# Patient Record
Sex: FEMALE | Race: WHITE | Employment: OTHER | URBAN - NONMETROPOLITAN AREA
[De-identification: names, ages, dates, MRNs, and addresses within clinical notes are randomized per-mention and may not be internally consistent; named-entity substitution may affect disease eponyms.]

---

## 2017-01-13 ENCOUNTER — HOSPITAL ENCOUNTER (OUTPATIENT)
Dept: GENERAL RADIOLOGY | Age: 52
Discharge: HOME | End: 2017-01-13
Payer: COMMERCIAL

## 2017-01-13 ENCOUNTER — OFFICE VISIT (OUTPATIENT)
Dept: URGENT CARE | Age: 52
End: 2017-01-13
Payer: COMMERCIAL

## 2017-01-13 VITALS
WEIGHT: 193.6 LBS | SYSTOLIC BLOOD PRESSURE: 95 MMHG | RESPIRATION RATE: 18 BRPM | HEART RATE: 62 BPM | HEIGHT: 62 IN | OXYGEN SATURATION: 99 % | BODY MASS INDEX: 35.63 KG/M2 | TEMPERATURE: 97.7 F | DIASTOLIC BLOOD PRESSURE: 64 MMHG

## 2017-01-13 DIAGNOSIS — M25.531 RIGHT WRIST PAIN: ICD-10-CM

## 2017-01-13 DIAGNOSIS — S63.501A RIGHT WRIST SPRAIN, INITIAL ENCOUNTER: Primary | ICD-10-CM

## 2017-01-13 DIAGNOSIS — M79.644 THUMB PAIN, RIGHT: ICD-10-CM

## 2017-01-13 PROCEDURE — 99213 OFFICE O/P EST LOW 20 MIN: CPT | Performed by: NURSE PRACTITIONER

## 2017-01-13 PROCEDURE — 73130 X-RAY EXAM OF HAND: CPT

## 2017-01-13 PROCEDURE — 73110 X-RAY EXAM OF WRIST: CPT

## 2017-01-13 PROCEDURE — G8427 DOCREV CUR MEDS BY ELIG CLIN: HCPCS | Performed by: NURSE PRACTITIONER

## 2017-01-13 PROCEDURE — G8484 FLU IMMUNIZE NO ADMIN: HCPCS | Performed by: NURSE PRACTITIONER

## 2017-01-13 PROCEDURE — 3014F SCREEN MAMMO DOC REV: CPT | Performed by: NURSE PRACTITIONER

## 2017-01-13 PROCEDURE — 1036F TOBACCO NON-USER: CPT | Performed by: NURSE PRACTITIONER

## 2017-01-13 PROCEDURE — G8598 ASA/ANTIPLAT THER USED: HCPCS | Performed by: NURSE PRACTITIONER

## 2017-01-13 PROCEDURE — 3017F COLORECTAL CA SCREEN DOC REV: CPT | Performed by: NURSE PRACTITIONER

## 2017-01-13 PROCEDURE — G8419 CALC BMI OUT NRM PARAM NOF/U: HCPCS | Performed by: NURSE PRACTITIONER

## 2017-01-13 ASSESSMENT — ENCOUNTER SYMPTOMS
GASTROINTESTINAL NEGATIVE: 1
RESPIRATORY NEGATIVE: 1
EYES NEGATIVE: 1

## 2017-02-09 ENCOUNTER — OFFICE VISIT (OUTPATIENT)
Dept: PRIMARY CARE CLINIC | Age: 52
End: 2017-02-09
Payer: COMMERCIAL

## 2017-02-09 VITALS
SYSTOLIC BLOOD PRESSURE: 135 MMHG | HEART RATE: 72 BPM | WEIGHT: 197.6 LBS | OXYGEN SATURATION: 98 % | DIASTOLIC BLOOD PRESSURE: 71 MMHG | RESPIRATION RATE: 19 BRPM | HEIGHT: 62 IN | TEMPERATURE: 97.7 F | BODY MASS INDEX: 36.36 KG/M2

## 2017-02-09 DIAGNOSIS — E78.49 OTHER HYPERLIPIDEMIA: ICD-10-CM

## 2017-02-09 DIAGNOSIS — I10 ESSENTIAL HYPERTENSION: ICD-10-CM

## 2017-02-09 DIAGNOSIS — E55.9 VITAMIN D DEFICIENCY: ICD-10-CM

## 2017-02-09 DIAGNOSIS — E11.9 TYPE 2 DIABETES, HBA1C GOAL < 7% (HCC): Primary | ICD-10-CM

## 2017-02-09 PROCEDURE — G8484 FLU IMMUNIZE NO ADMIN: HCPCS | Performed by: NURSE PRACTITIONER

## 2017-02-09 PROCEDURE — 3017F COLORECTAL CA SCREEN DOC REV: CPT | Performed by: NURSE PRACTITIONER

## 2017-02-09 PROCEDURE — G8427 DOCREV CUR MEDS BY ELIG CLIN: HCPCS | Performed by: NURSE PRACTITIONER

## 2017-02-09 PROCEDURE — G8598 ASA/ANTIPLAT THER USED: HCPCS | Performed by: NURSE PRACTITIONER

## 2017-02-09 PROCEDURE — 3046F HEMOGLOBIN A1C LEVEL >9.0%: CPT | Performed by: NURSE PRACTITIONER

## 2017-02-09 PROCEDURE — 1036F TOBACCO NON-USER: CPT | Performed by: NURSE PRACTITIONER

## 2017-02-09 PROCEDURE — 99214 OFFICE O/P EST MOD 30 MIN: CPT | Performed by: NURSE PRACTITIONER

## 2017-02-09 PROCEDURE — G8419 CALC BMI OUT NRM PARAM NOF/U: HCPCS | Performed by: NURSE PRACTITIONER

## 2017-02-09 PROCEDURE — 3014F SCREEN MAMMO DOC REV: CPT | Performed by: NURSE PRACTITIONER

## 2017-02-09 ASSESSMENT — ENCOUNTER SYMPTOMS
APNEA: 0
COLOR CHANGE: 0
ABDOMINAL DISTENTION: 0
TROUBLE SWALLOWING: 0
WHEEZING: 0
EYE REDNESS: 0
SHORTNESS OF BREATH: 0
NAUSEA: 0
ABDOMINAL PAIN: 0
VOMITING: 0
PHOTOPHOBIA: 0
SORE THROAT: 0
VOICE CHANGE: 0

## 2017-02-24 ENCOUNTER — OFFICE VISIT (OUTPATIENT)
Dept: URGENT CARE | Age: 52
End: 2017-02-24
Payer: COMMERCIAL

## 2017-02-24 VITALS
TEMPERATURE: 98 F | BODY MASS INDEX: 35.55 KG/M2 | DIASTOLIC BLOOD PRESSURE: 59 MMHG | HEIGHT: 62 IN | RESPIRATION RATE: 20 BRPM | SYSTOLIC BLOOD PRESSURE: 123 MMHG | WEIGHT: 193.2 LBS | HEART RATE: 75 BPM | OXYGEN SATURATION: 97 %

## 2017-02-24 DIAGNOSIS — J01.01 ACUTE RECURRENT MAXILLARY SINUSITIS: ICD-10-CM

## 2017-02-24 DIAGNOSIS — J02.9 SORE THROAT: Primary | ICD-10-CM

## 2017-02-24 LAB — S PYO AG THROAT QL: NORMAL

## 2017-02-24 PROCEDURE — G8427 DOCREV CUR MEDS BY ELIG CLIN: HCPCS | Performed by: FAMILY MEDICINE

## 2017-02-24 PROCEDURE — G8417 CALC BMI ABV UP PARAM F/U: HCPCS | Performed by: FAMILY MEDICINE

## 2017-02-24 PROCEDURE — G8484 FLU IMMUNIZE NO ADMIN: HCPCS | Performed by: FAMILY MEDICINE

## 2017-02-24 PROCEDURE — 1036F TOBACCO NON-USER: CPT | Performed by: FAMILY MEDICINE

## 2017-02-24 PROCEDURE — 3014F SCREEN MAMMO DOC REV: CPT | Performed by: FAMILY MEDICINE

## 2017-02-24 PROCEDURE — G8598 ASA/ANTIPLAT THER USED: HCPCS | Performed by: FAMILY MEDICINE

## 2017-02-24 PROCEDURE — 99213 OFFICE O/P EST LOW 20 MIN: CPT | Performed by: FAMILY MEDICINE

## 2017-02-24 PROCEDURE — 87880 STREP A ASSAY W/OPTIC: CPT | Performed by: FAMILY MEDICINE

## 2017-02-24 PROCEDURE — 3017F COLORECTAL CA SCREEN DOC REV: CPT | Performed by: FAMILY MEDICINE

## 2017-02-24 RX ORDER — FLUTICASONE PROPIONATE 50 MCG
2 SPRAY, SUSPENSION (ML) NASAL DAILY
Qty: 1 BOTTLE | Refills: 3 | Status: ON HOLD | OUTPATIENT
Start: 2017-02-24 | End: 2017-06-05

## 2017-02-24 RX ORDER — AMOXICILLIN AND CLAVULANATE POTASSIUM 875; 125 MG/1; MG/1
1 TABLET, FILM COATED ORAL EVERY 12 HOURS
Qty: 20 TABLET | Refills: 0 | Status: SHIPPED | OUTPATIENT
Start: 2017-02-24 | End: 2017-03-06

## 2017-05-02 ENCOUNTER — OFFICE VISIT (OUTPATIENT)
Dept: CARDIOLOGY | Age: 52
End: 2017-05-02
Payer: COMMERCIAL

## 2017-05-02 VITALS
DIASTOLIC BLOOD PRESSURE: 70 MMHG | BODY MASS INDEX: 36.07 KG/M2 | WEIGHT: 196 LBS | HEART RATE: 70 BPM | HEIGHT: 62 IN | SYSTOLIC BLOOD PRESSURE: 138 MMHG

## 2017-05-02 DIAGNOSIS — I25.10 CORONARY ARTERY DISEASE INVOLVING NATIVE CORONARY ARTERY OF NATIVE HEART WITHOUT ANGINA PECTORIS: Primary | ICD-10-CM

## 2017-05-02 DIAGNOSIS — I10 ESSENTIAL HYPERTENSION: ICD-10-CM

## 2017-05-02 DIAGNOSIS — E78.2 MIXED HYPERLIPIDEMIA: ICD-10-CM

## 2017-05-02 PROCEDURE — 1036F TOBACCO NON-USER: CPT | Performed by: INTERNAL MEDICINE

## 2017-05-02 PROCEDURE — G8598 ASA/ANTIPLAT THER USED: HCPCS | Performed by: INTERNAL MEDICINE

## 2017-05-02 PROCEDURE — G8427 DOCREV CUR MEDS BY ELIG CLIN: HCPCS | Performed by: INTERNAL MEDICINE

## 2017-05-02 PROCEDURE — G8417 CALC BMI ABV UP PARAM F/U: HCPCS | Performed by: INTERNAL MEDICINE

## 2017-05-02 PROCEDURE — 3014F SCREEN MAMMO DOC REV: CPT | Performed by: INTERNAL MEDICINE

## 2017-05-02 PROCEDURE — 99212 OFFICE O/P EST SF 10 MIN: CPT | Performed by: INTERNAL MEDICINE

## 2017-05-02 PROCEDURE — 3017F COLORECTAL CA SCREEN DOC REV: CPT | Performed by: INTERNAL MEDICINE

## 2017-05-09 ENCOUNTER — HOSPITAL ENCOUNTER (OUTPATIENT)
Dept: GENERAL RADIOLOGY | Age: 52
Discharge: HOME | End: 2017-05-09
Payer: COMMERCIAL

## 2017-05-09 ENCOUNTER — OFFICE VISIT (OUTPATIENT)
Dept: PRIMARY CARE CLINIC | Age: 52
End: 2017-05-09
Payer: COMMERCIAL

## 2017-05-09 VITALS
RESPIRATION RATE: 18 BRPM | TEMPERATURE: 97.3 F | SYSTOLIC BLOOD PRESSURE: 108 MMHG | BODY MASS INDEX: 35.33 KG/M2 | HEIGHT: 62 IN | OXYGEN SATURATION: 96 % | HEART RATE: 76 BPM | DIASTOLIC BLOOD PRESSURE: 64 MMHG | WEIGHT: 192 LBS

## 2017-05-09 DIAGNOSIS — E55.9 VITAMIN D DEFICIENCY: ICD-10-CM

## 2017-05-09 DIAGNOSIS — M79.671 RIGHT FOOT PAIN: ICD-10-CM

## 2017-05-09 DIAGNOSIS — Z13.220 SCREENING FOR HYPERLIPIDEMIA: Primary | ICD-10-CM

## 2017-05-09 DIAGNOSIS — E11.69 TYPE 2 DIABETES MELLITUS WITH OTHER SPECIFIED COMPLICATION (HCC): ICD-10-CM

## 2017-05-09 PROCEDURE — 1036F TOBACCO NON-USER: CPT | Performed by: NURSE PRACTITIONER

## 2017-05-09 PROCEDURE — G8427 DOCREV CUR MEDS BY ELIG CLIN: HCPCS | Performed by: NURSE PRACTITIONER

## 2017-05-09 PROCEDURE — G8417 CALC BMI ABV UP PARAM F/U: HCPCS | Performed by: NURSE PRACTITIONER

## 2017-05-09 PROCEDURE — G8598 ASA/ANTIPLAT THER USED: HCPCS | Performed by: NURSE PRACTITIONER

## 2017-05-09 PROCEDURE — 3014F SCREEN MAMMO DOC REV: CPT | Performed by: NURSE PRACTITIONER

## 2017-05-09 PROCEDURE — 3046F HEMOGLOBIN A1C LEVEL >9.0%: CPT | Performed by: NURSE PRACTITIONER

## 2017-05-09 PROCEDURE — 3017F COLORECTAL CA SCREEN DOC REV: CPT | Performed by: NURSE PRACTITIONER

## 2017-05-09 PROCEDURE — 73630 X-RAY EXAM OF FOOT: CPT

## 2017-05-09 PROCEDURE — 99214 OFFICE O/P EST MOD 30 MIN: CPT | Performed by: NURSE PRACTITIONER

## 2017-05-09 ASSESSMENT — ENCOUNTER SYMPTOMS
GASTROINTESTINAL NEGATIVE: 1
ALLERGIC/IMMUNOLOGIC NEGATIVE: 1
EYES NEGATIVE: 1
RESPIRATORY NEGATIVE: 1

## 2017-05-10 DIAGNOSIS — M79.672 LEFT FOOT PAIN: Primary | ICD-10-CM

## 2017-05-11 ENCOUNTER — HOSPITAL ENCOUNTER (OUTPATIENT)
Dept: NUCLEAR MEDICINE | Age: 52
End: 2017-05-11
Payer: COMMERCIAL

## 2017-05-11 ENCOUNTER — HOSPITAL ENCOUNTER (OUTPATIENT)
Dept: NUCLEAR MEDICINE | Age: 52
Discharge: HOME | End: 2017-05-11
Payer: COMMERCIAL

## 2017-05-11 ENCOUNTER — HOSPITAL ENCOUNTER (OUTPATIENT)
Dept: NON INVASIVE DIAGNOSTICS | Age: 52
Discharge: HOME | End: 2017-05-11
Payer: COMMERCIAL

## 2017-05-11 DIAGNOSIS — Z13.220 SCREENING FOR HYPERLIPIDEMIA: ICD-10-CM

## 2017-05-11 DIAGNOSIS — E11.69 TYPE 2 DIABETES MELLITUS WITH OTHER SPECIFIED COMPLICATION (HCC): ICD-10-CM

## 2017-05-11 DIAGNOSIS — E78.2 MIXED HYPERLIPIDEMIA: ICD-10-CM

## 2017-05-11 DIAGNOSIS — I10 ESSENTIAL HYPERTENSION: ICD-10-CM

## 2017-05-11 DIAGNOSIS — E55.9 VITAMIN D DEFICIENCY: ICD-10-CM

## 2017-05-11 DIAGNOSIS — I25.10 CORONARY ARTERY DISEASE INVOLVING NATIVE CORONARY ARTERY OF NATIVE HEART WITHOUT ANGINA PECTORIS: ICD-10-CM

## 2017-05-11 LAB
ALBUMIN SERPL-MCNC: 4 G/DL (ref 3.5–5.2)
ALP BLD-CCNC: 76 U/L (ref 35–104)
ALT SERPL-CCNC: 18 U/L (ref 5–33)
ANION GAP SERPL CALCULATED.3IONS-SCNC: 14 MMOL/L (ref 7–19)
AST SERPL-CCNC: 15 U/L (ref 5–32)
BASOPHILS ABSOLUTE: 0.1 K/UL (ref 0–0.2)
BASOPHILS RELATIVE PERCENT: 1 % (ref 0–1)
BILIRUB SERPL-MCNC: 0.4 MG/DL (ref 0.2–1.2)
BUN BLDV-MCNC: 12 MG/DL (ref 6–20)
CALCIUM SERPL-MCNC: 9.1 MG/DL (ref 8.6–10)
CHLORIDE BLD-SCNC: 97 MMOL/L (ref 98–111)
CO2: 26 MMOL/L (ref 22–29)
CREAT SERPL-MCNC: 0.7 MG/DL (ref 0.5–0.9)
EOSINOPHILS ABSOLUTE: 0.2 K/UL (ref 0–0.6)
EOSINOPHILS RELATIVE PERCENT: 1.8 % (ref 0–5)
GFR NON-AFRICAN AMERICAN: >60
GLOBULIN: 3 G/DL
GLUCOSE BLD-MCNC: 316 MG/DL (ref 74–109)
HBA1C MFR BLD: 10.9 %
HCT VFR BLD CALC: 44.6 % (ref 37–47)
HEMOGLOBIN: 15.3 G/DL (ref 12–16)
LYMPHOCYTES ABSOLUTE: 2.8 K/UL (ref 1.1–4.5)
LYMPHOCYTES RELATIVE PERCENT: 34 % (ref 20–40)
MCH RBC QN AUTO: 29.5 PG (ref 27–31)
MCHC RBC AUTO-ENTMCNC: 34.3 G/DL (ref 33–37)
MCV RBC AUTO: 85.9 FL (ref 81–99)
MONOCYTES ABSOLUTE: 0.7 K/UL (ref 0–0.9)
MONOCYTES RELATIVE PERCENT: 7.8 % (ref 0–10)
NEUTROPHILS ABSOLUTE: 4.6 K/UL (ref 1.5–7.5)
NEUTROPHILS RELATIVE PERCENT: 54.9 % (ref 50–65)
PDW BLD-RTO: 12.9 % (ref 11.5–14.5)
PLATELET # BLD: 236 K/UL (ref 130–400)
PMV BLD AUTO: 10.7 FL (ref 7.4–10.4)
POTASSIUM SERPL-SCNC: 4.3 MMOL/L (ref 3.5–5)
RBC # BLD: 5.19 M/UL (ref 4.2–5.4)
SODIUM BLD-SCNC: 137 MMOL/L (ref 136–145)
TOTAL PROTEIN: 7 G/DL (ref 6.6–8.7)
VITAMIN B-12: 338 PG/ML (ref 211–946)
VITAMIN D 25-HYDROXY: 13 NG/ML
WBC # BLD: 8.3 K/UL (ref 4.8–10.8)

## 2017-05-11 PROCEDURE — 3430000000 HC RX DIAGNOSTIC RADIOPHARMACEUTICAL: Performed by: INTERNAL MEDICINE

## 2017-05-11 PROCEDURE — 78452 HT MUSCLE IMAGE SPECT MULT: CPT

## 2017-05-11 PROCEDURE — 93017 CV STRESS TEST TRACING ONLY: CPT

## 2017-05-11 PROCEDURE — A9500 TC99M SESTAMIBI: HCPCS | Performed by: INTERNAL MEDICINE

## 2017-05-11 PROCEDURE — 6360000002 HC RX W HCPCS: Performed by: INTERNAL MEDICINE

## 2017-05-11 RX ADMIN — TETRAKIS(2-METHOXYISOBUTYLISOCYANIDE)COPPER(I) TETRAFLUOROBORATE 10 MILLICURIE: 1 INJECTION, POWDER, LYOPHILIZED, FOR SOLUTION INTRAVENOUS at 13:59

## 2017-05-11 RX ADMIN — REGADENOSON 0.4 MG: 0.08 INJECTION, SOLUTION INTRAVENOUS at 13:59

## 2017-05-11 RX ADMIN — TETRAKIS(2-METHOXYISOBUTYLISOCYANIDE)COPPER(I) TETRAFLUOROBORATE 30 MILLICURIE: 1 INJECTION, POWDER, LYOPHILIZED, FOR SOLUTION INTRAVENOUS at 13:58

## 2017-05-12 ENCOUNTER — TELEPHONE (OUTPATIENT)
Dept: PRIMARY CARE CLINIC | Age: 52
End: 2017-05-12

## 2017-05-12 DIAGNOSIS — M79.672 LEFT FOOT PAIN: Primary | ICD-10-CM

## 2017-05-12 LAB
CHOLESTEROL, TOTAL: 175 MG/DL (ref 160–199)
HDLC SERPL-MCNC: 61 MG/DL (ref 65–121)
LDL CHOLESTEROL CALCULATED: 98 MG/DL
LV EF: 51 %
LVEF MODALITY: NORMAL
TRIGL SERPL-MCNC: 81 MG/DL (ref 150–199)

## 2017-05-16 ENCOUNTER — SURG/PROC ORDERS (OUTPATIENT)
Dept: CARDIOLOGY | Age: 52
End: 2017-05-16

## 2017-05-16 ENCOUNTER — OFFICE VISIT (OUTPATIENT)
Dept: CARDIOLOGY | Age: 52
End: 2017-05-16
Payer: COMMERCIAL

## 2017-05-16 ENCOUNTER — HOSPITAL ENCOUNTER (OUTPATIENT)
Dept: GENERAL RADIOLOGY | Age: 52
Discharge: HOME | End: 2017-05-16
Payer: COMMERCIAL

## 2017-05-16 ENCOUNTER — HOSPITAL ENCOUNTER (OUTPATIENT)
Dept: LAB | Age: 52
Discharge: HOME | End: 2017-05-16
Payer: COMMERCIAL

## 2017-05-16 VITALS
BODY MASS INDEX: 35.33 KG/M2 | HEART RATE: 70 BPM | DIASTOLIC BLOOD PRESSURE: 70 MMHG | HEIGHT: 62 IN | SYSTOLIC BLOOD PRESSURE: 126 MMHG | WEIGHT: 192 LBS

## 2017-05-16 DIAGNOSIS — I25.10 CORONARY ARTERY DISEASE INVOLVING NATIVE CORONARY ARTERY OF NATIVE HEART WITHOUT ANGINA PECTORIS: ICD-10-CM

## 2017-05-16 DIAGNOSIS — E78.2 MIXED HYPERLIPIDEMIA: ICD-10-CM

## 2017-05-16 DIAGNOSIS — I10 ESSENTIAL HYPERTENSION: ICD-10-CM

## 2017-05-16 DIAGNOSIS — I25.10 CORONARY ARTERY DISEASE INVOLVING NATIVE CORONARY ARTERY OF NATIVE HEART WITHOUT ANGINA PECTORIS: Primary | ICD-10-CM

## 2017-05-16 DIAGNOSIS — R93.1 ABNORMAL NUCLEAR CARDIAC IMAGING TEST: Primary | ICD-10-CM

## 2017-05-16 LAB
ANION GAP SERPL CALCULATED.3IONS-SCNC: 12 MMOL/L (ref 7–19)
BUN BLDV-MCNC: 12 MG/DL (ref 6–20)
CALCIUM SERPL-MCNC: 9.3 MG/DL (ref 8.6–10)
CHLORIDE BLD-SCNC: 95 MMOL/L (ref 98–111)
CO2: 28 MMOL/L (ref 22–29)
CREAT SERPL-MCNC: 0.7 MG/DL (ref 0.5–0.9)
GFR NON-AFRICAN AMERICAN: >60
GLUCOSE BLD-MCNC: 316 MG/DL (ref 74–109)
POTASSIUM SERPL-SCNC: 4.3 MMOL/L (ref 3.5–5)
SODIUM BLD-SCNC: 135 MMOL/L (ref 136–145)

## 2017-05-16 PROCEDURE — G8427 DOCREV CUR MEDS BY ELIG CLIN: HCPCS | Performed by: INTERNAL MEDICINE

## 2017-05-16 PROCEDURE — 3017F COLORECTAL CA SCREEN DOC REV: CPT | Performed by: INTERNAL MEDICINE

## 2017-05-16 PROCEDURE — 99213 OFFICE O/P EST LOW 20 MIN: CPT | Performed by: INTERNAL MEDICINE

## 2017-05-16 PROCEDURE — G8598 ASA/ANTIPLAT THER USED: HCPCS | Performed by: INTERNAL MEDICINE

## 2017-05-16 PROCEDURE — 1036F TOBACCO NON-USER: CPT | Performed by: INTERNAL MEDICINE

## 2017-05-16 PROCEDURE — G8417 CALC BMI ABV UP PARAM F/U: HCPCS | Performed by: INTERNAL MEDICINE

## 2017-05-16 PROCEDURE — 71020 XR CHEST STANDARD TWO VW: CPT

## 2017-05-16 PROCEDURE — 3014F SCREEN MAMMO DOC REV: CPT | Performed by: INTERNAL MEDICINE

## 2017-05-17 RX ORDER — ERGOCALCIFEROL (VITAMIN D2) 1250 MCG
50000 CAPSULE ORAL WEEKLY
Qty: 4 CAPSULE | Refills: 3 | Status: SHIPPED | OUTPATIENT
Start: 2017-05-17 | End: 2017-09-05 | Stop reason: ALTCHOICE

## 2017-05-18 ENCOUNTER — TELEPHONE (OUTPATIENT)
Dept: PRIMARY CARE CLINIC | Age: 52
End: 2017-05-18

## 2017-05-18 DIAGNOSIS — E11.69 TYPE 2 DIABETES MELLITUS WITH OTHER SPECIFIED COMPLICATION (HCC): Primary | ICD-10-CM

## 2017-05-18 RX ORDER — SODIUM CHLORIDE 9 MG/ML
INJECTION, SOLUTION INTRAVENOUS CONTINUOUS
Status: CANCELLED | OUTPATIENT
Start: 2017-05-18

## 2017-05-24 ENCOUNTER — TELEPHONE (OUTPATIENT)
Dept: CARDIOLOGY | Age: 52
End: 2017-05-24

## 2017-05-25 ENCOUNTER — HOSPITAL ENCOUNTER (OUTPATIENT)
Dept: CARDIAC CATH/INVASIVE PROCEDURES | Age: 52
Discharge: HOME | End: 2017-05-25
Payer: COMMERCIAL

## 2017-06-05 ENCOUNTER — HOSPITAL ENCOUNTER (OUTPATIENT)
Dept: CARDIAC CATH/INVASIVE PROCEDURES | Age: 52
Discharge: HOME | End: 2017-06-05
Attending: INTERNAL MEDICINE | Admitting: INTERNAL MEDICINE
Payer: COMMERCIAL

## 2017-06-05 VITALS
SYSTOLIC BLOOD PRESSURE: 111 MMHG | WEIGHT: 192 LBS | DIASTOLIC BLOOD PRESSURE: 55 MMHG | HEART RATE: 66 BPM | OXYGEN SATURATION: 96 % | TEMPERATURE: 96.9 F | RESPIRATION RATE: 16 BRPM | HEIGHT: 62 IN | BODY MASS INDEX: 35.33 KG/M2

## 2017-06-05 DIAGNOSIS — R93.1 ABNORMAL NUCLEAR CARDIAC IMAGING TEST: ICD-10-CM

## 2017-06-05 PROCEDURE — 6360000002 HC RX W HCPCS

## 2017-06-05 PROCEDURE — 2720000001 HC MISC SURG SUPPLY STERILE $51-500

## 2017-06-05 PROCEDURE — 2709999900 HC NON-CHARGEABLE SUPPLY

## 2017-06-05 PROCEDURE — 2580000003 HC RX 258: Performed by: INTERNAL MEDICINE

## 2017-06-05 PROCEDURE — C1887 CATHETER, GUIDING: HCPCS

## 2017-06-05 PROCEDURE — C1760 CLOSURE DEV, VASC: HCPCS

## 2017-06-05 PROCEDURE — 99024 POSTOP FOLLOW-UP VISIT: CPT | Performed by: INTERNAL MEDICINE

## 2017-06-05 PROCEDURE — 93459 L HRT ART/GRFT ANGIO: CPT | Performed by: INTERNAL MEDICINE

## 2017-06-05 RX ORDER — ISOSORBIDE MONONITRATE 30 MG/1
30 TABLET, EXTENDED RELEASE ORAL DAILY
Qty: 30 TABLET | Refills: 3 | Status: SHIPPED | OUTPATIENT
Start: 2017-06-05 | End: 2017-09-05 | Stop reason: SDUPTHER

## 2017-06-05 RX ORDER — SODIUM CHLORIDE 9 MG/ML
INJECTION, SOLUTION INTRAVENOUS CONTINUOUS
Status: DISCONTINUED | OUTPATIENT
Start: 2017-06-05 | End: 2017-06-05 | Stop reason: HOSPADM

## 2017-06-05 RX ADMIN — SODIUM CHLORIDE: 9 INJECTION, SOLUTION INTRAVENOUS at 08:25

## 2017-06-10 ENCOUNTER — OFFICE VISIT (OUTPATIENT)
Dept: URGENT CARE | Age: 52
End: 2017-06-10
Payer: COMMERCIAL

## 2017-06-10 VITALS
HEART RATE: 74 BPM | BODY MASS INDEX: 35.88 KG/M2 | WEIGHT: 195 LBS | DIASTOLIC BLOOD PRESSURE: 74 MMHG | RESPIRATION RATE: 18 BRPM | TEMPERATURE: 97.9 F | HEIGHT: 62 IN | OXYGEN SATURATION: 98 % | SYSTOLIC BLOOD PRESSURE: 132 MMHG

## 2017-06-10 DIAGNOSIS — N39.0 URINARY TRACT INFECTION WITHOUT HEMATURIA, SITE UNSPECIFIED: ICD-10-CM

## 2017-06-10 DIAGNOSIS — R30.0 DYSURIA: Primary | ICD-10-CM

## 2017-06-10 LAB
BILIRUBIN, POC: ABNORMAL
BLOOD URINE, POC: ABNORMAL
CLARITY, POC: CLEAR
COLOR, POC: YELLOW
GLUCOSE URINE, POC: ABNORMAL
KETONES, POC: ABNORMAL
LEUKOCYTE EST, POC: ABNORMAL
NITRITE, POC: ABNORMAL
PH, POC: 7
PROTEIN, POC: ABNORMAL
SPECIFIC GRAVITY, POC: 1.02
UROBILINOGEN, POC: 0.2

## 2017-06-10 PROCEDURE — 3017F COLORECTAL CA SCREEN DOC REV: CPT | Performed by: PHYSICIAN ASSISTANT

## 2017-06-10 PROCEDURE — 3014F SCREEN MAMMO DOC REV: CPT | Performed by: PHYSICIAN ASSISTANT

## 2017-06-10 PROCEDURE — 99213 OFFICE O/P EST LOW 20 MIN: CPT | Performed by: PHYSICIAN ASSISTANT

## 2017-06-10 PROCEDURE — 81002 URINALYSIS NONAUTO W/O SCOPE: CPT | Performed by: PHYSICIAN ASSISTANT

## 2017-06-10 PROCEDURE — G8427 DOCREV CUR MEDS BY ELIG CLIN: HCPCS | Performed by: PHYSICIAN ASSISTANT

## 2017-06-10 PROCEDURE — G8598 ASA/ANTIPLAT THER USED: HCPCS | Performed by: PHYSICIAN ASSISTANT

## 2017-06-10 PROCEDURE — 1036F TOBACCO NON-USER: CPT | Performed by: PHYSICIAN ASSISTANT

## 2017-06-10 PROCEDURE — G8417 CALC BMI ABV UP PARAM F/U: HCPCS | Performed by: PHYSICIAN ASSISTANT

## 2017-06-10 RX ORDER — PHENAZOPYRIDINE HYDROCHLORIDE 100 MG/1
100 TABLET, FILM COATED ORAL 3 TIMES DAILY PRN
Qty: 21 TABLET | Refills: 0 | Status: SHIPPED | OUTPATIENT
Start: 2017-06-10 | End: 2017-06-17

## 2017-06-10 RX ORDER — CIPROFLOXACIN 500 MG/1
500 TABLET, FILM COATED ORAL 2 TIMES DAILY
Qty: 20 TABLET | Refills: 0 | Status: SHIPPED | OUTPATIENT
Start: 2017-06-10 | End: 2017-06-20

## 2017-06-10 ASSESSMENT — ENCOUNTER SYMPTOMS
VOMITING: 0
NAUSEA: 1

## 2017-06-12 LAB
ORGANISM: ABNORMAL
URINE CULTURE, ROUTINE: ABNORMAL
URINE CULTURE, ROUTINE: ABNORMAL

## 2017-06-15 ENCOUNTER — OFFICE VISIT (OUTPATIENT)
Dept: PRIMARY CARE CLINIC | Age: 52
End: 2017-06-15
Payer: COMMERCIAL

## 2017-06-15 VITALS
HEIGHT: 62 IN | WEIGHT: 196.4 LBS | SYSTOLIC BLOOD PRESSURE: 130 MMHG | DIASTOLIC BLOOD PRESSURE: 68 MMHG | BODY MASS INDEX: 36.14 KG/M2 | TEMPERATURE: 97.7 F | OXYGEN SATURATION: 95 % | HEART RATE: 70 BPM

## 2017-06-15 DIAGNOSIS — Z12.11 SCREENING FOR COLON CANCER: ICD-10-CM

## 2017-06-15 DIAGNOSIS — Z11.59 NEED FOR HEPATITIS C SCREENING TEST: ICD-10-CM

## 2017-06-15 DIAGNOSIS — I10 ESSENTIAL HYPERTENSION: ICD-10-CM

## 2017-06-15 DIAGNOSIS — K21.9 GASTROESOPHAGEAL REFLUX DISEASE WITHOUT ESOPHAGITIS: ICD-10-CM

## 2017-06-15 DIAGNOSIS — Z11.4 SCREENING FOR HIV WITHOUT PRESENCE OF RISK FACTORS: ICD-10-CM

## 2017-06-15 DIAGNOSIS — E11.8 TYPE 2 DIABETES MELLITUS WITH COMPLICATION, UNSPECIFIED LONG TERM INSULIN USE STATUS: ICD-10-CM

## 2017-06-15 DIAGNOSIS — L68.0 HIRSUTISM: ICD-10-CM

## 2017-06-15 DIAGNOSIS — Z23 NEEDS FLU SHOT: ICD-10-CM

## 2017-06-15 PROCEDURE — 1036F TOBACCO NON-USER: CPT | Performed by: NURSE PRACTITIONER

## 2017-06-15 PROCEDURE — 3017F COLORECTAL CA SCREEN DOC REV: CPT | Performed by: NURSE PRACTITIONER

## 2017-06-15 PROCEDURE — G8598 ASA/ANTIPLAT THER USED: HCPCS | Performed by: NURSE PRACTITIONER

## 2017-06-15 PROCEDURE — 99214 OFFICE O/P EST MOD 30 MIN: CPT | Performed by: NURSE PRACTITIONER

## 2017-06-15 PROCEDURE — 3046F HEMOGLOBIN A1C LEVEL >9.0%: CPT | Performed by: NURSE PRACTITIONER

## 2017-06-15 PROCEDURE — 3014F SCREEN MAMMO DOC REV: CPT | Performed by: NURSE PRACTITIONER

## 2017-06-15 PROCEDURE — G8417 CALC BMI ABV UP PARAM F/U: HCPCS | Performed by: NURSE PRACTITIONER

## 2017-06-15 PROCEDURE — G8427 DOCREV CUR MEDS BY ELIG CLIN: HCPCS | Performed by: NURSE PRACTITIONER

## 2017-06-15 RX ORDER — VALSARTAN 80 MG/1
80 TABLET ORAL DAILY
Qty: 30 TABLET | Refills: 3 | Status: SHIPPED | OUTPATIENT
Start: 2017-06-15 | End: 2017-10-11 | Stop reason: SDUPTHER

## 2017-06-16 ASSESSMENT — ENCOUNTER SYMPTOMS
EYES NEGATIVE: 1
GASTROINTESTINAL NEGATIVE: 1
RESPIRATORY NEGATIVE: 1
ALLERGIC/IMMUNOLOGIC NEGATIVE: 1

## 2017-06-19 ENCOUNTER — CARE COORDINATION (OUTPATIENT)
Dept: CARE COORDINATION | Age: 52
End: 2017-06-19

## 2017-06-20 ENCOUNTER — TELEPHONE (OUTPATIENT)
Dept: CARDIOLOGY | Age: 52
End: 2017-06-20

## 2017-06-29 ENCOUNTER — CARE COORDINATOR VISIT (OUTPATIENT)
Dept: PRIMARY CARE CLINIC | Age: 52
End: 2017-06-29

## 2017-06-29 ENCOUNTER — OFFICE VISIT (OUTPATIENT)
Dept: PRIMARY CARE CLINIC | Age: 52
End: 2017-06-29
Payer: COMMERCIAL

## 2017-06-29 VITALS
TEMPERATURE: 97.7 F | BODY MASS INDEX: 35.51 KG/M2 | RESPIRATION RATE: 16 BRPM | DIASTOLIC BLOOD PRESSURE: 68 MMHG | WEIGHT: 193 LBS | HEART RATE: 74 BPM | HEIGHT: 62 IN | SYSTOLIC BLOOD PRESSURE: 126 MMHG | OXYGEN SATURATION: 98 %

## 2017-06-29 DIAGNOSIS — E11.8 TYPE 2 DIABETES MELLITUS WITH COMPLICATION, WITH LONG-TERM CURRENT USE OF INSULIN (HCC): ICD-10-CM

## 2017-06-29 DIAGNOSIS — I10 ESSENTIAL HYPERTENSION: Primary | ICD-10-CM

## 2017-06-29 DIAGNOSIS — Z79.4 TYPE 2 DIABETES MELLITUS WITH COMPLICATION, WITH LONG-TERM CURRENT USE OF INSULIN (HCC): ICD-10-CM

## 2017-06-29 DIAGNOSIS — I25.10 ASCVD (ARTERIOSCLEROTIC CARDIOVASCULAR DISEASE): ICD-10-CM

## 2017-06-29 PROCEDURE — 3017F COLORECTAL CA SCREEN DOC REV: CPT | Performed by: INTERNAL MEDICINE

## 2017-06-29 PROCEDURE — 3046F HEMOGLOBIN A1C LEVEL >9.0%: CPT | Performed by: INTERNAL MEDICINE

## 2017-06-29 PROCEDURE — 99214 OFFICE O/P EST MOD 30 MIN: CPT | Performed by: INTERNAL MEDICINE

## 2017-06-29 PROCEDURE — G8598 ASA/ANTIPLAT THER USED: HCPCS | Performed by: INTERNAL MEDICINE

## 2017-06-29 PROCEDURE — 3014F SCREEN MAMMO DOC REV: CPT | Performed by: INTERNAL MEDICINE

## 2017-06-29 PROCEDURE — 1036F TOBACCO NON-USER: CPT | Performed by: INTERNAL MEDICINE

## 2017-06-29 PROCEDURE — G8417 CALC BMI ABV UP PARAM F/U: HCPCS | Performed by: INTERNAL MEDICINE

## 2017-06-29 PROCEDURE — G8427 DOCREV CUR MEDS BY ELIG CLIN: HCPCS | Performed by: INTERNAL MEDICINE

## 2017-06-29 RX ORDER — NYSTATIN 100000 [USP'U]/G
POWDER TOPICAL
Qty: 1 BOTTLE | Refills: 1 | Status: SHIPPED | OUTPATIENT
Start: 2017-06-29 | End: 2017-06-29 | Stop reason: SDUPTHER

## 2017-06-29 RX ORDER — NYSTATIN 100000 [USP'U]/G
POWDER TOPICAL
Qty: 1 BOTTLE | Refills: 1 | Status: SHIPPED | OUTPATIENT
Start: 2017-06-29 | End: 2018-07-13

## 2017-06-29 ASSESSMENT — ENCOUNTER SYMPTOMS
CONSTIPATION: 0
SHORTNESS OF BREATH: 1
VOMITING: 0
BACK PAIN: 0
NAUSEA: 0
DIARRHEA: 0
COUGH: 1

## 2017-06-29 ASSESSMENT — PATIENT HEALTH QUESTIONNAIRE - PHQ9
2. FEELING DOWN, DEPRESSED OR HOPELESS: 1
SUM OF ALL RESPONSES TO PHQ9 QUESTIONS 1 & 2: 2
SUM OF ALL RESPONSES TO PHQ QUESTIONS 1-9: 2
1. LITTLE INTEREST OR PLEASURE IN DOING THINGS: 1

## 2017-07-19 ENCOUNTER — CARE COORDINATION (OUTPATIENT)
Dept: PRIMARY CARE CLINIC | Age: 52
End: 2017-07-19

## 2017-07-25 ENCOUNTER — CARE COORDINATOR VISIT (OUTPATIENT)
Dept: PRIMARY CARE CLINIC | Age: 52
End: 2017-07-25

## 2017-07-25 DIAGNOSIS — E11.8 TYPE 2 DIABETES MELLITUS WITH COMPLICATION, UNSPECIFIED LONG TERM INSULIN USE STATUS: Primary | ICD-10-CM

## 2017-07-27 ENCOUNTER — TELEPHONE (OUTPATIENT)
Dept: PRIMARY CARE CLINIC | Age: 52
End: 2017-07-27

## 2017-07-27 DIAGNOSIS — M79.671 RIGHT FOOT PAIN: Primary | ICD-10-CM

## 2017-08-15 ENCOUNTER — OFFICE VISIT (OUTPATIENT)
Dept: PRIMARY CARE CLINIC | Age: 52
End: 2017-08-15
Payer: COMMERCIAL

## 2017-08-15 VITALS
HEART RATE: 66 BPM | HEIGHT: 62 IN | BODY MASS INDEX: 36.73 KG/M2 | OXYGEN SATURATION: 96 % | TEMPERATURE: 97.6 F | RESPIRATION RATE: 18 BRPM | SYSTOLIC BLOOD PRESSURE: 124 MMHG | DIASTOLIC BLOOD PRESSURE: 72 MMHG | WEIGHT: 199.6 LBS

## 2017-08-15 DIAGNOSIS — E11.8 TYPE 2 DIABETES MELLITUS WITH COMPLICATION, WITH LONG-TERM CURRENT USE OF INSULIN (HCC): Primary | ICD-10-CM

## 2017-08-15 DIAGNOSIS — Z79.4 TYPE 2 DIABETES MELLITUS WITH COMPLICATION, WITH LONG-TERM CURRENT USE OF INSULIN (HCC): Primary | ICD-10-CM

## 2017-08-15 DIAGNOSIS — I10 ESSENTIAL HYPERTENSION: ICD-10-CM

## 2017-08-15 DIAGNOSIS — I25.10 ASCVD (ARTERIOSCLEROTIC CARDIOVASCULAR DISEASE): ICD-10-CM

## 2017-08-15 LAB — HBA1C MFR BLD: 8.3 %

## 2017-08-15 PROCEDURE — G8417 CALC BMI ABV UP PARAM F/U: HCPCS | Performed by: INTERNAL MEDICINE

## 2017-08-15 PROCEDURE — 99214 OFFICE O/P EST MOD 30 MIN: CPT | Performed by: INTERNAL MEDICINE

## 2017-08-15 PROCEDURE — G8427 DOCREV CUR MEDS BY ELIG CLIN: HCPCS | Performed by: INTERNAL MEDICINE

## 2017-08-15 PROCEDURE — 83036 HEMOGLOBIN GLYCOSYLATED A1C: CPT | Performed by: INTERNAL MEDICINE

## 2017-08-15 PROCEDURE — 3046F HEMOGLOBIN A1C LEVEL >9.0%: CPT | Performed by: INTERNAL MEDICINE

## 2017-08-15 PROCEDURE — 3014F SCREEN MAMMO DOC REV: CPT | Performed by: INTERNAL MEDICINE

## 2017-08-15 PROCEDURE — 3017F COLORECTAL CA SCREEN DOC REV: CPT | Performed by: INTERNAL MEDICINE

## 2017-08-15 PROCEDURE — 1036F TOBACCO NON-USER: CPT | Performed by: INTERNAL MEDICINE

## 2017-08-15 PROCEDURE — G8598 ASA/ANTIPLAT THER USED: HCPCS | Performed by: INTERNAL MEDICINE

## 2017-08-15 ASSESSMENT — ENCOUNTER SYMPTOMS
BACK PAIN: 1
SHORTNESS OF BREATH: 1
DIARRHEA: 0
NAUSEA: 0
COUGH: 1
VOMITING: 0
CONSTIPATION: 0

## 2017-08-15 NOTE — MR AVS SNAPSHOT
Type 2 diabetes mellitus with complication, with long-term current use of insulin ([facility])   [[id number]]         Vital Signs     Blood Pressure Pulse Temperature Respirations Height Weight    124/72 66 97.6 °F (36.4 °C) (Temporal) 18 5' 2\" (1.575 m) 199 lb 9.6 oz (90.5 kg)    Last Menstrual Period Oxygen Saturation Body Mass Index Smoking Status          01/01/1998 (Approximate) 96% 36.51 kg/m2 Former Smoker        Additional Information about your Body Mass Index (BMI)           Your BMI as listed above is considered obese (30 or more). BMI is an estimate of body fat, calculated from your height and weight. The higher your BMI, the greater your risk of heart disease, high blood pressure, type 2 diabetes, stroke, gallstones, arthritis, sleep apnea, and certain cancers. BMI is not perfect. It may overestimate body fat in athletes and people who are more muscular. Even a small weight loss (between 5 and 10 percent of your current weight) by decreasing your calorie intake and becoming more physically active will help lower your risk of developing or worsening diseases associated with obesity. Learn more at: [employer]          Instructions    Stop Januvia. Start Tradjenta 5 mg once a day. Start Jardiance 10 mg once a day. Continue the remainder of your medications. Follow up again in three months. Today's Medication Changes          These changes are accurate as of: 8/15/17  3:25 PM.  If you have any questions, ask your nurse or doctor. START taking these medications           empagliflozin 10 MG tablet   Commonly known as:  JARDIANCE   Instructions: Take 1 tablet by mouth daily   Quantity:  30 tablet   Refills:  3   Started by:  Yuliet Alicea DO       linagliptin 5 MG tablet   Commonly known as:  TRADJENTA   Instructions:   Take 1 tablet by mouth daily   Quantity:  30 tablet   Refills:  5   Started by:  Yuliet Alicea DO STOP taking these medications           SITagliptin 100 MG tablet   Commonly known as:  Ladarius Mosqueda by:  aZhraa Shea, DO            Where to Get Your Medications      These medications were sent to [facility] [address], [address] [place]     Phone:  [phone]     empagliflozin 10 MG tablet    linagliptin 5 MG tablet               Your Current Medications Are              empagliflozin (JARDIANCE) 10 MG tablet Take 1 tablet by mouth daily    linagliptin (TRADJENTA) 5 MG tablet Take 1 tablet by mouth daily    insulin glargine (LANTUS) 100 UNIT/ML injection pen Inject 15 Units into the skin nightly    glucose blood VI test strips (ASCENSIA AUTODISC VI;ONE TOUCH ULTRA TEST VI) strip 1 each by In Vitro route daily As needed. nystatin (MYCOSTATIN) 290014 UNIT/GM powder Apply 2-3 times daily.     valsartan (DIOVAN) 80 MG tablet Take 1 tablet by mouth daily    Insulin Pen Needle ([employer] PEN NEEDLES) 31G X 6 MM MISC 1 each by Does not apply route daily    metFORMIN (GLUCOPHAGE) 1000 MG tablet Take 0.5 tablets by mouth 2 times daily (with meals) Resume on 6/7/17    isosorbide mononitrate (IMDUR) 30 MG extended release tablet Take 1 tablet by mouth daily    ergocalciferol (ERGOCALCIFEROL) 69777 UNITS capsule Take 1 capsule by mouth once a week    Lactobacillus (PROBIATA PO) Take 1 tablet by mouth daily     Estradiol (VAGIFEM) 10 MCG TABS vaginal tablet Place 1 tablet vaginally Twice a Week    mometasone (ELOCON) 0.1 % ointment Apply topically to vagina 3 times per week    metoprolol tartrate (LOPRESSOR) 25 MG tablet Take 1 tablet by mouth 2 times daily    spironolactone (ALDACTONE) 50 MG tablet Take 1 tablet by mouth daily    famotidine (PEPCID) 20 MG tablet Take 1 tablet by mouth 2 times daily    LORazepam (ATIVAN) 0.5 MG tablet Take 1 tablet by mouth every 8 hours as needed for Anxiety lovastatin (MEVACOR) 40 MG tablet Take 1 tablet by mouth nightly    furosemide (LASIX) 20 MG tablet Take 1 tablet by mouth daily as needed    ondansetron (ZOFRAN) 8 MG tablet Take 1 tablet by mouth every 8 hours as needed for Nausea or Vomiting    Eflornithine HCl (VANIQA) 13.9 % CREA Apply 1 Dose topically 2 times daily    nitroGLYCERIN (NITROSTAT) 0.4 MG SL tablet Place 1 tablet under the tongue every 5 minutes as needed for Chest pain    nystatin-triamcinolone (MYCOLOG II) 824358-0.1 UNIT/GM-% cream Apply topically 2 times daily. conjugated estrogens (PREMARIN) 0.625 MG/GM vaginal cream Insert 0.5 gm intravaginally every nite for 2 weeks then 2 times weekly    Cholecalciferol (VITAMIN D) 2000 UNITS CAPS capsule Take 1 capsule on days she is not taking the 50,000 Units. Docusate Calcium (STOOL SOFTENER PO) Take 100 mg by mouth 2 times daily as needed. Prochlorperazine Maleate (COMPAZINE PO) Take 10 mg by mouth every 6 hours as needed     aspirin 81 MG chewable tablet Take 81 mg by mouth daily.       Allergies              Codeine     Humalog [Insulin Lispro]     Motrin [Ibuprofen] Swelling      We Ordered/Performed the following           POCT glycosylated hemoglobin (Hb A1C)          Result Summary for POCT glycosylated hemoglobin (Hb A1C)      Result Information       Status          Abnormal Final result (Collected: 8/15/2017  3:15 PM)           8/15/2017  3:16 PM      Component Results     Component Value Ref Range & Units Status    Hemoglobin A1C 8.3 % Final               Additional Information        Basic Information     Date Of Birth Sex Race Ethnicity Preferred Language Preferred Written Language    1965 Female White Non-/Non  English English      Problem List as of 8/15/2017  Date Reviewed: 2/9/2017                Abnormal nuclear cardiac imaging test    Lichen simplex chronicus    ERRONEOUS ENCOUNTER--DISREGARD    Personal history of malignant neoplasm of cervix uteri

## 2017-08-15 NOTE — PROGRESS NOTES
[facility]  [facility] [address] [facility] [address]  Dept: [phone]  Dept Fax: [phone]  Loc: [phone]    Belle Juarez is a 46 y.o. female who presents today for her medical conditions/complaints as noted below. Belle Juarez is c/o of   Chief Complaint   Patient presents with    3 Month Follow-Up    Diabetes         HPI:     HPI  Ms. Aydee Marshall returns for follow-up of type 2 diabetes mellitus, coronary artery disease, hypertension. She still has suboptimal glycemic control. Because of her cardiac history I do recommend adding Jardiance to her regimen.     %HBA1C (%)   Date Value   03/22/2015 7.9 (H)   03/02/2015 8.2 (H)   12/23/2014 9.4 (H)     Hemoglobin A1C (%)   Date Value   08/15/2017 8.3   05/11/2017 10.9 (H)   06/07/2016 9.9             ( goal A1C is < 7)   MICROALBUMIN, RANDOM URINE (mg/G)   Date Value   03/09/2016 <1.20     LDL Calculated (mg/dL)   Date Value   05/11/2017 98   03/09/2016 104       (goal LDL is <100)   AST (U/L)   Date Value   05/11/2017 15     ALT (U/L)   Date Value   05/11/2017 18     BUN (mg/dL)   Date Value   05/16/2017 12     BP Readings from Last 3 Encounters:   09/13/17 120/67   09/05/17 130/66   08/15/17 124/72          (goal 120/80)    Past Medical History:   Diagnosis Date    CAD (coronary artery disease)     stent 2011     [facility]     Diabetes mellitus [facility]     ERRONEOUS ENCOUNTER--DISREGARD 9/15/2015    GERD (gastroesophageal reflux disease)     Headache     Heart attack ([facility] 12/2011    Hyperlipidemia     Hypertension     Lichen simplex chronicus 10/2015    Osteopenia     Pneumonia     Type II or unspecified type diabetes mellitus without mention of complication, not stated as uncontrolled       Past Surgical History:   Procedure Laterality Date    BREAST BIOPSY  6/8/2011    US guided core needle, right, benign    CARDIAC CATHETERIZATION  12/22/14  JDT    EF 50%    CARDIAC CATHETERIZATION  06/05/2017        CHOLECYSTECTOMY  2006    CORONARY ANGIOPLASTY WITH STENT PLACEMENT  12/2011    SABA to First diag    CORONARY ARTERY BYPASS GRAFT      FOOT SURGERY  2000    HYSTERECTOMY  1998    total    OTHER SURGICAL HISTORY      fibrinolysis    AL CABG, ARTERY-VEIN, THREE  12\23\2014    Coronary Artery Bypass, 3    THORACOTOMY      VULVAR/PERINEAL BIOPSY  10/07/2015    Dr. Umm Diallo       Family History   Problem Relation Age of Onset    Diabetes Father     Cancer Father     High Blood Pressure Father     Diabetes Mother     Breast Cancer Mother     Heart Failure Mother      CHF, MVP    Heart Failure Maternal Grandfather        Social History   Substance Use Topics    Smoking status: Former Smoker     Packs/day: 2.50     Years: 35.00     Types: Cigarettes     Quit date: 12/15/2014    Smokeless tobacco: Never Used      Comment: pack and half per week plus electronic cig    Alcohol use No      Current Outpatient Prescriptions   Medication Sig Dispense Refill    insulin glargine (LANTUS) 100 UNIT/ML injection pen Inject 15 Units into the skin nightly (Patient taking differently: Inject 20 Units into the skin nightly ) 5 Pen 3    glucose blood VI test strips (ASCENSIA AUTODISC VI;ONE TOUCH ULTRA TEST VI) strip 1 each by In Vitro route daily As needed. 100 each 3    nystatin (MYCOSTATIN) 732982 UNIT/GM powder Apply 2-3 times daily.  1 Bottle 1    valsartan (DIOVAN) 80 MG tablet Take 1 tablet by mouth daily 30 tablet 3    Insulin Pen Needle ([employer] PEN NEEDLES) 31G X 6 MM MISC 1 each by Does not apply route daily 100 each 3    Lactobacillus (PROBIATA PO) Take 1 tablet by mouth daily       Estradiol (VAGIFEM) 10 MCG TABS vaginal tablet Place 1 tablet vaginally Twice a Week 8 tablet 3    mometasone (ELOCON) 0.1 % ointment Apply topically to vagina 3 times per week 1 Tube 3    metoprolol tartrate (LOPRESSOR) 25 MG tablet Take 1 tablet by mouth 2 times daily 60 tablet 5  spironolactone (ALDACTONE) 50 MG tablet Take 1 tablet by mouth daily 30 tablet 5    famotidine (PEPCID) 20 MG tablet Take 1 tablet by mouth 2 times daily 60 tablet 3    LORazepam (ATIVAN) 0.5 MG tablet Take 1 tablet by mouth every 8 hours as needed for Anxiety 90 tablet 0    lovastatin (MEVACOR) 40 MG tablet Take 1 tablet by mouth nightly 30 tablet 3    furosemide (LASIX) 20 MG tablet Take 1 tablet by mouth daily as needed 90 tablet 3    ondansetron (ZOFRAN) 8 MG tablet Take 1 tablet by mouth every 8 hours as needed for Nausea or Vomiting 9 tablet 1    Eflornithine HCl (VANIQA) 13.9 % CREA Apply 1 Dose topically 2 times daily 45 g 2    nystatin-triamcinolone (MYCOLOG II) 649951-4.1 UNIT/GM-% cream Apply topically 2 times daily. 1 Tube 0    conjugated estrogens (PREMARIN) 0.625 MG/GM vaginal cream Insert 0.5 gm intravaginally every nite for 2 weeks then 2 times weekly 1 Tube 3    Docusate Calcium (STOOL SOFTENER PO) Take 100 mg by mouth 2 times daily as needed.  JANUVIA 100 MG tablet       vitamin D (ERGOCALCIFEROL) 03517 units CAPS capsule Take 50,000 Units by mouth once a week       metFORMIN (GLUCOPHAGE-XR) 500 MG extended release tablet Take 500 mg by mouth 2 times daily       aspirin 81 MG tablet Take 81 mg by mouth daily      vitamin D (CHOLECALCIFEROL) 1000 UNIT TABS tablet Take 1,000 Units by mouth daily      isosorbide mononitrate (IMDUR) 30 MG extended release tablet Take 1 tablet by mouth daily 30 tablet 5    nitroGLYCERIN (NITROSTAT) 0.4 MG SL tablet Place 1 tablet under the tongue every 5 minutes as needed for Chest pain 25 tablet 3     No current facility-administered medications for this visit.       Allergies   Allergen Reactions    Codeine     Humalog [Insulin Lispro]     Motrin [Ibuprofen] Swelling       Health Maintenance   Topic Date Due    Pneumococcal med risk (1 of 1 - PPSV23) 10/30/1982    Colon Cancer Screen FIT/FOBT  05/08/2016    Diabetic retinal exam Metabolic Panel    Creatinine, Random Urine    Hemoglobin A1C    Protein, urine, random    Urinalysis   2. ASCVD (arteriosclerotic cardiovascular disease)  CBC    Comprehensive Metabolic Panel    Creatinine, Random Urine    Hemoglobin A1C    Protein, urine, random    Urinalysis   3. Essential hypertension  CBC    Comprehensive Metabolic Panel    Creatinine, Random Urine    Hemoglobin A1C    Protein, urine, random    Urinalysis             Plan:      Return in about 3 months (around 11/15/2017). Patient Instructions   Stop Januvia. Start Tradjenta 5 mg once a day. Start Jardiance 10 mg once a day. Continue the remainder of your medications. Follow up again in three months. Orders Placed This Encounter   Procedures    CBC     Standing Status:   Future     Standing Expiration Date:   8/15/2018    Comprehensive Metabolic Panel     Standing Status:   Future     Standing Expiration Date:   8/15/2018    Creatinine, Random Urine     Standing Status:   Future     Standing Expiration Date:   8/15/2018    Hemoglobin A1C     Standing Status:   Future     Standing Expiration Date:   8/15/2018    Protein, urine, random     Standing Status:   Future     Standing Expiration Date:   8/15/2018    Urinalysis     Standing Status:   Future     Standing Expiration Date:   8/15/2018    POCT glycosylated hemoglobin (Hb A1C)     Orders Placed This Encounter   Medications    DISCONTD: empagliflozin (JARDIANCE) 10 MG tablet     Sig: Take 1 tablet by mouth daily     Dispense:  30 tablet     Refill:  3    DISCONTD: linagliptin (TRADJENTA) 5 MG tablet     Sig: Take 1 tablet by mouth daily     Dispense:  30 tablet     Refill:  5       Patient given educational materials - see patient instructions. Discussed use, benefit, and side effects of prescribed medications. All patient questions answered. Pt voiced understanding. Reviewed health maintenance. Instructed to continue current medications, diet and exercise.   Patient

## 2017-08-15 NOTE — PATIENT INSTRUCTIONS
Stop Januvia. Start Tradjenta 5 mg once a day. Start Jardiance 10 mg once a day. Continue the remainder of your medications. Follow up again in three months.

## 2017-08-18 ENCOUNTER — HOSPITAL ENCOUNTER (OUTPATIENT)
Dept: MRI IMAGING | Age: 52
Discharge: HOME | End: 2017-08-18
Payer: COMMERCIAL

## 2017-08-18 ENCOUNTER — CARE COORDINATION (OUTPATIENT)
Dept: PRIMARY CARE CLINIC | Age: 52
End: 2017-08-18

## 2017-08-18 DIAGNOSIS — M79.671 RIGHT FOOT PAIN: ICD-10-CM

## 2017-08-18 PROCEDURE — 73718 MRI LOWER EXTREMITY W/O DYE: CPT

## 2017-08-22 ENCOUNTER — TELEPHONE (OUTPATIENT)
Dept: PRIMARY CARE CLINIC | Age: 52
End: 2017-08-22

## 2017-08-22 DIAGNOSIS — D17.79 LIPOMA OF OTHER SPECIFIED SITES: Primary | ICD-10-CM

## 2017-08-23 ENCOUNTER — TELEPHONE (OUTPATIENT)
Dept: PRIMARY CARE CLINIC | Age: 52
End: 2017-08-23

## 2017-09-05 ENCOUNTER — OFFICE VISIT (OUTPATIENT)
Dept: CARDIOLOGY | Age: 52
End: 2017-09-05
Payer: COMMERCIAL

## 2017-09-05 VITALS
WEIGHT: 203 LBS | HEIGHT: 62 IN | SYSTOLIC BLOOD PRESSURE: 130 MMHG | BODY MASS INDEX: 37.36 KG/M2 | HEART RATE: 78 BPM | DIASTOLIC BLOOD PRESSURE: 66 MMHG

## 2017-09-05 DIAGNOSIS — E78.2 MIXED HYPERLIPIDEMIA: ICD-10-CM

## 2017-09-05 DIAGNOSIS — I25.810 CORONARY ARTERY DISEASE INVOLVING CORONARY BYPASS GRAFT OF NATIVE HEART WITHOUT ANGINA PECTORIS: ICD-10-CM

## 2017-09-05 DIAGNOSIS — I10 ESSENTIAL HYPERTENSION: ICD-10-CM

## 2017-09-05 DIAGNOSIS — Z95.1 S/P CABG X 3: ICD-10-CM

## 2017-09-05 DIAGNOSIS — I51.9 LEFT VENTRICULAR DYSFUNCTION: ICD-10-CM

## 2017-09-05 DIAGNOSIS — I25.10 CORONARY ARTERY DISEASE INVOLVING NATIVE CORONARY ARTERY OF NATIVE HEART WITHOUT ANGINA PECTORIS: Primary | ICD-10-CM

## 2017-09-05 PROCEDURE — 3017F COLORECTAL CA SCREEN DOC REV: CPT | Performed by: NURSE PRACTITIONER

## 2017-09-05 PROCEDURE — 1036F TOBACCO NON-USER: CPT | Performed by: NURSE PRACTITIONER

## 2017-09-05 PROCEDURE — G8427 DOCREV CUR MEDS BY ELIG CLIN: HCPCS | Performed by: NURSE PRACTITIONER

## 2017-09-05 PROCEDURE — G8417 CALC BMI ABV UP PARAM F/U: HCPCS | Performed by: NURSE PRACTITIONER

## 2017-09-05 PROCEDURE — 99213 OFFICE O/P EST LOW 20 MIN: CPT | Performed by: NURSE PRACTITIONER

## 2017-09-05 PROCEDURE — 3014F SCREEN MAMMO DOC REV: CPT | Performed by: NURSE PRACTITIONER

## 2017-09-05 PROCEDURE — G8598 ASA/ANTIPLAT THER USED: HCPCS | Performed by: NURSE PRACTITIONER

## 2017-09-05 RX ORDER — ERGOCALCIFEROL 1.25 MG/1
50000 CAPSULE ORAL WEEKLY
COMMUNITY
Start: 2017-08-11 | End: 2017-12-20 | Stop reason: SDUPTHER

## 2017-09-05 RX ORDER — METFORMIN HYDROCHLORIDE 500 MG/1
500 TABLET, EXTENDED RELEASE ORAL 2 TIMES DAILY
COMMUNITY
Start: 2017-09-02 | End: 2017-11-15 | Stop reason: SDUPTHER

## 2017-09-05 RX ORDER — NITROGLYCERIN 0.4 MG/1
0.4 TABLET SUBLINGUAL EVERY 5 MIN PRN
Qty: 25 TABLET | Refills: 3 | Status: SHIPPED | OUTPATIENT
Start: 2017-09-05 | End: 2018-03-13 | Stop reason: SDUPTHER

## 2017-09-05 RX ORDER — ISOSORBIDE MONONITRATE 30 MG/1
30 TABLET, EXTENDED RELEASE ORAL DAILY
Qty: 30 TABLET | Refills: 5 | Status: SHIPPED | OUTPATIENT
Start: 2017-09-05 | End: 2018-04-27 | Stop reason: SDUPTHER

## 2017-09-07 RX ORDER — PROCHLORPERAZINE MALEATE 10 MG
10 TABLET ORAL EVERY 6 HOURS PRN
COMMUNITY

## 2017-09-07 RX ORDER — MOMETASONE FUROATE 1 MG/G
OINTMENT TOPICAL 3 TIMES WEEKLY
COMMUNITY

## 2017-09-07 RX ORDER — NYSTATIN 100000 [USP'U]/G
POWDER TOPICAL 3 TIMES DAILY
COMMUNITY

## 2017-09-07 RX ORDER — SPIRONOLACTONE 50 MG/1
50 TABLET, FILM COATED ORAL DAILY
COMMUNITY

## 2017-09-07 RX ORDER — INSULIN GLARGINE 100 [IU]/ML
15 INJECTION, SOLUTION SUBCUTANEOUS DAILY
COMMUNITY
End: 2021-06-15

## 2017-09-07 RX ORDER — ASPIRIN 81 MG/1
81 TABLET ORAL DAILY
COMMUNITY

## 2017-09-07 RX ORDER — CHOLECALCIFEROL (VITAMIN D3) 50 MCG
2000 TABLET ORAL DAILY
COMMUNITY
End: 2021-12-14 | Stop reason: DRUGHIGH

## 2017-09-07 RX ORDER — ONDANSETRON HYDROCHLORIDE 8 MG/1
8 TABLET, FILM COATED ORAL EVERY 8 HOURS PRN
COMMUNITY

## 2017-09-07 RX ORDER — ISOSORBIDE MONONITRATE 30 MG/1
30 TABLET, EXTENDED RELEASE ORAL DAILY
COMMUNITY
End: 2022-06-22 | Stop reason: ALTCHOICE

## 2017-09-07 RX ORDER — NITROGLYCERIN 0.4 MG/1
0.4 TABLET SUBLINGUAL
COMMUNITY

## 2017-09-07 RX ORDER — ESTRADIOL 10 UG/1
1 INSERT VAGINAL 2 TIMES WEEKLY
COMMUNITY

## 2017-09-07 RX ORDER — FAMOTIDINE 20 MG/1
20 TABLET, FILM COATED ORAL 2 TIMES DAILY
COMMUNITY
End: 2021-06-15

## 2017-09-07 RX ORDER — LOVASTATIN 40 MG/1
40 TABLET ORAL NIGHTLY
COMMUNITY

## 2017-09-07 RX ORDER — DOCUSATE SODIUM 100 MG/1
100 CAPSULE, LIQUID FILLED ORAL 2 TIMES DAILY PRN
COMMUNITY

## 2017-09-07 RX ORDER — LORAZEPAM 0.5 MG/1
0.5 TABLET ORAL EVERY 8 HOURS PRN
COMMUNITY

## 2017-09-07 RX ORDER — FUROSEMIDE 20 MG/1
40 TABLET ORAL DAILY PRN
COMMUNITY

## 2017-09-07 RX ORDER — VALSARTAN 80 MG/1
80 TABLET ORAL DAILY
COMMUNITY

## 2017-09-08 ENCOUNTER — CARE COORDINATION (OUTPATIENT)
Dept: PRIMARY CARE CLINIC | Age: 52
End: 2017-09-08

## 2017-09-08 RX ORDER — AMOXICILLIN AND CLAVULANATE POTASSIUM 875; 125 MG/1; MG/1
1 TABLET, FILM COATED ORAL 2 TIMES DAILY
Qty: 20 TABLET | Refills: 0 | Status: SHIPPED | OUTPATIENT
Start: 2017-09-08 | End: 2017-09-13 | Stop reason: ALTCHOICE

## 2017-09-13 ENCOUNTER — OFFICE VISIT (OUTPATIENT)
Dept: URGENT CARE | Age: 52
End: 2017-09-13
Payer: COMMERCIAL

## 2017-09-13 VITALS
RESPIRATION RATE: 18 BRPM | BODY MASS INDEX: 35.97 KG/M2 | DIASTOLIC BLOOD PRESSURE: 67 MMHG | SYSTOLIC BLOOD PRESSURE: 120 MMHG | TEMPERATURE: 98.8 F | HEIGHT: 63 IN | WEIGHT: 203 LBS | OXYGEN SATURATION: 97 % | HEART RATE: 75 BPM

## 2017-09-13 DIAGNOSIS — J06.9 URI WITH COUGH AND CONGESTION: Primary | ICD-10-CM

## 2017-09-13 DIAGNOSIS — J02.9 SORE THROAT: ICD-10-CM

## 2017-09-13 LAB — S PYO AG THROAT QL: NORMAL

## 2017-09-13 PROCEDURE — G8417 CALC BMI ABV UP PARAM F/U: HCPCS | Performed by: NURSE PRACTITIONER

## 2017-09-13 PROCEDURE — G8427 DOCREV CUR MEDS BY ELIG CLIN: HCPCS | Performed by: NURSE PRACTITIONER

## 2017-09-13 PROCEDURE — 3014F SCREEN MAMMO DOC REV: CPT | Performed by: NURSE PRACTITIONER

## 2017-09-13 PROCEDURE — 87880 STREP A ASSAY W/OPTIC: CPT | Performed by: NURSE PRACTITIONER

## 2017-09-13 PROCEDURE — G8598 ASA/ANTIPLAT THER USED: HCPCS | Performed by: NURSE PRACTITIONER

## 2017-09-13 PROCEDURE — 1036F TOBACCO NON-USER: CPT | Performed by: NURSE PRACTITIONER

## 2017-09-13 PROCEDURE — 3017F COLORECTAL CA SCREEN DOC REV: CPT | Performed by: NURSE PRACTITIONER

## 2017-09-13 PROCEDURE — 99213 OFFICE O/P EST LOW 20 MIN: CPT | Performed by: NURSE PRACTITIONER

## 2017-09-13 RX ORDER — PREDNISONE 10 MG/1
30 TABLET ORAL DAILY
Qty: 9 TABLET | Refills: 0 | Status: SHIPPED | OUTPATIENT
Start: 2017-09-13 | End: 2017-09-16

## 2017-09-13 RX ORDER — SITAGLIPTIN 100 MG/1
100 TABLET, FILM COATED ORAL DAILY
COMMUNITY
Start: 2017-06-15 | End: 2018-06-26 | Stop reason: SDUPTHER

## 2017-09-13 RX ORDER — DEXTROMETHORPHAN HYDROBROMIDE AND PROMETHAZINE HYDROCHLORIDE 15; 6.25 MG/5ML; MG/5ML
5 SYRUP ORAL NIGHTLY PRN
Qty: 118 ML | Refills: 0 | Status: SHIPPED | OUTPATIENT
Start: 2017-09-13 | End: 2017-09-20

## 2017-09-13 RX ORDER — BENZONATATE 100 MG/1
100 CAPSULE ORAL 3 TIMES DAILY PRN
Qty: 21 CAPSULE | Refills: 0 | Status: SHIPPED | OUTPATIENT
Start: 2017-09-13 | End: 2017-09-20

## 2017-09-13 RX ORDER — AMOXICILLIN AND CLAVULANATE POTASSIUM 600; 42.9 MG/5ML; MG/5ML
600 POWDER, FOR SUSPENSION ORAL 2 TIMES DAILY
Qty: 100 ML | Refills: 0 | Status: SHIPPED | OUTPATIENT
Start: 2017-09-13 | End: 2017-09-23

## 2017-09-13 ASSESSMENT — ENCOUNTER SYMPTOMS
SHORTNESS OF BREATH: 1
SINUS PRESSURE: 1
COUGH: 1
SORE THROAT: 1

## 2017-09-19 ENCOUNTER — TELEPHONE (OUTPATIENT)
Dept: SURGERY | Age: 52
End: 2017-09-19

## 2017-10-11 DIAGNOSIS — I10 ESSENTIAL HYPERTENSION: ICD-10-CM

## 2017-10-11 DIAGNOSIS — E11.8 TYPE 2 DIABETES MELLITUS WITH COMPLICATION, UNSPECIFIED LONG TERM INSULIN USE STATUS: ICD-10-CM

## 2017-10-11 DIAGNOSIS — Z11.4 SCREENING FOR HIV WITHOUT PRESENCE OF RISK FACTORS: ICD-10-CM

## 2017-10-11 DIAGNOSIS — K21.9 GASTROESOPHAGEAL REFLUX DISEASE WITHOUT ESOPHAGITIS: ICD-10-CM

## 2017-10-11 DIAGNOSIS — Z12.11 SCREENING FOR COLON CANCER: ICD-10-CM

## 2017-10-11 DIAGNOSIS — Z23 NEEDS FLU SHOT: ICD-10-CM

## 2017-10-11 DIAGNOSIS — Z11.59 NEED FOR HEPATITIS C SCREENING TEST: ICD-10-CM

## 2017-10-11 DIAGNOSIS — L68.0 HIRSUTISM: ICD-10-CM

## 2017-10-11 RX ORDER — VALSARTAN 80 MG/1
80 TABLET ORAL DAILY
Qty: 30 TABLET | Refills: 3 | Status: SHIPPED | OUTPATIENT
Start: 2017-10-11 | End: 2018-04-27 | Stop reason: SDUPTHER

## 2017-10-12 ENCOUNTER — TELEPHONE (OUTPATIENT)
Dept: PODIATRY | Facility: CLINIC | Age: 52
End: 2017-10-12

## 2017-10-12 RX ORDER — LOVASTATIN 40 MG/1
TABLET ORAL
Qty: 30 TABLET | Refills: 3 | Status: SHIPPED | OUTPATIENT
Start: 2017-10-12 | End: 2018-04-04 | Stop reason: SDUPTHER

## 2017-10-12 RX ORDER — SPIRONOLACTONE 50 MG/1
50 TABLET, FILM COATED ORAL DAILY
Qty: 30 TABLET | Refills: 5 | Status: SHIPPED | OUTPATIENT
Start: 2017-10-12 | End: 2017-12-15 | Stop reason: SDUPTHER

## 2017-10-12 RX ORDER — FAMOTIDINE 20 MG/1
TABLET, FILM COATED ORAL
Qty: 60 TABLET | Refills: 3 | Status: SHIPPED | OUTPATIENT
Start: 2017-10-12 | End: 2017-12-20 | Stop reason: SDUPTHER

## 2017-10-12 NOTE — TELEPHONE ENCOUNTER
Rang several times and then said the person you have called does not have mailbox that has been set up yet.

## 2017-10-13 ENCOUNTER — OFFICE VISIT (OUTPATIENT)
Dept: PODIATRY | Facility: CLINIC | Age: 52
End: 2017-10-13

## 2017-10-13 VITALS
BODY MASS INDEX: 37.91 KG/M2 | WEIGHT: 206 LBS | OXYGEN SATURATION: 97 % | DIASTOLIC BLOOD PRESSURE: 76 MMHG | HEART RATE: 65 BPM | HEIGHT: 62 IN | SYSTOLIC BLOOD PRESSURE: 124 MMHG

## 2017-10-13 DIAGNOSIS — E11.49 DIABETES MELLITUS TYPE 2 WITH NEUROLOGICAL MANIFESTATIONS (HCC): ICD-10-CM

## 2017-10-13 DIAGNOSIS — L84 FOOT CALLUS: Primary | ICD-10-CM

## 2017-10-13 DIAGNOSIS — M79.671 FOOT PAIN, RIGHT: ICD-10-CM

## 2017-10-13 PROCEDURE — 11055 PARING/CUTG B9 HYPRKER LES 1: CPT | Performed by: PODIATRIST

## 2017-10-13 PROCEDURE — 99203 OFFICE O/P NEW LOW 30 MIN: CPT | Performed by: PODIATRIST

## 2017-10-13 NOTE — PROGRESS NOTES
[facility] - PODIATRY    Today's Date: 10/13/17    Patient Name: Gwendolyn Delgadillo  MRN: [id number]  CSN: [id number]  PCP: Wilton Bravo DO  Referring Provider: No ref. provider found    SUBJECTIVE     Chief Complaint   Patient presents with   • Right Foot - Pain   • Establish Care     Patient who is diabetic, states she has a knot on the outside of right foot.  Pain Score: 7/10  Last PCP visit: 09/2017   • Diabetes Mellitus     Blood sugar: 161 this morning.     HPI: Gwendolyn Delgadillo, a 51 y.o.female, comes to clinic as a(n) new patient presenting for diabetic foot exam and complaining of foot pain. Patient has h/o CAD, cervical cancer, chest pain, DM, heart attack with triple bypass, HLD, HTN, lichen simplex chronicus, lipoma. Patient is IDDM with last stated BG level of 161mg/dl. Admits pain at 7/10 level and described as aching, nagging and sharp. States that she has a spot on the bottom of her right foot that is painful. Denies trauma or injury. Says she was told it was a mass of some sort and also had an MRI that notes a small Lipoma in the area of pain. Pain is increased with weight bearing. Feels as though there is a rock in her shoe. Denies previous treatment. Denies any constitutional symptoms. No other pedal complaints at this time.    Past Medical History:   Diagnosis Date   • Abnormal nuclear cardiac imaging test    • Atrophic vaginitis    • CAD (coronary artery disease)    • Calcaneal spur, right foot    • Candidal intertrigo    • Cervical cancer    • Chest pain    • Diabetes mellitus    • HERNANDEZ (dyspnea on exertion)    • Heart attack    • History of pleural effusion    • Hyperlipidemia    • Hypertension    • Lichen simplex chronicus    • Lipoma     Right foot   • Personal history of malignant neoplasm of cervix uteri    • Right foot pain      Past Surgical History:   Procedure Laterality Date   • CORONARY ARTERY BYPASS GRAFT      x 3   • HYSTERECTOMY     • LUNG SURGERY       History reviewed.  No pertinent family history.  Social History     Social History   • Marital status: Unknown     Spouse name: N/A   • Number of children: N/A   • Years of education: N/A     Occupational History   • Not on file.     Social History Main Topics   • Smoking status: Former Smoker     Packs/day: 2.50     Quit date: 12/15/2014   • Smokeless tobacco: Never Used      Comment: 87.5 pack-years    • Alcohol use No   • Drug use: No   • Sexual activity: Defer     Other Topics Concern   • Not on file     Social History Narrative     Allergies   Allergen Reactions   • Codeine    • Humalog [Insulin Lispro]    • Motrin [Ibuprofen] Swelling     Current Outpatient Prescriptions   Medication Sig Dispense Refill   • aspirin 81 MG EC tablet Take 81 mg by mouth Daily.     • Cholecalciferol (VITAMIN D) 2000 units tablet Take 2,000 Units by mouth Daily.     • conjugated estrogens (PREMARIN) 0.625 MG/GM vaginal cream Insert 0.5 g into the vagina 2 (Two) Times a Week.     • docusate sodium (COLACE) 100 MG capsule Take 100 mg by mouth 2 (Two) Times a Day As Needed for Constipation.     • Eflornithine HCl 13.9 % cream Apply  topically 2 (Two) Times a Day.     • Empagliflozin (JARDIANCE) 10 MG tablet Take 10 mg by mouth Daily.     • estradiol (VAGIFEM) 10 MCG tablet vaginal tablet Insert 1 tablet into the vagina 2 (Two) Times a Week.     • famotidine (PEPCID) 20 MG tablet Take 20 mg by mouth 2 (Two) Times a Day.     • furosemide (LASIX) 20 MG tablet Take 20 mg by mouth Daily As Needed.     • insulin glargine (LANTUS) 100 UNIT/ML injection Inject 15 Units under the skin Daily.     • isosorbide mononitrate (IMDUR) 30 MG 24 hr tablet Take 30 mg by mouth Daily.     • Lactobacillus (PROBIATA PO) Take  by mouth Daily.     • LORazepam (ATIVAN) 0.5 MG tablet Take 0.5 mg by mouth Every 8 (Eight) Hours As Needed for Anxiety.     • lovastatin (MEVACOR) 40 MG tablet Take 40 mg by mouth Every Night.     • metFORMIN (GLUCOPHAGE) 1000 MG tablet Take 500 mg by  mouth 2 (Two) Times a Day With Meals.     • metoprolol tartrate (LOPRESSOR) 25 MG tablet Take 25 mg by mouth 2 (Two) Times a Day.     • mometasone (ELOCON) 0.1 % ointment Apply  topically 3 (Three) Times a Week.     • nitroglycerin (NITROSTAT) 0.4 MG SL tablet Place 0.4 mg under the tongue Every 5 (Five) Minutes As Needed for Chest Pain. Take no more than 3 doses in 15 minutes.     • nystatin (MYCOSTATIN) 442434 UNIT/GM powder Apply  topically 3 (Three) Times a Day.     • nystatin-triamcinolone (MYCOLOG II) 959747-1.1 UNIT/GM-% cream Apply  topically 2 (Two) Times a Day.     • ondansetron (ZOFRAN) 8 MG tablet Take 8 mg by mouth Every 8 (Eight) Hours As Needed for Nausea or Vomiting.     • prochlorperazine (COMPAZINE) 10 MG tablet Take 10 mg by mouth Every 6 (Six) Hours As Needed for Nausea or Vomiting.     • spironolactone (ALDACTONE) 50 MG tablet Take 50 mg by mouth Daily.     • valsartan (DIOVAN) 80 MG tablet Take 80 mg by mouth Daily.       No current facility-administered medications for this visit.      Review of Systems   Constitutional: Negative for chills and fever.   HENT: Negative for congestion.    Respiratory: Negative for shortness of breath.    Cardiovascular: Positive for leg swelling. Negative for chest pain.   Gastrointestinal: Negative for constipation, diarrhea, nausea and vomiting.   Musculoskeletal:        Foot pain   Skin: Negative for wound.   Neurological: Positive for numbness.       OBJECTIVE     Vitals:    10/13/17 1057   BP: 124/76   Pulse: 65   SpO2: 97%       PHYSICAL EXAM  GEN:   A&Ox3, NAD. Pt presents to clinic ambulating without assistance and wearing Casual Shoes.      NEURO:   Protective sensation intact to 9/10 sites Right foot, 10/10 site Left foot using Doyline-Beatriz monofilament  Light touch sensation diminished  No Tinel's or Villeux sign.    VASC:  Skin temperature Warm to Warm proximal to distal lissa  DP pulses 2/4 Right, 2/4 Left  PT pulses 2/4 Right, 2/4 Left  CFT 3 sec  lissa  Pedal hair growth present  1+ pitting edema noted lissa  Varicosities absent lissa    MUSC/SKEL:  Muscle Strength Right foot Dorsiflexors 5/5, Plantarflexors 5/5, Evertors 5/5, Invertors 5/5  Muscle Strength Left foot Dorsiflexors 5/5, Plantarflexors 5/5, Evertors 5/5, Invertors 5/5  ROM of the 1st MTP is full without pain or crepitus  ROM of the MTJ is full without pain or crepitus    ROM of the STJ is full without pain or crepitus    ROM of the ankle joint is full without pain or crepitus    POP of plantar 5th met head of right foot  Bony prominence to dorsal right foot, h/o foot surgery/trauma   Planus foot type    Gait pattern: Antalgic     DERM:  Pedal nails x10 are within normal limits of length and thickness  Webspaces are Clean, Dry, and Intact  Skin is normal in turgor and texture with no open wounds or sores appreciated.  Nucleated HPK noted sub 5th met head on right foot, no break in integument      RADIOLOGY/NUCLEAR:  No results found.    LABORATORY/CULTURE RESULTS:      PATHOLOGY RESULTS:       ASSESSMENT/PLAN     Gwendolyn was seen today for establish care, diabetes mellitus and pain.    Diagnoses and all orders for this visit:    Foot callus    Foot pain, right    Diabetes mellitus type 2 with neurological manifestations      Comprehensive lower extremity examination and evaluation was performed.  Discussed findings and treatment plan including risks, benefits, and treatment options with patient in detail. Patient agreed with treatment plan.  After verbal consent obtained, calluses x1 pared utilizing dermal curette and/or scalpel without incidence  Patient may maintain nails and calluses at home utilizing emery board or pumice stone between visits as needed  Reviewed at home diabetic foot care including daily foot checks   Rx: compound pain cream  An After Visit Summary was printed and given to the patient at discharge, including (if requested) any available informative/educational handouts regarding  diagnosis, treatment, or medications. All questions were answered to patient/family satisfaction. Should symptoms fail to improve or worsen they agree to call or return to clinic or to go to the Emergency Department. Discussed the importance of following up with any needed screening tests/labs/specialist appointments and any requested follow-up recommended by me today. Importance of maintaining follow-up discussed and patient accepts that missed appointments can delay diagnosis and potentially lead to worsening of conditions.  Return in about 6 months (around 4/13/2018)., or sooner if acute issues arise.        This document has been electronically signed by Lino Worthy DPM on October 13, 2017 11:33 AM

## 2017-10-13 NOTE — PATIENT INSTRUCTIONS
Corns and Calluses  Corns are small areas of thickened skin that occur on the top, sides, or tip of a toe. They contain a cone-shaped core with a point that can press on a nerve below. This causes pain. Calluses are areas of thickened skin that can occur anywhere on the body including hands, fingers, palms, soles of the feet, and heels. Calluses are usually larger than corns.   CAUSES   Corns and calluses are caused by rubbing (friction) or pressure, such as from shoes that are too tight or do not fit properly.   RISK FACTORS  Corns are more likely to develop in people who have toe deformities, such as hammer toes.  Since calluses can occur with friction to any area of the skin, calluses are more likely to develop in people who:   · Work with their hands.  · Wear shoes that fit poorly, shoes that are too tight, or shoes that are high-heeled.  · Have toes deformities.  SYMPTOMS  Symptoms of a corn or callus include:  · A hard growth on the skin.    · Pain or tenderness under the skin.    · Redness and swelling.    · Increased discomfort while wearing tight-fitting shoes.  DIAGNOSIS   Corns and calluses may be diagnosed with a medical history and physical exam.   TREATMENT   Corns and calluses may be treated with:  · Removing the cause of the friction or pressure. This may include:    Changing your shoes.    Wearing shoe inserts (orthotics) or other protective layers in your shoes, such as a corn pad.    Wearing gloves.  · Medicines to help soften skin in the hardened, thickened areas.  · Reducing the size of the corn or callus by removing the dead layers of skin.  · Antibiotic medicines to treat infection.  · Surgery, if a toe deformity is the cause.  HOME CARE INSTRUCTIONS   · Take medicines only as directed by your health care provider.  · If you were prescribed an antibiotic, finish all of it even if you start to feel better.  · Wear shoes that fit well. Avoid wearing high-heeled shoes and shoes that are too tight  or too loose.  · Wear any padding, protective layers, gloves, or orthotics as directed by your health care provider.  · Soak your hands or feet and then use a file or pumice stone to soften your corn or callus. Do this as directed by your health care provider.  · Check your corn or callus every day for signs of infection. Watch for:    Redness, swelling, or pain.    Fluid, blood, or pus.  SEEK MEDICAL CARE IF:   · Your symptoms do not improve with treatment.  · You have increased redness, swelling, or pain at the site of your corn or callus.  · You have fluid, blood, or pus coming from your corn or callus.  · You have new symptoms.     This information is not intended to replace advice given to you by your health care provider. Make sure you discuss any questions you have with your health care provider.     Document Released: 09/23/2005 Document Revised: 05/03/2016 Document Reviewed: 12/14/2015  [employer] Interactive Patient Education ©2017 [employer]    Diabetes and Foot Care  Diabetes may cause you to have problems because of poor blood supply (circulation) to your feet and legs. This may cause the skin on your feet to become thinner, break easier, and heal more slowly. Your skin may become dry, and the skin may peel and crack. You may also have nerve damage in your legs and feet causing decreased feeling in them. You may not notice minor injuries to your feet that could lead to infections or more serious problems. Taking care of your feet is one of the most important things you can do for yourself.  HOME CARE INSTRUCTIONS  · Wear shoes at all times, even in the house. Do not go barefoot. Bare feet are easily injured.  · Check your feet daily for blisters, cuts, and redness. If you cannot see the bottom of your feet, use a mirror or ask someone for help.  · Wash your feet with warm water (do not use hot water) and mild soap. Then pat your feet and the areas between your toes until they are completely dry. Do not  soak your feet as this can dry your skin.  · Apply a moisturizing lotion or petroleum jelly (that does not contain alcohol and is unscented) to the skin on your feet and to dry, brittle toenails. Do not apply lotion between your toes.  · Trim your toenails straight across. Do not dig under them or around the cuticle. File the edges of your nails with an emery board or nail file.  · Do not cut corns or calluses or try to remove them with medicine.  · Wear clean socks or stockings every day. Make sure they are not too tight. Do not wear knee-high stockings since they may decrease blood flow to your legs.  · Wear shoes that fit properly and have enough cushioning. To break in new shoes, wear them for just a few hours a day. This prevents you from injuring your feet. Always look in your shoes before you put them on to be sure there are no objects inside.  · Do not cross your legs. This may decrease the blood flow to your feet.  · If you find a minor scrape, cut, or break in the skin on your feet, keep it and the skin around it clean and dry. These areas may be cleansed with mild soap and water. Do not cleanse the area with peroxide, alcohol, or iodine.  · When you remove an adhesive bandage, be sure not to damage the skin around it.  · If you have a wound, look at it several times a day to make sure it is healing.  · Do not use heating pads or hot water bottles. They may burn your skin. If you have lost feeling in your feet or legs, you may not know it is happening until it is too late.  · Make sure your health care provider performs a complete foot exam at least annually or more often if you have foot problems. Report any cuts, sores, or bruises to your health care provider immediately.  SEEK MEDICAL CARE IF:   · You have an injury that is not healing.  · You have cuts or breaks in the skin.  · You have an ingrown nail.  · You notice redness on your legs or feet.  · You feel burning or tingling in your legs or  feet.  · You have pain or cramps in your legs and feet.  · Your legs or feet are numb.  · Your feet always feel cold.  SEEK IMMEDIATE MEDICAL CARE IF:   · There is increasing redness, swelling, or pain in or around a wound.  · There is a red line that goes up your leg.  · Pus is coming from a wound.  · You develop a fever or as directed by your health care provider.  · You notice a bad smell coming from an ulcer or wound.     This information is not intended to replace advice given to you by your health care provider. Make sure you discuss any questions you have with your health care provider.     Document Released: 12/15/2001 Document Revised: 04/10/2017 Document Reviewed: 05/27/2014  [employer] Interactive Patient Education ©2017 [employer] Inc[employer]

## 2017-11-09 ENCOUNTER — OFFICE VISIT (OUTPATIENT)
Dept: URGENT CARE | Age: 52
End: 2017-11-09
Payer: COMMERCIAL

## 2017-11-09 VITALS
TEMPERATURE: 97.9 F | HEIGHT: 62 IN | WEIGHT: 206 LBS | OXYGEN SATURATION: 97 % | HEART RATE: 68 BPM | BODY MASS INDEX: 37.91 KG/M2 | RESPIRATION RATE: 20 BRPM | SYSTOLIC BLOOD PRESSURE: 126 MMHG | DIASTOLIC BLOOD PRESSURE: 72 MMHG

## 2017-11-09 DIAGNOSIS — M54.50 ACUTE LOW BACK PAIN WITHOUT SCIATICA, UNSPECIFIED BACK PAIN LATERALITY: ICD-10-CM

## 2017-11-09 DIAGNOSIS — R81 GLUCOSURIA: ICD-10-CM

## 2017-11-09 DIAGNOSIS — N30.00 ACUTE CYSTITIS WITHOUT HEMATURIA: Primary | ICD-10-CM

## 2017-11-09 LAB
APPEARANCE FLUID: ABNORMAL
BILIRUBIN, POC: ABNORMAL
BLOOD URINE, POC: ABNORMAL
CLARITY, POC: ABNORMAL
COLOR, POC: ABNORMAL
GLUCOSE BLD-MCNC: 358 MG/DL
GLUCOSE URINE, POC: >=1000
KETONES, POC: ABNORMAL
LEUKOCYTE EST, POC: ABNORMAL
NITRITE, POC: ABNORMAL
PH, POC: 5.5
PROTEIN, POC: ABNORMAL
SPECIFIC GRAVITY, POC: 1.02
UROBILINOGEN, POC: 0.2

## 2017-11-09 PROCEDURE — 99213 OFFICE O/P EST LOW 20 MIN: CPT | Performed by: NURSE PRACTITIONER

## 2017-11-09 PROCEDURE — G8417 CALC BMI ABV UP PARAM F/U: HCPCS | Performed by: NURSE PRACTITIONER

## 2017-11-09 PROCEDURE — G8598 ASA/ANTIPLAT THER USED: HCPCS | Performed by: NURSE PRACTITIONER

## 2017-11-09 PROCEDURE — G8427 DOCREV CUR MEDS BY ELIG CLIN: HCPCS | Performed by: NURSE PRACTITIONER

## 2017-11-09 PROCEDURE — 3017F COLORECTAL CA SCREEN DOC REV: CPT | Performed by: NURSE PRACTITIONER

## 2017-11-09 PROCEDURE — 82962 GLUCOSE BLOOD TEST: CPT | Performed by: NURSE PRACTITIONER

## 2017-11-09 PROCEDURE — 1036F TOBACCO NON-USER: CPT | Performed by: NURSE PRACTITIONER

## 2017-11-09 PROCEDURE — G8484 FLU IMMUNIZE NO ADMIN: HCPCS | Performed by: NURSE PRACTITIONER

## 2017-11-09 PROCEDURE — 3014F SCREEN MAMMO DOC REV: CPT | Performed by: NURSE PRACTITIONER

## 2017-11-09 RX ORDER — NITROFURANTOIN 25; 75 MG/1; MG/1
100 CAPSULE ORAL 2 TIMES DAILY
Qty: 20 CAPSULE | Refills: 0 | Status: SHIPPED | OUTPATIENT
Start: 2017-11-09 | End: 2017-11-14 | Stop reason: ALTCHOICE

## 2017-11-09 ASSESSMENT — ENCOUNTER SYMPTOMS: BACK PAIN: 1

## 2017-11-09 NOTE — PATIENT INSTRUCTIONS
Patient Education        Urinary Tract Infection in Women: Care Instructions  Your Care Instructions    A urinary tract infection, or UTI, is a general term for an infection anywhere between the kidneys and the urethra (where urine comes out). Most UTIs are bladder infections. They often cause pain or burning when you urinate. UTIs are caused by bacteria and can be cured with antibiotics. Be sure to complete your treatment so that the infection goes away. Follow-up care is a key part of your treatment and safety. Be sure to make and go to all appointments, and call your doctor if you are having problems. It's also a good idea to know your test results and keep a list of the medicines you take. How can you care for yourself at home? · Take your antibiotics as directed. Do not stop taking them just because you feel better. You need to take the full course of antibiotics. · Drink extra water and other fluids for the next day or two. This may help wash out the bacteria that are causing the infection. (If you have kidney, heart, or liver disease and have to limit fluids, talk with your doctor before you increase your fluid intake.)  · Avoid drinks that are carbonated or have caffeine. They can irritate the bladder. · Urinate often. Try to empty your bladder each time. · To relieve pain, take a hot bath or lay a heating pad set on low over your lower belly or genital area. Never go to sleep with a heating pad in place. To prevent UTIs  · Drink plenty of water each day. This helps you urinate often, which clears bacteria from your system. (If you have kidney, heart, or liver disease and have to limit fluids, talk with your doctor before you increase your fluid intake.)  · Urinate when you need to. · Urinate right after you have sex. · Change sanitary pads often. · Avoid douches, bubble baths, feminine hygiene sprays, and other feminine hygiene products that have deodorants.   · After going to the bathroom, wipe

## 2017-11-09 NOTE — PROGRESS NOTES
[address] [place] [address]  Dept: [phone]  Loc: [phone]      Krys Greene is c/o of Lower Back Pain and Urinary Frequency        HPI:     HPI  Patient presents with Lower Back Pain and Urinary Frequency  Back pain started three days ago, she then noticed she is having frequency, urgency, and burning. She feels as though she needs to urinate but hardly can. She has not had any fever or nausea. She states this feels exactly like every other UTI she has ever had.      Past Medical History:   Diagnosis Date    CAD (coronary artery disease)     stent 2011     [facility]     Diabetes mellitus ([facility]     ERRONEOUS ENCOUNTER--DISREGARD 9/15/2015    GERD (gastroesophageal reflux disease)     Headache(784.0)     Heart attack 12/2011    Hyperlipidemia     Hypertension     Lichen simplex chronicus 10/2015    Osteopenia     Pneumonia     Type II or unspecified type diabetes mellitus without mention of complication, not stated as uncontrolled       Past Surgical History:   Procedure Laterality Date    BREAST BIOPSY  6/8/2011    US guided core needle, right, benign    CARDIAC CATHETERIZATION  12/22/14  JDT    EF 50%    CARDIAC CATHETERIZATION  06/05/2017        CHOLECYSTECTOMY  2006    CORONARY ANGIOPLASTY WITH STENT PLACEMENT  12/2011    [facility] to First diag    CORONARY ARTERY BYPASS GRAFT      FOOT SURGERY  2000    HYSTERECTOMY  1998    total    OTHER SURGICAL HISTORY      fibrinolysis    MA CABG, ARTERY-VEIN, THREE  12\23\2014    Coronary Artery Bypass, 3    THORACOTOMY      VULVAR/PERINEAL BIOPSY  10/07/2015    Dr. Aleta Cohen       Family History   Problem Relation Age of Onset    Diabetes Father     Cancer Father     High Blood Pressure Father     Diabetes Mother     Breast Cancer Mother     Heart Failure Mother      CHF, MVP    Heart Failure Maternal Grandfather        Social History   Substance Use Topics    Smoking status: Former Smoker     Packs/day: 2.50     Years: 35.00     Types: Cigarettes     Quit date: 12/15/2014    Smokeless tobacco: Never Used      Comment: pack and half per week plus electronic cig    Alcohol use No      Current Outpatient Prescriptions   Medication Sig Dispense Refill    nitrofurantoin, macrocrystal-monohydrate, (MACROBID) 100 MG capsule Take 1 capsule by mouth 2 times daily for 10 days 20 capsule 0    famotidine (PEPCID) 20 MG tablet TAKE 1 TABLET TWICE DAILY 60 tablet 3    lovastatin (MEVACOR) 40 MG tablet TAKE 1 TABLET AT BEDTIME 30 tablet 3    metoprolol tartrate (LOPRESSOR) 25 MG tablet TAKE 1 TABLET BY MOUTH TWICE DAILY 60 tablet 5    spironolactone (ALDACTONE) 50 MG tablet TAKE 1 TABLET BY MOUTH DAILY 30 tablet 5    valsartan (DIOVAN) 80 MG tablet TAKE 1 TABLET BY MOUTH DAILY 30 tablet 3    JANUVIA 100 MG tablet       vitamin D (ERGOCALCIFEROL) 66049 units CAPS capsule Take 50,000 Units by mouth once a week       metFORMIN (GLUCOPHAGE-XR) 500 MG extended release tablet Take 500 mg by mouth 2 times daily       aspirin 81 MG tablet Take 81 mg by mouth daily      vitamin D (CHOLECALCIFEROL) 1000 UNIT TABS tablet Take 1,000 Units by mouth daily      isosorbide mononitrate (IMDUR) 30 MG extended release tablet Take 1 tablet by mouth daily 30 tablet 5    nitroGLYCERIN (NITROSTAT) 0.4 MG SL tablet Place 1 tablet under the tongue every 5 minutes as needed for Chest pain 25 tablet 3    insulin glargine (LANTUS) 100 UNIT/ML injection pen Inject 15 Units into the skin nightly (Patient taking differently: Inject 20 Units into the skin nightly ) 5 Pen 3    glucose blood VI test strips (ASCENSIA AUTODISC VI;ONE TOUCH ULTRA TEST VI) strip 1 each by In Vitro route daily As needed. 100 each 3    nystatin (MYCOSTATIN) 174973 UNIT/GM powder Apply 2-3 times daily.  1 Bottle 1    Insulin Pen Needle ([employer] PEN NEEDLES) 31G X 6 MM MISC 1 each by Does not apply route daily 100 each 3    Lactobacillus (PROBIATA PO) Take 1 tablet by mouth daily       Estradiol (VAGIFEM) 10 MCG TABS vaginal tablet Place 1 tablet vaginally Twice a Week 8 tablet 3    mometasone (ELOCON) 0.1 % ointment Apply topically to vagina 3 times per week 1 Tube 3    spironolactone (ALDACTONE) 50 MG tablet Take 1 tablet by mouth daily 30 tablet 5    LORazepam (ATIVAN) 0.5 MG tablet Take 1 tablet by mouth every 8 hours as needed for Anxiety 90 tablet 0    furosemide (LASIX) 20 MG tablet Take 1 tablet by mouth daily as needed 90 tablet 3    ondansetron (ZOFRAN) 8 MG tablet Take 1 tablet by mouth every 8 hours as needed for Nausea or Vomiting 9 tablet 1    Eflornithine HCl (VANIQA) 13.9 % CREA Apply 1 Dose topically 2 times daily 45 g 2    nystatin-triamcinolone (MYCOLOG II) 867413-3.1 UNIT/GM-% cream Apply topically 2 times daily. 1 Tube 0    conjugated estrogens (PREMARIN) 0.625 MG/GM vaginal cream Insert 0.5 gm intravaginally every nite for 2 weeks then 2 times weekly 1 Tube 3    Docusate Calcium (STOOL SOFTENER PO) Take 100 mg by mouth 2 times daily as needed. No current facility-administered medications for this visit.       Allergies   Allergen Reactions    Codeine     Humalog [Insulin Lispro]     Motrin [Ibuprofen] Swelling       Health Maintenance   Topic Date Due    DTaP/Tdap/Td vaccine (1 - Tdap) 10/30/1982    Pneumococcal med risk (1 of 1 - PPSV23) 10/30/1982    Colon Cancer Screen FIT/FOBT  05/08/2016    Diabetic retinal exam  01/11/2017    Diabetic microalbuminuria test  03/09/2017    Flu vaccine (1) 09/01/2017    Hepatitis C screen  12/15/2017 (Originally 1965)    HIV screen  12/15/2017 (Originally 10/30/1978)    Breast cancer screen  12/19/2017    Diabetic foot exam  05/09/2018    Lipid screen  05/11/2018    Diabetic hemoglobin A1C test  08/15/2018    Cervical cancer screen  08/25/2018       Subjective:      Review of Systems   Constitutional: Negative for fever. Genitourinary: Positive for difficulty urinating, dysuria, frequency, pelvic pain and urgency. Musculoskeletal: Positive for back pain. Objective:     Physical Exam   Constitutional: She is oriented to person, place, and time. She appears well-developed and well-nourished. No distress. HENT:   Head: Normocephalic and atraumatic. Right Ear: External ear normal.   Left Ear: External ear normal.   Eyes: Conjunctivae and EOM are normal. Pupils are equal, round, and reactive to light. Right eye exhibits no discharge. Left eye exhibits no discharge. No scleral icterus. Neck: Normal range of motion. Neck supple. No JVD present. No thyromegaly present. Cardiovascular: Normal rate, regular rhythm and normal heart sounds. No murmur heard. Pulmonary/Chest: Effort normal and breath sounds normal. No respiratory distress. Abdominal: Soft. Bowel sounds are normal. She exhibits no distension. There is tenderness in the suprapubic area. There is no CVA tenderness. Musculoskeletal: Normal range of motion. Neurological: She is alert and oriented to person, place, and time. No cranial nerve deficit. Skin: Skin is warm and dry. No rash noted. She is not diaphoretic. Psychiatric: She has a normal mood and affect. Her behavior is normal. Judgment normal.   Nursing note and vitals reviewed.     /72 (Site: Right Arm, Position: Sitting, Cuff Size: Small Adult)   Pulse 68   Temp 97.9 °F (36.6 °C) (Oral)   Resp 20   Ht 5' 2\" (1.575 m)   Wt 206 lb (93.4 kg)   LMP 01/01/1998 (Approximate)   SpO2 97%   BMI 37.68 kg/m²     Results for orders placed or performed in visit on 11/09/17   POCT Urinalysis no Micro   Result Value Ref Range    Color, UA YEL     Clarity, UA CLR     Glucose, UA POC >=1,000 (A)     Bilirubin, UA NEG     Ketones, UA NEG     Spec Grav, UA 1.025     Blood, UA POC NEG     pH, UA 5.5     Protein, UA POC NEG     Urobilinogen, UA 0.2     Leukocytes, UA NEG the next day or two. This may help wash out the bacteria that are causing the infection. (If you have kidney, heart, or liver disease and have to limit fluids, talk with your doctor before you increase your fluid intake.)  · Avoid drinks that are carbonated or have caffeine. They can irritate the bladder. · Urinate often. Try to empty your bladder each time. · To relieve pain, take a hot bath or lay a heating pad set on low over your lower belly or genital area. Never go to sleep with a heating pad in place. To prevent UTIs  · Drink plenty of water each day. This helps you urinate often, which clears bacteria from your system. (If you have kidney, heart, or liver disease and have to limit fluids, talk with your doctor before you increase your fluid intake.)  · Urinate when you need to. · Urinate right after you have sex. · Change sanitary pads often. · Avoid douches, bubble baths, feminine hygiene sprays, and other feminine hygiene products that have deodorants. · After going to the bathroom, wipe from front to back. When should you call for help? Call your doctor now or seek immediate medical care if:  · Symptoms such as fever, chills, nausea, or vomiting get worse or appear for the first time. · You have new pain in your back just below your rib cage. This is called flank pain. · There is new blood or pus in your urine. · You have any problems with your antibiotic medicine. Watch closely for changes in your health, and be sure to contact your doctor if:  · You are not getting better after taking an antibiotic for 2 days. · Your symptoms go away but then come back. Where can you learn more? Go to https://[employer]. org and sign in to your [employer] account. Enter R242 in the [employer] box to learn more about \"Urinary Tract Infection in Women: Care Instructions. \"     If you do not have an account, please click on the \"Sign Up Now\" link.   Current as of: November 28,

## 2017-11-11 LAB
ORGANISM: ABNORMAL
URINE CULTURE, ROUTINE: ABNORMAL
URINE CULTURE, ROUTINE: ABNORMAL

## 2017-11-14 ENCOUNTER — TELEPHONE (OUTPATIENT)
Dept: URGENT CARE | Age: 52
End: 2017-11-14

## 2017-11-14 RX ORDER — AMOXICILLIN AND CLAVULANATE POTASSIUM 875; 125 MG/1; MG/1
1 TABLET, FILM COATED ORAL EVERY 12 HOURS
Qty: 20 TABLET | Refills: 0 | Status: SHIPPED | OUTPATIENT
Start: 2017-11-14 | End: 2017-11-24

## 2017-11-14 NOTE — TELEPHONE ENCOUNTER
Please let patient know that her urine culture did grow group B strep. I am sending her a different antibiotic to begin, she needs to stop the macrobid.

## 2017-11-15 ENCOUNTER — CARE COORDINATION (OUTPATIENT)
Dept: CARE COORDINATION | Age: 52
End: 2017-11-15

## 2017-11-15 RX ORDER — METFORMIN HYDROCHLORIDE 500 MG/1
TABLET, EXTENDED RELEASE ORAL
Qty: 30 TABLET | Refills: 5 | Status: SHIPPED | OUTPATIENT
Start: 2017-11-15 | End: 2018-01-30 | Stop reason: ALTCHOICE

## 2017-11-15 NOTE — CARE COORDINATION
Ambulatory Care Coordination Note  11/15/2017  CM Risk Score: 5  Yusef Mortality Risk Score:      ACC: Stewart White, RN    Summary Note: ACC called pt to introduce self as new care coordinator, pt stated she is having trouble with the antibiotic Urgent Care called in for her (will get the liquid option called into the pharmacy), pt has been without diabetic meds for several weeks, she can not afford the copay, she has been trying to get a denial letter about how she is not eligible for part D Medicare for [employer] but has been unable to obtain that letter as of yet. Care Coordination Interventions    Program Enrollment:  Rising Risk  Referral from Primary Care Provider:  Yes  Suggested Interventions and Community Resources  Diabetes Education:  Declined (Comment: 11/15/17-pt has been without medication for several weeks, can not afford, )  Fall Risk Prevention:  Not Started (Comment: Discuss life line, PT for gait eval)  Medication Assistance Program:  In Process (Comment: Pt 11/15/17-pt is trying to get a letter stating that she was not eligible for Part D, she has called both [facility] and Medicare seaking help with this letter and has not be able to obtain one. )  Pharmacist:  Completed (Comment: 11/15/17-Pt says she is in the doughnut hole most of her medication Pt has been without Escobar Hartman in several weeks. Pt is working with [facility] to see if she qualifies for assitance with medicaitons)  Zone Management Tools:   In Process (Comment: DM)  Other Services or Interventions:  Pt having trouble with antibiotic that she was given from Urgent Care, states the pills are too big, the liquid form of this medication will be called into the pharmacy         Goals Addressed             Most Recent     Self Monitoring   No change (11/15/2017)             Self-Monitored Blood Glucose - I will check my blood sugar Fasting blood sugar will keep food diary for blood sugars above 160    Blood sugar ranging from

## 2017-11-21 ENCOUNTER — CARE COORDINATION (OUTPATIENT)
Dept: CARE COORDINATION | Age: 52
End: 2017-11-21

## 2017-11-24 NOTE — CARE COORDINATION
Unable to reach pt this week, left a voicemail asking for call back and office number left in message.   Will try again to reach pt in one week

## 2017-12-05 ENCOUNTER — CARE COORDINATION (OUTPATIENT)
Dept: CARE COORDINATION | Age: 52
End: 2017-12-05

## 2017-12-06 DIAGNOSIS — I10 ESSENTIAL HYPERTENSION: ICD-10-CM

## 2017-12-06 DIAGNOSIS — E11.8 TYPE 2 DIABETES MELLITUS WITH COMPLICATION, WITH LONG-TERM CURRENT USE OF INSULIN (HCC): ICD-10-CM

## 2017-12-06 DIAGNOSIS — I25.10 ASCVD (ARTERIOSCLEROTIC CARDIOVASCULAR DISEASE): ICD-10-CM

## 2017-12-06 DIAGNOSIS — Z79.4 TYPE 2 DIABETES MELLITUS WITH COMPLICATION, WITH LONG-TERM CURRENT USE OF INSULIN (HCC): ICD-10-CM

## 2017-12-06 LAB
ALBUMIN SERPL-MCNC: 4.2 G/DL (ref 3.5–5.2)
ALP BLD-CCNC: 66 U/L (ref 35–104)
ALT SERPL-CCNC: 21 U/L (ref 5–33)
ANION GAP SERPL CALCULATED.3IONS-SCNC: 15 MMOL/L (ref 7–19)
AST SERPL-CCNC: 31 U/L (ref 5–32)
BACTERIA: ABNORMAL /HPF
BILIRUB SERPL-MCNC: 0.6 MG/DL (ref 0.2–1.2)
BILIRUBIN URINE: NEGATIVE
BLOOD, URINE: NEGATIVE
BUN BLDV-MCNC: 15 MG/DL (ref 6–20)
CALCIUM SERPL-MCNC: 9.6 MG/DL (ref 8.6–10)
CHLORIDE BLD-SCNC: 98 MMOL/L (ref 98–111)
CLARITY: ABNORMAL
CO2: 25 MMOL/L (ref 22–29)
COLOR: YELLOW
CREAT SERPL-MCNC: 0.6 MG/DL (ref 0.5–0.9)
CREATININE URINE: 160.4 MG/DL (ref 4.2–622)
EPITHELIAL CELLS, UA: 4 /HPF (ref 0–5)
GFR NON-AFRICAN AMERICAN: >60
GLUCOSE BLD-MCNC: 235 MG/DL (ref 74–109)
GLUCOSE URINE: 100 MG/DL
HBA1C MFR BLD: 9.6 %
HCT VFR BLD CALC: 45.3 % (ref 37–47)
HEMOGLOBIN: 15.2 G/DL (ref 12–16)
HYALINE CASTS: 4 /HPF (ref 0–8)
KETONES, URINE: NEGATIVE MG/DL
LEUKOCYTE ESTERASE, URINE: ABNORMAL
MCH RBC QN AUTO: 28.8 PG (ref 27–31)
MCHC RBC AUTO-ENTMCNC: 33.6 G/DL (ref 33–37)
MCV RBC AUTO: 85.8 FL (ref 81–99)
NITRITE, URINE: NEGATIVE
PDW BLD-RTO: 13 % (ref 11.5–14.5)
PH UA: 7
PLATELET # BLD: 237 K/UL (ref 130–400)
PMV BLD AUTO: 11.2 FL (ref 9.4–12.3)
POTASSIUM SERPL-SCNC: 5 MMOL/L (ref 3.5–5)
PROTEIN PROTEIN: 15 MG/DL (ref 15–45)
PROTEIN UA: NEGATIVE MG/DL
RBC # BLD: 5.28 M/UL (ref 4.2–5.4)
RBC UA: 1 /HPF (ref 0–4)
SODIUM BLD-SCNC: 138 MMOL/L (ref 136–145)
SPECIFIC GRAVITY UA: 1.02
TOTAL PROTEIN: 7.5 G/DL (ref 6.6–8.7)
UROBILINOGEN, URINE: 0.2 E.U./DL
WBC # BLD: 8.1 K/UL (ref 4.8–10.8)
WBC UA: 30 /HPF (ref 0–5)

## 2017-12-06 NOTE — CARE COORDINATION
Ambulatory Care Coordination Note  12/5/2017  CM Risk Score: 5  Yusef Mortality Risk Score:      ACC: Stephany Cartwright RN    Summary Note: ACC spoke with pt, she is doing well overall, blood sugars have been increasing, because she has not been able to afford the Januvia. Will work on trying to find the medication at a lower cost        Care Coordination Interventions    Program Enrollment:  Rising Risk  Referral from Primary Care Provider:  Yes  Suggested Interventions and Community Resources  Diabetes Education:  Declined (Comment: 12/5/17-pt has been without medication for several weeks, can not afford, )  Fall Risk Prevention:  Not Started (Comment: Discuss life line, PT for gait eval)  Medication Assistance Program:  In Process (Comment: 12/5/17-pt is still struggeling with medication cost, will work with [facility] to see what programs this patient maybe eligible for)  Pharmacist:  Completed (Comment: 12/5/17-Januvia coupon card sent to pharmacy to see about lowering the cost of the medication, pt qualified, now script is $5/month)  Zone Management Tools: In Process (Comment: 12/5/17DM-since pt has been out of Demond Post she has noticed her blood sugars have been running higher, fasting tends to be 170+)  Other Services or Interventions:  12/6/17 received confirmation on the cost of Januvia, pt to  and resume taking as prescribed         Goals Addressed             Most Recent     Self Monitoring   On track (12/5/2017)             Self-Monitored Blood Glucose - I will check my blood sugar Fasting blood sugar will keep food diary for blood sugars above 160    Blood sugar ranging from 100-156    Barriers: none  Plan for overcoming my barriers: N/A  Confidence: 7/10  Anticipated Goal Completion Date: 9/23/17              Prior to Admission medications    Medication Sig Start Date End Date Taking?  Authorizing Provider   metFORMIN (GLUCOPHAGE-XR) 500 MG extended release tablet TAKE 1 TABLET EVERY DAY (WITH

## 2017-12-08 LAB
ORGANISM: ABNORMAL
URINE CULTURE, ROUTINE: ABNORMAL
URINE CULTURE, ROUTINE: ABNORMAL

## 2017-12-15 ENCOUNTER — TELEPHONE (OUTPATIENT)
Dept: SURGERY | Age: 52
End: 2017-12-15

## 2017-12-15 ENCOUNTER — CARE COORDINATOR VISIT (OUTPATIENT)
Dept: CARE COORDINATION | Age: 52
End: 2017-12-15

## 2017-12-15 ENCOUNTER — OFFICE VISIT (OUTPATIENT)
Dept: PRIMARY CARE CLINIC | Age: 52
End: 2017-12-15
Payer: COMMERCIAL

## 2017-12-15 VITALS
WEIGHT: 209 LBS | DIASTOLIC BLOOD PRESSURE: 68 MMHG | HEART RATE: 67 BPM | HEIGHT: 62 IN | BODY MASS INDEX: 38.46 KG/M2 | TEMPERATURE: 96.1 F | OXYGEN SATURATION: 96 % | SYSTOLIC BLOOD PRESSURE: 128 MMHG

## 2017-12-15 DIAGNOSIS — I10 ESSENTIAL HYPERTENSION: Primary | ICD-10-CM

## 2017-12-15 DIAGNOSIS — E11.8 TYPE 2 DIABETES MELLITUS WITH COMPLICATION, UNSPECIFIED LONG TERM INSULIN USE STATUS: ICD-10-CM

## 2017-12-15 DIAGNOSIS — G62.9 NEUROPATHY: ICD-10-CM

## 2017-12-15 PROCEDURE — G8598 ASA/ANTIPLAT THER USED: HCPCS | Performed by: INTERNAL MEDICINE

## 2017-12-15 PROCEDURE — G8427 DOCREV CUR MEDS BY ELIG CLIN: HCPCS | Performed by: INTERNAL MEDICINE

## 2017-12-15 PROCEDURE — 1036F TOBACCO NON-USER: CPT | Performed by: INTERNAL MEDICINE

## 2017-12-15 PROCEDURE — 3017F COLORECTAL CA SCREEN DOC REV: CPT | Performed by: INTERNAL MEDICINE

## 2017-12-15 PROCEDURE — G8484 FLU IMMUNIZE NO ADMIN: HCPCS | Performed by: INTERNAL MEDICINE

## 2017-12-15 PROCEDURE — 3046F HEMOGLOBIN A1C LEVEL >9.0%: CPT | Performed by: INTERNAL MEDICINE

## 2017-12-15 PROCEDURE — 3014F SCREEN MAMMO DOC REV: CPT | Performed by: INTERNAL MEDICINE

## 2017-12-15 PROCEDURE — G8417 CALC BMI ABV UP PARAM F/U: HCPCS | Performed by: INTERNAL MEDICINE

## 2017-12-15 PROCEDURE — 99213 OFFICE O/P EST LOW 20 MIN: CPT | Performed by: INTERNAL MEDICINE

## 2017-12-15 RX ORDER — GABAPENTIN 300 MG/1
300 CAPSULE ORAL 3 TIMES DAILY
Qty: 90 CAPSULE | Refills: 3 | Status: SHIPPED | OUTPATIENT
Start: 2017-12-15 | End: 2018-01-11 | Stop reason: SDUPTHER

## 2017-12-15 ASSESSMENT — ENCOUNTER SYMPTOMS
DIARRHEA: 0
BACK PAIN: 0
NAUSEA: 0
SHORTNESS OF BREATH: 1
COUGH: 1
VOMITING: 0
CONSTIPATION: 0

## 2017-12-15 NOTE — PROGRESS NOTES
 CORONARY ANGIOPLASTY WITH STENT PLACEMENT  12/2011    SABA to Glenn Fernando 25 GRAFT      FOOT SURGERY  2000    HYSTERECTOMY  1998    total    OTHER SURGICAL HISTORY      fibrinolysis    [place] CABG, ARTERY-VEIN, THREE  [id number]    Coronary Artery Bypass, 3    THORACOTOMY      VULVAR/PERINEAL BIOPSY  10/07/2015    Dr. Thierry Boston       Family History   Problem Relation Age of Onset    Diabetes Father     Cancer Father     High Blood Pressure Father     Diabetes Mother     Breast Cancer Mother     Heart Failure Mother      CHF, MVP    Heart Failure Maternal Grandfather        Social History   Substance Use Topics    Smoking status: Former Smoker     Packs/day: 2.50     Years: 35.00     Types: Cigarettes     Quit date: 12/15/2014    Smokeless tobacco: Never Used      Comment: pack and half per week plus electronic cig    Alcohol use No      Current Outpatient Prescriptions   Medication Sig Dispense Refill    empagliflozin (JARDIANCE) 25 MG tablet Take 25 mg by mouth daily 30 tablet 3    metFORMIN (GLUCOPHAGE-XR) 500 MG extended release tablet TAKE 1 TABLET EVERY DAY (WITH BREAKFAST) 30 tablet 5    lovastatin (MEVACOR) 40 MG tablet TAKE 1 TABLET AT BEDTIME 30 tablet 3    metoprolol tartrate (LOPRESSOR) 25 MG tablet TAKE 1 TABLET BY MOUTH TWICE DAILY 60 tablet 5    valsartan (DIOVAN) 80 MG tablet TAKE 1 TABLET BY MOUTH DAILY 30 tablet 3    JANUVIA 100 MG tablet       aspirin 81 MG tablet Take 81 mg by mouth daily      vitamin D (CHOLECALCIFEROL) 1000 UNIT TABS tablet Take 1,000 Units by mouth daily      isosorbide mononitrate (IMDUR) 30 MG extended release tablet Take 1 tablet by mouth daily 30 tablet 5    nitroGLYCERIN (NITROSTAT) 0.4 MG SL tablet Place 1 tablet under the tongue every 5 minutes as needed for Chest pain 25 tablet 3    insulin glargine (LANTUS) 100 UNIT/ML injection pen Inject 15 Units into the skin nightly (Patient taking differently: Inject 20 Units into the skin nightly ) 5 Pen 3    glucose blood VI test strips (ASCENSIA AUTODISC VI;ONE TOUCH ULTRA TEST VI) strip 1 each by In Vitro route daily As needed. 100 each 3    nystatin (MYCOSTATIN) 381811 UNIT/GM powder Apply 2-3 times daily. 1 Bottle 1    Insulin Pen Needle ([employer] PEN NEEDLES) 31G X 6 MM MISC 1 each by Does not apply route daily 100 each 3    Lactobacillus (PROBIATA PO) Take 1 tablet by mouth daily       Estradiol (VAGIFEM) 10 MCG TABS vaginal tablet Place 1 tablet vaginally Twice a Week 8 tablet 3    mometasone (ELOCON) 0.1 % ointment Apply topically to vagina 3 times per week 1 Tube 3    LORazepam (ATIVAN) 0.5 MG tablet Take 1 tablet by mouth every 8 hours as needed for Anxiety 90 tablet 0    ondansetron (ZOFRAN) 8 MG tablet Take 1 tablet by mouth every 8 hours as needed for Nausea or Vomiting 9 tablet 1    Eflornithine HCl (VANIQA) 13.9 % CREA Apply 1 Dose topically 2 times daily 45 g 2    nystatin-triamcinolone (MYCOLOG II) 092113-2.1 UNIT/GM-% cream Apply topically 2 times daily. 1 Tube 0    Docusate Calcium (STOOL SOFTENER PO) Take 100 mg by mouth 2 times daily as needed.  gabapentin (NEURONTIN) 300 MG capsule Take 1 capsule by mouth 3 times daily for 30 days. 90 capsule 3    vitamin D (ERGOCALCIFEROL) 53691 units CAPS capsule Take 1 capsule by mouth once a week 12 capsule 1    famotidine (PEPCID) 20 MG tablet TAKE 1 TABLET TWICE DAILY 60 tablet 3    furosemide (LASIX) 20 MG tablet Take 1 tablet by mouth daily as needed (edema) 90 tablet 3    conjugated estrogens (PREMARIN) 0.625 MG/GM vaginal cream Insert 0.5 gm intravaginally every nite for 2 weeks then 2 times weekly 1 Tube 3     No current facility-administered medications for this visit.       Allergies   Allergen Reactions    Codeine     Humalog [Insulin Lispro]     Motrin [Ibuprofen] Swelling       Health Maintenance   Topic Date Due    Hepatitis C screen  1965    HIV screen  10/30/1978    DTaP/Tdap/Td vaccine (1 - Tdap) 10/30/1982    Pneumococcal med risk (1 of 1 - PPSV23) 10/30/1982    Colon Cancer Screen FIT/FOBT  05/08/2016    Diabetic retinal exam  01/11/2017    Diabetic microalbuminuria test  03/09/2017    Flu vaccine (1) 09/01/2017    Breast cancer screen  12/19/2017    A1C test (Diabetic or Prediabetic)  03/06/2018    Diabetic foot exam  05/09/2018    Lipid screen  05/11/2018    Cervical cancer screen  08/25/2018    Potassium monitoring  12/06/2018    Creatinine monitoring  12/06/2018       Subjective:      Review of Systems   Constitutional: Negative for activity change and fatigue. Respiratory: Positive for cough and shortness of breath. Cardiovascular: Negative for chest pain and leg swelling. Gastrointestinal: Negative for constipation, diarrhea, nausea and vomiting. Genitourinary: Negative for difficulty urinating and dysuria. Musculoskeletal: Negative for arthralgias and back pain. Neurological: Positive for numbness. Negative for dizziness and headaches. Objective:     Physical Exam   Constitutional: She is oriented to person, place, and time. She appears well-developed and well-nourished. HENT:   Head: Normocephalic and atraumatic. Mouth/Throat: Oropharynx is clear and moist.   Eyes: Conjunctivae are normal. Pupils are equal, round, and reactive to light. Cardiovascular: Normal rate, regular rhythm and normal heart sounds. Pulmonary/Chest: Effort normal and breath sounds normal.   Abdominal: Soft. Musculoskeletal: Normal range of motion. Neurological: She is alert and oriented to person, place, and time. Skin: Skin is warm and dry. Vitals reviewed. /68   Pulse 67   Temp 96.1 °F (35.6 °C) (Temporal)   Ht 5' 2\" (1.575 m)   Wt 209 lb (94.8 kg)   LMP 01/01/1998 (Approximate)   SpO2 96%   BMI 38.23 kg/m²     Assessment:      1. Essential hypertension     2. Type 2 diabetes mellitus with complication, unspecified long term insulin use status ([facility] [address]     3.

## 2017-12-19 NOTE — CARE COORDINATION
Ambulatory Care Coordination Note  12/15/2017  CM Risk Score: 5  Yusef Mortality Risk Score:      ACC: Noemí Lancaster RN    Summary Note: ACC met with the patient during PCP visit, reviewed medications and what concerns the patient had. Pt continued to have concerns about medication costs, worked with Pharmacy and [employer] and was able to lower the cost of some medications        Care Coordination Interventions    Program Enrollment:  Rising Risk  Referral from Primary Care Provider:  Yes  Suggested Interventions and Community Resources  Diabetes Education:  Declined (Comment: 12/5/17-pt has been without medication for several weeks, can not afford, )  Fall Risk Prevention:  Not Started (Comment: Discuss life line, PT for gait eval)  Medication Assistance Program:  In Process (Comment: 12/5/17-pt is still struggeling with medication cost, will work with [facility] to see what programs this patient maybe eligible for)  Pharmacist:  Completed (Comment: 12/5/17-Januvia coupon card sent to pharmacy to see about lowering the cost of the medication, pt qualified, now script is $5/month)  [employer] Management Tools: In Process (Comment: 12/5/17DM-since pt has been out of [facility] she has noticed her blood sugars have been running higher, fasting tends to be 170+)  Other Services or Interventions:  12/6/17 received confirmation on the cost of Januvia, pt to  and resume taking as prescribed         Goals Addressed     None          Prior to Admission medications    Medication Sig Start Date End Date Taking?  Authorizing Provider   gabapentin (NEURONTIN) 300 MG capsule Take 1 capsule by mouth 3 times daily 12/15/17   Brock Fatima, DO   empagliflozin (JARDIANCE) 25 MG tablet Take 25 mg by mouth daily 12/15/17   Brock Fatima DO   metFORMIN (GLUCOPHAGE-XR) 500 MG extended release tablet TAKE 1 TABLET EVERY DAY (WITH BREAKFAST) 11/15/17   Brock Fatima DO   famotidine (PEPCID) 20 MG tablet TAKE 1 TABLET TWICE DAILY 10/12/17   Lalit Schumacher MD   lovastatin (MEVACOR) 40 MG tablet TAKE 1 TABLET AT BEDTIME 10/12/17   Lalit Schumacher MD   metoprolol tartrate (LOPRESSOR) 25 MG tablet TAKE 1 TABLET BY MOUTH TWICE DAILY 10/12/17   Lalit Schumacher MD   valsartan (DIOVAN) 80 MG tablet TAKE 1 TABLET BY MOUTH DAILY 10/11/17   MIKE Artis   JANUVIA 100 MG tablet  6/15/17   Historical Provider, MD   vitamin D (ERGOCALCIFEROL) 09811 units CAPS capsule Take 50,000 Units by mouth once a week  8/11/17   Historical Provider, MD   aspirin 81 MG tablet Take 81 mg by mouth daily    Historical Provider, MD   vitamin D (CHOLECALCIFEROL) 1000 UNIT TABS tablet Take 1,000 Units by mouth daily    Historical Provider, MD   isosorbide mononitrate (IMDUR) 30 MG extended release tablet Take 1 tablet by mouth daily 9/5/17   MIKE Emery   nitroGLYCERIN (NITROSTAT) 0.4 MG SL tablet Place 1 tablet under the tongue every 5 minutes as needed for Chest pain 9/5/17   MIKE Emery   insulin glargine (LANTUS) 100 UNIT/ML injection pen Inject 15 Units into the skin nightly  Patient taking differently: Inject 20 Units into the skin nightly  7/25/17   Hieu Bates, DO   glucose blood VI test strips (ASCENSIA AUTODISC VI;ONE TOUCH ULTRA TEST VI) strip 1 each by In Vitro route daily As needed. 6/29/17   Hagen Copper, DO   nystatin (MYCOSTATIN) 880795 UNIT/GM powder Apply 2-3 times daily.  6/29/17   Hagen Copper, DO   Insulin Pen Needle ([employer] PEN NEEDLES) 31G X 6 MM MISC 1 each by Does not apply route daily 6/15/17   MIKE Artis   Lactobacillus (PROBIATA PO) Take 1 tablet by mouth daily     Historical Provider, MD   Estradiol (VAGIFEM) 10 MCG TABS vaginal tablet Place 1 tablet vaginally Twice a Week 12/15/16   Sariah Regalado MD   mometasone (ELOCON) 0.1 % ointment Apply topically to vagina 3 times per week 11/7/16   Sariah Regalado MD   LORazepam (ATIVAN) 0.5 MG tablet Take 1 tablet by mouth every

## 2017-12-20 ENCOUNTER — CARE COORDINATION (OUTPATIENT)
Dept: CARE COORDINATION | Age: 52
End: 2017-12-20

## 2017-12-20 DIAGNOSIS — I10 ESSENTIAL HYPERTENSION: ICD-10-CM

## 2017-12-20 DIAGNOSIS — Z12.11 SCREENING FOR COLON CANCER: ICD-10-CM

## 2017-12-20 DIAGNOSIS — L68.0 HIRSUTISM: ICD-10-CM

## 2017-12-20 DIAGNOSIS — K21.9 GASTROESOPHAGEAL REFLUX DISEASE WITHOUT ESOPHAGITIS: ICD-10-CM

## 2017-12-20 DIAGNOSIS — R60.0 LOCALIZED EDEMA: ICD-10-CM

## 2017-12-20 DIAGNOSIS — E11.8 TYPE 2 DIABETES MELLITUS WITH COMPLICATION, UNSPECIFIED LONG TERM INSULIN USE STATUS: ICD-10-CM

## 2017-12-20 DIAGNOSIS — Z11.4 SCREENING FOR HIV WITHOUT PRESENCE OF RISK FACTORS: ICD-10-CM

## 2017-12-20 DIAGNOSIS — Z23 NEEDS FLU SHOT: ICD-10-CM

## 2017-12-20 DIAGNOSIS — Z11.59 NEED FOR HEPATITIS C SCREENING TEST: ICD-10-CM

## 2017-12-20 RX ORDER — FAMOTIDINE 20 MG/1
TABLET, FILM COATED ORAL
Qty: 60 TABLET | Refills: 3 | Status: SHIPPED | OUTPATIENT
Start: 2017-12-20 | End: 2018-05-12 | Stop reason: SDUPTHER

## 2017-12-20 RX ORDER — FUROSEMIDE 20 MG/1
20 TABLET ORAL DAILY PRN
Qty: 90 TABLET | Refills: 3 | Status: SHIPPED | OUTPATIENT
Start: 2017-12-20 | End: 2018-04-30 | Stop reason: SDUPTHER

## 2017-12-20 RX ORDER — ERGOCALCIFEROL 1.25 MG/1
50000 CAPSULE ORAL WEEKLY
Qty: 12 CAPSULE | Refills: 1 | Status: SHIPPED | OUTPATIENT
Start: 2017-12-20 | End: 2018-10-04 | Stop reason: SDUPTHER

## 2017-12-27 NOTE — CARE COORDINATION
Maribel Chavez MD   mometasone (ELOCON) 0.1 % ointment Apply topically to vagina 3 times per week 11/7/16   Maribel Chavez MD   LORazepam (ATIVAN) 0.5 MG tablet Take 1 tablet by mouth every 8 hours as needed for Anxiety 10/18/16   MIKE Arriaga   ondansetron [facility]) 8 MG tablet Take 1 tablet by mouth every 8 hours as needed for Nausea or Vomiting 4/18/16   MIKE Vargas   Eflornithine HCl (VANIQA) 13.9 % CREA Apply 1 Dose topically 2 times daily 11/20/15   Mireya Chasten FontaineMIKE   nystatin-triamcinolone [facility]) 637937-5.5 UNIT/GM-% cream Apply topically 2 times daily. 9/30/15   MIKE Clayton   conjugated estrogens (PREMARIN) 0.625 MG/GM vaginal cream Insert 0.5 gm intravaginally every nite for 2 weeks then 2 times weekly 8/25/15   MIKE Clayton   Docusate Calcium (STOOL SOFTENER PO) Take 100 mg by mouth 2 times daily as needed.     Historical Provider, MD       Future Appointments  Date Time Provider Trini Quinn   3/6/2018 3:15 PM Pam Pena MD [facility]   3/15/2018 1:15 PM DO MITCHEL Carrillo [address]

## 2018-01-11 ENCOUNTER — CARE COORDINATION (OUTPATIENT)
Dept: CARE COORDINATION | Age: 53
End: 2018-01-11

## 2018-01-11 RX ORDER — GABAPENTIN 300 MG/1
300 CAPSULE ORAL 3 TIMES DAILY
Qty: 90 CAPSULE | Refills: 3 | Status: SHIPPED | OUTPATIENT
Start: 2018-01-11 | End: 2018-01-30 | Stop reason: SDUPTHER

## 2018-01-18 ENCOUNTER — CARE COORDINATION (OUTPATIENT)
Dept: CARE COORDINATION | Age: 53
End: 2018-01-18

## 2018-01-22 ENCOUNTER — APPOINTMENT (OUTPATIENT)
Dept: GENERAL RADIOLOGY | Age: 53
DRG: 313 | End: 2018-01-22
Payer: COMMERCIAL

## 2018-01-22 ENCOUNTER — HOSPITAL ENCOUNTER (INPATIENT)
Age: 53
LOS: 1 days | Discharge: HOME | DRG: 313 | End: 2018-01-23
Attending: FAMILY MEDICINE | Admitting: INTERNAL MEDICINE
Payer: COMMERCIAL

## 2018-01-22 DIAGNOSIS — I20.0 ACCELERATING ANGINA (HCC): Primary | ICD-10-CM

## 2018-01-22 LAB
ALBUMIN SERPL-MCNC: 4.1 G/DL (ref 3.5–5.2)
ALP BLD-CCNC: 69 U/L (ref 35–104)
ALT SERPL-CCNC: 16 U/L (ref 5–33)
ANION GAP SERPL CALCULATED.3IONS-SCNC: 15 MMOL/L (ref 7–19)
AST SERPL-CCNC: 16 U/L (ref 5–32)
BASOPHILS ABSOLUTE: 0.1 K/UL (ref 0–0.2)
BASOPHILS RELATIVE PERCENT: 0.9 % (ref 0–1)
BILIRUB SERPL-MCNC: <0.2 MG/DL (ref 0.2–1.2)
BUN BLDV-MCNC: 19 MG/DL (ref 6–20)
CALCIUM SERPL-MCNC: 9.3 MG/DL (ref 8.6–10)
CHLORIDE BLD-SCNC: 100 MMOL/L (ref 98–111)
CO2: 24 MMOL/L (ref 22–29)
CREAT SERPL-MCNC: 0.7 MG/DL (ref 0.5–0.9)
EOSINOPHILS ABSOLUTE: 0.1 K/UL (ref 0–0.6)
EOSINOPHILS RELATIVE PERCENT: 1.2 % (ref 0–5)
GFR NON-AFRICAN AMERICAN: >60
GLUCOSE BLD-MCNC: 287 MG/DL (ref 74–109)
HCT VFR BLD CALC: 40.8 % (ref 37–47)
HEMOGLOBIN: 13.7 G/DL (ref 12–16)
LYMPHOCYTES ABSOLUTE: 3 K/UL (ref 1.1–4.5)
LYMPHOCYTES RELATIVE PERCENT: 31.7 % (ref 20–40)
MCH RBC QN AUTO: 28.2 PG (ref 27–31)
MCHC RBC AUTO-ENTMCNC: 33.6 G/DL (ref 33–37)
MCV RBC AUTO: 84 FL (ref 81–99)
MONOCYTES ABSOLUTE: 0.6 K/UL (ref 0–0.9)
MONOCYTES RELATIVE PERCENT: 6.6 % (ref 0–10)
NEUTROPHILS ABSOLUTE: 5.6 K/UL (ref 1.5–7.5)
NEUTROPHILS RELATIVE PERCENT: 59.3 % (ref 50–65)
PDW BLD-RTO: 12.9 % (ref 11.5–14.5)
PLATELET # BLD: 250 K/UL (ref 130–400)
PMV BLD AUTO: 10.8 FL (ref 9.4–12.3)
POTASSIUM SERPL-SCNC: 4 MMOL/L (ref 3.5–5)
PRO-BNP: 635 PG/ML (ref 0–900)
RBC # BLD: 4.86 M/UL (ref 4.2–5.4)
SODIUM BLD-SCNC: 139 MMOL/L (ref 136–145)
TOTAL CK: 59 U/L (ref 26–192)
TOTAL PROTEIN: 7.3 G/DL (ref 6.6–8.7)
WBC # BLD: 9.5 K/UL (ref 4.8–10.8)

## 2018-01-22 PROCEDURE — 99285 EMERGENCY DEPT VISIT HI MDM: CPT

## 2018-01-22 PROCEDURE — 71045 X-RAY EXAM CHEST 1 VIEW: CPT

## 2018-01-22 PROCEDURE — 6360000002 HC RX W HCPCS: Performed by: FAMILY MEDICINE

## 2018-01-22 PROCEDURE — 6370000000 HC RX 637 (ALT 250 FOR IP): Performed by: FAMILY MEDICINE

## 2018-01-22 PROCEDURE — 96374 THER/PROPH/DIAG INJ IV PUSH: CPT

## 2018-01-22 PROCEDURE — 2580000003 HC RX 258: Performed by: FAMILY MEDICINE

## 2018-01-22 PROCEDURE — 93005 ELECTROCARDIOGRAM TRACING: CPT

## 2018-01-22 RX ORDER — NITROGLYCERIN 0.4 MG/1
0.4 TABLET SUBLINGUAL EVERY 5 MIN PRN
Status: DISCONTINUED | OUTPATIENT
Start: 2018-01-22 | End: 2018-01-23 | Stop reason: SDUPTHER

## 2018-01-22 RX ORDER — SODIUM CHLORIDE 9 MG/ML
INJECTION, SOLUTION INTRAVENOUS CONTINUOUS
Status: DISCONTINUED | OUTPATIENT
Start: 2018-01-22 | End: 2018-01-23 | Stop reason: HOSPADM

## 2018-01-22 RX ORDER — LORAZEPAM 2 MG/ML
1 INJECTION INTRAMUSCULAR ONCE
Status: COMPLETED | OUTPATIENT
Start: 2018-01-22 | End: 2018-01-22

## 2018-01-22 RX ADMIN — LORAZEPAM 1 MG: 2 INJECTION INTRAMUSCULAR; INTRAVENOUS at 23:22

## 2018-01-22 RX ADMIN — NITROGLYCERIN 0.4 MG: 0.4 TABLET SUBLINGUAL at 23:21

## 2018-01-22 RX ADMIN — SODIUM CHLORIDE: 9 INJECTION, SOLUTION INTRAVENOUS at 23:19

## 2018-01-22 ASSESSMENT — ENCOUNTER SYMPTOMS
VOMITING: 0
COUGH: 0
BACK PAIN: 0
SHORTNESS OF BREATH: 1
COLOR CHANGE: 0
DIARRHEA: 0
PHOTOPHOBIA: 0
NAUSEA: 1
CHEST TIGHTNESS: 1
RHINORRHEA: 0
CONSTIPATION: 0
SINUS PAIN: 0
ABDOMINAL PAIN: 0
ABDOMINAL DISTENTION: 0
WHEEZING: 0
BLOOD IN STOOL: 0

## 2018-01-22 ASSESSMENT — PAIN SCALES - GENERAL: PAINLEVEL_OUTOF10: 6

## 2018-01-22 ASSESSMENT — PAIN DESCRIPTION - DESCRIPTORS: DESCRIPTORS: STABBING

## 2018-01-22 ASSESSMENT — PAIN DESCRIPTION - LOCATION: LOCATION: CHEST

## 2018-01-22 ASSESSMENT — PAIN DESCRIPTION - ORIENTATION: ORIENTATION: MID

## 2018-01-23 ENCOUNTER — APPOINTMENT (OUTPATIENT)
Dept: NUCLEAR MEDICINE | Age: 53
DRG: 313 | End: 2018-01-23
Payer: COMMERCIAL

## 2018-01-23 VITALS
OXYGEN SATURATION: 95 % | WEIGHT: 209.13 LBS | DIASTOLIC BLOOD PRESSURE: 70 MMHG | BODY MASS INDEX: 38.48 KG/M2 | RESPIRATION RATE: 16 BRPM | HEART RATE: 63 BPM | SYSTOLIC BLOOD PRESSURE: 140 MMHG | TEMPERATURE: 97.4 F | HEIGHT: 62 IN

## 2018-01-23 PROBLEM — I20.8 ANGINA EFFORT: Status: ACTIVE | Noted: 2018-01-23

## 2018-01-23 PROBLEM — I20.89 ANGINA EFFORT: Status: ACTIVE | Noted: 2018-01-23

## 2018-01-23 LAB
EKG P AXIS: 68 DEGREES
EKG P-R INTERVAL: 134 MS
EKG Q-T INTERVAL: 388 MS
EKG QRS DURATION: 90 MS
EKG QTC CALCULATION (BAZETT): 407 MS
EKG T AXIS: 38 DEGREES
GLUCOSE BLD-MCNC: 117 MG/DL (ref 70–99)
GLUCOSE BLD-MCNC: 235 MG/DL (ref 70–99)
PERFORMED ON: ABNORMAL
PERFORMED ON: ABNORMAL
PERFORMED ON: NORMAL
POC TROPONIN I: 0.01 NG/ML (ref 0–0.08)
TROPONIN: <0.01 NG/ML (ref 0–0.03)
TROPONIN: <0.01 NG/ML (ref 0–0.03)

## 2018-01-23 PROCEDURE — 82948 REAGENT STRIP/BLOOD GLUCOSE: CPT

## 2018-01-23 PROCEDURE — 36415 COLL VENOUS BLD VENIPUNCTURE: CPT

## 2018-01-23 PROCEDURE — 6360000002 HC RX W HCPCS: Performed by: INTERNAL MEDICINE

## 2018-01-23 PROCEDURE — 83880 ASSAY OF NATRIURETIC PEPTIDE: CPT

## 2018-01-23 PROCEDURE — 78452 HT MUSCLE IMAGE SPECT MULT: CPT

## 2018-01-23 PROCEDURE — 85025 COMPLETE CBC W/AUTO DIFF WBC: CPT

## 2018-01-23 PROCEDURE — 99223 1ST HOSP IP/OBS HIGH 75: CPT | Performed by: INTERNAL MEDICINE

## 2018-01-23 PROCEDURE — 84484 ASSAY OF TROPONIN QUANT: CPT

## 2018-01-23 PROCEDURE — 99284 EMERGENCY DEPT VISIT MOD MDM: CPT | Performed by: FAMILY MEDICINE

## 2018-01-23 PROCEDURE — A9500 TC99M SESTAMIBI: HCPCS | Performed by: INTERNAL MEDICINE

## 2018-01-23 PROCEDURE — 82550 ASSAY OF CK (CPK): CPT

## 2018-01-23 PROCEDURE — 3430000000 HC RX DIAGNOSTIC RADIOPHARMACEUTICAL: Performed by: INTERNAL MEDICINE

## 2018-01-23 PROCEDURE — 80053 COMPREHEN METABOLIC PANEL: CPT

## 2018-01-23 PROCEDURE — 6370000000 HC RX 637 (ALT 250 FOR IP): Performed by: INTERNAL MEDICINE

## 2018-01-23 PROCEDURE — 93017 CV STRESS TEST TRACING ONLY: CPT

## 2018-01-23 PROCEDURE — 2140000000 HC CCU INTERMEDIATE R&B

## 2018-01-23 RX ORDER — SODIUM CHLORIDE 0.9 % (FLUSH) 0.9 %
10 SYRINGE (ML) INJECTION EVERY 12 HOURS SCHEDULED
Status: DISCONTINUED | OUTPATIENT
Start: 2018-01-23 | End: 2018-01-23 | Stop reason: HOSPADM

## 2018-01-23 RX ORDER — NITROGLYCERIN 0.4 MG/1
0.4 TABLET SUBLINGUAL EVERY 5 MIN PRN
Status: DISCONTINUED | OUTPATIENT
Start: 2018-01-23 | End: 2018-01-23 | Stop reason: HOSPADM

## 2018-01-23 RX ORDER — SODIUM CHLORIDE 0.9 % (FLUSH) 0.9 %
10 SYRINGE (ML) INJECTION PRN
Status: DISCONTINUED | OUTPATIENT
Start: 2018-01-23 | End: 2018-01-23 | Stop reason: HOSPADM

## 2018-01-23 RX ORDER — ONDANSETRON 2 MG/ML
4 INJECTION INTRAMUSCULAR; INTRAVENOUS EVERY 8 HOURS PRN
Status: DISCONTINUED | OUTPATIENT
Start: 2018-01-23 | End: 2018-01-23 | Stop reason: HOSPADM

## 2018-01-23 RX ORDER — MORPHINE SULFATE 4 MG/ML
4 INJECTION, SOLUTION INTRAMUSCULAR; INTRAVENOUS
Status: DISCONTINUED | OUTPATIENT
Start: 2018-01-23 | End: 2018-01-23 | Stop reason: HOSPADM

## 2018-01-23 RX ORDER — MORPHINE SULFATE 4 MG/ML
2 INJECTION, SOLUTION INTRAMUSCULAR; INTRAVENOUS
Status: DISCONTINUED | OUTPATIENT
Start: 2018-01-23 | End: 2018-01-23 | Stop reason: HOSPADM

## 2018-01-23 RX ORDER — ACETAMINOPHEN 325 MG/1
650 TABLET ORAL EVERY 4 HOURS PRN
Status: DISCONTINUED | OUTPATIENT
Start: 2018-01-23 | End: 2018-01-23 | Stop reason: HOSPADM

## 2018-01-23 RX ADMIN — ACETAMINOPHEN 650 MG: 325 TABLET, FILM COATED ORAL at 03:53

## 2018-01-23 RX ADMIN — TETRAKIS(2-METHOXYISOBUTYLISOCYANIDE)COPPER(I) TETRAFLUOROBORATE 10 MILLICURIE: 1 INJECTION, POWDER, LYOPHILIZED, FOR SOLUTION INTRAVENOUS at 13:22

## 2018-01-23 RX ADMIN — TETRAKIS(2-METHOXYISOBUTYLISOCYANIDE)COPPER(I) TETRAFLUOROBORATE 30 MILLICURIE: 1 INJECTION, POWDER, LYOPHILIZED, FOR SOLUTION INTRAVENOUS at 13:22

## 2018-01-23 RX ADMIN — REGADENOSON 0.4 MG: 0.08 INJECTION, SOLUTION INTRAVENOUS at 13:22

## 2018-01-23 RX ADMIN — ENOXAPARIN SODIUM 40 MG: 40 INJECTION SUBCUTANEOUS at 09:04

## 2018-01-23 ASSESSMENT — PAIN SCALES - GENERAL
PAINLEVEL_OUTOF10: 0
PAINLEVEL_OUTOF10: 6
PAINLEVEL_OUTOF10: 5
PAINLEVEL_OUTOF10: 0

## 2018-01-23 ASSESSMENT — HEART SCORE: ECG: 0

## 2018-01-23 ASSESSMENT — PAIN DESCRIPTION - LOCATION: LOCATION: HEAD

## 2018-01-23 NOTE — H&P
of children: N/A    Years of education: N/A     Occupational History    Not on file. Social History Main Topics    Smoking status: Former Smoker     Packs/day: 2.50     Years: 35.00     Types: Cigarettes     Quit date: 12/15/2014    Smokeless tobacco: Never Used      Comment: pack and half per week plus electronic cig    Alcohol use No    Drug use: No    Sexual activity: Yes     Other Topics Concern    Not on file     Social History Narrative    No narrative on file       Family History:     Family History   Problem Relation Age of Onset    Diabetes Father     Cancer Father     High Blood Pressure Father     Diabetes Mother     Breast Cancer Mother     Heart Failure Mother      CHF, MVP    Heart Failure Maternal Grandfather          REVIEW OF SYSTEMS:     Except as noted in the HPI, all other systems are negative        PHYSICAL EXAMINATION:     BP (!) 140/70   Pulse 63   Temp 97.4 °F (36.3 °C) (Temporal)   Resp 16   Ht 5' 2\" (1.575 m)   Wt 209 lb 2 oz (94.9 kg)   LMP 01/01/1998 (Approximate)   SpO2 95%   BMI 38.25 kg/m²     GENERAL - well developed and well nourished, in no amount of generalized distress  HEENT -  PERRLA, Hearing appears normal, conjunctiva and lids are normal, ears and nose appear normal  NECK - no thyromegaly, no JVD, trachea is in the midline  CARDIOVASCULAR - PMI is in the left mid line clavicular position, Normal S1 and S2 without murmur. No S3 or S4    PULMONARY - No respiratory distress. No wheezes and rales.   Breath sounds in both  lung fields are Normal  ABDOMEN  - soft, non tender, no rebound, no hepatomegaly or splenomegaly  MUSCULOSKELETAL  - Sitting, digitals and nails are without clubbing or cyanosis  EXTREMITIES - No edema  NEUROLOGIC - cranial nerves, II-XII, are normal  SKIN - turgor is normal, no rash  PSYCHIATRIC - normal mood and affect, alert and orientated x 3, judgement and insight appear appropriate      LABORATORY EVALUATION & TESTING:    I above  2. Continue to monitor rhythm  3. Further orders per clinical course. 4. Lexiscan today. Discussed with patient and nursing. I greatly appreciate the opportunity and your confidence in allowing me to participate in the care of Reggie Rea    Electronically signed by MIKE Melendez on 1/23/18     [facility]    ______________________________________________________________________  I have independently interviewed and examined Reggie Rea. I have discussed key elements of the care plan with the APRN and I agree with the findings and care plan as stated above unless otherwise noted. Cardiac Specific Problems:    Specialty Problems        Cardiology Problems    Heart attack        CAD (coronary artery disease)        Hypertension        Coronary artery disease involving coronary bypass graft of native heart without angina pectoris        Left ventricular dysfunction        Angina effort (HCC)                Subjective:      Chest discomfort    Objective:     GENERAL - well developed and well nourished    HEENT -  PERRLA, Hearing appears normal, conjunctiva and lids are normal, ears and nose appear normal  NECK - no thyromegaly, no JVD, trachea is in the midline  CARDIOVASCULAR - PMI is in the mid line clavicular position, Normal S1 and S2 with a grade 1/6 systolic murmur. No S3 or S4    PULMONARY - No respiratory distress. no wheezes and rales.   Breath sounds in both  lung fields are Normal  ABDOMEN  - soft, non tender, no rebound, no hepatomegaly or splenomegaly  MUSCULOSKELETAL  - gait and station are normal, digitals and nails are without clubbing or cyanosis  EXTREMITIES - trace edema  NEUROLOGIC - cranial nerves, 2-12, are normal  SKIN - turgor is normal, no rash  PSYCHIATRIC - normal mood and affect, alert and orientated x 3, judgement and insight appear appropriate    I examined the patient for these specific problems:    ASSESSMENT:    ALL THE CARDIOLOGY PROBLEMS ARE LISTED ABOVE; HOWEVER, THE FOLLOWING SPECIFIC CARDIAC PROBLEMS WERE ADDRESSED AND TREATED DURING THE HOSPITAL VISIT TODAY:                                                                                                                                                                                                                                              MEDICAL DECISION MAKING             Cardiac Specific Problem / Diagnosis  Discussion and Data Reviewed Diagnostic Procedures Ordered Management Options Selected           1. Presenting problem / symptom    Chest pain  Initial encounter   Serial enzymes are negative. EKG without acute changes. H&H: 13.7 & 40.8  K+ 4.0  BUN: 19 Cr: 0.7   CXR:   No evidence of acute cardiopulmonary process. An opacity   overlying the left lung apex may be related to the patient's overlying   chin. This is similar in appearance to the previous exam.     No chest pain since admission. She was given a nitro tab and Ativan in ER. Yes: Lexiscan Continue current medications:     Yes: lasix, Isosorbide, Lopressor, Diovan  Lovenox    Continue to monitor for chest pain. Telemetry. 2. CAD Initial presentation during this evaluation   Review and summation of old records:    12/7/2011 acute anterior lateral MI txed with thrombolic tx  02/1/9009 SABA to [facility] diag, normal wall motion  12/12/2014  lexiscan  Grossly negative, EF 72%, ischemia 1/0%  12/22/2014  Cath  Severe osteal LAD and proximal RCA, normal LVFX  12/23/2014  CABG x 3 LIMA-LAD, VG-diag, VG-PDA Katey Petit)  5/12/17 [place] Positive for inferior apical MI + myocardial ischemia, EF 51%, 12% ischemic myocardium on stress, intermediatre risk findings, AUC indication 16, AUC score 7  6/5/17  Cath  Occluded LIMA-LAD, patent VG-diag, patent VG-PDA, apical akinesis, EF 40%   Yes: Lexiscan Continue current medications:    Yes: Lopressor, Diovan, Mevacor, Isosorbide           3.  Hypertension Initial presentation during this evaluation The blood pressure for the lastr 36 hours has been:  Systolic (62IKQ), SMA:205 , Min:140 , JEK:127    Diastolic (03IYV), MGT:80, Min:70, Max:85     Yes: Lexiscan Continue current medications:       Yes    Continue to monitor. Adjust medications as needed. PLAN:    1. Continue present medications except for changes as noted above  2. Continue to monitor rhythm  3. Further orders per clinical course. Discussed with patient and nursing.     Electronically signed by Bere Johnson MD on 1/23/18    [facility] [place]

## 2018-01-24 ENCOUNTER — CARE COORDINATION (OUTPATIENT)
Dept: CARE COORDINATION | Age: 53
End: 2018-01-24

## 2018-01-24 LAB
LV EF: 67 %
LVEF MODALITY: NORMAL

## 2018-01-25 ENCOUNTER — CARE COORDINATION (OUTPATIENT)
Dept: CASE MANAGEMENT | Age: 53
End: 2018-01-25

## 2018-01-25 NOTE — CARE COORDINATION
[facility] Transitions Initial Follow Up Call    Call within 2 business days of discharge: Yes    Patient: Cleve Quinn Patient : 1965   MRN: [id number]  Reason for Admission: There are no discharge diagnoses documented for the most recent discharge. Discharge Date: 18 RARS: [facility] Risk Score: 10.25     Spoke with: N/A    Facility: [facility]    Non-face-to-face services provided:      Care Transitions 24 Hour Call    Do you have all of your prescriptions and are they filled?:  Yes  Do you have support at home?:  Partner/Spouse/SO  Are you an active caregiver in your home?:  No  Care Transitions Interventions         Follow Up: Made an attempt to make contact with patient to discuss CTC process and appropriate follow up. No answer, left message regarding the intent of the call. Will try again on next business day.   Future Appointments  Date Time Provider Trini Quinn   2018 9:30 AM Trudy Kitchen DO [facility]   2018 11:15 AM [facility] MAMMO RM 1 [facility] WOMENS L [facility]   3/6/2018 3:15 PM Kylah Luu MD [facility]   3/15/2018 1:15 PM DO MITCHEL Warner [facility]       Warren Amaro RN

## 2018-01-26 ENCOUNTER — CARE COORDINATION (OUTPATIENT)
Dept: CASE MANAGEMENT | Age: 53
End: 2018-01-26

## 2018-01-29 ENCOUNTER — TELEPHONE (OUTPATIENT)
Dept: PRIMARY CARE CLINIC | Age: 53
End: 2018-01-29

## 2018-01-30 ENCOUNTER — OFFICE VISIT (OUTPATIENT)
Dept: PRIMARY CARE CLINIC | Age: 53
End: 2018-01-30
Payer: COMMERCIAL

## 2018-01-30 ENCOUNTER — CARE COORDINATOR VISIT (OUTPATIENT)
Dept: PRIMARY CARE CLINIC | Age: 53
End: 2018-01-30

## 2018-01-30 VITALS
OXYGEN SATURATION: 98 % | BODY MASS INDEX: 39.71 KG/M2 | SYSTOLIC BLOOD PRESSURE: 110 MMHG | DIASTOLIC BLOOD PRESSURE: 62 MMHG | TEMPERATURE: 97.3 F | WEIGHT: 215.8 LBS | HEART RATE: 62 BPM | HEIGHT: 62 IN

## 2018-01-30 DIAGNOSIS — Z12.11 SCREENING FOR COLON CANCER: ICD-10-CM

## 2018-01-30 DIAGNOSIS — Z23 NEEDS FLU SHOT: ICD-10-CM

## 2018-01-30 DIAGNOSIS — Z11.4 SCREENING FOR HIV WITHOUT PRESENCE OF RISK FACTORS: ICD-10-CM

## 2018-01-30 DIAGNOSIS — L68.0 HIRSUTISM: ICD-10-CM

## 2018-01-30 DIAGNOSIS — K21.9 GASTROESOPHAGEAL REFLUX DISEASE WITHOUT ESOPHAGITIS: ICD-10-CM

## 2018-01-30 DIAGNOSIS — E11.8 TYPE 2 DIABETES MELLITUS WITH COMPLICATION, UNSPECIFIED LONG TERM INSULIN USE STATUS: ICD-10-CM

## 2018-01-30 DIAGNOSIS — Z11.59 NEED FOR HEPATITIS C SCREENING TEST: ICD-10-CM

## 2018-01-30 DIAGNOSIS — I10 ESSENTIAL HYPERTENSION: ICD-10-CM

## 2018-01-30 PROCEDURE — 99214 OFFICE O/P EST MOD 30 MIN: CPT | Performed by: INTERNAL MEDICINE

## 2018-01-30 RX ORDER — GABAPENTIN 300 MG/1
300 CAPSULE ORAL DAILY
Qty: 90 CAPSULE | Refills: 3 | Status: SHIPPED | OUTPATIENT
Start: 2018-01-30 | End: 2018-09-12 | Stop reason: ALTCHOICE

## 2018-01-30 RX ORDER — METFORMIN HYDROCHLORIDE EXTENDED-RELEASE TABLETS 1000 MG/1
1000 TABLET, FILM COATED, EXTENDED RELEASE ORAL
Qty: 30 TABLET | Refills: 3 | Status: SHIPPED | OUTPATIENT
Start: 2018-01-30 | End: 2018-06-12 | Stop reason: SDUPTHER

## 2018-01-30 RX ORDER — ESCITALOPRAM OXALATE 10 MG/1
10 TABLET ORAL DAILY
Qty: 30 TABLET | Refills: 3 | Status: SHIPPED | OUTPATIENT
Start: 2018-01-30 | End: 2018-07-13 | Stop reason: ALTCHOICE

## 2018-01-30 RX ORDER — LORAZEPAM 0.5 MG/1
0.5 TABLET ORAL EVERY 8 HOURS PRN
Qty: 90 TABLET | Refills: 0 | Status: SHIPPED | OUTPATIENT
Start: 2018-01-30 | End: 2018-08-03 | Stop reason: SDUPTHER

## 2018-01-30 ASSESSMENT — ENCOUNTER SYMPTOMS
SINUS PAIN: 0
SINUS PRESSURE: 0
SHORTNESS OF BREATH: 0
DIARRHEA: 0
COUGH: 0
VOMITING: 0
NAUSEA: 0
BACK PAIN: 0
CONSTIPATION: 0

## 2018-01-30 NOTE — PROGRESS NOTES
external ear and ear canal normal bilaterally, nose without deformity, nasal mucosa and turbinates normal without polyps  Neck: supple and non-tender without mass, no thyromegaly or thyroid nodules, no cervical lymphadenopathy  Pulmonary/Chest: clear to auscultation bilaterally- no wheezes, rales or rhonchi, normal air movement, no respiratory distress  Cardiovascular: normal rate, regular rhythm, normal S1 and S2, no murmurs, rubs, clicks, or gallops, distal pulses intact, no carotid bruits  Abdomen: soft, non-tender, non-distended, normal bowel sounds, no masses or organomegaly  Extremities: no cyanosis, clubbing or edema  Musculoskeletal: normal range of motion, no joint swelling, deformity or tenderness  Neurologic: reflexes normal and symmetric, no cranial nerve deficit, gait, coordination and speech normal    Initial post-discharge communication occurred between nurse care coordinator and patient on 1/24/18- see documentation in chart: telephone encounter. Assessment/Plan:  Shahbaz Lo was seen today for follow-up from hospital and [facility]. Diagnoses and all orders for this visit:    Screening for colon cancer  -     LORazepam (ATIVAN) 0.5 MG tablet; Take 1 tablet by mouth every 8 hours as needed for Anxiety. Screening for HIV without presence of risk factors  -     LORazepam (ATIVAN) 0.5 MG tablet; Take 1 tablet by mouth every 8 hours as needed for Anxiety. Need for hepatitis C screening test  -     LORazepam (ATIVAN) 0.5 MG tablet; Take 1 tablet by mouth every 8 hours as needed for Anxiety. Needs flu shot  -     LORazepam (ATIVAN) 0.5 MG tablet; Take 1 tablet by mouth every 8 hours as needed for Anxiety. Essential hypertension  -     LORazepam (ATIVAN) 0.5 MG tablet; Take 1 tablet by mouth every 8 hours as needed for Anxiety. Hirsutism  -     LORazepam (ATIVAN) 0.5 MG tablet; Take 1 tablet by mouth every 8 hours as needed for Anxiety.     Type 2 diabetes mellitus with complication,

## 2018-01-30 NOTE — PROGRESS NOTES
mouth daily      isosorbide mononitrate (IMDUR) 30 MG extended release tablet Take 1 tablet by mouth daily 30 tablet 5    nitroGLYCERIN (NITROSTAT) 0.4 MG SL tablet Place 1 tablet under the tongue every 5 minutes as needed for Chest pain 25 tablet 3    insulin glargine (LANTUS) 100 UNIT/ML injection pen Inject 15 Units into the skin nightly (Patient taking differently: Inject 20 Units into the skin nightly ) 5 Pen 3    glucose blood VI test strips (ASCENSIA AUTODISC VI;ONE TOUCH ULTRA TEST VI) strip 1 each by In Vitro route daily As needed. 100 each 3    nystatin (MYCOSTATIN) 060356 UNIT/GM powder Apply 2-3 times daily. 1 Bottle 1    Insulin Pen Needle ([employer] PEN NEEDLES) 31G X 6 MM MISC 1 each by Does not apply route daily 100 each 3    Lactobacillus (PROBIATA PO) Take 1 tablet by mouth daily       Estradiol (VAGIFEM) 10 MCG TABS vaginal tablet Place 1 tablet vaginally Twice a Week 8 tablet 3    mometasone (ELOCON) 0.1 % ointment Apply topically to vagina 3 times per week 1 Tube 3    LORazepam (ATIVAN) 0.5 MG tablet Take 1 tablet by mouth every 8 hours as needed for Anxiety 90 tablet 0    ondansetron (ZOFRAN) 8 MG tablet Take 1 tablet by mouth every 8 hours as needed for Nausea or Vomiting 9 tablet 1    Eflornithine HCl (VANIQA) 13.9 % CREA Apply 1 Dose topically 2 times daily 45 g 2    nystatin-triamcinolone (MYCOLOG II) 867003-1.1 UNIT/GM-% cream Apply topically 2 times daily. 1 Tube 0    conjugated estrogens (PREMARIN) 0.625 MG/GM vaginal cream Insert 0.5 gm intravaginally every nite for 2 weeks then 2 times weekly 1 Tube 3    Docusate Calcium (STOOL SOFTENER PO) Take 100 mg by mouth 2 times daily as needed. No current facility-administered medications for this visit.       Allergies   Allergen Reactions    Codeine     Humalog [Insulin Lispro]     Motrin [Ibuprofen] Swelling       Health Maintenance   Topic Date Due    Hepatitis C screen  1965    HIV screen  10/30/1978    DTaP/Tdap/Td vaccine (1 - Tdap) 10/30/1982    Pneumococcal med risk (1 of 1 - PPSV23) 10/30/1982    Colon Cancer Screen FIT/FOBT  05/08/2016    Diabetic retinal exam  01/11/2017    Diabetic microalbuminuria test  03/09/2017    Flu vaccine (1) 09/01/2017    Breast cancer screen  12/19/2017    A1C test (Diabetic or Prediabetic)  03/06/2018    Diabetic foot exam  05/09/2018    Lipid screen  05/11/2018    Cervical cancer screen  08/25/2018    Potassium monitoring  01/22/2019    Creatinine monitoring  01/22/2019       Subjective:      Review of Systems   Constitutional: Negative for activity change and fatigue. HENT: Negative for sinus pain and sinus pressure. Respiratory: Negative for cough and shortness of breath. Cardiovascular: Negative for chest pain and leg swelling. Gastrointestinal: Negative for constipation, diarrhea, nausea and vomiting. Genitourinary: Negative for difficulty urinating and dysuria. Musculoskeletal: Negative for arthralgias and back pain. Neurological: Negative for dizziness and headaches. Psychiatric/Behavioral: Positive for sleep disturbance. Objective:     Physical Exam   Constitutional: She is oriented to person, place, and time. She appears well-developed and well-nourished. HENT:   Head: Normocephalic and atraumatic. Mouth/Throat: Oropharynx is clear and moist.   Eyes: Conjunctivae are normal. Pupils are equal, round, and reactive to light. Cardiovascular: Normal rate, regular rhythm and normal heart sounds. Pulmonary/Chest: Effort normal and breath sounds normal.   Abdominal: Soft. Musculoskeletal: Normal range of motion. Neurological: She is alert and oriented to person, place, and time. Skin: Skin is warm and dry. Vitals reviewed.     /62 (Site: Left Arm, Position: Sitting)   Pulse 62   Temp 97.3 °F (36.3 °C)   Ht 5' 2\" (1.575 m)   Wt 215 lb 12.8 oz (97.9 kg)   LMP 01/01/1998 (Approximate)   SpO2 98%   BMI 39.47 kg/m² Assessment:      No diagnosis found. Plan:      No Follow-up on file. There are no Patient Instructions on file for this visit. No orders of the defined types were placed in this encounter. No orders of the defined types were placed in this encounter. Patient given educational materials - see patient instructions. Discussed use, benefit, and side effects of prescribed medications. All patient questions answered. Pt voiced understanding. Reviewed health maintenance. Instructed to continue current medications, diet and exercise. Patient agreed with treatment plan. Follow up as directed.      Electronically signed by Carey Monique DO on 1/30/2018 at 9:57 AM

## 2018-02-07 ENCOUNTER — CARE COORDINATION (OUTPATIENT)
Dept: CARE COORDINATION | Age: 53
End: 2018-02-07

## 2018-02-11 ENCOUNTER — OFFICE VISIT (OUTPATIENT)
Dept: URGENT CARE | Age: 53
End: 2018-02-11
Payer: COMMERCIAL

## 2018-02-11 VITALS
RESPIRATION RATE: 20 BRPM | WEIGHT: 215 LBS | HEART RATE: 62 BPM | TEMPERATURE: 98.2 F | OXYGEN SATURATION: 95 % | DIASTOLIC BLOOD PRESSURE: 60 MMHG | SYSTOLIC BLOOD PRESSURE: 113 MMHG | BODY MASS INDEX: 39.56 KG/M2 | HEIGHT: 62 IN

## 2018-02-11 DIAGNOSIS — R30.0 DYSURIA: ICD-10-CM

## 2018-02-11 DIAGNOSIS — N30.00 ACUTE CYSTITIS WITHOUT HEMATURIA: Primary | ICD-10-CM

## 2018-02-11 LAB
APPEARANCE FLUID: ABNORMAL
BILIRUBIN, POC: ABNORMAL
BLOOD URINE, POC: ABNORMAL
CLARITY, POC: ABNORMAL
COLOR, POC: ABNORMAL
GLUCOSE URINE, POC: 500
KETONES, POC: ABNORMAL
LEUKOCYTE EST, POC: ABNORMAL
NITRITE, POC: ABNORMAL
PH, POC: 6.5
PROTEIN, POC: ABNORMAL
SPECIFIC GRAVITY, POC: 1.02
UROBILINOGEN, POC: 0.2

## 2018-02-11 PROCEDURE — G8598 ASA/ANTIPLAT THER USED: HCPCS | Performed by: FAMILY MEDICINE

## 2018-02-11 PROCEDURE — G8427 DOCREV CUR MEDS BY ELIG CLIN: HCPCS | Performed by: FAMILY MEDICINE

## 2018-02-11 PROCEDURE — 99213 OFFICE O/P EST LOW 20 MIN: CPT | Performed by: FAMILY MEDICINE

## 2018-02-11 PROCEDURE — G8484 FLU IMMUNIZE NO ADMIN: HCPCS | Performed by: FAMILY MEDICINE

## 2018-02-11 PROCEDURE — 1111F DSCHRG MED/CURRENT MED MERGE: CPT | Performed by: FAMILY MEDICINE

## 2018-02-11 PROCEDURE — G8417 CALC BMI ABV UP PARAM F/U: HCPCS | Performed by: FAMILY MEDICINE

## 2018-02-11 PROCEDURE — 3017F COLORECTAL CA SCREEN DOC REV: CPT | Performed by: FAMILY MEDICINE

## 2018-02-11 PROCEDURE — 3014F SCREEN MAMMO DOC REV: CPT | Performed by: FAMILY MEDICINE

## 2018-02-11 PROCEDURE — 1036F TOBACCO NON-USER: CPT | Performed by: FAMILY MEDICINE

## 2018-02-11 RX ORDER — SULFAMETHOXAZOLE AND TRIMETHOPRIM 800; 160 MG/1; MG/1
1 TABLET ORAL 2 TIMES DAILY
Qty: 14 TABLET | Refills: 0 | Status: SHIPPED | OUTPATIENT
Start: 2018-02-11 | End: 2018-02-18

## 2018-02-11 ASSESSMENT — ENCOUNTER SYMPTOMS
RESPIRATORY NEGATIVE: 1
ABDOMINAL PAIN: 0
NAUSEA: 1

## 2018-02-11 NOTE — PROGRESS NOTES
tablet under the tongue every 5 minutes as needed for Chest pain 25 tablet 3    insulin glargine (LANTUS) 100 UNIT/ML injection pen Inject 15 Units into the skin nightly (Patient taking differently: Inject 20 Units into the skin nightly ) 5 Pen 3    glucose blood VI test strips (ASCENSIA AUTODISC VI;ONE TOUCH ULTRA TEST VI) strip 1 each by In Vitro route daily As needed. 100 each 3    nystatin (MYCOSTATIN) 174653 UNIT/GM powder Apply 2-3 times daily. 1 Bottle 1    Insulin Pen Needle ([employer] PEN NEEDLES) 31G X 6 MM MISC 1 each by Does not apply route daily 100 each 3    Lactobacillus (PROBIATA PO) Take 1 tablet by mouth daily       Estradiol (VAGIFEM) 10 MCG TABS vaginal tablet Place 1 tablet vaginally Twice a Week 8 tablet 3    mometasone (ELOCON) 0.1 % ointment Apply topically to vagina 3 times per week 1 Tube 3    ondansetron (ZOFRAN) 8 MG tablet Take 1 tablet by mouth every 8 hours as needed for Nausea or Vomiting 9 tablet 1    Eflornithine HCl (VANIQA) 13.9 % CREA Apply 1 Dose topically 2 times daily 45 g 2    nystatin-triamcinolone (MYCOLOG II) 123152-5.1 UNIT/GM-% cream Apply topically 2 times daily. 1 Tube 0    conjugated estrogens (PREMARIN) 0.625 MG/GM vaginal cream Insert 0.5 gm intravaginally every nite for 2 weeks then 2 times weekly 1 Tube 3    Docusate Calcium (STOOL SOFTENER PO) Take 100 mg by mouth 2 times daily as needed.  empagliflozin (JARDIANCE) 25 MG tablet Take 25 mg by mouth daily 30 tablet 3     No current facility-administered medications for this visit.       Allergies   Allergen Reactions    Codeine     Humalog [Insulin Lispro]     Motrin [Ibuprofen] Swelling       Health Maintenance   Topic Date Due    Hepatitis C screen  1965    HIV screen  10/30/1978    DTaP/Tdap/Td vaccine (1 - Tdap) 10/30/1982    Pneumococcal med risk (1 of 1 - PPSV23) 10/30/1982    Colon Cancer Screen FIT/FOBT  05/08/2016    Diabetic retinal exam  01/11/2017    Diabetic microalbuminuria test  03/09/2017    Flu vaccine (1) 09/01/2017    Breast cancer screen  12/19/2017    A1C test (Diabetic or Prediabetic)  03/06/2018    Diabetic foot exam  05/09/2018    Lipid screen  05/11/2018    Cervical cancer screen  08/25/2018    Potassium monitoring  01/22/2019    Creatinine monitoring  01/22/2019       Subjective:      Review of Systems   Constitutional: Positive for chills. HENT: Negative. Respiratory: Negative. Gastrointestinal: Positive for nausea. Negative for abdominal pain. Genitourinary: Positive for dysuria, frequency and urgency. Negative for flank pain and hematuria. Objective:     Physical Exam   Constitutional: She is oriented to person, place, and time. She appears well-developed and well-nourished. No distress. HENT:   Head: Normocephalic and atraumatic. Neck: Normal range of motion. Neck supple. Cardiovascular: Normal rate and regular rhythm. Pulmonary/Chest: Effort normal and breath sounds normal.   Abdominal: She exhibits no distension. There is no tenderness (No CVA tenderness). There is no rebound and no guarding. Lymphadenopathy:     She has no cervical adenopathy. Neurological: She is alert and oriented to person, place, and time. Skin: Skin is dry. No rash noted. She is not diaphoretic. /60   Pulse 62   Temp 98.2 °F (36.8 °C) (Oral)   Resp 20   Ht 5' 2\" (1.575 m)   Wt 215 lb (97.5 kg)   LMP 01/01/1998 (Approximate)   SpO2 95%   BMI 39.32 kg/m²     Assessment:      1. Acute cystitis without hematuria  sulfamethoxazole-trimethoprim (BACTRIM DS;SEPTRA DS) 800-160 MG per tablet   2. Dysuria  POCT Urinalysis no Micro       Plan:      Orders Placed This Encounter   Procedures    POCT Urinalysis no Micro       No Follow-up on file.     Orders Placed This Encounter   Procedures    POCT Urinalysis no Micro     Orders Placed This Encounter   Medications    sulfamethoxazole-trimethoprim (BACTRIM DS;SEPTRA DS) 800-160 MG

## 2018-02-12 NOTE — CARE COORDINATION
(ASCENSIA AUTODISC VI;ONE TOUCH ULTRA TEST VI) strip 1 each by In Vitro route daily As needed. 6/29/17   Francisco Webster DO   nystatin (MYCOSTATIN) 842669 UNIT/GM powder Apply 2-3 times daily. 6/29/17   Francisco Webster DO   Insulin Pen Needle ([employer] PEN NEEDLES) 31G X 6 MM MISC 1 each by Does not apply route daily 6/15/17   MIKE Covarrubias   Lactobacillus (PROBIATA PO) Take 1 tablet by mouth daily     Historical Provider, MD   Estradiol (VAGIFEM) 10 MCG TABS vaginal tablet Place 1 tablet vaginally Twice a Week 12/15/16   Sarai So MD   mometasone (ELOCON) 0.1 % ointment Apply topically to vagina 3 times per week 11/7/16   Sarai So MD   ondansetron [facility]) 8 MG tablet Take 1 tablet by mouth every 8 hours as needed for Nausea or Vomiting 4/18/16   MIKE Ralph   Eflornithine HCl (VANIQA) 13.9 % CREA Apply 1 Dose topically 2 times daily 11/20/15   Maudine Grooms FontaineMIKE   nystatin-triamcinolone [facility]) 356830-7.5 UNIT/GM-% cream Apply topically 2 times daily. 9/30/15   MIKE Painting   conjugated estrogens (PREMARIN) 0.625 MG/GM vaginal cream Insert 0.5 gm intravaginally every nite for 2 weeks then 2 times weekly 8/25/15   MIKE Painting   Docusate Calcium (STOOL SOFTENER PO) Take 100 mg by mouth 2 times daily as needed.     Historical Provider, MD       Future Appointments  Date Time Provider Trini Quinn   3/6/2018 3:15 PM Deirdre Fox MD [facility]   4/30/2018 1:00 PM Francisco Webster DO [facility]

## 2018-02-16 NOTE — CARE COORDINATION
Ambulatory Care Coordination Note  2/7/2018  CM Risk Score: 2  Yusef Mortality Risk Score:      ACC: Gloria Chauhan RN    Summary Note: ACC spoke with the pt, she is feeling better although she is experiencing a headache today. She has been taking her lasix and has noticed it seems to be helping with her fluid retention, Blood sugar was 102 this morning, but sometimes 140-150 when she checks fasting, says she is always hungry, she is wanting to lose weight, she has started exercising dayly by riding an exercise bike 5min/day   Pt is stressed over financial situation, continued to encourage the pt to reach out to the debt management company for assistance. Care Coordination Interventions    Program Enrollment:  Rising Risk  Referral from Primary Care Provider:  Yes  Suggested Interventions and Community Resources  Fall Risk Prevention:  Not Started (Comment: Discuss life line, PT for gait eval)  Pharmacist:  Completed (Comment: 12/5/17-Januvia coupon card sent to pharmacy to see about lowering the cost of the medication, pt qualified, now script is $5/month)  Zone Management Tools: In Process (Comment: 2/7/18-review of DM medications, blood sugar 102 this am, still having trouble with the cost of some medications )  Other Services or Interventions:  2/7/18-ongoing concerns with cost of medications          Goals Addressed             Most Recent     Self Monitoring   On track (2/7/2018)             Self-Monitored Blood Glucose - I will check my blood sugar Fasting blood sugar will keep food diary for blood sugars above 160    Blood sugar ranging from 100-156    Barriers: none  Plan for overcoming my barriers: N/A  Confidence: 7/10  Anticipated Goal Completion Date: 9/23/17              Prior to Admission medications    Medication Sig Start Date End Date Taking?  Authorizing Provider   sulfamethoxazole-trimethoprim (BACTRIM DS;SEPTRA DS) 800-160 MG per tablet Take 1 tablet by mouth 2 times daily for 7 days 2/11/18 2/18/18  Ashwini West MD   LORazepam (ATIVAN) 0.5 MG tablet Take 1 tablet by mouth every 8 hours as needed for Anxiety. 1/30/18 1/30/19  Barbie Be, DO   escitalopram (LEXAPRO) 10 MG tablet Take 1 tablet by mouth daily 1/30/18   Barbie Be, DO   metFORMIN, OSM, (FORTAMET) 1000 MG extended release tablet Take 1 tablet by mouth daily (with breakfast) 1/30/18   Barbie Be, DO   gabapentin (NEURONTIN) 300 MG capsule Take 1 capsule by mouth daily for 30 days.  1/30/18 3/1/18  Barbie Be, DO   vitamin D (ERGOCALCIFEROL) 56362 units CAPS capsule Take 1 capsule by mouth once a week 12/20/17   Barbie Be, DO   famotidine (PEPCID) 20 MG tablet TAKE 1 TABLET TWICE DAILY 12/20/17   Barbie Be, DO   furosemide (LASIX) 20 MG tablet Take 1 tablet by mouth daily as needed (edema) 12/20/17   Barbie Be, DO   empagliflozin (JARDIANCE) 25 MG tablet Take 25 mg by mouth daily 12/15/17   Barbie Be, DO   lovastatin (MEVACOR) 40 MG tablet TAKE 1 TABLET AT BEDTIME 10/12/17   Krish Rogers MD   metoprolol tartrate (LOPRESSOR) 25 MG tablet TAKE 1 TABLET BY MOUTH TWICE DAILY 10/12/17   Krish Rogers MD   valsartan (DIOVAN) 80 MG tablet TAKE 1 TABLET BY MOUTH DAILY 10/11/17   MIKE Goldberg   JANUVIA 100 MG tablet Take 100 mg by mouth daily  6/15/17   Historical Provider, MD   aspirin 81 MG tablet Take 81 mg by mouth daily    Historical Provider, MD   vitamin D (CHOLECALCIFEROL) 1000 UNIT TABS tablet Take 1,000 Units by mouth daily    Historical Provider, MD   isosorbide mononitrate (IMDUR) 30 MG extended release tablet Take 1 tablet by mouth daily 9/5/17   MIKE Sevilla   nitroGLYCERIN (NITROSTAT) 0.4 MG SL tablet Place 1 tablet under the tongue every 5 minutes as needed for Chest pain 9/5/17   MIKE Sevilla   insulin glargine (LANTUS) 100 UNIT/ML injection pen Inject 15 Units into the skin nightly  Patient taking differently: Inject 20 Units into the skin nightly  7/25/17   Vesna Martinez DO   glucose blood VI test strips (ASCENSIA AUTODISC VI;ONE TOUCH ULTRA TEST VI) strip 1 each by In Vitro route daily As needed. 6/29/17   Vesna Martinez DO   nystatin (MYCOSTATIN) 312118 UNIT/GM powder Apply 2-3 times daily. 6/29/17   Vesna Martinez DO   Insulin Pen Needle ([employer] PEN NEEDLES) 31G X 6 MM MISC 1 each by Does not apply route daily 6/15/17   MIKE Carlos   Lactobacillus (PROBIATA PO) Take 1 tablet by mouth daily     Historical Provider, MD   Estradiol (VAGIFEM) 10 MCG TABS vaginal tablet Place 1 tablet vaginally Twice a Week 12/15/16   Michael Valera MD   mometasone (ELOCON) 0.1 % ointment Apply topically to vagina 3 times per week 11/7/16   Michael Valera MD   ondansetron [facility]) 8 MG tablet Take 1 tablet by mouth every 8 hours as needed for Nausea or Vomiting 4/18/16   MIKE Barahona   Eflornithine HCl (VANIQA) 13.9 % CREA Apply 1 Dose topically 2 times daily 11/20/15   Burt Jennifer FontaineMIKE   nystatin-triamcinolone [facility] II) 012066-3.4 UNIT/GM-% cream Apply topically 2 times daily. 9/30/15   Minus Langley, APRN   conjugated estrogens (PREMARIN) 0.625 MG/GM vaginal cream Insert 0.5 gm intravaginally every nite for 2 weeks then 2 times weekly 8/25/15   Minus MIKE Langley   Docusate Calcium (STOOL SOFTENER PO) Take 100 mg by mouth 2 times daily as needed.     Historical Provider, MD       Future Appointments  Date Time Provider Trini Quinn   3/6/2018 3:15 PM Valerio Pearson MD [facility]   3/12/2018 2:30 PM [facility] MAMMO RM 1 [facility] WOMENS [facility] Women's   4/30/2018 1:00 PM Vesna Martinez DO [facility] RBC-[facility]

## 2018-02-25 ENCOUNTER — HOSPITAL ENCOUNTER (EMERGENCY)
Age: 53
Discharge: HOME | End: 2018-02-25
Attending: EMERGENCY MEDICINE
Payer: COMMERCIAL

## 2018-02-25 ENCOUNTER — APPOINTMENT (OUTPATIENT)
Dept: GENERAL RADIOLOGY | Age: 53
End: 2018-02-25
Payer: COMMERCIAL

## 2018-02-25 VITALS
OXYGEN SATURATION: 94 % | HEART RATE: 80 BPM | RESPIRATION RATE: 18 BRPM | WEIGHT: 210 LBS | HEIGHT: 62 IN | BODY MASS INDEX: 38.64 KG/M2 | DIASTOLIC BLOOD PRESSURE: 63 MMHG | SYSTOLIC BLOOD PRESSURE: 141 MMHG | TEMPERATURE: 99 F

## 2018-02-25 DIAGNOSIS — R68.89 FLU-LIKE SYMPTOMS: ICD-10-CM

## 2018-02-25 DIAGNOSIS — J18.9 PNEUMONIA DUE TO ORGANISM: Primary | ICD-10-CM

## 2018-02-25 LAB
ALBUMIN SERPL-MCNC: 4 G/DL (ref 3.5–5.2)
ALP BLD-CCNC: 62 U/L (ref 35–104)
ALT SERPL-CCNC: 27 U/L (ref 5–33)
ANION GAP SERPL CALCULATED.3IONS-SCNC: 15 MMOL/L (ref 7–19)
AST SERPL-CCNC: 30 U/L (ref 5–32)
BASOPHILS ABSOLUTE: 0 K/UL (ref 0–0.2)
BASOPHILS RELATIVE PERCENT: 0.2 % (ref 0–1)
BILIRUB SERPL-MCNC: 0.4 MG/DL (ref 0.2–1.2)
BUN BLDV-MCNC: 10 MG/DL (ref 6–20)
CALCIUM SERPL-MCNC: 8.8 MG/DL (ref 8.6–10)
CHLORIDE BLD-SCNC: 94 MMOL/L (ref 98–111)
CO2: 25 MMOL/L (ref 22–29)
CREAT SERPL-MCNC: 0.6 MG/DL (ref 0.5–0.9)
EOSINOPHILS ABSOLUTE: 0 K/UL (ref 0–0.6)
EOSINOPHILS RELATIVE PERCENT: 0.2 % (ref 0–5)
GFR NON-AFRICAN AMERICAN: >60
GLUCOSE BLD-MCNC: 385 MG/DL (ref 74–109)
HCT VFR BLD CALC: 40.2 % (ref 37–47)
HEMOGLOBIN: 13.6 G/DL (ref 12–16)
INR BLD: 0.97 (ref 0.88–1.18)
LACTIC ACID: 2 MMOL/L (ref 0.5–1.9)
LACTIC ACID: 3.1 MMOL/L (ref 0.5–1.9)
LACTIC ACID: 3.6 MMOL/L (ref 0.5–1.9)
LYMPHOCYTES ABSOLUTE: 0.7 K/UL (ref 1.1–4.5)
LYMPHOCYTES RELATIVE PERCENT: 11.3 % (ref 20–40)
MCH RBC QN AUTO: 27.6 PG (ref 27–31)
MCHC RBC AUTO-ENTMCNC: 33.8 G/DL (ref 33–37)
MCV RBC AUTO: 81.7 FL (ref 81–99)
MONOCYTES ABSOLUTE: 0.4 K/UL (ref 0–0.9)
MONOCYTES RELATIVE PERCENT: 6.9 % (ref 0–10)
NEUTROPHILS ABSOLUTE: 4.7 K/UL (ref 1.5–7.5)
NEUTROPHILS RELATIVE PERCENT: 81.1 % (ref 50–65)
PDW BLD-RTO: 13.2 % (ref 11.5–14.5)
PLATELET # BLD: 134 K/UL (ref 130–400)
PMV BLD AUTO: 10.1 FL (ref 9.4–12.3)
POTASSIUM SERPL-SCNC: 4.2 MMOL/L (ref 3.5–5)
PROTHROMBIN TIME: 12.8 SEC (ref 12–14.6)
RAPID INFLUENZA  B AGN: NEGATIVE
RAPID INFLUENZA A AGN: NEGATIVE
RBC # BLD: 4.92 M/UL (ref 4.2–5.4)
SODIUM BLD-SCNC: 134 MMOL/L (ref 136–145)
TOTAL PROTEIN: 7.4 G/DL (ref 6.6–8.7)
TROPONIN: <0.01 NG/ML (ref 0–0.03)
WBC # BLD: 5.8 K/UL (ref 4.8–10.8)

## 2018-02-25 PROCEDURE — 99285 EMERGENCY DEPT VISIT HI MDM: CPT | Performed by: EMERGENCY MEDICINE

## 2018-02-25 PROCEDURE — 85610 PROTHROMBIN TIME: CPT

## 2018-02-25 PROCEDURE — 96375 TX/PRO/DX INJ NEW DRUG ADDON: CPT

## 2018-02-25 PROCEDURE — 84484 ASSAY OF TROPONIN QUANT: CPT

## 2018-02-25 PROCEDURE — 6360000002 HC RX W HCPCS: Performed by: EMERGENCY MEDICINE

## 2018-02-25 PROCEDURE — 85025 COMPLETE CBC W/AUTO DIFF WBC: CPT

## 2018-02-25 PROCEDURE — 6370000000 HC RX 637 (ALT 250 FOR IP): Performed by: EMERGENCY MEDICINE

## 2018-02-25 PROCEDURE — 83605 ASSAY OF LACTIC ACID: CPT

## 2018-02-25 PROCEDURE — 36415 COLL VENOUS BLD VENIPUNCTURE: CPT

## 2018-02-25 PROCEDURE — 93005 ELECTROCARDIOGRAM TRACING: CPT

## 2018-02-25 PROCEDURE — 71046 X-RAY EXAM CHEST 2 VIEWS: CPT

## 2018-02-25 PROCEDURE — 99285 EMERGENCY DEPT VISIT HI MDM: CPT

## 2018-02-25 PROCEDURE — 87040 BLOOD CULTURE FOR BACTERIA: CPT

## 2018-02-25 PROCEDURE — 80053 COMPREHEN METABOLIC PANEL: CPT

## 2018-02-25 PROCEDURE — 96365 THER/PROPH/DIAG IV INF INIT: CPT

## 2018-02-25 PROCEDURE — 2580000003 HC RX 258: Performed by: EMERGENCY MEDICINE

## 2018-02-25 PROCEDURE — 94640 AIRWAY INHALATION TREATMENT: CPT

## 2018-02-25 PROCEDURE — 87804 INFLUENZA ASSAY W/OPTIC: CPT

## 2018-02-25 RX ORDER — 0.9 % SODIUM CHLORIDE 0.9 %
1000 INTRAVENOUS SOLUTION INTRAVENOUS ONCE
Status: COMPLETED | OUTPATIENT
Start: 2018-02-25 | End: 2018-02-25

## 2018-02-25 RX ORDER — ONDANSETRON 4 MG/1
4 TABLET, ORALLY DISINTEGRATING ORAL EVERY 8 HOURS PRN
Qty: 30 TABLET | Refills: 0 | Status: SHIPPED | OUTPATIENT
Start: 2018-02-25 | End: 2018-07-13 | Stop reason: SDUPTHER

## 2018-02-25 RX ORDER — OSELTAMIVIR PHOSPHATE 75 MG/1
75 CAPSULE ORAL ONCE
Status: COMPLETED | OUTPATIENT
Start: 2018-02-25 | End: 2018-02-25

## 2018-02-25 RX ORDER — DOXYCYCLINE 100 MG/1
100 CAPSULE ORAL 2 TIMES DAILY
Qty: 14 CAPSULE | Refills: 0 | Status: SHIPPED | OUTPATIENT
Start: 2018-02-25 | End: 2018-03-04

## 2018-02-25 RX ORDER — LEVOFLOXACIN 750 MG/1
750 TABLET ORAL DAILY
Qty: 7 TABLET | Refills: 0 | Status: SHIPPED | OUTPATIENT
Start: 2018-02-25 | End: 2018-03-04

## 2018-02-25 RX ORDER — ONDANSETRON 2 MG/ML
4 INJECTION INTRAMUSCULAR; INTRAVENOUS ONCE
Status: COMPLETED | OUTPATIENT
Start: 2018-02-25 | End: 2018-02-25

## 2018-02-25 RX ORDER — OSELTAMIVIR PHOSPHATE 75 MG/1
75 CAPSULE ORAL 2 TIMES DAILY
Qty: 10 CAPSULE | Refills: 0 | Status: SHIPPED | OUTPATIENT
Start: 2018-02-25 | End: 2018-03-02

## 2018-02-25 RX ORDER — IPRATROPIUM BROMIDE AND ALBUTEROL SULFATE 2.5; .5 MG/3ML; MG/3ML
1 SOLUTION RESPIRATORY (INHALATION) ONCE
Status: COMPLETED | OUTPATIENT
Start: 2018-02-25 | End: 2018-02-25

## 2018-02-25 RX ORDER — LEVOFLOXACIN 5 MG/ML
750 INJECTION, SOLUTION INTRAVENOUS ONCE
Status: COMPLETED | OUTPATIENT
Start: 2018-02-25 | End: 2018-02-25

## 2018-02-25 RX ADMIN — LEVOFLOXACIN 750 MG: 5 INJECTION, SOLUTION INTRAVENOUS at 14:54

## 2018-02-25 RX ADMIN — SODIUM CHLORIDE 1000 ML: 9 INJECTION, SOLUTION INTRAVENOUS at 16:50

## 2018-02-25 RX ADMIN — IPRATROPIUM BROMIDE AND ALBUTEROL SULFATE 1 AMPULE: .5; 3 SOLUTION RESPIRATORY (INHALATION) at 14:00

## 2018-02-25 RX ADMIN — SODIUM CHLORIDE 1000 ML: 9 INJECTION, SOLUTION INTRAVENOUS at 14:54

## 2018-02-25 RX ADMIN — OSELTAMIVIR PHOSPHATE 75 MG: 75 CAPSULE ORAL at 15:18

## 2018-02-25 RX ADMIN — ONDANSETRON 4 MG: 2 INJECTION, SOLUTION INTRAMUSCULAR; INTRAVENOUS at 16:27

## 2018-02-25 ASSESSMENT — ENCOUNTER SYMPTOMS
SHORTNESS OF BREATH: 1
VOMITING: 0
ABDOMINAL PAIN: 0
COUGH: 1

## 2018-02-25 ASSESSMENT — PAIN SCALES - GENERAL: PAINLEVEL_OUTOF10: 5

## 2018-02-25 NOTE — ED PROVIDER NOTES
Hypertension     Lichen simplex chronicus 10/2015    Osteopenia     Pneumonia     Type II or unspecified type diabetes mellitus without mention of complication, not stated as uncontrolled          SURGICAL HISTORY       Past Surgical History:   Procedure Laterality Date    BREAST BIOPSY  6/8/2011    US guided core needle, right, benign    CARDIAC CATHETERIZATION  12/22/14  JDT    EF 50%    CARDIAC CATHETERIZATION  06/05/2017        CHOLECYSTECTOMY  2006    CORONARY ANGIOPLASTY WITH STENT PLACEMENT  12/2011    [facility] to [facility]OT SURGERY  2000    HYSTERECTOMY  1998    total    OTHER SURGICAL HISTORY      fibrinolysis    MD CABG, ARTERY-VEIN, THREE  12\23\2014    Coronary Artery Bypass, 3    THORACOTOMY      VULVAR/PERINEAL [facility]OPSY  10/07/2015    [facility]       Discharge Medication List as of 2/25/2018  7:01 PM      CONTINUE these medications which have NOT CHANGED    Details   LORazepam (ATIVAN) 0.5 MG tablet Take 1 tablet by mouth every 8 hours as needed for Anxiety. , Disp-90 tablet, R-0Normal      escitalopram (LEXAPRO) 10 MG tablet Take 1 tablet by mouth daily, Disp-30 tablet, R-3Normal      metFORMIN, OSM, (FORTAMET) 1000 MG extended release tablet Take 1 tablet by mouth daily (with breakfast), Disp-30 tablet, R-3Normal      gabapentin (NEURONTIN) 300 MG capsule Take 1 capsule by mouth daily for 30 days. , Disp-90 capsule, R-3Normal      vitamin D (ERGOCALCIFEROL) 69159 units CAPS capsule Take 1 capsule by mouth once a week, Disp-12 capsule, R-1Normal      famotidine (PEPCID) 20 MG tablet TAKE 1 TABLET TWICE DAILY, Disp-60 tablet, R-3Normal      furosemide (LASIX) 20 MG tablet Take 1 tablet by mouth daily as needed (edema), Disp-90 tablet, R-3Normal      empagliflozin (JARDIANCE) 25 MG tablet Take 25 mg by mouth daily, Disp-30 tablet, R-3Normal      lovastatin (MEVACOR) 40 MG tablet TAKE 1 TABLET AT BEDTIME, Disp-30 tel. ,  Chemistry results called to and read back by Minnie Acevedo in ED, 02/25/2018  16:49, by [facility]   LACTIC ACID, PLASMA - Abnormal; Notable for the following:     Lactic Acid 3.6 (*)     All other components within normal limits    Narrative:     [address] tel. ,  Chemistry results called to and read back by Natalia Monet RN in ED, 02/25/2018  18:47, by [facility]   RAPID INFLUENZA A/B ANTIGENS   CULTURE BLOOD #1   CULTURE BLOOD #2   PROTIME-INR   TROPONIN       All other labs were within normal range or not returned as of this dictation. EMERGENCY DEPARTMENT COURSE and DIFFERENTIAL DIAGNOSIS/MDM:   Vitals:    Vitals:    02/25/18 1532 02/25/18 1631 02/25/18 1702 02/25/18 1732   BP: (!) 145/67 139/85 (!) 142/58 (!) 141/63   Pulse: 94 81 80 80   Resp: 19 18 18 18   Temp:       SpO2: 93% 95% 95% 94%   Weight:       Height:           MDM  Number of Diagnoses or Management Options  Flu-like symptoms:   Pneumonia due to organism:   Diagnosis management comments: Patient with flulike symptoms last week which then have not gotten completely better. Her  had the same flulike illness last week and got better. She is concerned she has a cough. The patient has a history of smoking in the past. She otherwise has not been eating well. She is a diabetic. Her flu test is negative however given this history of likely flulike illness and likely flu I will treat her with Tamiflu. The patient was given a breathing treatment as well. And clinically she has left lower lobe pneumonia which I can hear on exam and repeat exam. Chest x-ray seems to suggest this locale for pneumonia as well. Given that she is a diabetic and has underlying comorbidities. I will treat her with Tamiflu outside the window 48 hours as she has pneumonia and flu. I will also start her on Levaquin which she was given a dose in the ER to cover for community-acquired pneumonia.  Given that she also has risk for increased for MRSA with possible post-flu pneumonia TO:  Ursula Landeros [address] [address] [phone]    Schedule an appointment as soon as possible for a visit in 3 days        DISCHARGE MEDICATIONS:  Discharge Medication List as of 2/25/2018  7:01 PM      START taking these medications    Details   ondansetron (ZOFRAN ODT) 4 MG disintegrating tablet Take 1 tablet by mouth every 8 hours as needed for Nausea or Vomiting, Disp-30 tablet, R-0Print      oseltamivir (TAMIFLU) 75 MG capsule Take 1 capsule by mouth 2 times daily for 5 days, Disp-10 capsule, R-0Print      levofloxacin (LEVAQUIN) 750 MG tablet Take 1 tablet by mouth daily for 7 days, Disp-7 tablet, R-0Print      doxycycline monohydrate (MONODOX) 100 MG capsule Take 1 capsule by mouth 2 times daily for 7 days, Disp-14 capsule, R-0Print      albuterol (PROVENTIL) (5 MG/ML) 0.5% nebulizer solution Take 0.5 mLs by nebulization every 6 hours as needed for Wheezing, Disp-120 each, R-0Print                (Please note that portions of this note were completed with a voice recognition program.  Efforts were made to edit the dictations but occasionally words are mis-transcribed.)    Salma Reyes MD (electronically signed)  Attending Emergency Physician         Salma Reyes MD  02/25/18 0912

## 2018-02-26 ENCOUNTER — CARE COORDINATION (OUTPATIENT)
Dept: CARE COORDINATION | Age: 53
End: 2018-02-26

## 2018-02-26 ENCOUNTER — TELEPHONE (OUTPATIENT)
Dept: PRIMARY CARE CLINIC | Age: 53
End: 2018-02-26

## 2018-02-26 DIAGNOSIS — J18.9 PNEUMONIA OF BOTH LUNGS DUE TO INFECTIOUS ORGANISM, UNSPECIFIED PART OF LUNG: Primary | ICD-10-CM

## 2018-02-27 LAB
EKG P AXIS: 20 DEGREES
EKG P-R INTERVAL: 116 MS
EKG Q-T INTERVAL: 360 MS
EKG QRS DURATION: 76 MS
EKG QTC CALCULATION (BAZETT): 389 MS
EKG T AXIS: 119 DEGREES

## 2018-03-01 ENCOUNTER — OFFICE VISIT (OUTPATIENT)
Dept: PRIMARY CARE CLINIC | Age: 53
End: 2018-03-01
Payer: COMMERCIAL

## 2018-03-01 ENCOUNTER — CARE COORDINATION (OUTPATIENT)
Dept: CARE COORDINATION | Age: 53
End: 2018-03-01

## 2018-03-01 VITALS
SYSTOLIC BLOOD PRESSURE: 118 MMHG | WEIGHT: 213 LBS | OXYGEN SATURATION: 96 % | HEART RATE: 77 BPM | DIASTOLIC BLOOD PRESSURE: 74 MMHG | TEMPERATURE: 96.8 F | BODY MASS INDEX: 39.2 KG/M2 | HEIGHT: 62 IN

## 2018-03-01 DIAGNOSIS — Z09 HOSPITAL DISCHARGE FOLLOW-UP: ICD-10-CM

## 2018-03-01 DIAGNOSIS — B37.31 VAGINAL YEAST INFECTION: Primary | ICD-10-CM

## 2018-03-01 DIAGNOSIS — J18.9 COMMUNITY ACQUIRED PNEUMONIA, UNSPECIFIED LATERALITY: ICD-10-CM

## 2018-03-01 PROCEDURE — 99214 OFFICE O/P EST MOD 30 MIN: CPT | Performed by: NURSE PRACTITIONER

## 2018-03-01 PROCEDURE — G8427 DOCREV CUR MEDS BY ELIG CLIN: HCPCS | Performed by: NURSE PRACTITIONER

## 2018-03-01 PROCEDURE — 3014F SCREEN MAMMO DOC REV: CPT | Performed by: NURSE PRACTITIONER

## 2018-03-01 PROCEDURE — 1036F TOBACCO NON-USER: CPT | Performed by: NURSE PRACTITIONER

## 2018-03-01 PROCEDURE — G8484 FLU IMMUNIZE NO ADMIN: HCPCS | Performed by: NURSE PRACTITIONER

## 2018-03-01 PROCEDURE — G8598 ASA/ANTIPLAT THER USED: HCPCS | Performed by: NURSE PRACTITIONER

## 2018-03-01 PROCEDURE — G8417 CALC BMI ABV UP PARAM F/U: HCPCS | Performed by: NURSE PRACTITIONER

## 2018-03-01 PROCEDURE — 3017F COLORECTAL CA SCREEN DOC REV: CPT | Performed by: NURSE PRACTITIONER

## 2018-03-01 RX ORDER — NYSTATIN 100000 U/G
OINTMENT TOPICAL
Qty: 30 G | Refills: 0 | Status: SHIPPED | OUTPATIENT
Start: 2018-03-01 | End: 2019-02-08

## 2018-03-01 RX ORDER — FLUCONAZOLE 150 MG/1
TABLET ORAL
Qty: 1 TABLET | Refills: 0 | Status: SHIPPED | OUTPATIENT
Start: 2018-03-01 | End: 2018-07-13 | Stop reason: ALTCHOICE

## 2018-03-01 ASSESSMENT — ENCOUNTER SYMPTOMS
EYES NEGATIVE: 1
COUGH: 1
GASTROINTESTINAL NEGATIVE: 1

## 2018-03-01 NOTE — PROGRESS NOTES
[facility] PRIMARY CARE  [address]  [address] [facility] [address]  Dept: [phone]  Dept Fax: [phone]  Loc: [phone]    Jeri Marks is a 45 y.o. female who presents today for her medical conditions/complaints as noted below. Jeri Marks is c/o of Follow-Up from [facility]   Patient presents with    Follow-Up from Hospital       HPI:     HPI   Patient here for follow up on pneumonia. She was diagnosed on 2/25/2018 and was treated with Tamiflu. Patient reports taking the antibiotics and has been using the breathing treatment as well. She has noticed an improvement. She is hsving issues with a vaginal yeast infection though. She reports it starting after starting the oral abx.      Past Medical History:   Diagnosis Date    CAD (coronary artery disease)     stent 2011     [facility] [address]     Diabetes mellitus ([facility] [address]     ERRONEOUS ENCOUNTER--DISREGARD 9/15/2015    GERD (gastroesophageal reflux disease)     Headache(784.0)     Heart attack 12/2011    Hyperlipidemia     Hypertension     Lichen simplex chronicus 10/2015    Osteopenia     Pneumonia     Type II or unspecified type diabetes mellitus without mention of complication, not stated as uncontrolled         Past Surgical History:   Procedure Laterality Date    BREAST BIOPSY  6/8/2011    US guided core needle, right, benign    CARDIAC CATHETERIZATION  12/22/14  JDT    EF 50%    CARDIAC CATHETERIZATION  06/05/2017        CHOLECYSTECTOMY  2006    CORONARY ANGIOPLASTY WITH STENT PLACEMENT  12/2011    SABA to First diag    CORONARY ARTERY BYPASS GRAFT      FOOT SURGERY  2000    HYSTERECTOMY  1998    total    OTHER SURGICAL HISTORY      fibrinolysis    RI CABG, ARTERY-VEIN, THREE  12\23\2014    Coronary Artery Bypass, 3    THORACOTOMY      VULVAR/PERINEAL BIOPSY  10/07/2015    Dr. Luz Ornelas       Social History   Substance Use Topics    Smoking status: facility-administered medications for this visit. Allergies   Allergen Reactions    Codeine     Humalog [Insulin Lispro]     Motrin [Ibuprofen] Swelling       Family History   Problem Relation Age of Onset    Diabetes Father     Cancer Father     High Blood Pressure Father     Diabetes Mother    [de-identified] Breast Cancer Mother     Heart Failure Mother      CHF, MVP    Heart Failure Maternal Grandfather          Subjective:      Review of Systems   Constitutional: Negative. HENT: Negative. Eyes: Negative. Respiratory: Positive for cough. Cardiovascular: Negative. Gastrointestinal: Negative. Endocrine: Negative. Genitourinary: Positive for vaginal pain (vaginal itching). Musculoskeletal: Negative. Skin: Negative. Neurological: Negative. Hematological: Negative. Psychiatric/Behavioral: Negative. Objective:     Physical Exam   Constitutional: She is oriented to person, place, and time. Vital signs are normal. She appears well-developed and well-nourished. HENT:   Head: Normocephalic and atraumatic. Right Ear: Hearing, tympanic membrane, external ear and ear canal normal.   Left Ear: Hearing, tympanic membrane, external ear and ear canal normal.   Nose: Nose normal.   Mouth/Throat: Uvula is midline, oropharynx is clear and moist and mucous membranes are normal.   Eyes: Conjunctivae, EOM and lids are normal. Pupils are equal, round, and reactive to light. Neck: Trachea normal and normal range of motion. Neck supple. No thyroid mass and no thyromegaly present. Cardiovascular: Normal rate, regular rhythm, normal heart sounds and normal pulses. Pulmonary/Chest: Effort normal and breath sounds normal.   Abdominal: Soft. Normal appearance and bowel sounds are normal.   Genitourinary:   Genitourinary Comments: Patient declined exam   Musculoskeletal: Normal range of motion. Cervical back: Normal. She exhibits normal range of motion and no tenderness. Thoracic back: Normal. She exhibits normal range of motion and no tenderness. Lumbar back: Normal. She exhibits normal range of motion and no tenderness. Neurological: She is alert and oriented to person, place, and time. She has normal strength. Skin: Skin is warm, dry and intact. Psychiatric: She has a normal mood and affect. Her speech is normal and behavior is normal. Judgment and thought content normal. Cognition and memory are normal.   Nursing note and vitals reviewed. /74   Pulse 77   Temp 96.8 °F (36 °C) (Temporal)   Ht 5' 2\" (1.575 m)   Wt 213 lb (96.6 kg)   LMP 01/01/1998 (Approximate)   SpO2 96%   BMI 38.96 kg/m²     Assessment:     1. Vaginal yeast infection  fluconazole (DIFLUCAN) 150 MG tablet    nystatin (MYCOSTATIN) 377721 UNIT/GM ointment   2. Hospital discharge follow-up     3. Community acquired pneumonia, unspecified laterality         No results found for this visit on 03/01/18. Plan:     I am treating vaginal yeast orally with diflucan and topical nystatin as requested by patient. She has improved since discharge, so no additional xray or medication is needed. She does need to finish abx as prescribed. Return for Keep follow up as scheduled. No orders of the defined types were placed in this encounter. Orders Placed This Encounter   Medications    fluconazole (DIFLUCAN) 150 MG tablet     Sig: Take 1 tablet today, then 1 tablet in 3-5 days     Dispense:  1 tablet     Refill:  0    nystatin (MYCOSTATIN) 979745 UNIT/GM ointment     Sig: Apply topically 2 times daily. Dispense:  30 g     Refill:  0        Patient given educational materials - see patient instructions. Discussed use, benefit, and side effects of prescribed medications. All patient questions answered. Pt voiced understanding. Reviewed health maintenance. Instructed to continue current medications, diet and exercise. Patient agreed with treatment plan. Follow up as directed. Electronically signed by MIKE Hall on 3/1/2018 at 1:25 PM

## 2018-03-02 LAB
BLOOD CULTURE, ROUTINE: NORMAL
CULTURE, BLOOD 2: NORMAL

## 2018-03-05 ENCOUNTER — CARE COORDINATION (OUTPATIENT)
Dept: CARE COORDINATION | Age: 53
End: 2018-03-05

## 2018-03-07 ENCOUNTER — CARE COORDINATION (OUTPATIENT)
Dept: CARE COORDINATION | Age: 53
End: 2018-03-07

## 2018-03-13 ENCOUNTER — OFFICE VISIT (OUTPATIENT)
Dept: CARDIOLOGY | Age: 53
End: 2018-03-13
Payer: COMMERCIAL

## 2018-03-13 VITALS
WEIGHT: 211 LBS | DIASTOLIC BLOOD PRESSURE: 60 MMHG | SYSTOLIC BLOOD PRESSURE: 102 MMHG | RESPIRATION RATE: 16 BRPM | HEART RATE: 67 BPM | HEIGHT: 62 IN | BODY MASS INDEX: 38.83 KG/M2

## 2018-03-13 DIAGNOSIS — I25.10 CORONARY ARTERY DISEASE INVOLVING NATIVE CORONARY ARTERY OF NATIVE HEART WITHOUT ANGINA PECTORIS: ICD-10-CM

## 2018-03-13 DIAGNOSIS — E78.2 MIXED HYPERLIPIDEMIA: ICD-10-CM

## 2018-03-13 DIAGNOSIS — I10 ESSENTIAL HYPERTENSION: Primary | ICD-10-CM

## 2018-03-13 PROCEDURE — 99212 OFFICE O/P EST SF 10 MIN: CPT | Performed by: INTERNAL MEDICINE

## 2018-03-13 PROCEDURE — G8598 ASA/ANTIPLAT THER USED: HCPCS | Performed by: INTERNAL MEDICINE

## 2018-03-13 PROCEDURE — G8484 FLU IMMUNIZE NO ADMIN: HCPCS | Performed by: INTERNAL MEDICINE

## 2018-03-13 PROCEDURE — 3014F SCREEN MAMMO DOC REV: CPT | Performed by: INTERNAL MEDICINE

## 2018-03-13 PROCEDURE — G8427 DOCREV CUR MEDS BY ELIG CLIN: HCPCS | Performed by: INTERNAL MEDICINE

## 2018-03-13 PROCEDURE — 1036F TOBACCO NON-USER: CPT | Performed by: INTERNAL MEDICINE

## 2018-03-13 PROCEDURE — G8417 CALC BMI ABV UP PARAM F/U: HCPCS | Performed by: INTERNAL MEDICINE

## 2018-03-13 PROCEDURE — 3017F COLORECTAL CA SCREEN DOC REV: CPT | Performed by: INTERNAL MEDICINE

## 2018-03-13 NOTE — PROGRESS NOTES
[address] [facility] Patient Office Visit    [place]  [address]  Phone: [phone]  Fax: [phone]        3/13/2018    Chief Complaint / Reason for the Visit   Follow up of:  CAD and HTN and Hyperlipidemia      Specialty Problems        Cardiology Problems    Heart attack        CAD (coronary artery disease)        Hypertension        Coronary artery disease involving coronary bypass graft of native heart without angina pectoris        Left ventricular dysfunction        Angina effort [facility])              Current Status Today According to the patient:  \"Just got over pneumonia. \"    Subjective:  Ms. Elmer Foreman is generally feeling stable. Ms. Elmer Foreman has the following cardiac complaints / symptoms today:    1. CAD, states pain every once in while when she first wakes up that is very brief. 2. HTN, well controlled    3.  Hyperlipidemia, managed by PCP        Elmer Foreman is a 45 y.o. female [facility] the [facility]:    Patient Active Problem List    Diagnosis Date Noted    Angina effort ([facility] [address] 01/23/2018    Coronary artery disease involving coronary bypass graft of native heart without angina pectoris 09/05/2017    Left ventricular dysfunction 09/05/2017    Abnormal nuclear cardiac imaging test     Lichen simplex chronicus 10/21/2015    ERRONEOUS ENCOUNTER--DISREGARD 09/15/2015    Personal history of malignant neoplasm of cervix uteri 05/11/2015    Atrophic vaginitis 05/11/2015    History of pleural effusion 04/29/2015    Candidal intertrigo 02/03/2015    Weakness 02/03/2015    S/P CABG x 3 02/03/2015    Chest pain 12/01/2014    TAVERAS (dyspnea on exertion) 12/01/2014    Diabetes mellitus ([address]     CAD (coronary artery disease) 07/30/2013    Hypertension 07/30/2013    Hyperlipidemia 07/30/2013    Heart attack 12/01/2011     Current Outpatient Prescriptions   Medication Sig Dispense Refill    Monae Yan MA am scribing for and in the presence of YAMILE Pacheco MD,[facility]. Ravinder Singh MA     3:33 PM  YAMILE ROTH MD, [facility], personally performed the services described in this documentation as scribed by Ravinder Singh in my presence, and it is both accurate and complete. Electronically signed by Tyler Bear.  Shadi Pacheco MD, [facility]    3/14/18 10:07 AM

## 2018-03-14 RX ORDER — NITROGLYCERIN 0.4 MG/1
0.4 TABLET SUBLINGUAL EVERY 5 MIN PRN
Qty: 25 TABLET | Refills: 3 | Status: SHIPPED | OUTPATIENT
Start: 2018-03-14 | End: 2020-05-20 | Stop reason: SDUPTHER

## 2018-03-22 ENCOUNTER — CARE COORDINATION (OUTPATIENT)
Dept: CARE COORDINATION | Age: 53
End: 2018-03-22

## 2018-03-23 NOTE — CARE COORDINATION
empagliflozin (JARDIANCE) 25 MG tablet Take 25 mg by mouth daily 3/5/18   Chapin Flores DO   fluconazole (DIFLUCAN) 150 MG tablet Take 1 tablet today, then 1 tablet in 3-5 days 3/1/18   Felicia Councilman, APRN   nystatin (MYCOSTATIN) 397424 UNIT/GM ointment Apply topically 2 times daily. 3/1/18   Felicia Councilman, APRN   Nebulizers (COMPRESSOR/NEBULIZER) MISC Use as directed 2/26/18   Chapin Flores DO   ondansetron (ZOFRAN ODT) 4 MG disintegrating tablet Take 1 tablet by mouth every 8 hours as needed for Nausea or Vomiting 2/25/18   Minerva Oliva MD   albuterol (PROVENTIL) (5 MG/ML) 0.5% nebulizer solution Take 0.5 mLs by nebulization every 6 hours as needed for Wheezing 2/25/18   Minerva Oliva MD   LORazepam (ATIVAN) 0.5 MG tablet Take 1 tablet by mouth every 8 hours as needed for Anxiety. 1/30/18 1/30/19  Chapin Flores DO   escitalopram (LEXAPRO) 10 MG tablet Take 1 tablet by mouth daily 1/30/18   Chapin Flores DO   metFORMIN, OSM, (FORTAMET) 1000 MG extended release tablet Take 1 tablet by mouth daily (with breakfast)  Patient taking differently: Take 500 mg by mouth 2 times daily (with meals)  1/30/18   Chapin Flores DO   gabapentin (NEURONTIN) 300 MG capsule Take 1 capsule by mouth daily for 30 days.  1/30/18 3/13/18  Chapin Flores DO   vitamin D (ERGOCALCIFEROL) 33764 units CAPS capsule Take 1 capsule by mouth once a week 12/20/17   Chapin Flores DO   famotidine (PEPCID) 20 MG tablet TAKE 1 TABLET TWICE DAILY 12/20/17   Chapin Flores DO   furosemide (LASIX) 20 MG tablet Take 1 tablet by mouth daily as needed (edema) 12/20/17   Chapin Flores DO   lovastatin (MEVACOR) 40 MG tablet TAKE 1 TABLET AT BEDTIME 10/12/17   Juanita Pierce MD   metoprolol tartrate (LOPRESSOR) 25 MG tablet TAKE 1 TABLET BY MOUTH TWICE DAILY 10/12/17   Juanita Pierce MD   valsartan (DIOVAN) 80 MG tablet TAKE 1 TABLET BY MOUTH DAILY 10/11/17   Felicia Councilman, APRN JANUVIA 100 MG Appointments  Date Time Provider Trini Cheyenne   4/30/2018 1:00 PM [facility]   9/11/2018 2:30 PM MIKE Caballero [facility] [place]   3/5/2019 1:30 PM Jarad Adler MD [facility]

## 2018-04-04 DIAGNOSIS — L68.0 HIRSUTISM: ICD-10-CM

## 2018-04-04 DIAGNOSIS — E11.8 TYPE 2 DIABETES MELLITUS WITH COMPLICATION, UNSPECIFIED LONG TERM INSULIN USE STATUS: ICD-10-CM

## 2018-04-04 DIAGNOSIS — Z11.4 SCREENING FOR HIV WITHOUT PRESENCE OF RISK FACTORS: ICD-10-CM

## 2018-04-04 DIAGNOSIS — Z12.11 SCREENING FOR COLON CANCER: ICD-10-CM

## 2018-04-04 DIAGNOSIS — I10 ESSENTIAL HYPERTENSION: ICD-10-CM

## 2018-04-04 DIAGNOSIS — Z23 NEEDS FLU SHOT: ICD-10-CM

## 2018-04-04 DIAGNOSIS — Z11.59 NEED FOR HEPATITIS C SCREENING TEST: ICD-10-CM

## 2018-04-04 DIAGNOSIS — K21.9 GASTROESOPHAGEAL REFLUX DISEASE WITHOUT ESOPHAGITIS: ICD-10-CM

## 2018-04-04 RX ORDER — LOVASTATIN 40 MG/1
TABLET ORAL
Qty: 30 TABLET | Refills: 5 | Status: SHIPPED | OUTPATIENT
Start: 2018-04-04 | End: 2018-11-06 | Stop reason: SDUPTHER

## 2018-04-06 ENCOUNTER — CARE COORDINATION (OUTPATIENT)
Dept: CARE COORDINATION | Age: 53
End: 2018-04-06

## 2018-04-13 ENCOUNTER — TELEPHONE (OUTPATIENT)
Dept: VASCULAR SURGERY | Facility: CLINIC | Age: 53
End: 2018-04-13

## 2018-04-27 DIAGNOSIS — I25.10 CORONARY ARTERY DISEASE INVOLVING NATIVE CORONARY ARTERY OF NATIVE HEART WITHOUT ANGINA PECTORIS: ICD-10-CM

## 2018-04-27 RX ORDER — ISOSORBIDE MONONITRATE 30 MG/1
TABLET, EXTENDED RELEASE ORAL
Qty: 30 TABLET | Refills: 5 | Status: SHIPPED | OUTPATIENT
Start: 2018-04-27 | End: 2018-09-12 | Stop reason: SDUPTHER

## 2018-04-30 ENCOUNTER — OFFICE VISIT (OUTPATIENT)
Dept: PRIMARY CARE CLINIC | Age: 53
End: 2018-04-30
Payer: COMMERCIAL

## 2018-04-30 ENCOUNTER — CARE COORDINATION (OUTPATIENT)
Dept: CARE COORDINATION | Age: 53
End: 2018-04-30

## 2018-04-30 VITALS — DIASTOLIC BLOOD PRESSURE: 62 MMHG | WEIGHT: 204 LBS | SYSTOLIC BLOOD PRESSURE: 104 MMHG | BODY MASS INDEX: 37.31 KG/M2

## 2018-04-30 DIAGNOSIS — Z12.11 SCREENING FOR COLON CANCER: ICD-10-CM

## 2018-04-30 DIAGNOSIS — E11.8 TYPE 2 DIABETES MELLITUS WITH COMPLICATION, UNSPECIFIED LONG TERM INSULIN USE STATUS: ICD-10-CM

## 2018-04-30 DIAGNOSIS — M25.512 CHRONIC LEFT SHOULDER PAIN: Primary | ICD-10-CM

## 2018-04-30 DIAGNOSIS — Z11.59 NEED FOR HEPATITIS C SCREENING TEST: ICD-10-CM

## 2018-04-30 DIAGNOSIS — G89.29 CHRONIC LEFT SHOULDER PAIN: Primary | ICD-10-CM

## 2018-04-30 DIAGNOSIS — Z11.4 SCREENING FOR HIV WITHOUT PRESENCE OF RISK FACTORS: ICD-10-CM

## 2018-04-30 DIAGNOSIS — Z23 NEEDS FLU SHOT: ICD-10-CM

## 2018-04-30 DIAGNOSIS — R60.0 LOCALIZED EDEMA: ICD-10-CM

## 2018-04-30 DIAGNOSIS — K21.9 GASTROESOPHAGEAL REFLUX DISEASE WITHOUT ESOPHAGITIS: ICD-10-CM

## 2018-04-30 DIAGNOSIS — L68.0 HIRSUTISM: ICD-10-CM

## 2018-04-30 DIAGNOSIS — I10 ESSENTIAL HYPERTENSION: ICD-10-CM

## 2018-04-30 LAB — HBA1C MFR BLD: 9.3 %

## 2018-04-30 PROCEDURE — 1036F TOBACCO NON-USER: CPT | Performed by: INTERNAL MEDICINE

## 2018-04-30 PROCEDURE — 3017F COLORECTAL CA SCREEN DOC REV: CPT | Performed by: INTERNAL MEDICINE

## 2018-04-30 PROCEDURE — G8598 ASA/ANTIPLAT THER USED: HCPCS | Performed by: INTERNAL MEDICINE

## 2018-04-30 PROCEDURE — 83036 HEMOGLOBIN GLYCOSYLATED A1C: CPT | Performed by: INTERNAL MEDICINE

## 2018-04-30 PROCEDURE — 2022F DILAT RTA XM EVC RTNOPTHY: CPT | Performed by: INTERNAL MEDICINE

## 2018-04-30 PROCEDURE — G8417 CALC BMI ABV UP PARAM F/U: HCPCS | Performed by: INTERNAL MEDICINE

## 2018-04-30 PROCEDURE — 3046F HEMOGLOBIN A1C LEVEL >9.0%: CPT | Performed by: INTERNAL MEDICINE

## 2018-04-30 PROCEDURE — 99214 OFFICE O/P EST MOD 30 MIN: CPT | Performed by: INTERNAL MEDICINE

## 2018-04-30 PROCEDURE — G8427 DOCREV CUR MEDS BY ELIG CLIN: HCPCS | Performed by: INTERNAL MEDICINE

## 2018-04-30 RX ORDER — MELOXICAM 15 MG/1
15 TABLET ORAL DAILY
Qty: 30 TABLET | Refills: 3 | Status: SHIPPED | OUTPATIENT
Start: 2018-04-30 | End: 2018-07-13 | Stop reason: ALTCHOICE

## 2018-04-30 RX ORDER — FUROSEMIDE 20 MG/1
20 TABLET ORAL DAILY PRN
Qty: 90 TABLET | Refills: 3 | Status: SHIPPED | OUTPATIENT
Start: 2018-04-30 | End: 2019-06-24 | Stop reason: DRUGHIGH

## 2018-04-30 ASSESSMENT — ENCOUNTER SYMPTOMS
COUGH: 0
SINUS PAIN: 0
SHORTNESS OF BREATH: 0
DIARRHEA: 0
NAUSEA: 0
SINUS PRESSURE: 0
CONSTIPATION: 0
VOMITING: 0
BACK PAIN: 0

## 2018-05-12 DIAGNOSIS — L68.0 HIRSUTISM: ICD-10-CM

## 2018-05-12 DIAGNOSIS — Z23 NEEDS FLU SHOT: ICD-10-CM

## 2018-05-12 DIAGNOSIS — Z12.11 SCREENING FOR COLON CANCER: ICD-10-CM

## 2018-05-12 DIAGNOSIS — Z11.4 SCREENING FOR HIV WITHOUT PRESENCE OF RISK FACTORS: ICD-10-CM

## 2018-05-12 DIAGNOSIS — Z11.59 NEED FOR HEPATITIS C SCREENING TEST: ICD-10-CM

## 2018-05-12 DIAGNOSIS — K21.9 GASTROESOPHAGEAL REFLUX DISEASE WITHOUT ESOPHAGITIS: ICD-10-CM

## 2018-05-12 DIAGNOSIS — I10 ESSENTIAL HYPERTENSION: ICD-10-CM

## 2018-05-12 DIAGNOSIS — E11.8 TYPE 2 DIABETES MELLITUS WITH COMPLICATION, UNSPECIFIED LONG TERM INSULIN USE STATUS: ICD-10-CM

## 2018-05-14 RX ORDER — FAMOTIDINE 20 MG/1
TABLET, FILM COATED ORAL
Qty: 60 TABLET | Refills: 3 | Status: SHIPPED | OUTPATIENT
Start: 2018-05-14 | End: 2019-03-05 | Stop reason: CLARIF

## 2018-06-07 ENCOUNTER — CARE COORDINATION (OUTPATIENT)
Dept: CARE COORDINATION | Age: 53
End: 2018-06-07

## 2018-06-12 RX ORDER — METFORMIN HYDROCHLORIDE EXTENDED-RELEASE TABLETS 1000 MG/1
1000 TABLET, FILM COATED, EXTENDED RELEASE ORAL
Qty: 30 TABLET | Refills: 1 | Status: SHIPPED | OUTPATIENT
Start: 2018-06-12 | End: 2018-07-13

## 2018-06-26 RX ORDER — SITAGLIPTIN 100 MG/1
TABLET, FILM COATED ORAL
Qty: 30 TABLET | Refills: 3 | Status: SHIPPED | OUTPATIENT
Start: 2018-06-26 | End: 2018-11-06 | Stop reason: SDUPTHER

## 2018-07-13 ENCOUNTER — HOSPITAL ENCOUNTER (OUTPATIENT)
Dept: PREADMISSION TESTING | Age: 53
Discharge: HOME | End: 2018-07-17
Payer: COMMERCIAL

## 2018-07-13 VITALS — WEIGHT: 212 LBS | BODY MASS INDEX: 37.56 KG/M2 | HEIGHT: 63 IN

## 2018-07-13 LAB
ANION GAP SERPL CALCULATED.3IONS-SCNC: 11 MMOL/L (ref 7–19)
BASOPHILS ABSOLUTE: 0.1 K/UL (ref 0–0.2)
BASOPHILS RELATIVE PERCENT: 1 % (ref 0–1)
BUN BLDV-MCNC: 21 MG/DL (ref 6–20)
CALCIUM SERPL-MCNC: 9.5 MG/DL (ref 8.6–10)
CHLORIDE BLD-SCNC: 99 MMOL/L (ref 98–111)
CO2: 29 MMOL/L (ref 22–29)
CREAT SERPL-MCNC: 0.9 MG/DL (ref 0.5–0.9)
EOSINOPHILS ABSOLUTE: 0.1 K/UL (ref 0–0.6)
EOSINOPHILS RELATIVE PERCENT: 1.5 % (ref 0–5)
GFR NON-AFRICAN AMERICAN: >60
GLUCOSE BLD-MCNC: 259 MG/DL (ref 74–109)
HCT VFR BLD CALC: 45.2 % (ref 37–47)
HEMOGLOBIN: 14.8 G/DL (ref 12–16)
LYMPHOCYTES ABSOLUTE: 2 K/UL (ref 1.1–4.5)
LYMPHOCYTES RELATIVE PERCENT: 21.1 % (ref 20–40)
MCH RBC QN AUTO: 28.2 PG (ref 27–31)
MCHC RBC AUTO-ENTMCNC: 32.7 G/DL (ref 33–37)
MCV RBC AUTO: 86.3 FL (ref 81–99)
MONOCYTES ABSOLUTE: 0.7 K/UL (ref 0–0.9)
MONOCYTES RELATIVE PERCENT: 7.2 % (ref 0–10)
NEUTROPHILS ABSOLUTE: 6.4 K/UL (ref 1.5–7.5)
NEUTROPHILS RELATIVE PERCENT: 68.7 % (ref 50–65)
PDW BLD-RTO: 13.3 % (ref 11.5–14.5)
PLATELET # BLD: 239 K/UL (ref 130–400)
PMV BLD AUTO: 10.7 FL (ref 9.4–12.3)
POTASSIUM SERPL-SCNC: 4.3 MMOL/L (ref 3.5–5)
RBC # BLD: 5.24 M/UL (ref 4.2–5.4)
SODIUM BLD-SCNC: 139 MMOL/L (ref 136–145)
WBC # BLD: 9.3 K/UL (ref 4.8–10.8)

## 2018-07-13 PROCEDURE — 80048 BASIC METABOLIC PNL TOTAL CA: CPT

## 2018-07-13 PROCEDURE — 93005 ELECTROCARDIOGRAM TRACING: CPT

## 2018-07-13 PROCEDURE — 85025 COMPLETE CBC W/AUTO DIFF WBC: CPT

## 2018-07-13 RX ORDER — METFORMIN HYDROCHLORIDE 500 MG/1
500 TABLET, EXTENDED RELEASE ORAL
COMMUNITY
Start: 2018-04-27 | End: 2018-08-01 | Stop reason: SDUPTHER

## 2018-07-18 PROBLEM — M75.02 ADHESIVE CAPSULITIS OF LEFT SHOULDER: Status: ACTIVE | Noted: 2018-07-18

## 2018-07-18 LAB
EKG P AXIS: 38 DEGREES
EKG P-R INTERVAL: 138 MS
EKG Q-T INTERVAL: 410 MS
EKG QRS DURATION: 82 MS
EKG QTC CALCULATION (BAZETT): 409 MS
EKG T AXIS: 120 DEGREES

## 2018-07-19 ENCOUNTER — ANESTHESIA EVENT (OUTPATIENT)
Dept: OPERATING ROOM | Age: 53
End: 2018-07-19
Payer: COMMERCIAL

## 2018-07-19 ENCOUNTER — HOSPITAL ENCOUNTER (OUTPATIENT)
Age: 53
Setting detail: OUTPATIENT SURGERY
Discharge: HOME | End: 2018-07-19
Attending: ORTHOPAEDIC SURGERY | Admitting: ORTHOPAEDIC SURGERY
Payer: COMMERCIAL

## 2018-07-19 ENCOUNTER — ANESTHESIA (OUTPATIENT)
Dept: OPERATING ROOM | Age: 53
End: 2018-07-19
Payer: COMMERCIAL

## 2018-07-19 VITALS
TEMPERATURE: 98.2 F | SYSTOLIC BLOOD PRESSURE: 156 MMHG | BODY MASS INDEX: 37.56 KG/M2 | RESPIRATION RATE: 16 BRPM | DIASTOLIC BLOOD PRESSURE: 86 MMHG | HEIGHT: 63 IN | WEIGHT: 212 LBS | OXYGEN SATURATION: 99 % | HEART RATE: 51 BPM

## 2018-07-19 VITALS
SYSTOLIC BLOOD PRESSURE: 107 MMHG | RESPIRATION RATE: 13 BRPM | OXYGEN SATURATION: 98 % | DIASTOLIC BLOOD PRESSURE: 49 MMHG

## 2018-07-19 DIAGNOSIS — M75.02 ADHESIVE CAPSULITIS OF LEFT SHOULDER: Primary | ICD-10-CM

## 2018-07-19 LAB
GLUCOSE BLD-MCNC: 126 MG/DL (ref 70–99)
PERFORMED ON: ABNORMAL

## 2018-07-19 PROCEDURE — 2500000003 HC RX 250 WO HCPCS

## 2018-07-19 PROCEDURE — 7100000010 HC PHASE II RECOVERY - FIRST 15 MIN: Performed by: ORTHOPAEDIC SURGERY

## 2018-07-19 PROCEDURE — 6360000002 HC RX W HCPCS: Performed by: ANESTHESIOLOGY

## 2018-07-19 PROCEDURE — 7100000001 HC PACU RECOVERY - ADDTL 15 MIN: Performed by: ORTHOPAEDIC SURGERY

## 2018-07-19 PROCEDURE — 6360000002 HC RX W HCPCS

## 2018-07-19 PROCEDURE — 82948 REAGENT STRIP/BLOOD GLUCOSE: CPT

## 2018-07-19 PROCEDURE — 3600000101 HC MANIP SHOULDER UNDER ANESTHESI: Performed by: ORTHOPAEDIC SURGERY

## 2018-07-19 PROCEDURE — 7100000000 HC PACU RECOVERY - FIRST 15 MIN: Performed by: ORTHOPAEDIC SURGERY

## 2018-07-19 PROCEDURE — 7100000011 HC PHASE II RECOVERY - ADDTL 15 MIN: Performed by: ORTHOPAEDIC SURGERY

## 2018-07-19 PROCEDURE — 6360000002 HC RX W HCPCS: Performed by: ORTHOPAEDIC SURGERY

## 2018-07-19 PROCEDURE — 64415 NJX AA&/STRD BRCH PLXS IMG: CPT

## 2018-07-19 PROCEDURE — S0028 INJECTION, FAMOTIDINE, 20 MG: HCPCS | Performed by: ANESTHESIOLOGY

## 2018-07-19 PROCEDURE — 2580000003 HC RX 258: Performed by: ORTHOPAEDIC SURGERY

## 2018-07-19 PROCEDURE — 3700000000 HC ANESTHESIA ATTENDED CARE: Performed by: ORTHOPAEDIC SURGERY

## 2018-07-19 PROCEDURE — 2500000003 HC RX 250 WO HCPCS: Performed by: ORTHOPAEDIC SURGERY

## 2018-07-19 PROCEDURE — 2500000003 HC RX 250 WO HCPCS: Performed by: ANESTHESIOLOGY

## 2018-07-19 PROCEDURE — L3650 SO 8 ABD RESTRAINT PRE OTS: HCPCS | Performed by: ORTHOPAEDIC SURGERY

## 2018-07-19 PROCEDURE — 6370000000 HC RX 637 (ALT 250 FOR IP): Performed by: ANESTHESIOLOGY

## 2018-07-19 RX ORDER — SCOLOPAMINE TRANSDERMAL SYSTEM 1 MG/1
1 PATCH, EXTENDED RELEASE TRANSDERMAL ONCE
Status: DISCONTINUED | OUTPATIENT
Start: 2018-07-19 | End: 2018-07-19 | Stop reason: HOSPADM

## 2018-07-19 RX ORDER — ENALAPRILAT 2.5 MG/2ML
1.25 INJECTION INTRAVENOUS
Status: DISCONTINUED | OUTPATIENT
Start: 2018-07-19 | End: 2018-07-19 | Stop reason: HOSPADM

## 2018-07-19 RX ORDER — SODIUM CHLORIDE 0.9 % (FLUSH) 0.9 %
10 SYRINGE (ML) INJECTION EVERY 12 HOURS SCHEDULED
Status: DISCONTINUED | OUTPATIENT
Start: 2018-07-19 | End: 2018-07-19 | Stop reason: HOSPADM

## 2018-07-19 RX ORDER — MEPERIDINE HYDROCHLORIDE 50 MG/ML
12.5 INJECTION INTRAMUSCULAR; INTRAVENOUS; SUBCUTANEOUS EVERY 5 MIN PRN
Status: DISCONTINUED | OUTPATIENT
Start: 2018-07-19 | End: 2018-07-19 | Stop reason: HOSPADM

## 2018-07-19 RX ORDER — TRAMADOL HYDROCHLORIDE 50 MG/1
50 TABLET ORAL EVERY 4 HOURS PRN
Qty: 40 TABLET | Refills: 0 | Status: SHIPPED | OUTPATIENT
Start: 2018-07-19 | End: 2018-07-23

## 2018-07-19 RX ORDER — MIDAZOLAM HYDROCHLORIDE 1 MG/ML
2 INJECTION INTRAMUSCULAR; INTRAVENOUS
Status: COMPLETED | OUTPATIENT
Start: 2018-07-19 | End: 2018-07-19

## 2018-07-19 RX ORDER — HYDROCODONE BITARTRATE AND ACETAMINOPHEN 5; 325 MG/1; MG/1
1 TABLET ORAL EVERY 4 HOURS PRN
Qty: 20 TABLET | Refills: 0 | Status: SHIPPED | OUTPATIENT
Start: 2018-07-19 | End: 2018-07-26

## 2018-07-19 RX ORDER — SODIUM CHLORIDE, SODIUM LACTATE, POTASSIUM CHLORIDE, CALCIUM CHLORIDE 600; 310; 30; 20 MG/100ML; MG/100ML; MG/100ML; MG/100ML
INJECTION, SOLUTION INTRAVENOUS CONTINUOUS
Status: DISCONTINUED | OUTPATIENT
Start: 2018-07-19 | End: 2018-07-19 | Stop reason: HOSPADM

## 2018-07-19 RX ORDER — PROMETHAZINE HYDROCHLORIDE 25 MG/ML
6.25 INJECTION, SOLUTION INTRAMUSCULAR; INTRAVENOUS
Status: DISCONTINUED | OUTPATIENT
Start: 2018-07-19 | End: 2018-07-19 | Stop reason: HOSPADM

## 2018-07-19 RX ORDER — LIDOCAINE HYDROCHLORIDE 10 MG/ML
INJECTION, SOLUTION EPIDURAL; INFILTRATION; INTRACAUDAL; PERINEURAL PRN
Status: DISCONTINUED | OUTPATIENT
Start: 2018-07-19 | End: 2018-07-19 | Stop reason: SDUPTHER

## 2018-07-19 RX ORDER — ROPIVACAINE HYDROCHLORIDE 5 MG/ML
INJECTION, SOLUTION EPIDURAL; INFILTRATION; PERINEURAL PRN
Status: DISCONTINUED | OUTPATIENT
Start: 2018-07-19 | End: 2018-07-19 | Stop reason: SDUPTHER

## 2018-07-19 RX ORDER — LABETALOL HYDROCHLORIDE 5 MG/ML
5 INJECTION, SOLUTION INTRAVENOUS EVERY 10 MIN PRN
Status: DISCONTINUED | OUTPATIENT
Start: 2018-07-19 | End: 2018-07-19 | Stop reason: HOSPADM

## 2018-07-19 RX ORDER — FENTANYL CITRATE 50 UG/ML
50 INJECTION, SOLUTION INTRAMUSCULAR; INTRAVENOUS
Status: COMPLETED | OUTPATIENT
Start: 2018-07-19 | End: 2018-07-19

## 2018-07-19 RX ORDER — LIDOCAINE HYDROCHLORIDE 10 MG/ML
1 INJECTION, SOLUTION EPIDURAL; INFILTRATION; INTRACAUDAL; PERINEURAL
Status: DISCONTINUED | OUTPATIENT
Start: 2018-07-19 | End: 2018-07-19 | Stop reason: HOSPADM

## 2018-07-19 RX ORDER — METOCLOPRAMIDE HYDROCHLORIDE 5 MG/ML
10 INJECTION INTRAMUSCULAR; INTRAVENOUS
Status: DISCONTINUED | OUTPATIENT
Start: 2018-07-19 | End: 2018-07-19 | Stop reason: HOSPADM

## 2018-07-19 RX ORDER — HYDRALAZINE HYDROCHLORIDE 20 MG/ML
5 INJECTION INTRAMUSCULAR; INTRAVENOUS EVERY 10 MIN PRN
Status: DISCONTINUED | OUTPATIENT
Start: 2018-07-19 | End: 2018-07-19 | Stop reason: HOSPADM

## 2018-07-19 RX ORDER — DIPHENHYDRAMINE HYDROCHLORIDE 50 MG/ML
12.5 INJECTION INTRAMUSCULAR; INTRAVENOUS
Status: DISCONTINUED | OUTPATIENT
Start: 2018-07-19 | End: 2018-07-19 | Stop reason: HOSPADM

## 2018-07-19 RX ORDER — ONDANSETRON 2 MG/ML
INJECTION INTRAMUSCULAR; INTRAVENOUS PRN
Status: DISCONTINUED | OUTPATIENT
Start: 2018-07-19 | End: 2018-07-19 | Stop reason: SDUPTHER

## 2018-07-19 RX ORDER — PROPOFOL 10 MG/ML
INJECTION, EMULSION INTRAVENOUS PRN
Status: DISCONTINUED | OUTPATIENT
Start: 2018-07-19 | End: 2018-07-19 | Stop reason: SDUPTHER

## 2018-07-19 RX ORDER — SODIUM CHLORIDE 0.9 % (FLUSH) 0.9 %
10 SYRINGE (ML) INJECTION PRN
Status: DISCONTINUED | OUTPATIENT
Start: 2018-07-19 | End: 2018-07-19 | Stop reason: HOSPADM

## 2018-07-19 RX ADMIN — SODIUM CHLORIDE, POTASSIUM CHLORIDE, SODIUM LACTATE AND CALCIUM CHLORIDE: 600; 310; 30; 20 INJECTION, SOLUTION INTRAVENOUS at 10:21

## 2018-07-19 RX ADMIN — FAMOTIDINE 20 MG: 10 INJECTION, SOLUTION INTRAVENOUS at 10:21

## 2018-07-19 RX ADMIN — ONDANSETRON HYDROCHLORIDE 4 MG: 2 INJECTION, SOLUTION INTRAMUSCULAR; INTRAVENOUS at 11:32

## 2018-07-19 RX ADMIN — FENTANYL CITRATE 50 MCG: 50 INJECTION, SOLUTION INTRAMUSCULAR; INTRAVENOUS at 11:05

## 2018-07-19 RX ADMIN — LIDOCAINE HYDROCHLORIDE 40 MG: 10 INJECTION, SOLUTION EPIDURAL; INFILTRATION; INTRACAUDAL; PERINEURAL at 11:33

## 2018-07-19 RX ADMIN — MIDAZOLAM 2 MG: 1 INJECTION INTRAMUSCULAR; INTRAVENOUS at 11:05

## 2018-07-19 RX ADMIN — ROPIVACAINE HYDROCHLORIDE 20 ML: 5 INJECTION, SOLUTION EPIDURAL; INFILTRATION; PERINEURAL at 11:04

## 2018-07-19 RX ADMIN — PROPOFOL 100 MG: 10 INJECTION, EMULSION INTRAVENOUS at 11:33

## 2018-07-19 ASSESSMENT — PAIN SCALES - GENERAL
PAINLEVEL_OUTOF10: 0
PAINLEVEL_OUTOF10: 8

## 2018-07-19 ASSESSMENT — PAIN - FUNCTIONAL ASSESSMENT: PAIN_FUNCTIONAL_ASSESSMENT: 0-10

## 2018-07-19 ASSESSMENT — ENCOUNTER SYMPTOMS: SHORTNESS OF BREATH: 0

## 2018-07-19 ASSESSMENT — LIFESTYLE VARIABLES: SMOKING_STATUS: 0

## 2018-07-19 NOTE — BRIEF OP NOTE
Brief Postoperative Note  ______________________________________________________________    Patient: Anne Alarcon  YOB: 1965  MRN: [id number]  Date of Procedure: 7/19/2018    Pre-Op Diagnosis: M75.02    Post-Op Diagnosis: Same       Procedure(s):  SHOULDER MANIPULATION UNDER ANESTHESIA  CORTICOSTEROID INJECTION    Anesthesia: Regional, General    Surgeon(s):  Jeff Doss MD    Staff:  * No surgical staff found *     Estimated Blood Loss: * No values recorded between 7/19/2018 11:30 AM and 7/19/2018 40:57 AM * mL    Complications: None    Specimens:   * No specimens in log *    Implants:  * No implants in log *      Drains:      Findings: see op note    Jeff Doss MD  Date: 7/19/2018  Time: 11:31 AM

## 2018-07-19 NOTE — ANESTHESIA POSTPROCEDURE EVALUATION
Department of Anesthesiology  Postprocedure Note    Patient: Ibrahima Ireland  MRN: [id number]  YOB: 1965  Date of evaluation: 7/19/2018  Time:  11:50 AM     Procedure Summary     Date:  07/19/18 Room / Location:  PACU PROCS 01 / [facility] OR    Anesthesia Start:  5540 Anesthesia Stop:  0359    Procedures:       SHOULDER MANIPULATION UNDER ANESTHESIA (Left )      CORTICOSTEROID INJECTION (Left ) Diagnosis:  (M75.02)    Surgeon:  Tana Ram MD Responsible Provider:  MIKE Archer CRNA    Anesthesia Type:  general, regional ASA Status:  3          Anesthesia Type: general, regional    Marian Phase I: Marian Score: 8    Marian Phase II:      Last vitals: Reviewed and per EMR flowsheets.        Anesthesia Post Evaluation    Patient location during evaluation: PACU  Patient participation: complete - patient participated  Level of consciousness: awake and alert  Pain score: 0  Airway patency: patent  Nausea & Vomiting: no nausea and no vomiting  Complications: no  Cardiovascular status: hemodynamically stable and blood pressure returned to baseline  Respiratory status: acceptable and nasal cannula  Hydration status: stable

## 2018-07-19 NOTE — ANESTHESIA PRE PROCEDURE
Department of Anesthesiology  Preprocedure Note       Name:  June Mohamud   Age:  45 y.o.  :  1965                                          MRN:  [id number]         Date:  2018      Surgeon: Madhuri Ramírez):  Herminia Crawford MD    Procedure: Procedure(s):  SHOULDER MANIPULATION UNDER ANESTHESIA  CORTICOSTEROID INJECTION    Medications prior to admission:   Prior to Admission medications    Medication Sig Start Date End Date Taking? Authorizing Provider   metFORMIN (GLUCOPHAGE-XR) 500 MG extended release tablet Take 500 mg by mouth daily (with breakfast)  18   Historical Provider, MD HERRERA 100 MG tablet TAKE 1 TABLET EVERY DAY 18   MIKE Patino   famotidine (PEPCID) 20 MG tablet TAKE 1 TABLET TWICE DAILY 18   Hines Gosselin, DO   furosemide (LASIX) 20 MG tablet Take 1 tablet by mouth daily as needed (edema) 18   Hines Gosselin, DO   metoprolol tartrate (LOPRESSOR) 25 MG tablet TAKE 1 TABLET BY MOUTH TWICE DAILY 18   Hines Gosselin, DO   insulin glargine (LANTUS) 100 UNIT/ML injection pen Inject 28 Units into the skin nightly 18   Hines Gosselin, DO   isosorbide mononitrate (IMDUR) 30 MG extended release tablet TAKE 1 TABLET EVERY DAY  Patient taking differently: TAKE 1 TABLET EVERY nightly 18   MIKE Varela   valsartan (DIOVAN) 80 MG tablet TAKE 1 TABLET BY MOUTH DAILY 18   MIKE Patino   lovastatin (MEVACOR) 40 MG tablet TAKE 1 TABLET AT BEDTIME 18   Hines Gosselin, DO   nitroGLYCERIN (NITROSTAT) 0.4 MG SL tablet Place 1 tablet under the tongue every 5 minutes as needed for Chest pain 3/14/18   Karla Gorman MD   empagliflozin (JARDIANCE) 25 MG tablet Take 25 mg by mouth daily 3/5/18   Hines Gosselin, DO   nystatin (MYCOSTATIN) 876317 UNIT/GM ointment Apply topically 2 times daily.  3/1/18   MIKE Patino   Nebulizers (COMPRESSOR/NEBULIZER) MISC Use as directed 18   Hines Gosselin, DO   albuterol (PROVENTIL) (5 MG/ML) 0.5% nebulizer solution Take 0.5 mLs by nebulization every 6 hours as needed for Wheezing 2/25/18   Kilo Chandler MD   LORazepam (ATIVAN) 0.5 MG tablet Take 1 tablet by mouth every 8 hours as needed for Anxiety. 1/30/18 1/30/19  Colin Fermin DO   gabapentin (NEURONTIN) 300 MG capsule Take 1 capsule by mouth daily for 30 days. 1/30/18 3/13/18  Colin Fermin DO   vitamin D (ERGOCALCIFEROL) 04823 units CAPS capsule Take 1 capsule by mouth once a week  Patient taking differently: Take 50,000 Units by mouth once a week  12/20/17   Colin Fermin DO   aspirin 81 MG tablet Take 81 mg by mouth daily    Historical Provider, MD   vitamin D (CHOLECALCIFEROL) 1000 UNIT TABS tablet Take 5,000 Units by mouth daily     Historical Provider, MD   glucose blood VI test strips (ASCENSIA AUTODISC VI;ONE TOUCH ULTRA TEST VI) strip 1 each by In Vitro route daily As needed. 6/29/17   Colin Fermin DO   Insulin Pen Needle ([employer] PEN NEEDLES) 31G X 6 MM MISC 1 each by Does not apply route daily 6/15/17   MIKE Langley   Lactobacillus (PROBIATA PO) Take 1 tablet by mouth daily     Historical Provider, MD   mometasone (ELOCON) 0.1 % ointment Apply topically to vagina 3 times per week 11/7/16   Tania Hernandez MD   ondansetron [facility]) 8 MG tablet Take 1 tablet by mouth every 8 hours as needed for Nausea or Vomiting 4/18/16   MIKE Andino CNP   Eflornithine HCl (VANIQA) 13.9 % CREA Apply 1 Dose topically 2 times daily 11/20/15   MIKE Galvan   nystatin-triamcinolone [facility]) 431677-8.1 UNIT/GM-% cream Apply topically 2 times daily. 9/30/15   MIKE Allen CNP   Docusate Calcium (STOOL SOFTENER PO) Take 100 mg by mouth 2 times daily as needed.     Historical Provider, MD       Current medications:    Current Facility-Administered Medications   Medication Dose Route Frequency Provider Last Rate Last Dose    lactated ringers infusion   Intravenous Continuous Lemuel Barba MD           Allergies: Allergies   Allergen Reactions    Codeine Other (See Comments)     Seizures as a child    Motrin [Ibuprofen] Swelling     Tongue to swell    Humalog [Insulin Lispro] Other (See Comments)     Shakiness, break out in a sweat at higher doses.        Problem List:    Patient Active Problem List   Diagnosis Code    CAD (coronary artery disease) I25.10    Hypertension I10    Hyperlipidemia E78.5    Chest pain R07.9    Diabetes mellitus ([facility] [address] E11.9    Heart attack I21.9    TAVERAS (dyspnea on exertion) R06.09    Candidal intertrigo B37.2    Weakness R53.1    S/P CABG x 3 Z95.1    History of pleural effusion Z87.09    Personal history of malignant neoplasm of cervix uteri Z85.41    Atrophic vaginitis N95.2    ERRONEOUS ENCOUNTER--DISREGARD 62453    Lichen simplex chronicus L28.0    Abnormal nuclear cardiac imaging test R93.1    Coronary artery disease involving coronary bypass graft of native heart without angina pectoris I25.810    Left ventricular dysfunction I51.9    Angina effort (HCC) I20.8    Adhesive capsulitis of left shoulder M75.02       Past Medical History:        Diagnosis Date    CAD (coronary artery disease)     stent 2011 Tegan Rashid; sees dr. Mirella Barnes Cervical cancer ([facility] [address]     Diabetes mellitus ([facility] [address]     ERRONEOUS ENCOUNTER--DISREGARD 9/15/2015    GERD (gastroesophageal reflux disease)     Headache(784.0)     Heart attack 12/2011    Hyperlipidemia     Hypertension     Lichen simplex chronicus 10/2015    Osteopenia     Pneumonia     Type II or unspecified type diabetes mellitus without mention of complication, not stated as uncontrolled        Past Surgical History:        Procedure Laterality Date    BREAST BIOPSY  6/8/2011    US guided core needle, right, benign    CARDIAC CATHETERIZATION  12/22/14  JDT    EF 50%    CARDIAC CATHETERIZATION  06/05/2017        CHOLECYSTECTOMY  2006    CORONARY ANGIOPLASTY WITH 02/25/2018       HCG (If Applicable): No results found for: PREGTESTUR, PREGSERUM, HCG, HCGQUANT     ABGs:   Lab Results   Component Value Date    PO2ART 383 12/23/2014        Type & Screen (If Applicable):  No results found for: LABABO, [address]    Anesthesia Evaluation  Patient summary reviewed and Nursing notes reviewed no history of anesthetic complications:   Airway: Mallampati: II  TM distance: >3 FB   Neck ROM: full  Mouth opening: > = 3 FB Dental:      Comment: 13 teeth, broken and chipped    Pulmonary:normal exam        (-) pneumonia, shortness of breath and not a current smoker                          ROS comment: S/p talc pleurodesis   Cardiovascular:    (+) hypertension:, CAD:, CABG/stent:, hyperlipidemia      ECG reviewed      Echocardiogram reviewed  Stress test reviewed       Beta Blocker:  Dose within 24 Hrs      ROS comment: EF 40%  1 occluded bypass on last cath     Neuro/Psych:   Negative Neuro/Psych ROS              GI/Hepatic/Renal:   (+) GERD: well controlled,           Endo/Other:    (+) DiabetesType II DM, using insulin, : arthritis:., malignancy/cancer. (-) hypothyroidism               Abdominal:           Vascular: negative vascular ROS. Anesthesia Plan      general and regional     ASA 3     (Brachial plexus block)  Induction: intravenous. MIPS: Postoperative opioids intended and Prophylactic antiemetics administered. Anesthetic plan and risks discussed with patient. Plan discussed with CRNA.                   Sabina Mae MD   7/19/2018

## 2018-07-19 NOTE — OP NOTE
adduction: 60  3) External rotation in abduction:  90  4) Internal rotation in abduction:  80     After successfully manipulating the shoulder, the coracoid process was palpated and a sterile prep was performed just lateral to it.  Then, 4 mL 1% lidocaine, 4 mL 0.25% Marcaine, and 80 mg of Depo-Medrol were injected successfully in to the glenohumeral joint with a 22 gauge needle.  A band-aid was applied.  The patient was awakened by anesthesia, placed in a sling, and transported to the preop area in stable condition.     POSTOP PLAN  Begin range of motion immediately.  A physical therapy prescription has been given for passive and active-assist range of motion to begin on postop day No. 1.  The patient will follow up in clinic in 1 week for a clinical check.       Electronically signed by Alec Servin MD on 7/19/2018 at 11:31 AM

## 2018-07-23 ENCOUNTER — CARE COORDINATION (OUTPATIENT)
Dept: CARE COORDINATION | Age: 53
End: 2018-07-23

## 2018-08-01 ENCOUNTER — CARE COORDINATION (OUTPATIENT)
Dept: CARE COORDINATION | Age: 53
End: 2018-08-01

## 2018-08-01 ENCOUNTER — OFFICE VISIT (OUTPATIENT)
Dept: PRIMARY CARE CLINIC | Age: 53
End: 2018-08-01
Payer: COMMERCIAL

## 2018-08-01 VITALS
SYSTOLIC BLOOD PRESSURE: 128 MMHG | BODY MASS INDEX: 38.9 KG/M2 | HEART RATE: 64 BPM | DIASTOLIC BLOOD PRESSURE: 74 MMHG | HEIGHT: 62 IN | WEIGHT: 211.4 LBS | OXYGEN SATURATION: 98 % | TEMPERATURE: 97.7 F

## 2018-08-01 DIAGNOSIS — I51.9 LEFT VENTRICULAR DYSFUNCTION: ICD-10-CM

## 2018-08-01 DIAGNOSIS — R30.0 DYSURIA: ICD-10-CM

## 2018-08-01 DIAGNOSIS — R30.0 DYSURIA: Primary | ICD-10-CM

## 2018-08-01 DIAGNOSIS — Z95.1 S/P CABG X 3: ICD-10-CM

## 2018-08-01 DIAGNOSIS — N95.2 ATROPHIC VAGINITIS: ICD-10-CM

## 2018-08-01 DIAGNOSIS — N39.0 URINARY TRACT INFECTION WITHOUT HEMATURIA, SITE UNSPECIFIED: ICD-10-CM

## 2018-08-01 DIAGNOSIS — E55.9 VITAMIN D DEFICIENCY: ICD-10-CM

## 2018-08-01 DIAGNOSIS — R93.1 ABNORMAL NUCLEAR CARDIAC IMAGING TEST: ICD-10-CM

## 2018-08-01 DIAGNOSIS — I25.10 CORONARY ARTERY DISEASE INVOLVING NATIVE CORONARY ARTERY OF NATIVE HEART WITHOUT ANGINA PECTORIS: ICD-10-CM

## 2018-08-01 DIAGNOSIS — Z23 NEEDS FLU SHOT: ICD-10-CM

## 2018-08-01 DIAGNOSIS — I25.810 CORONARY ARTERY DISEASE INVOLVING CORONARY BYPASS GRAFT OF NATIVE HEART WITHOUT ANGINA PECTORIS: ICD-10-CM

## 2018-08-01 DIAGNOSIS — L28.0 LICHEN SIMPLEX CHRONICUS: ICD-10-CM

## 2018-08-01 DIAGNOSIS — I10 ESSENTIAL HYPERTENSION: ICD-10-CM

## 2018-08-01 DIAGNOSIS — Z85.41 PERSONAL HISTORY OF MALIGNANT NEOPLASM OF CERVIX UTERI: ICD-10-CM

## 2018-08-01 DIAGNOSIS — Z12.11 SCREENING FOR COLON CANCER: ICD-10-CM

## 2018-08-01 DIAGNOSIS — Z11.4 SCREENING FOR HIV WITHOUT PRESENCE OF RISK FACTORS: ICD-10-CM

## 2018-08-01 DIAGNOSIS — Z11.59 NEED FOR HEPATITIS C SCREENING TEST: ICD-10-CM

## 2018-08-01 DIAGNOSIS — E11.8 TYPE 2 DIABETES MELLITUS WITH COMPLICATION, UNSPECIFIED LONG TERM INSULIN USE STATUS: ICD-10-CM

## 2018-08-01 DIAGNOSIS — K21.9 GASTROESOPHAGEAL REFLUX DISEASE WITHOUT ESOPHAGITIS: ICD-10-CM

## 2018-08-01 DIAGNOSIS — E78.2 MIXED HYPERLIPIDEMIA: ICD-10-CM

## 2018-08-01 DIAGNOSIS — Z87.09 HISTORY OF PLEURAL EFFUSION: ICD-10-CM

## 2018-08-01 DIAGNOSIS — L68.0 HIRSUTISM: ICD-10-CM

## 2018-08-01 LAB
APPEARANCE FLUID: NORMAL
BILIRUBIN, POC: NORMAL
BLOOD URINE, POC: NORMAL
CLARITY, POC: CLEAR
COLOR, POC: YELLOW
GLUCOSE URINE, POC: 500
HBA1C MFR BLD: 9.7 %
KETONES, POC: NORMAL
LEUKOCYTE EST, POC: NORMAL
NITRITE, POC: NORMAL
PH, POC: 5.5
PROTEIN, POC: NORMAL
SPECIFIC GRAVITY, POC: 1.01
UROBILINOGEN, POC: 0.2

## 2018-08-01 PROCEDURE — 83036 HEMOGLOBIN GLYCOSYLATED A1C: CPT | Performed by: NURSE PRACTITIONER

## 2018-08-01 PROCEDURE — G8598 ASA/ANTIPLAT THER USED: HCPCS | Performed by: NURSE PRACTITIONER

## 2018-08-01 PROCEDURE — G8427 DOCREV CUR MEDS BY ELIG CLIN: HCPCS | Performed by: NURSE PRACTITIONER

## 2018-08-01 PROCEDURE — 3046F HEMOGLOBIN A1C LEVEL >9.0%: CPT | Performed by: NURSE PRACTITIONER

## 2018-08-01 PROCEDURE — 1036F TOBACCO NON-USER: CPT | Performed by: NURSE PRACTITIONER

## 2018-08-01 PROCEDURE — 81002 URINALYSIS NONAUTO W/O SCOPE: CPT | Performed by: NURSE PRACTITIONER

## 2018-08-01 PROCEDURE — 99214 OFFICE O/P EST MOD 30 MIN: CPT | Performed by: NURSE PRACTITIONER

## 2018-08-01 PROCEDURE — 3017F COLORECTAL CA SCREEN DOC REV: CPT | Performed by: NURSE PRACTITIONER

## 2018-08-01 PROCEDURE — 2022F DILAT RTA XM EVC RTNOPTHY: CPT | Performed by: NURSE PRACTITIONER

## 2018-08-01 PROCEDURE — G8417 CALC BMI ABV UP PARAM F/U: HCPCS | Performed by: NURSE PRACTITIONER

## 2018-08-01 RX ORDER — TAMSULOSIN HYDROCHLORIDE 0.4 MG/1
0.4 CAPSULE ORAL DAILY
Qty: 30 CAPSULE | Refills: 1 | Status: SHIPPED | OUTPATIENT
Start: 2018-08-01 | End: 2018-12-10 | Stop reason: SDUPTHER

## 2018-08-01 RX ORDER — METFORMIN HYDROCHLORIDE 500 MG/1
500 TABLET, EXTENDED RELEASE ORAL 2 TIMES DAILY
Qty: 60 TABLET | Refills: 2 | Status: SHIPPED | OUTPATIENT
Start: 2018-08-01 | End: 2018-12-20 | Stop reason: SDUPTHER

## 2018-08-01 RX ORDER — NITROFURANTOIN 25; 75 MG/1; MG/1
100 CAPSULE ORAL 2 TIMES DAILY
Qty: 20 CAPSULE | Refills: 0 | Status: SHIPPED | OUTPATIENT
Start: 2018-08-01 | End: 2018-08-11

## 2018-08-01 RX ORDER — PHENAZOPYRIDINE HYDROCHLORIDE 200 MG/1
200 TABLET, FILM COATED ORAL 3 TIMES DAILY PRN
Qty: 9 TABLET | Refills: 0 | Status: SHIPPED | OUTPATIENT
Start: 2018-08-01 | End: 2018-08-04

## 2018-08-01 RX ORDER — LORAZEPAM 0.5 MG/1
0.5 TABLET ORAL EVERY 8 HOURS PRN
Qty: 90 TABLET | Refills: 0 | Status: CANCELLED | OUTPATIENT
Start: 2018-08-01 | End: 2019-08-01

## 2018-08-01 ASSESSMENT — PATIENT HEALTH QUESTIONNAIRE - PHQ9
1. LITTLE INTEREST OR PLEASURE IN DOING THINGS: 0
SUM OF ALL RESPONSES TO PHQ9 QUESTIONS 1 & 2: 0
SUM OF ALL RESPONSES TO PHQ QUESTIONS 1-9: 0
2. FEELING DOWN, DEPRESSED OR HOPELESS: 0

## 2018-08-01 NOTE — PROGRESS NOTES
[facility] PRIMARY CARE  [address] [facility]  [address] [place] [address]  Dept: [phone]  Dept Fax: [phone]  Loc: [phone]        Yana Lopez is a 45 y.o. female who presents today for her medical conditions/ complaints as noted below. Yana Lopez is c/o 3 Month Follow-Up        Chief Complaint   Patient presents with    3 Month Follow-Up       HPI:     HPI    DM 2: 9.3 HA1C. Metformin XR 500mg, Lantus 28 units daily, Jardiance 25mg. HA1C is 9.7 today. Vit d def: Pt has completed 50,000 weekly for 6 weeks and is now on 5,000 daily. Pt has noted improvement in fatigue since beginning this regimen. Pt c/o of UTI symptoms. Frequency, burning with urination. Pt states that she doesn't think she is able to get all of the urine out of her bladder. Patient reports that they have been compliant with taking medications as directed.      Past Medical History:   Diagnosis Date    CAD (coronary artery disease)     stent 2011 Sesar Farmer; sees dr. Johnathan Avery [facility]ical [facility]     Diabetes mellitus ([facility]     ERRONEOUS ENCOUNTER--DISREGARD 9/15/2015    GERD (gastroesophageal reflux disease)     Headache(784.0)     Heart attack ([facility] 12/2011    Hyperlipidemia     Hypertension     Lichen simplex chronicus 10/2015    Osteopenia     Pneumonia     Type II or unspecified type diabetes mellitus without mention of complication, not stated as uncontrolled        Past Surgical History:   Procedure Laterality Date    BREAST BIOPSY  6/8/2011    US guided core needle, right, benign    CARDIAC CATHETERIZATION  12/22/14  JDT    EF 50%    CARDIAC CATHETERIZATION  06/05/2017        [facility] FLUOROSCOPIC GUIDANCE NEEDLE PLACEMENT ADD ON Left 7/19/2018    CORTICOSTEROID INJECTION performed by Norman Crowder MD at [address] [place] [address]  2006    CORONARY ANGIOPLASTY WITH STENT PLACEMENT  12/2011    SABA to [address] [place] [address] SURGERY  2000    HYSTERECTOMY  1998    total    OTHER SURGICAL HISTORY      fibrinolysis    [place] CABG, ARTERY-VEIN, THREE  12\23\2014    Coronary Artery Bypass, 3    [place] MANIPULATN SHLDR JT W ANESTHESIA Left 7/19/2018    SHOULDER MANIPULATION UNDER ANESTHESIA performed by Nasir Cates MD at [address]      VULVAR/PERINEAL BIOPSY  10/07/2015    Dr. Kuldip Salcedo Marital status:      Spouse name: N/A    Number of children: N/A    Years of education: N/A     Social History Main Topics    Smoking status: Former Smoker     Packs/day: 2.50     Years: 35.00     Types: Cigarettes     Quit date: 12/15/2014    Smokeless tobacco: Never Used      Comment: pack and half per week plus electronic cig    Alcohol use No    Drug use: No    Sexual activity: Yes     Other Topics Concern    None     Social History Narrative    None       Family History   Problem Relation Age of Onset    Diabetes Father     Cancer Father     High Blood Pressure Father     Diabetes Mother     Breast Cancer Mother     Heart Failure Mother         CHF, MVP    Heart Failure Maternal Grandfather        Current Outpatient Prescriptions   Medication Sig Dispense Refill    metFORMIN (GLUCOPHAGE-XR) 500 MG extended release tablet Take 1 tablet by mouth 2 times daily 60 tablet 2    tamsulosin (FLOMAX) 0.4 MG capsule Take 1 capsule by mouth daily 30 capsule 1    nitrofurantoin, macrocrystal-monohydrate, (MACROBID) 100 MG capsule Take 1 capsule by mouth 2 times daily for 10 days 20 capsule 0    insulin glargine (LANTUS SOLOSTAR) 100 UNIT/ML injection pen Inject 35 Units into the skin nightly 5 pen 1    JANUVIA 100 MG tablet TAKE 1 TABLET EVERY DAY 30 tablet 3    famotidine (PEPCID) 20 MG tablet TAKE 1 TABLET TWICE DAILY 60 tablet 3    furosemide (LASIX) 20 MG tablet Take 1 tablet by mouth daily as needed (edema) 90 tablet 3    metoprolol tartrate (LOPRESSOR) 25 MG tablet TAKE 1 TABLET BY MOUTH TWICE DAILY 60 tablet 5    isosorbide mononitrate (IMDUR) 30 MG extended release tablet TAKE 1 TABLET EVERY DAY (Patient taking differently: TAKE 1 TABLET EVERY nightly) 30 tablet 5    valsartan (DIOVAN) 80 MG tablet TAKE 1 TABLET BY MOUTH DAILY 30 tablet 5    lovastatin (MEVACOR) 40 MG tablet TAKE 1 TABLET AT BEDTIME 30 tablet 5    nitroGLYCERIN (NITROSTAT) 0.4 MG SL tablet Place 1 tablet under the tongue every 5 minutes as needed for Chest pain 25 tablet 3    nystatin (MYCOSTATIN) 735059 UNIT/GM ointment Apply topically 2 times daily. 30 g 0    Nebulizers (COMPRESSOR/NEBULIZER) MISC Use as directed 1 each 3    albuterol (PROVENTIL) (5 MG/ML) 0.5% nebulizer solution Take 0.5 mLs by nebulization every 6 hours as needed for Wheezing 120 each 0    vitamin D (ERGOCALCIFEROL) 10956 units CAPS capsule Take 1 capsule by mouth once a week (Patient taking differently: Take 50,000 Units by mouth once a week ) 12 capsule 1    aspirin 81 MG tablet Take 81 mg by mouth daily      vitamin D (CHOLECALCIFEROL) 1000 UNIT TABS tablet Take 5,000 Units by mouth daily       glucose blood VI test strips (ASCENSIA AUTODISC VI;ONE TOUCH ULTRA TEST VI) strip 1 each by In Vitro route daily As needed. 100 each 3    Insulin Pen Needle ([employer] PEN NEEDLES) 31G X 6 MM MISC 1 each by Does not apply route daily 100 each 3    Lactobacillus (PROBIATA PO) Take 1 tablet by mouth daily       mometasone (ELOCON) 0.1 % ointment Apply topically to vagina 3 times per week 1 Tube 3    ondansetron (ZOFRAN) 8 MG tablet Take 1 tablet by mouth every 8 hours as needed for Nausea or Vomiting 9 tablet 1    Eflornithine HCl (VANIQA) 13.9 % CREA Apply 1 Dose topically 2 times daily 45 g 2    nystatin-triamcinolone (MYCOLOG II) 256103-7.1 UNIT/GM-% cream Apply topically 2 times daily. 1 Tube 0    Docusate Calcium (STOOL SOFTENER PO) Take 100 mg by mouth 2 times daily as needed.       empagliflozin (JARDIANCE) 25 MG tablet Take 25 mg by mouth daily 30 tablet 1    LORazepam (ATIVAN) 0.5 MG tablet Take 1 tablet by mouth every 8 hours as needed for Anxiety. . 90 tablet 0    gabapentin (NEURONTIN) 300 MG capsule Take 1 capsule by mouth daily for 30 days. 90 capsule 3     No current facility-administered medications for this visit. Allergies   Allergen Reactions    Codeine Other (See Comments)     Seizures as a child    Motrin [Ibuprofen] Swelling     Tongue to swell    Humalog [Insulin Lispro] Other (See Comments)     Shakiness, break out in a sweat at higher doses. Lab Review not applicable    Subjective:   Review of Systems   Constitutional: Negative for activity change, appetite change, fatigue, fever and unexpected weight change. HENT: Negative for congestion, ear pain, nosebleeds, rhinorrhea, sore throat, trouble swallowing and voice change. Eyes: Negative for redness and visual disturbance. Respiratory: Negative for cough, chest tightness, shortness of breath and wheezing. Cardiovascular: Negative for chest pain, palpitations and leg swelling. Gastrointestinal: Negative for abdominal pain, blood in stool, constipation, diarrhea, nausea and vomiting. Endocrine: Positive for polydipsia, polyphagia and polyuria. Genitourinary: Positive for urgency. Negative for dysuria and frequency. Musculoskeletal: Negative for myalgias. Skin: Negative for rash and wound. Neurological: Negative for dizziness, speech difficulty, light-headedness and headaches. Psychiatric/Behavioral: Negative for agitation, confusion, self-injury and suicidal ideas. The patient is not nervous/anxious. Objective:     Physical Exam   Constitutional: She is oriented to person, place, and time. She appears well-developed and well-nourished. HENT:   Head: Normocephalic and atraumatic. Mouth/Throat: Oropharynx is clear and moist.   Eyes: Conjunctivae are normal. Pupils are equal, round, and reactive to light.    Cardiovascular: Screening for colon cancer    3. Screening for HIV without presence of risk factors    4. Need for hepatitis C screening test    5. Needs flu shot    6. Essential hypertension    7. Hirsutism    8. Type 2 diabetes mellitus with complication, unspecified long term insulin use status    9. Gastroesophageal reflux disease without esophagitis    10. Vitamin D deficiency    11. Urinary tract infection without hematuria, site unspecified      Return in about 1 month (around 9/1/2018) for 30 min appointment, medication recheck. Orders Placed This Encounter   Procedures    Urine Culture     Standing Status:   Future     Number of Occurrences:   1     Standing Expiration Date:   8/1/2019     Order Specific Question:   Specify (ex-cath, midstream, cysto, etc)? Answer:   hat    Urine Culture     Standing Status:   Future     Number of Occurrences:   1     Standing Expiration Date:   9/5/2019     Order Specific Question:   Specify (ex-cath, midstream, cysto, etc)?      Answer:   midstream    POCT glycosylated hemoglobin (Hb A1C)    POCT Urinalysis no Micro     Orders Placed This Encounter   Medications    DISCONTD: empagliflozin (JARDIANCE) 25 MG tablet     Sig: Take 25 mg by mouth daily     Dispense:  30 tablet     Refill:  1    metFORMIN (GLUCOPHAGE-XR) 500 MG extended release tablet     Sig: Take 1 tablet by mouth 2 times daily     Dispense:  60 tablet     Refill:  2    tamsulosin (FLOMAX) 0.4 MG capsule     Sig: Take 1 capsule by mouth daily     Dispense:  30 capsule     Refill:  1    nitrofurantoin, macrocrystal-monohydrate, (MACROBID) 100 MG capsule     Sig: Take 1 capsule by mouth 2 times daily for 10 days     Dispense:  20 capsule     Refill:  0    phenazopyridine (PYRIDIUM) 200 MG tablet     Sig: Take 1 tablet by mouth 3 times daily as needed for Pain     Dispense:  9 tablet     Refill:  0    insulin glargine (LANTUS SOLOSTAR) 100 UNIT/ML injection pen     Sig: Inject 35 Units into the skin nightly

## 2018-08-01 NOTE — CARE COORDINATION
tablet Take 81 mg by mouth daily    Historical Provider, MD   vitamin D (CHOLECALCIFEROL) 1000 UNIT TABS tablet Take 5,000 Units by mouth daily     Historical Provider, MD   glucose blood VI test strips (ASCENSIA AUTODISC VI;ONE TOUCH ULTRA TEST VI) strip 1 each by In Vitro route daily As needed. 6/29/17   Kari Augustin,    Insulin Pen Needle ([employer] PEN NEEDLES) 31G X 6 MM MISC 1 each by Does not apply route daily 6/15/17   MIKE Gracia   Lactobacillus (PROBIATA PO) Take 1 tablet by mouth daily     Historical Provider, MD   mometasone (ELOCON) 0.1 % ointment Apply topically to vagina 3 times per week 11/7/16   Susy Figueroa MD   ondansetron [facility]) 8 MG tablet Take 1 tablet by mouth every 8 hours as needed for Nausea or Vomiting 4/18/16   MIKE Duvall CNP   Eflornithine HCl (VANIQA) 13.9 % CREA Apply 1 Dose topically 2 times daily 11/20/15   MIKE Shea   nystatin-triamcinolone [facility]) 575648-1.3 UNIT/GM-% cream Apply topically 2 times daily. 9/30/15   MIKE Urena CNP   Docusate Calcium (STOOL SOFTENER PO) Take 100 mg by mouth 2 times daily as needed.     Historical Provider, MD       Future Appointments  Date Time Provider Trini Quinn   8/1/2018 1:30 PM MIKE Ruiz [facility]   9/11/2018 2:30 PM MIKE Whitlock [facility] [place]   3/5/2019 1:30 PM Dedra Singleton MD [facility] [place]

## 2018-08-01 NOTE — PATIENT INSTRUCTIONS
Increase metformin to twice a day dosing. Increase lantus to 35 units at bedtime. Begin macrobid 100mg twice a day for 10 days. Take pyridium 200mg three times a day for 3 days. Begin taking flomax 0.4mg daily to help get the urine out. Follow up in one month.

## 2018-08-02 ENCOUNTER — CARE COORDINATION (OUTPATIENT)
Dept: CARE COORDINATION | Age: 53
End: 2018-08-02

## 2018-08-03 DIAGNOSIS — Z11.59 NEED FOR HEPATITIS C SCREENING TEST: ICD-10-CM

## 2018-08-03 DIAGNOSIS — Z23 NEEDS FLU SHOT: ICD-10-CM

## 2018-08-03 DIAGNOSIS — Z11.4 SCREENING FOR HIV WITHOUT PRESENCE OF RISK FACTORS: ICD-10-CM

## 2018-08-03 DIAGNOSIS — I10 ESSENTIAL HYPERTENSION: ICD-10-CM

## 2018-08-03 DIAGNOSIS — Z12.11 SCREENING FOR COLON CANCER: ICD-10-CM

## 2018-08-03 DIAGNOSIS — E11.8 TYPE 2 DIABETES MELLITUS WITH COMPLICATION, UNSPECIFIED LONG TERM INSULIN USE STATUS: ICD-10-CM

## 2018-08-03 DIAGNOSIS — L68.0 HIRSUTISM: ICD-10-CM

## 2018-08-03 DIAGNOSIS — K21.9 GASTROESOPHAGEAL REFLUX DISEASE WITHOUT ESOPHAGITIS: ICD-10-CM

## 2018-08-03 LAB — URINE CULTURE, ROUTINE: NORMAL

## 2018-08-03 RX ORDER — LORAZEPAM 0.5 MG/1
0.5 TABLET ORAL EVERY 8 HOURS PRN
Qty: 90 TABLET | Refills: 0 | Status: SHIPPED | OUTPATIENT
Start: 2018-08-03 | End: 2019-08-03

## 2018-08-06 ENCOUNTER — CARE COORDINATION (OUTPATIENT)
Dept: CARE COORDINATION | Age: 53
End: 2018-08-06

## 2018-08-06 PROBLEM — K21.9 GASTROESOPHAGEAL REFLUX DISEASE WITHOUT ESOPHAGITIS: Status: ACTIVE | Noted: 2018-08-06

## 2018-08-06 PROBLEM — R30.0 DYSURIA: Status: ACTIVE | Noted: 2018-08-06

## 2018-08-06 PROBLEM — E55.9 VITAMIN D DEFICIENCY: Status: ACTIVE | Noted: 2018-08-06

## 2018-08-06 PROBLEM — N39.0 URINARY TRACT INFECTION WITHOUT HEMATURIA: Status: ACTIVE | Noted: 2018-08-06

## 2018-08-06 ASSESSMENT — ENCOUNTER SYMPTOMS
RHINORRHEA: 0
NAUSEA: 0
ABDOMINAL PAIN: 0
TROUBLE SWALLOWING: 0
DIARRHEA: 0
CONSTIPATION: 0
COUGH: 0
SHORTNESS OF BREATH: 0
CHEST TIGHTNESS: 0
VOMITING: 0
BLOOD IN STOOL: 0
SORE THROAT: 0
VOICE CHANGE: 0
WHEEZING: 0
EYE REDNESS: 0

## 2018-08-22 NOTE — CARE COORDINATION
PATIENT  This is Barry Hernandez. I think the Arlyss Coca was sent to the wrong place. [facility] still hasnt received it. Could you plz check on it. Thank you. ADRI ANTOINE RN  Yes let me check    PATIENT  She didnt renew my lorazepam either. ADRI ANTOINE   I have sent the request to [facility].

## 2018-08-28 NOTE — CARE COORDINATION
SOLOSTAR) 100 UNIT/ML injection pen Inject 35 Units into the skin nightly 8/1/18 8/31/18  MIKE Quigley   JANUVIA 100 MG tablet TAKE 1 TABLET EVERY DAY 6/26/18   MIKE Gregg   famotidine (PEPCID) 20 MG tablet TAKE 1 TABLET TWICE DAILY 5/14/18   George Sender, DO   furosemide (LASIX) 20 MG tablet Take 1 tablet by mouth daily as needed (edema) 4/30/18   George Sender, DO   metoprolol tartrate (LOPRESSOR) 25 MG tablet TAKE 1 TABLET BY MOUTH TWICE DAILY 4/30/18   George Sender, DO   isosorbide mononitrate (IMDUR) 30 MG extended release tablet TAKE 1 TABLET EVERY DAY  Patient taking differently: TAKE 1 TABLET EVERY nightly 4/27/18   MIKE Alexander   valsartan (DIOVAN) 80 MG tablet TAKE 1 TABLET BY MOUTH DAILY 4/27/18   MIKE Gregg   lovastatin (MEVACOR) 40 MG tablet TAKE 1 TABLET AT BEDTIME 4/4/18   George Sender, DO   nitroGLYCERIN (NITROSTAT) 0.4 MG SL tablet Place 1 tablet under the tongue every 5 minutes as needed for Chest pain 3/14/18   Singh Castro MD   nystatin (MYCOSTATIN) 707052 UNIT/GM ointment Apply topically 2 times daily. 3/1/18   MIKE Gregg   Nebulizers (COMPRESSOR/NEBULIZER) MISC Use as directed 2/26/18   George Sendileana, DO   albuterol (PROVENTIL) (5 MG/ML) 0.5% nebulizer solution Take 0.5 mLs by nebulization every 6 hours as needed for Wheezing 2/25/18   Gurinder Thompson MD   gabapentin (NEURONTIN) 300 MG capsule Take 1 capsule by mouth daily for 30 days.  1/30/18 8/1/18  George Sender, DO   vitamin D (ERGOCALCIFEROL) 43406 units CAPS capsule Take 1 capsule by mouth once a week  Patient taking differently: Take 50,000 Units by mouth once a week  12/20/17   George Sender, DO   aspirin 81 MG tablet Take 81 mg by mouth daily    Historical Provider, MD   vitamin D (CHOLECALCIFEROL) 1000 UNIT TABS tablet Take 5,000 Units by mouth daily     Historical Provider, MD   glucose blood VI test strips (ASCENSIA AUTODISC Jemima Toni TOUCH ULTRA TEST VI) strip 1 each by In Vitro route daily As needed. 6/29/17   Lavern Males, DO   Insulin Pen Needle ([employer] PEN NEEDLES) 31G X 6 MM MISC 1 each by Does not apply route daily 6/15/17   MIKE Cardoza   Lactobacillus (PROBIATA PO) Take 1 tablet by mouth daily     Historical Provider, MD   mometasone (ELOCON) 0.1 % ointment Apply topically to vagina 3 times per week 11/7/16   Mikey Macias MD   ondansetron [facility]) 8 MG tablet Take 1 tablet by mouth every 8 hours as needed for Nausea or Vomiting 4/18/16   MIKE Castillo CNP   Eflornithine HCl (VANIQA) 13.9 % CREA Apply 1 Dose topically 2 times daily 11/20/15   MIKE Andrews   nystatin-triamcinolone [facility]) 847666-5.8 UNIT/GM-% cream Apply topically 2 times daily. 9/30/15   MIKE Olson CNP   Docusate Calcium (STOOL SOFTENER PO) Take 100 mg by mouth 2 times daily as needed.     Historical Provider, MD       Future Appointments  Date Time Provider Trini Quinn   9/6/2018 2:15 PM MIKE Atkinson [facility]   9/11/2018 2:30 PM MIKE Daniels [facility]   3/5/2019 1:30 PM Viri Mckee MD [facility]

## 2018-09-12 ENCOUNTER — OFFICE VISIT (OUTPATIENT)
Dept: CARDIOLOGY | Age: 53
End: 2018-09-12
Payer: COMMERCIAL

## 2018-09-12 VITALS
WEIGHT: 215 LBS | SYSTOLIC BLOOD PRESSURE: 118 MMHG | DIASTOLIC BLOOD PRESSURE: 70 MMHG | RESPIRATION RATE: 16 BRPM | HEART RATE: 66 BPM | BODY MASS INDEX: 38.09 KG/M2 | HEIGHT: 63 IN

## 2018-09-12 DIAGNOSIS — I25.10 CORONARY ARTERY DISEASE INVOLVING NATIVE CORONARY ARTERY OF NATIVE HEART WITHOUT ANGINA PECTORIS: ICD-10-CM

## 2018-09-12 DIAGNOSIS — I25.810 CORONARY ARTERY DISEASE INVOLVING CORONARY BYPASS GRAFT OF NATIVE HEART WITHOUT ANGINA PECTORIS: ICD-10-CM

## 2018-09-12 DIAGNOSIS — I51.9 LEFT VENTRICULAR DYSFUNCTION: ICD-10-CM

## 2018-09-12 DIAGNOSIS — I10 ESSENTIAL HYPERTENSION: Primary | ICD-10-CM

## 2018-09-12 PROCEDURE — G8598 ASA/ANTIPLAT THER USED: HCPCS | Performed by: CLINICAL NURSE SPECIALIST

## 2018-09-12 PROCEDURE — 3017F COLORECTAL CA SCREEN DOC REV: CPT | Performed by: CLINICAL NURSE SPECIALIST

## 2018-09-12 PROCEDURE — G8417 CALC BMI ABV UP PARAM F/U: HCPCS | Performed by: CLINICAL NURSE SPECIALIST

## 2018-09-12 PROCEDURE — 1036F TOBACCO NON-USER: CPT | Performed by: CLINICAL NURSE SPECIALIST

## 2018-09-12 PROCEDURE — 99213 OFFICE O/P EST LOW 20 MIN: CPT | Performed by: CLINICAL NURSE SPECIALIST

## 2018-09-12 PROCEDURE — G8427 DOCREV CUR MEDS BY ELIG CLIN: HCPCS | Performed by: CLINICAL NURSE SPECIALIST

## 2018-09-12 RX ORDER — ISOSORBIDE MONONITRATE 30 MG/1
TABLET, EXTENDED RELEASE ORAL
Qty: 30 TABLET | Refills: 5 | Status: SHIPPED | OUTPATIENT
Start: 2018-09-12 | End: 2018-11-06 | Stop reason: SDUPTHER

## 2018-09-12 NOTE — PATIENT INSTRUCTIONS
Follow up in 6 mos With Dr. Yamila Espinoza   Call with any questions or concerns  Follow up with MIKE Fischer for non cardiac problems  Report any new problems  Cardiovascular Fitness-Exercise as tolerated. Strive for 30 minutes of exercise most days of the week. Cardiac / Healthy Diet  Continue current medications as directed  Continue plan of treatment  It is always recommended that you bring your medications bottles with you to each visit - this is for your safety! Patient Education        Learning About Diabetes and Coronary Artery Disease  How are diabetes and heart disease connected? Many people think diabetes and heart disease go hand in hand. But having diabetes doesn't have to mean that you are going to have a heart attack someday. Healthy living can help prevent many of the problems that come with both diabetes and heart disease. For some people, diabetes can cause problems in your body that may lead to heart disease. Diabetes can make the problems of heart disease worse. Experts do not fully understand how diabetes affects the heart. Many things can lead to heart disease, including high blood sugar, insulin resistance, high cholesterol, and high blood pressure. But lifestyle and genetics may also affect a person's risk. But here's the good news: The good things you're doing to stay healthy with diabetes-eating healthy foods, quitting smoking, getting exercise and more-are also helping your heart. What increases your risk for heart disease? When you have diabetes, your risk for heart disease is even higher if you have:  · High blood pressure, which pushes blood through the arteries with too much force. Over time, this damages the walls of the arteries. · High cholesterol, which causes the buildup of a kind of fat inside the blood vessel walls. This buildup can lower blood flow to the heart muscle and raise your risk for having a heart attack.   · Kidney damage, which shares many of this instruction, always ask your healthcare professional. [place] [id number] any warranty or liability for your use of this information.

## 2018-09-12 NOTE — PROGRESS NOTES
[facility] [address]  [address]  Phone: [phone]  Fax: [phone]    OFFICE VISIT:  2018    Shawnagladys Dunbarley - : 1965    Reason For Visit:  Margy Solares is a 45 y.o. female who is here for 6 Month Follow-Up and Coronary Artery Disease  Heart catheterization 2017  1. Successful femoral artery ultrasound  2. Successful femoral artery arteriogram  3. Severe double vessel coronary artery disease involving the LAD and RCA  4. Left ventricular function is 40% with apical akinesis  5. Occluded left CONSUELO to the LAD  6. Patent vein graft to the diagonal  7. Patent vein graft to the PDA  8. Patent ungrafted right CONSUELO  Continue medical management    Was in hospital January of this year who underwent nuclear stress testing. Previous inferior MI with some ischemia. Clinical management continued    Subjective  Margy Solares has occasional exertional chest pain that is relived with rest or a nitro. Overall has been doing fairly well except for her knee pain with activity. Has some TAVERAS. No resting shortness of breath, orthopnea, paroxysmal nocturnal dyspnea, syncope, presyncope, arrhythmia. Has some occasional BLE edema by end of the day. No  fatigue. The patient denies numbness or weakness to suggest cerebrovascular accident or transient ischemic attack. She did have a recent fall due to dogs pulling her onto steps. Had bruising on her chest.  Sierra Greene is PCP and follows labs including lipids.     Shawna Thien has the following history as recorded in [facility]:    Patient Active Problem List    Diagnosis Date Noted    Gastroesophageal reflux disease without esophagitis 2018    Dysuria 2018    Vitamin D deficiency 2018    Urinary tract infection without hematuria 2018    Adhesive capsulitis of left shoulder 2018    Angina effort ([address] 2018    Coronary artery disease involving coronary bypass graft of native heart without angina pectoris 09/05/2017    Left ventricular dysfunction 09/05/2017    Abnormal nuclear cardiac imaging test     Lichen simplex chronicus 10/21/2015    Personal history of malignant neoplasm of cervix uteri 05/11/2015    Atrophic vaginitis 05/11/2015    History of pleural effusion 04/29/2015    Candidal intertrigo 02/03/2015    Weakness 02/03/2015    S/P CABG x 3 02/03/2015    TAVERAS (dyspnea on exertion) 12/01/2014    Type 2 diabetes mellitus with complication, unspecified long term insulin use status     CAD (coronary artery disease) 07/30/2013    Hypertension 07/30/2013    Hyperlipidemia 07/30/2013    Heart attack ([address] 12/01/2011     Past Medical History:   Diagnosis Date    CAD (coronary artery disease)     stent 2011 Maritza Centeno; sees dr. Alden Allen [facility]ical [facility]     Diabetes mellitus ([facility]     ERRONEOUS ENCOUNTER--DISREGARD 9/15/2015    GERD (gastroesophageal reflux disease)     Headache(784.0)     Heart attack ([address] 12/2011    Hyperlipidemia     Hypertension     Lichen simplex chronicus 10/2015    Osteopenia     Pneumonia     Type II or unspecified type diabetes mellitus without mention of complication, not stated as uncontrolled      Past Surgical History:   Procedure Laterality Date    BREAST BIOPSY  6/8/2011    US guided core needle, right, benign    CARDIAC CATHETERIZATION  12/22/14  JDT    EF 50%    CARDIAC CATHETERIZATION  06/05/2017        G FLUOROSCOPIC GUIDANCE NEEDLE PLACEMENT ADD ON Left 7/19/2018    CORTICOSTEROID INJECTION performed by Satya Gillespie MD at [address] [place] [address]  2006    CORONARY ANGIOPLASTY WITH STENT PLACEMENT  12/2011    [facility] to [facility] [place] [facility]GERY  2000    HYSTERECTOMY  1998    total    OTHER SURGICAL HISTORY      fibrinolysis    NH CABG, ARTERY-VEIN, THREE  12\23\2014    Coronary Artery Bypass, 3    NH MANIPULATN SHLDR JT W ANESTHESIA Left 7/19/2018 SHOULDER MANIPULATION UNDER ANESTHESIA performed by Hilton Barnett MD at [facility] BIOPSY  10/07/2015    Dr. Tim Salamanca     Family History   Problem Relation Age of Onset    Diabetes Father     Cancer Father     High Blood Pressure Father     Diabetes Mother     Breast Cancer Mother     Heart Failure Mother         CHF, MVP    Heart Failure Maternal Grandfather      Social History   Substance Use Topics    Smoking status: Former Smoker     Packs/day: 2.50     Years: 35.00     Types: Cigarettes     Quit date: 12/15/2014    Smokeless tobacco: Never Used      Comment: pack and half per week plus electronic cig    Alcohol use No      Current Outpatient Prescriptions   Medication Sig Dispense Refill    empagliflozin (JARDIANCE) 25 MG tablet Take 25 mg by mouth daily      LORazepam (ATIVAN) 0.5 MG tablet Take 1 tablet by mouth every 8 hours as needed for Anxiety. . 90 tablet 0    metFORMIN (GLUCOPHAGE-XR) 500 MG extended release tablet Take 1 tablet by mouth 2 times daily 60 tablet 2    tamsulosin (FLOMAX) 0.4 MG capsule Take 1 capsule by mouth daily 30 capsule 1    insulin glargine (LANTUS SOLOSTAR) 100 UNIT/ML injection pen Inject 35 Units into the skin nightly 5 pen 1    JANUVIA 100 MG tablet TAKE 1 TABLET EVERY DAY 30 tablet 3    famotidine (PEPCID) 20 MG tablet TAKE 1 TABLET TWICE DAILY 60 tablet 3    furosemide (LASIX) 20 MG tablet Take 1 tablet by mouth daily as needed (edema) 90 tablet 3    metoprolol tartrate (LOPRESSOR) 25 MG tablet TAKE 1 TABLET BY MOUTH TWICE DAILY 60 tablet 5    isosorbide mononitrate (IMDUR) 30 MG extended release tablet TAKE 1 TABLET EVERY DAY (Patient taking differently: TAKE 1 TABLET EVERY nightly) 30 tablet 5    valsartan (DIOVAN) 80 MG tablet TAKE 1 TABLET BY MOUTH DAILY 30 tablet 5    lovastatin (MEVACOR) 40 MG tablet TAKE 1 TABLET AT BEDTIME 30 tablet 5    nitroGLYCERIN (NITROSTAT) 0.4 MG SL tablet Place 1 tablet under the tongue every 5 minutes as needed for Chest pain 25 tablet 3    nystatin (MYCOSTATIN) 774593 UNIT/GM ointment Apply topically 2 times daily. 30 g 0    Nebulizers (COMPRESSOR/NEBULIZER) MISC Use as directed 1 each 3    albuterol (PROVENTIL) (5 MG/ML) 0.5% nebulizer solution Take 0.5 mLs by nebulization every 6 hours as needed for Wheezing 120 each 0    vitamin D (ERGOCALCIFEROL) 49671 units CAPS capsule Take 1 capsule by mouth once a week (Patient taking differently: Take 50,000 Units by mouth once a week ) 12 capsule 1    aspirin 81 MG tablet Take 81 mg by mouth daily      vitamin D (CHOLECALCIFEROL) 1000 UNIT TABS tablet Take 5,000 Units by mouth daily       glucose blood VI test strips (ASCENSIA AUTODISC VI;ONE TOUCH ULTRA TEST VI) strip 1 each by In Vitro route daily As needed. 100 each 3    Insulin Pen Needle ([employer] PEN NEEDLES) 31G X 6 MM MISC 1 each by Does not apply route daily 100 each 3    Lactobacillus (PROBIATA PO) Take 1 tablet by mouth daily       mometasone (ELOCON) 0.1 % ointment Apply topically to vagina 3 times per week 1 Tube 3    ondansetron (ZOFRAN) 8 MG tablet Take 1 tablet by mouth every 8 hours as needed for Nausea or Vomiting 9 tablet 1    Eflornithine HCl (VANIQA) 13.9 % CREA Apply 1 Dose topically 2 times daily 45 g 2    nystatin-triamcinolone (MYCOLOG II) 263415-8.1 UNIT/GM-% cream Apply topically 2 times daily. 1 Tube 0    Docusate Calcium (STOOL SOFTENER PO) Take 100 mg by mouth 2 times daily as needed. No current facility-administered medications for this visit. Allergies: Codeine; Motrin [ibuprofen]; and Humalog [insulin lispro]    Review of Systems  Constitutional - no significant activity change, appetite change, or unexpected weight change. No fever, chills or diaphoresis. No fatigue. HEENT - no significant rhinorrhea or epistaxis. No tinnitus or significant hearing loss. Eyes - no sudden vision change or amaurosis.    Respiratory - no significant wheezing, stridor, apnea or cough. No dyspnea on exertion or shortness of breath. Cardiovascular -+ chest pain,. No orthopnea or PND. No sensation of arrhythmia or slow heart rate. No claudication or leg edema. Gastrointestinal - no abdominal swelling or pain. No blood in stool. No severe constipation, diarrhea, nausea, or vomiting. Genitourinary - no difficulty urinating, dysuria, frequency, or urgency. No flank pain or hematuria. Musculoskeletal -+ knee pain no back pain, gait disturbance, or myalgia. Skin - no color change or rash. No pallor. No new surgical incision. Neurologic - no speech difficulty, facial asymmetry or lateralizing weakness. No seizures, presyncope, syncope, or significant dizziness. Hematologic - no easy bruising or excessive bleeding. Psychiatric - no severe anxiety or insomnia. No confusion. All other review of systems are negative. Objective  Vital Signs - /70   Pulse 66   Resp 16   Ht 5' 3\" (1.6 m)   Wt 215 lb (97.5 kg)   LMP 01/01/1998 (Approximate)   BMI 38.09 kg/m²   General - Georges Greene is alert, cooperative, and pleasant. Well groomed. No acute distress. Body habitus is obese. HEENT - The head is normocephalic. No circumoral cyanosis. Dentition is normal.   EYES -  No Xanthelasma, no arcus senilis, no conjunctival hemorrhages or discharge. Neck - Supple, without increased jugular venous pressures. No carotid bruits. No mass. Respiratory - Lungs are clear bilaterally. No wheezes or rales. Normal effort without use of accessory muscles. Cardiovascular - Heart has regular rhythm and rate. No murmurs, rubs or gallops. + pedal pulses and no varicosities. Abdominal -  Soft, nontender, nondistended. Bowel sounds are intact. Extremities - No clubbing, cyanosis, or  edema. Musculoskeletal -  No clubbing . No Osler's nodes. Gait normal .  No kyphosis or scoliosis. Skin -  no statis ulcers or dermatitis.   Neurological - No

## 2018-09-20 ENCOUNTER — CARE COORDINATION (OUTPATIENT)
Dept: CARE COORDINATION | Age: 53
End: 2018-09-20

## 2018-10-03 ENCOUNTER — CARE COORDINATION (OUTPATIENT)
Dept: CARE COORDINATION | Age: 53
End: 2018-10-03

## 2018-10-03 ENCOUNTER — OFFICE VISIT (OUTPATIENT)
Dept: PRIMARY CARE CLINIC | Age: 53
End: 2018-10-03
Payer: COMMERCIAL

## 2018-10-03 VITALS
WEIGHT: 216.8 LBS | HEIGHT: 62 IN | TEMPERATURE: 97.5 F | HEART RATE: 70 BPM | BODY MASS INDEX: 39.9 KG/M2 | SYSTOLIC BLOOD PRESSURE: 120 MMHG | OXYGEN SATURATION: 97 % | DIASTOLIC BLOOD PRESSURE: 68 MMHG

## 2018-10-03 DIAGNOSIS — E78.2 MIXED HYPERLIPIDEMIA: ICD-10-CM

## 2018-10-03 DIAGNOSIS — E11.8 TYPE 2 DIABETES MELLITUS WITH COMPLICATION, WITHOUT LONG-TERM CURRENT USE OF INSULIN (HCC): Primary | ICD-10-CM

## 2018-10-03 DIAGNOSIS — Z12.31 ENCOUNTER FOR SCREENING MAMMOGRAM FOR BREAST CANCER: ICD-10-CM

## 2018-10-03 DIAGNOSIS — Z72.89 OTHER PROBLEMS RELATED TO LIFESTYLE: ICD-10-CM

## 2018-10-03 DIAGNOSIS — Z13.29 SCREENING FOR THYROID DISORDER: ICD-10-CM

## 2018-10-03 DIAGNOSIS — K12.2 ORAL ABSCESS: ICD-10-CM

## 2018-10-03 DIAGNOSIS — L84 CALLUS OF FOOT: ICD-10-CM

## 2018-10-03 DIAGNOSIS — Z12.4 PAP SMEAR FOR CERVICAL CANCER SCREENING: ICD-10-CM

## 2018-10-03 DIAGNOSIS — I10 ESSENTIAL HYPERTENSION: ICD-10-CM

## 2018-10-03 PROCEDURE — 3046F HEMOGLOBIN A1C LEVEL >9.0%: CPT | Performed by: NURSE PRACTITIONER

## 2018-10-03 PROCEDURE — G8484 FLU IMMUNIZE NO ADMIN: HCPCS | Performed by: NURSE PRACTITIONER

## 2018-10-03 PROCEDURE — G8427 DOCREV CUR MEDS BY ELIG CLIN: HCPCS | Performed by: NURSE PRACTITIONER

## 2018-10-03 PROCEDURE — 1036F TOBACCO NON-USER: CPT | Performed by: NURSE PRACTITIONER

## 2018-10-03 PROCEDURE — 3017F COLORECTAL CA SCREEN DOC REV: CPT | Performed by: NURSE PRACTITIONER

## 2018-10-03 PROCEDURE — G8598 ASA/ANTIPLAT THER USED: HCPCS | Performed by: NURSE PRACTITIONER

## 2018-10-03 PROCEDURE — 2022F DILAT RTA XM EVC RTNOPTHY: CPT | Performed by: NURSE PRACTITIONER

## 2018-10-03 PROCEDURE — 99214 OFFICE O/P EST MOD 30 MIN: CPT | Performed by: NURSE PRACTITIONER

## 2018-10-03 PROCEDURE — G8417 CALC BMI ABV UP PARAM F/U: HCPCS | Performed by: NURSE PRACTITIONER

## 2018-10-03 RX ORDER — CLINDAMYCIN HYDROCHLORIDE 300 MG/1
300 CAPSULE ORAL 3 TIMES DAILY
Qty: 30 CAPSULE | Refills: 0 | Status: SHIPPED | OUTPATIENT
Start: 2018-10-03 | End: 2018-10-13

## 2018-10-03 ASSESSMENT — ENCOUNTER SYMPTOMS
RESPIRATORY NEGATIVE: 1
GASTROINTESTINAL NEGATIVE: 1
EYES NEGATIVE: 1

## 2018-10-03 NOTE — PROGRESS NOTES
[facility]  [address]  [address] [place] [address]  Dept: [phone]  Dept Fax: [phone]  Loc: [phone]    Namrata Espinoza is a 45 y.o. female who presents today for her medical conditions/complaints as noted below. Namrata Espinoza is c/o of Diabetes (FU); Discuss Medications (Nystatin Powder); Jaw Pain (knot in jaw); and Immunizations (questions about vaccines)      Chief Complaint   Patient presents with    Diabetes     FU    Discuss Medications     Nystatin Powder    Jaw Pain     knot in jaw    Immunizations     questions about vaccines       HPI:     HPI   Patient is here for follow-up on multiple chronic conditions including diabetes mellitus, hypertension, and hyperlipidemia. Patient is also discussed different medications including nystatin powder. Patient is also needing multiple things addressed on her health maintenance including immunizations. She reports that glucose readings have been improving. Ranging 90-150s depending on her diet the day before. She has been using 35 units of Lantus at night.        Past Medical History:   Diagnosis Date    CAD (coronary artery disease)     stent 2011 Pierce Ignacio; sees dr. Ion Virgen [facility]ical [facility] ([facility] [address]     Diabetes mellitus ([facility] [address]     ERRONEOUS ENCOUNTER--DISREGARD 9/15/2015    GERD (gastroesophageal reflux disease)     Headache(784.0)     Heart attack ([facility] [address] 12/2011    Hyperlipidemia     Hypertension     Lichen simplex chronicus 10/2015    Osteopenia     Pneumonia     Type II or unspecified type diabetes mellitus without mention of complication, not stated as uncontrolled         Past Surgical History:   Procedure Laterality Date    BREAST BIOPSY  6/8/2011    US guided core needle, right, benign    CARDIAC CATHETERIZATION  12/22/14  JDT    EF 50%    CARDIAC CATHETERIZATION  06/05/2017        CHG FLUOROSCOPIC GUIDANCE NEEDLE PLACEMENT ADD ON Left 7/19/2018    CORTICOSTEROID normal range of motion and no tenderness. Neurological: She is alert and oriented to person, place, and time. She has normal strength. Skin: Skin is warm, dry and intact. Psychiatric: She has a normal mood and affect. Her speech is normal and behavior is normal. Judgment and thought content normal. Cognition and memory are normal.   Nursing note and vitals reviewed. Monofilament Exam Reveals:  Pulses: normal  Edema:normal  Skin Lesions:multiple clauses noted  Right Foot:    Left Foot:  Normal sensation at 1,2,3,4,5,6  Normal sensation at 1,2,3,6,10   Diminished sensation at 4,9   Diminished sensation at 7,8,9   No sensation at 10    No sensation at 4,5           /68   Pulse 70   Temp 97.5 °F (36.4 °C)   Ht 5' 2\" (1.575 m)   Wt 216 lb 12.8 oz (98.3 kg)   LMP 01/01/1998 (Approximate)   SpO2 97%   BMI 39.65 kg/m²     Assessment:      Diagnosis Orders   1. Type 2 diabetes mellitus with complication, with long-term current use of insulin ([facility])   DIABETES FOOT EXAM    Microalbumin / creatinine urine ratio    Hemoglobin A1C    CBC    Comprehensive Metabolic Panel   2. Encounter for screening mammogram for breast cancer  AMINAH Screening Bilateral   3. Essential hypertension     4. Mixed hyperlipidemia  Lipid Panel   5. Pap smear for cervical cancer screening  [id number] [place], [place]   6. BMI 39.0-39.9,adult     7. Other problems related to lifestyle  HIV Rapid 1&2    Hepatitis C Antibody   8. Oral abscess  clindamycin (CLEOCIN) 300 MG capsule   9. Screening for thyroid disorder  TSH without Reflex   10. Callus of foot         No results found for this visit on 10/03/18. Plan:     Oral abx for oral abscess. Refer to ob/gyn for PAP. Order for mammogram.  Labs - we will call with results. Order placed for diabetic shoes. Return in about 3 months (around 1/3/2019) for Diabetic Follow up.     Orders Placed This Encounter   Procedures    AMINAH Screening Bilateral     Order Specific

## 2018-10-04 ENCOUNTER — CARE COORDINATION (OUTPATIENT)
Dept: CARE COORDINATION | Age: 53
End: 2018-10-04

## 2018-10-04 RX ORDER — ERGOCALCIFEROL 1.25 MG/1
CAPSULE ORAL
Qty: 12 CAPSULE | Refills: 1 | Status: SHIPPED | OUTPATIENT
Start: 2018-10-04 | End: 2019-05-28 | Stop reason: SDUPTHER

## 2018-10-17 ENCOUNTER — HOSPITAL ENCOUNTER (OUTPATIENT)
Dept: WOMENS IMAGING | Age: 53
Discharge: HOME | End: 2018-10-17
Payer: COMMERCIAL

## 2018-10-17 DIAGNOSIS — Z72.89 OTHER PROBLEMS RELATED TO LIFESTYLE: ICD-10-CM

## 2018-10-17 DIAGNOSIS — Z12.31 ENCOUNTER FOR SCREENING MAMMOGRAM FOR BREAST CANCER: ICD-10-CM

## 2018-10-17 DIAGNOSIS — Z13.29 SCREENING FOR THYROID DISORDER: ICD-10-CM

## 2018-10-17 DIAGNOSIS — E11.8 TYPE 2 DIABETES MELLITUS WITH COMPLICATION, WITHOUT LONG-TERM CURRENT USE OF INSULIN (HCC): ICD-10-CM

## 2018-10-17 DIAGNOSIS — E78.2 MIXED HYPERLIPIDEMIA: ICD-10-CM

## 2018-10-17 LAB
ALBUMIN SERPL-MCNC: 4.4 G/DL (ref 3.5–5.2)
ALP BLD-CCNC: 72 U/L (ref 35–104)
ALT SERPL-CCNC: 26 U/L (ref 5–33)
ANION GAP SERPL CALCULATED.3IONS-SCNC: 15 MMOL/L (ref 7–19)
AST SERPL-CCNC: 22 U/L (ref 5–32)
BILIRUB SERPL-MCNC: 0.4 MG/DL (ref 0.2–1.2)
BUN BLDV-MCNC: 15 MG/DL (ref 6–20)
CALCIUM SERPL-MCNC: 9.9 MG/DL (ref 8.6–10)
CHLORIDE BLD-SCNC: 102 MMOL/L (ref 98–111)
CHOLESTEROL, TOTAL: 127 MG/DL (ref 160–199)
CO2: 27 MMOL/L (ref 22–29)
CREAT SERPL-MCNC: 0.7 MG/DL (ref 0.5–0.9)
CREATININE URINE: 423 MG/DL (ref 4.2–622)
GFR NON-AFRICAN AMERICAN: >60
GLUCOSE BLD-MCNC: 90 MG/DL (ref 74–109)
HBA1C MFR BLD: 7.1 % (ref 4–6)
HCT VFR BLD CALC: 43.9 % (ref 37–47)
HDLC SERPL-MCNC: 61 MG/DL (ref 65–121)
HEMOGLOBIN: 14.3 G/DL (ref 12–16)
HEPATITIS C ANTIBODY INTERPRETATION: NORMAL
HEREDITARY CANCER TEST-MYRIAD: NORMAL
LDL CHOLESTEROL CALCULATED: 52 MG/DL
MCH RBC QN AUTO: 28.4 PG (ref 27–31)
MCHC RBC AUTO-ENTMCNC: 32.6 G/DL (ref 33–37)
MCV RBC AUTO: 87.1 FL (ref 81–99)
MICROALBUMIN UR-MCNC: 1.6 MG/DL (ref 0–19)
MICROALBUMIN/CREAT UR-RTO: 3.8 MG/G
PDW BLD-RTO: 14 % (ref 11.5–14.5)
PLATELET # BLD: 297 K/UL (ref 130–400)
PMV BLD AUTO: 10.5 FL (ref 9.4–12.3)
POTASSIUM SERPL-SCNC: 4 MMOL/L (ref 3.5–5)
RAPID HIV 1&2: NORMAL
RBC # BLD: 5.04 M/UL (ref 4.2–5.4)
SODIUM BLD-SCNC: 144 MMOL/L (ref 136–145)
TOTAL PROTEIN: 7.8 G/DL (ref 6.6–8.7)
TRIGL SERPL-MCNC: 71 MG/DL (ref 0–149)
TSH SERPL DL<=0.05 MIU/L-ACNC: 2.29 UIU/ML (ref 0.27–4.2)
WBC # BLD: 10.1 K/UL (ref 4.8–10.8)

## 2018-10-17 PROCEDURE — 36415 COLL VENOUS BLD VENIPUNCTURE: CPT

## 2018-10-17 PROCEDURE — 77067 SCR MAMMO BI INCL CAD: CPT

## 2018-10-23 ENCOUNTER — TELEPHONE (OUTPATIENT)
Dept: PRIMARY CARE CLINIC | Age: 53
End: 2018-10-23

## 2018-10-23 ENCOUNTER — OFFICE VISIT (OUTPATIENT)
Dept: SURGERY | Age: 53
End: 2018-10-23
Payer: COMMERCIAL

## 2018-10-23 VITALS
BODY MASS INDEX: 39.93 KG/M2 | HEART RATE: 67 BPM | HEIGHT: 62 IN | SYSTOLIC BLOOD PRESSURE: 139 MMHG | DIASTOLIC BLOOD PRESSURE: 73 MMHG | WEIGHT: 217 LBS

## 2018-10-23 DIAGNOSIS — Z12.39 SCREENING BREAST EXAMINATION: Primary | ICD-10-CM

## 2018-10-23 PROCEDURE — G8598 ASA/ANTIPLAT THER USED: HCPCS | Performed by: PHYSICIAN ASSISTANT

## 2018-10-23 PROCEDURE — 99212 OFFICE O/P EST SF 10 MIN: CPT | Performed by: PHYSICIAN ASSISTANT

## 2018-10-23 PROCEDURE — 3017F COLORECTAL CA SCREEN DOC REV: CPT | Performed by: PHYSICIAN ASSISTANT

## 2018-10-23 PROCEDURE — G8484 FLU IMMUNIZE NO ADMIN: HCPCS | Performed by: PHYSICIAN ASSISTANT

## 2018-10-23 PROCEDURE — G8427 DOCREV CUR MEDS BY ELIG CLIN: HCPCS | Performed by: PHYSICIAN ASSISTANT

## 2018-10-23 PROCEDURE — 1036F TOBACCO NON-USER: CPT | Performed by: PHYSICIAN ASSISTANT

## 2018-10-23 PROCEDURE — G8417 CALC BMI ABV UP PARAM F/U: HCPCS | Performed by: PHYSICIAN ASSISTANT

## 2018-10-24 NOTE — CARE COORDINATION
Alia Khan MD   ondansetron (ZOFRAN) 8 MG tablet Take 1 tablet by mouth every 8 hours as needed for Nausea or Vomiting 4/18/16   MIKE Chatterjee CNP   Eflornithine HCl (VANIQA) 13.9 % CREA Apply 1 Dose topically 2 times daily 11/20/15   MIKE Avilez   nystatin-triamcinolone [facility]) 533743-6.8 UNIT/GM-% cream Apply topically 2 times daily. 9/30/15   MIKE Whitmore CNP   Docusate Calcium (STOOL SOFTENER PO) Take 100 mg by mouth 2 times daily as needed.     Historical Provider, MD       Future Appointments  Date Time Provider Trini Quinn   12/12/2018 10:30 AM [facility]OM [facility]   12/12/2018 2:30 PM Milla Osborn MD OB/GYN [facility]   1/3/2019 1:45 PM MIKE Bhakta [facility]   3/5/2019 1:30 PM Deepa Walton MD [facility] Card[facility]

## 2018-11-06 ENCOUNTER — CARE COORDINATION (OUTPATIENT)
Dept: CARE COORDINATION | Age: 53
End: 2018-11-06

## 2018-11-06 DIAGNOSIS — I25.10 CORONARY ARTERY DISEASE INVOLVING NATIVE CORONARY ARTERY OF NATIVE HEART WITHOUT ANGINA PECTORIS: ICD-10-CM

## 2018-11-06 RX ORDER — ISOSORBIDE MONONITRATE 30 MG/1
TABLET, EXTENDED RELEASE ORAL
Qty: 30 TABLET | Refills: 5 | Status: SHIPPED | OUTPATIENT
Start: 2018-11-06 | End: 2019-05-14 | Stop reason: SDUPTHER

## 2018-11-06 RX ORDER — SITAGLIPTIN 100 MG/1
TABLET, FILM COATED ORAL
Qty: 30 TABLET | Refills: 3 | Status: SHIPPED | OUTPATIENT
Start: 2018-11-06 | End: 2019-02-22 | Stop reason: SDUPTHER

## 2018-11-06 RX ORDER — VALSARTAN 80 MG/1
80 TABLET ORAL DAILY
Qty: 30 TABLET | Refills: 5 | Status: SHIPPED | OUTPATIENT
Start: 2018-11-06 | End: 2019-05-14 | Stop reason: SDUPTHER

## 2018-11-15 ENCOUNTER — CARE COORDINATION (OUTPATIENT)
Dept: CARE COORDINATION | Age: 53
End: 2018-11-15

## 2018-11-23 ENCOUNTER — CARE COORDINATION (OUTPATIENT)
Dept: CARE COORDINATION | Age: 53
End: 2018-11-23

## 2018-12-05 DIAGNOSIS — Z23 NEEDS FLU SHOT: ICD-10-CM

## 2018-12-05 DIAGNOSIS — K21.9 GASTROESOPHAGEAL REFLUX DISEASE WITHOUT ESOPHAGITIS: ICD-10-CM

## 2018-12-05 DIAGNOSIS — I10 ESSENTIAL HYPERTENSION: ICD-10-CM

## 2018-12-05 DIAGNOSIS — Z11.59 NEED FOR HEPATITIS C SCREENING TEST: ICD-10-CM

## 2018-12-05 DIAGNOSIS — E11.8 TYPE 2 DIABETES MELLITUS WITH COMPLICATION (HCC): ICD-10-CM

## 2018-12-05 DIAGNOSIS — L68.0 HIRSUTISM: ICD-10-CM

## 2018-12-05 DIAGNOSIS — Z11.4 SCREENING FOR HIV WITHOUT PRESENCE OF RISK FACTORS: ICD-10-CM

## 2018-12-05 DIAGNOSIS — Z12.11 SCREENING FOR COLON CANCER: ICD-10-CM

## 2018-12-07 ENCOUNTER — CARE COORDINATION (OUTPATIENT)
Dept: CARE COORDINATION | Age: 53
End: 2018-12-07

## 2018-12-10 DIAGNOSIS — N39.0 URINARY TRACT INFECTION WITHOUT HEMATURIA, SITE UNSPECIFIED: ICD-10-CM

## 2018-12-10 RX ORDER — TAMSULOSIN HYDROCHLORIDE 0.4 MG/1
0.4 CAPSULE ORAL DAILY
Qty: 30 CAPSULE | Refills: 1 | Status: SHIPPED | OUTPATIENT
Start: 2018-12-10 | End: 2019-06-24

## 2018-12-10 NOTE — CARE COORDINATION
hours as needed for Nausea or Vomiting 4/18/16   MIKE Barragan - CNP   Eflornithine HCl (VANIQA) 13.9 % CREA Apply 1 Dose topically 2 times daily 11/20/15   Pearla Bud Fontaine, MIKE   nystatin-triamcinolone [facility]) 263305-0.8 UNIT/GM-% cream Apply topically 2 times daily. 9/30/15   GarwinMIKE Ramsay - CNP   Docusate Calcium (STOOL SOFTENER PO) Take 100 mg by mouth 2 times daily as needed.     Historical Provider, MD       Future Appointments  Date Time Provider Trini Quinn   12/12/2018 10:30 AM [facility] WOMENS CTR NAVIGATION ROOM [facility] WOMENS [facility] Women[facility]   12/12/2018 2:30 PM Tnoy Coronado MD OB/GYN [facility]   1/3/2019 1:45 PM [facility]   3/5/2019 1:30 PM Dean Weinstein MD [facility] Cardio [facility]

## 2018-12-12 ENCOUNTER — HOSPITAL ENCOUNTER (OUTPATIENT)
Dept: WOMENS IMAGING | Age: 53
Discharge: HOME | End: 2018-12-12
Payer: MEDICARE

## 2018-12-20 ENCOUNTER — CARE COORDINATION (OUTPATIENT)
Dept: CARE COORDINATION | Age: 53
End: 2018-12-20

## 2018-12-20 RX ORDER — METFORMIN HYDROCHLORIDE 500 MG/1
500 TABLET, EXTENDED RELEASE ORAL 2 TIMES DAILY
Qty: 60 TABLET | Refills: 0 | Status: SHIPPED | OUTPATIENT
Start: 2018-12-20 | End: 2019-02-05 | Stop reason: SDUPTHER

## 2018-12-20 NOTE — CARE COORDINATION
Ambulatory Care Coordination Note  12/20/2018  CM Risk Score: 2  Yusef Mortality Risk Score:      ACC: Hanny Latif RN    Summary Note: ACC spoke with the patient, she states the pharmacy has attempted to get refill on metformin 2 times and has not gotten it yet. Will forward to phone nurse, pt reports ongoing foot pain from bone spur, and issues with hands being cold. Care Coordination Interventions    Program Enrollment:  Rising Risk  Referral from Primary Care Provider:  Yes  Suggested Interventions and Community Resources  Pharmacist:  Completed (Comment: 12/5/17-Januvia coupon card sent to pharmacy to see about lowering the cost of the medication, pt qualified, now script is $5/month)  Zone Management Tools: In Process (Comment: 10/3/18-Review of diabetes management )  Other Services or Interventions:  10/3/18 Discussed providers and wanting to change PCP's         Goals Addressed     None          Prior to Admission medications    Medication Sig Start Date End Date Taking?  Authorizing Provider   tamsulosin (FLOMAX) 0.4 MG capsule Take 1 capsule by mouth daily 12/10/18 1/9/19  MIKE Packer   metoprolol tartrate (LOPRESSOR) 25 MG tablet TAKE 1 TABLET BY MOUTH TWICE DAILY 12/5/18   Delfina Ganser, DO   insulin glargine (LANTUS SOLOSTAR) 100 UNIT/ML injection pen Inject 35 Units into the skin nightly 11/24/18 12/24/18  MIKE Jones   isosorbide mononitrate (IMDUR) 30 MG extended release tablet TAKE 1 TABLET EVERY DAY 11/6/18   MIKE Bedolla   JANUVIA 100 MG tablet TAKE 1 TABLET EVERY DAY 11/6/18   MIKE Jones   lovastatin (MEVACOR) 40 MG tablet TAKE 1 TABLET AT BEDTIME 11/6/18 12/6/18  Delfina Ganser, DO   valsartan (DIOVAN) 80 MG tablet TAKE 1 TABLET BY MOUTH DAILY 11/6/18   MIKE Jones   vitamin D (ERGOCALCIFEROL) 80129 units CAPS capsule TAKE 1 CAPSULE BY MOUTH ONCE WEEKLY 10/4/18   Delfina Ganser, DO   empagliflozin (Ofilia ) 25 MG Eflornithine HCl (VANIQA) 13.9 % CREA Apply 1 Dose topically 2 times daily 11/20/15   MIKE Esquivel   nystatin-triamcinolone [facility]) 695672-3.0 UNIT/GM-% cream Apply topically 2 times daily. 9/30/15   Mitch Cos, APRN - CNP   Docusate Calcium (STOOL SOFTENER PO) Take 100 mg by mouth 2 times daily as needed.     Historical Provider, MD       Future Appointments  Date Time Provider Trini Quinn   1/3/2019 1:45 PM MIKE Haskins [facility]   1/17/2019 1:30 PM Lul Maldonado MD OB/GYN [facility]   3/5/2019 1:30 PM India Cantrell MD [facility] Cardio [facility]

## 2018-12-28 ENCOUNTER — TELEPHONE (OUTPATIENT)
Dept: CARDIOLOGY | Age: 53
End: 2018-12-28

## 2018-12-31 DIAGNOSIS — E11.8 TYPE 2 DIABETES MELLITUS WITH COMPLICATION (HCC): ICD-10-CM

## 2018-12-31 DIAGNOSIS — K21.9 GASTROESOPHAGEAL REFLUX DISEASE WITHOUT ESOPHAGITIS: ICD-10-CM

## 2018-12-31 DIAGNOSIS — Z11.4 SCREENING FOR HIV WITHOUT PRESENCE OF RISK FACTORS: ICD-10-CM

## 2018-12-31 DIAGNOSIS — Z11.59 NEED FOR HEPATITIS C SCREENING TEST: ICD-10-CM

## 2018-12-31 DIAGNOSIS — L68.0 HIRSUTISM: ICD-10-CM

## 2018-12-31 DIAGNOSIS — Z23 NEEDS FLU SHOT: ICD-10-CM

## 2018-12-31 DIAGNOSIS — Z12.11 SCREENING FOR COLON CANCER: ICD-10-CM

## 2018-12-31 DIAGNOSIS — I10 ESSENTIAL HYPERTENSION: ICD-10-CM

## 2018-12-31 RX ORDER — FAMOTIDINE 20 MG/1
TABLET, FILM COATED ORAL
Qty: 60 TABLET | Refills: 3 | Status: SHIPPED | OUTPATIENT
Start: 2018-12-31 | End: 2019-05-15 | Stop reason: SDUPTHER

## 2019-01-03 ENCOUNTER — OFFICE VISIT (OUTPATIENT)
Dept: PRIMARY CARE CLINIC | Age: 54
End: 2019-01-03
Payer: COMMERCIAL

## 2019-01-03 ENCOUNTER — CARE COORDINATION (OUTPATIENT)
Dept: CARE COORDINATION | Age: 54
End: 2019-01-03

## 2019-01-03 VITALS
WEIGHT: 220.8 LBS | SYSTOLIC BLOOD PRESSURE: 122 MMHG | HEIGHT: 62 IN | HEART RATE: 77 BPM | BODY MASS INDEX: 40.63 KG/M2 | OXYGEN SATURATION: 98 % | DIASTOLIC BLOOD PRESSURE: 64 MMHG | TEMPERATURE: 97.1 F

## 2019-01-03 DIAGNOSIS — M72.2 PLANTAR FASCIITIS: ICD-10-CM

## 2019-01-03 DIAGNOSIS — E11.8 TYPE 2 DIABETES MELLITUS WITH COMPLICATION, WITHOUT LONG-TERM CURRENT USE OF INSULIN (HCC): Primary | ICD-10-CM

## 2019-01-03 LAB — HBA1C MFR BLD: 7.1 %

## 2019-01-03 PROCEDURE — 2022F DILAT RTA XM EVC RTNOPTHY: CPT | Performed by: NURSE PRACTITIONER

## 2019-01-03 PROCEDURE — 1036F TOBACCO NON-USER: CPT | Performed by: NURSE PRACTITIONER

## 2019-01-03 PROCEDURE — G8427 DOCREV CUR MEDS BY ELIG CLIN: HCPCS | Performed by: NURSE PRACTITIONER

## 2019-01-03 PROCEDURE — G8484 FLU IMMUNIZE NO ADMIN: HCPCS | Performed by: NURSE PRACTITIONER

## 2019-01-03 PROCEDURE — 3045F PR MOST RECENT HEMOGLOBIN A1C LEVEL 7.0-9.0%: CPT | Performed by: NURSE PRACTITIONER

## 2019-01-03 PROCEDURE — 99214 OFFICE O/P EST MOD 30 MIN: CPT | Performed by: NURSE PRACTITIONER

## 2019-01-03 PROCEDURE — G8598 ASA/ANTIPLAT THER USED: HCPCS | Performed by: NURSE PRACTITIONER

## 2019-01-03 PROCEDURE — 3017F COLORECTAL CA SCREEN DOC REV: CPT | Performed by: NURSE PRACTITIONER

## 2019-01-03 PROCEDURE — G8417 CALC BMI ABV UP PARAM F/U: HCPCS | Performed by: NURSE PRACTITIONER

## 2019-01-03 PROCEDURE — 83036 HEMOGLOBIN GLYCOSYLATED A1C: CPT | Performed by: NURSE PRACTITIONER

## 2019-01-04 ENCOUNTER — CARE COORDINATION (OUTPATIENT)
Dept: CARE COORDINATION | Age: 54
End: 2019-01-04

## 2019-01-07 ASSESSMENT — ENCOUNTER SYMPTOMS
RESPIRATORY NEGATIVE: 1
GASTROINTESTINAL NEGATIVE: 1
EYES NEGATIVE: 1

## 2019-01-08 ENCOUNTER — OFFICE VISIT (OUTPATIENT)
Dept: PRIMARY CARE CLINIC | Age: 54
End: 2019-01-08
Payer: COMMERCIAL

## 2019-01-08 ENCOUNTER — CARE COORDINATION (OUTPATIENT)
Dept: CARE COORDINATION | Age: 54
End: 2019-01-08

## 2019-01-08 VITALS
WEIGHT: 219.2 LBS | OXYGEN SATURATION: 97 % | DIASTOLIC BLOOD PRESSURE: 74 MMHG | SYSTOLIC BLOOD PRESSURE: 126 MMHG | TEMPERATURE: 97.9 F | BODY MASS INDEX: 40.34 KG/M2 | HEART RATE: 80 BPM | HEIGHT: 62 IN

## 2019-01-08 DIAGNOSIS — N30.01 ACUTE CYSTITIS WITH HEMATURIA: Primary | ICD-10-CM

## 2019-01-08 DIAGNOSIS — E66.01 MORBID OBESITY WITH BMI OF 40.0-44.9, ADULT (HCC): ICD-10-CM

## 2019-01-08 DIAGNOSIS — R35.0 URINARY FREQUENCY: ICD-10-CM

## 2019-01-08 LAB
APPEARANCE FLUID: ABNORMAL
BILIRUBIN, POC: ABNORMAL
BLOOD URINE, POC: ABNORMAL
CLARITY, POC: ABNORMAL
COLOR, POC: ABNORMAL
GLUCOSE URINE, POC: 500
KETONES, POC: ABNORMAL
LEUKOCYTE EST, POC: ABNORMAL
NITRITE, POC: ABNORMAL
PH, POC: 6
PROTEIN, POC: ABNORMAL
SPECIFIC GRAVITY, POC: 1
UROBILINOGEN, POC: 0.2

## 2019-01-08 PROCEDURE — G8427 DOCREV CUR MEDS BY ELIG CLIN: HCPCS | Performed by: NURSE PRACTITIONER

## 2019-01-08 PROCEDURE — G8484 FLU IMMUNIZE NO ADMIN: HCPCS | Performed by: NURSE PRACTITIONER

## 2019-01-08 PROCEDURE — G8598 ASA/ANTIPLAT THER USED: HCPCS | Performed by: NURSE PRACTITIONER

## 2019-01-08 PROCEDURE — 3017F COLORECTAL CA SCREEN DOC REV: CPT | Performed by: NURSE PRACTITIONER

## 2019-01-08 PROCEDURE — 99213 OFFICE O/P EST LOW 20 MIN: CPT | Performed by: NURSE PRACTITIONER

## 2019-01-08 PROCEDURE — 1036F TOBACCO NON-USER: CPT | Performed by: NURSE PRACTITIONER

## 2019-01-08 PROCEDURE — G8417 CALC BMI ABV UP PARAM F/U: HCPCS | Performed by: NURSE PRACTITIONER

## 2019-01-08 PROCEDURE — 81002 URINALYSIS NONAUTO W/O SCOPE: CPT | Performed by: NURSE PRACTITIONER

## 2019-01-08 RX ORDER — PHENAZOPYRIDINE HYDROCHLORIDE 200 MG/1
200 TABLET, FILM COATED ORAL 3 TIMES DAILY PRN
Qty: 9 TABLET | Refills: 0 | Status: SHIPPED | OUTPATIENT
Start: 2019-01-08 | End: 2019-01-11

## 2019-01-08 RX ORDER — NITROFURANTOIN 25; 75 MG/1; MG/1
100 CAPSULE ORAL 2 TIMES DAILY
Qty: 20 CAPSULE | Refills: 0 | Status: SHIPPED | OUTPATIENT
Start: 2019-01-08 | End: 2019-01-18

## 2019-01-08 ASSESSMENT — ENCOUNTER SYMPTOMS
EYES NEGATIVE: 1
RESPIRATORY NEGATIVE: 1
GASTROINTESTINAL NEGATIVE: 1

## 2019-01-16 ENCOUNTER — CARE COORDINATION (OUTPATIENT)
Dept: CARE COORDINATION | Age: 54
End: 2019-01-16

## 2019-01-17 ENCOUNTER — CARE COORDINATION (OUTPATIENT)
Dept: CARE COORDINATION | Age: 54
End: 2019-01-17

## 2019-01-17 ENCOUNTER — OFFICE VISIT (OUTPATIENT)
Dept: OBGYN | Age: 54
End: 2019-01-17
Payer: COMMERCIAL

## 2019-01-17 VITALS
DIASTOLIC BLOOD PRESSURE: 60 MMHG | SYSTOLIC BLOOD PRESSURE: 100 MMHG | BODY MASS INDEX: 38.8 KG/M2 | HEIGHT: 63 IN | WEIGHT: 219 LBS

## 2019-01-17 DIAGNOSIS — Z12.4 SCREENING FOR CERVICAL CANCER: ICD-10-CM

## 2019-01-17 DIAGNOSIS — M79.671 RIGHT FOOT PAIN: Primary | ICD-10-CM

## 2019-01-17 DIAGNOSIS — Z01.419 WOMEN'S ANNUAL ROUTINE GYNECOLOGICAL EXAMINATION: Primary | ICD-10-CM

## 2019-01-17 PROCEDURE — G8484 FLU IMMUNIZE NO ADMIN: HCPCS | Performed by: OBSTETRICS & GYNECOLOGY

## 2019-01-17 PROCEDURE — G0101 CA SCREEN;PELVIC/BREAST EXAM: HCPCS | Performed by: OBSTETRICS & GYNECOLOGY

## 2019-01-27 ASSESSMENT — ENCOUNTER SYMPTOMS
GASTROINTESTINAL NEGATIVE: 1
RESPIRATORY NEGATIVE: 1
ALLERGIC/IMMUNOLOGIC NEGATIVE: 1
EYES NEGATIVE: 1

## 2019-01-29 ENCOUNTER — CARE COORDINATION (OUTPATIENT)
Dept: CARE COORDINATION | Age: 54
End: 2019-01-29

## 2019-02-05 RX ORDER — METFORMIN HYDROCHLORIDE 500 MG/1
500 TABLET, EXTENDED RELEASE ORAL 2 TIMES DAILY
Qty: 60 TABLET | Refills: 0 | Status: SHIPPED | OUTPATIENT
Start: 2019-02-05 | End: 2019-03-21 | Stop reason: SDUPTHER

## 2019-02-05 RX ORDER — INSULIN GLARGINE 100 [IU]/ML
INJECTION, SOLUTION SUBCUTANEOUS
Qty: 15 ML | Refills: 1 | Status: SHIPPED | OUTPATIENT
Start: 2019-02-05 | End: 2019-04-18 | Stop reason: SDUPTHER

## 2019-02-07 ENCOUNTER — CARE COORDINATION (OUTPATIENT)
Dept: CARE COORDINATION | Age: 54
End: 2019-02-07

## 2019-02-08 ENCOUNTER — OFFICE VISIT (OUTPATIENT)
Dept: PRIMARY CARE CLINIC | Age: 54
End: 2019-02-08
Payer: COMMERCIAL

## 2019-02-08 ENCOUNTER — HOSPITAL ENCOUNTER (OUTPATIENT)
Dept: GENERAL RADIOLOGY | Age: 54
Discharge: HOME | End: 2019-02-08
Payer: COMMERCIAL

## 2019-02-08 ENCOUNTER — CARE COORDINATION (OUTPATIENT)
Dept: CARE COORDINATION | Age: 54
End: 2019-02-08

## 2019-02-08 ENCOUNTER — TELEPHONE (OUTPATIENT)
Dept: PRIMARY CARE CLINIC | Age: 54
End: 2019-02-08

## 2019-02-08 VITALS
WEIGHT: 222.4 LBS | DIASTOLIC BLOOD PRESSURE: 64 MMHG | TEMPERATURE: 97.6 F | BODY MASS INDEX: 39.41 KG/M2 | HEART RATE: 75 BPM | RESPIRATION RATE: 14 BRPM | OXYGEN SATURATION: 98 % | SYSTOLIC BLOOD PRESSURE: 102 MMHG | HEIGHT: 63 IN

## 2019-02-08 DIAGNOSIS — M79.671 RIGHT FOOT PAIN: ICD-10-CM

## 2019-02-08 DIAGNOSIS — B37.31 VAGINAL YEAST INFECTION: Primary | ICD-10-CM

## 2019-02-08 LAB
APPEARANCE FLUID: NORMAL
BILIRUBIN, POC: NORMAL
BLOOD URINE, POC: NORMAL
CLARITY, POC: CLEAR
COLOR, POC: YELLOW
GLUCOSE URINE, POC: 500
KETONES, POC: NORMAL
LEUKOCYTE EST, POC: NORMAL
NITRITE, POC: NORMAL
PH, POC: 5.5
PROTEIN, POC: NORMAL
SPECIFIC GRAVITY, POC: 1.01
UROBILINOGEN, POC: 0.2

## 2019-02-08 PROCEDURE — 81002 URINALYSIS NONAUTO W/O SCOPE: CPT | Performed by: NURSE PRACTITIONER

## 2019-02-08 PROCEDURE — G8417 CALC BMI ABV UP PARAM F/U: HCPCS | Performed by: NURSE PRACTITIONER

## 2019-02-08 PROCEDURE — 1036F TOBACCO NON-USER: CPT | Performed by: NURSE PRACTITIONER

## 2019-02-08 PROCEDURE — 3017F COLORECTAL CA SCREEN DOC REV: CPT | Performed by: NURSE PRACTITIONER

## 2019-02-08 PROCEDURE — 99213 OFFICE O/P EST LOW 20 MIN: CPT | Performed by: NURSE PRACTITIONER

## 2019-02-08 PROCEDURE — 73630 X-RAY EXAM OF FOOT: CPT

## 2019-02-08 PROCEDURE — G8427 DOCREV CUR MEDS BY ELIG CLIN: HCPCS | Performed by: NURSE PRACTITIONER

## 2019-02-08 PROCEDURE — G8484 FLU IMMUNIZE NO ADMIN: HCPCS | Performed by: NURSE PRACTITIONER

## 2019-02-08 PROCEDURE — G8598 ASA/ANTIPLAT THER USED: HCPCS | Performed by: NURSE PRACTITIONER

## 2019-02-08 RX ORDER — FLUCONAZOLE 150 MG/1
150 TABLET ORAL DAILY
Qty: 3 TABLET | Refills: 0 | Status: SHIPPED | OUTPATIENT
Start: 2019-02-08 | End: 2019-02-11

## 2019-02-08 RX ORDER — NYSTATIN 100000 U/G
CREAM TOPICAL
Qty: 30 G | Refills: 1 | Status: SHIPPED | OUTPATIENT
Start: 2019-02-08 | End: 2019-04-18 | Stop reason: SDUPTHER

## 2019-02-08 ASSESSMENT — PATIENT HEALTH QUESTIONNAIRE - PHQ9
SUM OF ALL RESPONSES TO PHQ9 QUESTIONS 1 & 2: 0
SUM OF ALL RESPONSES TO PHQ QUESTIONS 1-9: 0
SUM OF ALL RESPONSES TO PHQ QUESTIONS 1-9: 0
1. LITTLE INTEREST OR PLEASURE IN DOING THINGS: 0
2. FEELING DOWN, DEPRESSED OR HOPELESS: 0

## 2019-02-08 ASSESSMENT — ENCOUNTER SYMPTOMS
RESPIRATORY NEGATIVE: 1
EYES NEGATIVE: 1
GASTROINTESTINAL NEGATIVE: 1

## 2019-02-11 ENCOUNTER — CARE COORDINATION (OUTPATIENT)
Dept: CARE COORDINATION | Age: 54
End: 2019-02-11

## 2019-02-12 ENCOUNTER — TELEPHONE (OUTPATIENT)
Dept: PRIMARY CARE CLINIC | Age: 54
End: 2019-02-12

## 2019-02-12 ENCOUNTER — CARE COORDINATION (OUTPATIENT)
Dept: CARE COORDINATION | Age: 54
End: 2019-02-12

## 2019-02-14 ENCOUNTER — CARE COORDINATION (OUTPATIENT)
Dept: CARE COORDINATION | Age: 54
End: 2019-02-14

## 2019-02-26 ENCOUNTER — CARE COORDINATION (OUTPATIENT)
Dept: CARE COORDINATION | Age: 54
End: 2019-02-26

## 2019-03-05 ENCOUNTER — OFFICE VISIT (OUTPATIENT)
Dept: CARDIOLOGY | Age: 54
End: 2019-03-05
Payer: COMMERCIAL

## 2019-03-05 VITALS
WEIGHT: 224 LBS | BODY MASS INDEX: 41.22 KG/M2 | HEIGHT: 62 IN | SYSTOLIC BLOOD PRESSURE: 124 MMHG | HEART RATE: 60 BPM | DIASTOLIC BLOOD PRESSURE: 64 MMHG

## 2019-03-05 DIAGNOSIS — I51.9 LEFT VENTRICULAR DYSFUNCTION: ICD-10-CM

## 2019-03-05 DIAGNOSIS — I10 ESSENTIAL HYPERTENSION: ICD-10-CM

## 2019-03-05 DIAGNOSIS — E66.01 CLASS 3 SEVERE OBESITY DUE TO EXCESS CALORIES WITHOUT SERIOUS COMORBIDITY WITH BODY MASS INDEX (BMI) OF 40.0 TO 44.9 IN ADULT (HCC): ICD-10-CM

## 2019-03-05 DIAGNOSIS — I25.810 CORONARY ARTERY DISEASE INVOLVING CORONARY BYPASS GRAFT OF NATIVE HEART WITHOUT ANGINA PECTORIS: Primary | ICD-10-CM

## 2019-03-05 PROCEDURE — 3017F COLORECTAL CA SCREEN DOC REV: CPT | Performed by: CLINICAL NURSE SPECIALIST

## 2019-03-05 PROCEDURE — G8484 FLU IMMUNIZE NO ADMIN: HCPCS | Performed by: CLINICAL NURSE SPECIALIST

## 2019-03-05 PROCEDURE — G8417 CALC BMI ABV UP PARAM F/U: HCPCS | Performed by: CLINICAL NURSE SPECIALIST

## 2019-03-05 PROCEDURE — 1036F TOBACCO NON-USER: CPT | Performed by: CLINICAL NURSE SPECIALIST

## 2019-03-05 PROCEDURE — G8598 ASA/ANTIPLAT THER USED: HCPCS | Performed by: CLINICAL NURSE SPECIALIST

## 2019-03-05 PROCEDURE — 99213 OFFICE O/P EST LOW 20 MIN: CPT | Performed by: CLINICAL NURSE SPECIALIST

## 2019-03-05 PROCEDURE — G8427 DOCREV CUR MEDS BY ELIG CLIN: HCPCS | Performed by: CLINICAL NURSE SPECIALIST

## 2019-03-12 ENCOUNTER — CARE COORDINATION (OUTPATIENT)
Dept: CARE COORDINATION | Age: 54
End: 2019-03-12

## 2019-03-13 ENCOUNTER — CARE COORDINATION (OUTPATIENT)
Dept: CARE COORDINATION | Age: 54
End: 2019-03-13

## 2019-03-15 ENCOUNTER — OFFICE VISIT (OUTPATIENT)
Dept: PRIMARY CARE CLINIC | Age: 54
End: 2019-03-15
Payer: COMMERCIAL

## 2019-03-15 ENCOUNTER — HOSPITAL ENCOUNTER (OUTPATIENT)
Dept: GENERAL RADIOLOGY | Age: 54
Discharge: HOME | End: 2019-03-15
Payer: COMMERCIAL

## 2019-03-15 VITALS
HEART RATE: 67 BPM | HEIGHT: 62 IN | BODY MASS INDEX: 41.18 KG/M2 | TEMPERATURE: 97.4 F | SYSTOLIC BLOOD PRESSURE: 137 MMHG | DIASTOLIC BLOOD PRESSURE: 69 MMHG | WEIGHT: 223.8 LBS | RESPIRATION RATE: 19 BRPM | OXYGEN SATURATION: 99 %

## 2019-03-15 DIAGNOSIS — M54.12 CERVICAL RADICULOPATHY: Primary | ICD-10-CM

## 2019-03-15 DIAGNOSIS — M54.12 CERVICAL RADICULOPATHY: ICD-10-CM

## 2019-03-15 PROCEDURE — G8598 ASA/ANTIPLAT THER USED: HCPCS | Performed by: NURSE PRACTITIONER

## 2019-03-15 PROCEDURE — 1036F TOBACCO NON-USER: CPT | Performed by: NURSE PRACTITIONER

## 2019-03-15 PROCEDURE — 72040 X-RAY EXAM NECK SPINE 2-3 VW: CPT

## 2019-03-15 PROCEDURE — G8427 DOCREV CUR MEDS BY ELIG CLIN: HCPCS | Performed by: NURSE PRACTITIONER

## 2019-03-15 PROCEDURE — G8417 CALC BMI ABV UP PARAM F/U: HCPCS | Performed by: NURSE PRACTITIONER

## 2019-03-15 PROCEDURE — 99213 OFFICE O/P EST LOW 20 MIN: CPT | Performed by: NURSE PRACTITIONER

## 2019-03-15 PROCEDURE — 3017F COLORECTAL CA SCREEN DOC REV: CPT | Performed by: NURSE PRACTITIONER

## 2019-03-15 PROCEDURE — G8484 FLU IMMUNIZE NO ADMIN: HCPCS | Performed by: NURSE PRACTITIONER

## 2019-03-15 PROCEDURE — 70360 X-RAY EXAM OF NECK: CPT

## 2019-03-15 RX ORDER — TIZANIDINE 2 MG/1
2 TABLET ORAL 2 TIMES DAILY PRN
Qty: 60 TABLET | Refills: 0 | Status: SHIPPED | OUTPATIENT
Start: 2019-03-15 | End: 2019-04-18 | Stop reason: SDUPTHER

## 2019-03-15 ASSESSMENT — ENCOUNTER SYMPTOMS
GASTROINTESTINAL NEGATIVE: 1
RESPIRATORY NEGATIVE: 1
EYES NEGATIVE: 1

## 2019-03-18 ENCOUNTER — CARE COORDINATION (OUTPATIENT)
Dept: CARE COORDINATION | Age: 54
End: 2019-03-18

## 2019-03-19 ENCOUNTER — CARE COORDINATION (OUTPATIENT)
Dept: CARE COORDINATION | Age: 54
End: 2019-03-19

## 2019-03-20 ENCOUNTER — TELEPHONE (OUTPATIENT)
Dept: PRIMARY CARE CLINIC | Age: 54
End: 2019-03-20

## 2019-03-20 ENCOUNTER — CARE COORDINATION (OUTPATIENT)
Dept: CARE COORDINATION | Age: 54
End: 2019-03-20

## 2019-03-21 DIAGNOSIS — E11.8 TYPE 2 DIABETES MELLITUS WITH COMPLICATION, WITH LONG-TERM CURRENT USE OF INSULIN (HCC): Primary | ICD-10-CM

## 2019-03-21 DIAGNOSIS — Z79.4 TYPE 2 DIABETES MELLITUS WITH COMPLICATION, WITH LONG-TERM CURRENT USE OF INSULIN (HCC): Primary | ICD-10-CM

## 2019-03-21 RX ORDER — METFORMIN HYDROCHLORIDE 500 MG/1
500 TABLET, EXTENDED RELEASE ORAL 2 TIMES DAILY
Qty: 60 TABLET | Refills: 2 | Status: SHIPPED | OUTPATIENT
Start: 2019-03-21 | End: 2019-06-24 | Stop reason: DRUGHIGH

## 2019-03-26 ENCOUNTER — CARE COORDINATION (OUTPATIENT)
Dept: CARE COORDINATION | Age: 54
End: 2019-03-26

## 2019-03-29 ENCOUNTER — TELEPHONE (OUTPATIENT)
Dept: PRIMARY CARE CLINIC | Age: 54
End: 2019-03-29

## 2019-03-29 DIAGNOSIS — M50.30 DDD (DEGENERATIVE DISC DISEASE), CERVICAL: ICD-10-CM

## 2019-03-29 DIAGNOSIS — M54.12 CERVICAL RADICULOPATHY: Primary | ICD-10-CM

## 2019-04-03 ENCOUNTER — OFFICE VISIT (OUTPATIENT)
Dept: PRIMARY CARE CLINIC | Age: 54
End: 2019-04-03
Payer: COMMERCIAL

## 2019-04-03 VITALS
HEART RATE: 77 BPM | DIASTOLIC BLOOD PRESSURE: 80 MMHG | WEIGHT: 220.2 LBS | OXYGEN SATURATION: 98 % | SYSTOLIC BLOOD PRESSURE: 126 MMHG | HEIGHT: 63 IN | BODY MASS INDEX: 39.02 KG/M2 | TEMPERATURE: 98.7 F

## 2019-04-03 DIAGNOSIS — E78.2 MIXED HYPERLIPIDEMIA: ICD-10-CM

## 2019-04-03 DIAGNOSIS — M54.12 CERVICAL RADICULOPATHY: ICD-10-CM

## 2019-04-03 DIAGNOSIS — Z79.4 TYPE 2 DIABETES MELLITUS WITH COMPLICATION, WITH LONG-TERM CURRENT USE OF INSULIN (HCC): Primary | ICD-10-CM

## 2019-04-03 DIAGNOSIS — Z87.891 PERSONAL HISTORY OF TOBACCO USE: ICD-10-CM

## 2019-04-03 DIAGNOSIS — I10 ESSENTIAL HYPERTENSION: ICD-10-CM

## 2019-04-03 DIAGNOSIS — E11.8 TYPE 2 DIABETES MELLITUS WITH COMPLICATION, WITH LONG-TERM CURRENT USE OF INSULIN (HCC): Primary | ICD-10-CM

## 2019-04-03 DIAGNOSIS — I20.8 ANGINA EFFORT: ICD-10-CM

## 2019-04-03 LAB — HBA1C MFR BLD: 7.5 %

## 2019-04-03 PROCEDURE — 3045F PR MOST RECENT HEMOGLOBIN A1C LEVEL 7.0-9.0%: CPT | Performed by: NURSE PRACTITIONER

## 2019-04-03 PROCEDURE — G8427 DOCREV CUR MEDS BY ELIG CLIN: HCPCS | Performed by: NURSE PRACTITIONER

## 2019-04-03 PROCEDURE — 2022F DILAT RTA XM EVC RTNOPTHY: CPT | Performed by: NURSE PRACTITIONER

## 2019-04-03 PROCEDURE — 3017F COLORECTAL CA SCREEN DOC REV: CPT | Performed by: NURSE PRACTITIONER

## 2019-04-03 PROCEDURE — 83036 HEMOGLOBIN GLYCOSYLATED A1C: CPT | Performed by: NURSE PRACTITIONER

## 2019-04-03 PROCEDURE — G8598 ASA/ANTIPLAT THER USED: HCPCS | Performed by: NURSE PRACTITIONER

## 2019-04-03 PROCEDURE — 1036F TOBACCO NON-USER: CPT | Performed by: NURSE PRACTITIONER

## 2019-04-03 PROCEDURE — G0296 VISIT TO DETERM LDCT ELIG: HCPCS | Performed by: NURSE PRACTITIONER

## 2019-04-03 PROCEDURE — G8417 CALC BMI ABV UP PARAM F/U: HCPCS | Performed by: NURSE PRACTITIONER

## 2019-04-03 PROCEDURE — 99214 OFFICE O/P EST MOD 30 MIN: CPT | Performed by: NURSE PRACTITIONER

## 2019-04-03 ASSESSMENT — ENCOUNTER SYMPTOMS
EYES NEGATIVE: 1
RESPIRATORY NEGATIVE: 1
GASTROINTESTINAL NEGATIVE: 1

## 2019-04-03 NOTE — PROGRESS NOTES
[facility] PRIMARY CARE  [address]  [address] [facility] [address]  Dept: [phone]  Dept Fax: [phone]  Loc: [phone]    Tteo Betancur is a 52 y.o. female who presents today for her medical conditions/complaints as noted below. Teto Betancur is c/o of Follow-up (3 months) and Diabetes      Chief Complaint   Patient presents with    Follow-up     3 months    Diabetes       HPI:     HPI   Patient here for follow up on chronic conditions including DM, HTN, and increased neck pain. She has been treating as discussed at last appointment without any relief. Patient also reports being out of her Jardiance over the last 2-3 weeks. She has been monitoring blood sugars at home but does not remember the numbers per her report. She denies any issues with her blood pressure at this time.     Past Medical History:   Diagnosis Date    Abnormal Pap smear of cervix     CAD (coronary artery disease)     stent 2011 Jeremías Ontiveros; sees dr. Tavo Pichardo Cervical [facility]     Diabetes mellitus [facility]     ERRONEOUS ENCOUNTER--DISREGARD 9/15/2015    GERD (gastroesophageal reflux disease)     Headache(784.0)     Heart attack ([facility] [address] [facility] 12/2011    Hyperlipidemia     Hypertension     Lichen simplex chronicus 10/2015    Osteopenia     Pneumonia     Type II or unspecified type diabetes mellitus without mention of complication, not stated as uncontrolled         Past Surgical History:   Procedure Laterality Date    BREAST BIOPSY  6/8/2011    US guided core needle, right, benign    CARDIAC CATHETERIZATION  12/22/14  JDT    EF 50%    CARDIAC CATHETERIZATION  06/05/2017        [facility] FLUOROSCOPIC GUIDANCE NEEDLE PLACEMENT ADD ON Left 7/19/2018    CORTICOSTEROID INJECTION performed by Conor Costa MD at [address] [place] [address]  2006    CORONARY ANGIOPLASTY WITH STENT PLACEMENT  12/2011    [facility]    HYSTERECTOMY      total    OTHER SURGICAL HISTORY      fibrinolysis    HI CABG, ARTERY-VEIN, THREE  \\    Coronary Artery Bypass, 3    HI MANIPULATN SHLDR JT W ANESTHESIA Left 2018    SHOULDER MANIPULATION UNDER ANESTHESIA performed by Sanjuana Montano MD at [address] [place] [address] VULVAR/PERINEAL BIOPSY  10/07/2015    Dr. Keri Dixon       Social History     Tobacco Use    Smoking status: Former Smoker     Packs/day: 2.50     Years: 35.00     Pack years: 87.50     Types: Cigarettes     Last attempt to quit: 12/15/2014     Years since quittin.3    Smokeless tobacco: Never Used    Tobacco comment: pack and half per week plus electronic cig   Substance Use Topics    Alcohol use: No        Current Outpatient Medications   Medication Sig Dispense Refill    metFORMIN (GLUCOPHAGE-XR) 500 MG extended release tablet Take 1 tablet by mouth 2 times daily Indications: Diabetes 60 tablet 2    tiZANidine (ZANAFLEX) 2 MG tablet Take 1 tablet by mouth 2 times daily as needed (pain) 60 tablet 0    empagliflozin (JARDIANCE) 25 MG tablet Take 25 mg by mouth daily 30 tablet 5    JANUVIA 100 MG tablet TAKE 1 TABLET EVERY DAY 30 tablet 5    nystatin (MYCOSTATIN) 938787 UNIT/GM cream Apply topically 2 times daily.  30 g 1    Exenatide ER (BYDUREON BCISE) 2 MG/0.85ML AUIJ Inject 2 mg into the skin every 7 days 4 pen 2    famotidine (PEPCID) 20 MG tablet TAKE 1 TABLET TWICE DAILY 60 tablet 3    tamsulosin (FLOMAX) 0.4 MG capsule Take 1 capsule by mouth daily 30 capsule 1    metoprolol tartrate (LOPRESSOR) 25 MG tablet TAKE 1 TABLET BY MOUTH TWICE DAILY 60 tablet 5    isosorbide mononitrate (IMDUR) 30 MG extended release tablet TAKE 1 TABLET EVERY DAY 30 tablet 5    lovastatin (MEVACOR) 40 MG tablet TAKE 1 TABLET AT BEDTIME 30 tablet 2    valsartan (DIOVAN) 80 MG tablet TAKE 1 TABLET BY MOUTH DAILY 30 tablet 5    vitamin D (ERGOCALCIFEROL) 33144 units CAPS capsule TAKE 1 CAPSULE BY MOUTH ONCE WEEKLY 12 capsule 1    Diabetic Shoe MISC by Does not apply route 1 each 0    LORazepam (ATIVAN) 0.5 MG tablet Take 1 tablet by mouth every 8 hours as needed for Anxiety. . 90 tablet 0    furosemide (LASIX) 20 MG tablet Take 1 tablet by mouth daily as needed (edema) 90 tablet 3    nitroGLYCERIN (NITROSTAT) 0.4 MG SL tablet Place 1 tablet under the tongue every 5 minutes as needed for Chest pain 25 tablet 3    Nebulizers (COMPRESSOR/NEBULIZER) MISC Use as directed 1 each 3    albuterol (PROVENTIL) (5 MG/ML) 0.5% nebulizer solution Take 0.5 mLs by nebulization every 6 hours as needed for Wheezing 120 each 0    aspirin 81 MG tablet Take 81 mg by mouth daily      glucose blood VI test strips (ASCENSIA AUTODISC VI;ONE TOUCH ULTRA TEST VI) strip 1 each by In Vitro route daily As needed. 100 each 3    Insulin Pen Needle ([employer] PEN NEEDLES) 31G X 6 MM MISC 1 each by Does not apply route daily 100 each 3    Lactobacillus (PROBIATA PO) Take 1 tablet by mouth daily       mometasone (ELOCON) 0.1 % ointment Apply topically to vagina 3 times per week 1 Tube 3    ondansetron (ZOFRAN) 8 MG tablet Take 1 tablet by mouth every 8 hours as needed for Nausea or Vomiting 9 tablet 1    Eflornithine HCl (VANIQA) 13.9 % CREA Apply 1 Dose topically 2 times daily 45 g 2    Docusate Calcium (STOOL SOFTENER PO) Take 100 mg by mouth 2 times daily as needed.  LANTUS SOLOSTAR 100 UNIT/ML injection pen INJECT 35 UNITS SQ INTO THE SKIN NIGHTLY 15 mL 1     No current facility-administered medications for this visit. Allergies   Allergen Reactions    Codeine Other (See Comments)     Seizures as a child    Motrin [Ibuprofen] Swelling     Tongue to swell    Humalog [Insulin Lispro] Other (See Comments)     Shakiness, break out in a sweat at higher doses.        Family History   Problem Relation Age of Onset    Diabetes Father     Cancer Father     High Blood Pressure Father     Heart Attack Father     Diabetes Mother    Arvilla Orchard Breast Cancer Mother     Heart Failure Mother         CHF, MVP    Heart Failure Maternal Grandfather     Stroke Maternal Grandfather     Diabetes Brother          Subjective:      Review of Systems   Constitutional: Negative. HENT: Negative. Eyes: Negative. Respiratory: Negative. Cardiovascular: Negative. Gastrointestinal: Negative. Endocrine: Negative. Genitourinary: Negative. Musculoskeletal: Positive for arthralgias (left shoulder) and neck pain. Skin: Negative. Neurological: Negative. Hematological: Negative. Psychiatric/Behavioral: Negative. Objective:     Physical Exam   Constitutional: She is oriented to person, place, and time. Vital signs are normal. She appears well-developed and well-nourished. HENT:   Head: Normocephalic and atraumatic. Right Ear: Hearing, tympanic membrane, external ear and ear canal normal.   Left Ear: Hearing, tympanic membrane, external ear and ear canal normal.   Nose: Nose normal.   Mouth/Throat: Uvula is midline, oropharynx is clear and moist and mucous membranes are normal.   Eyes: Pupils are equal, round, and reactive to light. Conjunctivae, EOM and lids are normal.   Neck: Trachea normal and normal range of motion. Neck supple. No thyroid mass and no thyromegaly present. Cardiovascular: Normal rate, regular rhythm, normal heart sounds and intact distal pulses. Pulmonary/Chest: Effort normal and breath sounds normal.   Abdominal: Soft. Normal appearance and bowel sounds are normal.   Musculoskeletal:        Cervical back: She exhibits decreased range of motion and tenderness. Thoracic back: Normal. She exhibits normal range of motion and no tenderness. Lumbar back: Normal. She exhibits normal range of motion and no tenderness. Neurological: She is alert and oriented to person, place, and time. She has normal strength. Skin: Skin is warm, dry and intact. Psychiatric: She has a normal mood and affect. Her speech is normal and behavior is normal. Judgment and thought content normal. Cognition and memory are normal.   Nursing note and vitals reviewed. /80   Pulse 77   Temp 98.7 °F (37.1 °C)   Ht 5' 2.5\" (1.588 m)   Wt 220 lb 3.2 oz (99.9 kg)   LMP 1998 (Approximate)   SpO2 98%   BMI 39.63 kg/m²     Assessment:      Diagnosis Orders   1. Type 2 diabetes mellitus with complication, with long-term current use of insulin ([facility])  POCT glycosylated hemoglobin (Hb A1C)   2. Personal history of tobacco use  MS VISIT TO DISCUSS LUNG CA SCREEN W LDCT    CT Lung Screening (Annual)   3. Essential hypertension     4. Mixed hyperlipidemia     5. Cervical radiculopathy  External Referral To Physical Therapy       Results for orders placed or performed in visit on 19   POCT glycosylated hemoglobin (Hb A1C)   Result Value Ref Range    Hemoglobin A1C 7.5 %       Plan:     Patient is to restart Jardiance as discussed. Samples have been provided to the patient. Low-dose CT of chest has been ordered due to history of smoking. We will continue current medication regimen in regards to her hypertension and hyperlipidemia. Return in about 3 months (around 7/3/2019) for Follow up chronic conditions. Orders Placed This Encounter   Procedures    CT Lung Screening (Annual)     Age: Patient is 52 y.o. Smoking History: Social History    Tobacco Use      Smoking status: Former Smoker        Packs/day: 2.50        Years: 35.00        Pack years: 87.5        Types: Cigarettes        Quit date: 12/15/2014        Years since quittin.3      Smokeless tobacco: Never Used      Tobacco comment: pack and half per week plus electronic cig    Alcohol use: No    Drug use: No   Pack years: 87.5     Standing Status:   Future     Standing Expiration Date:   4/3/2020     Order Specific Question:   Is there documentation of shared decision making?      Answer:   Yes     Order Specific Question:   Is this a low dose CT or a routine CT? Answer:   Low Dose CT [1]     Order Specific Question:   Is this the first (baseline) CT or an annual exam?     Answer: Annual [2]     Order Specific Question:   Does the patient show any signs or symptoms of lung cancer? Answer:   No     Order Specific Question:   Smoking Status? Answer: Former Smoker [4]     Order Specific Question:   Date quit smoking? (must be within 15 years)     Answer:   12/15/2014     Order Specific Question:   Smoking packs per day? Answer:   2.5     Order Specific Question:   Years smoking? Answer:   28    External Referral To Physical Therapy     Referral Priority:   Routine     Referral Type:   Eval and Treat     Referral Reason:   Specialty Services Required     Requested Specialty:   Physical Therapy     Number of Visits Requested:   1    POCT glycosylated hemoglobin (Hb A1C)    [place] VISIT TO DISCUSS LUNG CA SCREEN W LDCT       No orders of the defined types were placed in this encounter. Patient offered educational handouts and has had all questions answered. Patient voices understanding and agrees to plans along with risks and benefits of plan. Patient is instructed to continue prior meds, diet, and exercise plans as instructed. Patient agrees to follow up as instructed and sooner if needed. Patient agrees to go to ER if condition becomes emergent. EMR Dragon/transcription disclaimer: Some of this encounter note is an electronic transcription/translation of spoken language to printed text. The electronic translation of spoken language may permit erroneous, or at times, nonsensical words or phrases to be inadvertently transcribed.  Although I have reviewed the note for such errors, some may still exist.    Electronically signed by MIKE Neal on 4/3/2019 at 3:35 PM                   Low Dose CT (LDCT) Lung Screening criteria met   Age 55-77   Pack year smoking >30   Still smoking or less than 15 year since quit   No sign or symptoms of lung cancer   > 11 months since last LDCT     Risks and benefits of lung cancer screening with LDCT scans discussed:    Significance of positive screen - False-positive LDCT results often occur. 95% of all positive results do not lead to a diagnosis of cancer. Usually further imaging can resolve most false-positive results; however, some patients may require invasive procedures. Over diagnosis risk - 10% to 12% of screen-detected lung cancer cases are over diagnosed--that is, the cancer would not have been detected in the patient's lifetime without the screening. Need for follow up screens annually to continue lung cancer screening effectiveness     Risks associated with radiation from annual LDCT- Radiation exposure is about the same as for a mammogram, which is about 1/3 of the annual background radiation exposure from everyday life. Starting screening at age 54 is not likely to increase cancer risk from radiation exposure. Patients with comorbidities resulting in life expectancy of < 10 years, or that would preclude treatment of an abnormality identified on CT, should not be screened due to lack of benefit.     To obtain maximal benefit from this screening, smoking cessation and long-term abstinence from smoking is critical

## 2019-04-04 ENCOUNTER — TELEPHONE (OUTPATIENT)
Dept: PRIMARY CARE CLINIC | Age: 54
End: 2019-04-04

## 2019-04-11 ENCOUNTER — HOSPITAL ENCOUNTER (OUTPATIENT)
Dept: CT IMAGING | Age: 54
Discharge: HOME | End: 2019-04-11
Payer: COMMERCIAL

## 2019-04-11 ENCOUNTER — HOSPITAL ENCOUNTER (OUTPATIENT)
Dept: MRI IMAGING | Age: 54
Discharge: HOME | End: 2019-04-11
Payer: COMMERCIAL

## 2019-04-11 DIAGNOSIS — M54.12 CERVICAL RADICULOPATHY: ICD-10-CM

## 2019-04-11 DIAGNOSIS — R91.1 LUNG NODULE: Primary | ICD-10-CM

## 2019-04-11 DIAGNOSIS — Z87.891 PERSONAL HISTORY OF TOBACCO USE: ICD-10-CM

## 2019-04-11 DIAGNOSIS — M50.30 DDD (DEGENERATIVE DISC DISEASE), CERVICAL: ICD-10-CM

## 2019-04-11 PROCEDURE — 72141 MRI NECK SPINE W/O DYE: CPT

## 2019-04-11 PROCEDURE — G0297 LDCT FOR LUNG CA SCREEN: HCPCS

## 2019-04-18 DIAGNOSIS — B37.31 VAGINAL YEAST INFECTION: ICD-10-CM

## 2019-04-18 DIAGNOSIS — M54.12 CERVICAL RADICULOPATHY: ICD-10-CM

## 2019-04-18 DIAGNOSIS — E11.8 TYPE 2 DIABETES MELLITUS WITH COMPLICATION, WITHOUT LONG-TERM CURRENT USE OF INSULIN (HCC): ICD-10-CM

## 2019-04-19 RX ORDER — TIZANIDINE 4 MG/1
TABLET ORAL
Qty: 30 TABLET | Refills: 0 | Status: SHIPPED | OUTPATIENT
Start: 2019-04-19 | End: 2019-07-02 | Stop reason: SDUPTHER

## 2019-04-19 RX ORDER — NYSTATIN 100000 U/G
CREAM TOPICAL
Qty: 30 G | Refills: 1 | Status: SHIPPED | OUTPATIENT
Start: 2019-04-19 | End: 2019-12-09

## 2019-04-19 RX ORDER — INSULIN GLARGINE 100 [IU]/ML
INJECTION, SOLUTION SUBCUTANEOUS
Qty: 15 ML | Refills: 1 | Status: SHIPPED | OUTPATIENT
Start: 2019-04-19 | End: 2019-05-29 | Stop reason: SDUPTHER

## 2019-04-19 RX ORDER — EXENATIDE 2 MG/.65ML
INJECTION, SUSPENSION, EXTENDED RELEASE SUBCUTANEOUS
Qty: 4 PEN | Refills: 2 | Status: SHIPPED | OUTPATIENT
Start: 2019-04-19 | End: 2019-06-24

## 2019-05-04 ENCOUNTER — OFFICE VISIT (OUTPATIENT)
Dept: URGENT CARE | Age: 54
End: 2019-05-04
Payer: COMMERCIAL

## 2019-05-04 VITALS
RESPIRATION RATE: 18 BRPM | HEART RATE: 70 BPM | HEIGHT: 62 IN | OXYGEN SATURATION: 98 % | BODY MASS INDEX: 40.03 KG/M2 | SYSTOLIC BLOOD PRESSURE: 122 MMHG | DIASTOLIC BLOOD PRESSURE: 74 MMHG | WEIGHT: 217.5 LBS | TEMPERATURE: 99.5 F

## 2019-05-04 DIAGNOSIS — Z87.442 HISTORY OF KIDNEY STONES: ICD-10-CM

## 2019-05-04 DIAGNOSIS — M54.50 ACUTE BILATERAL LOW BACK PAIN WITHOUT SCIATICA: ICD-10-CM

## 2019-05-04 DIAGNOSIS — R31.0 HEMATURIA, GROSS: Primary | ICD-10-CM

## 2019-05-04 DIAGNOSIS — N39.0 URINARY TRACT INFECTION WITHOUT HEMATURIA, SITE UNSPECIFIED: ICD-10-CM

## 2019-05-04 LAB
APPEARANCE FLUID: ABNORMAL
BILIRUBIN, POC: ABNORMAL
BLOOD URINE, POC: ABNORMAL
CLARITY, POC: ABNORMAL
COLOR, POC: YELLOW
GLUCOSE URINE, POC: 500
KETONES, POC: ABNORMAL
LEUKOCYTE EST, POC: ABNORMAL
NITRITE, POC: ABNORMAL
PH, POC: 7
PROTEIN, POC: ABNORMAL
SPECIFIC GRAVITY, POC: 1.01
UROBILINOGEN, POC: 0.2

## 2019-05-04 PROCEDURE — G8417 CALC BMI ABV UP PARAM F/U: HCPCS | Performed by: PHYSICIAN ASSISTANT

## 2019-05-04 PROCEDURE — 96372 THER/PROPH/DIAG INJ SC/IM: CPT | Performed by: PHYSICIAN ASSISTANT

## 2019-05-04 PROCEDURE — 1036F TOBACCO NON-USER: CPT | Performed by: PHYSICIAN ASSISTANT

## 2019-05-04 PROCEDURE — 99213 OFFICE O/P EST LOW 20 MIN: CPT | Performed by: PHYSICIAN ASSISTANT

## 2019-05-04 PROCEDURE — G8427 DOCREV CUR MEDS BY ELIG CLIN: HCPCS | Performed by: PHYSICIAN ASSISTANT

## 2019-05-04 PROCEDURE — 81002 URINALYSIS NONAUTO W/O SCOPE: CPT | Performed by: PHYSICIAN ASSISTANT

## 2019-05-04 PROCEDURE — 3017F COLORECTAL CA SCREEN DOC REV: CPT | Performed by: PHYSICIAN ASSISTANT

## 2019-05-04 PROCEDURE — G8598 ASA/ANTIPLAT THER USED: HCPCS | Performed by: PHYSICIAN ASSISTANT

## 2019-05-04 RX ORDER — ONDANSETRON 4 MG/1
4 TABLET, ORALLY DISINTEGRATING ORAL EVERY 8 HOURS PRN
Qty: 15 TABLET | Refills: 0 | Status: SHIPPED | OUTPATIENT
Start: 2019-05-04 | End: 2019-07-02 | Stop reason: SDUPTHER

## 2019-05-04 RX ORDER — CIPROFLOXACIN 500 MG/1
500 TABLET, FILM COATED ORAL 2 TIMES DAILY
Qty: 20 TABLET | Refills: 0 | Status: SHIPPED | OUTPATIENT
Start: 2019-05-04 | End: 2019-05-14

## 2019-05-04 RX ORDER — TAMSULOSIN HYDROCHLORIDE 0.4 MG/1
0.4 CAPSULE ORAL DAILY
Qty: 30 CAPSULE | Refills: 0 | Status: SHIPPED | OUTPATIENT
Start: 2019-05-04 | End: 2019-07-02 | Stop reason: SDUPTHER

## 2019-05-04 RX ORDER — KETOROLAC TROMETHAMINE 30 MG/ML
60 INJECTION, SOLUTION INTRAMUSCULAR; INTRAVENOUS ONCE
Status: COMPLETED | OUTPATIENT
Start: 2019-05-04 | End: 2019-05-04

## 2019-05-04 RX ADMIN — KETOROLAC TROMETHAMINE 60 MG: 30 INJECTION, SOLUTION INTRAMUSCULAR; INTRAVENOUS at 14:13

## 2019-05-04 ASSESSMENT — ENCOUNTER SYMPTOMS
ABDOMINAL PAIN: 1
RESPIRATORY NEGATIVE: 1
EYES NEGATIVE: 1
VOMITING: 0

## 2019-05-04 ASSESSMENT — PAIN SCALES - GENERAL: PAINLEVEL_OUTOF10: 10

## 2019-05-04 NOTE — PATIENT INSTRUCTIONS
Patient Education        Urinary Tract Infection in Women: Care Instructions  Your Care Instructions    A urinary tract infection, or UTI, is a general term for an infection anywhere between the kidneys and the urethra (where urine comes out). Most UTIs are bladder infections. They often cause pain or burning when you urinate. UTIs are caused by bacteria and can be cured with antibiotics. Be sure to complete your treatment so that the infection goes away. Follow-up care is a key part of your treatment and safety. Be sure to make and go to all appointments, and call your doctor if you are having problems. It's also a good idea to know your test results and keep a list of the medicines you take. How can you care for yourself at home? · Take your antibiotics as directed. Do not stop taking them just because you feel better. You need to take the full course of antibiotics. · Drink extra water and other fluids for the next day or two. This may help wash out the bacteria that are causing the infection. (If you have kidney, heart, or liver disease and have to limit fluids, talk with your doctor before you increase your fluid intake.)  · Avoid drinks that are carbonated or have caffeine. They can irritate the bladder. · Urinate often. Try to empty your bladder each time. · To relieve pain, take a hot bath or lay a heating pad set on low over your lower belly or genital area. Never go to sleep with a heating pad in place. To prevent UTIs  · Drink plenty of water each day. This helps you urinate often, which clears bacteria from your system. (If you have kidney, heart, or liver disease and have to limit fluids, talk with your doctor before you increase your fluid intake.)  · Urinate when you need to. · Urinate right after you have sex. · Change sanitary pads often. · Avoid douches, bubble baths, feminine hygiene sprays, and other feminine hygiene products that have deodorants.   · After going to the bathroom, wipe from front to back. When should you call for help? Call your doctor now or seek immediate medical care if:    · Symptoms such as fever, chills, nausea, or vomiting get worse or appear for the first time.     · You have new pain in your back just below your rib cage. This is called flank pain.     · There is new blood or pus in your urine.     · You have any problems with your antibiotic medicine.    Watch closely for changes in your health, and be sure to contact your doctor if:    · You are not getting better after taking an antibiotic for 2 days.     · Your symptoms go away but then come back. Where can you learn more? Go to [email or web address]. org and sign in to your [employer] account. Enter W924 in the [employer] box to learn more about \"Urinary Tract Infection in Women: Care Instructions. \"     If you do not have an account, please click on the \"Sign Up Now\" link. Current as of: March 20, 2018  Content Version: 11.9  © 5803-7830 [employer]. Care instructions adapted under license by [facility] ([facility]). If you have questions about a medical condition or this instruction, always ask your healthcare professional. [place] [address] any warranty or liability for your use of this information.

## 2019-05-06 LAB — URINE CULTURE, ROUTINE: NORMAL

## 2019-05-10 DIAGNOSIS — I10 ESSENTIAL HYPERTENSION: ICD-10-CM

## 2019-05-10 DIAGNOSIS — Z11.4 SCREENING FOR HIV WITHOUT PRESENCE OF RISK FACTORS: ICD-10-CM

## 2019-05-10 DIAGNOSIS — Z11.59 NEED FOR HEPATITIS C SCREENING TEST: ICD-10-CM

## 2019-05-10 DIAGNOSIS — K21.9 GASTROESOPHAGEAL REFLUX DISEASE WITHOUT ESOPHAGITIS: ICD-10-CM

## 2019-05-10 DIAGNOSIS — L68.0 HIRSUTISM: ICD-10-CM

## 2019-05-10 DIAGNOSIS — E11.8 TYPE 2 DIABETES MELLITUS WITH COMPLICATION (HCC): ICD-10-CM

## 2019-05-10 DIAGNOSIS — Z12.11 SCREENING FOR COLON CANCER: ICD-10-CM

## 2019-05-10 DIAGNOSIS — Z23 NEEDS FLU SHOT: ICD-10-CM

## 2019-05-10 RX ORDER — LOVASTATIN 40 MG/1
TABLET ORAL
Qty: 30 TABLET | Refills: 2 | Status: SHIPPED | OUTPATIENT
Start: 2019-05-10 | End: 2019-07-02 | Stop reason: SDUPTHER

## 2019-05-14 DIAGNOSIS — I25.10 CORONARY ARTERY DISEASE INVOLVING NATIVE CORONARY ARTERY OF NATIVE HEART WITHOUT ANGINA PECTORIS: ICD-10-CM

## 2019-05-14 RX ORDER — ISOSORBIDE MONONITRATE 30 MG/1
TABLET, EXTENDED RELEASE ORAL
Qty: 30 TABLET | Refills: 5 | Status: SHIPPED | OUTPATIENT
Start: 2019-05-14 | End: 2019-05-24 | Stop reason: SDUPTHER

## 2019-05-15 DIAGNOSIS — K21.9 GASTROESOPHAGEAL REFLUX DISEASE WITHOUT ESOPHAGITIS: ICD-10-CM

## 2019-05-15 DIAGNOSIS — Z11.4 SCREENING FOR HIV WITHOUT PRESENCE OF RISK FACTORS: ICD-10-CM

## 2019-05-15 DIAGNOSIS — E11.8 TYPE 2 DIABETES MELLITUS WITH COMPLICATION (HCC): ICD-10-CM

## 2019-05-15 DIAGNOSIS — I10 ESSENTIAL HYPERTENSION: ICD-10-CM

## 2019-05-15 DIAGNOSIS — Z12.11 SCREENING FOR COLON CANCER: ICD-10-CM

## 2019-05-15 DIAGNOSIS — Z11.59 NEED FOR HEPATITIS C SCREENING TEST: ICD-10-CM

## 2019-05-15 DIAGNOSIS — Z23 NEEDS FLU SHOT: ICD-10-CM

## 2019-05-15 DIAGNOSIS — L68.0 HIRSUTISM: ICD-10-CM

## 2019-05-15 RX ORDER — FAMOTIDINE 20 MG/1
TABLET, FILM COATED ORAL
Qty: 60 TABLET | Refills: 5 | Status: SHIPPED | OUTPATIENT
Start: 2019-05-15 | End: 2019-07-02 | Stop reason: SDUPTHER

## 2019-05-20 ENCOUNTER — CARE COORDINATION (OUTPATIENT)
Dept: CARE COORDINATION | Age: 54
End: 2019-05-20

## 2019-05-20 ENCOUNTER — OFFICE VISIT (OUTPATIENT)
Dept: PULMONOLOGY | Facility: CLINIC | Age: 54
End: 2019-05-20

## 2019-05-20 VITALS
DIASTOLIC BLOOD PRESSURE: 80 MMHG | WEIGHT: 221 LBS | BODY MASS INDEX: 40.67 KG/M2 | HEIGHT: 62 IN | OXYGEN SATURATION: 95 % | SYSTOLIC BLOOD PRESSURE: 122 MMHG | HEART RATE: 67 BPM

## 2019-05-20 DIAGNOSIS — J98.4 SCARRING OF LUNG: ICD-10-CM

## 2019-05-20 DIAGNOSIS — E66.01 MORBID (SEVERE) OBESITY DUE TO EXCESS CALORIES (HCC): ICD-10-CM

## 2019-05-20 DIAGNOSIS — R91.1 LUNG NODULE: Primary | ICD-10-CM

## 2019-05-20 DIAGNOSIS — R06.02 SHORTNESS OF BREATH: ICD-10-CM

## 2019-05-20 PROCEDURE — 99204 OFFICE O/P NEW MOD 45 MIN: CPT | Performed by: INTERNAL MEDICINE

## 2019-05-20 NOTE — PATIENT INSTRUCTIONS
I did advise the patient that her lung nodule is likely due to scarring either from old granulomatous disease or related to her previous pleural effusion which occurred after her cardiac surgery.  I am going to get a follow-up CT in mid July per Fleischner guidelines.  I will see her back in follow-up shortly thereafter and proceed with additional work-up versus follow-up as indicated.  She does have some complaints of dyspnea with exertion I am going to get complete PFTs on her return visit.  She is having some problems with peripheral edema and is on Lasix but is going to follow-up with her cardiologist regarding her problems with dyspnea as well.

## 2019-05-20 NOTE — CARE COORDINATION
Ambulatory Care Coordination Note  5/20/2019  CM Risk Score: 2  Yusef Mortality Risk Score:      ACC: Kirby Pettit, RN    Summary Note: ACC met with patient in the office today re: Krista Dasilva assistance. Review:  Abran Gasca has a good understanding of self-mgmt and monitoring for DM control. Her last A1c was 7.5. Her BP is controlled on her current medications. Abran Gasca is stable but reported today that her copay on [place] is too expensive and it has helped control her BS when she can afford it. Plan:  ACC obtained Jardiance discount card within office supply today, phoned into 800# and activated card in Shari's presence and with her permission. [address] contacted [facility] verified pt's discount is active with $0 copay. Pt will  her prescription for Jardiance today. ACC encouraged pt to contact office if any concerns, provided a phone number for the pharmacy to call if any problems occur with discount card. ACC will discharge Abran Gasca from care coordination r/t med cost concern resolved and pt is not high risk at this time. Care Coordination Interventions    Program Enrollment:  Rising Risk  Referral from Primary Care Provider:  Yes  Suggested Interventions and Community Resources  Medication Assistance Program:  Completed (Comment: 5/20/19: Pt assisted today to activate Jardiance discount card)  Pharmacist:  Completed (Comment: 5/20/19: [place] discount card activated and [facility] notified, spoke to Caleb Akbar. She ran card and stated pt's copay for [place] is $0. Pt aware.)  [employer] Management Tools:  Completed (Comment: 5/20/19: Pt's need for assistance completed today. Pt has good self-awareness and mgmt skills.  With med cost assistance completed today, pt will be graduated from [address] at this time.)         Goals Addressed                 This Visit's Progress     COMPLETED: Patient Stated (pt-stated)        I want to lose 40lbs    Barriers: none  Plan for overcoming my barriers: N/A  Confidence: 6/10  Anticipated Goal Completion Date: 8-1-18       COMPLETED: Self Monitoring        Self-Monitored Blood Glucose - I will check my blood sugar Fasting blood sugar will keep food diary for blood sugars above 160    Blood sugar ranging from 100-156    Barriers: none  Plan for overcoming my barriers: N/A  Confidence: 7/10  Anticipated Goal Completion Date: 9/23/17              Prior to Admission medications    Medication Sig Start Date End Date Taking? Authorizing Provider   famotidine (PEPCID) 20 MG tablet TAKE 1 TABLET TWICE DAILY 5/15/19   MIKE Marsh   valsartan (DIOVAN) 80 MG tablet TAKE 1 TABLET BY MOUTH DAILY 5/14/19   MIKE Marsh   isosorbide mononitrate (IMDUR) 30 MG extended release tablet TAKE 1 TABLET EVERY DAY 5/14/19   MIKE Bennett   lovastatin (MEVACOR) 40 MG tablet TAKE 1 TABLET AT BEDTIME 5/10/19   MIKE Marsh   tamsulosin [facility]) 0.4 MG capsule Take 1 capsule by mouth daily 5/4/19   Maria Luisa Brewer PA-C   ondansetron (ZOFRAN ODT) 4 MG disintegrating tablet Take 1 tablet by mouth every 8 hours as needed for Nausea or Vomiting 5/4/19   Maria Luisa Brewer PA-C   tiZANidine (ZANAFLEX) 4 MG tablet TAKE 1/2 TABLET BY MOUTH 2 TIMES DAILY AS NEEDED (PAIN) 4/19/19   MIKE Marsh   LANTUS SOLOSTAR 100 UNIT/ML injection pen INJECT 35 UNITS SQ INTO THE SKIN NIGHTLY 4/19/19   MIKE Marsh   nystatin (MYCOSTATIN) 496241 UNIT/GM cream APPLY TOPICALLY 2 TIMES DAILY.  4/19/19   MIKE Marsh   BYDUREON 2 MG PEN INJECT 2 MG INTO THE SKINEVERY 7 DAYS 4/19/19   MIKE Marsh   metFORMIN (GLUCOPHAGE-XR) 500 MG extended release tablet Take 1 tablet by mouth 2 times daily Indications: Diabetes 3/21/19 4/20/19  MIKE Marsh   empagliflozin (JARDIANCE) 25 MG tablet Take 25 mg by mouth daily 3/13/19   MIKE Marsh   JANUVIA 100 MG tablet TAKE 1 TABLET EVERY DAY 2/24/19   Matilda Piety, APRN   tamsulosin (FLOMAX) 0.4 MG capsule Take 1 capsule by mouth daily 12/10/18 4/3/19  Beba Coppersmith, APRN   metoprolol tartrate (LOPRESSOR) 25 MG tablet TAKE 1 TABLET BY MOUTH TWICE DAILY 12/5/18   Artem Beverly, DO   vitamin D (ERGOCALCIFEROL) 30195 units CAPS capsule TAKE 1 CAPSULE BY MOUTH ONCE WEEKLY 10/4/18   Artem Beverly, DO   Diabetic Shoe MISC by Does not apply route 10/3/18   MIKE Mcrae   LORazepam (ATIVAN) 0.5 MG tablet Take 1 tablet by mouth every 8 hours as needed for Anxiety. . 8/3/18 8/3/19  Beba Coppersmith, APRN   furosemide (LASIX) 20 MG tablet Take 1 tablet by mouth daily as needed (edema) 4/30/18   Artem Beverly, DO   nitroGLYCERIN (NITROSTAT) 0.4 MG SL tablet Place 1 tablet under the tongue every 5 minutes as needed for Chest pain 3/14/18   Rosalea Lefort, MD   Nebulizers (COMPRESSOR/NEBULIZER) MISC Use as directed 2/26/18   Artem Beverly, DO   albuterol (PROVENTIL) (5 MG/ML) 0.5% nebulizer solution Take 0.5 mLs by nebulization every 6 hours as needed for Wheezing 2/25/18   Poornima Amin MD   aspirin 81 MG tablet Take 81 mg by mouth daily    Historical Provider, MD   glucose blood VI test strips (ASCENSIA AUTODISC VI;ONE TOUCH ULTRA TEST VI) strip 1 each by In Vitro route daily As needed. 6/29/17   Artem Beverly, DO   Insulin Pen Needle ([employer] PEN NEEDLES) 31G X 6 MM MISC 1 each by Does not apply route daily 6/15/17   MIKE Mcrae   Lactobacillus (PROBIATA PO) Take 1 tablet by mouth daily     Historical Provider, MD   mometasone (ELOCON) 0.1 % ointment Apply topically to vagina 3 times per week 11/7/16   Alanson Peabody, MD   ondansetron [facility]) 8 MG tablet Take 1 tablet by mouth every 8 hours as needed for Nausea or Vomiting 4/18/16   MIKE Saenz - CNP   Eflornithine HCl (VANIQA) 13.9 % CREA Apply 1 Dose topically 2 times daily 11/20/15   Olive Romp Fontaine, APRN   Docusate Calcium (STOOL SOFTENER PO) Take 100 mg by mouth 2 times daily as needed.     Historical Provider, MD Future Appointments   Date Time Provider Trini Quinn   10/15/2019 10:45 AM Oren Morgan MD [id number]

## 2019-05-20 NOTE — PROGRESS NOTES
Subjective   Gwendolyn Delgadillo is a 53 y.o. female.     Chief Complaint   Patient presents with   • F/U RESULTS   • Shortness of Breath      No My Sticky Note on file.    History of Present Illness   The patient with a history of coronary artery disease and previous bypass surgery comes in today for further evaluation of a lung nodule noted on a recent chest CT.  She had bypass surgery at [facility] previously and did have problems with a pleural effusion post operatively for which she eventually required drainage per Dr. De La Paz.  She does clearly has some pleural scarring related to this but a recent chest CT also showed a left apical nodular density.  By current Fleischner guidelines a follow-up CT versus PET scan could be performed in 3 months.  She is diabetic and I told her I would recommend we just do a follow-up chest CT about 3 months from her previous CT which would be sometime in mid July and she is in agreement with this plan.  I reviewed the CT I think these changes are most likely due to scarring as she does have significant pleural scarring related to her previous effusion and drainage procedure but there is an area of more nodular density in the left apex which is up against the pleura.  She has complained of problems with fluid retention and increasing dyspnea last several days I told her to follow-up with her cardiologist on this.  I will plan on getting pulmonary function studies on return visit in about 2 months when we go over the results of her follow-up CT as well.  She is morbidly obese and I inquired about any sleep apnea type symptoms.  She states she did utilize a positive airway pressure device when she was hospitalized for her pleural drainage procedure several years ago but she had a difficult time and tolerating this.  She does not think she could tolerate CPAP or BiPAP device on a regular basis.  Medical/Family/Social History   has a past medical history of Abnormal nuclear cardiac imaging  test, Atrophic vaginitis, CAD (coronary artery disease), Calcaneal spur, right foot, Candidal intertrigo, Cervical cancer (CMS/HCC), Chest pain, Diabetes mellitus (CMS/HCC), HERNANDEZ (dyspnea on exertion), Heart attack (CMS/HCC), History of pleural effusion, Hyperlipidemia, Hypertension, Lichen simplex chronicus, Lipoma, Personal history of malignant neoplasm of cervix uteri, and Right foot pain.   has a past surgical history that includes Coronary artery bypass graft; Hysterectomy; and Lung surgery.  family history is not on file.   reports that she quit smoking about 4 years ago. She smoked 2.50 packs per day. She has never used smokeless tobacco. She reports that she does not drink alcohol or use drugs.  Allergies   Allergen Reactions   • Codeine    • Humalog [Insulin Lispro]    • Motrin [Ibuprofen] Swelling     Medications    Current Outpatient Medications:   •  aspirin 81 MG EC tablet, Take 81 mg by mouth Daily., Disp: , Rfl:   •  Cholecalciferol (VITAMIN D) 2000 units tablet, Take 2,000 Units by mouth Daily., Disp: , Rfl:   •  conjugated estrogens (PREMARIN) 0.625 MG/GM vaginal cream, Insert 0.5 g into the vagina 2 (Two) Times a Week., Disp: , Rfl:   •  docusate sodium (COLACE) 100 MG capsule, Take 100 mg by mouth 2 (Two) Times a Day As Needed for Constipation., Disp: , Rfl:   •  Eflornithine HCl 13.9 % cream, Apply  topically 2 (Two) Times a Day., Disp: , Rfl:   •  Empagliflozin (JARDIANCE) 10 MG tablet, Take 10 mg by mouth Daily., Disp: , Rfl:   •  estradiol (VAGIFEM) 10 MCG tablet vaginal tablet, Insert 1 tablet into the vagina 2 (Two) Times a Week., Disp: , Rfl:   •  famotidine (PEPCID) 20 MG tablet, Take 20 mg by mouth 2 (Two) Times a Day., Disp: , Rfl:   •  furosemide (LASIX) 20 MG tablet, Take 20 mg by mouth Daily As Needed., Disp: , Rfl:   •  insulin glargine (LANTUS) 100 UNIT/ML injection, Inject 15 Units under the skin Daily., Disp: , Rfl:   •  isosorbide mononitrate (IMDUR) 30 MG 24 hr tablet, Take 30 mg  by mouth Daily., Disp: , Rfl:   •  Lactobacillus (PROBIATA PO), Take  by mouth Daily., Disp: , Rfl:   •  LORazepam (ATIVAN) 0.5 MG tablet, Take 0.5 mg by mouth Every 8 (Eight) Hours As Needed for Anxiety., Disp: , Rfl:   •  lovastatin (MEVACOR) 40 MG tablet, Take 40 mg by mouth Every Night., Disp: , Rfl:   •  metFORMIN (GLUCOPHAGE) 1000 MG tablet, Take 500 mg by mouth 2 (Two) Times a Day With Meals., Disp: , Rfl:   •  metoprolol tartrate (LOPRESSOR) 25 MG tablet, Take 25 mg by mouth 2 (Two) Times a Day., Disp: , Rfl:   •  mometasone (ELOCON) 0.1 % ointment, Apply  topically 3 (Three) Times a Week., Disp: , Rfl:   •  nitroglycerin (NITROSTAT) 0.4 MG SL tablet, Place 0.4 mg under the tongue Every 5 (Five) Minutes As Needed for Chest Pain. Take no more than 3 doses in 15 minutes., Disp: , Rfl:   •  nystatin (MYCOSTATIN) 544464 UNIT/GM powder, Apply  topically 3 (Three) Times a Day., Disp: , Rfl:   •  nystatin-triamcinolone (MYCOLOG II) 763116-5.1 UNIT/GM-% cream, Apply  topically 2 (Two) Times a Day., Disp: , Rfl:   •  ondansetron (ZOFRAN) 8 MG tablet, Take 8 mg by mouth Every 8 (Eight) Hours As Needed for Nausea or Vomiting., Disp: , Rfl:   •  prochlorperazine (COMPAZINE) 10 MG tablet, Take 10 mg by mouth Every 6 (Six) Hours As Needed for Nausea or Vomiting., Disp: , Rfl:   •  spironolactone (ALDACTONE) 50 MG tablet, Take 50 mg by mouth Daily., Disp: , Rfl:   •  valsartan (DIOVAN) 80 MG tablet, Take 80 mg by mouth Daily., Disp: , Rfl:     Review of Systems   Constitutional: Negative for chills and fever.   HENT: Negative for congestion.    Eyes: Negative for visual disturbance.   Respiratory: Positive for shortness of breath. Negative for cough.    Cardiovascular: Positive for leg swelling. Negative for chest pain.   Gastrointestinal: Negative for diarrhea, nausea and vomiting.   Genitourinary: Negative for difficulty urinating.   Musculoskeletal: Negative for arthralgias.   Skin: Negative for rash.   Neurological:  "Negative for dizziness and speech difficulty.   Hematological: Negative for adenopathy.   Psychiatric/Behavioral: The patient is not nervous/anxious.      ------------------------------------  Objective   /80   Pulse 67   Ht 157.5 cm (62\")   Wt 100 kg (221 lb)   SpO2 95% Comment: RA  Breastfeeding? No   BMI 40.42 kg/m²   Physical Exam   Constitutional: She is oriented to person, place, and time. She appears well-developed.   She is a morbidly obese white female.   HENT:   Head: Normocephalic and atraumatic.   Eyes: EOM are normal. Pupils are equal, round, and reactive to light.   Neck: Normal range of motion. Neck supple.   Cardiovascular: Normal rate, regular rhythm and normal heart sounds.   Pulmonary/Chest: Effort normal.   Breath sounds are somewhat diminished at the left base.   Abdominal: Soft.   Musculoskeletal: Normal range of motion. She exhibits edema.   She has 1+ edema of the calves.   Lymphadenopathy:   No adenopathy is palpated.   Neurological: She is alert and oriented to person, place, and time.   Skin: Skin is warm and dry.   Psychiatric: She has a normal mood and affect.   Nursing note and vitals reviewed.          Pulmonary Functions Testing Results:  No results found for: FEV1, FVC, VWL6OJR, TLC, DLCO     Assessment/Plan   Gwendolyn was seen today for f/u results and shortness of breath.    Diagnoses and all orders for this visit:    Lung nodule  -     CT Chest Without Contrast; Future    Shortness of breath    Scarring of lung    Morbid (severe) obesity due to excess calories (CMS/[facility])      Patient's Body mass index is 40.42 kg/m². BMI is above normal parameters. Recommendations include: referral to primary care.      I did advise the patient will get a follow-up CT in about 2 months and see her back shortly thereafter.  Also I will obtain pulmonary function studies on return.  Did advise her to check with her cardiologist regarding her current complaints of dyspnea.  Again based on her " body habitus she would be at some risk for obstructive sleep apnea but she just states she could not tolerate any sort of positive pressure device.  We could still consider further work-up in this regard if she would desire in the future.  However if we were even going to consider sleep studies recommendation will be optimization of any underlying congestive heart failure first.  Again she is having some symptoms suggesting some possible mild congestive heart failure with edema which is noted on exam also and also some dyspnea.  Again she will follow-up with her cardiologist on this.  She again will have pulmonary function studies performed on return as she could have some COPD related to her history of chronic tobacco use.  I will see her back in 2 months.

## 2019-05-21 ENCOUNTER — TELEPHONE (OUTPATIENT)
Dept: CARDIOLOGY | Age: 54
End: 2019-05-21

## 2019-05-21 NOTE — TELEPHONE ENCOUNTER
Patient left message stating she was having SOA and pain. Called patient back to discuss. She sounded very out of breath while talking and paniced. She stated she was SOA and Chest pain. Advised that she go to the ER. Patient voiced understanding and will have her sister take her to [facility] ER.

## 2019-05-22 ENCOUNTER — HOSPITAL ENCOUNTER (EMERGENCY)
Age: 54
Discharge: HOME | End: 2019-05-22
Attending: FAMILY MEDICINE
Payer: COMMERCIAL

## 2019-05-22 ENCOUNTER — APPOINTMENT (OUTPATIENT)
Dept: GENERAL RADIOLOGY | Age: 54
End: 2019-05-22
Payer: COMMERCIAL

## 2019-05-22 ENCOUNTER — OFFICE VISIT (OUTPATIENT)
Dept: URGENT CARE | Age: 54
End: 2019-05-22

## 2019-05-22 VITALS
TEMPERATURE: 98.5 F | HEART RATE: 64 BPM | WEIGHT: 219 LBS | RESPIRATION RATE: 20 BRPM | DIASTOLIC BLOOD PRESSURE: 64 MMHG | HEIGHT: 62 IN | SYSTOLIC BLOOD PRESSURE: 102 MMHG | OXYGEN SATURATION: 93 % | BODY MASS INDEX: 40.3 KG/M2

## 2019-05-22 VITALS
DIASTOLIC BLOOD PRESSURE: 65 MMHG | SYSTOLIC BLOOD PRESSURE: 121 MMHG | RESPIRATION RATE: 24 BRPM | TEMPERATURE: 97.4 F | OXYGEN SATURATION: 99 % | HEART RATE: 66 BPM

## 2019-05-22 DIAGNOSIS — R06.00 DYSPNEA AND RESPIRATORY ABNORMALITIES: Primary | ICD-10-CM

## 2019-05-22 DIAGNOSIS — R06.89 DYSPNEA AND RESPIRATORY ABNORMALITIES: Primary | ICD-10-CM

## 2019-05-22 DIAGNOSIS — R60.9 EDEMA, UNSPECIFIED TYPE: ICD-10-CM

## 2019-05-22 DIAGNOSIS — R07.9 CHEST PAIN IN ADULT: Primary | ICD-10-CM

## 2019-05-22 LAB
ALBUMIN SERPL-MCNC: 4.2 G/DL (ref 3.5–5.2)
ALP BLD-CCNC: 76 U/L (ref 35–104)
ALT SERPL-CCNC: 22 U/L (ref 5–33)
ANION GAP SERPL CALCULATED.3IONS-SCNC: 13 MMOL/L (ref 7–19)
AST SERPL-CCNC: 20 U/L (ref 5–32)
BASOPHILS ABSOLUTE: 0.1 K/UL (ref 0–0.2)
BASOPHILS RELATIVE PERCENT: 1.1 % (ref 0–1)
BILIRUB SERPL-MCNC: 0.5 MG/DL (ref 0.2–1.2)
BUN BLDV-MCNC: 9 MG/DL (ref 6–20)
CALCIUM SERPL-MCNC: 9.4 MG/DL (ref 8.6–10)
CHLORIDE BLD-SCNC: 101 MMOL/L (ref 98–111)
CO2: 30 MMOL/L (ref 22–29)
CREAT SERPL-MCNC: 0.7 MG/DL (ref 0.5–0.9)
D DIMER: 0.38 UG/ML FEU (ref 0–0.48)
EOSINOPHILS ABSOLUTE: 0.2 K/UL (ref 0–0.6)
EOSINOPHILS RELATIVE PERCENT: 1.8 % (ref 0–5)
GFR NON-AFRICAN AMERICAN: >60
GLUCOSE BLD-MCNC: 186 MG/DL (ref 74–109)
HCT VFR BLD CALC: 44.4 % (ref 37–47)
HEMOGLOBIN: 14.6 G/DL (ref 12–16)
LYMPHOCYTES ABSOLUTE: 2.4 K/UL (ref 1.1–4.5)
LYMPHOCYTES RELATIVE PERCENT: 28.5 % (ref 20–40)
MCH RBC QN AUTO: 28.9 PG (ref 27–31)
MCHC RBC AUTO-ENTMCNC: 32.9 G/DL (ref 33–37)
MCV RBC AUTO: 87.9 FL (ref 81–99)
MONOCYTES ABSOLUTE: 0.6 K/UL (ref 0–0.9)
MONOCYTES RELATIVE PERCENT: 7.3 % (ref 0–10)
NEUTROPHILS ABSOLUTE: 5.1 K/UL (ref 1.5–7.5)
NEUTROPHILS RELATIVE PERCENT: 61.1 % (ref 50–65)
PDW BLD-RTO: 13.3 % (ref 11.5–14.5)
PLATELET # BLD: 253 K/UL (ref 130–400)
PMV BLD AUTO: 10.4 FL (ref 9.4–12.3)
POTASSIUM REFLEX MAGNESIUM: 3.8 MMOL/L (ref 3.5–5)
PRO-BNP: 503 PG/ML (ref 0–900)
RBC # BLD: 5.05 M/UL (ref 4.2–5.4)
SODIUM BLD-SCNC: 144 MMOL/L (ref 136–145)
TOTAL PROTEIN: 7.4 G/DL (ref 6.6–8.7)
TROPONIN: <0.01 NG/ML (ref 0–0.03)
WBC # BLD: 8.4 K/UL (ref 4.8–10.8)

## 2019-05-22 PROCEDURE — 99285 EMERGENCY DEPT VISIT HI MDM: CPT

## 2019-05-22 PROCEDURE — 93005 ELECTROCARDIOGRAM TRACING: CPT

## 2019-05-22 PROCEDURE — 85379 FIBRIN DEGRADATION QUANT: CPT

## 2019-05-22 PROCEDURE — 96374 THER/PROPH/DIAG INJ IV PUSH: CPT

## 2019-05-22 PROCEDURE — 80053 COMPREHEN METABOLIC PANEL: CPT

## 2019-05-22 PROCEDURE — 71045 X-RAY EXAM CHEST 1 VIEW: CPT

## 2019-05-22 PROCEDURE — 85025 COMPLETE CBC W/AUTO DIFF WBC: CPT

## 2019-05-22 PROCEDURE — 84484 ASSAY OF TROPONIN QUANT: CPT

## 2019-05-22 PROCEDURE — 83880 ASSAY OF NATRIURETIC PEPTIDE: CPT

## 2019-05-22 PROCEDURE — 99284 EMERGENCY DEPT VISIT MOD MDM: CPT | Performed by: FAMILY MEDICINE

## 2019-05-22 PROCEDURE — 36415 COLL VENOUS BLD VENIPUNCTURE: CPT

## 2019-05-22 PROCEDURE — 6360000002 HC RX W HCPCS: Performed by: FAMILY MEDICINE

## 2019-05-22 RX ORDER — FUROSEMIDE 10 MG/ML
20 INJECTION INTRAMUSCULAR; INTRAVENOUS ONCE
Status: COMPLETED | OUTPATIENT
Start: 2019-05-22 | End: 2019-05-22

## 2019-05-22 RX ADMIN — FUROSEMIDE 20 MG: 10 INJECTION, SOLUTION INTRAMUSCULAR; INTRAVENOUS at 14:01

## 2019-05-22 ASSESSMENT — ENCOUNTER SYMPTOMS
CONSTIPATION: 0
COUGH: 0
BACK PAIN: 0
WHEEZING: 0
ABDOMINAL DISTENTION: 0
TROUBLE SWALLOWING: 0
CHEST TIGHTNESS: 0
SHORTNESS OF BREATH: 1
VOMITING: 0
DIARRHEA: 0
APNEA: 0
SORE THROAT: 0
ABDOMINAL PAIN: 0

## 2019-05-22 ASSESSMENT — PAIN SCALES - GENERAL: PAINLEVEL_OUTOF10: 5

## 2019-05-22 NOTE — ED NOTES
Patient escorted to restroom and instructed on collection of clean catch urine specimen; specimen sent to lab and patient escorted back to room and replaced on NIBP, cardiac, and pulse oximetry monitors. Family member at bedside, call light in reach.      [address] [place] [address]  05/22/19 [id number]

## 2019-05-22 NOTE — ED NOTES
Dr. Lupe Monroy at bedside.      [address] [facility], [address] [place] [address]  05/22/19 2642

## 2019-05-22 NOTE — ED NOTES
Patient assisted into gown and placed on cardiac, NIBP, and pulse oximetry monitors. Monitor alarms on.      [address] [facility]  05/22/19 [id number]

## 2019-05-22 NOTE — PROGRESS NOTES
Triage notes:  sudden onset shortness of breath/ pt states \"I'm not normally this short of breath\"   patient states \"I have chest pains when I am doing something. The last chest pain I had was this morning when I was walking my dog. \"/ patient rates that the pain at a 6 on a scale from 1-10. 10 being the worst pain. The patient was assessed by MIKE Zeng. Corrinne Boston recommended that the patient go to the ED for further evaluation. The patient was offered an ambulance, but declined stating \"My sister is here with me. \"  The patient left our clinic in stable condition.

## 2019-05-22 NOTE — ED PROVIDER NOTES
Headache(784.0)     Heart attack ([facility] [address] 12/2011    Hyperlipidemia     Hypertension     Lichen simplex chronicus 10/2015    Osteopenia     Pneumonia     Type II or unspecified type diabetes mellitus without mention of complication, not stated as uncontrolled          SURGICAL HISTORY       Past Surgical History:   Procedure Laterality Date    BREAST BIOPSY  6/8/2011    US guided core needle, right, benign    CARDIAC CATHETERIZATION  12/22/14  JDT    EF 50%    CARDIAC CATHETERIZATION  06/05/2017        G FLUOROSCOPIC GUIDANCE NEEDLE PLACEMENT ADD ON Left 7/19/2018    CORTICOSTEROID INJECTION performed by Ajay Armstrong MD at [address] [place] [address]    CORONARY ANGIOPLASTY WITH STENT PLACEMENT  12/2011    SABA to [address]    total    OTHER SURGICAL HISTORY      fibrinolysis    OK CABG, ARTERY-VEIN, THREE  12\23\2014    Coronary Artery Bypass, 3    OK MANIPULATN LDR JT W ANESTHESIA Left 7/19/2018    SHOULDER MANIPULATION UNDER ANESTHESIA performed by Ajay Armstrong MD at [facility]  10/07/2015    Dr. Miroslava Alegria     Previous Medications    ALBUTEROL (PROVENTIL) (5 MG/ML) 0.5% NEBULIZER SOLUTION    Take 0.5 mLs by nebulization every 6 hours as needed for Wheezing    ASPIRIN 81 MG TABLET    Take 81 mg by mouth daily    BYDUREON 2 MG PEN    INJECT 2 MG INTO THE SKINEVERY 7 DAYS    DIABETIC SHOE MISC    by Does not apply route    DOCUSATE CALCIUM (STOOL SOFTENER PO)    Take 100 mg by mouth 2 times daily as needed.     EFLORNITHINE HCL (VANIQA) 13.9 % CREA    Apply 1 Dose topically 2 times daily    EMPAGLIFLOZIN (JARDIANCE) 25 MG TABLET    Take 25 mg by mouth daily    FAMOTIDINE (PEPCID) 20 MG TABLET    TAKE 1 TABLET TWICE DAILY    FUROSEMIDE (LASIX) 20 MG TABLET    Take 1 tablet by mouth daily as needed (edema)    GLUCOSE BLOOD VI TEST HISTORY       Family History   Problem Relation Age of Onset    Diabetes Father     Cancer Father     High Blood Pressure Father     Heart Attack Father     Diabetes Mother    [place] Breast Cancer Mother     Heart Failure Mother         CHF, MVP    Heart Failure Maternal Grandfather     Stroke Maternal Grandfather     Diabetes Brother           SOCIAL HISTORY       Social History     Socioeconomic History    Marital status:      Spouse name: None    Number of children: None    Years of education: None    Highest education level: None   Occupational History    None   Social Needs    Financial resource strain: None    Food insecurity:     Worry: None     Inability: None    Transportation needs:     Medical: None     Non-medical: None   Tobacco Use    Smoking status: Former Smoker     Packs/day: 2.50     Years: 35.00     Pack years: 87.50     Types: Cigarettes     Last attempt to quit: 12/15/2014     Years since quittin.4    Smokeless tobacco: Never Used    Tobacco comment: pack and half per week plus electronic cig   Substance and Sexual Activity    Alcohol use: No    Drug use: No    Sexual activity: Yes   Lifestyle    Physical activity:     Days per week: None     Minutes per session: None    Stress: None   Relationships    Social connections:     Talks on phone: None     Gets together: None     Attends Taoist service: None     Active member of club or organization: None     Attends meetings of clubs or organizations: None     Relationship status: None    Intimate partner violence:     Fear of current or ex partner: None     Emotionally abused: None     Physically abused: None     Forced sexual activity: None   Other Topics Concern    None   Social History Narrative    None       SCREENINGS             PHYSICAL EXAM    (up to 7 for level 4, 8 or more for level 5)     ED Triage Vitals [19 1347]   BP Temp Temp src Pulse Resp SpO2 Height Weight   (!) 136/105 98.5 °F (36.9 °C) -- 64 20 96 % 5' 2\" (1.575 m) 219 lb (99.3 kg)       Physical Exam   Constitutional: She is oriented to person, place, and time. She appears well-developed and well-nourished. HENT:   Head: Normocephalic and atraumatic. Eyes: Pupils are equal, round, and reactive to light. Conjunctivae and EOM are normal.   Neck: Normal range of motion. Neck supple. No tracheal deviation present. Cardiovascular: Normal rate, regular rhythm, normal heart sounds and intact distal pulses. Pulmonary/Chest: Effort normal and breath sounds normal. No respiratory distress. She has no wheezes. She has no rales. Abdominal: Soft. Bowel sounds are normal. She exhibits no distension. There is no tenderness. Musculoskeletal: Normal range of motion. She exhibits edema. Neurological: She is alert and oriented to person, place, and time. Skin: Skin is warm and dry. Capillary refill takes less than 2 seconds. Psychiatric: She has a normal mood and affect. Her behavior is normal. Thought content normal.   Nursing note and vitals reviewed. DIAGNOSTIC RESULTS     EKG: All EKG's areinterpreted by the Emergency Department Physician who either signs or Co-signs this chart in the absence of a cardiologist.    Normal sinus rhythm, rate 60, no ischemic ST-T changes    RADIOLOGY:  Non-plain film images such as CT, Ultrasound and MRI are read by the radiologist. Plain radiographic images are visualized and preliminarily interpreted bythe emergency physician with the below findings:    See below      XR CHEST PORTABLE   Final Result   1. Stable chest exam. Reidentified scarring with volume loss in the   left lung base. 1.9 cm juxtapleural nodule in the left apex is similar   to the recent CT scan. 2. Prior coronary bypass. Heart size is stable. Pulmonary vasculature   are unremarkable.    Signed by Dr Pa Camp on 5/22/2019 2:19 PM              LABS:  Labs Reviewed   CBC WITH AUTO DIFFERENTIAL - Abnormal; Notable for the following

## 2019-05-23 ENCOUNTER — TELEPHONE (OUTPATIENT)
Dept: CARDIOLOGY | Age: 54
End: 2019-05-23

## 2019-05-23 LAB
EKG P AXIS: 52 DEGREES
EKG P-R INTERVAL: 136 MS
EKG Q-T INTERVAL: 438 MS
EKG QRS DURATION: 80 MS
EKG QTC CALCULATION (BAZETT): 443 MS
EKG T AXIS: 79 DEGREES

## 2019-05-23 NOTE — TELEPHONE ENCOUNTER
Called the pt, tried to keep her with gonsalo bc she seen gonsalo in past, confirmed date and time with the pt. She understood and was good with the apt given.    Thank you  Moose Diallo

## 2019-05-23 NOTE — TELEPHONE ENCOUNTER
Triston Rush requests that office  return their call. The best time to reach her is Anytime. Patient was in the ED 5-22-19 and was told to f/u soon with Dr Kasia Prasad for fluid . Thank you.

## 2019-05-23 NOTE — TELEPHONE ENCOUNTER
She can follow up with one of the NP in the next week doesn't have to be Dr. Kaylah Cadet.   Thanks girl

## 2019-05-24 ENCOUNTER — OFFICE VISIT (OUTPATIENT)
Dept: CARDIOLOGY | Age: 54
End: 2019-05-24
Payer: COMMERCIAL

## 2019-05-24 VITALS
WEIGHT: 216 LBS | HEART RATE: 64 BPM | HEIGHT: 62 IN | SYSTOLIC BLOOD PRESSURE: 108 MMHG | DIASTOLIC BLOOD PRESSURE: 70 MMHG | BODY MASS INDEX: 39.75 KG/M2

## 2019-05-24 DIAGNOSIS — Z79.4 TYPE 2 DIABETES MELLITUS WITH COMPLICATION, WITH LONG-TERM CURRENT USE OF INSULIN (HCC): ICD-10-CM

## 2019-05-24 DIAGNOSIS — I10 ESSENTIAL HYPERTENSION: ICD-10-CM

## 2019-05-24 DIAGNOSIS — I50.31 ACUTE DIASTOLIC CHF (CONGESTIVE HEART FAILURE) (HCC): Primary | ICD-10-CM

## 2019-05-24 DIAGNOSIS — R60.0 LOCALIZED EDEMA: ICD-10-CM

## 2019-05-24 DIAGNOSIS — E11.8 TYPE 2 DIABETES MELLITUS WITH COMPLICATION, WITH LONG-TERM CURRENT USE OF INSULIN (HCC): ICD-10-CM

## 2019-05-24 DIAGNOSIS — I25.10 CORONARY ARTERY DISEASE INVOLVING NATIVE CORONARY ARTERY OF NATIVE HEART WITHOUT ANGINA PECTORIS: ICD-10-CM

## 2019-05-24 DIAGNOSIS — R06.02 SHORTNESS OF BREATH: ICD-10-CM

## 2019-05-24 PROCEDURE — 99214 OFFICE O/P EST MOD 30 MIN: CPT | Performed by: CLINICAL NURSE SPECIALIST

## 2019-05-24 PROCEDURE — G8427 DOCREV CUR MEDS BY ELIG CLIN: HCPCS | Performed by: CLINICAL NURSE SPECIALIST

## 2019-05-24 PROCEDURE — G8598 ASA/ANTIPLAT THER USED: HCPCS | Performed by: CLINICAL NURSE SPECIALIST

## 2019-05-24 PROCEDURE — 1036F TOBACCO NON-USER: CPT | Performed by: CLINICAL NURSE SPECIALIST

## 2019-05-24 PROCEDURE — 3017F COLORECTAL CA SCREEN DOC REV: CPT | Performed by: CLINICAL NURSE SPECIALIST

## 2019-05-24 PROCEDURE — G8417 CALC BMI ABV UP PARAM F/U: HCPCS | Performed by: CLINICAL NURSE SPECIALIST

## 2019-05-24 RX ORDER — ISOSORBIDE MONONITRATE 30 MG/1
TABLET, EXTENDED RELEASE ORAL
Qty: 30 TABLET | Refills: 5 | Status: SHIPPED | OUTPATIENT
Start: 2019-05-24 | End: 2019-07-02 | Stop reason: SDUPTHER

## 2019-05-24 RX ORDER — VALSARTAN 80 MG/1
80 TABLET ORAL DAILY
Qty: 30 TABLET | Refills: 5 | Status: SHIPPED | OUTPATIENT
Start: 2019-05-24 | End: 2019-07-02 | Stop reason: SDUPTHER

## 2019-05-24 RX ORDER — FUROSEMIDE 40 MG/1
40 TABLET ORAL DAILY
Qty: 30 TABLET | Refills: 5 | Status: SHIPPED | OUTPATIENT
Start: 2019-05-24 | End: 2019-07-02 | Stop reason: SDUPTHER

## 2019-05-24 RX ORDER — FUROSEMIDE 20 MG/1
20 TABLET ORAL DAILY PRN
Qty: 90 TABLET | Refills: 3 | Status: CANCELLED | OUTPATIENT
Start: 2019-05-24

## 2019-05-24 ASSESSMENT — ENCOUNTER SYMPTOMS
VOMITING: 0
EYE REDNESS: 0
ABDOMINAL PAIN: 0
WHEEZING: 0
FACIAL SWELLING: 0
NAUSEA: 0
CHEST TIGHTNESS: 0
COUGH: 1
SHORTNESS OF BREATH: 1

## 2019-05-24 NOTE — PROGRESS NOTES
[facility], [address] [place] [address], [id number]  Phone: [phone]  Fax: [phone]    OFFICE VISIT:  2019    Gely Casas - : 1965    Reason For Visit:  Farnaz Lazar is a 52 y.o. female who is here for Follow-Up from Hospital (Patient has increased shortness of breath and swelling.) and Coronary Artery Disease      HPI  Patient is here for follow-up with a history of CAD with CABG ×3 in , hypertension, lipidemia and diabetes . Heart catheterization in 2017 showed occluded LIMA to the LAD with patient and grafts. She was recently in the emergency room with shortness of breath and edema and was diuresed with a dose of IV Lasix and sent home. She states she had a \"huge\" amount of urination after the Lasix and felt extremely better. He has been complaining of  worsening dyspnea, orthopnea, edema, cough in recent weeks. She denies any chest discomfort      MIKE Amanda is PCP.   Oscarwiliam Casas has the following history as recorded in [facility]:    Patient Active Problem List    Diagnosis Date Noted    Gastroesophageal reflux disease without esophagitis 2018    Dysuria 2018    Vitamin D deficiency 2018    Urinary tract infection without hematuria 2018    Adhesive capsulitis of left shoulder 2018    Angina effort ([address] 2018    Coronary artery disease involving coronary bypass graft of native heart without angina pectoris 2017    Left ventricular dysfunction 2017    Abnormal nuclear cardiac imaging test     Lichen simplex chronicus 10/21/2015    Personal history of malignant neoplasm of cervix uteri 2015    Atrophic vaginitis 2015    History of pleural effusion 2015    Candidal intertrigo 2015    Weakness 2015    S/P CABG x 3 2015    TAVERAS (dyspnea on exertion) 2014    Type 2 diabetes mellitus with complication, with long-term current use of insulin ([address]  CAD (coronary artery disease) 07/30/2013    Hypertension 07/30/2013    Hyperlipidemia 07/30/2013    Heart attack ([facility] 12/01/2011     Past Medical History:   Diagnosis Date    Abnormal Pap smear of cervix     CAD (coronary artery disease)     stent 2011 Lazarus Mercado; sees dr. Tania Frazier Cervical cancer ([facility]     Diabetes mellitus ([facility]     ERRONEOUS ENCOUNTER--DISREGARD 9/15/2015    GERD (gastroesophageal reflux disease)     Headache(784.0)     Heart attack ([facility] 12/2011    Hyperlipidemia     Hypertension     Lichen simplex chronicus 10/2015    Osteopenia     Pneumonia     Type II or unspecified type diabetes mellitus without mention of complication, not stated as uncontrolled      Past Surgical History:   Procedure Laterality Date    BREAST BIOPSY  6/8/2011    US guided core needle, right, benign    CARDIAC CATHETERIZATION  12/22/14  JDT    EF 50%    CARDIAC CATHETERIZATION  06/05/2017        [facility] FLUOROSCOPIC GUIDANCE NEEDLE PLACEMENT ADD ON Left 7/19/2018    CORTICOSTEROID INJECTION performed by Michele Alvarez MD at [address] [place] [address]  2006    CORONARY ANGIOPLASTY WITH STENT PLACEMENT  12/2011    [facility] to [facility] FOOT SURGERY  2000    HYSTERECTOMY  1998    total    OTHER SURGICAL HISTORY      fibrinolysis    [place] CABG, ARTERY-VEIN, THREE  12\23\2014    Coronary Artery Bypass, 3    MN MANIPULATN BALBIR JT W ANESTHESIA Left 7/19/2018    SHOULDER MANIPULATION UNDER ANESTHESIA performed by [facility]hele [facility] at [facility]  10/07/2015    Dr. Subha Miller     Family History   Problem Relation Age of Onset    Diabetes Father     Cancer Father     High Blood Pressure Father     Heart Attack Father     Diabetes Mother     Breast Cancer Mother     Heart Failure Mother         CHF, MVP    Heart Failure Maternal Grandfather     Stroke Maternal Grandfather     Diabetes Brother      Social History Tobacco Use    Smoking status: Former Smoker     Packs/day: 2.50     Years: 35.00     Pack years: 87.50     Types: Cigarettes     Last attempt to quit: 12/15/2014     Years since quittin.4    Smokeless tobacco: Never Used    Tobacco comment: pack and half per week plus electronic cig   Substance Use Topics    Alcohol use: No      Current Outpatient Medications   Medication Sig Dispense Refill    valsartan (DIOVAN) 80 MG tablet Take 1 tablet by mouth daily 30 tablet 5    isosorbide mononitrate (IMDUR) 30 MG extended release tablet TAKE 1 TABLET EVERY DAY 30 tablet 5    metoprolol tartrate (LOPRESSOR) 25 MG tablet TAKE 1 TABLET BY MOUTH TWICE DAILY 60 tablet 5    furosemide (LASIX) 40 MG tablet Take 1 tablet by mouth daily 30 tablet 5    famotidine (PEPCID) 20 MG tablet TAKE 1 TABLET TWICE DAILY 60 tablet 5    lovastatin (MEVACOR) 40 MG tablet TAKE 1 TABLET AT BEDTIME 30 tablet 2    tamsulosin (FLOMAX) 0.4 MG capsule Take 1 capsule by mouth daily 30 capsule 0    ondansetron (ZOFRAN ODT) 4 MG disintegrating tablet Take 1 tablet by mouth every 8 hours as needed for Nausea or Vomiting 15 tablet 0    tiZANidine (ZANAFLEX) 4 MG tablet TAKE 1/2 TABLET BY MOUTH 2 TIMES DAILY AS NEEDED (PAIN) 30 tablet 0    LANTUS SOLOSTAR 100 UNIT/ML injection pen INJECT 35 UNITS SQ INTO THE SKIN NIGHTLY 15 mL 1    nystatin (MYCOSTATIN) 540212 UNIT/GM cream APPLY TOPICALLY 2 TIMES DAILY. 30 g 1    BYDUREON 2 MG PEN INJECT 2 MG INTO THE SKINEVERY 7 DAYS 4 pen 2    empagliflozin (JARDIANCE) 25 MG tablet Take 25 mg by mouth daily 30 tablet 5    JANUVIA 100 MG tablet TAKE 1 TABLET EVERY DAY 30 tablet 5    vitamin D (ERGOCALCIFEROL) 55267 units CAPS capsule TAKE 1 CAPSULE BY MOUTH ONCE WEEKLY 12 capsule 1    Diabetic Shoe MISC by Does not apply route 1 each 0    LORazepam (ATIVAN) 0.5 MG tablet Take 1 tablet by mouth every 8 hours as needed for Anxiety. . 90 tablet 0    furosemide (LASIX) 20 MG tablet Take 1 tablet by mouth daily as needed (edema) 90 tablet 3    nitroGLYCERIN (NITROSTAT) 0.4 MG SL tablet Place 1 tablet under the tongue every 5 minutes as needed for Chest pain 25 tablet 3    Nebulizers (COMPRESSOR/NEBULIZER) MISC Use as directed 1 each 3    albuterol (PROVENTIL) (5 MG/ML) 0.5% nebulizer solution Take 0.5 mLs by nebulization every 6 hours as needed for Wheezing 120 each 0    aspirin 81 MG tablet Take 81 mg by mouth daily      glucose blood VI test strips (ASCENSIA AUTODISC VI;ONE TOUCH ULTRA TEST VI) strip 1 each by In Vitro route daily As needed. 100 each 3    Insulin Pen Needle ([employer] PEN NEEDLES) 31G X 6 MM MISC 1 each by Does not apply route daily 100 each 3    Lactobacillus (PROBIATA PO) Take 1 tablet by mouth daily       mometasone (ELOCON) 0.1 % ointment Apply topically to vagina 3 times per week 1 Tube 3    ondansetron (ZOFRAN) 8 MG tablet Take 1 tablet by mouth every 8 hours as needed for Nausea or Vomiting 9 tablet 1    Eflornithine HCl (VANIQA) 13.9 % CREA Apply 1 Dose topically 2 times daily 45 g 2    Docusate Calcium (STOOL SOFTENER PO) Take 100 mg by mouth 2 times daily as needed.  metFORMIN (GLUCOPHAGE-XR) 500 MG extended release tablet Take 1 tablet by mouth 2 times daily Indications: Diabetes 60 tablet 2    tamsulosin (FLOMAX) 0.4 MG capsule Take 1 capsule by mouth daily 30 capsule 1     No current facility-administered medications for this visit. Allergies: Codeine; Motrin [ibuprofen]; and Humalog [insulin lispro]    Review of Systems  Review of Systems   Constitutional: Negative for activity change, diaphoresis, fatigue, fever and unexpected weight change. HENT: Negative for facial swelling and nosebleeds. Eyes: Negative for redness and visual disturbance. Respiratory: Positive for cough and shortness of breath. Negative for chest tightness and wheezing. Complaints of recent orthopnea   Cardiovascular: Positive for leg swelling.  Negative for chest pain and symptoms    Hypertension-well controlled on current regimen    Diabetes - discussed A1c goal of less than 7%     Plan    Orders Placed This Encounter   Procedures    Basic Metabolic Panel     Standing Status:   Future     Standing Expiration Date:   5/23/2020    ECHO Complete 2D W Doppler W Color     Standing Status:   Future     Standing Expiration Date:   5/24/2020     Order Specific Question:   Reason for exam:     Answer:   dyspnea, edema     Return in about 1 month (around 6/21/2019) for MIKE. Echocardiogram  Increase Lasix 40mg daily  Labs- BMP    OK to take an extra Lasix for weight gain over 3lbs in 24 hours. If weight is not improving by the next day, call the office.    - Weigh daily and report weight gain of 3lbs or more in 24hrs or 5lbs in one week. - Call for increasing shortness of breath or increasing swelling in feet and legs. (This could mean you are retaining too much fluid)  - 2000mg low sodium diet  - Fluid restriction of 1500ml per day (about 6 cups of fluid per day)    Call with any questionsor concerns  Follow up with MIKE Neal for non cardiac problems  Report any new problems  Cardiovascular Fitness-Exercise as tolerated. Strive for 15 minutes of exercise most days of the week. Cardiac / HealthyDiet  Continue current medications as directed  Continue plan of treatment  It is always recommended that you bring your medicationsbottles with you to each visit - this is for your safety!        MIKE Toledo

## 2019-05-24 NOTE — PATIENT INSTRUCTIONS
Return in about 1 month (around 6/21/2019) for APRN. Echocardiogram  Increase Lasix 40mg daily  Lab-BMP 1 week    OK to take an extra Lasix for weight gain over 3lbs in 24 hours. If weight is not improving by the next day, call the office.    - Weigh daily and report weight gain of 3lbs or more in 24hrs or 5lbs in one week. - Call for increasing shortness of breath or increasing swelling in feet and legs.     (This could mean you are retaining too much fluid)  - 2000mg low sodium diet  - Fluid restriction of 1500ml per day (about 6 cups of fluid per day)

## 2019-05-28 RX ORDER — ERGOCALCIFEROL 1.25 MG/1
CAPSULE ORAL
Qty: 12 CAPSULE | Refills: 1 | Status: SHIPPED | OUTPATIENT
Start: 2019-05-28 | End: 2019-07-02 | Stop reason: SDUPTHER

## 2019-05-29 RX ORDER — INSULIN GLARGINE 100 [IU]/ML
INJECTION, SOLUTION SUBCUTANEOUS
Qty: 15 ML | Refills: 1 | Status: SHIPPED | OUTPATIENT
Start: 2019-05-29 | End: 2019-07-02 | Stop reason: SDUPTHER

## 2019-05-29 NOTE — TELEPHONE ENCOUNTER
Alia Zamora called to request a refill on her medication.       Last office visit : 4/3/2019   Next office visit : Visit date not found     Requested Prescriptions     Pending Prescriptions Disp Refills    LANTUS SOLOSTAR 100 UNIT/ML injection pen [Pharmacy Med Name: LANTUS SOLOSTAR 100 UNITS/MAVE] 15 mL 1     Sig: INJECT [facility] NIGHTLY            Megan Elliott, [address] [place]

## 2019-06-07 ENCOUNTER — HOSPITAL ENCOUNTER (OUTPATIENT)
Dept: NON INVASIVE DIAGNOSTICS | Age: 54
Discharge: HOME | End: 2019-06-07
Payer: COMMERCIAL

## 2019-06-07 DIAGNOSIS — R06.02 SHORTNESS OF BREATH: ICD-10-CM

## 2019-06-07 DIAGNOSIS — R60.0 LOCALIZED EDEMA: ICD-10-CM

## 2019-06-07 LAB
LV EF: 54 %
LVEF MODALITY: NORMAL

## 2019-06-07 PROCEDURE — 93306 TTE W/DOPPLER COMPLETE: CPT

## 2019-06-24 ENCOUNTER — OFFICE VISIT (OUTPATIENT)
Dept: CARDIOLOGY | Age: 54
End: 2019-06-24
Payer: COMMERCIAL

## 2019-06-24 VITALS
BODY MASS INDEX: 39.56 KG/M2 | DIASTOLIC BLOOD PRESSURE: 70 MMHG | HEIGHT: 62 IN | SYSTOLIC BLOOD PRESSURE: 130 MMHG | WEIGHT: 215 LBS | HEART RATE: 68 BPM

## 2019-06-24 DIAGNOSIS — R06.02 SHORTNESS OF BREATH: ICD-10-CM

## 2019-06-24 DIAGNOSIS — I10 ESSENTIAL HYPERTENSION: ICD-10-CM

## 2019-06-24 DIAGNOSIS — I50.32 CHRONIC DIASTOLIC CONGESTIVE HEART FAILURE (HCC): ICD-10-CM

## 2019-06-24 DIAGNOSIS — I25.10 CORONARY ARTERY DISEASE INVOLVING NATIVE CORONARY ARTERY OF NATIVE HEART WITHOUT ANGINA PECTORIS: ICD-10-CM

## 2019-06-24 DIAGNOSIS — E11.8 TYPE 2 DIABETES MELLITUS WITH COMPLICATION, WITH LONG-TERM CURRENT USE OF INSULIN (HCC): ICD-10-CM

## 2019-06-24 DIAGNOSIS — I50.32 CHRONIC DIASTOLIC CONGESTIVE HEART FAILURE (HCC): Primary | ICD-10-CM

## 2019-06-24 DIAGNOSIS — Z79.4 TYPE 2 DIABETES MELLITUS WITH COMPLICATION, WITH LONG-TERM CURRENT USE OF INSULIN (HCC): ICD-10-CM

## 2019-06-24 LAB
ANION GAP SERPL CALCULATED.3IONS-SCNC: 12 MMOL/L (ref 7–19)
BUN BLDV-MCNC: 10 MG/DL (ref 6–20)
CALCIUM SERPL-MCNC: 9.6 MG/DL (ref 8.6–10)
CHLORIDE BLD-SCNC: 102 MMOL/L (ref 98–111)
CO2: 30 MMOL/L (ref 22–29)
CREAT SERPL-MCNC: 0.6 MG/DL (ref 0.5–0.9)
GFR NON-AFRICAN AMERICAN: >60
GLUCOSE BLD-MCNC: 204 MG/DL (ref 74–109)
POTASSIUM SERPL-SCNC: 3.8 MMOL/L (ref 3.5–5)
SODIUM BLD-SCNC: 144 MMOL/L (ref 136–145)

## 2019-06-24 PROCEDURE — 3045F PR MOST RECENT HEMOGLOBIN A1C LEVEL 7.0-9.0%: CPT | Performed by: CLINICAL NURSE SPECIALIST

## 2019-06-24 PROCEDURE — G8427 DOCREV CUR MEDS BY ELIG CLIN: HCPCS | Performed by: CLINICAL NURSE SPECIALIST

## 2019-06-24 PROCEDURE — 99213 OFFICE O/P EST LOW 20 MIN: CPT | Performed by: CLINICAL NURSE SPECIALIST

## 2019-06-24 PROCEDURE — G8598 ASA/ANTIPLAT THER USED: HCPCS | Performed by: CLINICAL NURSE SPECIALIST

## 2019-06-24 PROCEDURE — 3017F COLORECTAL CA SCREEN DOC REV: CPT | Performed by: CLINICAL NURSE SPECIALIST

## 2019-06-24 PROCEDURE — 1036F TOBACCO NON-USER: CPT | Performed by: CLINICAL NURSE SPECIALIST

## 2019-06-24 PROCEDURE — 2022F DILAT RTA XM EVC RTNOPTHY: CPT | Performed by: CLINICAL NURSE SPECIALIST

## 2019-06-24 PROCEDURE — G8417 CALC BMI ABV UP PARAM F/U: HCPCS | Performed by: CLINICAL NURSE SPECIALIST

## 2019-06-24 ASSESSMENT — ENCOUNTER SYMPTOMS
EYE REDNESS: 0
SHORTNESS OF BREATH: 1
NAUSEA: 0
WHEEZING: 0
CHEST TIGHTNESS: 0
FACIAL SWELLING: 0
ABDOMINAL PAIN: 0
COUGH: 0
VOMITING: 0

## 2019-06-24 NOTE — PROGRESS NOTES
[facility], [address] [place] [address]  Phone: [phone]  Fax: [phone]    OFFICE VISIT:  2019    Goyo Warner - : 1965    Reason For Visit:  Mikael Ngo is a 52 y.o. female who is here for 1 Month Follow-Up (shortness of breath); Congestive Heart Failure; Coronary Artery Disease; and Hypertension    HPI  Patient is here for follow-up with a history of CAD with CABG ×3 in , hypertension, hyperlipidemia and diabetes . Heart catheterization in 2017 showed occluded LIMA to the LAD with patient and grafts. She was recently in the emergency room with shortness of breath and edema and was diuresed with a dose of IV Lasix and sent home. At last visit we increased her Lasix and check a 2D echocardiogram.  Her weight is down about 4 pounds. She thinks her shortness of breath has improved some of her lower extremity edema. MIKE Bustos is PCP.   Goyo Warner has the following history as recorded in [facility]:    Patient Active Problem List    Diagnosis Date Noted    Gastroesophageal reflux disease without esophagitis 2018    Dysuria 2018    Vitamin D deficiency 2018    Urinary tract infection without hematuria 2018    Adhesive capsulitis of left shoulder 2018    Angina effort 2018    Coronary artery disease involving coronary bypass graft of native heart without angina pectoris 2017    Left ventricular dysfunction 2017    Abnormal nuclear cardiac imaging test     Lichen simplex chronicus 10/21/2015    Personal history of malignant neoplasm of cervix uteri 2015    Atrophic vaginitis 2015    History of pleural effusion 2015    Candidal intertrigo 2015    Weakness 2015    S/P CABG x 3 2015    TAVERAS (dyspnea on exertion) 2014    Type 2 diabetes mellitus with complication, with long-term current use of insulin ([facility]     CAD (coronary artery Smoking status: Former Smoker     Types: Cigarettes    Smokeless tobacco: Never Used   Substance Use Topics    Alcohol use: No      Current Outpatient Medications   Medication Sig Dispense Refill    metFORMIN (GLUCOPHAGE) 500 MG tablet Take 500 mg by mouth 2 times daily (with meals)      LANTUS SOLOSTAR 100 UNIT/ML injection pen INJECT 35 UNITS SUBQ INTOTHE SKIN NIGHTLY 15 mL 1    vitamin D (ERGOCALCIFEROL) 66600 units CAPS capsule TAKE 1 CAPSULE BY MOUTH ONCE WEEKLY 12 capsule 1    valsartan (DIOVAN) 80 MG tablet Take 1 tablet by mouth daily 30 tablet 5    isosorbide mononitrate (IMDUR) 30 MG extended release tablet TAKE 1 TABLET EVERY DAY 30 tablet 5    metoprolol tartrate (LOPRESSOR) 25 MG tablet TAKE 1 TABLET BY MOUTH TWICE DAILY 60 tablet 5    furosemide (LASIX) 40 MG tablet Take 1 tablet by mouth daily 30 tablet 5    famotidine (PEPCID) 20 MG tablet TAKE 1 TABLET TWICE DAILY 60 tablet 5    lovastatin (MEVACOR) 40 MG tablet TAKE 1 TABLET AT BEDTIME 30 tablet 2    tamsulosin (FLOMAX) 0.4 MG capsule Take 1 capsule by mouth daily 30 capsule 0    ondansetron (ZOFRAN ODT) 4 MG disintegrating tablet Take 1 tablet by mouth every 8 hours as needed for Nausea or Vomiting 15 tablet 0    tiZANidine (ZANAFLEX) 4 MG tablet TAKE 1/2 TABLET BY MOUTH 2 TIMES DAILY AS NEEDED (PAIN) 30 tablet 0    nystatin (MYCOSTATIN) 614975 UNIT/GM cream APPLY TOPICALLY 2 TIMES DAILY. 30 g 1    empagliflozin (JARDIANCE) 25 MG tablet Take 25 mg by mouth daily 30 tablet 5    JANUVIA 100 MG tablet TAKE 1 TABLET EVERY DAY 30 tablet 5    LORazepam (ATIVAN) 0.5 MG tablet Take 1 tablet by mouth every 8 hours as needed for Anxiety. . 90 tablet 0    nitroGLYCERIN (NITROSTAT) 0.4 MG SL tablet Place 1 tablet under the tongue every 5 minutes as needed for Chest pain 25 tablet 3    albuterol (PROVENTIL) (5 MG/ML) 0.5% nebulizer solution Take 0.5 mLs by nebulization every 6 hours as needed for Wheezing 120 each 0    aspirin 81 MG tablet Take 81 mg by mouth daily      Lactobacillus (PROBIATA PO) Take 1 tablet by mouth daily       mometasone (ELOCON) 0.1 % ointment Apply topically to vagina 3 times per week 1 Tube 3    ondansetron (ZOFRAN) 8 MG tablet Take 1 tablet by mouth every 8 hours as needed for Nausea or Vomiting 9 tablet 1    Eflornithine HCl (VANIQA) 13.9 % CREA Apply 1 Dose topically 2 times daily 45 g 2    Docusate Calcium (STOOL SOFTENER PO) Take 100 mg by mouth 2 times daily as needed. No current facility-administered medications for this visit. Allergies: Codeine; Motrin [ibuprofen]; and Humalog [insulin lispro]    Review of Systems  Review of Systems   Constitutional: Negative for activity change, diaphoresis, fatigue, fever and unexpected weight change. HENT: Negative for facial swelling and nosebleeds. Eyes: Negative for redness and visual disturbance. Respiratory: Positive for shortness of breath. Negative for cough, chest tightness and wheezing. Complaints of recent orthopnea   Cardiovascular: Positive for leg swelling. Negative for chest pain and palpitations. Gastrointestinal: Negative for abdominal pain, nausea and vomiting. Endocrine: Negative for cold intolerance and heat intolerance. Genitourinary: Negative for dysuria and hematuria. Musculoskeletal: Negative for arthralgias and myalgias. Skin: Negative for pallor and rash. Neurological: Negative for dizziness, seizures, syncope, weakness and light-headedness. Hematological: Does not bruise/bleed easily. Psychiatric/Behavioral: Negative for agitation. The patient is not nervous/anxious.         Objective  Vital Signs - /70   Pulse 68   Ht 5' 2\" (1.575 m)   Wt 215 lb (97.5 kg)   LMP 01/01/1998 (Approximate)   BMI 39.32 kg/m²    Wt Readings from Last 3 Encounters:   06/24/19 215 lb (97.5 kg)   05/24/19 216 lb (98 kg)   05/22/19 219 lb (99.3 kg)      Physical Exam   Constitutional: She is oriented to person, place, and ([address]       Chronic diastolic heart failure-probable. Recent ER visit with dyspnea, orthopnea, edema, chronic cough. Responded well to IV Lasix. Shortness of breath and edema has improved since increasing her daily dose of Lasix. Continue Lasix at 40 mg daily. We discussed the importance of daily weights, reporting weight gain of 3 pounds or more in 24 hours and low-sodium diet. BMP today    CAD-stable without angina symptoms    Hypertension-well controlled on current regimen    Diabetes - discussed A1c goal of less than 7%     Plan    Return in about 3 months (around 9/24/2019) for APRN. Labs-BMP    OK to take an extra Lasix for weight gain over 3lbs in 24 hours. If weight is not improving by the next day, call the office.    - Weigh daily and report weight gain of 3lbs or more in 24hrs or 5lbs in one week. - Call for increasing shortness of breath or increasing swelling in feet and legs. (This could mean you are retaining too much fluid)  - 2000mg low sodium diet  - Fluid restriction of 1500ml per day (about 6 cups of fluid per day)    Call with any questionsor concerns  Follow up with MIKE Beltrán for non cardiac problems  Report any new problems  Cardiovascular Fitness-Exercise as tolerated. Strive for 15 minutes of exercise most days of the week. Cardiac / HealthyDiet  Continue current medications as directed  Continue plan of treatment  It is always recommended that you bring your medicationsbottles with you to each visit - this is for your safety!        MIKE Howe

## 2019-06-24 NOTE — PATIENT INSTRUCTIONS
margarita in about 3 months (around 9/24/2019) for APRN. Labs_ BMP      OK to take an extra Lasix for weight gain over 3lbs in 24 hours. If weight is not improving by the next day, call the office.    - Weigh daily and report weight gain of 3lbs or more in 24hrs or 5lbs in one week. - Call for increasing shortness of breath or increasing swelling in feet and legs. (This could mean you are retaining too much fluid)  - 2000mg low sodium diet  - Fluid restriction of 1500ml per day (about 6 cups of fluid per day)    Patient Education        Limiting Sodium and Fluids With Heart Failure: Care Instructions  Your Care Instructions    Sodium causes your body to hold on to extra water. This may cause your heart failure symptoms to get worse. Limiting sodium may help you feel better and lower your risk of having to go to the hospital.  People get most of their sodium from processed foods. Fast food and restaurant meals also tend to be very high in sodium. Your doctor may suggest that you limit sodium to 2,000 milligrams (mg) a day or less. That is less than 1 teaspoon of salt a day, including all the salt you eat in cooked or packaged foods. Usually, you have to limit the amount of liquids you drink only if your heart failure is severe. Limiting sodium alone often is enough to help your body get rid of extra fluids. However, your doctor may tell you to limit your fluid intake to a set amount each day. Follow-up care is a key part of your treatment and safety. Be sure to make and go to all appointments, and call your doctor if you are having problems. It's also a good idea to know your test results and keep a list of the medicines you take. How can you care for yourself at home? Read food labels  · Read food labels on cans and food packages. The labels tell you how much sodium is in each serving. Make sure that you look at the serving size.  If you eat more than the serving size, you have eaten more sodium than is listed for one serving. · Food labels also tell you the Percent Daily Value. If the Percent Daily Value says 50%, it means that you will get at least 50% of all the sodium you need for the entire day in one serving. Choose products with low Percent Daily Values for sodium. · Be aware that sodium can come in forms other than salt, including monosodium glutamate (MSG), sodium citrate, and sodium bicarbonate (baking soda). MSG is often added to Asian food. You can sometimes ask for food without MSG or salt. Buy low-sodium foods  · Buy foods that are labeled \"unsalted\" (no salt added), \"sodium-free\" (less than 5 mg of sodium per serving), or \"low-sodium\" (less than 140 mg of sodium per serving). A food labeled \"light sodium\" has less than half of the full-sodium version of that food. Foods labeled \"reduced-sodium\" may still have too much sodium. · Buy fresh vegetables or plain, frozen vegetables. Buy low-sodium versions of canned vegetables, soups, and other canned goods. Prepare low-sodium meals  · Use less salt each day when cooking. Reducing salt in this way will help you adjust to the taste. Do not add salt after cooking. Take the salt shaker off the table. · Flavor your food with garlic, lemon juice, onion, vinegar, herbs, and spices instead of salt. Do not use soy sauce, steak sauce, onion salt, garlic salt, mustard, or ketchup on your food. · Make your own salad dressings, sauces, and ketchup without adding salt. · Use less salt (or none) when recipes call for it. You can often use half the salt a recipe calls for without losing flavor. Other dishes like rice, pasta, and grains do not need added salt. · Rinse canned vegetables. This removes some--but not all--of the salt. · Avoid water that has a naturally high sodium content or that has been treated with water softeners, which add sodium. Call your local water company to find out the sodium content of your water supply.  If you buy bottled water, read the label and choose a sodium-free brand. Avoid high-sodium foods, such as:  · Smoked, cured, salted, and canned meat, fish, and poultry. · Ham, tucker, hot dogs, and luncheon meats. · Regular, hard, and processed cheese and regular peanut butter. · Crackers with salted tops. · Frozen prepared meals. · Canned and dried soups, broths, and bouillon, unless labeled sodium-free or low-sodium. · Canned vegetables, unless labeled sodium-free or low-sodium. · Salted snack foods such as chips and pretzels. · [place] fries, pizza, tacos, and other fast foods. · Pickles, olives, ketchup, and other condiments, especially soy sauce, unless labeled sodium-free or low-sodium. If you cannot cook for yourself  · Have family members or friends help you, or have someone cook low-sodium meals. · Check with your local senior nutrition program to find out where meals are served and whether they offer a low-sodium option. You can often find these programs through your local health department or hospital.  · Have meals delivered to your home. [facility] have a Meals on [employer]. These programs provide one hot meal a day for older adults, delivered to their homes. Ask whether these meals are low-sodium. Let them know that you are on a low-sodium diet. Limiting fluid intake  · Find a method that works for you. You might simply write down how much you drink every time you do. Some people keep a container filled with the amount of fluid allowed for that day. If they drink from a source other than the container, then they pour out that amount. · Measure your regular drinking glasses to find out how much fluid each one holds. Once you know this, you will not have to measure every time. · Besides water, milk, juices, and other drinks, some foods have a lot of fluid. Count any foods that will melt (such as ice cream or gelatin dessert) or liquid foods (such as soup) as part of your fluid intake for the day.   Where can you learn more?  Go to [email or web address] and sign in to your [employer] account. Enter A166 in the [facility] box to learn more about \"Limiting Sodium and Fluids With Heart Failure: Care Instructions. \"     If you do not have an account, please click on the \"Sign Up Now\" link. Current as of: July 22, 2018  Content Version: 12.0  © 7304-5114 Healthwise, Incorporated. Care instructions adapted under license by [facility] ([facility]). If you have questions about a medical condition or this instruction, always ask your healthcare professional. [address] any warranty or liability for your use of this information.

## 2019-07-01 DIAGNOSIS — E11.8 TYPE 2 DIABETES MELLITUS WITH COMPLICATION, WITH LONG-TERM CURRENT USE OF INSULIN (HCC): ICD-10-CM

## 2019-07-01 DIAGNOSIS — Z79.4 TYPE 2 DIABETES MELLITUS WITH COMPLICATION, WITH LONG-TERM CURRENT USE OF INSULIN (HCC): ICD-10-CM

## 2019-07-01 RX ORDER — METFORMIN HYDROCHLORIDE 500 MG/1
TABLET, EXTENDED RELEASE ORAL
Qty: 60 TABLET | Refills: 2 | Status: SHIPPED | OUTPATIENT
Start: 2019-07-01 | End: 2019-07-02 | Stop reason: SDUPTHER

## 2019-07-02 ENCOUNTER — OFFICE VISIT (OUTPATIENT)
Dept: PRIMARY CARE CLINIC | Age: 54
End: 2019-07-02
Payer: COMMERCIAL

## 2019-07-02 VITALS
HEART RATE: 75 BPM | OXYGEN SATURATION: 98 % | BODY MASS INDEX: 37.39 KG/M2 | HEIGHT: 63 IN | TEMPERATURE: 96.7 F | DIASTOLIC BLOOD PRESSURE: 80 MMHG | WEIGHT: 211 LBS | SYSTOLIC BLOOD PRESSURE: 120 MMHG

## 2019-07-02 DIAGNOSIS — Z12.11 SCREENING FOR COLON CANCER: ICD-10-CM

## 2019-07-02 DIAGNOSIS — I25.10 CORONARY ARTERY DISEASE INVOLVING NATIVE CORONARY ARTERY OF NATIVE HEART WITHOUT ANGINA PECTORIS: ICD-10-CM

## 2019-07-02 DIAGNOSIS — E11.8 TYPE 2 DIABETES MELLITUS WITH COMPLICATION, WITH LONG-TERM CURRENT USE OF INSULIN (HCC): ICD-10-CM

## 2019-07-02 DIAGNOSIS — Z23 NEEDS FLU SHOT: ICD-10-CM

## 2019-07-02 DIAGNOSIS — Z11.4 SCREENING FOR HIV WITHOUT PRESENCE OF RISK FACTORS: ICD-10-CM

## 2019-07-02 DIAGNOSIS — K21.9 GASTROESOPHAGEAL REFLUX DISEASE WITHOUT ESOPHAGITIS: ICD-10-CM

## 2019-07-02 DIAGNOSIS — Z11.59 NEED FOR HEPATITIS C SCREENING TEST: ICD-10-CM

## 2019-07-02 DIAGNOSIS — L68.0 HIRSUTISM: ICD-10-CM

## 2019-07-02 DIAGNOSIS — M54.12 CERVICAL RADICULOPATHY: ICD-10-CM

## 2019-07-02 DIAGNOSIS — Z79.4 TYPE 2 DIABETES MELLITUS WITH COMPLICATION, WITH LONG-TERM CURRENT USE OF INSULIN (HCC): ICD-10-CM

## 2019-07-02 DIAGNOSIS — I10 ESSENTIAL HYPERTENSION: ICD-10-CM

## 2019-07-02 LAB — HBA1C MFR BLD: 7.4 %

## 2019-07-02 PROCEDURE — G8598 ASA/ANTIPLAT THER USED: HCPCS | Performed by: NURSE PRACTITIONER

## 2019-07-02 PROCEDURE — G8417 CALC BMI ABV UP PARAM F/U: HCPCS | Performed by: NURSE PRACTITIONER

## 2019-07-02 PROCEDURE — 99214 OFFICE O/P EST MOD 30 MIN: CPT | Performed by: NURSE PRACTITIONER

## 2019-07-02 PROCEDURE — G8427 DOCREV CUR MEDS BY ELIG CLIN: HCPCS | Performed by: NURSE PRACTITIONER

## 2019-07-02 PROCEDURE — 3017F COLORECTAL CA SCREEN DOC REV: CPT | Performed by: NURSE PRACTITIONER

## 2019-07-02 PROCEDURE — 1036F TOBACCO NON-USER: CPT | Performed by: NURSE PRACTITIONER

## 2019-07-02 PROCEDURE — 3045F PR MOST RECENT HEMOGLOBIN A1C LEVEL 7.0-9.0%: CPT | Performed by: NURSE PRACTITIONER

## 2019-07-02 PROCEDURE — 83036 HEMOGLOBIN GLYCOSYLATED A1C: CPT | Performed by: NURSE PRACTITIONER

## 2019-07-02 PROCEDURE — 2022F DILAT RTA XM EVC RTNOPTHY: CPT | Performed by: NURSE PRACTITIONER

## 2019-07-02 RX ORDER — METFORMIN HYDROCHLORIDE 500 MG/1
TABLET, EXTENDED RELEASE ORAL
Qty: 60 TABLET | Refills: 2 | Status: SHIPPED | OUTPATIENT
Start: 2019-07-02 | End: 2020-02-02

## 2019-07-02 RX ORDER — ISOSORBIDE MONONITRATE 30 MG/1
TABLET, EXTENDED RELEASE ORAL
Qty: 30 TABLET | Refills: 5 | Status: SHIPPED | OUTPATIENT
Start: 2019-07-02 | End: 2019-12-20

## 2019-07-02 RX ORDER — TAMSULOSIN HYDROCHLORIDE 0.4 MG/1
0.4 CAPSULE ORAL DAILY
Qty: 30 CAPSULE | Refills: 0 | Status: SHIPPED | OUTPATIENT
Start: 2019-07-02 | End: 2019-08-13 | Stop reason: SDUPTHER

## 2019-07-02 RX ORDER — ONDANSETRON 4 MG/1
4 TABLET, ORALLY DISINTEGRATING ORAL EVERY 8 HOURS PRN
Qty: 15 TABLET | Refills: 0 | Status: SHIPPED | OUTPATIENT
Start: 2019-07-02 | End: 2020-05-11

## 2019-07-02 RX ORDER — VALSARTAN 80 MG/1
80 TABLET ORAL DAILY
Qty: 30 TABLET | Refills: 5 | Status: SHIPPED | OUTPATIENT
Start: 2019-07-02 | End: 2020-05-11

## 2019-07-02 RX ORDER — LOVASTATIN 40 MG/1
TABLET ORAL
Qty: 30 TABLET | Refills: 2 | Status: SHIPPED | OUTPATIENT
Start: 2019-07-02 | End: 2019-11-22 | Stop reason: SDUPTHER

## 2019-07-02 RX ORDER — ERGOCALCIFEROL 1.25 MG/1
CAPSULE ORAL
Qty: 12 CAPSULE | Refills: 1 | Status: SHIPPED | OUTPATIENT
Start: 2019-07-02 | End: 2019-12-20

## 2019-07-02 RX ORDER — TIZANIDINE 4 MG/1
TABLET ORAL
Qty: 30 TABLET | Refills: 0 | Status: SHIPPED | OUTPATIENT
Start: 2019-07-02 | End: 2019-12-30

## 2019-07-02 RX ORDER — FAMOTIDINE 20 MG/1
TABLET, FILM COATED ORAL
Qty: 60 TABLET | Refills: 5 | Status: SHIPPED | OUTPATIENT
Start: 2019-07-02 | End: 2019-12-20

## 2019-07-02 RX ORDER — FUROSEMIDE 40 MG/1
40 TABLET ORAL DAILY
Qty: 30 TABLET | Refills: 5 | Status: SHIPPED | OUTPATIENT
Start: 2019-07-02 | End: 2019-12-20

## 2019-07-02 NOTE — PROGRESS NOTES
Medications    tamsulosin (FLOMAX) 0.4 MG capsule     Sig: Take 1 capsule by mouth daily     Dispense:  30 capsule     Refill:  0    metFORMIN (GLUCOPHAGE-XR) 500 MG extended release tablet     Sig: TAKE 1 TABLET BY MOUTH 2 TIMES DAILY. INDDICATIONS: DIABETES     Dispense:  60 tablet     Refill:  2    insulin glargine (LANTUS SOLOSTAR) 100 UNIT/ML injection pen     Sig: INJECT 35 UNITS SUBQ INTOTHE SKIN NIGHTLY     Dispense:  15 mL     Refill:  1    vitamin D (ERGOCALCIFEROL) 67576 units CAPS capsule     Sig: TAKE 1 CAPSULE BY MOUTH ONCE WEEKLY     Dispense:  12 capsule     Refill:  1    valsartan (DIOVAN) 80 MG tablet     Sig: Take 1 tablet by mouth daily     Dispense:  30 tablet     Refill:  5    isosorbide mononitrate (IMDUR) 30 MG extended release tablet     Sig: TAKE 1 TABLET EVERY DAY     Dispense:  30 tablet     Refill:  5    metoprolol tartrate (LOPRESSOR) 25 MG tablet     Sig: TAKE 1 TABLET BY MOUTH TWICE DAILY     Dispense:  60 tablet     Refill:  5    famotidine (PEPCID) 20 MG tablet     Sig: TAKE 1 TABLET TWICE DAILY     Dispense:  60 tablet     Refill:  5    furosemide (LASIX) 40 MG tablet     Sig: Take 1 tablet by mouth daily     Dispense:  30 tablet     Refill:  5    lovastatin (MEVACOR) 40 MG tablet     Sig: TAKE 1 TABLET AT BEDTIME     Dispense:  30 tablet     Refill:  2    ondansetron (ZOFRAN ODT) 4 MG disintegrating tablet     Sig: Take 1 tablet by mouth every 8 hours as needed for Nausea or Vomiting     Dispense:  15 tablet     Refill:  0    tiZANidine (ZANAFLEX) 4 MG tablet     Sig: TAKE 1/2 TABLET BY MOUTH 2 TIMES DAILY AS NEEDED (PAIN)     Dispense:  30 tablet     Refill:  0    SITagliptin (JANUVIA) 100 MG tablet     Sig: TAKE 1 TABLET EVERY DAY     Dispense:  30 tablet     Refill:  5        Patient offered educational handouts and has had all questions answered. Patient voices understanding and agrees to plans along with risks and benefits of plan.  Patient is instructed to

## 2019-07-03 ASSESSMENT — ENCOUNTER SYMPTOMS
GASTROINTESTINAL NEGATIVE: 1
EYES NEGATIVE: 1
SHORTNESS OF BREATH: 1

## 2019-07-17 ENCOUNTER — OFFICE VISIT (OUTPATIENT)
Dept: PRIMARY CARE CLINIC | Age: 54
End: 2019-07-17
Payer: COMMERCIAL

## 2019-07-17 ENCOUNTER — HOSPITAL ENCOUNTER (OUTPATIENT)
Dept: GENERAL RADIOLOGY | Age: 54
Discharge: HOME | End: 2019-07-17
Payer: COMMERCIAL

## 2019-07-17 VITALS
OXYGEN SATURATION: 97 % | BODY MASS INDEX: 39.27 KG/M2 | TEMPERATURE: 98.3 F | HEIGHT: 63 IN | SYSTOLIC BLOOD PRESSURE: 130 MMHG | HEART RATE: 73 BPM | WEIGHT: 221.6 LBS | DIASTOLIC BLOOD PRESSURE: 86 MMHG

## 2019-07-17 DIAGNOSIS — R53.1 GENERAL WEAKNESS: Primary | ICD-10-CM

## 2019-07-17 DIAGNOSIS — S67.22XA CRUSHING INJURY OF LEFT HAND, INITIAL ENCOUNTER: ICD-10-CM

## 2019-07-17 DIAGNOSIS — Z78.9 DECREASED ACTIVITIES OF DAILY LIVING (ADL): ICD-10-CM

## 2019-07-17 DIAGNOSIS — R29.898 DECREASED GRIP STRENGTH: ICD-10-CM

## 2019-07-17 DIAGNOSIS — Z74.09 IMPAIRED MOBILITY: ICD-10-CM

## 2019-07-17 PROCEDURE — 1036F TOBACCO NON-USER: CPT | Performed by: NURSE PRACTITIONER

## 2019-07-17 PROCEDURE — G8427 DOCREV CUR MEDS BY ELIG CLIN: HCPCS | Performed by: NURSE PRACTITIONER

## 2019-07-17 PROCEDURE — 73130 X-RAY EXAM OF HAND: CPT

## 2019-07-17 PROCEDURE — G8417 CALC BMI ABV UP PARAM F/U: HCPCS | Performed by: NURSE PRACTITIONER

## 2019-07-17 PROCEDURE — 99214 OFFICE O/P EST MOD 30 MIN: CPT | Performed by: NURSE PRACTITIONER

## 2019-07-17 PROCEDURE — G8598 ASA/ANTIPLAT THER USED: HCPCS | Performed by: NURSE PRACTITIONER

## 2019-07-17 PROCEDURE — 3017F COLORECTAL CA SCREEN DOC REV: CPT | Performed by: NURSE PRACTITIONER

## 2019-07-17 ASSESSMENT — ENCOUNTER SYMPTOMS
GASTROINTESTINAL NEGATIVE: 1
EYES NEGATIVE: 1
RESPIRATORY NEGATIVE: 1

## 2019-07-17 NOTE — PROGRESS NOTES
warm, dry and intact. Psychiatric: She has a normal mood and affect. Her speech is normal and behavior is normal. Judgment and thought content normal. Cognition and memory are normal.   Nursing note and vitals reviewed. /86   Pulse 73   Temp 98.3 °F (36.8 °C) (Temporal)   Ht 5' 2.5\" (1.588 m)   Wt 221 lb 9.6 oz (100.5 kg)   LMP 01/01/1998 (Approximate)   SpO2 97%   BMI 39.89 kg/m²     Assessment:      Diagnosis Orders   1. General weakness     2. Decreased  strength     3. Decreased activities of daily living (ADL)     4. Impaired mobility     5. Crushing injury of left hand, initial encounter  XR HAND LEFT (MIN 3 VIEWS)       No results found for this visit on 07/17/19. Plan:     I am checking xray to rule out fracture. Ace wrap provided in office. Patient is to use ice as discussed. No follow-ups on file. Orders Placed This Encounter   Procedures    XR HAND LEFT (MIN 3 VIEWS)     Standing Status:   Future     Number of Occurrences:   1     Standing Expiration Date:   7/17/2020       No orders of the defined types were placed in this encounter. Patient offered educational handouts and has had all questions answered. Patient voices understanding and agrees to plans along with risks and benefits of plan. Patient is instructed to continue prior meds, diet, and exercise plans as instructed. Patient agrees to follow up as instructed and sooner if needed. Patient agrees to go to ER if condition becomes emergent. EMR Dragon/transcription disclaimer: Some of this encounter note is an electronic transcription/translation of spoken language to printed text. The electronic translation of spoken language may permit erroneous, or at times, nonsensical words or phrases to be inadvertently transcribed.  Although I have reviewed the note for such errors, some may still exist.    Electronically signed by MIKE Means on 7/18/2019 at 11:49 AM

## 2019-07-17 NOTE — PROGRESS NOTES
[id number]    Coronary Artery Bypass, 3    ND MANIPULATN SHLDR JT W ANESTHESIA Left 2018    SHOULDER MANIPULATION UNDER ANESTHESIA performed by Louisa Devries MD at [address]      VULVAR/PERINEAL BIOPSY  10/07/2015    Dr. [place] History     Tobacco Use    Smoking status: Former Smoker     Packs/day: 2.00     Years: 15.00     Pack years: 30.00     Types: Cigarettes     Last attempt to quit: 2014     Years since quittin.0    Smokeless tobacco: Never Used   Substance Use Topics    Alcohol use: No        Current Outpatient Medications   Medication Sig Dispense Refill    tamsulosin (FLOMAX) 0.4 MG capsule Take 1 capsule by mouth daily 30 capsule 0    metFORMIN (GLUCOPHAGE-XR) 500 MG extended release tablet TAKE 1 TABLET BY MOUTH 2 TIMES DAILY.  INDDICATIONS: DIABETES 60 tablet 2    insulin glargine (LANTUS SOLOSTAR) 100 UNIT/ML injection pen INJECT 35 UNITS SUBQ INTOTHE SKIN NIGHTLY 15 mL 1    vitamin D (ERGOCALCIFEROL) 06617 units CAPS capsule TAKE 1 CAPSULE BY MOUTH ONCE WEEKLY 12 capsule 1    valsartan (DIOVAN) 80 MG tablet Take 1 tablet by mouth daily 30 tablet 5    isosorbide mononitrate (IMDUR) 30 MG extended release tablet TAKE 1 TABLET EVERY DAY 30 tablet 5    metoprolol tartrate (LOPRESSOR) 25 MG tablet TAKE 1 TABLET BY MOUTH TWICE DAILY 60 tablet 5    famotidine (PEPCID) 20 MG tablet TAKE 1 TABLET TWICE DAILY 60 tablet 5    furosemide (LASIX) 40 MG tablet Take 1 tablet by mouth daily 30 tablet 5    lovastatin (MEVACOR) 40 MG tablet TAKE 1 TABLET AT BEDTIME 30 tablet 2    ondansetron (ZOFRAN ODT) 4 MG disintegrating tablet Take 1 tablet by mouth every 8 hours as needed for Nausea or Vomiting 15 tablet 0    tiZANidine (ZANAFLEX) 4 MG tablet TAKE 1/2 TABLET BY MOUTH 2 TIMES DAILY AS NEEDED (PAIN) 30 tablet 0    SITagliptin (JANUVIA) 100 MG tablet TAKE 1 TABLET EVERY DAY 30 tablet 5    metFORMIN (GLUCOPHAGE) 500 MG tablet Take 500 mg by mouth 2 times Decreased sensation noted. Decreased strength noted. Neurological: She is alert and oriented to person, place, and time. She has normal strength. Skin: Skin is warm, dry and intact. Psychiatric: She has a normal mood and affect. Her speech is normal and behavior is normal. Judgment and thought content normal. Cognition and memory are normal.   Nursing note and vitals reviewed. /86   Pulse 73   Temp 98.3 °F (36.8 °C) (Temporal)   Ht 5' 2.5\" (1.588 m)   Wt 221 lb 9.6 oz (100.5 kg)   LMP 01/01/1998 (Approximate)   SpO2 97%   BMI 39.89 kg/m²     Assessment:      Diagnosis Orders   1. General weakness     2. Decreased  strength     3. Decreased activities of daily living (ADL)     4. Impaired mobility     5. Crushing injury of left hand, initial encounter  XR HAND LEFT (MIN 3 VIEWS)       No results found for this visit on 07/17/19. Plan:     Patient requires the assistance of a power mobility device/wheelchair to successfully complete daily living tasks outside of her house. A power wheelchair is necessary due to the patient's impaired ambulation and mobility restrictions including worsening strength or increasing weakness in hands. The patient would not be able to resolve these daily living tasks using a non powered wheelchair due to worsening strength in the patients hands/upper extremities. The patient can no longer self-propel a wheelchair safely and has been requiring assistance from friends/family members when she is out for long periods of time. The patient cannot self-propel in a standard wheelchair. The patient has not expressed an unwillingness to use the wheelchair. No follow-ups on file. Orders Placed This Encounter   Procedures    XR HAND LEFT (MIN 3 VIEWS)     Standing Status:   Future     Number of Occurrences:   1     Standing Expiration Date:   7/17/2020       No orders of the defined types were placed in this encounter.        Patient offered educational

## 2019-07-19 ASSESSMENT — ENCOUNTER SYMPTOMS
EYES NEGATIVE: 1
RESPIRATORY NEGATIVE: 1
GASTROINTESTINAL NEGATIVE: 1

## 2019-07-24 ENCOUNTER — HOSPITAL ENCOUNTER (OUTPATIENT)
Dept: CT IMAGING | Age: 54
Discharge: HOME | End: 2019-07-24
Payer: COMMERCIAL

## 2019-07-24 DIAGNOSIS — R91.1 LUNG NODULE: ICD-10-CM

## 2019-07-24 PROCEDURE — 71250 CT THORAX DX C-: CPT

## 2019-07-25 ENCOUNTER — TELEPHONE (OUTPATIENT)
Dept: PRIMARY CARE CLINIC | Age: 54
End: 2019-07-25

## 2019-07-31 ENCOUNTER — OFFICE VISIT (OUTPATIENT)
Dept: PULMONOLOGY | Facility: CLINIC | Age: 54
End: 2019-07-31

## 2019-07-31 VITALS
OXYGEN SATURATION: 95 % | BODY MASS INDEX: 40.02 KG/M2 | HEART RATE: 72 BPM | HEIGHT: 61 IN | DIASTOLIC BLOOD PRESSURE: 70 MMHG | WEIGHT: 212 LBS | SYSTOLIC BLOOD PRESSURE: 116 MMHG

## 2019-07-31 DIAGNOSIS — J98.4 SCARRING OF LUNG: Primary | ICD-10-CM

## 2019-07-31 DIAGNOSIS — E66.01 MORBID OBESITY WITH BMI OF 40.0-44.9, ADULT (HCC): ICD-10-CM

## 2019-07-31 DIAGNOSIS — J98.4 RESTRICTIVE LUNG DISEASE: ICD-10-CM

## 2019-07-31 DIAGNOSIS — Z87.891 PERSONAL HISTORY OF NICOTINE DEPENDENCE: ICD-10-CM

## 2019-07-31 LAB
DIFF CAP.CO: NORMAL ML/MMHG SEC
FEV1/FVC: NORMAL %
FEV1: NORMAL LITERS
FVC VOL RESPIRATORY: NORMAL LITERS
TLC: NORMAL LITERS

## 2019-07-31 PROCEDURE — 94727 GAS DIL/WSHOT DETER LNG VOL: CPT | Performed by: INTERNAL MEDICINE

## 2019-07-31 PROCEDURE — 99214 OFFICE O/P EST MOD 30 MIN: CPT | Performed by: INTERNAL MEDICINE

## 2019-07-31 PROCEDURE — 94729 DIFFUSING CAPACITY: CPT | Performed by: INTERNAL MEDICINE

## 2019-07-31 PROCEDURE — 94010 BREATHING CAPACITY TEST: CPT | Performed by: INTERNAL MEDICINE

## 2019-07-31 NOTE — PROGRESS NOTES
Subjective   Gwendolyn Delgadillo is a 53 y.o. female.     Chief Complaint   Patient presents with   • scarring of lung      No My Sticky Note on file.    History of Present Illness   The patient returns today for follow-up to go the results of her follow-up scan for pulmonary function studies.  She did try a CPAP device in the past and could not tolerate it.  She is not interested in pursuing any further work-up regarding possible sleep apnea.  This is been prescribed after her previous chest surgery.  Her pulmonary function studies today do show a moderate restrictive defect.  This most likely relates to a combination of her weight and some lung scarring.  Her follow-up chest CT just showed some scarring with no suspicious nodules.  Based on her smoking history we will get yearly screening CTs and we will also get repeat pulmonary functions when she returns in a year as well.    Medical/Family/Social History   has a past medical history of Abnormal nuclear cardiac imaging test, Atrophic vaginitis, CAD (coronary artery disease), Calcaneal spur, right foot, Candidal intertrigo, Cervical cancer (CMS/HCC), Chest pain, Diabetes mellitus (CMS/HCC), HERNANDEZ (dyspnea on exertion), Heart attack (CMS/HCC), History of pleural effusion, Hyperlipidemia, Hypertension, Lichen simplex chronicus, Lipoma, Personal history of malignant neoplasm of cervix uteri, and Right foot pain.   has a past surgical history that includes Coronary artery bypass graft; Hysterectomy; and Lung surgery.  family history is not on file.   reports that she quit smoking about 4 years ago. She smoked 2.50 packs per day. She has never used smokeless tobacco. She reports that she does not drink alcohol or use drugs.  Allergies   Allergen Reactions   • Codeine    • Humalog [Insulin Lispro]    • Motrin [Ibuprofen] Swelling     Medications    Current Outpatient Medications:   •  aspirin 81 MG EC tablet, Take 81 mg by mouth Daily., Disp: , Rfl:   •  Cholecalciferol (VITAMIN  D) 2000 units tablet, Take 2,000 Units by mouth Daily., Disp: , Rfl:   •  conjugated estrogens (PREMARIN) 0.625 MG/GM vaginal cream, Insert 0.5 g into the vagina 2 (Two) Times a Week., Disp: , Rfl:   •  docusate sodium (COLACE) 100 MG capsule, Take 100 mg by mouth 2 (Two) Times a Day As Needed for Constipation., Disp: , Rfl:   •  Eflornithine HCl 13.9 % cream, Apply  topically 2 (Two) Times a Day., Disp: , Rfl:   •  Empagliflozin (JARDIANCE) 10 MG tablet, Take 10 mg by mouth Daily., Disp: , Rfl:   •  estradiol (VAGIFEM) 10 MCG tablet vaginal tablet, Insert 1 tablet into the vagina 2 (Two) Times a Week., Disp: , Rfl:   •  famotidine (PEPCID) 20 MG tablet, Take 20 mg by mouth 2 (Two) Times a Day., Disp: , Rfl:   •  furosemide (LASIX) 20 MG tablet, Take 20 mg by mouth Daily As Needed., Disp: , Rfl:   •  insulin glargine (LANTUS) 100 UNIT/ML injection, Inject 15 Units under the skin Daily., Disp: , Rfl:   •  isosorbide mononitrate (IMDUR) 30 MG 24 hr tablet, Take 30 mg by mouth Daily., Disp: , Rfl:   •  Lactobacillus (PROBIATA PO), Take  by mouth Daily., Disp: , Rfl:   •  LORazepam (ATIVAN) 0.5 MG tablet, Take 0.5 mg by mouth Every 8 (Eight) Hours As Needed for Anxiety., Disp: , Rfl:   •  lovastatin (MEVACOR) 40 MG tablet, Take 40 mg by mouth Every Night., Disp: , Rfl:   •  metFORMIN (GLUCOPHAGE) 1000 MG tablet, Take 500 mg by mouth 2 (Two) Times a Day With Meals., Disp: , Rfl:   •  metoprolol tartrate (LOPRESSOR) 25 MG tablet, Take 25 mg by mouth 2 (Two) Times a Day., Disp: , Rfl:   •  mometasone (ELOCON) 0.1 % ointment, Apply  topically 3 (Three) Times a Week., Disp: , Rfl:   •  nitroglycerin (NITROSTAT) 0.4 MG SL tablet, Place 0.4 mg under the tongue Every 5 (Five) Minutes As Needed for Chest Pain. Take no more than 3 doses in 15 minutes., Disp: , Rfl:   •  nystatin (MYCOSTATIN) 858427 UNIT/GM powder, Apply  topically 3 (Three) Times a Day., Disp: , Rfl:   •  nystatin-triamcinolone (MYCOLOG II) 962901-7.1 UNIT/GM-%  "cream, Apply  topically 2 (Two) Times a Day., Disp: , Rfl:   •  ondansetron (ZOFRAN) 8 MG tablet, Take 8 mg by mouth Every 8 (Eight) Hours As Needed for Nausea or Vomiting., Disp: , Rfl:   •  prochlorperazine (COMPAZINE) 10 MG tablet, Take 10 mg by mouth Every 6 (Six) Hours As Needed for Nausea or Vomiting., Disp: , Rfl:   •  spironolactone (ALDACTONE) 50 MG tablet, Take 50 mg by mouth Daily., Disp: , Rfl:   •  valsartan (DIOVAN) 80 MG tablet, Take 80 mg by mouth Daily., Disp: , Rfl:     Review of Systems  Constitutional: Negative for chills and fever.   HENT: Negative for congestion.    Eyes: Negative for visual disturbance.   Respiratory: Positive for shortness of breath. Negative for cough.    Cardiovascular: Positive for leg swelling. Negative for chest pain.   Gastrointestinal: Negative for diarrhea, nausea and vomiting.   Genitourinary: Negative for difficulty urinating.   Musculoskeletal: Negative for arthralgias.   Skin: Negative for rash.   Neurological: Negative for dizziness and speech difficulty.   Hematological: Negative for adenopathy.   Psychiatric/Behavioral: The patient is not nervous/anxious.         Objective   /70   Pulse 72   Ht 154.9 cm (61\")   Wt 96.2 kg (212 lb)   SpO2 95% Comment: RA  Breastfeeding? No   BMI 40.06 kg/m²   Physical Exam    Constitutional: She is oriented to person, place, and time. She appears well-developed.   She is a morbidly obese white female.   HENT:   Head: Normocephalic and atraumatic.   Eyes: EOM are normal. Pupils are equal, round, and reactive to light.   Neck: Normal range of motion. Neck supple.   Cardiovascular: Normal rate, regular rhythm and normal heart sounds.   Pulmonary/Chest: Effort normal.   Breath sounds are somewhat diminished at the left base.   Abdominal: Soft.   Musculoskeletal: Normal range of motion. She exhibits edema.   She has 1+ edema of the calves.   Lymphadenopathy:   Neurological: She is alert and oriented to person, place, and " time.   Skin: Skin is warm and dry.   Psychiatric: She has a normal mood and affect.   Nursing note and vitals reviewed.        Pulmonary Functions Testing Results:  FEV1   Date Value Ref Range Status   07/31/2019 59% liters Final     FVC   Date Value Ref Range Status   07/31/2019 57% liters Final     FEV1/FVC   Date Value Ref Range Status   07/31/2019 85.14% % Final     TLC   Date Value Ref Range Status   07/31/2019 61% liters Final     DLCO   Date Value Ref Range Status   07/31/2019 75% ml/mmHg sec Final      My interpretation of PFT:  1.  Spirometry is consistent with a moderate restrictive ventilatory defect with coexisting mild small airways disease.  2.  Lung volumes confirm a moderate restrictive ventilatory defect.  There is also a decreased inspiratory capacity.  3.  There is a mild diffusion impairment which when corrected for alveolar volume is actually supranormal.  Assessment/Plan   Gwendolyn was seen today for scarring of lung.    Diagnoses and all orders for this visit:    Scarring of lung  -     Pulmonary Function Test    Personal history of nicotine dependence  -     CT Chest Low Dose Wo; Future      Patient's Body mass index is 40.06 kg/m². BMI is above normal parameters. Recommendations include: referral to primary care.      I told the patient we will see her back in 1 year with complete pulmonary functions we will get a follow-up screening chest CT just prior to return as well.

## 2019-07-31 NOTE — PATIENT INSTRUCTIONS
I did advise the patient that her chest CT just appeared to show scarring and no discrete nodules or mass lesions.  We will see her back in 1 year with a screening chest CT just prior to return.  Her pulmonary function studies did show some restriction probably due to her lung scarring.  We will get a follow-up PFT on return as well.

## 2019-08-02 ENCOUNTER — TRANSCRIBE ORDERS (OUTPATIENT)
Dept: PHYSICAL THERAPY | Facility: HOSPITAL | Age: 54
End: 2019-08-02

## 2019-08-02 DIAGNOSIS — M54.12 RADICULOPATHY, CERVICAL: Primary | ICD-10-CM

## 2019-08-02 DIAGNOSIS — Z46.89 WHEELCHAIR FITTING OR ADJUSTMENT: ICD-10-CM

## 2019-08-13 RX ORDER — TAMSULOSIN HYDROCHLORIDE 0.4 MG/1
0.4 CAPSULE ORAL DAILY
Qty: 30 CAPSULE | Refills: 0 | Status: SHIPPED | OUTPATIENT
Start: 2019-08-13 | End: 2019-10-24 | Stop reason: SDUPTHER

## 2019-08-15 ENCOUNTER — HOSPITAL ENCOUNTER (OUTPATIENT)
Dept: PHYSICAL THERAPY | Facility: HOSPITAL | Age: 54
Setting detail: THERAPIES SERIES
Discharge: HOME | End: 2019-08-15

## 2019-08-15 DIAGNOSIS — Z74.09 IMPAIRED FUNCTIONAL MOBILITY, BALANCE, GAIT, AND ENDURANCE: Primary | ICD-10-CM

## 2019-08-15 PROCEDURE — 97161 PT EVAL LOW COMPLEX 20 MIN: CPT | Performed by: PHYSICAL THERAPIST

## 2019-08-15 NOTE — THERAPY DISCHARGE NOTE
Outpatient Physical Therapy Ortho Initial Evaluation/Discharge   Francine     Patient Name: Gwendolyn Delgadillo  : 1965  MRN: [id number]  Today's Date: 8/15/2019      Visit Date: 08/15/2019    Patient Active Problem List   Diagnosis   • Scarring of lung   • Shortness of breath   • Morbid (severe) obesity due to excess calories (CMS/HCC)   • Restrictive lung disease   • Personal history of nicotine dependence   • Morbid obesity with BMI of 40.0-44.9, adult (CMS/HCC)        Past Medical History:   Diagnosis Date   • Abnormal nuclear cardiac imaging test    • Atrophic vaginitis    • CAD (coronary artery disease)    • Calcaneal spur, right foot    • Candidal intertrigo    • Cervical cancer (CMS/HCC)    • Chest pain    • Diabetes mellitus (CMS/HCC)    • HERNANDEZ (dyspnea on exertion)    • Heart attack (CMS/HCC)    • History of pleural effusion    • Hyperlipidemia    • Hypertension    • Lichen simplex chronicus    • Lipoma     Right foot   • Personal history of malignant neoplasm of cervix uteri    • Right foot pain         Past Surgical History:   Procedure Laterality Date   • CORONARY ARTERY BYPASS GRAFT      x 3   • HYSTERECTOMY     • LUNG SURGERY           Visit Dx:     ICD-10-CM ICD-9-CM   1. Impaired functional mobility, balance, gait, and endurance Z74.09 V49.89       Patient History     Row Name 08/15/19 1700             History    Chief Complaint  Other 1 (comment) Mobility  -WJ      Brief Description of Current Complaint  Pt complains of decreased fucntional mobility, poor endurance and dififuclty with ambulation for long distances. She had a triple bypass in , shoulder surgery in 2017 and lung surgery in  that all have contributed to her declinei n health and mobility.  -WJ      Previous treatment for THIS PROBLEM  Massage;Rehabilitation  -WJ      Current Tobacco Use  no  -WJ      Patient's Rating of General Health  Poor  -WJ      Hand Dominance  right-handed  -WJ      What clinical tests have you had  for this problem?  X-ray;CT scan  -WJ      Related/Recent Hospitalizations  No  -WJ         Pain     Pain Location  Shoulder;Wrist;Hand;Knee;Back  -WJ      Pain at Present  6  -WJ      Pain Frequency  Constant/continuous  -WJ         Fall Risk Assessment    Any falls in the past year:  Yes  -WJ      Number of falls reported in the last 12 months  4-5  -WJ      Factors that contributed to the fall:  Lost balance  -WJ         Services    Prior Rehab/Home Health Experiences  Yes  -WJ      When was the prior experience with Rehab/Home Health  2014,2015,2017,2019  -WJ      Where was the prior experience with Rehab/Home Health  [facility]  -WJ      Are you currently receiving Home Health services  No  -WJ      Do you plan to receive Home Health services in the near future  No  -WJ         Daily Activities    Primary Language  English  -WJ      Are you able to read  Yes  -WJ      Are you able to write  Yes  -WJ      How does patient learn best?  Listening  -WJ      Barriers to learning  None  -WJ      Pt Participated in POC and Goals  Yes  -WJ         Safety    Are you being hurt, hit, or frightened by anyone at home or in your life?  No  -WJ      Are you being neglected by a caregiver  No  -WJ        User Key  (r) = Recorded By, (t) = Taken By, (c) = Cosigned By    Initials Name Provider Type    WJ Angel Talbert, PT DPT Physical Therapist                           Therapy Education  Education Details: Discussed wheelchair eval and the process along with Niranjan Harley from [facility]  How Provided: Verbal  Provided to: Patient        PT Assessment/Plan     Row Name 08/15/19 0051          PT Assessment    Functional Limitations  Impaired gait;Impaired locomotion;Limitation in home management;Limitations in community activities;Performance in leisure activities;Performance in self-care ADL  -WJ     Impairments  Locomotion;Pain;Muscle strength;Posture;Range of motion;Impaired muscle endurance;Gait;Endurance;Balance  -WJ     Assessment  Comments  This was a one time visit for a wheelchair evaluation. Results from this evaluation can be seen in the paper chart that was completed stating the findings inclduing ROM, strength and other results/details from today's evaluation.  -WJ     Please refer to paper survey for additional self-reported information  Yes  -WJ     Rehab Potential  Other (comment) 1 time visit  -W     Patient/caregiver participated in establishment of treatment plan and goals  Yes  -WJ     Patient would benefit from skilled therapy intervention  No  -WJ        PT Plan    PT Frequency  One time visit  -W     Predicted Duration of Therapy Intervention (Therapy Eval)  one timem visit  -W     Planned CPT's?  PT EVAL LOW COMPLEXITY: 09942  -W     PT Plan Comments  One time visit, DC at this time  -       User Key  (r) = Recorded By, (t) = Taken By, (c) = Cosigned By    Initials Name Provider Type    WJ Angel Talbert, PT DPT Physical Therapist                                     Time Calculation:   Start Time: 1345  Stop Time: 1445  Time Calculation (min): 60 min  Total Timed Code Minutes- PT: 0 minute(s)  Therapy Charges for Today     Code Description Service Date Service Provider Modifiers Qty    [id number] HC PT EVAL LOW COMPLEXITY 4 8/15/2019 Angel Talbert, PT DPT GP 1                OP PT Discharge Summary  Date of Discharge: 08/15/19  Reason for Discharge: other (comment)(W/C eval)  Outcomes Achieved: Other  Discharge Destination: Home without follow-up  Discharge Instructions/Additional Comments: One time visit for wheel chair evaluation, we will D/C at this time.        Angel Talbert, PT DPT  8/15/2019

## 2019-08-22 RX ORDER — NYSTATIN 100000 [USP'U]/G
POWDER TOPICAL
Qty: 1 BOTTLE | Refills: 1 | Status: SHIPPED | OUTPATIENT
Start: 2019-08-22 | End: 2019-12-09 | Stop reason: SDUPTHER

## 2019-08-27 ENCOUNTER — TELEPHONE (OUTPATIENT)
Dept: PRIMARY CARE CLINIC | Age: 54
End: 2019-08-27

## 2019-08-27 DIAGNOSIS — E11.8 TYPE 2 DIABETES MELLITUS WITH COMPLICATION, WITH LONG-TERM CURRENT USE OF INSULIN (HCC): ICD-10-CM

## 2019-08-27 DIAGNOSIS — Z79.4 TYPE 2 DIABETES MELLITUS WITH COMPLICATION, WITH LONG-TERM CURRENT USE OF INSULIN (HCC): ICD-10-CM

## 2019-08-27 DIAGNOSIS — R43.8 METALLIC TASTE: Primary | ICD-10-CM

## 2019-09-05 ENCOUNTER — OFFICE VISIT (OUTPATIENT)
Dept: PRIMARY CARE CLINIC | Age: 54
End: 2019-09-05
Payer: COMMERCIAL

## 2019-09-05 DIAGNOSIS — Z79.4 TYPE 2 DIABETES MELLITUS WITH COMPLICATION, WITH LONG-TERM CURRENT USE OF INSULIN (HCC): ICD-10-CM

## 2019-09-05 DIAGNOSIS — E11.8 TYPE 2 DIABETES MELLITUS WITH COMPLICATION, WITH LONG-TERM CURRENT USE OF INSULIN (HCC): ICD-10-CM

## 2019-09-05 DIAGNOSIS — R30.0 DYSURIA: Primary | ICD-10-CM

## 2019-09-05 DIAGNOSIS — R43.8 METALLIC TASTE: ICD-10-CM

## 2019-09-05 LAB
ALBUMIN SERPL-MCNC: 4.1 G/DL (ref 3.5–5.2)
ALP BLD-CCNC: 65 U/L (ref 35–104)
ALT SERPL-CCNC: 27 U/L (ref 5–33)
ANION GAP SERPL CALCULATED.3IONS-SCNC: 13 MMOL/L (ref 7–19)
APPEARANCE FLUID: ABNORMAL
AST SERPL-CCNC: 28 U/L (ref 5–32)
BILIRUB SERPL-MCNC: 0.5 MG/DL (ref 0.2–1.2)
BILIRUBIN, POC: ABNORMAL
BLOOD URINE, POC: ABNORMAL
BUN BLDV-MCNC: 13 MG/DL (ref 6–20)
CALCIUM SERPL-MCNC: 9.4 MG/DL (ref 8.6–10)
CHLORIDE BLD-SCNC: 103 MMOL/L (ref 98–111)
CHOLESTEROL, FASTING: 118 MG/DL (ref 160–199)
CLARITY, POC: ABNORMAL
CO2: 25 MMOL/L (ref 22–29)
COLOR, POC: ABNORMAL
CREAT SERPL-MCNC: 0.7 MG/DL (ref 0.5–0.9)
GFR NON-AFRICAN AMERICAN: >60
GLUCOSE BLD-MCNC: 117 MG/DL (ref 74–109)
GLUCOSE URINE, POC: 500
HBA1C MFR BLD: 7.1 % (ref 4–6)
HCT VFR BLD CALC: 42.7 % (ref 37–47)
HDLC SERPL-MCNC: 53 MG/DL (ref 65–121)
HEMOGLOBIN: 13.9 G/DL (ref 12–16)
KETONES, POC: ABNORMAL
LDL CHOLESTEROL CALCULATED: 49 MG/DL
LEUKOCYTE EST, POC: ABNORMAL
MCH RBC QN AUTO: 29.1 PG (ref 27–31)
MCHC RBC AUTO-ENTMCNC: 32.6 G/DL (ref 33–37)
MCV RBC AUTO: 89.3 FL (ref 81–99)
NITRITE, POC: ABNORMAL
PDW BLD-RTO: 13.4 % (ref 11.5–14.5)
PH, POC: 6
PLATELET # BLD: 222 K/UL (ref 130–400)
PMV BLD AUTO: 10.6 FL (ref 9.4–12.3)
POTASSIUM SERPL-SCNC: 4.2 MMOL/L (ref 3.5–5)
PROTEIN, POC: ABNORMAL
RBC # BLD: 4.78 M/UL (ref 4.2–5.4)
SODIUM BLD-SCNC: 141 MMOL/L (ref 136–145)
SPECIFIC GRAVITY, POC: 1.03
TOTAL PROTEIN: 7.4 G/DL (ref 6.6–8.7)
TRIGLYCERIDE, FASTING: 82 MG/DL (ref 0–149)
UROBILINOGEN, POC: 0.2
VITAMIN B-12: 291 PG/ML (ref 211–946)
VITAMIN D 25-HYDROXY: 40.8 NG/ML
WBC # BLD: 7.7 K/UL (ref 4.8–10.8)

## 2019-09-05 PROCEDURE — 81002 URINALYSIS NONAUTO W/O SCOPE: CPT | Performed by: NURSE PRACTITIONER

## 2019-09-08 LAB
ARSENIC BLOOD: <10 UG/L (ref 0–12)
LEAD LEVEL BLOOD: <2 UG/DL (ref 0–4.9)
MERCURY BLOOD: <2.5 UG/L (ref 0–10)

## 2019-09-23 RX ORDER — INSULIN GLARGINE 100 [IU]/ML
INJECTION, SOLUTION SUBCUTANEOUS
Qty: 15 ML | Refills: 1 | Status: SHIPPED | OUTPATIENT
Start: 2019-09-23 | End: 2019-12-20

## 2019-10-02 ENCOUNTER — OFFICE VISIT (OUTPATIENT)
Dept: PRIMARY CARE CLINIC | Age: 54
End: 2019-10-02
Payer: COMMERCIAL

## 2019-10-02 VITALS
DIASTOLIC BLOOD PRESSURE: 84 MMHG | RESPIRATION RATE: 17 BRPM | SYSTOLIC BLOOD PRESSURE: 126 MMHG | HEIGHT: 62 IN | HEART RATE: 67 BPM | WEIGHT: 208 LBS | BODY MASS INDEX: 38.28 KG/M2 | OXYGEN SATURATION: 98 % | TEMPERATURE: 97.8 F

## 2019-10-02 DIAGNOSIS — Z79.4 TYPE 2 DIABETES MELLITUS WITH COMPLICATION, WITH LONG-TERM CURRENT USE OF INSULIN (HCC): ICD-10-CM

## 2019-10-02 DIAGNOSIS — R43.2 ABNORMAL TASTE IN MOUTH: ICD-10-CM

## 2019-10-02 DIAGNOSIS — N90.4 LICHEN SCLEROSUS OF FEMALE GENITALIA: ICD-10-CM

## 2019-10-02 DIAGNOSIS — M65.331 TRIGGER MIDDLE FINGER OF RIGHT HAND: ICD-10-CM

## 2019-10-02 DIAGNOSIS — E11.8 TYPE 2 DIABETES MELLITUS WITH COMPLICATION, WITH LONG-TERM CURRENT USE OF INSULIN (HCC): ICD-10-CM

## 2019-10-02 DIAGNOSIS — M65.322 TRIGGER INDEX FINGER OF LEFT HAND: Primary | ICD-10-CM

## 2019-10-02 PROCEDURE — 1036F TOBACCO NON-USER: CPT | Performed by: NURSE PRACTITIONER

## 2019-10-02 PROCEDURE — G8417 CALC BMI ABV UP PARAM F/U: HCPCS | Performed by: NURSE PRACTITIONER

## 2019-10-02 PROCEDURE — G8598 ASA/ANTIPLAT THER USED: HCPCS | Performed by: NURSE PRACTITIONER

## 2019-10-02 PROCEDURE — 3017F COLORECTAL CA SCREEN DOC REV: CPT | Performed by: NURSE PRACTITIONER

## 2019-10-02 PROCEDURE — G8484 FLU IMMUNIZE NO ADMIN: HCPCS | Performed by: NURSE PRACTITIONER

## 2019-10-02 PROCEDURE — G8427 DOCREV CUR MEDS BY ELIG CLIN: HCPCS | Performed by: NURSE PRACTITIONER

## 2019-10-02 PROCEDURE — 99214 OFFICE O/P EST MOD 30 MIN: CPT | Performed by: NURSE PRACTITIONER

## 2019-10-03 ASSESSMENT — ENCOUNTER SYMPTOMS
GASTROINTESTINAL NEGATIVE: 1
RESPIRATORY NEGATIVE: 1
EYES NEGATIVE: 1

## 2019-10-07 ENCOUNTER — TELEPHONE (OUTPATIENT)
Dept: OBGYN | Age: 54
End: 2019-10-07

## 2019-11-04 ENCOUNTER — ANESTHESIA EVENT (OUTPATIENT)
Dept: OPERATING ROOM | Age: 54
End: 2019-11-04

## 2019-11-07 ENCOUNTER — OFFICE VISIT (OUTPATIENT)
Dept: CARDIOLOGY | Age: 54
End: 2019-11-07
Payer: COMMERCIAL

## 2019-11-07 VITALS
SYSTOLIC BLOOD PRESSURE: 126 MMHG | DIASTOLIC BLOOD PRESSURE: 82 MMHG | HEIGHT: 62 IN | BODY MASS INDEX: 38.46 KG/M2 | HEART RATE: 68 BPM | WEIGHT: 209 LBS

## 2019-11-07 DIAGNOSIS — I10 ESSENTIAL HYPERTENSION: ICD-10-CM

## 2019-11-07 DIAGNOSIS — I51.9 LEFT VENTRICULAR DYSFUNCTION: ICD-10-CM

## 2019-11-07 DIAGNOSIS — I25.119 CORONARY ARTERY DISEASE INVOLVING NATIVE CORONARY ARTERY OF NATIVE HEART WITH ANGINA PECTORIS (HCC): Primary | ICD-10-CM

## 2019-11-07 PROCEDURE — 1036F TOBACCO NON-USER: CPT | Performed by: CLINICAL NURSE SPECIALIST

## 2019-11-07 PROCEDURE — G8598 ASA/ANTIPLAT THER USED: HCPCS | Performed by: CLINICAL NURSE SPECIALIST

## 2019-11-07 PROCEDURE — G8484 FLU IMMUNIZE NO ADMIN: HCPCS | Performed by: CLINICAL NURSE SPECIALIST

## 2019-11-07 PROCEDURE — G8417 CALC BMI ABV UP PARAM F/U: HCPCS | Performed by: CLINICAL NURSE SPECIALIST

## 2019-11-07 PROCEDURE — G8427 DOCREV CUR MEDS BY ELIG CLIN: HCPCS | Performed by: CLINICAL NURSE SPECIALIST

## 2019-11-07 PROCEDURE — 93000 ELECTROCARDIOGRAM COMPLETE: CPT | Performed by: CLINICAL NURSE SPECIALIST

## 2019-11-07 PROCEDURE — 99214 OFFICE O/P EST MOD 30 MIN: CPT | Performed by: CLINICAL NURSE SPECIALIST

## 2019-11-07 PROCEDURE — 3017F COLORECTAL CA SCREEN DOC REV: CPT | Performed by: CLINICAL NURSE SPECIALIST

## 2019-11-08 ENCOUNTER — ANESTHESIA (OUTPATIENT)
Dept: OPERATING ROOM | Age: 54
End: 2019-11-08

## 2019-11-08 ENCOUNTER — HOSPITAL ENCOUNTER (OUTPATIENT)
Age: 54
Setting detail: OUTPATIENT SURGERY
Discharge: HOME | End: 2019-11-08
Attending: ORTHOPAEDIC SURGERY | Admitting: ORTHOPAEDIC SURGERY

## 2019-11-08 VITALS
SYSTOLIC BLOOD PRESSURE: 134 MMHG | HEART RATE: 66 BPM | RESPIRATION RATE: 18 BRPM | OXYGEN SATURATION: 99 % | WEIGHT: 210 LBS | HEIGHT: 62 IN | TEMPERATURE: 97.1 F | BODY MASS INDEX: 38.64 KG/M2 | DIASTOLIC BLOOD PRESSURE: 68 MMHG

## 2019-11-08 VITALS
OXYGEN SATURATION: 98 % | SYSTOLIC BLOOD PRESSURE: 111 MMHG | TEMPERATURE: 97 F | RESPIRATION RATE: 7 BRPM | DIASTOLIC BLOOD PRESSURE: 66 MMHG

## 2019-11-08 PROCEDURE — 26055 INCISE FINGER TENDON SHEATH: CPT

## 2019-11-08 PROCEDURE — G8907 PT DOC NO EVENTS ON DISCHARG: HCPCS

## 2019-11-08 PROCEDURE — G8918 PT W/O PREOP ORDER IV AB PRO: HCPCS

## 2019-11-08 RX ORDER — DEXAMETHASONE SODIUM PHOSPHATE 4 MG/ML
INJECTION, SOLUTION INTRA-ARTICULAR; INTRALESIONAL; INTRAMUSCULAR; INTRAVENOUS; SOFT TISSUE PRN
Status: DISCONTINUED | OUTPATIENT
Start: 2019-11-08 | End: 2019-11-08 | Stop reason: SDUPTHER

## 2019-11-08 RX ORDER — MORPHINE SULFATE 10 MG/ML
4 INJECTION, SOLUTION INTRAMUSCULAR; INTRAVENOUS EVERY 5 MIN PRN
Status: DISCONTINUED | OUTPATIENT
Start: 2019-11-08 | End: 2019-11-08 | Stop reason: HOSPADM

## 2019-11-08 RX ORDER — PROMETHAZINE HYDROCHLORIDE 25 MG/ML
6.25 INJECTION, SOLUTION INTRAMUSCULAR; INTRAVENOUS
Status: DISCONTINUED | OUTPATIENT
Start: 2019-11-08 | End: 2019-11-08 | Stop reason: HOSPADM

## 2019-11-08 RX ORDER — HYDRALAZINE HYDROCHLORIDE 20 MG/ML
5 INJECTION INTRAMUSCULAR; INTRAVENOUS EVERY 10 MIN PRN
Status: DISCONTINUED | OUTPATIENT
Start: 2019-11-08 | End: 2019-11-08 | Stop reason: HOSPADM

## 2019-11-08 RX ORDER — MEPERIDINE HYDROCHLORIDE 25 MG/ML
12.5 INJECTION INTRAMUSCULAR; INTRAVENOUS; SUBCUTANEOUS EVERY 5 MIN PRN
Status: DISCONTINUED | OUTPATIENT
Start: 2019-11-08 | End: 2019-11-08 | Stop reason: HOSPADM

## 2019-11-08 RX ORDER — PROPOFOL 10 MG/ML
INJECTION, EMULSION INTRAVENOUS PRN
Status: DISCONTINUED | OUTPATIENT
Start: 2019-11-08 | End: 2019-11-08 | Stop reason: SDUPTHER

## 2019-11-08 RX ORDER — DIPHENHYDRAMINE HYDROCHLORIDE 50 MG/ML
12.5 INJECTION INTRAMUSCULAR; INTRAVENOUS
Status: DISCONTINUED | OUTPATIENT
Start: 2019-11-08 | End: 2019-11-08 | Stop reason: HOSPADM

## 2019-11-08 RX ORDER — LABETALOL HYDROCHLORIDE 5 MG/ML
5 INJECTION, SOLUTION INTRAVENOUS EVERY 10 MIN PRN
Status: DISCONTINUED | OUTPATIENT
Start: 2019-11-08 | End: 2019-11-08 | Stop reason: HOSPADM

## 2019-11-08 RX ORDER — HYDROMORPHONE HCL 110MG/55ML
0.5 PATIENT CONTROLLED ANALGESIA SYRINGE INTRAVENOUS EVERY 5 MIN PRN
Status: DISCONTINUED | OUTPATIENT
Start: 2019-11-08 | End: 2019-11-08 | Stop reason: HOSPADM

## 2019-11-08 RX ORDER — MORPHINE SULFATE 10 MG/ML
2 INJECTION, SOLUTION INTRAMUSCULAR; INTRAVENOUS EVERY 5 MIN PRN
Status: DISCONTINUED | OUTPATIENT
Start: 2019-11-08 | End: 2019-11-08 | Stop reason: HOSPADM

## 2019-11-08 RX ORDER — METOCLOPRAMIDE HYDROCHLORIDE 5 MG/ML
10 INJECTION INTRAMUSCULAR; INTRAVENOUS
Status: DISCONTINUED | OUTPATIENT
Start: 2019-11-08 | End: 2019-11-08 | Stop reason: HOSPADM

## 2019-11-08 RX ORDER — HYDROCODONE BITARTRATE AND ACETAMINOPHEN 7.5; 325 MG/1; MG/1
1 TABLET ORAL ONCE
Status: COMPLETED | OUTPATIENT
Start: 2019-11-08 | End: 2019-11-08

## 2019-11-08 RX ORDER — SODIUM CHLORIDE, SODIUM LACTATE, POTASSIUM CHLORIDE, CALCIUM CHLORIDE 600; 310; 30; 20 MG/100ML; MG/100ML; MG/100ML; MG/100ML
INJECTION, SOLUTION INTRAVENOUS CONTINUOUS
Status: DISCONTINUED | OUTPATIENT
Start: 2019-11-08 | End: 2019-11-08 | Stop reason: HOSPADM

## 2019-11-08 RX ORDER — ENALAPRILAT 2.5 MG/2ML
1.25 INJECTION INTRAVENOUS
Status: DISCONTINUED | OUTPATIENT
Start: 2019-11-08 | End: 2019-11-08 | Stop reason: HOSPADM

## 2019-11-08 RX ORDER — MIDAZOLAM HYDROCHLORIDE 1 MG/ML
INJECTION INTRAMUSCULAR; INTRAVENOUS PRN
Status: DISCONTINUED | OUTPATIENT
Start: 2019-11-08 | End: 2019-11-08 | Stop reason: SDUPTHER

## 2019-11-08 RX ORDER — LIDOCAINE HYDROCHLORIDE 10 MG/ML
INJECTION, SOLUTION EPIDURAL; INFILTRATION; INTRACAUDAL; PERINEURAL PRN
Status: DISCONTINUED | OUTPATIENT
Start: 2019-11-08 | End: 2019-11-08 | Stop reason: SDUPTHER

## 2019-11-08 RX ORDER — ONDANSETRON 2 MG/ML
INJECTION INTRAMUSCULAR; INTRAVENOUS PRN
Status: DISCONTINUED | OUTPATIENT
Start: 2019-11-08 | End: 2019-11-08 | Stop reason: SDUPTHER

## 2019-11-08 RX ORDER — HYDROMORPHONE HCL 110MG/55ML
0.25 PATIENT CONTROLLED ANALGESIA SYRINGE INTRAVENOUS EVERY 5 MIN PRN
Status: DISCONTINUED | OUTPATIENT
Start: 2019-11-08 | End: 2019-11-08 | Stop reason: HOSPADM

## 2019-11-08 RX ORDER — FENTANYL CITRATE 50 UG/ML
INJECTION, SOLUTION INTRAMUSCULAR; INTRAVENOUS PRN
Status: DISCONTINUED | OUTPATIENT
Start: 2019-11-08 | End: 2019-11-08 | Stop reason: SDUPTHER

## 2019-11-08 RX ADMIN — MIDAZOLAM HYDROCHLORIDE 2 MG: 1 INJECTION INTRAMUSCULAR; INTRAVENOUS at 08:17

## 2019-11-08 RX ADMIN — ONDANSETRON 4 MG: 2 INJECTION INTRAMUSCULAR; INTRAVENOUS at 08:30

## 2019-11-08 RX ADMIN — DEXAMETHASONE SODIUM PHOSPHATE 8 MG: 4 INJECTION, SOLUTION INTRA-ARTICULAR; INTRALESIONAL; INTRAMUSCULAR; INTRAVENOUS; SOFT TISSUE at 08:30

## 2019-11-08 RX ADMIN — FENTANYL CITRATE 50 MCG: 50 INJECTION, SOLUTION INTRAMUSCULAR; INTRAVENOUS at 08:55

## 2019-11-08 RX ADMIN — FENTANYL CITRATE 25 MCG: 50 INJECTION, SOLUTION INTRAMUSCULAR; INTRAVENOUS at 08:46

## 2019-11-08 RX ADMIN — HYDROCODONE BITARTRATE AND ACETAMINOPHEN 1 TABLET: 7.5; 325 TABLET ORAL at 09:22

## 2019-11-08 RX ADMIN — PROPOFOL 160 MG: 10 INJECTION, EMULSION INTRAVENOUS at 08:27

## 2019-11-08 RX ADMIN — FENTANYL CITRATE 25 MCG: 50 INJECTION, SOLUTION INTRAMUSCULAR; INTRAVENOUS at 08:38

## 2019-11-08 RX ADMIN — SODIUM CHLORIDE, SODIUM LACTATE, POTASSIUM CHLORIDE, CALCIUM CHLORIDE: 600; 310; 30; 20 INJECTION, SOLUTION INTRAVENOUS at 07:19

## 2019-11-08 RX ADMIN — LIDOCAINE HYDROCHLORIDE 50 MG: 10 INJECTION, SOLUTION EPIDURAL; INFILTRATION; INTRACAUDAL; PERINEURAL at 08:27

## 2019-11-08 ASSESSMENT — ENCOUNTER SYMPTOMS
SHORTNESS OF BREATH: 1
DYSPNEA ACTIVITY LEVEL: AFTER AMBULATING 1 FLIGHT OF STAIRS

## 2019-11-08 ASSESSMENT — PAIN SCALES - GENERAL
PAINLEVEL_OUTOF10: 5
PAINLEVEL_OUTOF10: 8

## 2019-11-08 ASSESSMENT — PAIN DESCRIPTION - ORIENTATION: ORIENTATION: LEFT

## 2019-11-08 ASSESSMENT — PAIN DESCRIPTION - LOCATION: LOCATION: HAND

## 2019-11-08 ASSESSMENT — PAIN - FUNCTIONAL ASSESSMENT: PAIN_FUNCTIONAL_ASSESSMENT: ACTIVITIES ARE NOT PREVENTED

## 2019-11-08 ASSESSMENT — PAIN DESCRIPTION - PAIN TYPE: TYPE: SURGICAL PAIN

## 2019-11-08 ASSESSMENT — PAIN DESCRIPTION - PROGRESSION: CLINICAL_PROGRESSION: GRADUALLY IMPROVING

## 2019-11-11 ENCOUNTER — TELEPHONE (OUTPATIENT)
Dept: SURGERY | Age: 54
End: 2019-11-11

## 2019-11-14 ENCOUNTER — OFFICE VISIT (OUTPATIENT)
Dept: PRIMARY CARE CLINIC | Age: 54
End: 2019-11-14
Payer: COMMERCIAL

## 2019-11-14 VITALS
SYSTOLIC BLOOD PRESSURE: 128 MMHG | OXYGEN SATURATION: 98 % | DIASTOLIC BLOOD PRESSURE: 84 MMHG | HEIGHT: 62 IN | TEMPERATURE: 97.5 F | WEIGHT: 209 LBS | HEART RATE: 73 BPM | BODY MASS INDEX: 38.46 KG/M2

## 2019-11-14 DIAGNOSIS — I10 ESSENTIAL HYPERTENSION: ICD-10-CM

## 2019-11-14 DIAGNOSIS — Z79.4 TYPE 2 DIABETES MELLITUS WITH COMPLICATION, WITH LONG-TERM CURRENT USE OF INSULIN (HCC): Primary | ICD-10-CM

## 2019-11-14 DIAGNOSIS — Z12.11 COLON CANCER SCREENING: ICD-10-CM

## 2019-11-14 DIAGNOSIS — I25.10 CORONARY ARTERY DISEASE INVOLVING NATIVE CORONARY ARTERY OF NATIVE HEART WITHOUT ANGINA PECTORIS: ICD-10-CM

## 2019-11-14 DIAGNOSIS — E11.8 TYPE 2 DIABETES MELLITUS WITH COMPLICATION, WITH LONG-TERM CURRENT USE OF INSULIN (HCC): Primary | ICD-10-CM

## 2019-11-14 PROCEDURE — 3017F COLORECTAL CA SCREEN DOC REV: CPT | Performed by: NURSE PRACTITIONER

## 2019-11-14 PROCEDURE — G8417 CALC BMI ABV UP PARAM F/U: HCPCS | Performed by: NURSE PRACTITIONER

## 2019-11-14 PROCEDURE — G8598 ASA/ANTIPLAT THER USED: HCPCS | Performed by: NURSE PRACTITIONER

## 2019-11-14 PROCEDURE — G8427 DOCREV CUR MEDS BY ELIG CLIN: HCPCS | Performed by: NURSE PRACTITIONER

## 2019-11-14 PROCEDURE — 2022F DILAT RTA XM EVC RTNOPTHY: CPT | Performed by: NURSE PRACTITIONER

## 2019-11-14 PROCEDURE — G8484 FLU IMMUNIZE NO ADMIN: HCPCS | Performed by: NURSE PRACTITIONER

## 2019-11-14 PROCEDURE — 1036F TOBACCO NON-USER: CPT | Performed by: NURSE PRACTITIONER

## 2019-11-14 PROCEDURE — 99214 OFFICE O/P EST MOD 30 MIN: CPT | Performed by: NURSE PRACTITIONER

## 2019-11-17 ASSESSMENT — ENCOUNTER SYMPTOMS
GASTROINTESTINAL NEGATIVE: 1
EYES NEGATIVE: 1
RESPIRATORY NEGATIVE: 1

## 2019-12-09 ENCOUNTER — HOSPITAL ENCOUNTER (OUTPATIENT)
Dept: WOMENS IMAGING | Age: 54
Discharge: HOME | End: 2019-12-09
Payer: COMMERCIAL

## 2019-12-09 ENCOUNTER — OFFICE VISIT (OUTPATIENT)
Dept: SURGERY | Age: 54
End: 2019-12-09
Payer: COMMERCIAL

## 2019-12-09 ENCOUNTER — HOSPITAL ENCOUNTER (OUTPATIENT)
Dept: NUCLEAR MEDICINE | Age: 54
Discharge: HOME | End: 2019-12-11
Payer: COMMERCIAL

## 2019-12-09 ENCOUNTER — HOSPITAL ENCOUNTER (OUTPATIENT)
Dept: NON INVASIVE DIAGNOSTICS | Age: 54
Discharge: HOME | End: 2019-12-09
Payer: COMMERCIAL

## 2019-12-09 VITALS
DIASTOLIC BLOOD PRESSURE: 84 MMHG | HEIGHT: 62 IN | SYSTOLIC BLOOD PRESSURE: 131 MMHG | BODY MASS INDEX: 38.46 KG/M2 | WEIGHT: 209 LBS | HEART RATE: 82 BPM

## 2019-12-09 DIAGNOSIS — Z12.31 BREAST CANCER SCREENING BY MAMMOGRAM: ICD-10-CM

## 2019-12-09 DIAGNOSIS — I25.119 CORONARY ARTERY DISEASE INVOLVING NATIVE CORONARY ARTERY OF NATIVE HEART WITH ANGINA PECTORIS (HCC): ICD-10-CM

## 2019-12-09 PROCEDURE — A9500 TC99M SESTAMIBI: HCPCS | Performed by: CLINICAL NURSE SPECIALIST

## 2019-12-09 PROCEDURE — 6360000002 HC RX W HCPCS: Performed by: INTERNAL MEDICINE

## 2019-12-09 PROCEDURE — 3017F COLORECTAL CA SCREEN DOC REV: CPT | Performed by: PHYSICIAN ASSISTANT

## 2019-12-09 PROCEDURE — G8484 FLU IMMUNIZE NO ADMIN: HCPCS | Performed by: PHYSICIAN ASSISTANT

## 2019-12-09 PROCEDURE — 78452 HT MUSCLE IMAGE SPECT MULT: CPT

## 2019-12-09 PROCEDURE — 77067 SCR MAMMO BI INCL CAD: CPT

## 2019-12-09 PROCEDURE — 93017 CV STRESS TEST TRACING ONLY: CPT

## 2019-12-09 PROCEDURE — G8598 ASA/ANTIPLAT THER USED: HCPCS | Performed by: PHYSICIAN ASSISTANT

## 2019-12-09 PROCEDURE — G8417 CALC BMI ABV UP PARAM F/U: HCPCS | Performed by: PHYSICIAN ASSISTANT

## 2019-12-09 PROCEDURE — 3430000000 HC RX DIAGNOSTIC RADIOPHARMACEUTICAL: Performed by: CLINICAL NURSE SPECIALIST

## 2019-12-09 PROCEDURE — 99213 OFFICE O/P EST LOW 20 MIN: CPT | Performed by: PHYSICIAN ASSISTANT

## 2019-12-09 PROCEDURE — G8427 DOCREV CUR MEDS BY ELIG CLIN: HCPCS | Performed by: PHYSICIAN ASSISTANT

## 2019-12-09 PROCEDURE — 1036F TOBACCO NON-USER: CPT | Performed by: PHYSICIAN ASSISTANT

## 2019-12-09 RX ORDER — NYSTATIN 100000 [USP'U]/G
POWDER TOPICAL
Qty: 60 G | Refills: 5 | Status: SHIPPED | OUTPATIENT
Start: 2019-12-09 | End: 2020-12-23

## 2019-12-09 RX ADMIN — REGADENOSON 0.4 MG: 0.08 INJECTION, SOLUTION INTRAVENOUS at 13:07

## 2019-12-09 RX ADMIN — TETRAKIS(2-METHOXYISOBUTYLISOCYANIDE)COPPER(I) TETRAFLUOROBORATE 10 MILLICURIE: 1 INJECTION, POWDER, LYOPHILIZED, FOR SOLUTION INTRAVENOUS at 13:08

## 2019-12-09 RX ADMIN — TETRAKIS(2-METHOXYISOBUTYLISOCYANIDE)COPPER(I) TETRAFLUOROBORATE 30 MILLICURIE: 1 INJECTION, POWDER, LYOPHILIZED, FOR SOLUTION INTRAVENOUS at 13:08

## 2019-12-09 NOTE — PROGRESS NOTES
HPI:  Fabricio Villalobos is in for yearly follow-up breast check. She has not noticed any changes in her breasts. She has had previous Myrisk testing and was negative for mutation. BILATERAL DIGITAL SCREENING MAMMOGRAM WITH COMPUTER AIDED DETECTION,   BILATERAL DIGITAL BREAST TOMOSYNTHESIS   DATE: 12/9/2019 1:09 PM   INDICATION: Screen. COMPARISON: 10/17/2018, 12/19/2016, 12/14/2015, 8/14/2015 (left only)   FAMILY HISTORY OF BREAST CANCER: Mother at age 47, maternal   grandmother at age 66, maternal aunt at age 50, paternal aunt at age   39. Maternal aunt with history of ovarian cancer at age 64. TECHNIQUE: Standard digital mammogram images were obtained. Computer   Aided Detection was utilized. In addition, low dose images were   obtained in each projection. These low-dose images were reconstructed   into 1 mm thick slices and reviewed on the soft copy workstation. Technologist reports limited exam due to patient's ability to position   and best possible images obtained. BREAST COMPOSITION: Category A -- Almost entirely fat (less than 25%   glandular tissue). FINDINGS: Limited exam. There are no suspicious findings.       Impression   1. No mammographic evidence of malignancy.  Recommend routine annual   screening mammography. BREAST EXAM:  On examination, she has fibrocystic changes throughout both breasts, no dominant masses, no skin or nipple changes and no axillary adenopathy. I see nothing suspicious for breast cancer. ASSESSMENT:  Benign fibrocystic changes              PLAN:  I will plan to see her back in 1 year for physical exam and bilateral mammograms. She will contact me if anything significant changes. 15 minutes spent,  which includes face to face with patient, record review, evaluation, planning, and education.

## 2019-12-10 LAB
LV EF: 76 %
LVEF MODALITY: NORMAL

## 2019-12-17 RX ORDER — SODIUM CHLORIDE 9 MG/ML
INJECTION, SOLUTION INTRAVENOUS CONTINUOUS
Status: CANCELLED | OUTPATIENT
Start: 2019-12-18

## 2019-12-18 ENCOUNTER — HOSPITAL ENCOUNTER (OUTPATIENT)
Dept: CARDIAC CATH/INVASIVE PROCEDURES | Age: 54
Discharge: HOME | End: 2019-12-18
Payer: COMMERCIAL

## 2019-12-20 ENCOUNTER — HOSPITAL ENCOUNTER (EMERGENCY)
Age: 54
Discharge: HOME | End: 2019-12-20
Attending: EMERGENCY MEDICINE
Payer: COMMERCIAL

## 2019-12-20 ENCOUNTER — APPOINTMENT (OUTPATIENT)
Dept: GENERAL RADIOLOGY | Age: 54
End: 2019-12-20
Payer: COMMERCIAL

## 2019-12-20 VITALS
SYSTOLIC BLOOD PRESSURE: 113 MMHG | HEART RATE: 72 BPM | HEIGHT: 63 IN | RESPIRATION RATE: 18 BRPM | WEIGHT: 204 LBS | OXYGEN SATURATION: 100 % | DIASTOLIC BLOOD PRESSURE: 57 MMHG | TEMPERATURE: 98 F | BODY MASS INDEX: 36.14 KG/M2

## 2019-12-20 DIAGNOSIS — Z11.4 SCREENING FOR HIV WITHOUT PRESENCE OF RISK FACTORS: ICD-10-CM

## 2019-12-20 DIAGNOSIS — S59.902A ELBOW INJURY, LEFT, INITIAL ENCOUNTER: Primary | ICD-10-CM

## 2019-12-20 DIAGNOSIS — Z11.59 NEED FOR HEPATITIS C SCREENING TEST: ICD-10-CM

## 2019-12-20 DIAGNOSIS — L68.0 HIRSUTISM: ICD-10-CM

## 2019-12-20 DIAGNOSIS — I10 ESSENTIAL HYPERTENSION: ICD-10-CM

## 2019-12-20 DIAGNOSIS — K21.9 GASTROESOPHAGEAL REFLUX DISEASE WITHOUT ESOPHAGITIS: ICD-10-CM

## 2019-12-20 DIAGNOSIS — Z23 NEEDS FLU SHOT: ICD-10-CM

## 2019-12-20 DIAGNOSIS — Z12.11 SCREENING FOR COLON CANCER: ICD-10-CM

## 2019-12-20 DIAGNOSIS — I25.10 CORONARY ARTERY DISEASE INVOLVING NATIVE CORONARY ARTERY OF NATIVE HEART WITHOUT ANGINA PECTORIS: ICD-10-CM

## 2019-12-20 PROCEDURE — 73080 X-RAY EXAM OF ELBOW: CPT

## 2019-12-20 PROCEDURE — 6370000000 HC RX 637 (ALT 250 FOR IP): Performed by: EMERGENCY MEDICINE

## 2019-12-20 PROCEDURE — 99283 EMERGENCY DEPT VISIT LOW MDM: CPT

## 2019-12-20 RX ORDER — FAMOTIDINE 20 MG/1
TABLET, FILM COATED ORAL
Qty: 60 TABLET | Refills: 5 | Status: SHIPPED | OUTPATIENT
Start: 2019-12-20 | End: 2020-02-28

## 2019-12-20 RX ORDER — ISOSORBIDE MONONITRATE 30 MG/1
TABLET, EXTENDED RELEASE ORAL
Qty: 30 TABLET | Refills: 5 | Status: SHIPPED | OUTPATIENT
Start: 2019-12-20 | End: 2021-06-16

## 2019-12-20 RX ORDER — SEMAGLUTIDE 1.34 MG/ML
INJECTION, SOLUTION SUBCUTANEOUS
Qty: 4 ML | Refills: 2 | Status: SHIPPED | OUTPATIENT
Start: 2019-12-20 | End: 2020-03-04

## 2019-12-20 RX ORDER — FUROSEMIDE 40 MG/1
40 TABLET ORAL DAILY
Qty: 30 TABLET | Refills: 5 | Status: SHIPPED | OUTPATIENT
Start: 2019-12-20 | End: 2022-04-26 | Stop reason: SDUPTHER

## 2019-12-20 RX ORDER — OXYCODONE HYDROCHLORIDE 5 MG/1
5 TABLET ORAL EVERY 6 HOURS PRN
Qty: 12 TABLET | Refills: 0 | Status: SHIPPED | OUTPATIENT
Start: 2019-12-20 | End: 2019-12-23

## 2019-12-20 RX ORDER — OXYCODONE HYDROCHLORIDE AND ACETAMINOPHEN 5; 325 MG/1; MG/1
1 TABLET ORAL ONCE
Status: COMPLETED | OUTPATIENT
Start: 2019-12-20 | End: 2019-12-20

## 2019-12-20 RX ADMIN — OXYCODONE HYDROCHLORIDE AND ACETAMINOPHEN 1 TABLET: 5; 325 TABLET ORAL at 22:39

## 2019-12-20 ASSESSMENT — PAIN SCALES - GENERAL
PAINLEVEL_OUTOF10: 8
PAINLEVEL_OUTOF10: 8

## 2019-12-20 ASSESSMENT — ENCOUNTER SYMPTOMS
COUGH: 0
BACK PAIN: 0
NAUSEA: 0
EYE PAIN: 0
SORE THROAT: 0
SHORTNESS OF BREATH: 0
ABDOMINAL PAIN: 0
CHEST TIGHTNESS: 0
PHOTOPHOBIA: 0
DIARRHEA: 0
RHINORRHEA: 0
VOMITING: 0

## 2019-12-20 ASSESSMENT — PAIN DESCRIPTION - ORIENTATION: ORIENTATION: LEFT

## 2019-12-20 ASSESSMENT — PAIN DESCRIPTION - LOCATION: LOCATION: SHOULDER

## 2019-12-30 ENCOUNTER — HOSPITAL ENCOUNTER (OUTPATIENT)
Dept: CARDIAC CATH/INVASIVE PROCEDURES | Age: 54
Discharge: HOME | End: 2019-12-30
Payer: COMMERCIAL

## 2019-12-30 ENCOUNTER — HOSPITAL ENCOUNTER (OUTPATIENT)
Dept: GENERAL RADIOLOGY | Age: 54
Discharge: HOME | End: 2019-12-30
Payer: COMMERCIAL

## 2019-12-30 VITALS
OXYGEN SATURATION: 94 % | TEMPERATURE: 97.2 F | DIASTOLIC BLOOD PRESSURE: 61 MMHG | WEIGHT: 204 LBS | HEIGHT: 62 IN | BODY MASS INDEX: 37.54 KG/M2 | HEART RATE: 72 BPM | SYSTOLIC BLOOD PRESSURE: 136 MMHG | RESPIRATION RATE: 18 BRPM

## 2019-12-30 DIAGNOSIS — I25.119 CORONARY ARTERY DISEASE INVOLVING NATIVE CORONARY ARTERY OF NATIVE HEART WITH ANGINA PECTORIS (HCC): ICD-10-CM

## 2019-12-30 LAB
ANION GAP SERPL CALCULATED.3IONS-SCNC: 14 MMOL/L (ref 7–19)
BUN BLDV-MCNC: 12 MG/DL (ref 6–20)
CALCIUM SERPL-MCNC: 9.9 MG/DL (ref 8.6–10)
CHLORIDE BLD-SCNC: 99 MMOL/L (ref 98–111)
CO2: 30 MMOL/L (ref 22–29)
CREAT SERPL-MCNC: 0.7 MG/DL (ref 0.5–0.9)
GFR NON-AFRICAN AMERICAN: >60
GLUCOSE BLD-MCNC: 112 MG/DL (ref 74–109)
HCT VFR BLD CALC: 45.5 % (ref 37–47)
HEMOGLOBIN: 15.1 G/DL (ref 12–16)
MCH RBC QN AUTO: 29.1 PG (ref 27–31)
MCHC RBC AUTO-ENTMCNC: 33.2 G/DL (ref 33–37)
MCV RBC AUTO: 87.7 FL (ref 81–99)
PDW BLD-RTO: 13.2 % (ref 11.5–14.5)
PLATELET # BLD: 274 K/UL (ref 130–400)
PMV BLD AUTO: 10.2 FL (ref 9.4–12.3)
POTASSIUM SERPL-SCNC: 3.2 MMOL/L (ref 3.5–5)
RBC # BLD: 5.19 M/UL (ref 4.2–5.4)
SODIUM BLD-SCNC: 143 MMOL/L (ref 136–145)
WBC # BLD: 8.2 K/UL (ref 4.8–10.8)

## 2019-12-30 PROCEDURE — 99024 POSTOP FOLLOW-UP VISIT: CPT | Performed by: INTERNAL MEDICINE

## 2019-12-30 PROCEDURE — 85027 COMPLETE CBC AUTOMATED: CPT

## 2019-12-30 PROCEDURE — 99152 MOD SED SAME PHYS/QHP 5/>YRS: CPT | Performed by: INTERNAL MEDICINE

## 2019-12-30 PROCEDURE — 2709999900 HC NON-CHARGEABLE SUPPLY

## 2019-12-30 PROCEDURE — 93459 L HRT ART/GRFT ANGIO: CPT | Performed by: INTERNAL MEDICINE

## 2019-12-30 PROCEDURE — 36415 COLL VENOUS BLD VENIPUNCTURE: CPT

## 2019-12-30 PROCEDURE — 6360000002 HC RX W HCPCS

## 2019-12-30 PROCEDURE — 2580000003 HC RX 258: Performed by: INTERNAL MEDICINE

## 2019-12-30 PROCEDURE — C1894 INTRO/SHEATH, NON-LASER: HCPCS

## 2019-12-30 PROCEDURE — 6360000004 HC RX CONTRAST MEDICATION: Performed by: INTERNAL MEDICINE

## 2019-12-30 PROCEDURE — 71046 X-RAY EXAM CHEST 2 VIEWS: CPT

## 2019-12-30 PROCEDURE — C1760 CLOSURE DEV, VASC: HCPCS

## 2019-12-30 PROCEDURE — 80048 BASIC METABOLIC PNL TOTAL CA: CPT

## 2019-12-30 RX ORDER — SODIUM CHLORIDE 9 MG/ML
INJECTION, SOLUTION INTRAVENOUS CONTINUOUS
Status: DISCONTINUED | OUTPATIENT
Start: 2019-12-30 | End: 2020-01-01 | Stop reason: HOSPADM

## 2019-12-30 RX ORDER — SODIUM CHLORIDE 0.9 % (FLUSH) 0.9 %
10 SYRINGE (ML) INJECTION EVERY 12 HOURS SCHEDULED
Status: DISCONTINUED | OUTPATIENT
Start: 2019-12-30 | End: 2020-01-01 | Stop reason: HOSPADM

## 2019-12-30 RX ORDER — SODIUM CHLORIDE 0.9 % (FLUSH) 0.9 %
10 SYRINGE (ML) INJECTION PRN
Status: DISCONTINUED | OUTPATIENT
Start: 2019-12-30 | End: 2020-01-01 | Stop reason: HOSPADM

## 2019-12-30 RX ADMIN — IOPAMIDOL 156 ML: 612 INJECTION, SOLUTION INTRAVENOUS at 14:03

## 2019-12-30 RX ADMIN — SODIUM CHLORIDE: 9 INJECTION, SOLUTION INTRAVENOUS at 10:45

## 2019-12-31 LAB
LV EF: 40 %
LVEF MODALITY: NORMAL

## 2020-01-17 ENCOUNTER — OFFICE VISIT (OUTPATIENT)
Dept: PRIMARY CARE CLINIC | Age: 55
End: 2020-01-17
Payer: COMMERCIAL

## 2020-01-17 VITALS
DIASTOLIC BLOOD PRESSURE: 84 MMHG | HEART RATE: 85 BPM | WEIGHT: 198 LBS | RESPIRATION RATE: 18 BRPM | OXYGEN SATURATION: 98 % | SYSTOLIC BLOOD PRESSURE: 126 MMHG | BODY MASS INDEX: 35.08 KG/M2 | HEIGHT: 63 IN | TEMPERATURE: 97.8 F

## 2020-01-17 LAB — HBA1C MFR BLD: 7 %

## 2020-01-17 PROCEDURE — G8417 CALC BMI ABV UP PARAM F/U: HCPCS | Performed by: NURSE PRACTITIONER

## 2020-01-17 PROCEDURE — 2022F DILAT RTA XM EVC RTNOPTHY: CPT | Performed by: NURSE PRACTITIONER

## 2020-01-17 PROCEDURE — G8484 FLU IMMUNIZE NO ADMIN: HCPCS | Performed by: NURSE PRACTITIONER

## 2020-01-17 PROCEDURE — 1036F TOBACCO NON-USER: CPT | Performed by: NURSE PRACTITIONER

## 2020-01-17 PROCEDURE — G8427 DOCREV CUR MEDS BY ELIG CLIN: HCPCS | Performed by: NURSE PRACTITIONER

## 2020-01-17 PROCEDURE — 83036 HEMOGLOBIN GLYCOSYLATED A1C: CPT | Performed by: NURSE PRACTITIONER

## 2020-01-17 PROCEDURE — 99213 OFFICE O/P EST LOW 20 MIN: CPT | Performed by: NURSE PRACTITIONER

## 2020-01-17 PROCEDURE — 3017F COLORECTAL CA SCREEN DOC REV: CPT | Performed by: NURSE PRACTITIONER

## 2020-01-17 ASSESSMENT — ENCOUNTER SYMPTOMS
NAUSEA: 1
RESPIRATORY NEGATIVE: 1
EYES NEGATIVE: 1

## 2020-01-17 ASSESSMENT — PATIENT HEALTH QUESTIONNAIRE - PHQ9
SUM OF ALL RESPONSES TO PHQ9 QUESTIONS 1 & 2: 2
SUM OF ALL RESPONSES TO PHQ QUESTIONS 1-9: 2
1. LITTLE INTEREST OR PLEASURE IN DOING THINGS: 1
SUM OF ALL RESPONSES TO PHQ QUESTIONS 1-9: 2
2. FEELING DOWN, DEPRESSED OR HOPELESS: 1

## 2020-01-17 NOTE — PROGRESS NOTES
[facility]  [address] [address]  [address] [address]  Dept: [phone]  Dept Fax: [phone]  Loc: [phone]    Emory Sandoval is a 62 y.o. female who presents today for her medical conditions/complaints as noted below. Emory Sandoval is c/o of Diabetes (2 month follow up - Thinks Ozempic could be causing her to have a poor appetite she states everything has a bad taste. States these sx started after increasing Ozempic dose ) and Headache (Pt. states she has been keeping a horrible HA her head pounds so bad at times she states she has not been eating well )      Chief Complaint   Patient presents with    Diabetes     2 month follow up - Thinks Ozempic could be causing her to have a poor appetite she states everything has a bad taste. States these sx started after increasing Ozempic dose     Headache     Pt. states she has been keeping a horrible HA her head pounds so bad at times she states she has not been eating well        HPI:     HPI  Patient here for follow up on chronic conditions including DM, headaches, and HTN. Patient has been taking her meds has prescribed. She reports having increased her ozempic up to 0.5 and has noticed a bad taste in her mouth. Patient has also developed increased nausea since increasing the Ozempic up as well.       Past Medical History:   Diagnosis Date    Abnormal Pap smear of cervix     CAD (coronary artery disease)     stent 2011 Jessica Whittaker; sees dr. Olga Stephenson [facility]     Diabetes mellitus ([facility]     ERRONEOUS ENCOUNTER--DISREGARD 9/15/2015    GERD (gastroesophageal reflux disease)     Headache(784.0)     Heart attack ([facility] 12/2011    Hyperlipidemia     Hypertension     Lichen simplex chronicus 10/2015    Osteopenia     Pneumonia     Type II or unspecified type diabetes mellitus without mention of complication, not stated as uncontrolled         Past Surgical History:   Procedure Laterality Date    BREAST BIOPSY  2011    US guided core needle, right, benign    CARDIAC CATHETERIZATION  14  JDT    EF 50%    CARDIAC CATHETERIZATION  2017    Dr.Talley Rosemary [facility] FLUOROSCOPIC GUIDANCE NEEDLE PLACEMENT ADD ON Left 2018    CORTICOSTEROID INJECTION performed by Gera Wyman MD at [address] [place] [address]   [address] WITH STENT PLACEMENT  2011    SABA to [address] GRAFT      FINGER TRIGGER RELEASE Left 2019    LEFT INDEX AND LONG TRIGGER FINGER RELEASAE performed by Lashaun Escoto MD at [address] [address].    total    OTHER SURGICAL HISTORY      fibrinolysis    CA CABG, ARTERY-VEIN, THREE  \    Coronary Artery Bypass, 3    CA MANIPULATN SHLDR JT W ANESTHESIA Left 2018    SHOULDER MANIPULATION UNDER ANESTHESIA performed by Gera Wyman MD at [address] [place] [address] VULVAR/PERINEAL BIOPSY  10/07/2015    Dr. Maxim Hernandez       Social History     Tobacco Use    Smoking status: Former Smoker     Packs/day: 2.00     Years: 15.00     Pack years: 30.00     Types: Cigarettes     Last attempt to quit: 2014     Years since quittin.5    Smokeless tobacco: Never Used   Substance Use Topics    Alcohol use: No        Current Outpatient Medications   Medication Sig Dispense Refill    furosemide (LASIX) 40 MG tablet TAKE 1 TABLET BY MOUTH DAILY 30 tablet 5    famotidine (PEPCID) 20 MG tablet TAKE 1 TABLET TWICE DAILY 60 tablet 5    LANTUS SOLOSTAR 100 UNIT/ML injection pen INJECT 35 UNITS SUBQ INTOTHE SKIN NIGHTLY 15 mL 1    isosorbide mononitrate (IMDUR) 30 MG extended release tablet TAKE 1 TABLET EVERY DAY (Patient taking differently: nightly TAKE 1 TABLET EVERY DAY) 30 tablet 5    vitamin D (ERGOCALCIFEROL) 1.25 MG (61311 UT) CAPS capsule TAKE 1 CAPSULE BY MOUTH ONCE WEEKLY 12 capsule 1    OZEMPIC, 1 MG/DOSE, 2 MG/1.5ML SOPN INJECT 0.25 MG INTO THE SKIN EVERY 7 DAYS 4 mL 2    nystatin (MYCOSTATIN) 302694 UNIT/GM powder Apply 2-3 times daily. 60 g 5    lovastatin (MEVACOR) 40 MG tablet TAKE 1 TABLET AT BEDTIME 30 tablet 3    JARDIANCE 25 MG tablet TAKE 1 TABLET BY MOUTH ONCE DAILY 90 tablet 1    tamsulosin (FLOMAX) 0.4 MG capsule TAKE 1 CAPSULE BY MOUTH DAILY 30 capsule 5    metFORMIN (GLUCOPHAGE-XR) 500 MG extended release tablet TAKE 1 TABLET BY MOUTH 2 TIMES DAILY. INDDICATIONS: DIABETES 60 tablet 2    valsartan (DIOVAN) 80 MG tablet Take 1 tablet by mouth daily 30 tablet 5    metoprolol tartrate (LOPRESSOR) 25 MG tablet TAKE 1 TABLET BY MOUTH TWICE DAILY 60 tablet 5    ondansetron (ZOFRAN ODT) 4 MG disintegrating tablet Take 1 tablet by mouth every 8 hours as needed for Nausea or Vomiting 15 tablet 0    SITagliptin (JANUVIA) 100 MG tablet TAKE 1 TABLET EVERY DAY 30 tablet 5    nitroGLYCERIN (NITROSTAT) 0.4 MG SL tablet Place 1 tablet under the tongue every 5 minutes as needed for Chest pain 25 tablet 3    albuterol (PROVENTIL) (5 MG/ML) 0.5% nebulizer solution Take 0.5 mLs by nebulization every 6 hours as needed for Wheezing 120 each 0    aspirin 81 MG tablet Take 81 mg by mouth daily      Lactobacillus (PROBIATA PO) Take 1 tablet by mouth daily       mometasone (ELOCON) 0.1 % ointment Apply topically to vagina 3 times per week 1 Tube 3    Eflornithine HCl (VANIQA) 13.9 % CREA Apply 1 Dose topically 2 times daily 45 g 2    Docusate Calcium (STOOL SOFTENER PO) Take 100 mg by mouth 2 times daily as needed. No current facility-administered medications for this visit. Allergies   Allergen Reactions    Codeine Other (See Comments)     Seizures as a child    Motrin [Ibuprofen] Swelling     Tongue to swell    Humalog [Insulin Lispro] Other (See Comments)     Shakiness, break out in a sweat at higher doses.        Family History   Problem Relation Age of Onset    Diabetes Father     Cancer Father     High Blood Pressure Father     Heart Attack Skin is warm and dry. Neurological:      Mental Status: She is alert and oriented to person, place, and time. Psychiatric:         Speech: Speech normal.         Behavior: Behavior normal.         Thought Content: Thought content normal.         Judgment: Judgment normal.         /84   Pulse 85   Temp 97.8 °F (36.6 °C) (Temporal)   Resp 18   Ht 5' 2.5\" (1.588 m)   Wt 198 lb (89.8 kg)   LMP 01/01/1998 (Approximate)   SpO2 98%   BMI 35.64 kg/m²     Assessment:      Diagnosis Orders   1. Type 2 diabetes mellitus with complication, with long-term current use of insulin ([facility])  POCT glycosylated hemoglobin (Hb A1C)   2. Essential hypertension     3. BMI 35.0-35.9,adult         Results for orders placed or performed in visit on 01/17/20   POCT glycosylated hemoglobin (Hb A1C)   Result Value Ref Range    Hemoglobin A1C 7.0 %       Plan:     a1c has improved since last visit. Since having increase nausea with ozempic at 0.5 mg, we are decreasing doing to 0.25 mg to see if this will help with increased nausea. If decreasing does not improve the symptoms then we will switch to see if Bcise will improve nausea symptoms. Return in about 6 weeks (around 2/28/2020) for Diabetic Follow up, Follow up chronic conditions. Orders Placed This Encounter   Procedures    POCT glycosylated hemoglobin (Hb A1C)       No orders of the defined types were placed in this encounter. Patient offered educational handouts and has had all questions answered. Patient voices understanding and agrees to plans along with risks and benefits of plan. Patient is instructed to continue prior meds, diet, and exercise plans as instructed. Patient agrees to follow up as instructed and sooner if needed. Patient agrees to go to ER if condition becomes emergent. EMR Dragon/transcription disclaimer: Some of this encounter note is an electronic transcription/translation of spoken language to printed text.  The electronic translation of spoken language may permit erroneous, or at times, nonsensical words or phrases to be inadvertently transcribed.  Although I have reviewed the note for such errors, some may still exist.    Electronically signed by MIKE Mcdonough on 1/17/2020 at 9:04 PM

## 2020-01-28 ENCOUNTER — OFFICE VISIT (OUTPATIENT)
Dept: URGENT CARE | Age: 55
End: 2020-01-28
Payer: COMMERCIAL

## 2020-01-28 VITALS
WEIGHT: 196 LBS | RESPIRATION RATE: 16 BRPM | BODY MASS INDEX: 35.28 KG/M2 | TEMPERATURE: 97.7 F | OXYGEN SATURATION: 94 % | HEART RATE: 79 BPM

## 2020-01-28 LAB
APPEARANCE FLUID: CLEAR
BILIRUBIN, POC: ABNORMAL
BLOOD URINE, POC: ABNORMAL
CLARITY, POC: CLEAR
COLOR, POC: YELLOW
GLUCOSE URINE, POC: 1000
KETONES, POC: ABNORMAL
LEUKOCYTE EST, POC: ABNORMAL
NITRITE, POC: POSITIVE
PH, POC: 5.5
PROTEIN, POC: ABNORMAL
SPECIFIC GRAVITY, POC: 1.01
UROBILINOGEN, POC: 0.2

## 2020-01-28 PROCEDURE — 81002 URINALYSIS NONAUTO W/O SCOPE: CPT | Performed by: NURSE PRACTITIONER

## 2020-01-28 PROCEDURE — 99213 OFFICE O/P EST LOW 20 MIN: CPT | Performed by: NURSE PRACTITIONER

## 2020-01-28 RX ORDER — NITROFURANTOIN 25; 75 MG/1; MG/1
100 CAPSULE ORAL 2 TIMES DAILY
Qty: 14 CAPSULE | Refills: 0 | Status: SHIPPED | OUTPATIENT
Start: 2020-01-28 | End: 2020-02-04

## 2020-01-28 RX ORDER — PHENAZOPYRIDINE HYDROCHLORIDE 200 MG/1
200 TABLET, FILM COATED ORAL 3 TIMES DAILY PRN
Qty: 15 TABLET | Refills: 0 | Status: SHIPPED | OUTPATIENT
Start: 2020-01-28 | End: 2020-02-02

## 2020-01-28 ASSESSMENT — ENCOUNTER SYMPTOMS
DIARRHEA: 0
VOMITING: 0
NAUSEA: 0
ABDOMINAL PAIN: 0
BACK PAIN: 1

## 2020-01-28 NOTE — PROGRESS NOTES
[facility] URGENT CARE  [address]  [address] [place] [address]  Dept: [phone]  Loc: [phone]    Fabricio Villalobos is a 62 y.o. female who presents today for her medical conditions/complaintsas noted below. Fabricio Villalobos is c/o of Dysuria and Urinary Frequency        HPI:     Dysuria    This is a new problem. The current episode started in the past 7 days (4-5 days). The problem occurs every urination. The problem has been gradually worsening. The quality of the pain is described as burning. The pain is at a severity of 5/10. The pain is moderate. There has been no fever. She is sexually active. There is no history of pyelonephritis. Associated symptoms include frequency and urgency. Pertinent negatives include no chills, discharge, flank pain, hesitancy, nausea or vomiting. She has tried increased fluids for the symptoms. The treatment provided no relief. Urinary Frequency    This is a new problem. The current episode started in the past 7 days (4-5 days). The problem occurs every urination. The problem has been gradually worsening. The quality of the pain is described as aching and burning. The pain is at a severity of 5/10. The pain is mild. There has been no fever. She is sexually active. There is no history of pyelonephritis. Associated symptoms include frequency and urgency. Pertinent negatives include no chills, discharge, flank pain, hesitancy, nausea or vomiting. She has tried increased fluids for the symptoms. The treatment provided mild relief. She says she has had symptoms for 4-5 days and took an Azo 2 days ago. This morning around 3 am her symptoms worsened.    Past Medical History:   Diagnosis Date    Abnormal Pap smear of cervix     CAD (coronary artery disease)     stent 2011 Caprice Hua; sees dr. Stcaie Mason Cervical cancer [facility])     Diabetes mellitus ([facility] [address]     ERRONEOUS ENCOUNTER--DISREGARD 9/15/2015    GERD (gastroesophageal reflux disease)     Headache(784.0)     Heart attack ([address] 2011    Hyperlipidemia     Hypertension     Lichen simplex chronicus 10/2015    Osteopenia     Pneumonia     Type II or unspecified type diabetes mellitus without mention of complication, not stated as uncontrolled      Past Surgical History:   Procedure Laterality Date    BREAST BIOPSY  2011    US guided core needle, right, benign    CARDIAC CATHETERIZATION  14  JDT    EF 50%    CARDIAC CATHETERIZATION  2017        CH FLUOROSCOPIC GUIDANCE NEEDLE PLACEMENT ADD ON Left 2018    CORTICOSTEROID INJECTION performed by Tong Dutta MD at [address] [place] [address]    CORONARY ANGIOPLASTY WITH STENT PLACEMENT  2011    SABA to First diag    CORONARY ARTERY BYPASS GRAFT      FINGER TRIGGER RELEASE Left 2019    LEFT INDEX AND LONG TRIGGER FINGER RELEASAE performed by Marylene Boozer, MD at [address]    total    OTHER SURGICAL HISTORY      fibrinolysis    MD CABG, ARTERY-VEIN, THREE  \\    Coronary Artery Bypass, 3    MD MANIPULATN SHLDR JT W ANESTHESIA Left 2018    SHOULDER MANIPULATION UNDER ANESTHESIA performed by [facility]utta MD at [facility]  10/07/2015    Dr. Marquis Monique       Family History   Problem Relation Age of Onset    Diabetes Father     Cancer Father     High Blood Pressure Father     Heart Attack Father     Diabetes Mother     Breast Cancer Mother     Heart Failure Mother         CHF, MVP    Heart Failure Maternal Grandfather     Stroke Maternal Grandfather     Diabetes Brother        Social History     Tobacco Use    Smoking status: Former Smoker     Packs/day: 2.00     Years: 15.00     Pack years: 30.00     Types: Cigarettes     Last attempt to quit: 2014     Years since quittin.5    Smokeless tobacco: Never Used   Substance Use Topics    Alcohol use:  No Take 0.5 mLs by nebulization every 6 hours as needed for Wheezing 120 each 0    aspirin 81 MG tablet Take 81 mg by mouth daily      Lactobacillus (PROBIATA PO) Take 1 tablet by mouth daily       mometasone (ELOCON) 0.1 % ointment Apply topically to vagina 3 times per week 1 Tube 3    Eflornithine HCl (VANIQA) 13.9 % CREA Apply 1 Dose topically 2 times daily 45 g 2    Docusate Calcium (STOOL SOFTENER PO) Take 100 mg by mouth 2 times daily as needed. No current facility-administered medications for this visit. Allergies   Allergen Reactions    Codeine Other (See Comments)     Seizures as a child    Motrin [Ibuprofen] Swelling     Tongue to swell    Humalog [Insulin Lispro] Other (See Comments)     Shakiness, break out in a sweat at higher doses. Health Maintenance   Topic Date Due    Pneumococcal 0-64 years Vaccine (1 of 1 - PPSV23) 10/30/1969    DTaP/Tdap/Td vaccine (1 - Tdap) 10/30/1974    Hepatitis B vaccine (1 of 3 - Risk 3-dose series) 10/30/1982    Shingles Vaccine (1 of 2) 10/30/2013    Diabetic retinal exam  01/11/2017    Cervical cancer screen  08/25/2018    Flu vaccine (1) 09/01/2019    Diabetic foot exam  10/03/2019    Diabetic microalbuminuria test  10/17/2019    Colon Cancer Screen FIT/FOBT  10/02/2020 (Originally 5/8/2016)    Low dose CT lung screening  07/24/2020    Lipid screen  09/05/2020    Breast cancer screen  12/09/2020    Potassium monitoring  12/30/2020    Creatinine monitoring  12/30/2020    A1C test (Diabetic or Prediabetic)  01/17/2021    Hepatitis C screen  Completed    HIV screen  Completed       Subjective:     Review of Systems   Constitutional: Negative for activity change, appetite change, chills and fever. Gastrointestinal: Negative for abdominal pain, diarrhea, nausea and vomiting. Endocrine: Negative. Genitourinary: Positive for dysuria, frequency and urgency. Negative for flank pain and hesitancy.    Musculoskeletal: Positive for back culture     Order Specific Question:   Specify (ex-cath, midstream, cysto, etc)? Answer:   midstream    POCT Urinalysis no Micro     Results for orders placed or performed in visit on 01/28/20   POCT Urinalysis no Micro   Result Value Ref Range    Color, UA yellow     Clarity, UA clear     Glucose, UA POC 1,000     Bilirubin, UA neg     Ketones, UA trace     Spec Grav, UA 1.015     Blood, UA POC moderate     pH, UA 5.5     Protein, UA POC 30 mg     Urobilinogen, UA 0.2     Leukocytes, UA small     Nitrite, UA positive     Appearance, Fluid Clear Clear, Slightly Cloudy       Return if symptoms worsen or fail to improve. Orders Placed This Encounter   Medications    nitrofurantoin, macrocrystal-monohydrate, (MACROBID) 100 MG capsule     Sig: Take 1 capsule by mouth 2 times daily for 7 days     Dispense:  14 capsule     Refill:  0    phenazopyridine (PYRIDIUM) 200 MG tablet     Sig: Take 1 tablet by mouth 3 times daily as needed for Pain     Dispense:  15 tablet     Refill:  0        Patient Instructions     Plenty of fluids- drink more water  OTC Tylenol or Motrin as needed  Pyridium as directed  Macrobid as directed  Urine for culture will call with results in 2-3 days  Follow up with PCP or return to Urgent Care for worsening or unresolved symptoms. Patient Education        Urinary Tract Infection in Women: Care Instructions  Your Care Instructions    A urinary tract infection, or UTI, is a general term for an infection anywhere between the kidneys and the urethra (where urine comes out). Most UTIs are bladder infections. They often cause pain or burning when you urinate. UTIs are caused by bacteria and can be cured with antibiotics. Be sure to complete your treatment so that the infection goes away. Follow-up care is a key part of your treatment and safety. Be sure to make and go to all appointments, and call your doctor if you are having problems.  It's also a good idea to know your test results [email or web address] and sign in to your [employer] account. Enter K828 in the [facility] box to learn more about \"Urinary Tract Infection in Women: Care Instructions. \"     If you do not have an account, please click on the \"Sign Up Now\" link. Current as of: August 21, 2019  Content Version: 12.3  © 8179-1775 Healthwise, [employer]. Care instructions adapted under license by [facility] [address] If you have questions about a medical condition or this instruction, always ask your healthcare professional. [place] [address] any warranty or liability for your use of this information. Patient given educational materials- see patient instructions. Discussed use, benefit, and side effects of prescribedmedications. All patient questions answered. Pt voiced understanding.        Electronically signed by MIKE Lemon CNP on 1/28/2020 at 1:04 PM

## 2020-01-31 ENCOUNTER — TELEPHONE (OUTPATIENT)
Dept: PRIMARY CARE CLINIC | Age: 55
End: 2020-01-31

## 2020-01-31 LAB
ORGANISM: ABNORMAL
ORGANISM: ABNORMAL
URINE CULTURE, ROUTINE: ABNORMAL

## 2020-01-31 RX ORDER — SULFAMETHOXAZOLE AND TRIMETHOPRIM 800; 160 MG/1; MG/1
1 TABLET ORAL 2 TIMES DAILY
Qty: 20 TABLET | Refills: 0 | Status: SHIPPED | OUTPATIENT
Start: 2020-01-31 | End: 2020-02-10

## 2020-01-31 RX ORDER — CIMETIDINE 400 MG/1
400 TABLET, FILM COATED ORAL 2 TIMES DAILY
Qty: 60 TABLET | Refills: 0 | Status: SHIPPED | OUTPATIENT
Start: 2020-01-31 | End: 2020-02-28

## 2020-02-03 ENCOUNTER — TELEPHONE (OUTPATIENT)
Dept: PRIMARY CARE CLINIC | Age: 55
End: 2020-02-03

## 2020-02-04 RX ORDER — CEPHALEXIN 500 MG/1
500 CAPSULE ORAL 2 TIMES DAILY
Qty: 14 CAPSULE | Refills: 0 | Status: SHIPPED | OUTPATIENT
Start: 2020-02-04 | End: 2020-02-11

## 2020-02-28 ENCOUNTER — OFFICE VISIT (OUTPATIENT)
Dept: PRIMARY CARE CLINIC | Age: 55
End: 2020-02-28
Payer: COMMERCIAL

## 2020-02-28 VITALS
TEMPERATURE: 97.4 F | WEIGHT: 193 LBS | HEIGHT: 62 IN | DIASTOLIC BLOOD PRESSURE: 82 MMHG | HEART RATE: 82 BPM | SYSTOLIC BLOOD PRESSURE: 122 MMHG | BODY MASS INDEX: 35.51 KG/M2 | OXYGEN SATURATION: 97 % | RESPIRATION RATE: 18 BRPM

## 2020-02-28 PROCEDURE — 1036F TOBACCO NON-USER: CPT | Performed by: NURSE PRACTITIONER

## 2020-02-28 PROCEDURE — G8417 CALC BMI ABV UP PARAM F/U: HCPCS | Performed by: NURSE PRACTITIONER

## 2020-02-28 PROCEDURE — 99214 OFFICE O/P EST MOD 30 MIN: CPT | Performed by: NURSE PRACTITIONER

## 2020-02-28 PROCEDURE — 2022F DILAT RTA XM EVC RTNOPTHY: CPT | Performed by: NURSE PRACTITIONER

## 2020-02-28 PROCEDURE — G8427 DOCREV CUR MEDS BY ELIG CLIN: HCPCS | Performed by: NURSE PRACTITIONER

## 2020-02-28 PROCEDURE — 3051F HG A1C>EQUAL 7.0%<8.0%: CPT | Performed by: NURSE PRACTITIONER

## 2020-02-28 PROCEDURE — G8484 FLU IMMUNIZE NO ADMIN: HCPCS | Performed by: NURSE PRACTITIONER

## 2020-02-28 PROCEDURE — 3017F COLORECTAL CA SCREEN DOC REV: CPT | Performed by: NURSE PRACTITIONER

## 2020-02-28 RX ORDER — FAMOTIDINE 20 MG/1
TABLET, FILM COATED ORAL
Qty: 60 TABLET | Refills: 5 | Status: CANCELLED | OUTPATIENT
Start: 2020-02-28

## 2020-02-28 RX ORDER — OMEPRAZOLE 20 MG/1
20 CAPSULE, DELAYED RELEASE ORAL
Qty: 90 CAPSULE | Refills: 1 | Status: SHIPPED | OUTPATIENT
Start: 2020-02-28 | End: 2020-09-09

## 2020-02-28 RX ORDER — METFORMIN HYDROCHLORIDE 500 MG/1
TABLET, EXTENDED RELEASE ORAL
Qty: 60 TABLET | Refills: 2 | Status: SHIPPED | OUTPATIENT
Start: 2020-02-28 | End: 2021-03-18

## 2020-02-28 ASSESSMENT — ENCOUNTER SYMPTOMS
NAUSEA: 1
RESPIRATORY NEGATIVE: 1
EYES NEGATIVE: 1

## 2020-02-28 NOTE — PROGRESS NOTES
[facility] PRIMARY CARE  [address] [place] [address]  Dept: [phone]  Dept Fax: [phone]  Loc: [phone]    Whit Girard is a 62 y.o. female who presents today for her medical conditions/complaints as noted below. Whit Girard is c/o of Diabetes (seen 6 weeks ago decreased Ozempic due to nausea pt states her appetite has improved and she still has bouts of nausea but it is not like it was. )      Chief Complaint   Patient presents with    Diabetes     seen 6 weeks ago decreased Ozempic due to nausea pt states her appetite has improved and she still has bouts of nausea but it is not like it was. HPI:     HPI   Patient here for follow up on DM and adjusting Ozempic. She reports that the nausea has improved since decreasing the dose of Ozempic. She has had a few breakthrough episodes of reflux. She is needing refills on other meds. Patient is also complaining of vaginal tearing with any sexual intercourse.     Past Medical History:   Diagnosis Date    Abnormal Pap smear of cervix     CAD (coronary artery disease)     stent 2011 Anabel Ayala; sees dr. Mehreen De La O Cervical cancer ([facility] [address]     Diabetes mellitus ([facility] [address] [facility]     ERRONEOUS ENCOUNTER--DISREGARD 9/15/2015    GERD (gastroesophageal reflux disease)     Headache(784.0)     Heart attack ([facility] [address] 12/2011    Hyperlipidemia     Hypertension     Lichen simplex chronicus 10/2015    Osteopenia     Pneumonia     Type II or unspecified type diabetes mellitus without mention of complication, not stated as uncontrolled         Past Surgical History:   Procedure Laterality Date    BREAST BIOPSY  6/8/2011    US guided core needle, right, benign    CARDIAC CATHETERIZATION  12/22/14  JDT    EF 50%    CARDIAC CATHETERIZATION  06/05/2017    Dr.Talley Rosemary FARRELL FLUOROSCOPIC GUIDANCE NEEDLE PLACEMENT ADD ON Left 7/19/2018    CORTICOSTEROID INJECTION performed by Jim Dyson MD at [address] [place] [address] CHOLECYSTECTOMY  2006    CORONARY ANGIOPLASTY WITH STENT PLACEMENT  2011    SABA to First diag    CORONARY ARTERY BYPASS GRAFT      FINGER TRIGGER RELEASE Left 2019    LEFT INDEX AND LONG TRIGGER FINGER RELEASAE performed by Marylene Boozer, MD at [address]   [address]    total    OTHER SURGICAL HISTORY      fibrinolysis    [place] CABG, ARTERY-VEIN, THREE      Coronary Artery Bypass, 3    [place] MANIPULATN SHLDR JT W ANESTHESIA Left 2018    SHOULDER MANIPULATION UNDER ANESTHESIA performed by Tong Dutta MD at [address] [place] [address] VULVAR/PERINEAL BIOPSY  10/07/2015    Dr. Marquis Monique       Social History     Tobacco Use    Smoking status: Former Smoker     Packs/day: 2.00     Years: 15.00     Pack years: 30.00     Types: Cigarettes     Last attempt to quit: 2014     Years since quittin.6    Smokeless tobacco: Never Used   Substance Use Topics    Alcohol use: No        Current Outpatient Medications   Medication Sig Dispense Refill    metFORMIN (GLUCOPHAGE-XR) 500 MG extended release tablet Take 1 tablet by mouth 2 times daily 60 tablet 2    metoprolol tartrate (LOPRESSOR) 25 MG tablet TAKE 1 TABLET BY MOUTH TWICE DAILY 60 tablet 5    omeprazole (PRILOSEC) 20 MG delayed release capsule Take 1 capsule by mouth every morning (before breakfast) 90 capsule 1    furosemide (LASIX) 40 MG tablet TAKE 1 TABLET BY MOUTH DAILY 30 tablet 5    LANTUS SOLOSTAR 100 UNIT/ML injection pen INJECT 35 UNITS SUBQ INTOTHE SKIN NIGHTLY 15 mL 1    isosorbide mononitrate (IMDUR) 30 MG extended release tablet TAKE 1 TABLET EVERY DAY (Patient taking differently: nightly TAKE 1 TABLET EVERY DAY) 30 tablet 5    vitamin D (ERGOCALCIFEROL) 1.25 MG (12819 UT) CAPS capsule TAKE 1 CAPSULE BY MOUTH ONCE WEEKLY 12 capsule 1    OZEMPIC, 1 MG/DOSE, 2 MG/1.5ML SOPN INJECT 0.25 MG INTO THE SKIN EVERY 7 DAYS 4 mL 2    nystatin (MYCOSTATIN) 550932 UNIT/GM powder Apply 2-3 times daily. 60 g 5    lovastatin (MEVACOR) 40 MG tablet TAKE 1 TABLET AT BEDTIME 30 tablet 3    JARDIANCE 25 MG tablet TAKE 1 TABLET BY MOUTH ONCE DAILY 90 tablet 1    tamsulosin (FLOMAX) 0.4 MG capsule TAKE 1 CAPSULE BY MOUTH DAILY 30 capsule 5    valsartan (DIOVAN) 80 MG tablet Take 1 tablet by mouth daily 30 tablet 5    ondansetron (ZOFRAN ODT) 4 MG disintegrating tablet Take 1 tablet by mouth every 8 hours as needed for Nausea or Vomiting 15 tablet 0    SITagliptin (JANUVIA) 100 MG tablet TAKE 1 TABLET EVERY DAY 30 tablet 5    nitroGLYCERIN (NITROSTAT) 0.4 MG SL tablet Place 1 tablet under the tongue every 5 minutes as needed for Chest pain 25 tablet 3    albuterol (PROVENTIL) (5 MG/ML) 0.5% nebulizer solution Take 0.5 mLs by nebulization every 6 hours as needed for Wheezing 120 each 0    aspirin 81 MG tablet Take 81 mg by mouth daily      Lactobacillus (PROBIATA PO) Take 1 tablet by mouth daily       mometasone (ELOCON) 0.1 % ointment Apply topically to vagina 3 times per week 1 Tube 3    Eflornithine HCl (VANIQA) 13.9 % CREA Apply 1 Dose topically 2 times daily 45 g 2    Docusate Calcium (STOOL SOFTENER PO) Take 100 mg by mouth 2 times daily as needed. No current facility-administered medications for this visit. Allergies   Allergen Reactions    Codeine Other (See Comments)     Seizures as a child    Motrin [Ibuprofen] Swelling     Tongue to swell    Humalog [Insulin Lispro] Other (See Comments)     Shakiness, break out in a sweat at higher doses. Family History   Problem Relation Age of Onset    Diabetes Father     Cancer Father     High Blood Pressure Father     Heart Attack Father     Diabetes Mother     Breast Cancer Mother     Heart Failure Mother         CHF, MVP    Heart Failure Maternal Grandfather     Stroke Maternal Grandfather     Diabetes Brother                Subjective:      Review of Systems   Constitutional: Negative. HENT: Negative.     Eyes: Negative. Respiratory: Negative. Cardiovascular: Negative. Gastrointestinal: Positive for nausea (improving). Endocrine: Negative. Genitourinary: Positive for vaginal bleeding (with any sex). Musculoskeletal: Negative. Skin: Negative. Neurological: Negative. Hematological: Negative. Psychiatric/Behavioral: Negative. Objective:     Physical Exam  Vitals signs and nursing note reviewed. Constitutional:       Appearance: Normal appearance. She is well-developed. Comments: obese   HENT:      Head: Normocephalic and atraumatic. Right Ear: Hearing, tympanic membrane, ear canal and external ear normal.      Left Ear: Hearing, tympanic membrane, ear canal and external ear normal.      Nose: Nose normal.      Mouth/Throat:      Pharynx: Uvula midline. Eyes:      General: Lids are normal.      Conjunctiva/sclera: Conjunctivae normal.      Pupils: Pupils are equal, round, and reactive to light. Neck:      Musculoskeletal: Normal range of motion and neck supple. Thyroid: No thyroid mass or thyromegaly. Trachea: Trachea normal.   Cardiovascular:      Rate and Rhythm: Normal rate and regular rhythm. Heart sounds: Normal heart sounds. Pulmonary:      Effort: Pulmonary effort is normal.      Breath sounds: Normal breath sounds. Abdominal:      General: Bowel sounds are normal.      Palpations: Abdomen is soft. Musculoskeletal: Normal range of motion. Cervical back: Normal. She exhibits normal range of motion and no tenderness. Thoracic back: Normal. She exhibits normal range of motion and no tenderness. Lumbar back: Normal. She exhibits normal range of motion and no tenderness. Skin:     General: Skin is warm and dry. Neurological:      Mental Status: She is alert and oriented to person, place, and time. Psychiatric:         Speech: Speech normal.         Behavior: Behavior normal.         Thought Content:  Thought content normal. Judgment: Judgment normal.         /82   Pulse 82   Temp 97.4 °F (36.3 °C) (Temporal)   Resp 18   Ht 5' 2\" (1.575 m)   Wt 193 lb (87.5 kg)   LMP 01/01/1998 (Approximate)   SpO2 97%   BMI 35.30 kg/m²     Assessment:      Diagnosis Orders   1. Type 2 diabetes mellitus with complication, with long-term current use of insulin (HCC)  metFORMIN (GLUCOPHAGE-XR) 500 MG extended release tablet   2. Essential hypertension  metoprolol tartrate (LOPRESSOR) 25 MG tablet   3. Hirsutism     4. Gastroesophageal reflux disease without esophagitis         No results found for this visit on 02/28/20. Plan:     Patient is to follow-up with OB/GYN in regards to the vaginal tearing and hormonal issues. He is to continue medications including metformin and Ozempic as scheduled. We will recheck labs including A1c at next visit. Patient is to continue to lose weight as discussed to help overall numbers. No follow-ups on file. No orders of the defined types were placed in this encounter. Orders Placed This Encounter   Medications    metFORMIN (GLUCOPHAGE-XR) 500 MG extended release tablet     Sig: Take 1 tablet by mouth 2 times daily     Dispense:  60 tablet     Refill:  2    metoprolol tartrate (LOPRESSOR) 25 MG tablet     Sig: TAKE 1 TABLET BY MOUTH TWICE DAILY     Dispense:  60 tablet     Refill:  5    omeprazole (PRILOSEC) 20 MG delayed release capsule     Sig: Take 1 capsule by mouth every morning (before breakfast)     Dispense:  90 capsule     Refill:  1        Patient offered educational handouts and has had all questions answered. Patient voices understanding and agrees to plans along with risks and benefits of plan. Patient is instructed to continue prior meds, diet, and exercise plans as instructed. Patient agrees to follow up as instructed and sooner if needed. Patient agrees to go to ER if condition becomes emergent.       EMR Dragon/transcription disclaimer: Some of this encounter note is an electronic transcription/translation of spoken language to printed text. The electronic translation of spoken language may permit erroneous, or at times, nonsensical words or phrases to be inadvertently transcribed.  Although I have reviewed the note for such errors, some may still exist.    Electronically signed by MIKE Edward on 2/28/2020 at 3:50 PM

## 2020-03-12 ENCOUNTER — TELEPHONE (OUTPATIENT)
Dept: PRIMARY CARE CLINIC | Age: 55
End: 2020-03-12

## 2020-03-24 ENCOUNTER — OFFICE VISIT (OUTPATIENT)
Dept: PRIMARY CARE CLINIC | Age: 55
End: 2020-03-24
Payer: COMMERCIAL

## 2020-03-24 PROCEDURE — 99443 PR PHYS/QHP TELEPHONE EVALUATION 21-30 MIN: CPT | Performed by: NURSE PRACTITIONER

## 2020-03-24 RX ORDER — SUCRALFATE 1 G/1
1 TABLET ORAL 4 TIMES DAILY
Qty: 120 TABLET | Refills: 3 | Status: SHIPPED | OUTPATIENT
Start: 2020-03-24 | End: 2020-06-05

## 2020-03-24 ASSESSMENT — ENCOUNTER SYMPTOMS
RESPIRATORY NEGATIVE: 1
GASTROINTESTINAL NEGATIVE: 1
EYES NEGATIVE: 1

## 2020-03-24 NOTE — PROGRESS NOTES
[address] [place] [address]            Phone:  [phone]  Fax:  [phone]    Valentine Chacon is a 62 y.o. female evaluated via telephone on 3/24/2020. Consent:    She and/or health care decision maker is aware that that she may receive a bill for this telephone service, depending on her insurance coverage, and has provided verbal consent to proceed: Yes      HPI  Chief Complaint   Patient presents with    Neck Pain    Abdominal Pain       I communicated with the patient and/or health care decision maker about continued neck pain. She is also complaining of some nausea and epigastric pain. She reports symptoms are worse after eating food. She has been taking meds as prescribed. HTN has been well controlled. Review of Systems   Constitutional: Negative. HENT: Negative. Eyes: Negative. Respiratory: Negative. Cardiovascular: Negative. Gastrointestinal: Negative. Endocrine: Negative. Genitourinary: Negative. Musculoskeletal: Positive for neck pain. Skin: Negative. Neurological: Negative. Hematological: Negative. Psychiatric/Behavioral: Negative. PLAN    Details of this discussion including any medical advice provided:     1. Cervical pain    - External Referral To Physical Therapy  -will refer once PT has reopened. 2. Epigastric pain  -starting patient on carafate to see if this will help with abd pain    3. Essential hypertension  -patient to continue all meds as prescribed. I affirm this is a Patient Initiated Episode with an Established Patient who has not had a related appointment within my department in the past 7 days or scheduled within the next 24 hours.       Total Time: minutes: 21-30 minutes      Note: not billable if this call serves to triage the patient into an appointment for the relevant concern      Annia Mckeon         Pursuant to the emergency declaration under the [address]

## 2020-03-29 ENCOUNTER — OFFICE VISIT (OUTPATIENT)
Dept: URGENT CARE | Age: 55
End: 2020-03-29
Payer: COMMERCIAL

## 2020-03-29 VITALS
OXYGEN SATURATION: 97 % | TEMPERATURE: 98.5 F | SYSTOLIC BLOOD PRESSURE: 116 MMHG | BODY MASS INDEX: 34.93 KG/M2 | DIASTOLIC BLOOD PRESSURE: 72 MMHG | HEART RATE: 76 BPM | RESPIRATION RATE: 18 BRPM | WEIGHT: 191 LBS

## 2020-03-29 LAB
APPEARANCE FLUID: ABNORMAL
BILIRUBIN, POC: NEGATIVE
BLOOD URINE, POC: ABNORMAL
CLARITY, POC: CLEAR
COLOR, POC: YELLOW
GLUCOSE URINE, POC: 500
KETONES, POC: NEGATIVE
LEUKOCYTE EST, POC: ABNORMAL
NITRITE, POC: NEGATIVE
PH, POC: 5
PROTEIN, POC: NEGATIVE
SPECIFIC GRAVITY, POC: 1.01
UROBILINOGEN, POC: 0.2

## 2020-03-29 PROCEDURE — 81002 URINALYSIS NONAUTO W/O SCOPE: CPT | Performed by: INTERNAL MEDICINE

## 2020-03-29 PROCEDURE — 1036F TOBACCO NON-USER: CPT | Performed by: INTERNAL MEDICINE

## 2020-03-29 PROCEDURE — 99213 OFFICE O/P EST LOW 20 MIN: CPT | Performed by: INTERNAL MEDICINE

## 2020-03-29 PROCEDURE — G8427 DOCREV CUR MEDS BY ELIG CLIN: HCPCS | Performed by: INTERNAL MEDICINE

## 2020-03-29 PROCEDURE — G8484 FLU IMMUNIZE NO ADMIN: HCPCS | Performed by: INTERNAL MEDICINE

## 2020-03-29 PROCEDURE — G8417 CALC BMI ABV UP PARAM F/U: HCPCS | Performed by: INTERNAL MEDICINE

## 2020-03-29 PROCEDURE — 3017F COLORECTAL CA SCREEN DOC REV: CPT | Performed by: INTERNAL MEDICINE

## 2020-03-29 RX ORDER — PHENAZOPYRIDINE HYDROCHLORIDE 200 MG/1
200 TABLET, FILM COATED ORAL 3 TIMES DAILY PRN
Qty: 10 TABLET | Refills: 1 | Status: SHIPPED | OUTPATIENT
Start: 2020-03-29 | End: 2020-04-01

## 2020-03-29 RX ORDER — CEFDINIR 300 MG/1
300 CAPSULE ORAL 2 TIMES DAILY
Qty: 14 CAPSULE | Refills: 0 | Status: SHIPPED | OUTPATIENT
Start: 2020-03-29 | End: 2020-04-05

## 2020-03-29 ASSESSMENT — ENCOUNTER SYMPTOMS
EYE DISCHARGE: 0
VOMITING: 0
CHEST TIGHTNESS: 0
SINUS PRESSURE: 0
COUGH: 0
RHINORRHEA: 0
DIARRHEA: 0
VOICE CHANGE: 0
SHORTNESS OF BREATH: 0
SORE THROAT: 0
ABDOMINAL PAIN: 1
WHEEZING: 0
COLOR CHANGE: 0
BLOOD IN STOOL: 0
NAUSEA: 1
EYE PAIN: 0
EYE REDNESS: 0

## 2020-03-29 NOTE — PROGRESS NOTES
20 MG delayed release capsule Take 1 capsule by mouth every morning (before breakfast) 90 capsule 1    furosemide (LASIX) 40 MG tablet TAKE 1 TABLET BY MOUTH DAILY 30 tablet 5    LANTUS SOLOSTAR 100 UNIT/ML injection pen INJECT 35 UNITS SUBQ INTOTHE SKIN NIGHTLY 15 mL 1    isosorbide mononitrate (IMDUR) 30 MG extended release tablet TAKE 1 TABLET EVERY DAY (Patient taking differently: nightly TAKE 1 TABLET EVERY DAY) 30 tablet 5    vitamin D (ERGOCALCIFEROL) 1.25 MG (10883 UT) CAPS capsule TAKE 1 CAPSULE BY MOUTH ONCE WEEKLY 12 capsule 1    nystatin (MYCOSTATIN) 286838 UNIT/GM powder Apply 2-3 times daily. 60 g 5    JARDIANCE 25 MG tablet TAKE 1 TABLET BY MOUTH ONCE DAILY 90 tablet 1    tamsulosin (FLOMAX) 0.4 MG capsule TAKE 1 CAPSULE BY MOUTH DAILY 30 capsule 5    valsartan (DIOVAN) 80 MG tablet Take 1 tablet by mouth daily 30 tablet 5    ondansetron (ZOFRAN ODT) 4 MG disintegrating tablet Take 1 tablet by mouth every 8 hours as needed for Nausea or Vomiting 15 tablet 0    SITagliptin (JANUVIA) 100 MG tablet TAKE 1 TABLET EVERY DAY 30 tablet 5    nitroGLYCERIN (NITROSTAT) 0.4 MG SL tablet Place 1 tablet under the tongue every 5 minutes as needed for Chest pain 25 tablet 3    albuterol (PROVENTIL) (5 MG/ML) 0.5% nebulizer solution Take 0.5 mLs by nebulization every 6 hours as needed for Wheezing 120 each 0    aspirin 81 MG tablet Take 81 mg by mouth daily      Lactobacillus (PROBIATA PO) Take 1 tablet by mouth daily       mometasone (ELOCON) 0.1 % ointment Apply topically to vagina 3 times per week 1 Tube 3    Eflornithine HCl (VANIQA) 13.9 % CREA Apply 1 Dose topically 2 times daily 45 g 2    Docusate Calcium (STOOL SOFTENER PO) Take 100 mg by mouth 2 times daily as needed. No current facility-administered medications for this visit.         Allergies   Allergen Reactions    Codeine Other (See Comments)     Seizures as a child    Motrin [Ibuprofen] Swelling     Tongue to swell    Humalog [Insulin Lispro] Other (See Comments)     Shakiness, break out in a sweat at higher doses.        Past Surgical History:   Procedure Laterality Date    BREAST BIOPSY  2011    US guided core needle, right, benign    CARDIAC CATHETERIZATION  14  JDT    EF 50%    CARDIAC CATHETERIZATION  2017        [facility]G FLUOROSCOPIC GUIDANCE NEEDLE PLACEMENT ADD ON Left 2018    CORTICOSTEROID INJECTION performed by Dalia Walker MD at [facility]  2006   [address] WITH STENT PLACEMENT  2011    SABA to First diag    CORONARY ARTERY BYPASS GRAFT      FINGER TRIGGER RELEASE Left 2019    LEFT INDEX AND LONG TRIGGER FINGER RELEASAE performed by Lis Melendrez MD at [place] [address].    total    OTHER SURGICAL HISTORY      fibrinolysis    OR CABG, ARTERY-VEIN, THREE  \    Coronary Artery Bypass, 3    OR MANIPULATN JOSELDR JT W ANESTHESIA Left 2018    SHOULDER MANIPULATION UNDER ANESTHESIA performed by Dalia Walker MD at [address]      VULVAR/PERINEAL BIOPSY  10/07/2015    Dr. Wes Patel       Social History     Tobacco Use    Smoking status: Former Smoker     Packs/day: 2.00     Years: 15.00     Pack years: 30.00     Types: Cigarettes     Last attempt to quit: 2014     Years since quittin.7    Smokeless tobacco: Never Used   Substance Use Topics    Alcohol use: No    Drug use: No       Family History   Problem Relation Age of Onset    Diabetes Father     Cancer Father     High Blood Pressure Father     Heart Attack Father     Diabetes Mother     Breast Cancer Mother     Heart Failure Mother         CHF, MVP    Heart Failure Maternal Grandfather     Stroke Maternal Grandfather     Diabetes Brother        /72   Pulse 76   Temp 98.5 °F (36.9 °C) (Oral)   Resp 18   Wt 191 lb (86.6 kg)   LMP 1998 (Approximate)   SpO2 97%   BMI 34.93 kg/m²     Physical Exam  Vitals signs reviewed. Constitutional:       General: She is not in acute distress. Appearance: She is obese. She is not toxic-appearing. HENT:      Head: Normocephalic and atraumatic. Right Ear: Tympanic membrane, ear canal and external ear normal.      Left Ear: Tympanic membrane, ear canal and external ear normal.      Nose: Nose normal.      Mouth/Throat:      Mouth: No oral lesions. Tongue: No lesions. Palate: No lesions. Pharynx: Uvula midline. No pharyngeal swelling, oropharyngeal exudate or posterior oropharyngeal erythema. Tonsils: 1+ on the right. 1+ on the left. Eyes:      General: Lids are normal.         Right eye: No discharge. Left eye: No discharge. Conjunctiva/sclera: Conjunctivae normal.      Right eye: No exudate. Left eye: No exudate. Pupils: Pupils are equal, round, and reactive to light. Neck:      Musculoskeletal: Neck supple. Normal range of motion. No neck rigidity. Trachea: Trachea normal. No tracheal tenderness. Cardiovascular:      Rate and Rhythm: Normal rate. Pulses:           Posterior tibial pulses are 2+ on the right side and 2+ on the left side. Heart sounds: Normal heart sounds. No murmur. Comments: Extremities warm and well perfused, trace ankle edema bilaterally  Pulmonary:      Effort: Pulmonary effort is normal. No accessory muscle usage. Breath sounds: Normal breath sounds. No decreased breath sounds, wheezing or rales. Abdominal:      General: Abdomen is flat. Bowel sounds are normal. There is no distension. Palpations: Abdomen is soft. There is no hepatomegaly or splenomegaly. Tenderness: There is abdominal tenderness in the epigastric area and suprapubic area. Comments: Exam slightly limited by body habitus   Musculoskeletal:         General: No tenderness. Lymphadenopathy:      Head:      Right side of head: No submandibular adenopathy.       Left side of head: No cefdinir x7 days  -pyridium 3x/day prn dysuria, limit 3 days of treatment as discussed with patient  -ok to use clobetasol twice daily for lichen simplex chronicus/lichen sclerosus X6-62 days then resume prn    Over 50% of the total visit time of 20 minutes was spent on counseling and/or coordination of care of:   1. Acute cystitis without hematuria    2. Dysuria    3. Lichen simplex chronicus         Orders Placed This Encounter   Procedures    Culture, Urine     Order Specific Question:   Specify (ex-cath, midstream, cysto, etc)? Answer:   mid stream    POCT Urinalysis no Micro     Orders Placed This Encounter   Medications    cefdinir (OMNICEF) 300 MG capsule     Sig: Take 1 capsule by mouth 2 times daily for 7 days     Dispense:  14 capsule     Refill:  0    phenazopyridine (PYRIDIUM) 200 MG tablet     Sig: Take 1 tablet by mouth 3 times daily as needed for Pain     Dispense:  10 tablet     Refill:  1     There are no discontinued medications. Patient Instructions       Patient Education        Urinary Tract Infection in Women: Care Instructions  Your Care Instructions    A urinary tract infection, or UTI, is a general term for an infection anywhere between the kidneys and the urethra (where urine comes out). Most UTIs are bladder infections. They often cause pain or burning when you urinate. UTIs are caused by bacteria and can be cured with antibiotics. Be sure to complete your treatment so that the infection goes away. Follow-up care is a key part of your treatment and safety. Be sure to make and go to all appointments, and call your doctor if you are having problems. It's also a good idea to know your test results and keep a list of the medicines you take. How can you care for yourself at home? · Take your antibiotics as directed. Do not stop taking them just because you feel better. You need to take the full course of antibiotics. · Drink extra water and other fluids for the next day or two. This may help wash out the bacteria that are causing the infection. (If you have kidney, heart, or liver disease and have to limit fluids, talk with your doctor before you increase your fluid intake.)  · Avoid drinks that are carbonated or have caffeine. They can irritate the bladder. · Urinate often. Try to empty your bladder each time. · To relieve pain, take a hot bath or lay a heating pad set on low over your lower belly or genital area. Never go to sleep with a heating pad in place. To prevent UTIs  · Drink plenty of water each day. This helps you urinate often, which clears bacteria from your system. (If you have kidney, heart, or liver disease and have to limit fluids, talk with your doctor before you increase your fluid intake.)  · Urinate when you need to. · Urinate right after you have sex. · Change sanitary pads often. · Avoid douches, bubble baths, feminine hygiene sprays, and other feminine hygiene products that have deodorants. · After going to the bathroom, wipe from front to back. When should you call for help? Call your doctor now or seek immediate medical care if:    · Symptoms such as fever, chills, nausea, or vomiting get worse or appear for the first time.     · You have new pain in your back just below your rib cage. This is called flank pain.     · There is new blood or pus in your urine.     · You have any problems with your antibiotic medicine.    Watch closely for changes in your health, and be sure to contact your doctor if:    · You are not getting better after taking an antibiotic for 2 days.     · Your symptoms go away but then come back. Where can you learn more? Go to https://[email or web address] Assistant.Spruik. org and sign in to your [email or web address] account. Enter Z706 in the KnowFu box to learn more about \"Urinary Tract Infection in Women: Care Instructions. \"     If you do not have an account, please click on the \"Sign Up Now\" link.   Current as of: August 21, always ask your healthcare professional. [place] [address] any warranty or liability for your use of this information. Patient voices understanding and agrees to plans along with risks and benefits of plan. Counseling:  Dc Null case, medications and options were discussed in detail. patient was instructed tocall the office if she   questions regarding her treatment. Should her conditions worsen, she should return to office to be reassessed by Dr. Todd Palomino. she  Should to go the closest Emergency Department for any emergency. They verbalized understanding the above instructions. Return if symptoms worsen or fail to improve.

## 2020-03-29 NOTE — PATIENT INSTRUCTIONS
Patient Education        Urinary Tract Infection in Women: Care Instructions  Your Care Instructions    A urinary tract infection, or UTI, is a general term for an infection anywhere between the kidneys and the urethra (where urine comes out). Most UTIs are bladder infections. They often cause pain or burning when you urinate. UTIs are caused by bacteria and can be cured with antibiotics. Be sure to complete your treatment so that the infection goes away. Follow-up care is a key part of your treatment and safety. Be sure to make and go to all appointments, and call your doctor if you are having problems. It's also a good idea to know your test results and keep a list of the medicines you take. How can you care for yourself at home? · Take your antibiotics as directed. Do not stop taking them just because you feel better. You need to take the full course of antibiotics. · Drink extra water and other fluids for the next day or two. This may help wash out the bacteria that are causing the infection. (If you have kidney, heart, or liver disease and have to limit fluids, talk with your doctor before you increase your fluid intake.)  · Avoid drinks that are carbonated or have caffeine. They can irritate the bladder. · Urinate often. Try to empty your bladder each time. · To relieve pain, take a hot bath or lay a heating pad set on low over your lower belly or genital area. Never go to sleep with a heating pad in place. To prevent UTIs  · Drink plenty of water each day. This helps you urinate often, which clears bacteria from your system. (If you have kidney, heart, or liver disease and have to limit fluids, talk with your doctor before you increase your fluid intake.)  · Urinate when you need to. · Urinate right after you have sex. · Change sanitary pads often. · Avoid douches, bubble baths, feminine hygiene sprays, and other feminine hygiene products that have deodorants.   · After going to the bathroom, wipe from front to back. When should you call for help? Call your doctor now or seek immediate medical care if:    · Symptoms such as fever, chills, nausea, or vomiting get worse or appear for the first time.     · You have new pain in your back just below your rib cage. This is called flank pain.     · There is new blood or pus in your urine.     · You have any problems with your antibiotic medicine.    Watch closely for changes in your health, and be sure to contact your doctor if:    · You are not getting better after taking an antibiotic for 2 days.     · Your symptoms go away but then come back. Where can you learn more? Go to https://chpepiceweb.9GAG. org and sign in to your MoPix account. Enter T210 in the TopCoder box to learn more about \"Urinary Tract Infection in Women: Care Instructions. \"     If you do not have an account, please click on the \"Sign Up Now\" link. Current as of: August 21, 2019Content Version: 12.4  © 0146-5525 Healthwise, Incorporated. Care instructions adapted under license by [facility] ([facility]). If you have questions about a medical condition or this instruction, always ask your healthcare professional. [place] [address] any warranty or liability for your use of this information. Patient Education        Lichen Sclerosus: Care Instructions  Your Care Instructions  Lichen sclerosus is a long-term (chronic) skin problem that causes thin, wrinkled white patches. The patches are itchy and painful. If the skin tears, bright red or purple spots may appear. In most cases, it occurs on the skin of the anus (the opening where stool leaves the body), the vulva (the area around the vagina), and the tip of the penis in men who haven't been circumcised. Doctors aren't sure what causes lichen sclerosus. It isn't caused by an infection, and it's not contagious. You can't spread it to others.   If the skin patches are on the anus, vulva, or penis, they may need to be treated. If these areas aren't treated, the skin can thicken and scar. This can narrow the openings to the vagina and anus. The foreskin over the penis may tighten and shrink. If this happens, going to the bathroom and having sex can be painful. Lichen sclerosus is usually treated with strong prescription cream or ointment. The medicine stops the inflammation, but the scarring of the skin might not completely go away. Men with scarring from advanced cases on the tip of the penis may have surgery to remove the foreskin. Skin patches on any other part of the body usually go away on their own without treatment. You may have a small increased risk of skin cancer on the affected area. Your doctor will examine the skin at least once a year. Follow-up care is a key part of your treatment and safety. Be sure to make and go to all appointments, and call your doctor if you are having problems. It's also a good idea to know your test results and keep a list of the medicines you take. How can you care for yourself at home? · Be safe with medicines. If your doctor prescribed a cream, apply it exactly as directed. Call your doctor if you think you are having a problem with your medicine. · Put cold, wet cloths on the area to reduce itching. · Wear loose-fitting clothes. Avoid nylon and other fabric that holds moisture close to the skin. This may allow an infection to start. · If your doctor told you to use nonprescription moisturizing cream on your skin, read and follow the directions on the label. Care tips for women  · Do not douche, unless your doctor tells you to. · Avoid hot baths. Don't use soaps or bath products to wash the area around your vulva. Rinse with water only, and gently pat the area dry. Care tips for men  · Keep your penis clean. If you haven't been circumcised, gently pull the foreskin back to wash your penis with warm water. Make sure your penis is dry before you get dressed.   When should you call for help? Call your doctor now or seek immediate medical care if:    · You have symptoms of infection, such as:  ? Increased pain, swelling, warmth, or redness. ? Red streaks leading from the area. ? Pus draining from the area. ? A fever.    Watch closely for changes in your health, and be sure to contact your doctor if:    · The affected area grows or changes.     · You do not get better as expected. Where can you learn more? Go to https://Rhone ApparelpeStatSocial.MollyWatr. org and sign in to your Qualys account. Enter X497 in the TV4 Entertainment box to learn more about \"Lichen Sclerosus: Care Instructions. \"     If you do not have an account, please click on the \"Sign Up Now\" link. Current as of: October 30, 2019Content Version: 12.4  © 4399-4774 Healthwise, Incorporated. Care instructions adapted under license by [facility] ([facility]). If you have questions about a medical condition or this instruction, always ask your healthcare professional. [address] any warranty or liability for your use of this information.

## 2020-04-16 ENCOUNTER — VIRTUAL VISIT (OUTPATIENT)
Dept: PRIMARY CARE CLINIC | Age: 55
End: 2020-04-16
Payer: COMMERCIAL

## 2020-04-16 PROCEDURE — 99442 PR PHYS/QHP TELEPHONE EVALUATION 11-20 MIN: CPT | Performed by: NURSE PRACTITIONER

## 2020-04-16 RX ORDER — CEFDINIR 300 MG/1
300 CAPSULE ORAL 2 TIMES DAILY
Qty: 20 CAPSULE | Refills: 0 | Status: SHIPPED | OUTPATIENT
Start: 2020-04-16 | End: 2020-04-24

## 2020-04-16 ASSESSMENT — ENCOUNTER SYMPTOMS
EYES NEGATIVE: 1
VOMITING: 1
RESPIRATORY NEGATIVE: 1
ALLERGIC/IMMUNOLOGIC NEGATIVE: 1
NAUSEA: 1

## 2020-04-16 NOTE — PROGRESS NOTES
Brandan Cooley is a 62 y.o. female evaluated via telephone on 4/16/2020. Consent:  She and/or health care decision maker is aware that that she may receive a bill for this telephone service, depending on her insurance coverage, and has provided verbal consent to proceed: Yes      Documentation:  I communicated with the patient and/or health care decision maker about UTI symptoms. Details of this discussion including any medical advice provided: see below     Dysuria    This is a recurrent problem. The current episode started 1 to 4 weeks ago. The problem occurs every urination. The problem has been gradually worsening. Quality: pressure. The pain is at a severity of 6/10. The pain is moderate. There has been no fever. She is not sexually active. There is no history of pyelonephritis. Associated symptoms include frequency, nausea, urgency and vomiting. Pertinent negatives include no discharge. She has tried antibiotics and increased fluids (spilled 6 days worth of antibiotics in the sink) for the symptoms. The treatment provided no relief. No change in PMH, family, social, or surgical history unless mentioned above. Review of Systems   Constitutional: Negative. HENT: Negative. Eyes: Negative. Respiratory: Negative. Cardiovascular: Negative. Gastrointestinal: Positive for nausea and vomiting. Endocrine: Negative. Genitourinary: Positive for dysuria, frequency and urgency. Musculoskeletal: Negative. Skin: Negative. Allergic/Immunologic: Negative. Neurological: Negative. Hematological: Negative. Psychiatric/Behavioral: Negative.         Past Medical History:   Diagnosis Date    Abnormal Pap smear of cervix     CAD (coronary artery disease)     stent 2011 Mario Diss; sees dr. Rashi Li Cervical cancer [facility])     Diabetes mellitus ([facility] [address]     ERRONEOUS ENCOUNTER--DISREGARD 9/15/2015    GERD (gastroesophageal reflux disease)     Headache(784.0)     Heart attack ([facility] [address] 15 tablet 0    SITagliptin (JANUVIA) 100 MG tablet TAKE 1 TABLET EVERY DAY 30 tablet 5    albuterol (PROVENTIL) (5 MG/ML) 0.5% nebulizer solution Take 0.5 mLs by nebulization every 6 hours as needed for Wheezing 120 each 0    aspirin 81 MG tablet Take 81 mg by mouth daily      Lactobacillus (PROBIATA PO) Take 1 tablet by mouth daily       mometasone (ELOCON) 0.1 % ointment Apply topically to vagina 3 times per week 1 Tube 3    Eflornithine HCl (VANIQA) 13.9 % CREA Apply 1 Dose topically 2 times daily 45 g 2    Docusate Calcium (STOOL SOFTENER PO) Take 100 mg by mouth 2 times daily as needed.  nitroGLYCERIN (NITROSTAT) 0.4 MG SL tablet Place 1 tablet under the tongue every 5 minutes as needed for Chest pain (Patient not taking: Reported on 4/16/2020) 25 tablet 3     No current facility-administered medications for this visit. Allergies   Allergen Reactions    Codeine Other (See Comments)     Seizures as a child    Motrin [Ibuprofen] Swelling     Tongue to swell    Humalog [Insulin Lispro] Other (See Comments)     Shakiness, break out in a sweat at higher doses.        Past Surgical History:   Procedure Laterality Date    BREAST BIOPSY  6/8/2011    US guided core needle, right, benign    CARDIAC CATHETERIZATION  12/22/14  JDT    EF 50%    CARDIAC CATHETERIZATION  06/05/2017        [facility] FLUOROSCOPIC GUIDANCE NEEDLE PLACEMENT ADD ON Left 7/19/2018    CORTICOSTEROID INJECTION performed by Paul Lynn MD at [address] [place] [address]  2006    CORONARY ANGIOPLASTY WITH STENT PLACEMENT  12/2011    SABA to First diag    CORONARY ARTERY BYPASS GRAFT      FINGER TRIGGER RELEASE Left 11/8/2019    LEFT INDEX AND LONG TRIGGER FINGER RELEASAE performed by Luke Carrasco MD at [place] [address].    total    OTHER SURGICAL HISTORY      fibrinolysis    AR CABG, ARTERY-VEIN, THREE  12\23\2014    Coronary Artery Bypass, 3    AR NEAL DE LEON

## 2020-04-24 ENCOUNTER — VIRTUAL VISIT (OUTPATIENT)
Dept: PRIMARY CARE CLINIC | Age: 55
End: 2020-04-24
Payer: COMMERCIAL

## 2020-04-24 PROCEDURE — 99443 PR PHYS/QHP TELEPHONE EVALUATION 21-30 MIN: CPT | Performed by: NURSE PRACTITIONER

## 2020-04-24 RX ORDER — PHENAZOPYRIDINE HYDROCHLORIDE 100 MG/1
100 TABLET, FILM COATED ORAL 3 TIMES DAILY PRN
Qty: 9 TABLET | Refills: 0 | Status: SHIPPED | OUTPATIENT
Start: 2020-04-24 | End: 2020-12-10

## 2020-04-24 RX ORDER — SULFAMETHOXAZOLE AND TRIMETHOPRIM 400; 80 MG/1; MG/1
1 TABLET ORAL 2 TIMES DAILY
Qty: 20 TABLET | Refills: 0 | Status: SHIPPED | OUTPATIENT
Start: 2020-04-24 | End: 2020-05-04

## 2020-04-24 ASSESSMENT — ENCOUNTER SYMPTOMS
RESPIRATORY NEGATIVE: 1
GASTROINTESTINAL NEGATIVE: 1
EYES NEGATIVE: 1

## 2020-04-24 NOTE — PROGRESS NOTES
[address] [place] [address]            Phone:  [phone]  Fax:  [phone]    Celestine Nuñez is a 62 y.o. female evaluated via telephone on 4/24/2020. Consent:    She and/or health care decision maker is aware that that she may receive a bill for this telephone service, depending on her insurance coverage, and has provided verbal consent to proceed: Yes      HPI  Chief Complaint   Patient presents with    Abdominal Pain    Urinary Tract Infection       I communicated with the patient and/or health care decision maker about abdominal pain and neck discomfort. Patient has been doing her own physical therapy at home with an elastic band and reports that this has been helping her cervical pain. Patient has been taking Carafate for her increased abdominal pain and reports that this also has been helping. Patient however is complaining of continued UTI-like symptoms. She was diagnosed with a UTI approximately 1 month ago and was started on oral antibiotic. She reports only being able to finish 3 days of that medicine and being seen in our office by Archie Farmer and receiving another oral antibiotic. She is on day 7 of this antibiotic and it is not helping her symptoms. Review of Systems   Constitutional: Negative. HENT: Negative. Eyes: Negative. Respiratory: Negative. Cardiovascular: Negative. Gastrointestinal: Negative. Endocrine: Negative. Genitourinary: Positive for difficulty urinating, dysuria and frequency. Musculoskeletal: Negative. Skin: Negative. Neurological: Negative. Hematological: Negative. Psychiatric/Behavioral: Negative. PLAN    Details of this discussion including any medical advice provided:     1. Urinary tract infection without hematuria, site unspecified    - sulfamethoxazole-trimethoprim (BACTRIM) 400-80 MG per tablet; Take 1 tablet by mouth 2 times daily for 10 days  Dispense: 20 tablet;  Refill: 0  -

## 2020-05-11 ENCOUNTER — VIRTUAL VISIT (OUTPATIENT)
Dept: PRIMARY CARE CLINIC | Age: 55
End: 2020-05-11
Payer: COMMERCIAL

## 2020-05-11 PROCEDURE — 99442 PR PHYS/QHP TELEPHONE EVALUATION 11-20 MIN: CPT | Performed by: NURSE PRACTITIONER

## 2020-05-11 RX ORDER — ONDANSETRON 4 MG/1
4 TABLET, ORALLY DISINTEGRATING ORAL 3 TIMES DAILY PRN
Qty: 21 TABLET | Refills: 0 | Status: SHIPPED | OUTPATIENT
Start: 2020-05-11 | End: 2020-05-29

## 2020-05-11 ASSESSMENT — ENCOUNTER SYMPTOMS
VOMITING: 1
DIARRHEA: 1
ALLERGIC/IMMUNOLOGIC NEGATIVE: 1
ABDOMINAL PAIN: 0
RESPIRATORY NEGATIVE: 1
COUGH: 0
EYES NEGATIVE: 1
NAUSEA: 1

## 2020-05-11 NOTE — PROGRESS NOTES
UNIT/ML injection pen INJECT 35 UNITS SUBQ INTOTHE SKIN NIGHTLY 15 mL 1    isosorbide mononitrate (IMDUR) 30 MG extended release tablet TAKE 1 TABLET EVERY DAY (Patient taking differently: nightly TAKE 1 TABLET EVERY DAY) 30 tablet 5    vitamin D (ERGOCALCIFEROL) 1.25 MG (01393 UT) CAPS capsule TAKE 1 CAPSULE BY MOUTH ONCE WEEKLY 12 capsule 1    nystatin (MYCOSTATIN) 935082 UNIT/GM powder Apply 2-3 times daily. 60 g 5    JARDIANCE 25 MG tablet TAKE 1 TABLET BY MOUTH ONCE DAILY 90 tablet 1    SITagliptin (JANUVIA) 100 MG tablet TAKE 1 TABLET EVERY DAY 30 tablet 5    nitroGLYCERIN (NITROSTAT) 0.4 MG SL tablet Place 1 tablet under the tongue every 5 minutes as needed for Chest pain (Patient not taking: Reported on 4/16/2020) 25 tablet 3    albuterol (PROVENTIL) (5 MG/ML) 0.5% nebulizer solution Take 0.5 mLs by nebulization every 6 hours as needed for Wheezing 120 each 0    aspirin 81 MG tablet Take 81 mg by mouth daily      Lactobacillus (PROBIATA PO) Take 1 tablet by mouth daily       mometasone (ELOCON) 0.1 % ointment Apply topically to vagina 3 times per week 1 Tube 3    Eflornithine HCl (VANIQA) 13.9 % CREA Apply 1 Dose topically 2 times daily 45 g 2    Docusate Calcium (STOOL SOFTENER PO) Take 100 mg by mouth 2 times daily as needed. No current facility-administered medications for this visit. Allergies   Allergen Reactions    Codeine Other (See Comments)     Seizures as a child    Motrin [Ibuprofen] Swelling     Tongue to swell    Humalog [Insulin Lispro] Other (See Comments)     Shakiness, break out in a sweat at higher doses.        Past Surgical History:   Procedure Laterality Date    BREAST BIOPSY  6/8/2011    US guided core needle, right, benign    CARDIAC CATHETERIZATION  12/22/14  JDT    EF 50%    CARDIAC CATHETERIZATION  06/05/2017        CHG FLUOROSCOPIC GUIDANCE NEEDLE PLACEMENT ADD ON Left 7/19/2018    CORTICOSTEROID INJECTION performed by Lata Lauren MD at tablet; Refill: 0    3. Diarrhea, unspecified type  - refrain from taking imodium; if continues for 3-4 days, then will consider further testing     4. Encounter for counseling  - medications, nutrition, and follow-up    Over 50% of the total visit time of 15 minutes was spent on counseling and or coordination of care of gastroenteritis, vomiting, diarrhea, counseling, medications, and follow-up. No orders of the defined types were placed in this encounter. Orders Placed This Encounter   Medications    ondansetron (ZOFRAN-ODT) 4 MG disintegrating tablet     Sig: Take 1 tablet by mouth 3 times daily as needed for Nausea or Vomiting     Dispense:  21 tablet     Refill:  0     Medications Discontinued During This Encounter   Medication Reason    ondansetron (ZOFRAN ODT) 4 MG disintegrating tablet LIST CLEANUP     There are no Patient Instructions on file for this visit. Patient given educational handouts and has had all questions answered. Patient voices understanding and agrees to plans along with risks and benefits of plan. Patient isinstructed to continue prior meds, diet, and exercise plans unless instructed otherwise. Patient agrees to follow up as instructed and sooner if needed. Patient agrees to go to ER if condition becomes emergent. Notesmay be completed with dictation device and spelling errors may occur. Return if symptoms worsen or fail to improve. I affirm this is a Patient Initiated Episode with an Established Patient who has not had a related appointment within my department in the past 7 days or scheduled within the next 24 hours. Total Time: minutes: 11-20 minutes    Note: not billable if this call serves to triage the patient into an appointment for the relevant concern      [address] [place] [address] were provided through a telephone visit to substitute for in-person clinic visit. Patient and provider were located at their individual homes.     Pursuant to the emergency

## 2020-05-20 ENCOUNTER — OFFICE VISIT (OUTPATIENT)
Dept: CARDIOLOGY | Age: 55
End: 2020-05-20
Payer: COMMERCIAL

## 2020-05-20 VITALS — HEIGHT: 62 IN | BODY MASS INDEX: 34.04 KG/M2 | WEIGHT: 185 LBS

## 2020-05-20 PROCEDURE — G8417 CALC BMI ABV UP PARAM F/U: HCPCS | Performed by: CLINICAL NURSE SPECIALIST

## 2020-05-20 PROCEDURE — 3017F COLORECTAL CA SCREEN DOC REV: CPT | Performed by: CLINICAL NURSE SPECIALIST

## 2020-05-20 PROCEDURE — 99214 OFFICE O/P EST MOD 30 MIN: CPT | Performed by: CLINICAL NURSE SPECIALIST

## 2020-05-20 PROCEDURE — 1036F TOBACCO NON-USER: CPT | Performed by: CLINICAL NURSE SPECIALIST

## 2020-05-20 PROCEDURE — G8427 DOCREV CUR MEDS BY ELIG CLIN: HCPCS | Performed by: CLINICAL NURSE SPECIALIST

## 2020-05-20 RX ORDER — NITROGLYCERIN 0.4 MG/1
0.4 TABLET SUBLINGUAL EVERY 5 MIN PRN
Qty: 25 TABLET | Refills: 3 | Status: SHIPPED | OUTPATIENT
Start: 2020-05-20 | End: 2020-12-02 | Stop reason: SDUPTHER

## 2020-05-20 NOTE — PATIENT INSTRUCTIONS
Try taking your Imdur in the AM - if you continue to have to take the Nitro  more often then will increase your imdur   Follow up in 6 mos    Call with any questions or concerns  Follow up with MIKE Mariscal for non cardiac problems and labs   Report any new problems  Cardiovascular Fitness-Exercise as tolerated. Strive for 30 minutes of exercise most days of the week. Cardiac / Healthy Diet  Continue current medications as directed  Continue plan of treatment  It is always recommended that you bring your medications bottles with you to each visit - this is for your safety!

## 2020-05-20 NOTE — PROGRESS NOTES
[address] [address], [address] [address]  Phone: [phone]  Fax: [phone]    OFFICE VISIT:  2020    Jason Yang - : 1965    Reason For Visit:  Demario Palmer is a 62 y.o. female who is here for 6 Month Follow-Up (patient has soa with exertion); Coronary Artery Disease; Hypertension; and Congestive Heart Failure  CAD with CABG ×3 in , hypertension, hyperlipidemia and diabetes . Heart catheterization in 2017 showed occluded LIMA to the LAD with patient and grafts. 19  Cath  Occluded LIMA-LAD at the anastomosis to the LAD itself, patent VG-diag, patent VG-PDA, apical akinesis, EF 45%  Recommend continuing ongoing medical management    She returns today in follow-up    Subjective  Demario Palmer continues to have some exertional chest pain. It is with activity such as walking her dogs. Feels like a pressure and usually with rest.  Sometimes she does take a nitro -usually relieved with one has had to take to one occasion. Recently was stressed with has been doing recently diagnosed with bladder cancer. No resting shortness of breath, orthopnea, paroxysmal nocturnal dyspnea, syncope, presyncope, arrhythmia, edema and fatigue. The patient denies numbness or weakness to suggest cerebrovascular accident or transient ischemic attack. MIKE Colunag is PCP and follows labs.   Jason Yang has the following history as recorded in [facility]:    Patient Active Problem List    Diagnosis Date Noted    Gastroesophageal reflux disease without esophagitis 2018    Dysuria 2018    Vitamin D deficiency 2018    Urinary tract infection without hematuria 2018    Adhesive capsulitis of left shoulder 2018    Angina effort 2018    Coronary artery disease involving coronary bypass graft of native heart without angina pectoris 2017    Left ventricular dysfunction 2017    Abnormal nuclear cardiac imaging test     Lichen simplex ANESTHESIA Left 2018    SHOULDER MANIPULATION UNDER ANESTHESIA performed by [facility] Patel [facility] at [facility]    THORACOTOMY      VULVAR/PERINEAL BIOPSY  10/07/2015    Dr. López Roman     Family History   Problem Relation Age of Onset    Diabetes Father     Cancer Father     High Blood Pressure Father     Heart Attack Father     Diabetes Mother     Breast Cancer Mother     Heart Failure Mother         CHF, MVP    Heart Failure Maternal Grandfather     Stroke Maternal Grandfather     Diabetes Brother      Social History     Tobacco Use    Smoking status: Former Smoker     Packs/day: 2.00     Years: 15.00     Pack years: 30.00     Types: Cigarettes     Last attempt to quit: 2014     Years since quittin.8    Smokeless tobacco: Never Used   Substance Use Topics    Alcohol use: No      Current Outpatient Medications   Medication Sig Dispense Refill    nitroGLYCERIN (NITROSTAT) 0.4 MG SL tablet Place 1 tablet under the tongue every 5 minutes as needed for Chest pain 25 tablet 3    ondansetron (ZOFRAN-ODT) 4 MG disintegrating tablet Take 1 tablet by mouth 3 times daily as needed for Nausea or Vomiting 21 tablet 0    tamsulosin (FLOMAX) 0.4 MG capsule TAKE 1 CAPSULE BY MOUTH DAILY 30 capsule 5    valsartan (DIOVAN) 80 MG tablet TAKE 1 TABLET BY MOUTH DAILY 30 tablet 5    phenazopyridine (PYRIDIUM) 100 MG tablet Take 1 tablet by mouth 3 times daily as needed for Pain 9 tablet 0    sucralfate (CARAFATE) 1 GM tablet Take 1 tablet by mouth 4 times daily 120 tablet 3    diclofenac sodium (VOLTAREN) 1 % GEL Apply 2 g topically 4 times daily 2 Tube 1    lovastatin (MEVACOR) 40 MG tablet TAKE 1 TABLET AT BEDTIME 30 tablet 3    OZEMPIC, 1 MG/DOSE, 2 MG/1.5ML SOPN INJECT 0.25 MG INTO THE SKIN EVERY 7 DAYS 4 mL 3    metFORMIN (GLUCOPHAGE-XR) 500 MG extended release tablet Take 1 tablet by mouth 2 times daily 60 tablet 2    metoprolol tartrate (LOPRESSOR) 25 MG tablet TAKE 1 TABLET BY MOUTH TWICE DAILY disease of native artery of native heart with stable angina pectoris ([facility] [address]     2. Essential hypertension     3. Left ventricular dysfunction     4. Chronic diastolic congestive heart failure ([facility] [facility]       Data:  BP Readings from Last 3 Encounters:   03/29/20 116/72   02/28/20 122/82   01/17/20 126/84    Pulse Readings from Last 3 Encounters:   03/29/20 76   02/28/20 82   01/28/20 79        Wt Readings from Last 3 Encounters:   05/20/20 185 lb (83.9 kg)   03/29/20 191 lb (86.6 kg)   02/28/20 193 lb (87.5 kg)   recent cath-reviewed with patient in detail  The LAD is a moderate sized vessel with several diagonal branches. There is a 100% stenosis in the proximal portion of this vessel. The left CONSUELO is patent to the mid LAD, however at the anastomosis,it is 100% occluded. The vein graft to the diagonal is patent with faint retrograde filling of the LAD itself.     She is still having some exertional angina - taking her imdur at night-would have her take in the day and see how she does. May need to up titrate is having to take more supplemental as needed nitroglycerin. We discussed letting us know how she does this change of the next couple weeks. Blood Pressure and heart rate controlled. Medical management includes ARB, beta-blocker and long-acting nitrate   Last lipids 118, HDL 53, triglycerides 82 and LDL 49  Diabetic-hemoglobin A1c 7.0     States taking medications as prescribed  Weight is stable no evidence of heart failure       Plan  Try taking your Imdur in the AM - if you continue to have to take the Nitro  more often then will increase your imdur   Follow up in 6 mos    Call with any questions or concerns  Follow up with MIKE Diamond for non cardiac problems and labs   Report any new problems  Cardiovascular Fitness-Exercise as tolerated. Strive for 30 minutes of exercise most days of the week.     Cardiac / Healthy Diet  Continue current medications as directed  Continue plan of treatment  It is

## 2020-05-29 ENCOUNTER — VIRTUAL VISIT (OUTPATIENT)
Dept: PRIMARY CARE CLINIC | Age: 55
End: 2020-05-29
Payer: COMMERCIAL

## 2020-05-29 PROCEDURE — 99443 PR PHYS/QHP TELEPHONE EVALUATION 21-30 MIN: CPT | Performed by: NURSE PRACTITIONER

## 2020-05-29 RX ORDER — PROMETHAZINE HYDROCHLORIDE 25 MG/1
25 TABLET ORAL 4 TIMES DAILY PRN
Qty: 20 TABLET | Refills: 0 | Status: SHIPPED | OUTPATIENT
Start: 2020-05-29 | End: 2020-06-05

## 2020-05-29 RX ORDER — ONDANSETRON 4 MG/1
4 TABLET, ORALLY DISINTEGRATING ORAL EVERY 8 HOURS PRN
Qty: 30 TABLET | Refills: 0 | Status: SHIPPED | OUTPATIENT
Start: 2020-05-29 | End: 2020-07-13 | Stop reason: SDUPTHER

## 2020-05-29 ASSESSMENT — ENCOUNTER SYMPTOMS
NAUSEA: 1
RESPIRATORY NEGATIVE: 1
ABDOMINAL PAIN: 1
EYES NEGATIVE: 1

## 2020-05-29 NOTE — PROGRESS NOTES
[address] [address]            Phone:  [phone]  Fax:  [phone]    Quinten Soto is a 62 y.o. female evaluated via telephone on 5/29/2020. Consent:    She and/or health care decision maker is aware that that she may receive a bill for this telephone service, depending on her insurance coverage, and has provided verbal consent to proceed: Yes      HPI  Chief Complaint   Patient presents with    Nausea    Emesis       I communicated with the patient and/or health care decision maker about nausea/vomiting. She reports it is the same, but it seems to be worsening. It has been on and off for the last few months. She feels like it is constant now. She is never aware when the symptoms will occur. Review of Systems   Constitutional: Negative. HENT: Negative. Eyes: Negative. Respiratory: Negative. Cardiovascular: Negative. Gastrointestinal: Positive for abdominal pain and nausea. Endocrine: Negative. Genitourinary: Negative. Musculoskeletal: Negative. Skin: Negative. Neurological: Negative. Hematological: Negative. Psychiatric/Behavioral: Negative. PLAN    Details of this discussion including any medical advice provided:     1. Nausea and vomiting, intractability of vomiting not specified, unspecified vomiting type    - Lori Sapp, MIKE Infante, Gastroenterology, [place]    2. Epigastric pain      3. Counseling, unspecified      I am referring to GI due to continued pain and N/V. Refill on zofran and phenergan. I affirm this is a Patient Initiated Episode with an Established Patient who has not had a related appointment within my department in the past 7 days or scheduled within the next 24 hours.       Total Time: minutes: 21-30 minutes      Note: not billable if this call serves to triage the patient into an appointment for the relevant concern      Kapil Gardner         Pursuant to the emergency declaration under the [facility] waiver authority and the [employer] Act, this Virtual  Visit was conducted, with patient's consent, to reduce the patient's risk of exposure to COVID-19 and provide continuity of care for an established patient. Services were provided through a telephone discussion  to substitute for in-person clinic visit. Parties involved during telephone call include myself, MIKE Powell and patient listed.

## 2020-06-05 ENCOUNTER — OFFICE VISIT (OUTPATIENT)
Dept: GASTROENTEROLOGY | Age: 55
End: 2020-06-05
Payer: COMMERCIAL

## 2020-06-05 VITALS
WEIGHT: 180 LBS | BODY MASS INDEX: 31.89 KG/M2 | OXYGEN SATURATION: 98 % | DIASTOLIC BLOOD PRESSURE: 60 MMHG | HEIGHT: 63 IN | SYSTOLIC BLOOD PRESSURE: 101 MMHG | HEART RATE: 76 BPM

## 2020-06-05 PROBLEM — R11.0 CHRONIC NAUSEA: Status: ACTIVE | Noted: 2020-06-05

## 2020-06-05 PROBLEM — R11.10 CHRONIC VOMITING: Status: ACTIVE | Noted: 2020-06-05

## 2020-06-05 PROBLEM — R19.4 CHANGE IN BOWEL HABITS: Status: ACTIVE | Noted: 2020-06-05

## 2020-06-05 PROBLEM — R19.8 ALTERNATING CONSTIPATION AND DIARRHEA: Status: ACTIVE | Noted: 2020-06-05

## 2020-06-05 PROBLEM — Z80.0 FAMILY HISTORY OF COLON CANCER: Status: ACTIVE | Noted: 2020-06-05

## 2020-06-05 PROBLEM — Z90.49 S/P CHOLECYSTECTOMY: Status: ACTIVE | Noted: 2020-06-05

## 2020-06-05 PROBLEM — R12 CHRONIC HEARTBURN: Status: ACTIVE | Noted: 2020-06-05

## 2020-06-05 PROCEDURE — 1036F TOBACCO NON-USER: CPT | Performed by: NURSE PRACTITIONER

## 2020-06-05 PROCEDURE — G8427 DOCREV CUR MEDS BY ELIG CLIN: HCPCS | Performed by: NURSE PRACTITIONER

## 2020-06-05 PROCEDURE — 3017F COLORECTAL CA SCREEN DOC REV: CPT | Performed by: NURSE PRACTITIONER

## 2020-06-05 PROCEDURE — G8417 CALC BMI ABV UP PARAM F/U: HCPCS | Performed by: NURSE PRACTITIONER

## 2020-06-05 PROCEDURE — 99203 OFFICE O/P NEW LOW 30 MIN: CPT | Performed by: NURSE PRACTITIONER

## 2020-06-05 PROCEDURE — 3051F HG A1C>EQUAL 7.0%<8.0%: CPT | Performed by: NURSE PRACTITIONER

## 2020-06-05 PROCEDURE — 2022F DILAT RTA XM EVC RTNOPTHY: CPT | Performed by: NURSE PRACTITIONER

## 2020-06-05 ASSESSMENT — ENCOUNTER SYMPTOMS
ANAL BLEEDING: 0
BLOOD IN STOOL: 0
ABDOMINAL DISTENTION: 0
SORE THROAT: 0
BACK PAIN: 0
COUGH: 0
RECTAL PAIN: 0
VOICE CHANGE: 0
DIARRHEA: 1
VOMITING: 1
TROUBLE SWALLOWING: 0
CONSTIPATION: 1
SHORTNESS OF BREATH: 0
NAUSEA: 1
ABDOMINAL PAIN: 0

## 2020-06-05 NOTE — PROGRESS NOTES
[facility]     Diabetes mellitus ([facility]     ERRONEOUS ENCOUNTER--DISREGARD 9/15/2015    GERD (gastroesophageal reflux disease)     Headache(784.0)     Heart attack ([facility] [facility] 12/2011    Hyperlipidemia     Hypertension     Lichen simplex chronicus 10/2015    Osteopenia     Pneumonia     Type II or unspecified type diabetes mellitus without mention of complication, not stated as uncontrolled           Past Surgical History:   Procedure Laterality Date    BREAST BIOPSY  6/8/2011    US guided core needle, right, benign    CARDIAC CATHETERIZATION  12/22/14  JDT    EF 50%    CARDIAC CATHETERIZATION  06/05/2017        [facility] FLUOROSCOPIC GUIDANCE NEEDLE PLACEMENT ADD ON Left 7/19/2018    CORTICOSTEROID INJECTION performed by Jinny Del Castillo MD at [address] [place] [address]    COLONOSCOPY  approx 2005    per pt recall \" Normal\"   [address]  12/2011    SABA to [address] [place] [address] 231 Left 11/8/2019    LEFT INDEX AND LONG TRIGGER FINGER RELEASAE performed by Abrahan Crisostomo MD at [place] [address].    total    OTHER SURGICAL HISTORY      fibrinolysis    OR CABG, ARTERY-VEIN, THREE  12\23\2014    Coronary Artery Bypass, 3    OR MANIPULATN SHLDR JT W ANESTHESIA Left 7/19/2018    SHOULDER MANIPULATION UNDER ANESTHESIA performed by Jinny Del Castillo MD at [facility] ENDOSCOPY  approx 2005    per pt recall \" Normal\"    VULVAR/PERINEAL BIOPSY  10/07/2015    Dr. Christaina Norman History     Socioeconomic History    Marital status:      Spouse name: None    Number of children: None    Years of education: None    Highest education level: None   Occupational History    None   Social Needs    Financial resource strain: None    Food insecurity     Worry: None     Inability: None    Transportation needs     Medical: None g topically 4 times daily 2 Tube 1    lovastatin (MEVACOR) 40 MG tablet TAKE 1 TABLET AT BEDTIME 30 tablet 3    OZEMPIC, 1 MG/DOSE, 2 MG/1.5ML SOPN INJECT 0.25 MG INTO THE SKIN EVERY 7 DAYS 4 mL 3    metFORMIN (GLUCOPHAGE-XR) 500 MG extended release tablet Take 1 tablet by mouth 2 times daily 60 tablet 2    metoprolol tartrate (LOPRESSOR) 25 MG tablet TAKE 1 TABLET BY MOUTH TWICE DAILY 60 tablet 5    omeprazole (PRILOSEC) 20 MG delayed release capsule Take 1 capsule by mouth every morning (before breakfast) 90 capsule 1    furosemide (LASIX) 40 MG tablet TAKE 1 TABLET BY MOUTH DAILY 30 tablet 5    LANTUS SOLOSTAR 100 UNIT/ML injection pen INJECT 35 UNITS SUBQ INTOTHE SKIN NIGHTLY 15 mL 1    isosorbide mononitrate (IMDUR) 30 MG extended release tablet TAKE 1 TABLET EVERY DAY (Patient taking differently: nightly TAKE 1 TABLET EVERY DAY) 30 tablet 5    vitamin D (ERGOCALCIFEROL) 1.25 MG (32944 UT) CAPS capsule TAKE 1 CAPSULE BY MOUTH ONCE WEEKLY 12 capsule 1    nystatin (MYCOSTATIN) 780112 UNIT/GM powder Apply 2-3 times daily. 60 g 5    JARDIANCE 25 MG tablet TAKE 1 TABLET BY MOUTH ONCE DAILY 90 tablet 1    SITagliptin (JANUVIA) 100 MG tablet TAKE 1 TABLET EVERY DAY 30 tablet 5    albuterol (PROVENTIL) (5 MG/ML) 0.5% nebulizer solution Take 0.5 mLs by nebulization every 6 hours as needed for Wheezing 120 each 0    aspirin 81 MG tablet Take 81 mg by mouth daily      Lactobacillus (PROBIATA PO) Take 1 tablet by mouth daily       mometasone (ELOCON) 0.1 % ointment Apply topically to vagina 3 times per week 1 Tube 3    Eflornithine HCl (VANIQA) 13.9 % CREA Apply 1 Dose topically 2 times daily 45 g 2    Docusate Calcium (STOOL SOFTENER PO) Take 100 mg by mouth 2 times daily as needed. No current facility-administered medications for this visit. Review of Systems   Constitutional: Negative for appetite change, fatigue, fever and unexpected weight change.    HENT: Negative for sore throat, trouble swallowing and voice change. Respiratory: Negative for cough and shortness of breath. Cardiovascular: Negative for chest pain, palpitations and leg swelling. Gastrointestinal: Positive for constipation, diarrhea, nausea and vomiting. Negative for abdominal distention, abdominal pain, anal bleeding, blood in stool and rectal pain. Genitourinary: Negative for hematuria. Musculoskeletal: Negative for arthralgias, back pain and neck pain. Neurological: Negative for dizziness, weakness, light-headedness and headaches. Psychiatric/Behavioral: Negative for dysphoric mood and sleep disturbance. The patient is not nervous/anxious. All other systems reviewed and are negative. Objective:     Physical Exam  Vitals signs and nursing note reviewed. Constitutional:       Appearance: She is well-developed. Comments: /60   Pulse 76   Ht 5' 2.5\" (1.588 m)   Wt 180 lb (81.6 kg)   LMP 01/01/1998 (Approximate)   SpO2 98%   BMI 32.40 kg/m²    Eyes:      General: No scleral icterus. Conjunctiva/sclera: Conjunctivae normal.      Pupils: Pupils are equal, round, and reactive to light. Neck:      Musculoskeletal: Normal range of motion and neck supple. Thyroid: No thyromegaly. Cardiovascular:      Rate and Rhythm: Normal rate and regular rhythm. Heart sounds: Normal heart sounds. No murmur. No friction rub. No gallop. Pulmonary:      Effort: Pulmonary effort is normal. No respiratory distress. Breath sounds: Normal breath sounds. Abdominal:      General: Bowel sounds are normal. There is no distension. Palpations: Abdomen is soft. Tenderness: There is no abdominal tenderness. There is no rebound. Musculoskeletal: Normal range of motion. General: No deformity. Neurological:      Mental Status: She is alert and oriented to person, place, and time. Cranial Nerves: No cranial nerve deficit.    Psychiatric:         Judgment: Judgment normal. Assessment:       Diagnosis Orders   1. Chronic nausea  NM Gastric Emptying    ESOPHAGOSCOPY / EGD   2. Chronic vomiting  NM Gastric Emptying    ESOPHAGOSCOPY / EGD   3. Change in bowel habits     4. Family history of colon cancer     5. Alternating constipation and diarrhea     6. S/P cholecystectomy     7. Type 2 diabetes mellitus with complication, with long-term current use of insulin ([facility] [facility]     8. Chronic heartburn  ESOPHAGOSCOPY / EGD         Plan:      1. GES- will call her with results  2. Schedule outpatient endoscopy- r/o h pylori. Patient advised no Aspirin, Fish Oil, Vit E or NSAIDs 5 (five) days before procedure. Follow-up Visit: per Dr. Lindsey Espinla  Pt Education:   Risks, benefits, and alternatives to endoscopy were discussed. Patient voices understanding of risks of, but not limited to, perforation, bleeding, and infection. The risk of perforation is increased with esophageal dilatation. All questions answered to patient's satisfaction. Patient is agreable to proceed.

## 2020-06-05 NOTE — PATIENT INSTRUCTIONS
You are going to have an Endoscopy and here are some basic instructions:    Nothing to eat or drink after midnight EXCEPT:  PLEASE TAKE MEDICATION(S) FOR HIGH BLOOD PRESSURE, SEIZURES, HEART, AND THYROID WITH A SIP OF WATER AT LEAST 2 HOURS PRIOR TO ARRIVAL TIME.   YOU MAY ALSO TAKE ANY INHALERS YOU ARE PRESCRIBED. You will not be able to drive for 24 hours after the procedure due to sedation. Bring a  with you the day of the procedure. No aspirin, ibuprofen, naproxen, fish oil or vitamin E for 5 days before procedure. Continue current medications. If you are on blood thinners, clearance from the prescribing physician will be obtained before your procedure is scheduled. Increased [email or web address] may be associated with discontinuation of your blood thinner and include, but not limited to, stroke, TIA, or cardiac event. If biopsies are taken during the procedure they will be sent to a pathologist for analysis. You will be notified by mail of the pathology results in 2-3 weeks. Your physician may also schedule a follow up appointment with the nurse practitioner to discuss pathology, symptoms or to check if you have had any problems related to your procedure. If you prefer not to return to the office after your procedure please discuss this with your physician on the day of your procedure.

## 2020-06-16 ENCOUNTER — OFFICE VISIT (OUTPATIENT)
Age: 55
End: 2020-06-16

## 2020-06-16 VITALS — HEART RATE: 70 BPM | TEMPERATURE: 97.4 F | OXYGEN SATURATION: 97 %

## 2020-06-18 LAB
REPORT: NORMAL
SARS-COV-2: NOT DETECTED
THIS TEST SENT TO: NORMAL

## 2020-06-19 ENCOUNTER — ANESTHESIA (OUTPATIENT)
Dept: OPERATING ROOM | Age: 55
End: 2020-06-19

## 2020-06-19 ENCOUNTER — HOSPITAL ENCOUNTER (OUTPATIENT)
Age: 55
Setting detail: OUTPATIENT SURGERY
Discharge: HOME | End: 2020-06-19
Attending: INTERNAL MEDICINE | Admitting: INTERNAL MEDICINE
Payer: COMMERCIAL

## 2020-06-19 ENCOUNTER — ANESTHESIA EVENT (OUTPATIENT)
Dept: OPERATING ROOM | Age: 55
End: 2020-06-19

## 2020-06-19 ENCOUNTER — APPOINTMENT (OUTPATIENT)
Dept: OPERATING ROOM | Age: 55
End: 2020-06-19

## 2020-06-19 ENCOUNTER — HOSPITAL ENCOUNTER (OUTPATIENT)
Age: 55
Setting detail: SPECIMEN
Discharge: HOME | End: 2020-06-19
Payer: COMMERCIAL

## 2020-06-19 VITALS
WEIGHT: 180 LBS | HEART RATE: 68 BPM | TEMPERATURE: 98 F | SYSTOLIC BLOOD PRESSURE: 141 MMHG | DIASTOLIC BLOOD PRESSURE: 75 MMHG | HEIGHT: 62 IN | OXYGEN SATURATION: 97 % | BODY MASS INDEX: 33.13 KG/M2 | RESPIRATION RATE: 18 BRPM

## 2020-06-19 VITALS — DIASTOLIC BLOOD PRESSURE: 75 MMHG | OXYGEN SATURATION: 98 % | TEMPERATURE: 98 F | SYSTOLIC BLOOD PRESSURE: 131 MMHG

## 2020-06-19 PROCEDURE — G8907 PT DOC NO EVENTS ON DISCHARG: HCPCS

## 2020-06-19 PROCEDURE — 43239 EGD BIOPSY SINGLE/MULTIPLE: CPT | Performed by: INTERNAL MEDICINE

## 2020-06-19 PROCEDURE — 43239 EGD BIOPSY SINGLE/MULTIPLE: CPT

## 2020-06-19 PROCEDURE — 88305 TISSUE EXAM BY PATHOLOGIST: CPT

## 2020-06-19 PROCEDURE — G8918 PT W/O PREOP ORDER IV AB PRO: HCPCS

## 2020-06-19 RX ORDER — SODIUM CHLORIDE 9 MG/ML
INJECTION, SOLUTION INTRAVENOUS CONTINUOUS
Status: DISCONTINUED | OUTPATIENT
Start: 2020-06-19 | End: 2020-06-19 | Stop reason: HOSPADM

## 2020-06-19 RX ORDER — MEPERIDINE HYDROCHLORIDE 25 MG/ML
12.5 INJECTION INTRAMUSCULAR; INTRAVENOUS; SUBCUTANEOUS EVERY 5 MIN PRN
Status: DISCONTINUED | OUTPATIENT
Start: 2020-06-19 | End: 2020-06-19 | Stop reason: HOSPADM

## 2020-06-19 RX ORDER — DIPHENHYDRAMINE HYDROCHLORIDE 50 MG/ML
12.5 INJECTION INTRAMUSCULAR; INTRAVENOUS
Status: DISCONTINUED | OUTPATIENT
Start: 2020-06-19 | End: 2020-06-19 | Stop reason: HOSPADM

## 2020-06-19 RX ORDER — METOCLOPRAMIDE HYDROCHLORIDE 5 MG/ML
10 INJECTION INTRAMUSCULAR; INTRAVENOUS
Status: DISCONTINUED | OUTPATIENT
Start: 2020-06-19 | End: 2020-06-19 | Stop reason: HOSPADM

## 2020-06-19 RX ORDER — LABETALOL HYDROCHLORIDE 5 MG/ML
5 INJECTION, SOLUTION INTRAVENOUS EVERY 10 MIN PRN
Status: DISCONTINUED | OUTPATIENT
Start: 2020-06-19 | End: 2020-06-19 | Stop reason: HOSPADM

## 2020-06-19 RX ORDER — LIDOCAINE HYDROCHLORIDE 10 MG/ML
INJECTION, SOLUTION EPIDURAL; INFILTRATION; INTRACAUDAL; PERINEURAL PRN
Status: DISCONTINUED | OUTPATIENT
Start: 2020-06-19 | End: 2020-06-19 | Stop reason: SDUPTHER

## 2020-06-19 RX ORDER — PROMETHAZINE HYDROCHLORIDE 25 MG/ML
6.25 INJECTION, SOLUTION INTRAMUSCULAR; INTRAVENOUS
Status: DISCONTINUED | OUTPATIENT
Start: 2020-06-19 | End: 2020-06-19 | Stop reason: HOSPADM

## 2020-06-19 RX ORDER — PROPOFOL 10 MG/ML
INJECTION, EMULSION INTRAVENOUS PRN
Status: DISCONTINUED | OUTPATIENT
Start: 2020-06-19 | End: 2020-06-19 | Stop reason: SDUPTHER

## 2020-06-19 RX ORDER — HYDRALAZINE HYDROCHLORIDE 20 MG/ML
5 INJECTION INTRAMUSCULAR; INTRAVENOUS EVERY 10 MIN PRN
Status: DISCONTINUED | OUTPATIENT
Start: 2020-06-19 | End: 2020-06-19 | Stop reason: HOSPADM

## 2020-06-19 RX ADMIN — PROPOFOL 150 MG: 10 INJECTION, EMULSION INTRAVENOUS at 09:14

## 2020-06-19 RX ADMIN — SODIUM CHLORIDE: 9 INJECTION, SOLUTION INTRAVENOUS at 08:55

## 2020-06-19 RX ADMIN — LIDOCAINE HYDROCHLORIDE 50 MG: 10 INJECTION, SOLUTION EPIDURAL; INFILTRATION; INTRACAUDAL; PERINEURAL at 09:14

## 2020-06-19 RX ADMIN — PROPOFOL 50 MG: 10 INJECTION, EMULSION INTRAVENOUS at 09:17

## 2020-06-19 ASSESSMENT — ENCOUNTER SYMPTOMS
DYSPNEA ACTIVITY LEVEL: AFTER AMBULATING 1 FLIGHT OF STAIRS
SHORTNESS OF BREATH: 1

## 2020-06-19 NOTE — ANESTHESIA POSTPROCEDURE EVALUATION
Department of Anesthesiology  Postprocedure Note    Patient: Carmelita Porras  MRN: [id number]  YOB: 1965  Date of evaluation: 6/19/2020  Time:  9:28 AM     Procedure Summary     Date:  06/19/20 Room / Location:  Counts include 234 beds at the [facility] ENDO 02 / [address]    Anesthesia Start:  6839 Anesthesia Stop:  1918    Procedure:  EGD BIOPSY (N/A Esophagus) Diagnosis:  (N/V)    Surgeon:  Bora Salazar MD Responsible Provider:  MIKE Roque CRNA    Anesthesia Type:  general ASA Status:  3          Anesthesia Type: general    Marian Phase I:      Marian Phase II: Marian Score: 9    Last vitals: Reviewed and per EMR flowsheets. Anesthesia Post Evaluation    Patient location during evaluation: PACU  Patient participation: complete - patient participated  Level of consciousness: awake  Pain score: 0  Airway patency: patent  Nausea & Vomiting: no vomiting and no nausea  Complications: no  Cardiovascular status: blood pressure returned to baseline  Respiratory status: acceptable and room air  Hydration status: stable  Comments: Report given to      ,RN. VSS.  Adequate oxygen exchange on O2 NC.

## 2020-06-19 NOTE — ANESTHESIA PRE PROCEDURE
isosorbide mononitrate (IMDUR) 30 MG extended release tablet TAKE 1 TABLET EVERY DAY  Patient taking differently: nightly TAKE 1 TABLET EVERY DAY 12/20/19   MIKE Abbott - CNP   vitamin D (ERGOCALCIFEROL) 1.25 MG (09988 UT) CAPS capsule TAKE 1 CAPSULE BY MOUTH ONCE WEEKLY 12/20/19   MIKE Mariscal   nystatin (MYCOSTATIN) 751735 UNIT/GM powder Apply 2-3 times daily. 12/9/19   Vale Morel PA-C   JARDIANCE 25 MG tablet TAKE 1 TABLET BY MOUTH ONCE DAILY 11/12/19   MIKE Mariscal   SITagliptin (JANUVIA) 100 MG tablet TAKE 1 TABLET EVERY DAY 7/2/19   MIKE Mariscal   albuterol (PROVENTIL) (5 MG/ML) 0.5% nebulizer solution Take 0.5 mLs by nebulization every 6 hours as needed for Wheezing 2/25/18   Elden Favre, MD   aspirin 81 MG tablet Take 81 mg by mouth daily    Historical Provider, MD   Lactobacillus (PROBIATA PO) Take 1 tablet by mouth daily     Historical Provider, MD   mometasone (ELOCON) 0.1 % ointment Apply topically to vagina 3 times per week 11/7/16   Param Kaur MD   Eflornithine HCl [facility]) 13.9 % CREA Apply 1 Dose topically 2 times daily 11/20/15   MIKE De La Torre   Docusate Calcium (STOOL SOFTENER PO) Take 100 mg by mouth 2 times daily as needed. Historical Provider, MD       Current medications:    No current outpatient medications on file. No current facility-administered medications for this visit. Allergies: Allergies   Allergen Reactions    Codeine Other (See Comments)     Seizures as a child    Motrin [Ibuprofen] Swelling     Tongue to swell    Humalog [Insulin Lispro] Other (See Comments)     Shakiness, break out in a sweat at higher doses.        Problem List:    Patient Active Problem List   Diagnosis Code    CAD (coronary artery disease) I25.10    Hypertension I10    Hyperlipidemia E78.5    Type 2 diabetes mellitus with complication, with long-term current use of insulin ([facility] [address] E11.8, Z79.4    Heart attack ([facility] [address] I21.9    TAVERAS (dyspnea on exertion) R06.09    Candidal intertrigo B37.2    Weakness R53.1    S/P CABG x 3 Z95.1    History of pleural effusion Z87.09    Personal history of malignant neoplasm of cervix uteri Z85.41    Atrophic vaginitis U87.7    Lichen simplex chronicus L28.0    Abnormal nuclear cardiac imaging test R93.1    Coronary artery disease involving coronary bypass graft of native heart without angina pectoris I25.810    Left ventricular dysfunction I51.9    Angina effort I20.8    Adhesive capsulitis of left shoulder M75.02    Gastroesophageal reflux disease without esophagitis K21.9    Dysuria R30.0    Vitamin D deficiency E55.9    Urinary tract infection without hematuria N39.0    Chronic nausea R11.0    Chronic vomiting R11.10    Change in bowel habits R19.4    Family history of colon cancer Z80.0    Alternating constipation and diarrhea R19.8    S/P cholecystectomy Z90.49    Chronic heartburn R12       Past Medical History:        Diagnosis Date    Abnormal Pap smear of cervix     CAD (coronary artery disease)     stent 2011 [place]; sees dr. Skylar Ontiveros Cervical cancer ([facility] [address]     Diabetes mellitus ([facility] [address]     ERRONEOUS ENCOUNTER--DISREGARD 9/15/2015    GERD (gastroesophageal reflux disease)     Headache(784.0)     Heart attack ([facility] [address] 12/2011    Hyperlipidemia     Hypertension     Lichen simplex chronicus 10/2015    Osteopenia     Pneumonia     Type II or unspecified type diabetes mellitus without mention of complication, not stated as uncontrolled        Past Surgical History:        Procedure Laterality Date    BREAST BIOPSY  6/8/2011    US guided core needle, right, benign    CARDIAC CATHETERIZATION  12/22/14  JDT    EF 50%    CARDIAC CATHETERIZATION  06/05/2017        [facility] FLUOROSCOPIC GUIDANCE NEEDLE PLACEMENT ADD ON Left 7/19/2018    CORTICOSTEROID INJECTION performed by Bharath Vera MD at [address] [place] [address]  2006    COLONOSCOPY  approx 2005    per 12/30/2019    BUN 12 12/30/2019    CREATININE 0.7 12/30/2019    LABGLOM >60 12/30/2019    GLUCOSE 112 12/30/2019    PROT 7.4 09/05/2019    CALCIUM 9.9 12/30/2019    BILITOT 0.5 09/05/2019    ALKPHOS 65 09/05/2019    AST 28 09/05/2019    ALT 27 09/05/2019       POC Tests: No results for input(s): POCGLU, POCNA, POCK, POCCL, POCBUN, POCHEMO, POCHCT in the last 72 hours. Coags:   Lab Results   Component Value Date    PROTIME 12.8 02/25/2018    INR 0.97 02/25/2018       HCG (If Applicable): No results found for: PREGTESTUR, PREGSERUM, HCG, HCGQUANT     ABGs:   Lab Results   Component Value Date    PO2ART 383 12/23/2014        Type & Screen (If Applicable):  No results found for: LABABO, [address]    Anesthesia Evaluation  Patient summary reviewed and Nursing notes reviewed  Airway: Mallampati: II  TM distance: <3 FB   Neck ROM: full  Mouth opening: > = 3 FB Dental:          Pulmonary:normal exam    (+) shortness of breath: chronic,      (-) pneumonia                           Cardiovascular:  Exercise tolerance: poor (<4 METS),   (+) hypertension: moderate, angina: with exertion, past MI: > 6 months, CAD:, CABG/stent:, TAVERAS: after ambulating 1 flight of stairs, hyperlipidemia         Beta Blocker:  Dose within 24 Hrs      ROS comment: CABG - 12/2014       Neuro/Psych:   (+) headaches: tension headaches,             GI/Hepatic/Renal:   (+) GERD: poorly controlled,           Endo/Other:    (+) DiabetesType II DM, , malignancy/cancer. Pt had no PAT visit        ROS comment: Vit D Deficiency Abdominal:           Vascular: negative vascular ROS. Anesthesia Plan      general     ASA 3       Induction: intravenous. Anesthetic plan and risks discussed with patient.                       MIKE Lewis - CRNA   6/19/2020

## 2020-06-19 NOTE — H&P
TABLET BY MOUTH ONCE DAILY 11/12/19  Yes Dee Singh, APRN   SITagliptin (JANUVIA) 100 MG tablet TAKE 1 TABLET EVERY DAY 7/2/19  Yes Dee Knappes, APRN   albuterol (PROVENTIL) (5 MG/ML) 0.5% nebulizer solution Take 0.5 mLs by nebulization every 6 hours as needed for Wheezing 2/25/18  Yes Gisela Griffith MD   aspirin 81 MG tablet Take 81 mg by mouth daily   Yes Historical Provider, MD   Lactobacillus (PROBIATA PO) Take 1 tablet by mouth daily    Yes Historical Provider, MD   mometasone (ELOCON) 0.1 % ointment Apply topically to vagina 3 times per week 11/7/16  Yes Amanda Robledo MD   Eflornithine [facility]) 13.9 % CREA Apply 1 Dose topically 2 times daily 11/20/15  Yes MIKE Romero   Docusate Calcium (STOOL SOFTENER PO) Take 100 mg by mouth 2 times daily as needed.    Yes Historical Provider, MD       Past Medical History:  Past Medical History:   Diagnosis Date    Abnormal Pap smear of cervix     CAD (coronary artery disease)     stent 2011 [place]; sees dr. Augusta Whitman Cervical cancer ([facility] [address]     Diabetes mellitus ([facility] [address]     ERRONEOUS ENCOUNTER--DISREGARD 9/15/2015    GERD (gastroesophageal reflux disease)     Headache(784.0)     Heart attack ([facility] [address] 12/2011    Hyperlipidemia     Hypertension     Lichen simplex chronicus 10/2015    Osteopenia     Pneumonia     Type II or unspecified type diabetes mellitus without mention of complication, not stated as uncontrolled        Past Surgical History:  Past Surgical History:   Procedure Laterality Date    BREAST BIOPSY  6/8/2011    US guided core needle, right, benign    CARDIAC CATHETERIZATION  12/22/14  JDT    EF 50%    CARDIAC CATHETERIZATION  06/05/2017        [facility] FLUOROSCOPIC GUIDANCE NEEDLE PLACEMENT ADD ON Left 7/19/2018    CORTICOSTEROID INJECTION performed by Nguyễn Shearer MD at [address] [place] [address]  2006    COLONOSCOPY  approx 2005    per pt recall \" Normal\"   [address] 2011    [facility] to First diag    CORONARY ARTERY BYPASS GRAFT      FINGER TRIGGER RELEASE Left 2019    LEFT INDEX AND LONG TRIGGER FINGER RELEASAE performed by Nay Hopson MD at [place] [address].    total    OTHER SURGICAL HISTORY      fibrinolysis    MD CABG, ARTERY-VEIN, THREE  \\    Coronary Artery Bypass, 3    MD MANIPULATN SHLDR JT W ANESTHESIA Left 2018    SHOULDER MANIPULATION UNDER ANESTHESIA performed by Emma Benson MD at [address] [facility] ENDOSCOPY  approx 2005    per pt recall \" Normal\"    VULVAR/PERINEAL BIOPSY  10/07/2015    Dr. Fransico Shultz       Social History:  Social History     Tobacco Use    Smoking status: Former Smoker     Packs/day: 2.00     Years: 15.00     Pack years: 30.00     Types: Cigarettes     Last attempt to quit: 2014     Years since quittin.9    Smokeless tobacco: Never Used   Substance Use Topics    Alcohol use: No    Drug use: No       Vital Signs:   Vitals:    20 0839   BP: (!) 147/72   Pulse: 72   Resp: 16   SpO2: 96%        Physical Exam:  Cardiac:  [x]WNL  []Comments:  Pulmonary:  [x]WNL   []Comments:  Neuro/Mental Status:  [x]WNL  []Comments:  Abdominal:  [x]WNL    []Comments:  Other:   []WNL  []Comments:    Informed Consent:  The risks and benefits of the procedure have been discussed with either the patient or if they cannot consent, their representative. Assessment:  Patient examined and appropriate for planned sedation and procedure. Plan:  Proceed with planned sedation and procedure as above.          Shivani Joseph MD

## 2020-06-24 ENCOUNTER — VIRTUAL VISIT (OUTPATIENT)
Dept: PRIMARY CARE CLINIC | Age: 55
End: 2020-06-24
Payer: COMMERCIAL

## 2020-06-24 PROCEDURE — 99443 PR PHYS/QHP TELEPHONE EVALUATION 21-30 MIN: CPT | Performed by: NURSE PRACTITIONER

## 2020-06-24 PROCEDURE — 43239 EGD BIOPSY SINGLE/MULTIPLE: CPT

## 2020-06-24 RX ORDER — HYDROCODONE BITARTRATE AND ACETAMINOPHEN 5; 325 MG/1; MG/1
1 TABLET ORAL EVERY 6 HOURS PRN
Qty: 18 TABLET | Refills: 0 | Status: SHIPPED | OUTPATIENT
Start: 2020-06-24 | End: 2020-06-27

## 2020-06-24 RX ORDER — AMOXICILLIN AND CLAVULANATE POTASSIUM 875; 125 MG/1; MG/1
1 TABLET, FILM COATED ORAL 2 TIMES DAILY
Qty: 20 TABLET | Refills: 0 | Status: SHIPPED | OUTPATIENT
Start: 2020-06-24 | End: 2020-07-04

## 2020-06-24 ASSESSMENT — ENCOUNTER SYMPTOMS
EYE REDNESS: 0
RHINORRHEA: 0
SORE THROAT: 0
CONSTIPATION: 0
WHEEZING: 0
COUGH: 0
SHORTNESS OF BREATH: 0
NAUSEA: 0
VOICE CHANGE: 0
BLOOD IN STOOL: 0
TROUBLE SWALLOWING: 0
ABDOMINAL PAIN: 0
DIARRHEA: 0
CHEST TIGHTNESS: 0
VOMITING: 0

## 2020-06-24 NOTE — PROGRESS NOTES
I communicated with the patient and/or health care decision maker about start antibiotics and pain medication as needed for 3 days. Details of this discussion including any medical advice provided: call if symptoms do not improve. Contact Dr. Denzel Heimlich oral surgeon to discuss removal of teeth. I affirm this is a Patient Initiated Episode with an Established Patient who has not had a related appointment within my department in the past 7 days or scheduled within the next 24 hours. Total Time: minutes: 21-30 minutes    Note: not billable if this call serves to triage the patient into an appointment for the relevant concern      [address] to the emergency declaration under the [facility] waiver authority and the [employer] and [employer] Act, this Virtual  Visit was conducted, with patient's consent, to reduce the patient's risk of exposure to COVID-19 and provide continuity of care for an established patient. Services were provided through a video synchronous discussion virtually to substitute for in-person clinic visit.

## 2020-07-06 ENCOUNTER — HOSPITAL ENCOUNTER (OUTPATIENT)
Dept: NUCLEAR MEDICINE | Age: 55
Discharge: HOME | End: 2020-07-08
Payer: COMMERCIAL

## 2020-07-06 ENCOUNTER — TELEPHONE (OUTPATIENT)
Dept: GASTROENTEROLOGY | Age: 55
End: 2020-07-06

## 2020-07-06 PROCEDURE — 78264 GASTRIC EMPTYING IMG STUDY: CPT

## 2020-07-06 PROCEDURE — 3430000000 HC RX DIAGNOSTIC RADIOPHARMACEUTICAL: Performed by: NURSE PRACTITIONER

## 2020-07-06 PROCEDURE — A9541 TC99M SULFUR COLLOID: HCPCS | Performed by: NURSE PRACTITIONER

## 2020-07-06 RX ORDER — BETHANECHOL CHLORIDE 25 MG/1
25 TABLET ORAL
Qty: 90 TABLET | Refills: 5 | Status: SHIPPED | OUTPATIENT
Start: 2020-07-06 | End: 2021-02-23

## 2020-07-06 RX ADMIN — Medication 2 MILLICURIE: at 11:06

## 2020-07-06 NOTE — TELEPHONE ENCOUNTER
Please let the pt know the 90 minute gastric emptying study revealed delayed gastric emptying. Treatment for this is better control of her diabetes with HgA1C of 6.5 or less consistently. I can do a trial of medication (bethanechol) to see if it helps her nausea if she wants to. I escribed it .

## 2020-07-08 NOTE — TELEPHONE ENCOUNTER
I called this patient today, she did not answer her phone, I left her a VM @ 8:12 am with the information from [facility]angel, I told the patient to call us back if she has any questions.  Bogdan segovia

## 2020-07-13 RX ORDER — ONDANSETRON 4 MG/1
4 TABLET, ORALLY DISINTEGRATING ORAL EVERY 8 HOURS PRN
Qty: 30 TABLET | Refills: 0 | Status: SHIPPED | OUTPATIENT
Start: 2020-07-13 | End: 2020-07-24 | Stop reason: SDUPTHER

## 2020-07-13 NOTE — TELEPHONE ENCOUNTER
Alcon Sarita called to request a refill on her medication.       Last office visit : 3/24/2020   Next office visit : 7/28/2020     Requested Prescriptions     Pending Prescriptions Disp Refills    ondansetron (ZOFRAN ODT) 4 MG disintegrating tablet 30 tablet 0     Sig: Take 1 tablet by mouth every 8 hours as needed for Nausea or Vomiting            Nakul Plain

## 2020-07-24 ENCOUNTER — OFFICE VISIT (OUTPATIENT)
Dept: GASTROENTEROLOGY | Age: 55
End: 2020-07-24
Payer: COMMERCIAL

## 2020-07-24 VITALS
DIASTOLIC BLOOD PRESSURE: 82 MMHG | BODY MASS INDEX: 30.87 KG/M2 | WEIGHT: 174.2 LBS | HEART RATE: 69 BPM | HEIGHT: 63 IN | SYSTOLIC BLOOD PRESSURE: 126 MMHG | OXYGEN SATURATION: 98 %

## 2020-07-24 PROCEDURE — 3051F HG A1C>EQUAL 7.0%<8.0%: CPT | Performed by: NURSE PRACTITIONER

## 2020-07-24 PROCEDURE — 3017F COLORECTAL CA SCREEN DOC REV: CPT | Performed by: NURSE PRACTITIONER

## 2020-07-24 PROCEDURE — 1036F TOBACCO NON-USER: CPT | Performed by: NURSE PRACTITIONER

## 2020-07-24 PROCEDURE — G8417 CALC BMI ABV UP PARAM F/U: HCPCS | Performed by: NURSE PRACTITIONER

## 2020-07-24 PROCEDURE — G8427 DOCREV CUR MEDS BY ELIG CLIN: HCPCS | Performed by: NURSE PRACTITIONER

## 2020-07-24 PROCEDURE — 2022F DILAT RTA XM EVC RTNOPTHY: CPT | Performed by: NURSE PRACTITIONER

## 2020-07-24 PROCEDURE — 99213 OFFICE O/P EST LOW 20 MIN: CPT | Performed by: NURSE PRACTITIONER

## 2020-07-24 RX ORDER — ONDANSETRON 4 MG/1
4 TABLET, ORALLY DISINTEGRATING ORAL DAILY PRN
Qty: 30 TABLET | Refills: 5 | Status: SHIPPED | OUTPATIENT
Start: 2020-07-24 | End: 2021-09-08 | Stop reason: SDUPTHER

## 2020-07-24 ASSESSMENT — ENCOUNTER SYMPTOMS
ABDOMINAL PAIN: 0
ABDOMINAL DISTENTION: 0
TROUBLE SWALLOWING: 1
RECTAL PAIN: 0
BACK PAIN: 1
SHORTNESS OF BREATH: 1
NAUSEA: 1
COUGH: 0
ANAL BLEEDING: 0
BLOOD IN STOOL: 0
DIARRHEA: 1
CONSTIPATION: 1
VOICE CHANGE: 0
VOMITING: 1

## 2020-07-24 NOTE — PROGRESS NOTES
Subjective:      Lion Chun is a59 y.o. female  Chief Complaint   Patient presents with    Follow-up     4-6 wk EGD per Dr Nikky Rothman       HPI  PCP: MIKE Patel  Referring Provider: Dr Nikky Rothman MD  Pt is here s/p EGD to discuss results. Denies post-procedure complications. The EGD was normal.  She has also had a 90 minute gastric emptying study, this revealed delayed gastric emptying. She has known DM (controlled at this time, per pt). She was started on bethanechol TID. She reports this is really helping. She also takes zofran ODT once daily with the bethanechol. Used to vomit daily. But she hasn't vomited in a week since she's been on this regimen. She would like refills on the zofran. She also reports continued dysphagia. No esophageal stricture noted and bx neg for EoE. We discussed referral for esophageal manometry, she defers at this time but will let us know if she changes her mind. She continues to defer a colonoscopy and is aware of the risks of potential colonic malignancy. GI scope reports in history per MA per [facility] policy, I reviewed this. Family HX:  Father had colon cancer, paternal uncle had colon cancer  Pt denies family hx of inflammatory bowel dx, gastric CA and esophageal CA.     Past Medical History:   Diagnosis Date    Abnormal Pap smear of cervix     CAD (coronary artery disease)     stent 2011 [place]; sees dr. Jocy Esposito [facility]ervical cancer ([facility] [address]     Diabetes mellitus ([facility] [address]     ERRONEOUS ENCOUNTER--DISREGARD 9/15/2015    GERD (gastroesophageal reflux disease)     Headache(784.0)     Heart attack ([address] 12/2011    Hyperlipidemia     Hypertension     Lichen simplex chronicus 10/2015    Osteopenia     Pneumonia     Type II or unspecified type diabetes mellitus without mention of complication, not stated as uncontrolled           Past Surgical History:   Procedure Laterality Date    BREAST BIOPSY  6/8/2011    US guided core needle, right, benign    3    metFORMIN (GLUCOPHAGE-XR) 500 MG extended release tablet Take 1 tablet by mouth 2 times daily 60 tablet 2    metoprolol tartrate (LOPRESSOR) 25 MG tablet TAKE 1 TABLET BY MOUTH TWICE DAILY 60 tablet 5    omeprazole (PRILOSEC) 20 MG delayed release capsule Take 1 capsule by mouth every morning (before breakfast) 90 capsule 1    furosemide (LASIX) 40 MG tablet TAKE 1 TABLET BY MOUTH DAILY 30 tablet 5    LANTUS SOLOSTAR 100 UNIT/ML injection pen INJECT 35 UNITS SUBQ INTOTHE SKIN NIGHTLY 15 mL 1    isosorbide mononitrate (IMDUR) 30 MG extended release tablet TAKE 1 TABLET EVERY DAY (Patient taking differently: nightly TAKE 1 TABLET EVERY DAY) 30 tablet 5    vitamin D (ERGOCALCIFEROL) 1.25 MG (48275 UT) CAPS capsule TAKE 1 CAPSULE BY MOUTH ONCE WEEKLY 12 capsule 1    nystatin (MYCOSTATIN) 887527 UNIT/GM powder Apply 2-3 times daily. (Patient taking differently: Apply topically 3 times daily as needed ) 60 g 5    JARDIANCE 25 MG tablet TAKE 1 TABLET BY MOUTH ONCE DAILY 90 tablet 1    SITagliptin (JANUVIA) 100 MG tablet TAKE 1 TABLET EVERY DAY 30 tablet 5    albuterol (PROVENTIL) (5 MG/ML) 0.5% nebulizer solution Take 0.5 mLs by nebulization every 6 hours as needed for Wheezing 120 each 0    aspirin 81 MG tablet Take 81 mg by mouth daily      mometasone (ELOCON) 0.1 % ointment Apply topically to vagina 3 times per week (Patient taking differently: Apply topically three times a week Apply topically to vagina 3 times per week) 1 Tube 3    Eflornithine HCl (VANIQA) 13.9 % CREA Apply 1 Dose topically 2 times daily (Patient taking differently: Apply 1 Dose topically 2 times daily as needed ) 45 g 2    Docusate Calcium (STOOL SOFTENER PO) Take 100 mg by mouth 2 times daily as needed. No current facility-administered medications for this visit. Review of Systems   Constitutional: Positive for fatigue. Negative for unexpected weight change. HENT: Positive for trouble swallowing.  Negative for voice change. Respiratory: Positive for shortness of breath. Negative for cough. Cardiovascular: Negative for chest pain and palpitations. Gastrointestinal: Positive for constipation, diarrhea, nausea and vomiting. Negative for abdominal distention, abdominal pain, anal bleeding, blood in stool and rectal pain. Genitourinary: Negative for hematuria. Musculoskeletal: Positive for back pain and neck pain. Negative for arthralgias. Neurological: Negative for weakness and headaches. Psychiatric/Behavioral: Positive for dysphoric mood. The patient is not nervous/anxious. Objective:     Physical Exam  Vitals signs and nursing note reviewed. Constitutional:       Appearance: She is well-developed. Comments: /82   Pulse 69   Ht 5' 2.5\" (1.588 m)   Wt 174 lb 3.2 oz (79 kg)   LMP 01/01/1998 (Approximate)   SpO2 98%   BMI 31.35 kg/m²    Eyes:      General: No scleral icterus. Conjunctiva/sclera: Conjunctivae normal.      Pupils: Pupils are equal, round, and reactive to light. Cardiovascular:      Rate and Rhythm: Normal rate and regular rhythm. Heart sounds: Normal heart sounds. No murmur. No friction rub. No gallop. Pulmonary:      Effort: Pulmonary effort is normal. No respiratory distress. Breath sounds: Normal breath sounds. Abdominal:      General: Bowel sounds are normal. There is no distension. Palpations: Abdomen is soft. Tenderness: There is no abdominal tenderness. There is no rebound. Neurological:      Mental Status: She is alert and oriented to person, place, and time. Cranial Nerves: No cranial nerve deficit. Psychiatric:         Judgment: Judgment normal.           Assessment:       Diagnosis Orders   1. Gastroparesis due to DM (HCC)  ondansetron (ZOFRAN ODT) 4 MG disintegrating tablet   2. Chronic nausea  ondansetron (ZOFRAN ODT) 4 MG disintegrating tablet   3.  Chronic vomiting  ondansetron (ZOFRAN ODT) 4 MG disintegrating tablet Plan:      1. Continue bethanechol and zofran, I refilled the zofran.   2. F/u in 1 year for med refills or sooner if needed

## 2020-07-24 NOTE — PATIENT INSTRUCTIONS
Patient Education        Gastroparesis: Care Instructions  Your Care Instructions     When you have gastroparesis, your stomach takes a lot longer to empty. This delay can cause belly pain, bloating, and belching. It also can cause hiccups, heartburn, nausea or vomiting. You may not feel like eating. These symptoms may come and go. They most often occur during and after meals. You may feel full after only a few bites of food. This condition occurs when the nerves to the stomach don't work properly. Diabetes is the most common cause of this nerve damage. Gastroparesis can make it harder to control your blood sugar levels. But keeping your blood sugar levels under control may help with your symptoms. Parkinson's disease, stroke, and some medicines can also cause this condition. Home treatment can often help. Follow-up care is a key part of your treatment and safety. Be sure to make and go to all appointments, and call your doctor if you are having problems. It's also a good idea to know your test results and keep a list of the medicines you take. How can you care for yourself at home? · Eat several small meals each day rather than three large meals. · Eat foods that are low in fiber and fat. · If your doctor suggests it, take medicines that help the stomach empty more quickly. These are called motility agents. When should you call for help? Call your doctor now or seek immediate medical care if:  · You are vomiting. · You have new or worse belly pain. · You have a fever. · You cannot pass stools or gas. Watch closely for changes in your health, and be sure to contact your doctor if you have any problems. Where can you learn more? Go to [email or web address] and sign in to your [employer] account. Enter M106 in the [employer] box to learn more about \"Gastroparesis: Care Instructions. \"     If you do not have an account, please click on the \"Sign Up Now\" link.   Current as of: August 12, 2019               Content Version: 12.5  © 5537-6032 [employer]wise, Inc[employer]. Care instructions adapted under license by [facility] ([facility]). If you have questions about a medical condition or this instruction, always ask your healthcare professional. [address] any warranty or liability for your use of this information.

## 2020-07-28 ENCOUNTER — HOSPITAL ENCOUNTER (OUTPATIENT)
Dept: CT IMAGING | Age: 55
Discharge: HOME | End: 2020-07-28
Payer: COMMERCIAL

## 2020-07-28 DIAGNOSIS — Z87.891 PERSONAL HISTORY OF NICOTINE DEPENDENCE: ICD-10-CM

## 2020-07-28 PROBLEM — E66.01 CLASS 3 SEVERE OBESITY DUE TO EXCESS CALORIES WITHOUT SERIOUS COMORBIDITY WITH BODY MASS INDEX (BMI) OF 40.0 TO 44.9 IN ADULT (HCC): Status: RESOLVED | Noted: 2020-07-28 | Resolved: 2020-07-28

## 2020-07-28 PROBLEM — E66.01 CLASS 3 SEVERE OBESITY DUE TO EXCESS CALORIES WITHOUT SERIOUS COMORBIDITY WITH BODY MASS INDEX (BMI) OF 40.0 TO 44.9 IN ADULT (HCC): Status: ACTIVE | Noted: 2020-07-28

## 2020-07-28 PROBLEM — E66.813 CLASS 3 SEVERE OBESITY DUE TO EXCESS CALORIES WITHOUT SERIOUS COMORBIDITY WITH BODY MASS INDEX (BMI) OF 40.0 TO 44.9 IN ADULT: Status: RESOLVED | Noted: 2020-07-28 | Resolved: 2020-07-28

## 2020-07-28 PROBLEM — E66.813 CLASS 3 SEVERE OBESITY DUE TO EXCESS CALORIES WITHOUT SERIOUS COMORBIDITY WITH BODY MASS INDEX (BMI) OF 40.0 TO 44.9 IN ADULT: Status: ACTIVE | Noted: 2020-07-28

## 2020-07-28 PROCEDURE — G0297 LDCT FOR LUNG CA SCREEN: HCPCS

## 2020-08-07 ENCOUNTER — VIRTUAL VISIT (OUTPATIENT)
Dept: PRIMARY CARE CLINIC | Age: 55
End: 2020-08-07
Payer: COMMERCIAL

## 2020-08-07 PROCEDURE — 99443 PR PHYS/QHP TELEPHONE EVALUATION 21-30 MIN: CPT | Performed by: NURSE PRACTITIONER

## 2020-08-07 RX ORDER — PSYLLIUM SEED (WITH DEXTROSE)
1 POWDER (GRAM) ORAL 2 TIMES DAILY
Qty: 60 PACKET | Refills: 2 | Status: SHIPPED | OUTPATIENT
Start: 2020-08-07 | End: 2020-09-06

## 2020-08-07 RX ORDER — DOCUSATE SODIUM 100 MG/1
100 CAPSULE, LIQUID FILLED ORAL DAILY PRN
Qty: 30 CAPSULE | Refills: 1 | Status: SHIPPED | OUTPATIENT
Start: 2020-08-07 | End: 2022-04-26 | Stop reason: SDUPTHER

## 2020-08-07 ASSESSMENT — ENCOUNTER SYMPTOMS
RESPIRATORY NEGATIVE: 1
EYES NEGATIVE: 1
DIARRHEA: 1
CONSTIPATION: 1

## 2020-08-07 NOTE — PROGRESS NOTES
LEYDI Sepulveda  [facility]   Respiratory Disease Clinic  [address] [place] [address]  Phone: [phone]  Fax: [phone]     Gwendolyn Delgadillo is a 54 y.o. female.   CC:   Chief Complaint   Patient presents with   • Shortness of Breath        HPI:  Ms Delgadillo is a pleasant 54 year old female patient of Dr. Felix with known scarring of lung, moderate lung disease, and small airway disease. She is here for a yearly follow up with CT of the chest the is stable. She is morbidly obese with possible ROWAN however mention in last year's note that she does not wish to have any further work up. She confirms that today. She continues to have SOB related to one of her bypass vessels occluded. She gets short of breath just walking the dogs. She will sit on chair and hold the lead. The heat is killing her. She is limiting her exposure to shopping. She uses the nebulizer once every couple of weeks if she is wheezing. She is not on any inhalers. She quit smoking about 5 years ago.     Patient denies fever, chills, cough, shortness of breath or difficulty breathing, fatigue, muscle or body aches, new onset headache, new loss of taste or smell, sore throat, congestion or runny nose, nausea or vomiting, diarrhea.  Patient denies any known exposure to a person under investigation or positive for COVID-19.  Patient is wearing mask today.       The following portions of the patient's history were reviewed and updated as appropriate: allergies, current medications, past family history, past medical history, past social history, past surgical history and problem list.    Past Medical History:   Diagnosis Date   • Abnormal nuclear cardiac imaging test    • Atrophic vaginitis    • CAD (coronary artery disease)    • Calcaneal spur, right foot    • Candidal intertrigo    • Cervical cancer (CMS/HCC)    • Chest pain    • Diabetes mellitus (CMS/HCC)    • HERNANDEZ (dyspnea on exertion)    • Heart attack (CMS/HCC)    •  "History of pleural effusion    • Hyperlipidemia    • Hypertension    • Lichen simplex chronicus    • Lipoma     Right foot   • Personal history of malignant neoplasm of cervix uteri    • Right foot pain        History reviewed. No pertinent family history.    Social History     Socioeconomic History   • Marital status:      Spouse name: Not on file   • Number of children: Not on file   • Years of education: Not on file   • Highest education level: Not on file   Tobacco Use   • Smoking status: Former Smoker     Packs/day: 2.50     Last attempt to quit: 12/15/2014     Years since quittin.6   • Smokeless tobacco: Never Used   • Tobacco comment: 87.5 pack-years    Substance and Sexual Activity   • Alcohol use: No   • Drug use: No   • Sexual activity: Defer       Review of Systems   Constitutional: Negative for chills, diaphoresis, fatigue and fever.   HENT: Negative for congestion, rhinorrhea and trouble swallowing.    Eyes: Negative for blurred vision, double vision and visual disturbance.   Respiratory: Negative for cough, shortness of breath and wheezing.    Cardiovascular: Negative for chest pain, palpitations and leg swelling.   Gastrointestinal: Negative for abdominal distention, abdominal pain and nausea.   Endocrine: Negative for cold intolerance, heat intolerance and polydipsia.   Musculoskeletal: Negative for back pain, gait problem and myalgias.   Skin: Negative for color change, pallor and rash.   Neurological: Negative for dizziness, syncope and confusion.   Hematological: Negative for adenopathy. Does not bruise/bleed easily.   Psychiatric/Behavioral: Negative for agitation, behavioral problems and sleep disturbance.       /70   Pulse 76   Temp 98.1 °F (36.7 °C)   Resp 16   Ht 154.9 cm (61\")   Wt 78.5 kg (173 lb)   SpO2 97% Comment: RA  Breastfeeding No   BMI 32.69 kg/m²     Physical Exam   Constitutional: She is oriented to person, place, and time. She appears well-developed and " well-nourished. No distress. She is morbidly obese.  HENT:   Head: Normocephalic and atraumatic.   Eyes: Pupils are equal, round, and reactive to light. Conjunctivae are normal.   Neck: Normal range of motion. Neck supple.   Cardiovascular: Normal rate, regular rhythm and normal heart sounds. Exam reveals no gallop and no friction rub.   No murmur heard.  Pulmonary/Chest: Effort normal. No respiratory distress. She has decreased breath sounds. She has no wheezes.   Abdominal: Soft. Bowel sounds are normal.   Musculoskeletal: Normal range of motion. She exhibits no edema or deformity.   Lymphadenopathy:     She has no cervical adenopathy.   Neurological: She is alert and oriented to person, place, and time.   Skin: Skin is warm and dry. She is not diaphoretic. No erythema.   Psychiatric: She has a normal mood and affect. Her behavior is normal. Judgment and thought content normal.       Pulmonary Functions Testing Results:    PFT Values        Some values may be hidden. Unless noted otherwise, only the newest values recorded on each date are displayed.         Old Values PFT Results 7/31/19   FVC 57%   FEV1 59%   FEV1/FVC 85.14%   DLCO 75%   TLC 61%      Pre Drug PFT Results 7/31/19   No data to display.      Post Drug PFT Results 7/31/19   No data to display.      Other Tests PFT Results 7/31/19   No data to display.           Results for orders placed in visit on 07/31/19   Pulmonary Function Test     My interpretation: no new    CXR: none         Problem List Items Addressed This Visit        Respiratory    Scarring of lung - Primary    Restrictive lung disease    Relevant Medications    albuterol (2.5 MG/3ML) 0.083% nebulizer solution 3 mL, albuterol (5 MG/ML) 0.5% nebulizer solution 0.5 mL       Digestive    Morbid (severe) obesity due to excess calories (CMS/[facility])       Other    Personal history of nicotine dependence      Other Visit Diagnoses     Lung nodule            Patient's Body mass index is 32.69 kg/m².  BMI is above normal parameters. Recommendations include: referral to primary care.      Assessment/Plan     She relates her shortness of breath to her coronary disease and weight. She last PFTs showed restriction. She may have some sleep apnea but does not wish to be tested as she could not tolerate the CPAP related to anxiety with the mask. She was walked today in office and does not qualify for oxygen. She is offered an overnight pulse ox but wants to think about it and will call back to let us know. She will follow up with Dr. Felix in 6 months.  She will need a repeat CT scan in one year.            Aissatou Diaz, LEYDI  8/10/2020  15:40    No follow-ups on file.    This dictation was generated by voice recognition computer software. Although all attempts are made to edit dictation for accuracy, there may be errors in the transcription that are not intended.

## 2020-08-07 NOTE — PROGRESS NOTES
[address] [address]            Phone:  [phone]  Fax:  [phone]    Kwan Stern is a 62 y.o. female evaluated via telephone on 8/7/2020. Consent:    She and/or health care decision maker is aware that that she may receive a bill for this telephone service, depending on her insurance coverage, and has provided verbal consent to proceed: Yes      HPI  Chief Complaint   Patient presents with    Diabetes    Emesis       I communicated with the patient and/or health care decision maker about follow up on chronic conditions including DM, HTN, and HLD. She has seen GI recently due to chronic nausea/vomiting. She is now on a regimen that is helping. She reports taking bethanechol, but it is a large pill that she is having issues swallowing it. She is also complaining of some constipation and diarrhea as well. She reports this started after stopping her probiotic. She has been monitoring blood sugars and they have been the highest 180s. Fasting has been 80-140s. Review of Systems   Constitutional: Negative. HENT: Negative. Eyes: Negative. Respiratory: Negative. Cardiovascular: Negative. Gastrointestinal: Positive for constipation and diarrhea. Endocrine: Negative. Genitourinary: Negative. Musculoskeletal: Negative. Skin: Negative. Neurological: Negative. Hematological: Negative. Psychiatric/Behavioral: Negative. PLAN    Details of this discussion including any medical advice provided:     1. Type 2 diabetes mellitus with complication, with long-term current use of insulin ([facility] [address]      2. Coronary artery disease involving coronary bypass graft of native heart without angina pectoris      3. Mixed hyperlipidemia      4. Nausea and vomiting, intractability of vomiting not specified, unspecified vomiting type      5. Chronic idiopathic constipation    - psyllium (METAMUCIL) 28 % packet;  Take 1 packet by mouth 2 times

## 2020-08-10 ENCOUNTER — OFFICE VISIT (OUTPATIENT)
Dept: PULMONOLOGY | Facility: CLINIC | Age: 55
End: 2020-08-10

## 2020-08-10 VITALS
RESPIRATION RATE: 16 BRPM | WEIGHT: 173 LBS | SYSTOLIC BLOOD PRESSURE: 120 MMHG | OXYGEN SATURATION: 97 % | HEART RATE: 76 BPM | BODY MASS INDEX: 32.66 KG/M2 | DIASTOLIC BLOOD PRESSURE: 70 MMHG | HEIGHT: 61 IN | TEMPERATURE: 98.1 F

## 2020-08-10 DIAGNOSIS — R91.1 LUNG NODULE: ICD-10-CM

## 2020-08-10 DIAGNOSIS — Z87.891 PERSONAL HISTORY OF NICOTINE DEPENDENCE: ICD-10-CM

## 2020-08-10 DIAGNOSIS — J98.4 RESTRICTIVE LUNG DISEASE: ICD-10-CM

## 2020-08-10 DIAGNOSIS — R43.2 ABNORMAL TASTE IN MOUTH: ICD-10-CM

## 2020-08-10 DIAGNOSIS — R06.09 DYSPNEA ON EXERTION: ICD-10-CM

## 2020-08-10 DIAGNOSIS — J98.4 SCARRING OF LUNG: Primary | ICD-10-CM

## 2020-08-10 DIAGNOSIS — E66.01 MORBID (SEVERE) OBESITY DUE TO EXCESS CALORIES (HCC): ICD-10-CM

## 2020-08-10 PROCEDURE — 99213 OFFICE O/P EST LOW 20 MIN: CPT | Performed by: NURSE PRACTITIONER

## 2020-08-10 NOTE — PROCEDURES
Walking Oximetry  Performed by: Aissatou Diaz APRN  Authorized by: Aissatou Diaz APRN     Rest room air SAT %:  97%  Exercise room air SAT %:  97%

## 2020-08-12 LAB — ANA IGG, ELISA: NORMAL

## 2020-09-09 RX ORDER — OMEPRAZOLE 20 MG/1
20 CAPSULE, DELAYED RELEASE ORAL
Qty: 90 CAPSULE | Refills: 1 | Status: SHIPPED | OUTPATIENT
Start: 2020-09-09 | End: 2021-02-23 | Stop reason: SINTOL

## 2020-09-09 RX ORDER — SEMAGLUTIDE 1.34 MG/ML
INJECTION, SOLUTION SUBCUTANEOUS
Qty: 4 ML | Refills: 4 | Status: SHIPPED | OUTPATIENT
Start: 2020-09-09 | End: 2020-11-12

## 2020-09-09 RX ORDER — LOVASTATIN 40 MG/1
TABLET ORAL
Qty: 30 TABLET | Refills: 4 | Status: SHIPPED | OUTPATIENT
Start: 2020-09-09 | End: 2021-04-15

## 2020-09-30 RX ORDER — EMPAGLIFLOZIN 25 MG/1
TABLET, FILM COATED ORAL
Qty: 90 TABLET | Refills: 0 | Status: SHIPPED | OUTPATIENT
Start: 2020-09-30 | End: 2020-12-07 | Stop reason: SDUPTHER

## 2020-10-16 RX ORDER — ERGOCALCIFEROL 1.25 MG/1
CAPSULE ORAL
Qty: 4 CAPSULE | Refills: 2 | Status: SHIPPED | OUTPATIENT
Start: 2020-10-16 | End: 2021-03-18

## 2020-10-28 ENCOUNTER — OFFICE VISIT (OUTPATIENT)
Dept: PRIMARY CARE CLINIC | Age: 55
End: 2020-10-28
Payer: COMMERCIAL

## 2020-10-28 VITALS
TEMPERATURE: 98 F | OXYGEN SATURATION: 96 % | HEIGHT: 62 IN | WEIGHT: 176 LBS | BODY MASS INDEX: 32.39 KG/M2 | SYSTOLIC BLOOD PRESSURE: 118 MMHG | HEART RATE: 67 BPM | DIASTOLIC BLOOD PRESSURE: 74 MMHG | RESPIRATION RATE: 18 BRPM

## 2020-10-28 DIAGNOSIS — N30.00 ACUTE CYSTITIS WITHOUT HEMATURIA: ICD-10-CM

## 2020-10-28 LAB
APPEARANCE FLUID: CLEAR
BILIRUBIN, POC: NORMAL
BLOOD URINE, POC: NORMAL
CLARITY, POC: CLEAR
COLOR, POC: YELLOW
GLUCOSE URINE, POC: >1000
KETONES, POC: NORMAL
LEUKOCYTE EST, POC: NORMAL
NITRITE, POC: NORMAL
PH, POC: 5
PROTEIN, POC: NORMAL
SPECIFIC GRAVITY, POC: 1.01
UROBILINOGEN, POC: 0.2

## 2020-10-28 PROCEDURE — 3017F COLORECTAL CA SCREEN DOC REV: CPT | Performed by: NURSE PRACTITIONER

## 2020-10-28 PROCEDURE — 3051F HG A1C>EQUAL 7.0%<8.0%: CPT | Performed by: NURSE PRACTITIONER

## 2020-10-28 PROCEDURE — 99213 OFFICE O/P EST LOW 20 MIN: CPT | Performed by: NURSE PRACTITIONER

## 2020-10-28 PROCEDURE — 2022F DILAT RTA XM EVC RTNOPTHY: CPT | Performed by: NURSE PRACTITIONER

## 2020-10-28 PROCEDURE — G8427 DOCREV CUR MEDS BY ELIG CLIN: HCPCS | Performed by: NURSE PRACTITIONER

## 2020-10-28 PROCEDURE — 1036F TOBACCO NON-USER: CPT | Performed by: NURSE PRACTITIONER

## 2020-10-28 PROCEDURE — G8417 CALC BMI ABV UP PARAM F/U: HCPCS | Performed by: NURSE PRACTITIONER

## 2020-10-28 PROCEDURE — G8484 FLU IMMUNIZE NO ADMIN: HCPCS | Performed by: NURSE PRACTITIONER

## 2020-10-28 PROCEDURE — 81002 URINALYSIS NONAUTO W/O SCOPE: CPT | Performed by: NURSE PRACTITIONER

## 2020-10-28 ASSESSMENT — PATIENT HEALTH QUESTIONNAIRE - PHQ9
2. FEELING DOWN, DEPRESSED OR HOPELESS: 0
SUM OF ALL RESPONSES TO PHQ QUESTIONS 1-9: 0
SUM OF ALL RESPONSES TO PHQ QUESTIONS 1-9: 0
SUM OF ALL RESPONSES TO PHQ9 QUESTIONS 1 & 2: 0
SUM OF ALL RESPONSES TO PHQ QUESTIONS 1-9: 0
1. LITTLE INTEREST OR PLEASURE IN DOING THINGS: 0

## 2020-10-28 ASSESSMENT — ENCOUNTER SYMPTOMS
COUGH: 0
RHINORRHEA: 0
VOMITING: 0
VOICE CHANGE: 0
PHOTOPHOBIA: 0
COLOR CHANGE: 0
BACK PAIN: 0
SHORTNESS OF BREATH: 0
NAUSEA: 0

## 2020-10-28 NOTE — PROGRESS NOTES
[address] [facility]  [address] [address] [place] [address]  Dept: [phone]  Dept Fax: [phone]  Loc: [phone]    Ayde Chapman is a 62 y.o. female who presents today for her medical conditions/complaints as noted below. Ayde Chapman is c/o of Urinary Tract Infection (several days; worse yesterday; burning and irritated and feels like she is having to go very often, but little output)        HPI:     HPI   Chief Complaint   Patient presents with    Urinary Tract Infection     several days; worse yesterday; burning and irritated and feels like she is having to go very often, but little output     Patient presents today for evaluation of dysuria. She states that this has been present for several days; she has burning and frequency with decreased output. She denies fever. She has not taken any medication for symptoms. No hematuria.        Past Medical History:   Diagnosis Date    Abnormal Pap smear of cervix     CAD (coronary artery disease)     stent 2011 Finn Ignacio; sees dr. Lesvia Manning Cervical cancer [facility])     Class 3 severe obesity due to excess calories without serious comorbidity with body mass index (BMI) of 40.0 to 44.9 in adult [facility]) 7/28/2020    Diabetes mellitus ([facility] [address]     ERRONEOUS ENCOUNTER--DISREGARD 9/15/2015    GERD (gastroesophageal reflux disease)     Headache(784.0)     Heart attack ([facility] [address] 12/2011    Hyperlipidemia     Hypertension     Lichen simplex chronicus 10/2015    Osteopenia     Pneumonia     Type II or unspecified type diabetes mellitus without mention of complication, not stated as uncontrolled       Past Surgical History:   Procedure Laterality Date    BREAST BIOPSY  6/8/2011    US guided core needle, right, benign    CARDIAC CATHETERIZATION  12/22/14  JDT    EF 50%    CARDIAC CATHETERIZATION  06/05/2017        CHSUZI FLUOROSCOPIC GUIDANCE NEEDLE PLACEMENT ADD ON Left 7/19/2018    CORTICOSTEROID INJECTION performed by Jessie Case MD at [address] [place] [address]    COLONOSCOPY  approx 2005    Dr. Nelwyn Rinne per pt recall \" Normal\"   [address] WITH STENT PLACEMENT  12/2011    SABA to [address] RELEASE Left 11/8/2019    LEFT INDEX AND LONG TRIGGER FINGER RELEASAE performed by Claudell Hubert, MD at [facility]    OTHER SURGICAL HISTORY      fibrinolysis    MA CABG, ARTERY-VEIN, THREE  12/23/2014    Coronary Artery Bypass, 3    MA MANIPULATN JOSELDR JT W ANESTHESIA Left 7/19/2018    SHOULDER MANIPULATION UNDER ANESTHESIA performed by Jessie Case MD at [address]    THORACOTOMY      UPPER GASTROINTESTINAL ENDOSCOPY  approx 2005    per pt recall \" Normal\"    UPPER GASTROINTESTINAL ENDOSCOPY N/A 6/19/2020    Dr Ralf Maher exam, neg EoE    VULVAR/PERINEAL BIOPSY  10/07/2015    Dr. Mery Guzman 10/28/2020 7/24/2020 6/19/2020 6/19/2020 6/19/2020 7/39/7811   SYSTOLIC 145 085 600 884 154 -   DIASTOLIC 74 82 75 62 65 -   Pulse 67 69 68 79 74 -   Temp 98 - - - 98 -   Resp 18 - 18 18 16 -   SpO2 96 98 97 98 99 98   Weight 176 lb 174 lb 3.2 oz - - - -   Height 5' 2\" 5' 2.5\" - - - -   Body mass index 32.19 kg/m2 31.35 kg/m2 - - - -   Some recent data might be hidden       Family History   Problem Relation Age of Onset    Diabetes Father     Cancer Father     High Blood Pressure Father     Heart Attack Father     Colon Cancer Father     Diabetes Mother     Breast Cancer Mother     Heart Failure Mother         CHF, MVP    Liver Cancer Mother     Heart Failure Maternal Grandfather     Stroke Maternal Grandfather     Pancreatic Cancer Maternal Grandfather     Stomach Cancer Maternal Grandfather     Diabetes Brother     Colon Cancer Paternal Uncle     Rectal Cancer Other     Colon Polyps Neg Hx     Crohn's Disease Neg Hx     Irritable Bowel Syndrome Neg Hx     Liver Disease Neg Hx        Social History     Tobacco Use    Smoking status: Former Smoker     Packs/day: 2.00     Years: 15.00     Pack years: 30.00     Types: Cigarettes     Last attempt to quit: 2014     Years since quittin.3    Smokeless tobacco: Never Used   Substance Use Topics    Alcohol use: No      Current Outpatient Medications   Medication Sig Dispense Refill    nitrofurantoin, macrocrystal-monohydrate, (MACROBID) 100 MG capsule Take 1 capsule by mouth 2 times daily for 10 days 20 capsule 0    SITagliptin (JANUVIA) 100 MG tablet TAKE 1 TABLET EVERY DAY 30 tablet 5    albuterol (PROVENTIL) (5 MG/ML) 0.5% nebulizer solution Take 0.5 mLs by nebulization every 6 hours as needed for Wheezing 120 each 0    vitamin D (ERGOCALCIFEROL) 1.25 MG (74045 UT) CAPS capsule TAKE 1 CAPSULE BY MOUTH ONCE WEEKLY 4 capsule 2    metoprolol tartrate (LOPRESSOR) 25 MG tablet TAKE 1 TABLET BY MOUTH TWICE DAILY 60 tablet 5    JARDIANCE 25 MG tablet Take 1 tablet by mouth once daily 90 tablet 0    OZEMPIC, 1 MG/DOSE, 2 MG/1.5ML SOPN INJECT 0.25 MG INTO THE SKIN EVERY 7 DAYS 4 mL 4    lovastatin (MEVACOR) 40 MG tablet TAKE 1 TABLET AT BEDTIME 30 tablet 4    omeprazole (PRILOSEC) 20 MG delayed release capsule TAKE 1 CAPSULE BY MOUTH EVERY MORNING (BEFORE BREAKFAST) 90 capsule 1    docusate sodium (COLACE) 100 MG capsule Take 1 capsule by mouth daily as needed for Constipation 30 capsule 1    ondansetron (ZOFRAN ODT) 4 MG disintegrating tablet Take 1 tablet by mouth daily as needed for Nausea or Vomiting 30 tablet 5    bethanechol (URECHOLINE) 25 MG tablet Take 1 tablet by mouth 3 times daily (before meals) Take 30 minutes before each meal and at bedtime (Patient taking differently: Take 25 mg by mouth 2 times daily (with meals) Take 30 minutes before each meal and at bedtime) 90 tablet 5    nitroGLYCERIN (NITROSTAT) 0.4 MG SL tablet Place 1 tablet under the tongue every 5 minutes as needed for Chest pain 25 tablet 3    tamsulosin (FLOMAX) 0.4 MG capsule TAKE 1 CAPSULE BY MOUTH DAILY 30 capsule 5    valsartan (DIOVAN) 80 MG tablet TAKE 1 TABLET BY MOUTH DAILY 30 tablet 5    phenazopyridine (PYRIDIUM) 100 MG tablet Take 1 tablet by mouth 3 times daily as needed for Pain 9 tablet 0    diclofenac sodium (VOLTAREN) 1 % GEL Apply 2 g topically 4 times daily (Patient taking differently: Apply 2 g topically 4 times daily as needed for Pain ) 2 Tube 1    metFORMIN (GLUCOPHAGE-XR) 500 MG extended release tablet Take 1 tablet by mouth 2 times daily 60 tablet 2    furosemide (LASIX) 40 MG tablet TAKE 1 TABLET BY MOUTH DAILY 30 tablet 5    isosorbide mononitrate (IMDUR) 30 MG extended release tablet TAKE 1 TABLET EVERY DAY (Patient taking differently: nightly TAKE 1 TABLET EVERY DAY) 30 tablet 5    nystatin (MYCOSTATIN) 727322 UNIT/GM powder Apply 2-3 times daily. (Patient taking differently: Apply topically 3 times daily as needed ) 60 g 5    aspirin 81 MG tablet Take 81 mg by mouth daily      mometasone (ELOCON) 0.1 % ointment Apply topically to vagina 3 times per week (Patient taking differently: Apply topically three times a week Apply topically to vagina 3 times per week) 1 Tube 3    Eflornithine HCl (VANIQA) 13.9 % CREA Apply 1 Dose topically 2 times daily (Patient taking differently: Apply 1 Dose topically 2 times daily as needed ) 45 g 2    LANTUS SOLOSTAR 100 UNIT/ML injection pen INJECT 35 UNITS SUBQ INTOTHE SKIN NIGHTLY (Patient not taking: Reported on 10/28/2020) 15 mL 1     No current facility-administered medications for this visit. Allergies   Allergen Reactions    Codeine Other (See Comments)     Seizures as a child    Motrin [Ibuprofen] Swelling     Tongue to swell    Humalog [Insulin Lispro] Other (See Comments)     Shakiness, break out in a sweat at higher doses.        Health Maintenance   Topic Date Due    Hepatitis B vaccine (1 of 3 - Risk 3-dose series) 10/30/1982    DTaP/Tdap/Td vaccine (1 - Tdap) 10/30/1982    Shingles Vaccine (1 of 2) 10/30/2013    Colon Cancer Screen FIT/FOBT  05/08/2016    Diabetic retinal exam  01/11/2017    Diabetic foot exam  10/03/2019    Diabetic microalbuminuria test  10/17/2019    Annual Wellness Visit (AWV)  07/28/2020    Lipid screen  09/05/2020    Flu vaccine (1) 10/28/2021 (Originally 9/1/2020)    Pneumococcal 0-64 years Vaccine (1 of 1 - PPSV23) 10/28/2024 (Originally 10/30/1971)    Breast cancer screen  12/09/2020    Potassium monitoring  12/30/2020    Creatinine monitoring  12/30/2020    A1C test (Diabetic or Prediabetic)  01/17/2021    Low dose CT lung screening  07/28/2021    Hepatitis C screen  Completed    HIV screen  Completed    Hepatitis A vaccine  Aged Out    Hib vaccine  Aged Out    Meningococcal (ACWY) vaccine  Aged Out       Subjective:      Review of Systems   Constitutional: Negative for chills and fever. HENT: Negative for ear pain, hearing loss, rhinorrhea and voice change. Eyes: Negative for photophobia and visual disturbance. Respiratory: Negative for cough and shortness of breath. Cardiovascular: Negative for chest pain and palpitations. Gastrointestinal: Negative for nausea and vomiting. Endocrine: Negative. Negative for cold intolerance and heat intolerance. Genitourinary: Positive for dysuria and frequency. Negative for difficulty urinating and flank pain. Musculoskeletal: Negative for back pain and neck pain. Skin: Negative for color change and rash. Allergic/Immunologic: Negative for environmental allergies and food allergies. Neurological: Negative for dizziness, speech difficulty and headaches. Hematological: Does not bruise/bleed easily. Psychiatric/Behavioral: Negative for sleep disturbance and suicidal ideas. Objective:     Physical Exam  Vitals signs and nursing note reviewed. Constitutional:       Appearance: She is well-developed. HENT:      Head: Atraumatic.       Right Ear: External ear normal.      Left Ear: External ear normal.      Nose: Nose normal.   Eyes:      Conjunctiva/sclera: Conjunctivae normal.      Pupils: Pupils are equal, round, and reactive to light. Neck:      Musculoskeletal: Normal range of motion and neck supple. Cardiovascular:      Rate and Rhythm: Normal rate and regular rhythm. Heart sounds: Normal heart sounds, S1 normal and S2 normal.   Pulmonary:      Effort: Pulmonary effort is normal.      Breath sounds: Normal breath sounds. Abdominal:      General: Bowel sounds are normal.      Palpations: Abdomen is soft. Musculoskeletal: Normal range of motion. Skin:     General: Skin is warm and dry. Neurological:      Mental Status: She is alert and oriented to person, place, and time. Psychiatric:         Behavior: Behavior normal.       /74   Pulse 67   Temp 98 °F (36.7 °C) (Temporal)   Resp 18   Ht 5' 2\" (1.575 m)   Wt 176 lb (79.8 kg)   LMP 01/01/1998 (Approximate)   SpO2 96%   BMI 32.19 kg/m²     Assessment:       Diagnosis Orders   1. Type 2 diabetes mellitus with complication, with long-term current use of insulin ([facility] [address]     2. Urinary tract infection without hematuria, site unspecified  POCT Urinalysis no Micro   3. Acute cystitis without hematuria  Culture, Urine       Results for orders placed or performed in visit on 10/28/20   POCT Urinalysis no Micro   Result Value Ref Range    Color, UA yellow     Clarity, UA clear     Glucose, UA POC >1,000     Bilirubin, UA -     Ketones, UA -     Spec Grav, UA 1.015     Blood, UA POC trace - intact     pH, UA 5.0     Protein, UA POC -     Urobilinogen, UA 0.2     Leukocytes, UA -     Nitrite, UA +     Appearance, Fluid Clear Clear, Slightly Cloudy       Plan:     Macrobid as directed; will send urine off for culture. Patient given educational materials -see patient instructions. Discussed use, benefit, and side effects of prescribed medications. All patient questions answered.   Pt voiced understanding. Reviewed health maintenance. Instructed to continue currentmedications, diet and exercise. Patient agreed with treatment plan. Follow up as directed. MEDICATIONS:  Orders Placed This Encounter   Medications    SITagliptin (JANUVIA) 100 MG tablet     Sig: TAKE 1 TABLET EVERY DAY     Dispense:  30 tablet     Refill:  5    albuterol (PROVENTIL) (5 MG/ML) 0.5% nebulizer solution     Sig: Take 0.5 mLs by nebulization every 6 hours as needed for Wheezing     Dispense:  120 each     Refill:  0    nitrofurantoin, macrocrystal-monohydrate, (MACROBID) 100 MG capsule     Sig: Take 1 capsule by mouth 2 times daily for 10 days     Dispense:  20 capsule     Refill:  0         ORDERS:  Orders Placed This Encounter   Procedures    Culture, Urine    Culture, Urine    POCT Urinalysis no Micro       Follow-up:  Return if symptoms worsen or fail to improve. PATIENT INSTRUCTIONS:  Patient Instructions   We are committed to providing you with the best care possible. In order to help us achieve these goals please remember to bring all medications, herbal products, and over the counter supplements with you to each visit. If your provider has ordered testing for you, please be sure to follow up with our office if you have not received results within 7 days after the testing took place. *If you receive a survey after visiting one of our offices, please take time to share your experience concerning your physician office visit. These surveys are confidential and no health information about you is shared. We are eager to improve for you and we are counting on your feedback to help make that happen. Electronically signed by MIKE Singleton on 10/29/2020 at 2:15 PM    EMR Dragon/transcription disclaimer:  Much of thisencounter note is electronic transcription/translation of spoken language to printed texts.   The electronic translation of spoken language may be erroneous, or at times,

## 2020-10-29 ENCOUNTER — TELEPHONE (OUTPATIENT)
Dept: PRIMARY CARE CLINIC | Age: 55
End: 2020-10-29

## 2020-10-29 RX ORDER — NITROFURANTOIN 25; 75 MG/1; MG/1
100 CAPSULE ORAL 2 TIMES DAILY
Qty: 20 CAPSULE | Refills: 0 | Status: SHIPPED | OUTPATIENT
Start: 2020-10-29 | End: 2020-11-08

## 2020-10-29 NOTE — TELEPHONE ENCOUNTER
I sent it; please apologize to her as I seem to have left that off the orders I placed for her yesterday!

## 2020-10-30 LAB
ORGANISM: ABNORMAL
URINE CULTURE, ROUTINE: ABNORMAL
URINE CULTURE, ROUTINE: ABNORMAL

## 2020-11-06 RX ORDER — TAMSULOSIN HYDROCHLORIDE 0.4 MG/1
0.4 CAPSULE ORAL DAILY
Qty: 30 CAPSULE | Refills: 5 | Status: SHIPPED | OUTPATIENT
Start: 2020-11-06 | End: 2021-07-23

## 2020-11-12 ENCOUNTER — OFFICE VISIT (OUTPATIENT)
Dept: PRIMARY CARE CLINIC | Age: 55
End: 2020-11-12
Payer: COMMERCIAL

## 2020-11-12 VITALS
OXYGEN SATURATION: 98 % | HEART RATE: 79 BPM | RESPIRATION RATE: 18 BRPM | DIASTOLIC BLOOD PRESSURE: 80 MMHG | WEIGHT: 169 LBS | TEMPERATURE: 98 F | BODY MASS INDEX: 29.95 KG/M2 | SYSTOLIC BLOOD PRESSURE: 122 MMHG | HEIGHT: 63 IN

## 2020-11-12 PROCEDURE — 3017F COLORECTAL CA SCREEN DOC REV: CPT | Performed by: NURSE PRACTITIONER

## 2020-11-12 PROCEDURE — G8484 FLU IMMUNIZE NO ADMIN: HCPCS | Performed by: NURSE PRACTITIONER

## 2020-11-12 PROCEDURE — 99214 OFFICE O/P EST MOD 30 MIN: CPT | Performed by: NURSE PRACTITIONER

## 2020-11-12 PROCEDURE — 2022F DILAT RTA XM EVC RTNOPTHY: CPT | Performed by: NURSE PRACTITIONER

## 2020-11-12 PROCEDURE — 3051F HG A1C>EQUAL 7.0%<8.0%: CPT | Performed by: NURSE PRACTITIONER

## 2020-11-12 PROCEDURE — G8417 CALC BMI ABV UP PARAM F/U: HCPCS | Performed by: NURSE PRACTITIONER

## 2020-11-12 PROCEDURE — G8427 DOCREV CUR MEDS BY ELIG CLIN: HCPCS | Performed by: NURSE PRACTITIONER

## 2020-11-12 PROCEDURE — 1036F TOBACCO NON-USER: CPT | Performed by: NURSE PRACTITIONER

## 2020-11-12 RX ORDER — CEFDINIR 300 MG/1
300 CAPSULE ORAL 2 TIMES DAILY
Qty: 14 CAPSULE | Refills: 0 | Status: SHIPPED | OUTPATIENT
Start: 2020-11-12 | End: 2020-11-19

## 2020-11-12 ASSESSMENT — ENCOUNTER SYMPTOMS
EYES NEGATIVE: 1
RESPIRATORY NEGATIVE: 1
NAUSEA: 1

## 2020-11-12 NOTE — PROGRESS NOTES
[address] [facility]  [address] [address]  [address] [address]  Dept: [phone]  Dept Fax: [phone]  Loc: [phone]    Staci Kehr is a 60 y.o. female who presents today for her medical conditions/complaints as noted below. Staci Kehr is c/o of Diabetes (Pt states she is having a hard time about keeping her medications down most of the time. She states she is vomiting daily Pt request DM foot exam and rx for DM shoes )      Chief Complaint   Patient presents with    Diabetes     Pt states she is having a hard time about keeping her medications down most of the time. She states she is vomiting daily Pt request DM foot exam and rx for DM shoes        HPI:     HPI   Patient here for follow up on DM. She is still having increased nausea. She has lost a few more pounds over the last month as well. She is also complaining of increased urination with burning as well. She denies fever.      Past Medical History:   Diagnosis Date    Abnormal Pap smear of cervix     CAD (coronary artery disease)     stent 2011 Sravani Ashton; sees dr. Sherry Pena Cervical cancer [facility])     Class 3 severe obesity due to excess calories without serious comorbidity with body mass index (BMI) of 40.0 to 44.9 in adult [facility]) 7/28/2020    Diabetes mellitus ([address]     ERRONEOUS ENCOUNTER--DISREGARD 9/15/2015    GERD (gastroesophageal reflux disease)     Headache(784.0)     Heart attack ([address] 12/2011    Hyperlipidemia     Hypertension     Lichen simplex chronicus 10/2015    Osteopenia     Pneumonia     Type II or unspecified type diabetes mellitus without mention of complication, not stated as uncontrolled         Past Surgical History:   Procedure Laterality Date    BREAST BIOPSY  6/8/2011    US guided core needle, right, benign    CARDIAC CATHETERIZATION  12/22/14  JDT    EF 50%    CARDIAC CATHETERIZATION  06/05/2017    Dr.Talley Rosemary [facility] FLUOROSCOPIC GUIDANCE NEEDLE PLACEMENT ADD ON Left 2018    CORTICOSTEROID INJECTION performed by Kaley Ngo MD at [address] [address]    COLONOSCOPY  approx     Dr. Gonzalez Primes per pt recall \" Normal\"   [address] WITH STENT PLACEMENT  2011    SABA to [address] RELEASE Left 2019    LEFT INDEX AND LONG TRIGGER FINGER RELEASAE performed by Adalberto Pereira MD at [facility]    OTHER SURGICAL HISTORY      fibrinolysis    CO CABG, ARTERY-VEIN, THREE  2014    Coronary Artery Bypass, 3    CO MANIPULATN SHLDR JT W ANESTHESIA Left 2018    SHOULDER MANIPULATION UNDER ANESTHESIA performed by Kaley Ngo MD at [facility] ENDOSCOPY  approx     per pt recall \" Normal\"    UPPER GASTROINTESTINAL ENDOSCOPY N/A 2020    Dr Martinez Russian exam, neg EoE    VULVAR/PERINEAL BIOPSY  10/07/2015    Dr. Aniket Palacios       Social History     Tobacco Use    Smoking status: Former Smoker     Packs/day: 2.00     Years: 15.00     Pack years: 30.00     Types: Cigarettes     Last attempt to quit: 2014     Years since quittin.3    Smokeless tobacco: Never Used   Substance Use Topics    Alcohol use: No        Current Outpatient Medications   Medication Sig Dispense Refill    cefdinir (OMNICEF) 300 MG capsule Take 1 capsule by mouth 2 times daily for 7 days 14 capsule 0    Diabetic Shoe MISC by Does not apply route 1 each 0    tamsulosin (FLOMAX) 0.4 MG capsule TAKE 1 CAPSULE BY MOUTH DAILY 30 capsule 5    SITagliptin (JANUVIA) 100 MG tablet TAKE 1 TABLET EVERY DAY 30 tablet 5    albuterol (PROVENTIL) (5 MG/ML) 0.5% nebulizer solution Take 0.5 mLs by nebulization every 6 hours as needed for Wheezing 120 each 0    vitamin D (ERGOCALCIFEROL) 1.25 MG (12178 UT) CAPS capsule TAKE 1 CAPSULE BY MOUTH ONCE WEEKLY 4 capsule 2    metoprolol tartrate (LOPRESSOR) 25 MG tablet TAKE 1 TABLET BY MOUTH TWICE DAILY 60 tablet 5    JARDIANCE 25 MG tablet Take 1 tablet by mouth once daily 90 tablet 0    lovastatin (MEVACOR) 40 MG tablet TAKE 1 TABLET AT BEDTIME 30 tablet 4    omeprazole (PRILOSEC) 20 MG delayed release capsule TAKE 1 CAPSULE BY MOUTH EVERY MORNING (BEFORE BREAKFAST) 90 capsule 1    docusate sodium (COLACE) 100 MG capsule Take 1 capsule by mouth daily as needed for Constipation 30 capsule 1    ondansetron (ZOFRAN ODT) 4 MG disintegrating tablet Take 1 tablet by mouth daily as needed for Nausea or Vomiting 30 tablet 5    bethanechol (URECHOLINE) 25 MG tablet Take 1 tablet by mouth 3 times daily (before meals) Take 30 minutes before each meal and at bedtime (Patient taking differently: Take 25 mg by mouth 2 times daily (with meals) Take 30 minutes before each meal and at bedtime) 90 tablet 5    nitroGLYCERIN (NITROSTAT) 0.4 MG SL tablet Place 1 tablet under the tongue every 5 minutes as needed for Chest pain 25 tablet 3    valsartan (DIOVAN) 80 MG tablet TAKE 1 TABLET BY MOUTH DAILY 30 tablet 5    phenazopyridine (PYRIDIUM) 100 MG tablet Take 1 tablet by mouth 3 times daily as needed for Pain 9 tablet 0    diclofenac sodium (VOLTAREN) 1 % GEL Apply 2 g topically 4 times daily (Patient taking differently: Apply 2 g topically 4 times daily as needed for Pain ) 2 Tube 1    metFORMIN (GLUCOPHAGE-XR) 500 MG extended release tablet Take 1 tablet by mouth 2 times daily 60 tablet 2    furosemide (LASIX) 40 MG tablet TAKE 1 TABLET BY MOUTH DAILY 30 tablet 5    isosorbide mononitrate (IMDUR) 30 MG extended release tablet TAKE 1 TABLET EVERY DAY (Patient taking differently: nightly TAKE 1 TABLET EVERY DAY) 30 tablet 5    nystatin (MYCOSTATIN) 508205 UNIT/GM powder Apply 2-3 times daily.  (Patient taking differently: Apply topically 3 times daily as needed ) 60 g 5    aspirin 81 MG tablet Take 81 mg by mouth daily      mometasone (ELOCON) 0.1 % ointment Apply topically to vagina 3 times per week (Patient taking differently: Apply topically three times a week Apply topically to vagina 3 times per week) 1 Tube 3    Eflornithine HCl (VANIQA) 13.9 % CREA Apply 1 Dose topically 2 times daily (Patient taking differently: Apply 1 Dose topically 2 times daily as needed ) 45 g 2     No current facility-administered medications for this visit. Allergies   Allergen Reactions    Codeine Other (See Comments)     Seizures as a child    Motrin [Ibuprofen] Swelling     Tongue to swell    Humalog [Insulin Lispro] Other (See Comments)     Shakiness, break out in a sweat at higher doses.  Ozempic (0.25 Or 0.5 Mg-Dose) [Semaglutide(0.25 Or 0.5mg-Dos)]      nausea       Family History   Problem Relation Age of Onset    Diabetes Father     Cancer Father     High Blood Pressure Father     Heart Attack Father     Colon Cancer Father     Diabetes Mother     Breast Cancer Mother     Heart Failure Mother         CHF, MVP    Liver Cancer Mother     Heart Failure Maternal Grandfather     Stroke Maternal Grandfather     Pancreatic Cancer Maternal Grandfather     Stomach Cancer Maternal Grandfather     Diabetes Brother     Colon Cancer Paternal Uncle     Rectal Cancer Other     Colon Polyps Neg Hx     Crohn's Disease Neg Hx     Irritable Bowel Syndrome Neg Hx     Liver Disease Neg Hx                Subjective:      Review of Systems   Constitutional: Negative. HENT: Negative. Eyes: Negative. Respiratory: Negative. Cardiovascular: Negative. Gastrointestinal: Positive for nausea. Endocrine: Negative. Genitourinary: Positive for dysuria. Musculoskeletal: Positive for arthralgias (bilateral foot). Skin: Negative. Neurological: Negative. Hematological: Negative. Psychiatric/Behavioral: Negative. Objective:     Physical Exam  Vitals signs and nursing note reviewed. Constitutional:       Appearance: Normal appearance. She is well-developed. HENT:      Head: Normocephalic and atraumatic. Right Ear: Hearing, tympanic membrane, ear canal and external ear normal.      Left Ear: Hearing, tympanic membrane, ear canal and external ear normal.      Nose: Nose normal.      Mouth/Throat:      Pharynx: Uvula midline. Eyes:      General: Lids are normal.      Conjunctiva/sclera: Conjunctivae normal.      Pupils: Pupils are equal, round, and reactive to light. Neck:      Musculoskeletal: Normal range of motion and neck supple. Thyroid: No thyroid mass or thyromegaly. Trachea: Trachea normal.   Cardiovascular:      Rate and Rhythm: Normal rate and regular rhythm. Pulses: Normal pulses. Heart sounds: Normal heart sounds. Pulmonary:      Effort: Pulmonary effort is normal.      Breath sounds: Normal breath sounds. Abdominal:      General: Bowel sounds are normal.      Palpations: Abdomen is soft. Tenderness: There is abdominal tenderness in the suprapubic area. Musculoskeletal: Normal range of motion. Cervical back: Normal. She exhibits normal range of motion and no tenderness. Thoracic back: Normal. She exhibits normal range of motion and no tenderness. Lumbar back: She exhibits tenderness. She exhibits normal range of motion. Feet:      Right foot:      Protective Sensation: 10 sites tested. 2 sites sensed. Left foot:      Protective Sensation: 10 sites tested. 2 sites sensed. Skin:     General: Skin is warm and dry. Neurological:      Mental Status: She is alert and oriented to person, place, and time. Psychiatric:         Speech: Speech normal.         Behavior: Behavior normal.         Thought Content: Thought content normal.         Judgment: Judgment normal.         /80   Pulse 79   Temp 98 °F (36.7 °C) (Temporal)   Resp 18   Ht 5' 2.5\" (1.588 m)   Wt 169 lb (76.7 kg)   LMP 01/01/1998 (Approximate)   SpO2 98%   BMI 30.42 kg/m²     Assessment:      Diagnosis Orders   1.  Type 2 diabetes mellitus with complication, with long-term current use of insulin ([facility])   DIABETES FOOT EXAM    CBC Auto Differential    Comprehensive Metabolic Panel    Lipid, Fasting    Hemoglobin A1C    Diabetic Shoe MISC   2. Colon cancer screening  Cologuard (For External Results Only)   3. Essential hypertension     4. Acute cystitis without hematuria  cefdinir (OMNICEF) 300 MG capsule   5. Mixed hyperlipidemia         No results found for this visit on 11/12/20. Plan: Will check a1c prior to making med adjustment. Likely will start Glyperide  Stop ozempic due to side effect  abx for UTI      Return in about 6 weeks (around 12/24/2020) for Diabetic Follow up. Orders Placed This Encounter   Procedures    Cologuard (For External Results Only)     This test is performed by an external laboratory and is used for result attachment only. It is required that this order requisition be faxed to: [facility] @ [phone]. See [email or web address] for further information.      Standing Status:   Future     Standing Expiration Date:   11/12/2021    CBC Auto Differential     Standing Status:   Future     Number of Occurrences:   1     Standing Expiration Date:   11/12/2021    Comprehensive Metabolic Panel     Standing Status:   Future     Number of Occurrences:   1     Standing Expiration Date:   11/12/2021    Lipid, Fasting     Standing Status:   Future     Number of Occurrences:   1     Standing Expiration Date:   11/12/2021    Hemoglobin A1C     Standing Status:   Future     Number of Occurrences:   1     Standing Expiration Date:   11/12/2021     DIABETES FOOT EXAM       Orders Placed This Encounter   Medications    cefdinir (OMNICEF) 300 MG capsule     Sig: Take 1 capsule by mouth 2 times daily for 7 days     Dispense:  14 capsule     Refill:  0    Diabetic Shoe MISC     Sig: by Does not apply route     Dispense:  1 each     Refill:  0        Patient offered educational handouts and has had all questions answered. Patient voices understanding and agrees to plans along with risks and benefits of plan. Patient is instructed to continue prior meds, diet, and exercise plans as instructed. Patient agrees to follow up as instructed and sooner if needed. Patient agrees to go to ER if condition becomes emergent. EMR Dragon/transcription disclaimer: Some of this encounter note is an electronic transcription/translation of spoken language to printed text. The electronic translation of spoken language may permit erroneous, or at times, nonsensical words or phrases to be inadvertently transcribed.  Although I have reviewed the note for such errors, some may still exist.    Electronically signed by MIKE David on 11/13/2020 at 12:31 PM

## 2020-11-13 DIAGNOSIS — E11.8 TYPE 2 DIABETES MELLITUS WITH COMPLICATION, WITH LONG-TERM CURRENT USE OF INSULIN (HCC): ICD-10-CM

## 2020-11-13 DIAGNOSIS — Z79.4 TYPE 2 DIABETES MELLITUS WITH COMPLICATION, WITH LONG-TERM CURRENT USE OF INSULIN (HCC): ICD-10-CM

## 2020-11-13 LAB
ALBUMIN SERPL-MCNC: 4.5 G/DL (ref 3.5–5.2)
ALP BLD-CCNC: 81 U/L (ref 35–104)
ALT SERPL-CCNC: 20 U/L (ref 5–33)
ANION GAP SERPL CALCULATED.3IONS-SCNC: 9 MMOL/L (ref 7–19)
AST SERPL-CCNC: 22 U/L (ref 5–32)
BASOPHILS ABSOLUTE: 0.1 K/UL (ref 0–0.2)
BASOPHILS RELATIVE PERCENT: 1.3 % (ref 0–1)
BILIRUB SERPL-MCNC: 0.7 MG/DL (ref 0.2–1.2)
BUN BLDV-MCNC: 18 MG/DL (ref 6–20)
CALCIUM SERPL-MCNC: 9.7 MG/DL (ref 8.6–10)
CHLORIDE BLD-SCNC: 101 MMOL/L (ref 98–111)
CHOLESTEROL, FASTING: 168 MG/DL (ref 160–199)
CO2: 29 MMOL/L (ref 22–29)
CREAT SERPL-MCNC: 0.7 MG/DL (ref 0.5–0.9)
EOSINOPHILS ABSOLUTE: 0.1 K/UL (ref 0–0.6)
EOSINOPHILS RELATIVE PERCENT: 1.5 % (ref 0–5)
GFR AFRICAN AMERICAN: >59
GFR NON-AFRICAN AMERICAN: >60
GLUCOSE BLD-MCNC: 133 MG/DL (ref 74–109)
HBA1C MFR BLD: 6.3 % (ref 4–6)
HCT VFR BLD CALC: 44.1 % (ref 37–47)
HDLC SERPL-MCNC: 64 MG/DL (ref 65–121)
HEMOGLOBIN: 14.9 G/DL (ref 12–16)
IMMATURE GRANULOCYTES #: 0 K/UL
LDL CHOLESTEROL CALCULATED: 90 MG/DL
LYMPHOCYTES ABSOLUTE: 1.9 K/UL (ref 1.1–4.5)
LYMPHOCYTES RELATIVE PERCENT: 34.2 % (ref 20–40)
MCH RBC QN AUTO: 28.8 PG (ref 27–31)
MCHC RBC AUTO-ENTMCNC: 33.8 G/DL (ref 33–37)
MCV RBC AUTO: 85.3 FL (ref 81–99)
MONOCYTES ABSOLUTE: 0.4 K/UL (ref 0–0.9)
MONOCYTES RELATIVE PERCENT: 7.1 % (ref 0–10)
NEUTROPHILS ABSOLUTE: 3.1 K/UL (ref 1.5–7.5)
NEUTROPHILS RELATIVE PERCENT: 55.7 % (ref 50–65)
PDW BLD-RTO: 13.1 % (ref 11.5–14.5)
PLATELET # BLD: 269 K/UL (ref 130–400)
PMV BLD AUTO: 10.4 FL (ref 9.4–12.3)
POTASSIUM SERPL-SCNC: 3.6 MMOL/L (ref 3.5–5)
RBC # BLD: 5.17 M/UL (ref 4.2–5.4)
SODIUM BLD-SCNC: 139 MMOL/L (ref 136–145)
TOTAL PROTEIN: 7.5 G/DL (ref 6.6–8.7)
TRIGLYCERIDE, FASTING: 68 MG/DL (ref 0–149)
WBC # BLD: 5.5 K/UL (ref 4.8–10.8)

## 2020-12-02 ENCOUNTER — OFFICE VISIT (OUTPATIENT)
Dept: CARDIOLOGY | Age: 55
End: 2020-12-02
Payer: COMMERCIAL

## 2020-12-02 VITALS
SYSTOLIC BLOOD PRESSURE: 122 MMHG | DIASTOLIC BLOOD PRESSURE: 76 MMHG | HEIGHT: 62 IN | HEART RATE: 68 BPM | WEIGHT: 173 LBS | BODY MASS INDEX: 31.83 KG/M2 | OXYGEN SATURATION: 96 %

## 2020-12-02 PROCEDURE — G8417 CALC BMI ABV UP PARAM F/U: HCPCS | Performed by: CLINICAL NURSE SPECIALIST

## 2020-12-02 PROCEDURE — G8427 DOCREV CUR MEDS BY ELIG CLIN: HCPCS | Performed by: CLINICAL NURSE SPECIALIST

## 2020-12-02 PROCEDURE — 99213 OFFICE O/P EST LOW 20 MIN: CPT | Performed by: CLINICAL NURSE SPECIALIST

## 2020-12-02 PROCEDURE — 1036F TOBACCO NON-USER: CPT | Performed by: CLINICAL NURSE SPECIALIST

## 2020-12-02 PROCEDURE — G8484 FLU IMMUNIZE NO ADMIN: HCPCS | Performed by: CLINICAL NURSE SPECIALIST

## 2020-12-02 PROCEDURE — 3017F COLORECTAL CA SCREEN DOC REV: CPT | Performed by: CLINICAL NURSE SPECIALIST

## 2020-12-02 RX ORDER — NITROGLYCERIN 0.4 MG/1
0.4 TABLET SUBLINGUAL EVERY 5 MIN PRN
Qty: 25 TABLET | Refills: 3 | Status: SHIPPED | OUTPATIENT
Start: 2020-12-02 | End: 2021-06-03 | Stop reason: SDUPTHER

## 2020-12-02 NOTE — PATIENT INSTRUCTIONS
Follow up in 6 mos   Call with any questions or concerns  Follow up with MIKE Herrera for non cardiac problems  Report any new problems  Cardiovascular Fitness-Exercise as tolerated. Strive for 30 minutes of exercise most days of the week. Cardiac / Healthy Diet  Continue current medications as directed  Continue plan of treatment  It is always recommended that you bring your medications bottles with you to each visit - this is for your safety!

## 2020-12-02 NOTE — PROGRESS NOTES
[facility]  [address] [place], [place] [address]elisa William Alberts, [address] [address]  Phone: [phone]  Fax: [phone]    OFFICE VISIT:  2020    Rosana Espinosa - : 1965    Reason For Visit:  Corrinne Lab is a 60 y.o. female who is here for Follow-up (NO CARDIAC SYMPTOMS ); Coronary Artery Disease; Congestive Heart Failure; and Hypertension  CAD with CABG ×3 in , hypertension, hyperlipidemia and diabetes . Heart catheterization in 2017 showed occluded LIMA to the LAD with patient and grafts. 19  Cath  Occluded LIMA-LAD at the anastomosis to the LAD itself, patent VG-diag, patent VG-PDA, apical akinesis, EF 45%  Recommend continuing ongoing medical management     She returns today in follow-up. She was having N/V with weight loss  Was found to have allergy to ozempic. She is now eating better. She has TAVERAS with any activity but no worse than she has had. She continues to have TAVERAS with activity. I will have resting shortness of breath. She states when she sitting she does have to take a big deep breath. Subjective  Corebonyne Lab denies exertional chest pain, orthopnea, paroxysmal nocturnal dyspnea, syncope, presyncope, arrhythmia, edema and fatigue. The patient denies numbness or weakness to suggest cerebrovascular accident or transient ischemic attack. MIKE Lane is PCP and follows labs including lipids and glucose.    Rosana Espinosa has the following history as recorded in [facility]:    Patient Active Problem List    Diagnosis Date Noted    Chronic nausea 2020    Chronic vomiting 2020    Change in bowel habits 2020    Family history of colon cancer 2020    Alternating constipation and diarrhea 2020    S/P cholecystectomy 2020    Chronic heartburn 2020    Gastroesophageal reflux disease without esophagitis 2018    Dysuria 2018    Vitamin D deficiency 2018    Urinary tract infection without hematuria 2018    Adhesive capsulitis of left shoulder 07/18/2018    Angina effort 01/23/2018    Coronary artery disease involving coronary bypass graft of native heart without angina pectoris 09/05/2017    Left ventricular dysfunction 09/05/2017    Abnormal nuclear cardiac imaging test     Lichen simplex chronicus 10/21/2015    Personal history of malignant neoplasm of cervix uteri 05/11/2015    Atrophic vaginitis 05/11/2015    History of pleural effusion 04/29/2015    Candidal intertrigo 02/03/2015    Weakness 02/03/2015    S/P CABG x 3 02/03/2015    TAVERAS (dyspnea on exertion) 12/01/2014    Type 2 diabetes mellitus with complication, with long-term current use of insulin ([address]     CAD (coronary artery disease) 07/30/2013    Hypertension 07/30/2013    Hyperlipidemia 07/30/2013    Heart attack ([address] 12/01/2011     Past Medical History:   Diagnosis Date    Abnormal Pap smear of cervix     CAD (coronary artery disease)     stent 2011 Nesha Cruz; sees dr. Alexis Cam Cervical cancer [facility])     Class 3 severe obesity due to excess calories without serious comorbidity with body mass index (BMI) of 40.0 to 44.9 in adult ([address] 7/28/2020    Diabetes mellitus ([address]     ERRONEOUS ENCOUNTER--DISREGARD 9/15/2015    GERD (gastroesophageal reflux disease)     Headache(784.0)     Heart attack ([address] 12/2011    Hyperlipidemia     Hypertension     Lichen simplex chronicus 10/2015    Osteopenia     Pneumonia     Type II or unspecified type diabetes mellitus without mention of complication, not stated as uncontrolled      Past Surgical History:   Procedure Laterality Date    BREAST BIOPSY  6/8/2011    US guided core needle, right, benign    CARDIAC CATHETERIZATION  12/22/14  JDT    EF 50%    CARDIAC CATHETERIZATION  06/05/2017        [facility] FLUOROSCOPIC GUIDANCE NEEDLE PLACEMENT ADD ON Left 7/19/2018    CORTICOSTEROID INJECTION performed by Omari Schneider MD at [address] [place] [address]  2006    COLONOSCOPY  approx 2005 Dr. Kamara Memos per pt recall \" Normal\"   [address] WITH STENT PLACEMENT  2011    SABA to First diag    CORONARY ARTERY BYPASS GRAFT      FINGER TRIGGER RELEASE Left 2019    LEFT INDEX AND LONG TRIGGER FINGER RELEASAE performed by Juan Luis Elise MD at [facility]    OTHER SURGICAL HISTORY      fibrinolysis    CT CABG, ARTERY-VEIN, THREE  2014    Coronary Artery Bypass, 3    CT MANIPULATN SHLDR JT W ANESTHESIA Left 2018    SHOULDER MANIPULATION UNDER ANESTHESIA performed by Yasir Trinh MD at [facility]    THORACOTOMY      UPPER GASTROINTESTINAL ENDOSCOPY  approx     per pt recall \" Normal\"    UPPER GASTROINTESTINAL ENDOSCOPY N/A 2020    Dr Jaye Mead exam, neg EoE    VULVAR/PERINEAL BIOPSY  10/07/2015    Dr. Bing Turpin     Family History   Problem Relation Age of Onset    Diabetes Father     Cancer Father     High Blood Pressure Father     Heart Attack Father     Colon Cancer Father     Diabetes Mother     Breast Cancer Mother     Heart Failure Mother         CHF, MVP    Liver Cancer Mother     Heart Failure Maternal Grandfather     Stroke Maternal Grandfather     Pancreatic Cancer Maternal Grandfather     Stomach Cancer Maternal Grandfather     Diabetes Brother     Colon Cancer Paternal Uncle     Rectal Cancer Other     Colon Polyps Neg Hx     Crohn's Disease Neg Hx     Irritable Bowel Syndrome Neg Hx     Liver Disease Neg Hx      Social History     Tobacco Use    Smoking status: Former Smoker     Packs/day: 2.00     Years: 15.00     Pack years: 30.00     Types: Cigarettes     Last attempt to quit: 2014     Years since quittin.4    Smokeless tobacco: Never Used   Substance Use Topics    Alcohol use: No      Current Outpatient Medications   Medication Sig Dispense Refill    Diabetic Shoe MISC by Does not apply route 1 each 0    tamsulosin (FLOMAX) 0.4 MG capsule TAKE 1 CAPSULE BY MOUTH DAILY 30 capsule 5    SITagliptin (JANUVIA) 100 MG tablet TAKE 1 TABLET EVERY DAY 30 tablet 5    albuterol (PROVENTIL) (5 MG/ML) 0.5% nebulizer solution Take 0.5 mLs by nebulization every 6 hours as needed for Wheezing 120 each 0    vitamin D (ERGOCALCIFEROL) 1.25 MG (03820 UT) CAPS capsule TAKE 1 CAPSULE BY MOUTH ONCE WEEKLY 4 capsule 2    metoprolol tartrate (LOPRESSOR) 25 MG tablet TAKE 1 TABLET BY MOUTH TWICE DAILY 60 tablet 5    JARDIANCE 25 MG tablet Take 1 tablet by mouth once daily 90 tablet 0    lovastatin (MEVACOR) 40 MG tablet TAKE 1 TABLET AT BEDTIME 30 tablet 4    omeprazole (PRILOSEC) 20 MG delayed release capsule TAKE 1 CAPSULE BY MOUTH EVERY MORNING (BEFORE BREAKFAST) 90 capsule 1    docusate sodium (COLACE) 100 MG capsule Take 1 capsule by mouth daily as needed for Constipation 30 capsule 1    ondansetron (ZOFRAN ODT) 4 MG disintegrating tablet Take 1 tablet by mouth daily as needed for Nausea or Vomiting 30 tablet 5    bethanechol (URECHOLINE) 25 MG tablet Take 1 tablet by mouth 3 times daily (before meals) Take 30 minutes before each meal and at bedtime (Patient taking differently: Take 25 mg by mouth 2 times daily (with meals) Take 30 minutes before each meal and at bedtime) 90 tablet 5    nitroGLYCERIN (NITROSTAT) 0.4 MG SL tablet Place 1 tablet under the tongue every 5 minutes as needed for Chest pain 25 tablet 3    valsartan (DIOVAN) 80 MG tablet TAKE 1 TABLET BY MOUTH DAILY 30 tablet 5    phenazopyridine (PYRIDIUM) 100 MG tablet Take 1 tablet by mouth 3 times daily as needed for Pain 9 tablet 0    diclofenac sodium (VOLTAREN) 1 % GEL Apply 2 g topically 4 times daily (Patient taking differently: Apply 2 g topically 4 times daily as needed for Pain ) 2 Tube 1    metFORMIN (GLUCOPHAGE-XR) 500 MG extended release tablet Take 1 tablet by mouth 2 times daily 60 tablet 2    furosemide (LASIX) 40 MG tablet TAKE 1 TABLET BY MOUTH DAILY 30 tablet 5    isosorbide mononitrate (IMDUR) 30 MG extended release tablet TAKE 1 TABLET EVERY DAY (Patient taking differently: nightly TAKE 1 TABLET EVERY DAY) 30 tablet 5    nystatin (MYCOSTATIN) 324906 UNIT/GM powder Apply 2-3 times daily. (Patient taking differently: Apply topically 3 times daily as needed ) 60 g 5    aspirin 81 MG tablet Take 81 mg by mouth daily      mometasone (ELOCON) 0.1 % ointment Apply topically to vagina 3 times per week (Patient taking differently: Apply topically three times a week Apply topically to vagina 3 times per week) 1 Tube 3    Eflornithine HCl (VANIQA) 13.9 % CREA Apply 1 Dose topically 2 times daily (Patient taking differently: Apply 1 Dose topically 2 times daily as needed ) 45 g 2     No current facility-administered medications for this visit. Allergies: Codeine; Motrin [ibuprofen]; Humalog [insulin lispro]; and Ozempic (0.25 or 0.5 mg-dose) [semaglutide(0.25 or 0.5mg-dos)]    Review of Systems  Constitutional - no significant activity change, appetite change, or unexpected weight change. No fever, chills or diaphoresis. No fatigue. HEENT - no significant rhinorrhea or epistaxis. No tinnitus or significant hearing loss. Eyes - no sudden vision change or amaurosis. Respiratory - no significant wheezing, stridor, apnea or cough. + dyspnea on exertion and occasional resting shortness of breath. Cardiovascular - no exertional chest pain, orthopnea or PND. No sensation of arrhythmia or slow heart rate. No claudication or leg edema. Gastrointestinal - no abdominal swelling or pain. No blood in stool. No severe constipation, diarrhea, nausea, or vomiting. Genitourinary - no difficulty urinating, dysuria, frequency, or urgency. No flank pain or hematuria. Musculoskeletal - no back pain, gait disturbance, or myalgia. Skin - no color change or rash. No pallor. No new surgical incision. Neurologic - no speech difficulty, facial asymmetry or lateralizing weakness.   No seizures, presyncope, syncope, or significant dizziness. Hematologic - no easy bruising or excessive bleeding. Psychiatric - no severe anxiety or insomnia. No confusion. All other review of systems are negative. Objective  Vital Signs - /76   Pulse 68   Ht 5' 2\" (1.575 m)   Wt 173 lb (78.5 kg)   LMP 01/01/1998 (Approximate)   SpO2 96%   BMI 31.64 kg/m²   [facility] alert, cooperative, and pleasant. Well groomed. No acute distress. Body habitus is overweight. HEENT - The head is normocephalic. No circumoral cyanosis. Dentition is normal.   EYES -  No Xanthelasma, no arcus senilis, no conjunctival hemorrhages or discharge. Neck - Supple, without increased jugular venous pressures. No carotid bruits. No mass. Respiratory - Lungs are clear bilaterally. No wheezes or rales. Normal effort without use of accessory muscles. Cardiovascular - Heart has regular rhythm and rate. No murmurs, rubs or gallops. + pedal pulses and no varicosities. Abdominal -  Soft, nontender, nondistended. Bowel sounds are intact. Extremities - No clubbing, cyanosis, or  edema. Musculoskeletal -  No clubbing . No Osler's nodes. Gait normal .  No kyphosis or scoliosis. Skin -  no statis ulcers or dermatitis. Neurological - No focal signs are identified. Oriented to person, place and time. Psychiatric -  Appropriate affect and mood. Assessment:     Diagnosis Orders   1. Coronary artery disease involving native coronary artery of native heart without angina pectoris     2. Essential hypertension     3. Mixed hyperlipidemia     4.  Left ventricular dysfunction       Data:  BP Readings from Last 3 Encounters:   12/02/20 122/76   11/12/20 122/80   10/28/20 118/74    Pulse Readings from Last 3 Encounters:   12/02/20 68   11/12/20 79   10/28/20 67        Wt Readings from Last 3 Encounters:   12/02/20 173 lb (78.5 kg)   11/12/20 169 lb (76.7 kg)   10/28/20 176 lb (79.8 kg)   Blood pressure and heart rate well controlled. Medical manage includes beta-blocker, ARB, statin, aspirin as well as long-acting nitrate    Has ongoing exertional dyspnea but no true angina. She is taking her medications regularly including her long-acting nitrate. Overall no change in activity tolerance. Encouraged her to be as active as possible. Following with primary care for her glucose. Evidently has had some recent GI complications with some of those medications and is improving. States taking medications as prescribed  Stable cardiovascular status. No evidence of overt heart failure, angina or dysrhythmia. Plan    Follow up in 6 mos   Call with any questions or concerns  Follow up with MIKE Franz for non cardiac problems  Report any new problems  Cardiovascular Fitness-Exercise as tolerated. Strive for 30 minutes of exercise most days of the week. Cardiac / Healthy Diet  Continue current medications as directed  Continue plan of treatment  It is always recommended that you bring your medications bottles with you to each visit - this is for your safety! MIKE Pollack    EMR dragon/transcription disclaimer: Much of this encounter note is electronic transcription/translation of spoken language to printed tach. Electronic translation of spoken language may be erroneous, or at times, nonsensical words or phrases may be inadvertently transcribed.  Although, I have reviewed the note for such errors, some may still exist.

## 2020-12-07 RX ORDER — EMPAGLIFLOZIN 25 MG/1
25 TABLET, FILM COATED ORAL DAILY
Qty: 90 TABLET | Refills: 0 | Status: SHIPPED | OUTPATIENT
Start: 2020-12-07 | End: 2021-03-25

## 2020-12-07 NOTE — TELEPHONE ENCOUNTER
Staci Kehr called to request a refill on her medication.       Last office visit : 11/12/2020   Next office visit : 12/18/2020     Requested Prescriptions     Pending Prescriptions Disp Refills    empagliflozin (JARDIANCE) 25 MG tablet 90 tablet 0     Sig: Take 25 mg by mouth daily            [employer]

## 2020-12-10 ENCOUNTER — VIRTUAL VISIT (OUTPATIENT)
Dept: PRIMARY CARE CLINIC | Age: 55
End: 2020-12-10
Payer: COMMERCIAL

## 2020-12-10 PROCEDURE — 99442 PR PHYS/QHP TELEPHONE EVALUATION 11-20 MIN: CPT | Performed by: NURSE PRACTITIONER

## 2020-12-10 RX ORDER — PHENAZOPYRIDINE HYDROCHLORIDE 100 MG/1
100 TABLET, FILM COATED ORAL 3 TIMES DAILY PRN
Qty: 9 TABLET | Refills: 0 | Status: SHIPPED | OUTPATIENT
Start: 2020-12-10 | End: 2021-01-12

## 2020-12-10 RX ORDER — CIPROFLOXACIN 500 MG/1
500 TABLET, FILM COATED ORAL 2 TIMES DAILY
Qty: 14 TABLET | Refills: 0 | Status: SHIPPED | OUTPATIENT
Start: 2020-12-10 | End: 2020-12-17

## 2020-12-10 ASSESSMENT — ENCOUNTER SYMPTOMS
EYES NEGATIVE: 1
GASTROINTESTINAL NEGATIVE: 1
RESPIRATORY NEGATIVE: 1

## 2020-12-10 NOTE — PROGRESS NOTES
[address] [address]            Phone:  [phone]  Fax:  [phone]    Edin Jack is a 60 y.o. female evaluated via telephone on 12/10/2020. Consent:    She and/or health care decision maker is aware that that she may receive a bill for this telephone service, depending on her insurance coverage, and has provided verbal consent to proceed: Yes      HPI  Chief Complaint   Patient presents with    Urinary Tract Infection       I communicated with the patient and/or health care decision maker about frequent urination and pressure. She did take 1 tablet of pyridium yesterday. She finished an oral abx last month for UTI as well. She reports symptoms did resolve, but have returned. Denies fever or flank pain. Review of Systems   Constitutional: Negative. Negative for fever. HENT: Negative. Eyes: Negative. Respiratory: Negative. Cardiovascular: Negative. Gastrointestinal: Negative. Endocrine: Negative. Genitourinary: Positive for dysuria and frequency. Negative for flank pain. Musculoskeletal: Negative. Skin: Negative. Neurological: Negative. Hematological: Negative. Psychiatric/Behavioral: Negative. PLAN    Details of this discussion including any medical advice provided:     1. Acute cystitis without hematuria    - ciprofloxacin (CIPRO) 500 MG tablet; Take 1 tablet by mouth 2 times daily for 7 days  Dispense: 14 tablet; Refill: 0  - phenazopyridine (PYRIDIUM) 100 MG tablet; Take 1 tablet by mouth 3 times daily as needed for Pain  Dispense: 9 tablet; Refill: 0       I am sending in Cipro along with Pyridium due to UTI symptoms. If this does not resolve it UTI or recurs again will need to refer to urology for further evaluation.     I affirm this is a Patient Initiated Episode with an Established Patient who has not had a related appointment within my department in the past 7 days or scheduled within the next 24 hours. Total Time: minutes: 11-20 minutes      Note: not billable if this call serves to triage the patient into an appointment for the relevant concern      Asmita Yun to the emergency declaration under the [facility], [facility] waiver authority and the [employer] and [employer] Act, this Virtual  Visit was conducted, with patient's consent, to reduce the patient's risk of exposure to COVID-19 and provide continuity of care for an established patient. Services were provided through a telephone discussion  to substitute for in-person clinic visit. Parties involved during telephone call include myself, MIKE Vazquez and patient listed.

## 2020-12-23 RX ORDER — NYSTATIN 100000 [USP'U]/G
POWDER TOPICAL
Qty: 60 G | Refills: 5 | Status: SHIPPED | OUTPATIENT
Start: 2020-12-23

## 2021-01-12 ENCOUNTER — VIRTUAL VISIT (OUTPATIENT)
Dept: PRIMARY CARE CLINIC | Age: 56
End: 2021-01-12
Payer: COMMERCIAL

## 2021-01-12 DIAGNOSIS — Z79.4 TYPE 2 DIABETES MELLITUS WITH COMPLICATION, WITH LONG-TERM CURRENT USE OF INSULIN (HCC): Primary | ICD-10-CM

## 2021-01-12 DIAGNOSIS — Z71.9 COUNSELING, UNSPECIFIED: ICD-10-CM

## 2021-01-12 DIAGNOSIS — I10 ESSENTIAL HYPERTENSION: ICD-10-CM

## 2021-01-12 DIAGNOSIS — R20.9 SENSATION OF COLD IN FINGER: ICD-10-CM

## 2021-01-12 DIAGNOSIS — E11.8 TYPE 2 DIABETES MELLITUS WITH COMPLICATION, WITH LONG-TERM CURRENT USE OF INSULIN (HCC): Primary | ICD-10-CM

## 2021-01-12 PROCEDURE — 99443 PR PHYS/QHP TELEPHONE EVALUATION 21-30 MIN: CPT | Performed by: NURSE PRACTITIONER

## 2021-01-12 ASSESSMENT — ENCOUNTER SYMPTOMS
GASTROINTESTINAL NEGATIVE: 1
RESPIRATORY NEGATIVE: 1
EYES NEGATIVE: 1

## 2021-01-12 NOTE — PROGRESS NOTES
Patient continue current regimen. We are checking poct a1c in office during next visit. May need to start glipizide if increasing. If patient continues to have increased episodes of the cold sensation in left hand will order vascular studies as discussed. I affirm this is a Patient Initiated Episode with an Established Patient who has not had a related appointment within my department in the past 7 days or scheduled within the next 24 hours. Total Time: minutes: 21-30 minutes      Note: not billable if this call serves to triage the patient into an appointment for the relevant concern      Jayant Huitron to the emergency declaration under the [facility], [facility] waiver authority and the [employer] and [employer] Act, this Virtual  Visit was conducted, with patient's consent, to reduce the patient's risk of exposure to COVID-19 and provide continuity of care for an established patient. Services were provided through a telephone discussion  to substitute for in-person clinic visit. Parties involved during telephone call include myself, MIKE Marks and patient listed.

## 2021-02-08 ENCOUNTER — TELEPHONE (OUTPATIENT)
Dept: CARDIOLOGY CLINIC | Age: 56
End: 2021-02-08

## 2021-02-08 NOTE — TELEPHONE ENCOUNTER
Patient states that for the past week and a half when she gets SOB she has to rest and she is just dead tired after that. She said she is getting enough sleep. BP good at 126/68-78. She has an appt with pulm on 2/10/21 advised she discuss with them as well.

## 2021-02-09 DIAGNOSIS — R06.02 SOB (SHORTNESS OF BREATH): Primary | ICD-10-CM

## 2021-02-09 NOTE — TELEPHONE ENCOUNTER
Spoke with patient and she agreed to do 2D echo. Order placed and number given to patient to schedule.

## 2021-02-09 NOTE — TELEPHONE ENCOUNTER
Had similar symptoms in December.   Agree to discuss with pulmonologist  If symptoms are significantly worse than they were in December and we can do 2D echo

## 2021-02-23 ENCOUNTER — OFFICE VISIT (OUTPATIENT)
Dept: PRIMARY CARE CLINIC | Age: 56
End: 2021-02-23
Payer: COMMERCIAL

## 2021-02-23 VITALS
BODY MASS INDEX: 34.16 KG/M2 | HEART RATE: 60 BPM | HEIGHT: 63 IN | DIASTOLIC BLOOD PRESSURE: 72 MMHG | WEIGHT: 192.8 LBS | OXYGEN SATURATION: 96 % | TEMPERATURE: 97.4 F | SYSTOLIC BLOOD PRESSURE: 128 MMHG

## 2021-02-23 DIAGNOSIS — E66.01 MORBID (SEVERE) OBESITY DUE TO EXCESS CALORIES (HCC): ICD-10-CM

## 2021-02-23 DIAGNOSIS — I25.118 CORONARY ARTERY DISEASE OF NATIVE ARTERY OF NATIVE HEART WITH STABLE ANGINA PECTORIS (HCC): ICD-10-CM

## 2021-02-23 DIAGNOSIS — I50.32 CHRONIC DIASTOLIC CONGESTIVE HEART FAILURE (HCC): ICD-10-CM

## 2021-02-23 DIAGNOSIS — E11.8 TYPE 2 DIABETES MELLITUS WITH COMPLICATION, WITH LONG-TERM CURRENT USE OF INSULIN (HCC): Primary | ICD-10-CM

## 2021-02-23 DIAGNOSIS — Z79.4 TYPE 2 DIABETES MELLITUS WITH COMPLICATION, WITH LONG-TERM CURRENT USE OF INSULIN (HCC): Primary | ICD-10-CM

## 2021-02-23 LAB — HBA1C MFR BLD: 8.8 %

## 2021-02-23 PROCEDURE — 1036F TOBACCO NON-USER: CPT | Performed by: NURSE PRACTITIONER

## 2021-02-23 PROCEDURE — 83036 HEMOGLOBIN GLYCOSYLATED A1C: CPT | Performed by: NURSE PRACTITIONER

## 2021-02-23 PROCEDURE — 2022F DILAT RTA XM EVC RTNOPTHY: CPT | Performed by: NURSE PRACTITIONER

## 2021-02-23 PROCEDURE — G8417 CALC BMI ABV UP PARAM F/U: HCPCS | Performed by: NURSE PRACTITIONER

## 2021-02-23 PROCEDURE — 3052F HG A1C>EQUAL 8.0%<EQUAL 9.0%: CPT | Performed by: NURSE PRACTITIONER

## 2021-02-23 PROCEDURE — 99214 OFFICE O/P EST MOD 30 MIN: CPT | Performed by: NURSE PRACTITIONER

## 2021-02-23 PROCEDURE — G8427 DOCREV CUR MEDS BY ELIG CLIN: HCPCS | Performed by: NURSE PRACTITIONER

## 2021-02-23 PROCEDURE — G8484 FLU IMMUNIZE NO ADMIN: HCPCS | Performed by: NURSE PRACTITIONER

## 2021-02-23 PROCEDURE — 3017F COLORECTAL CA SCREEN DOC REV: CPT | Performed by: NURSE PRACTITIONER

## 2021-02-23 ASSESSMENT — PATIENT HEALTH QUESTIONNAIRE - PHQ9
SUM OF ALL RESPONSES TO PHQ QUESTIONS 1-9: 0
SUM OF ALL RESPONSES TO PHQ QUESTIONS 1-9: 0
SUM OF ALL RESPONSES TO PHQ9 QUESTIONS 1 & 2: 0
2. FEELING DOWN, DEPRESSED OR HOPELESS: 0
SUM OF ALL RESPONSES TO PHQ QUESTIONS 1-9: 0

## 2021-02-23 NOTE — PROGRESS NOTES
[address] [facility]  [id number] [place] [address]  [address] [address]  Dept: [phone]  Dept Fax: [phone]  Loc: [phone]    Severiano Maria is a 60 y.o. female who presents today for her medical conditions/complaints as noted below. Severiano Maria is c/o of Diabetes (F/U)      Chief Complaint   Patient presents with    Diabetes     F/U       HPI:     HPI  Patient is here for follow up on DM. During last visit we did adjust her meds due to increased nausea/vomiting. She has had some weight gain over the last few months. She has been monitoring blood sugars and they have been higher per her report. She has been monitoring her bp at home and it has been doing good as well.       Past Medical History:   Diagnosis Date    Abnormal Pap smear of cervix     CAD (coronary artery disease)     stent 2011 Scot Bal; sees dr. Rick Castillo Cervical cancer [facility])     Class 3 severe obesity due to excess calories without serious comorbidity with body mass index (BMI) of 40.0 to 44.9 in adult [facility]) 7/28/2020    Diabetes mellitus ([facility]     ERRONEOUS ENCOUNTER--DISREGARD 9/15/2015    GERD (gastroesophageal reflux disease)     Headache(784.0)     Heart attack ([facility] [address] 12/2011    Hyperlipidemia     Hypertension     Lichen simplex chronicus 10/2015    Osteopenia     Pneumonia     Type II or unspecified type diabetes mellitus without mention of complication, not stated as uncontrolled         Past Surgical History:   Procedure Laterality Date    BREAST BIOPSY  6/8/2011    US guided core needle, right, benign    CARDIAC CATHETERIZATION  12/22/14  JDT    EF 50%    CARDIAC CATHETERIZATION  06/05/2017        [facility] FLUOROSCOPIC GUIDANCE NEEDLE PLACEMENT ADD ON Left 7/19/2018    CORTICOSTEROID INJECTION performed by Adam Reyes MD at [address] [facility]  2006    COLONOSCOPY  approx 2005    Dr. Kendal Peterson per pt recall \" Normal\"   [address] [facility] PLACEMENT  2011    [facility] to First diag    CORONARY ARTERY BYPASS GRAFT      FINGER TRIGGER RELEASE Left 2019    LEFT INDEX AND LONG TRIGGER FINGER RELEASAE performed by Darcy Hopkins MD at [address]    OTHER SURGICAL HISTORY      fibrinolysis    MA CABG, ARTERY-VEIN, THREE  2014    Coronary Artery Bypass, 3    MA MANIPULATN SHLDR JT W ANESTHESIA Left 2018    SHOULDER MANIPULATION UNDER ANESTHESIA performed by Kota Contreras MD at [address]      UPPER GASTROINTESTINAL ENDOSCOPY  approx 2005    per pt recall \" Normal\"    UPPER GASTROINTESTINAL ENDOSCOPY N/A 2020    Dr Armin Zavaleta exam, neg EoE    VULVAR/PERINEAL BIOPSY  10/07/2015    Dr. Shayna Guerin       Social History     Tobacco Use    Smoking status: Former Smoker     Packs/day: 2.00     Years: 15.00     Pack years: 30.00     Types: Cigarettes     Quit date: 2014     Years since quittin.6    Smokeless tobacco: Never Used   Substance Use Topics    Alcohol use: No        Current Outpatient Medications   Medication Sig Dispense Refill    SITagliptin (JANUVIA) 50 MG tablet Take 1 tablet by mouth daily 90 tablet 1    nystatin (MYCOSTATIN) 722348 UNIT/GM powder APPLY 2 OR 3 TIMES DAILY.  60 g 5    empagliflozin (JARDIANCE) 25 MG tablet Take 25 mg by mouth daily 90 tablet 0    nitroGLYCERIN (NITROSTAT) 0.4 MG SL tablet Place 1 tablet under the tongue every 5 minutes as needed for Chest pain 25 tablet 3    Diabetic Shoe MISC by Does not apply route 1 each 0    tamsulosin (FLOMAX) 0.4 MG capsule TAKE 1 CAPSULE BY MOUTH DAILY 30 capsule 5    albuterol (PROVENTIL) (5 MG/ML) 0.5% nebulizer solution Take 0.5 mLs by nebulization every 6 hours as needed for Wheezing 120 each 0    vitamin D (ERGOCALCIFEROL) 1.25 MG (92790 UT) CAPS capsule TAKE 1 CAPSULE BY MOUTH ONCE WEEKLY 4 capsule 2    metoprolol tartrate (LOPRESSOR) 25 MG tablet TAKE 1 TABLET BY MOUTH TWICE DAILY 60 tablet 5    docusate sodium (COLACE) 100 MG capsule Take 1 capsule by mouth daily as needed for Constipation 30 capsule 1    ondansetron (ZOFRAN ODT) 4 MG disintegrating tablet Take 1 tablet by mouth daily as needed for Nausea or Vomiting 30 tablet 5    valsartan (DIOVAN) 80 MG tablet TAKE 1 TABLET BY MOUTH DAILY 30 tablet 5    diclofenac sodium (VOLTAREN) 1 % GEL Apply 2 g topically 4 times daily (Patient taking differently: Apply 2 g topically 4 times daily as needed for Pain ) 2 Tube 1    metFORMIN (GLUCOPHAGE-XR) 500 MG extended release tablet Take 1 tablet by mouth 2 times daily 60 tablet 2    furosemide (LASIX) 40 MG tablet TAKE 1 TABLET BY MOUTH DAILY 30 tablet 5    isosorbide mononitrate (IMDUR) 30 MG extended release tablet TAKE 1 TABLET EVERY DAY (Patient taking differently: nightly TAKE 1 TABLET EVERY DAY) 30 tablet 5    aspirin 81 MG tablet Take 81 mg by mouth daily      mometasone (ELOCON) 0.1 % ointment Apply topically to vagina 3 times per week (Patient taking differently: Apply topically three times a week Apply topically to vagina 3 times per week) 1 Tube 3    Eflornithine HCl (VANIQA) 13.9 % CREA Apply 1 Dose topically 2 times daily (Patient taking differently: Apply 1 Dose topically 2 times daily as needed ) 45 g 2    lovastatin (MEVACOR) 40 MG tablet TAKE 1 TABLET AT BEDTIME 30 tablet 4     No current facility-administered medications for this visit. Allergies   Allergen Reactions    Codeine Other (See Comments)     Seizures as a child    Motrin [Ibuprofen] Swelling     Tongue to swell    Humalog [Insulin Lispro] Other (See Comments)     Shakiness, break out in a sweat at higher doses.     Ozempic (0.25 Or 0.5 Mg-Dose) [Semaglutide(0.25 Or 0.5mg-Dos)]      nausea       Family History   Problem Relation Age of Onset    Diabetes Father     Cancer Father     High Blood Pressure Father     Heart Attack Father     Colon Cancer Father     Diabetes Mother     Breast Cancer Mother     Heart Failure Mother         CHF, MVP    Liver Cancer Mother     Heart Failure Maternal Grandfather     Stroke Maternal Grandfather     Pancreatic Cancer Maternal Grandfather     Stomach Cancer Maternal Grandfather     Diabetes Brother     Colon Cancer Paternal Uncle     Rectal Cancer Other     Colon Polyps Neg Hx     Crohn's Disease Neg Hx     Irritable Bowel Syndrome Neg Hx     Liver Disease Neg Hx                Subjective:      Review of Systems   Constitutional: Negative. HENT: Negative. Eyes: Negative. Respiratory: Negative. Cardiovascular: Negative. Gastrointestinal: Negative. Endocrine: Negative. Genitourinary: Negative. Musculoskeletal: Negative. Skin: Negative. Neurological: Negative. Hematological: Negative. Psychiatric/Behavioral: Negative. Objective:     Physical Exam  Vitals signs and nursing note reviewed. Constitutional:       Appearance: Normal appearance. HENT:      Head: Normocephalic and atraumatic. Jaw: There is normal jaw occlusion. Right Ear: Hearing, tympanic membrane, ear canal and external ear normal.      Left Ear: Hearing, tympanic membrane, ear canal and external ear normal.      Nose: Nose normal.      Mouth/Throat:      Lips: Pink. Mouth: Mucous membranes are moist.      Pharynx: Oropharynx is clear. Eyes:      General: Lids are normal.      Extraocular Movements: Extraocular movements intact. Conjunctiva/sclera: Conjunctivae normal.      Pupils: Pupils are equal, round, and reactive to light. Neck:      Musculoskeletal: Full passive range of motion without pain, normal range of motion and neck supple. Thyroid: No thyromegaly. Trachea: Trachea normal.   Cardiovascular:      Rate and Rhythm: Normal rate and regular rhythm. Pulses: Normal pulses. Dorsalis pedis pulses are 2+ on the right side and 2+ on the left side.         Posterior tibial pulses are 2+ on the right side and 2+ on the left side. Heart sounds: Normal heart sounds. No murmur. Pulmonary:      Effort: Pulmonary effort is normal.      Breath sounds: Normal breath sounds and air entry. Abdominal:      General: Bowel sounds are normal.      Palpations: Abdomen is soft. Musculoskeletal:      Cervical back: She exhibits normal range of motion. Thoracic back: She exhibits normal range of motion. Lumbar back: She exhibits normal range of motion. Right lower leg: No edema. Left lower leg: No edema. Lymphadenopathy:      Cervical: No cervical adenopathy. Skin:     General: Skin is warm and dry. Capillary Refill: Capillary refill takes less than 2 seconds. Neurological:      General: No focal deficit present. Mental Status: She is alert and oriented to person, place, and time. Mental status is at baseline. Psychiatric:         Attention and Perception: Attention normal.         Mood and Affect: Mood normal.         Speech: Speech normal.         Behavior: Behavior normal.         Thought Content: Thought content normal.         Cognition and Memory: Cognition normal.         Judgment: Judgment normal.         /72   Pulse 60   Temp 97.4 °F (36.3 °C)   Ht 5' 2.5\" (1.588 m)   Wt 192 lb 12.8 oz (87.5 kg)   LMP 01/01/1998 (Approximate)   SpO2 96%   BMI 34.70 kg/m²     Assessment:      Diagnosis Orders   1. Type 2 diabetes mellitus with complication, with long-term current use of insulin ([facility])  POCT glycosylated hemoglobin (Hb A1C)   2. Chronic diastolic congestive heart failure ([address]     3. Morbid (severe) obesity due to excess calories ([address]     4. Coronary artery disease of native artery of native heart with stable angina pectoris ([address]         Results for orders placed or performed in visit on 02/23/21   POCT glycosylated hemoglobin (Hb A1C)   Result Value Ref Range    Hemoglobin A1C 8.8 %       Plan:     a1c has increased.   I am starting patient on Januvia based on results. Samples provided in office. pateint is to monitor diet and increase exercise as well. Return in about 3 months (around 5/23/2021) for Office Visit, POCT A1c. Orders Placed This Encounter   Procedures    POCT glycosylated hemoglobin (Hb A1C)       Orders Placed This Encounter   Medications    SITagliptin (JANUVIA) 50 MG tablet     Sig: Take 1 tablet by mouth daily     Dispense:  90 tablet     Refill:  1        Patient offered educational handouts and has had all questions answered. Patient voices understanding and agrees to plans along with risks and benefits of plan. Patient is instructed to continue prior meds, diet, and exercise plans as instructed. Patient agrees to follow up as instructed and sooner if needed. Patient agrees to go to ER if condition becomes emergent. EMR Dragon/transcription disclaimer: Some of this encounter note is an electronic transcription/translation of spoken language to printed text. The electronic translation of spoken language may permit erroneous, or at times, nonsensical words or phrases to be inadvertently transcribed.  Although I have reviewed the note for such errors, some may still exist.    Electronically signed by MIKE Nova on 2/24/2021 at 12:42 PM

## 2021-02-24 PROBLEM — I50.32 CHRONIC DIASTOLIC CONGESTIVE HEART FAILURE (HCC): Status: ACTIVE | Noted: 2021-02-24

## 2021-02-24 ASSESSMENT — ENCOUNTER SYMPTOMS
GASTROINTESTINAL NEGATIVE: 1
EYES NEGATIVE: 1
RESPIRATORY NEGATIVE: 1

## 2021-03-01 ENCOUNTER — VIRTUAL VISIT (OUTPATIENT)
Dept: PRIMARY CARE CLINIC | Age: 56
End: 2021-03-01
Payer: COMMERCIAL

## 2021-03-01 DIAGNOSIS — N30.00 ACUTE CYSTITIS WITHOUT HEMATURIA: Primary | ICD-10-CM

## 2021-03-01 PROCEDURE — 99442 PR PHYS/QHP TELEPHONE EVALUATION 11-20 MIN: CPT | Performed by: NURSE PRACTITIONER

## 2021-03-01 RX ORDER — CIPROFLOXACIN 500 MG/1
500 TABLET, FILM COATED ORAL 2 TIMES DAILY
Qty: 20 TABLET | Refills: 0 | Status: SHIPPED | OUTPATIENT
Start: 2021-03-01 | End: 2021-03-11

## 2021-03-01 RX ORDER — PHENAZOPYRIDINE HYDROCHLORIDE 100 MG/1
100 TABLET, FILM COATED ORAL 3 TIMES DAILY PRN
Qty: 9 TABLET | Refills: 0 | Status: SHIPPED | OUTPATIENT
Start: 2021-03-01 | End: 2021-03-31

## 2021-03-01 ASSESSMENT — ENCOUNTER SYMPTOMS
PHOTOPHOBIA: 0
BACK PAIN: 0
SHORTNESS OF BREATH: 0
COUGH: 0
NAUSEA: 0
RHINORRHEA: 0
COLOR CHANGE: 0
VOICE CHANGE: 0
VOMITING: 0

## 2021-03-01 NOTE — PROGRESS NOTES
[facility]   [address] [place] [address]            Phone:  [phone]  Fax:  [phone]    Joycelyn Blanca is a 60 y.o. female evaluated via telephone on 3/1/2021. Consent:    She and/or health care decision maker is aware that that she may receive a bill for this telephone service, depending on her insurance coverage, and has provided verbal consent to proceed: Yes      HPI  Chief Complaint   Patient presents with    Dysuria       Patient presents today for evaluation of dysuria x 2 days. She states symptoms began yesterday. She is having pelvic pressure, urinary frequency and burning. She denies fever. Review of Systems   Constitutional: Negative for chills and fever. HENT: Negative for ear pain, hearing loss, rhinorrhea and voice change. Eyes: Negative for photophobia and visual disturbance. Respiratory: Negative for cough and shortness of breath. Cardiovascular: Negative for chest pain and palpitations. Gastrointestinal: Negative for nausea and vomiting. Endocrine: Negative. Negative for cold intolerance and heat intolerance. Genitourinary: Positive for dysuria and frequency. Negative for difficulty urinating and flank pain. Musculoskeletal: Negative for back pain and neck pain. Skin: Negative for color change and rash. Allergic/Immunologic: Negative for environmental allergies and food allergies. Neurological: Negative for dizziness, speech difficulty and headaches. Hematological: Does not bruise/bleed easily. Psychiatric/Behavioral: Negative for sleep disturbance and suicidal ideas. Patient location: home    PLAN    Details of this discussion including any medical advice provided:     1. Acute cystitis without hematuria    - ciprofloxacin (CIPRO) 500 MG tablet; Take 1 tablet by mouth 2 times daily for 10 days  Dispense: 20 tablet;  Refill: 0 - phenazopyridine (PYRIDIUM) 100 MG tablet; Take 1 tablet by mouth 3 times daily as needed for Pain  Dispense: 9 tablet; Refill: 0       I affirm this is a Patient Initiated Episode with an Established Patient who has not had a related appointment within my department in the past 7 days or scheduled within the next 24 hours. Total Time: minutes: 11-20 minutes      Note: not billable if this call serves to triage the patient into an appointment for the relevant concern      [address] to the emergency declaration under the [place], [facility] waiver authority and the [employer] Act, this Virtual  Visit was conducted, with patient's consent, to reduce the patient's risk of exposure to COVID-19 and provide continuity of care for an established patient.

## 2021-03-04 ENCOUNTER — HOSPITAL ENCOUNTER (OUTPATIENT)
Dept: NON INVASIVE DIAGNOSTICS | Age: 56
Discharge: HOME | End: 2021-03-04
Payer: COMMERCIAL

## 2021-03-04 DIAGNOSIS — R06.02 SOB (SHORTNESS OF BREATH): ICD-10-CM

## 2021-03-04 LAB
LV EF: 58 %
LVEF MODALITY: NORMAL

## 2021-03-04 PROCEDURE — 6360000004 HC RX CONTRAST MEDICATION: Performed by: CLINICAL NURSE SPECIALIST

## 2021-03-04 PROCEDURE — C8929 TTE W OR WO FOL WCON,DOPPLER: HCPCS

## 2021-03-04 RX ADMIN — PERFLUTREN 2.2 MG: 6.52 INJECTION, SUSPENSION INTRAVENOUS at 16:20

## 2021-03-18 DIAGNOSIS — Z79.4 TYPE 2 DIABETES MELLITUS WITH COMPLICATION, WITH LONG-TERM CURRENT USE OF INSULIN (HCC): ICD-10-CM

## 2021-03-18 DIAGNOSIS — E11.8 TYPE 2 DIABETES MELLITUS WITH COMPLICATION, WITH LONG-TERM CURRENT USE OF INSULIN (HCC): ICD-10-CM

## 2021-03-18 RX ORDER — ERGOCALCIFEROL 1.25 MG/1
CAPSULE ORAL
Qty: 4 CAPSULE | Refills: 2 | Status: SHIPPED | OUTPATIENT
Start: 2021-03-18 | End: 2021-06-22

## 2021-03-18 RX ORDER — VALSARTAN 80 MG/1
80 TABLET ORAL DAILY
Qty: 30 TABLET | Refills: 5 | Status: SHIPPED | OUTPATIENT
Start: 2021-03-18 | End: 2021-10-22

## 2021-03-18 RX ORDER — METFORMIN HYDROCHLORIDE 500 MG/1
TABLET, EXTENDED RELEASE ORAL
Qty: 60 TABLET | Refills: 3 | Status: SHIPPED | OUTPATIENT
Start: 2021-03-18 | End: 2021-07-22

## 2021-03-25 RX ORDER — ORAL SEMAGLUTIDE 7 MG/1
7 TABLET ORAL DAILY
Qty: 30 TABLET | Refills: 2 | Status: SHIPPED | OUTPATIENT
Start: 2021-03-25 | End: 2021-04-29 | Stop reason: SINTOL

## 2021-03-25 RX ORDER — GLIPIZIDE 5 MG/1
5 TABLET, FILM COATED, EXTENDED RELEASE ORAL DAILY
Qty: 30 TABLET | Refills: 3 | Status: SHIPPED | OUTPATIENT
Start: 2021-03-25 | End: 2021-06-01

## 2021-03-26 ENCOUNTER — TELEPHONE (OUTPATIENT)
Dept: PRIMARY CARE CLINIC | Age: 56
End: 2021-03-26

## 2021-03-26 NOTE — TELEPHONE ENCOUNTER
Pt called and LM on VM stating she was just about out of samples of Jardiance and Januvia and did not qualify for patient assistance and could not afford it. Per VO from Michael Adhikari APRN medication has been changed to Glipizide and Rybelsus. Pt is to come by the office to  sample Lot # [id number] Exp 1/2022 so that she can use the savings card to receive a discount on the Rx at the pharmacy on the [address]. Pt has been notified by telephone and EULALIA.

## 2021-03-31 ENCOUNTER — OFFICE VISIT (OUTPATIENT)
Dept: URGENT CARE | Age: 56
End: 2021-03-31
Payer: COMMERCIAL

## 2021-03-31 VITALS
BODY MASS INDEX: 34.38 KG/M2 | OXYGEN SATURATION: 98 % | SYSTOLIC BLOOD PRESSURE: 138 MMHG | DIASTOLIC BLOOD PRESSURE: 82 MMHG | HEART RATE: 90 BPM | RESPIRATION RATE: 18 BRPM | TEMPERATURE: 97.7 F | WEIGHT: 191 LBS

## 2021-03-31 DIAGNOSIS — K04.7 DENTAL INFECTION: ICD-10-CM

## 2021-03-31 DIAGNOSIS — K08.89 PAIN, DENTAL: Primary | ICD-10-CM

## 2021-03-31 PROCEDURE — G8484 FLU IMMUNIZE NO ADMIN: HCPCS | Performed by: NURSE PRACTITIONER

## 2021-03-31 PROCEDURE — G8427 DOCREV CUR MEDS BY ELIG CLIN: HCPCS | Performed by: NURSE PRACTITIONER

## 2021-03-31 PROCEDURE — 3017F COLORECTAL CA SCREEN DOC REV: CPT | Performed by: NURSE PRACTITIONER

## 2021-03-31 PROCEDURE — 1036F TOBACCO NON-USER: CPT | Performed by: NURSE PRACTITIONER

## 2021-03-31 PROCEDURE — 99213 OFFICE O/P EST LOW 20 MIN: CPT | Performed by: NURSE PRACTITIONER

## 2021-03-31 PROCEDURE — G8417 CALC BMI ABV UP PARAM F/U: HCPCS | Performed by: NURSE PRACTITIONER

## 2021-03-31 RX ORDER — AMOXICILLIN AND CLAVULANATE POTASSIUM 875; 125 MG/1; MG/1
1 TABLET, FILM COATED ORAL 2 TIMES DAILY
Qty: 20 TABLET | Refills: 0 | Status: SHIPPED | OUTPATIENT
Start: 2021-03-31 | End: 2021-04-10

## 2021-03-31 ASSESSMENT — ENCOUNTER SYMPTOMS: SINUS PRESSURE: 0

## 2021-03-31 NOTE — PROGRESS NOTES
[facility]  [address] [place] [address] [address] [address]  Dept: [phone]  Dept Fax: [phone]  Loc: [phone]    Sanjuana Bright is a 60 y.o. female who presents today for her medical conditions/complaintsas noted below. Sanjuana Bright is c/o of Dental Pain        HPI:     Dental Pain   This is a new problem. The current episode started in the past 7 days. The problem occurs constantly. The problem has been rapidly worsening. The pain is at a severity of 10/10. The pain is severe. Associated symptoms include facial pain. Pertinent negatives include no fever, sinus pressure or thermal sensitivity. She has tried acetaminophen for the symptoms. The treatment provided mild relief.        Past Medical History:   Diagnosis Date    Abnormal Pap smear of cervix     CAD (coronary artery disease)     stent 2011 Zack Ibarra; sees dr. Kerri Wilkins Cervical cancer [facility])     Class 3 severe obesity due to excess calories without serious comorbidity with body mass index (BMI) of 40.0 to 44.9 in adult [facility]) 7/28/2020    Diabetes mellitus ([facility]     ERRONEOUS ENCOUNTER--DISREGARD 9/15/2015    GERD (gastroesophageal reflux disease)     Headache(784.0)     Heart attack ([facility] 12/2011    Hyperlipidemia     Hypertension     Lichen simplex chronicus 10/2015    Osteopenia     Pneumonia     Type II or unspecified type diabetes mellitus without mention of complication, not stated as uncontrolled      Past Surgical History:   Procedure Laterality Date    BREAST BIOPSY  6/8/2011    US guided core needle, right, benign    CARDIAC CATHETERIZATION  12/22/14  JDT    EF 50%    CARDIAC CATHETERIZATION  06/05/2017        G FLUOROSCOPIC GUIDANCE NEEDLE PLACEMENT ADD ON Left 7/19/2018    CORTICOSTEROID INJECTION performed by Dave Hagan MD at [address] [place] [address]  2006    COLONOSCOPY  approx 2005    Dr. Yossi Herrera per pt recall \" Normal\"    CORONARY ANGIOPLASTY WITH STENT PLACEMENT  2011    [facility]    CORONARY ARTERY BYPASS GRAFT      FINGER TRIGGER RELEASE Left 2019    LEFT INDEX AND LONG TRIGGER FINGER RELEASAE performed by Barbara Palma MD at [address]    OTHER SURGICAL HISTORY      fibrinolysis    IA CABG, ARTERY-VEIN, THREE  2014    Coronary Artery Bypass, 3    IA MANIPULATN JOSELDR JT W ANESTHESIA Left 2018    SHOULDER MANIPULATION UNDER ANESTHESIA performed by Mich Krishnamurthy MD at [facility]    THORACOTOMY      UPPER GASTROINTESTINAL ENDOSCOPY  approx     per pt recall \" Normal\"    UPPER GASTROINTESTINAL ENDOSCOPY N/A 2020    Dr Elijah Alston exam, neg EoE    VULVAR/PERINEAL BIOPSY  10/07/2015    Dr. Steven Flanagan       Family History   Problem Relation Age of Onset    Diabetes Father     Cancer Father     High Blood Pressure Father     Heart Attack Father     Colon Cancer Father     Diabetes Mother    Naseem Re Breast Cancer Mother     Heart Failure Mother         CHF, MVP    Liver Cancer Mother     Heart Failure Maternal Grandfather     Stroke Maternal Grandfather     Pancreatic Cancer Maternal Grandfather     Stomach Cancer Maternal Grandfather     Diabetes Brother     Colon Cancer Paternal Uncle     Rectal Cancer Other     Colon Polyps Neg Hx     Crohn's Disease Neg Hx     Irritable Bowel Syndrome Neg Hx     Liver Disease Neg Hx        Social History     Tobacco Use    Smoking status: Former Smoker     Packs/day: 2.00     Years: 15.00     Pack years: 30.00     Types: Cigarettes     Quit date: 2014     Years since quittin.7    Smokeless tobacco: Never Used   Substance Use Topics    Alcohol use: No      Current Outpatient Medications   Medication Sig Dispense Refill    amoxicillin-clavulanate (AUGMENTIN) 875-125 MG per tablet Take 1 tablet by mouth 2 times daily for 10 days 20 tablet 0    glipiZIDE (GLUCOTROL XL) 5 MG extended release tablet Take 1 topically to vagina 3 times per week) 1 Tube 3    Eflornithine HCl (VANIQA) 13.9 % CREA Apply 1 Dose topically 2 times daily (Patient taking differently: Apply 1 Dose topically 2 times daily as needed ) 45 g 2     No current facility-administered medications for this visit. Allergies   Allergen Reactions    Codeine Other (See Comments)     Seizures as a child    Motrin [Ibuprofen] Swelling     Tongue to swell    Humalog [Insulin Lispro] Other (See Comments)     Shakiness, break out in a sweat at higher doses.  Ozempic (0.25 Or 0.5 Mg-Dose) [Semaglutide(0.25 Or 0.5mg-Dos)]      nausea       Health Maintenance   Topic Date Due    Hepatitis B vaccine (1 of 3 - Risk 3-dose series) Never done    DTaP/Tdap/Td vaccine (1 - Tdap) Never done    Shingles Vaccine (1 of 2) Never done    Colon Cancer Screen FIT/FOBT  05/08/2016    Diabetic retinal exam  01/11/2017    Diabetic microalbuminuria test  10/17/2019    Breast cancer screen  12/09/2020    Flu vaccine (1) 10/28/2021 (Originally 9/1/2020)    Pneumococcal 0-64 years Vaccine (1 of 1 - PPSV23) 10/28/2024 (Originally 10/30/1971)    Low dose CT lung screening  07/28/2021    Diabetic foot exam  11/12/2021    Lipid screen  11/13/2021    Potassium monitoring  11/13/2021    Creatinine monitoring  11/13/2021    A1C test (Diabetic or Prediabetic)  02/23/2022    Hepatitis C screen  Completed    HIV screen  Completed    Hepatitis A vaccine  Aged Out    Hib vaccine  Aged Out    Meningococcal (ACWY) vaccine  Aged Out       Subjective:     Review of Systems   Constitutional: Negative for fever. HENT: Positive for dental problem. Negative for sinus pressure.        :Objective      Physical Exam  Vitals signs and nursing note reviewed. Constitutional:       General: She is not in acute distress. Appearance: Normal appearance. She is well-developed. She is not ill-appearing or diaphoretic. HENT:      Head: Normocephalic and atraumatic. Mouth/Throat:      Lips: Pink. Mouth: Mucous membranes are moist.      Dentition: Abnormal dentition. Gingival swelling and dental caries present. Pharynx: Oropharynx is clear. Uvula midline. Eyes:      Conjunctiva/sclera: Conjunctivae normal.      Pupils: Pupils are equal, round, and reactive to light. Neck:      Musculoskeletal: Normal range of motion. Cardiovascular:      Rate and Rhythm: Normal rate and regular rhythm. Heart sounds: Normal heart sounds. No murmur. Pulmonary:      Effort: Pulmonary effort is normal. No respiratory distress. Breath sounds: Normal breath sounds. No wheezing. Skin:     General: Skin is warm and dry. Findings: No rash. Neurological:      Mental Status: She is alert and oriented to person, place, and time. Psychiatric:         Behavior: Behavior normal.       /82   Pulse 90   Temp 97.7 °F (36.5 °C) (Temporal)   Resp 18   Wt 191 lb (86.6 kg)   LMP 01/01/1998 (Approximate)   SpO2 98%   BMI 34.38 kg/m²     :Assessment       Diagnosis Orders   1. Pain, dental     2. Dental infection  amoxicillin-clavulanate (AUGMENTIN) 875-125 MG per tablet       :Plan   1. Antibiotic as prescribed   2. Continue tylenol per package instructions   3. Follow up with PCP as needed   4. Keep dental appointment   5. Return to clinic as needed with new symptoms      No orders of the defined types were placed in this encounter. No follow-ups on file. Orders Placed This Encounter   Medications    amoxicillin-clavulanate (AUGMENTIN) 875-125 MG per tablet     Sig: Take 1 tablet by mouth 2 times daily for 10 days     Dispense:  20 tablet     Refill:  0       Patient given educational materials- see patient instructions. Discussed use, benefit, and side effects of prescribedmedications. All patient questions answered. Pt voiced understanding.      Patient Instructions       Patient Education        Abscessed Tooth: Care Instructions  Your Care prevent tooth abscess  · Brush and floss every day. Have regular dental checkups. · Eat a healthy diet. Avoid sugary foods and drinks. · Do not smoke or vape with nicotine. And don't use spit tobacco. Tobacco and nicotine slow your ability to heal. They increase your risk for gum disease and cancer of the mouth and throat. If you need help quitting, talk to your doctor about stop-smoking programs and medicines. These can increase your chances of quitting for good. When should you call for help? Call 911 anytime you think you may need emergency care. For example, call if:    · You have trouble breathing. Call your doctor now or seek immediate medical care if:    · You have new or worse symptoms of infection, such as:  ? Increased pain, swelling, warmth, or redness. ? Red streaks leading from the area. ? Pus draining from the area. ? A fever. Watch closely for changes in your health, and be sure to contact your doctor if:    · You do not get better as expected. Where can you learn more? Go to https://ObjectFX.Nextlanding. org and sign in to your Corhythm account. Enter Q156 in the [facility] box to learn more about \"Abscessed Tooth: Care Instructions. \"     If you do not have an account, please click on the \"Sign Up Now\" link. Current as of: October 27, 2020               Content Version: 12.8  © 3245-5076 Healthwise, Incorporated. Care instructions adapted under license by [facility] ([facility]). If you have questions about a medical condition or this instruction, always ask your healthcare professional. [place] [address] any warranty or liability for your use of this information. Patient Education        Tooth and Gum Pain: Care Instructions  Your Care Instructions     The most common causes of dental pain are tooth decay and gum disease. Pain can also be caused by an infection of the tooth (abscess) or the gums. Or you may have pain from a broken or cracked tooth. Other causes of pain include infection and damage to a tooth from nervous grinding of your teeth. A wisdom tooth can be painful when it is coming in but cannot break through the gum. It can also be painful when the tooth is only partway in and extra gum tissue has formed around it. The tissue can get inflamed (pericoronitis), and sometimes it gets infected. Prompt dental care can help find the cause of your toothache and keep the tooth from dying or gum disease from getting worse. Self-care at home may reduce your pain and discomfort. Follow-up care is a key part of your treatment and safety. Be sure to make and go to all appointments, and call your dentist or doctor if you are having problems. It's also a good idea to know your test results and keep a list of the medicines you take. How can you care for yourself at home? · To reduce pain and facial swelling, put an ice or cold pack on the outside of your cheek for 10 to 20 minutes at a time. Put a thin cloth between the ice and your skin. Do not use heat. · If your doctor prescribed antibiotics, take them as directed. Do not stop taking them just because you feel better. You need to take the full course of antibiotics. · Ask your doctor if you can take an over-the-counter pain medicine, such as acetaminophen (Tylenol), ibuprofen (Advil, Motrin), or naproxen (Aleve). Be safe with medicines. Read and follow all instructions on the label. · Avoid very hot, cold, or sweet foods and drinks if they increase your pain. · Rinse your mouth with warm salt water every 2 hours to help relieve pain and swelling. Mix 1 teaspoon of salt in 8 ounces of water. · Talk to your dentist about using special toothpaste for sensitive teeth. To reduce pain on contact with heat or cold or when brushing, brush with this toothpaste regularly or rub a small amount of the paste on the sensitive area with a clean finger 2 or 3 times a day. Floss gently between your teeth.   · Do not smoke or use spit tobacco. Tobacco use can make gum problems worse, decreases your ability to fight infection in your gums, and delays healing. If you need help quitting, talk to your doctor about stop-smoking programs and medicines. These can increase your chances of quitting for good. When should you call for help? Call 911 anytime you think you may need emergency care. For example, call if:    · You have trouble breathing. Call your dentist or doctor now or seek immediate medical care if:    · You have signs of infection, such as:  ? Increased pain, swelling, warmth, or redness. ? Red streaks leading from the area. ? Pus draining from the area. ? A fever. Watch closely for changes in your health, and be sure to contact your doctor if:    · You do not get better as expected. Where can you learn more? Go to https://Dedalus Grouppepiceweb.Grapeshot. org and sign in to your Sendah Direct account. Enter [id number] in the [facility] box to learn more about \"Tooth and Gum Pain: Care Instructions. \"     If you do not have an account, please click on the \"Sign Up Now\" link. Current as of: October 27, 2020               Content Version: 12.8  © 2006-2021 Healthwise, [employer]. Care instructions adapted under license by [facility] ([facility]). If you have questions about a medical condition or this instruction, always ask your healthcare professional. [place] [address] any warranty or liability for your use of this information. 1. Antibiotic as prescribed   2. Continue tylenol per package instructions   3. Follow up with PCP as needed   4. Keep dental appointment   5.  Return to clinic as needed with new symptoms         Electronically signed by MIKE Contreras on 3/31/2021 at 12:46 PM

## 2021-03-31 NOTE — PATIENT INSTRUCTIONS
Patient Education        Abscessed Tooth: Care Instructions  Your Care Instructions     An abscessed tooth is a tooth that has a pocket of pus in the tissues around it. Pus forms when the body tries to fight an infection caused by bacteria. If the pus cannot drain, it forms an abscess. An abscessed tooth can cause red, swollen gums and throbbing pain, especially when you chew. You may have a bad taste in your mouth and a fever, and your jaw may swell. Damage to the tooth, untreated tooth decay, or gum disease can cause an abscessed tooth. An abscessed tooth needs to be treated by a dental professional right away. If it is not treated, the infection could spread to other parts of your body. Your dentist will give you antibiotics to stop the infection. He or she may make a hole in the tooth or cut open (sherri) the abscess inside your mouth so that the infection can drain, which should relieve your pain. You may need to have a root canal treatment, which tries to save your tooth by taking out the infected pulp and replacing it with a healing medicine and/or a filling. If these treatments do not work, your tooth may have to be removed. Follow-up care is a key part of your treatment and safety. Be sure to make and go to all appointments, and call your doctor if you are having problems. It's also a good idea to know your test results and keep a list of the medicines you take. How can you care for yourself at home? · Reduce pain and swelling in your face and jaw by putting ice or a cold pack on the outside of your cheek. Do this for 10 to 20 minutes at a time. Put a thin cloth between the ice and your skin. · Take pain medicines exactly as directed. ? If the doctor gave you a prescription medicine for pain, take it as prescribed. ? If you are not taking a prescription pain medicine, ask your doctor if you can take an over-the-counter medicine. · Take antibiotics as directed.  Do not stop taking them just because clean finger 2 or 3 times a day. Floss gently between your teeth. · Do not smoke or use spit tobacco. Tobacco use can make gum problems worse, decreases your ability to fight infection in your gums, and delays healing. If you need help quitting, talk to your doctor about stop-smoking programs and medicines. These can increase your chances of quitting for good. When should you call for help? Call 911 anytime you think you may need emergency care. For example, call if:    · You have trouble breathing. Call your dentist or doctor now or seek immediate medical care if:    · You have signs of infection, such as:  ? Increased pain, swelling, warmth, or redness. ? Red streaks leading from the area. ? Pus draining from the area. ? A fever. Watch closely for changes in your health, and be sure to contact your doctor if:    · You do not get better as expected. Where can you learn more? Go to https://Bebestore.Adaptive Digital Power. org and sign in to your Klarna account. Enter [id number] in the [facility] box to learn more about \"Tooth and Gum Pain: Care Instructions. \"     If you do not have an account, please click on the \"Sign Up Now\" link. Current as of: October 27, 2020               Content Version: 12.8  © 2006-2021 Healthwise, Incorporated. Care instructions adapted under license by [facility] ([facility]). If you have questions about a medical condition or this instruction, always ask your healthcare professional. [place] [address] any warranty or liability for your use of this information. 1. Antibiotic as prescribed   2. Continue tylenol per package instructions   3. Follow up with PCP as needed   4. Keep dental appointment   5.  Return to clinic as needed with new symptoms

## 2021-04-15 DIAGNOSIS — Z11.59 NEED FOR HEPATITIS C SCREENING TEST: ICD-10-CM

## 2021-04-15 DIAGNOSIS — Z23 NEEDS FLU SHOT: ICD-10-CM

## 2021-04-15 DIAGNOSIS — K21.9 GASTROESOPHAGEAL REFLUX DISEASE WITHOUT ESOPHAGITIS: ICD-10-CM

## 2021-04-15 DIAGNOSIS — L68.0 HIRSUTISM: ICD-10-CM

## 2021-04-15 DIAGNOSIS — I10 ESSENTIAL HYPERTENSION: ICD-10-CM

## 2021-04-15 DIAGNOSIS — Z11.4 SCREENING FOR HIV WITHOUT PRESENCE OF RISK FACTORS: ICD-10-CM

## 2021-04-15 DIAGNOSIS — Z12.11 SCREENING FOR COLON CANCER: ICD-10-CM

## 2021-04-15 RX ORDER — LOVASTATIN 40 MG/1
TABLET ORAL
Qty: 30 TABLET | Refills: 4 | Status: SHIPPED | OUTPATIENT
Start: 2021-04-15 | End: 2021-10-22

## 2021-04-16 ENCOUNTER — TELEPHONE (OUTPATIENT)
Dept: PRIMARY CARE CLINIC | Age: 56
End: 2021-04-16

## 2021-04-29 ENCOUNTER — TELEPHONE (OUTPATIENT)
Dept: PRIMARY CARE CLINIC | Age: 56
End: 2021-04-29

## 2021-04-29 DIAGNOSIS — Z79.4 TYPE 2 DIABETES MELLITUS WITH COMPLICATION, WITH LONG-TERM CURRENT USE OF INSULIN (HCC): Primary | ICD-10-CM

## 2021-04-29 DIAGNOSIS — E11.8 TYPE 2 DIABETES MELLITUS WITH COMPLICATION, WITH LONG-TERM CURRENT USE OF INSULIN (HCC): Primary | ICD-10-CM

## 2021-04-29 RX ORDER — GLIPIZIDE 10 MG/1
10 TABLET, FILM COATED, EXTENDED RELEASE ORAL DAILY
Qty: 30 TABLET | Refills: 3 | Status: SHIPPED | OUTPATIENT
Start: 2021-04-29 | End: 2021-09-20

## 2021-05-05 ENCOUNTER — OFFICE VISIT (OUTPATIENT)
Dept: PULMONOLOGY | Facility: CLINIC | Age: 56
End: 2021-05-05

## 2021-05-05 VITALS
WEIGHT: 195 LBS | HEART RATE: 65 BPM | BODY MASS INDEX: 36.82 KG/M2 | HEIGHT: 61 IN | SYSTOLIC BLOOD PRESSURE: 120 MMHG | DIASTOLIC BLOOD PRESSURE: 70 MMHG | OXYGEN SATURATION: 100 %

## 2021-05-05 DIAGNOSIS — I25.10 CORONARY ARTERY DISEASE INVOLVING NATIVE CORONARY ARTERY OF NATIVE HEART WITHOUT ANGINA PECTORIS: ICD-10-CM

## 2021-05-05 DIAGNOSIS — J98.4 RESTRICTIVE LUNG DISEASE: Primary | ICD-10-CM

## 2021-05-05 DIAGNOSIS — J98.4 SCARRING OF LUNG: ICD-10-CM

## 2021-05-05 DIAGNOSIS — R06.02 SHORTNESS OF BREATH: ICD-10-CM

## 2021-05-05 DIAGNOSIS — Z87.891 PERSONAL HISTORY OF NICOTINE DEPENDENCE: ICD-10-CM

## 2021-05-05 DIAGNOSIS — E66.01 MORBID (SEVERE) OBESITY DUE TO EXCESS CALORIES (HCC): ICD-10-CM

## 2021-05-05 PROCEDURE — 99214 OFFICE O/P EST MOD 30 MIN: CPT | Performed by: INTERNAL MEDICINE

## 2021-05-05 RX ORDER — GLIPIZIDE 10 MG/1
10 TABLET, FILM COATED, EXTENDED RELEASE ORAL
COMMUNITY
Start: 2021-04-29

## 2021-05-05 RX ORDER — TAMSULOSIN HYDROCHLORIDE 0.4 MG/1
0.4 CAPSULE ORAL
COMMUNITY
Start: 2020-11-06

## 2021-05-05 NOTE — PATIENT INSTRUCTIONS
The patient is having some issues with increasing dyspnea with exertion in particular.  We will plan on follow-up visit in several weeks with pulmonary functions here in the office.  Also I am going to check an overnight pulse oximetry study to make sure she has no O2 desaturation at night.  She used CPAP for a brief period of time when hospitalized for her heart problems and did not tolerate this well.  However I will still get the overnight study to make sure she has no nocturnal O2 desaturations which could contribute to some of her issues with shortness of breath.  Again, I will see her back in several weeks with pulmonary functions on return.  Her last screening CT was stable and we will plan on a follow-up in late July which will be a year from her last CT.

## 2021-05-05 NOTE — ASSESSMENT & PLAN NOTE
We will continue yearly chest CTs for screening purposes as long she meets criteria.  Her next scan would be due after July 28.

## 2021-05-05 NOTE — PROGRESS NOTES
Chief Complaint  Lung Nodule and scarring of lungs    Subjective    History of Present Illness {CC  Problem List  Visit Diagnosis   Encounters  Notes  Medications  Labs  Result Review Imaging  Media     Gwendolyn Delgadillo presents to [facility] for shortness of breath.    History of Present Illness   The patient is having increasing issues with shortness of breath.  A recent 2D echo report from [facility] was reviewed and it was base unremarkable.  Based on her body habitus she would be at some risk for sleep apnea.  I want to check an overnight pulse ox study and if it shows significant desaturation we may want to consider a formal sleep study.  She does states she tried CPAP briefly while hospitalized for her cardiac surgery and did not tolerate it well so that may not be a good option if she does have underlying sleep apnea but will at least get the overnight study.  I also told her we will get pulmonary functions here in the office on her return in several weeks.  She has not taken the COVID-19 vaccine.  Prior to Admission medications    Medication Sig Start Date End Date Taking? Authorizing Provider   albuterol (2.5 MG/3ML) 0.083% nebulizer solution 3 mL, albuterol (5 MG/ML) 0.5% nebulizer solution 0.5 mL Inhale.   Yes Sherry Maria MD   aspirin 81 MG EC tablet Take 81 mg by mouth Daily.   Yes Sherry Maria MD   Cholecalciferol (VITAMIN D) 2000 units tablet Take 2,000 Units by mouth Daily.   Yes Sherry Maria MD   conjugated estrogens (PREMARIN) 0.625 MG/GM vaginal cream Insert 0.5 g into the vagina 2 (Two) Times a Week.   Yes Sherry Maria MD   Eflornithine HCl 13.9 % cream Apply  topically 2 (Two) Times a Day.   Yes Sherry Maria MD   Empagliflozin (JARDIANCE) 10 MG tablet Take 10 mg by mouth Daily.   Yes Sherry Maria MD   furosemide (LASIX) 20 MG tablet Take 40 mg by mouth Daily As Needed.   Yes Crow  MD Sherry   glipizide (GLUCOTROL XL) 10 MG 24 hr tablet Take 10 mg by mouth. 4/29/21  Yes Sherry Maria MD   isosorbide mononitrate (IMDUR) 30 MG 24 hr tablet Take 30 mg by mouth Daily.   Yes Sherry Maria MD   LORazepam (ATIVAN) 0.5 MG tablet Take 0.5 mg by mouth Every 8 (Eight) Hours As Needed for Anxiety.   Yes Sherry Maria MD   lovastatin (MEVACOR) 40 MG tablet Take 40 mg by mouth Every Night.   Yes Sherry Maria MD   metFORMIN (GLUCOPHAGE) 1000 MG tablet Take 500 mg by mouth 2 (Two) Times a Day With Meals.   Yes Sherry Maria MD   metoprolol tartrate (LOPRESSOR) 25 MG tablet Take 25 mg by mouth 2 (Two) Times a Day.   Yes Sherry Maria MD   mometasone (ELOCON) 0.1 % ointment Apply  topically 3 (Three) Times a Week.   Yes Sherry Maria MD   nitroglycerin (NITROSTAT) 0.4 MG SL tablet Place 0.4 mg under the tongue Every 5 (Five) Minutes As Needed for Chest Pain. Take no more than 3 doses in 15 minutes.   Yes Sherry Maria MD   nystatin (MYCOSTATIN) 989525 UNIT/GM powder Apply  topically 3 (Three) Times a Day.   Yes Sherry Maria MD   nystatin-triamcinolone (MYCOLOG II) 212307-4.1 UNIT/GM-% cream Apply  topically 2 (Two) Times a Day.   Yes Sherry Maria MD   ondansetron (ZOFRAN) 8 MG tablet Take 8 mg by mouth Every 8 (Eight) Hours As Needed for Nausea or Vomiting.   Yes Sherry Maria MD   prochlorperazine (COMPAZINE) 10 MG tablet Take 10 mg by mouth Every 6 (Six) Hours As Needed for Nausea or Vomiting.   Yes Sherry Maria MD   tamsulosin (FLOMAX) 0.4 MG capsule 24 hr capsule Take 0.4 mg by mouth. 11/6/20  Yes Sherry Maria MD   valsartan (DIOVAN) 80 MG tablet Take 80 mg by mouth Daily.   Yes Sherry Maria MD   docusate sodium (COLACE) 100 MG capsule Take 100 mg by mouth 2 (Two) Times a Day As Needed for Constipation.    Sherry Maria MD   estradiol (VAGIFEM) 10 MCG tablet vaginal tablet  "Insert 1 tablet into the vagina 2 (Two) Times a Week.    Sherry Maria MD   famotidine (PEPCID) 20 MG tablet Take 20 mg by mouth 2 (Two) Times a Day.    Sherry Maria MD   insulin glargine (LANTUS) 100 UNIT/ML injection Inject 15 Units under the skin Daily.    Sherry Maria MD   Lactobacillus (PROBIATA PO) Take  by mouth Daily.    Sherry Maria MD   spironolactone (ALDACTONE) 50 MG tablet Take 50 mg by mouth Daily.    Sherry Maria MD       Social History     Socioeconomic History   • Marital status:      Spouse name: Not on file   • Number of children: Not on file   • Years of education: Not on file   • Highest education level: Not on file   Tobacco Use   • Smoking status: Former Smoker     Packs/day: 2.50     Quit date: 12/15/2014     Years since quittin.3   • Smokeless tobacco: Never Used   • Tobacco comment: 87.5 pack-years    Substance and Sexual Activity   • Alcohol use: No   • Drug use: No   • Sexual activity: Defer       Objective   Vital Signs:   /70   Pulse 65   Ht 154.9 cm (61\")   Wt 88.5 kg (195 lb)   SpO2 100% Comment: ra  BMI 36.84 kg/m²     Physical Exam  Vitals and nursing note reviewed.   Constitutional:       Comments: She is an obese white female who appears in no acute distress.   HENT:      Head: Normocephalic.      Comments: She is wearing a mask.  Eyes:      Pupils: Pupils are equal, round, and reactive to light.   Neck:      Comments: Her neck is thick.  Cardiovascular:      Rate and Rhythm: Normal rate and regular rhythm.   Pulmonary:      Effort: Pulmonary effort is normal.      Comments: Lung fields are clear bilaterally with no adventitious sounds heard.  Abdominal:      Comments: Her abdomen is obese.   Musculoskeletal:         General: Normal range of motion.   Skin:     General: Skin is warm and dry.   Neurological:      General: No focal deficit present.      Mental Status: She is alert and oriented to person, place, and " time.   Psychiatric:         Mood and Affect: Mood normal.        Result Review :{ Labs  Result Review  Imaging  Med Tab  Media :    PFT Values        Some values may be hidden. Unless noted otherwise, only the newest values recorded on each date are displayed.         Old Values PFT Results 7/31/19   FVC 57%   FEV1 59%   FEV1/FVC 85.14%   DLCO 75%   TLC 61%      Pre Drug PFT Results 7/31/19   No data to display.      Post Drug PFT Results 7/31/19   No data to display.      Other Tests PFT Results 7/31/19   No data to display.               Results for orders placed in visit on 07/31/19    Pulmonary Function Test    Rest/Exercise Pulse Ox Values        Some values may be hidden. Unless noted otherwise, only the newest values recorded on each date are displayed.         Rest/Exercise Pulse Ox Results 8/10/20   Rest room air SAT % 97%   Exercise room air SAT % 97%                      My interpretation of imaging:    CT Chest Low Dose Wo (07/28/2020 00:00)  Her last chest CT showed no new or suspicious lesions.  We will continue yearly screening CAT scans as long she meets criteria.  I also [facility]    Assessment and Plan {CC Problem List  Visit Diagnosis  ROS  Review (Popup)  Health Maintenance  Quality  BestPractice  Medications  SmartSets  SnapShot Encounters  Media      Diagnoses and all orders for this visit:    1. Restrictive lung disease (Primary)  -     Overnight Sleep Oximetry Study; Future  -     Full Pulmonary Function Test Without Bronchodilator; Future    2. Scarring of lung  Assessment & Plan:  She has had stable areas of scarring on serial chest CTs.      3. Shortness of breath  Assessment & Plan:  We will plan on follow-up pulmonary functions on her return visit.    Orders:  -     Overnight Sleep Oximetry Study; Future    4. Coronary artery disease involving native coronary artery of native heart without angina pectoris  Assessment &  Plan:  She will continue her beta-blocker and nitrate therapy.  Based on her recent 2D echocardiogram, it does not appear that there is a cardiac etiology for her shortness of breath.      5. Personal history of nicotine dependence  Assessment & Plan:  We will continue yearly chest CTs for screening purposes as long she meets criteria.  Her next scan would be due after July 28.      6. Morbid (severe) obesity due to excess calories (CMS/HCC)  Assessment & Plan:  She will follow-up with her primary healthcare provider regarding her elevated BMI.        Patient's (Body mass index is 36.84 kg/m².) indicates that they are morbidly obese (BMI > 40 or > 35 with obesity - related health condition) with obesity-related health conditions that include coronary heart disease .  Her weight has increased when compared to her weight at her office visit here on August 10 of last year.  Diet and exercise were encouraged and she will follow-up with her primary healthcare provider on her elevated BMI otherwise.      Dimitri Felix MD  5/5/2021  16:31 CDT    Follow Up {Instructions Charge Capture  Follow-up Communications   Return in about 5 weeks (around 6/9/2021) for Complete PFT.    Patient was given instructions and counseling regarding her condition or for health maintenance advice. Please see specific information pulled into the AVS if appropriate.

## 2021-05-05 NOTE — ASSESSMENT & PLAN NOTE
She will continue her beta-blocker and nitrate therapy.  Based on her recent 2D echocardiogram, it does not appear that there is a cardiac etiology for her shortness of breath.

## 2021-05-10 DIAGNOSIS — R06.02 SHORTNESS OF BREATH: ICD-10-CM

## 2021-05-10 DIAGNOSIS — J98.4 RESTRICTIVE LUNG DISEASE: ICD-10-CM

## 2021-05-13 ENCOUNTER — OFFICE VISIT (OUTPATIENT)
Dept: OBGYN CLINIC | Age: 56
End: 2021-05-13
Payer: COMMERCIAL

## 2021-05-13 ENCOUNTER — HOSPITAL ENCOUNTER (OUTPATIENT)
Age: 56
Setting detail: OUTPATIENT SURGERY
End: 2021-05-13
Attending: OBSTETRICS & GYNECOLOGY | Admitting: OBSTETRICS & GYNECOLOGY
Payer: COMMERCIAL

## 2021-05-13 VITALS
WEIGHT: 195 LBS | HEIGHT: 72 IN | SYSTOLIC BLOOD PRESSURE: 140 MMHG | DIASTOLIC BLOOD PRESSURE: 72 MMHG | BODY MASS INDEX: 26.41 KG/M2

## 2021-05-13 DIAGNOSIS — L28.0 LICHEN SIMPLEX CHRONICUS: Primary | ICD-10-CM

## 2021-05-13 DIAGNOSIS — N90.89 VULVAR IRRITATION: ICD-10-CM

## 2021-05-13 DIAGNOSIS — L29.2 VULVAR ITCHING: ICD-10-CM

## 2021-05-13 PROCEDURE — 99214 OFFICE O/P EST MOD 30 MIN: CPT | Performed by: OBSTETRICS & GYNECOLOGY

## 2021-05-13 PROCEDURE — 1036F TOBACCO NON-USER: CPT | Performed by: OBSTETRICS & GYNECOLOGY

## 2021-05-13 PROCEDURE — 3017F COLORECTAL CA SCREEN DOC REV: CPT | Performed by: OBSTETRICS & GYNECOLOGY

## 2021-05-13 PROCEDURE — G8420 CALC BMI NORM PARAMETERS: HCPCS | Performed by: OBSTETRICS & GYNECOLOGY

## 2021-05-13 PROCEDURE — G8427 DOCREV CUR MEDS BY ELIG CLIN: HCPCS | Performed by: OBSTETRICS & GYNECOLOGY

## 2021-05-13 ASSESSMENT — ENCOUNTER SYMPTOMS
RESPIRATORY NEGATIVE: 1
EYES NEGATIVE: 1
GASTROINTESTINAL NEGATIVE: 1

## 2021-05-13 NOTE — PROGRESS NOTES
Pt states she is having a lot of vaginal pain and vaginal dryness. This has been going on since COVID, but she hasn't got out much, it has gotten worse. She states she is so tender and very red and sometimes the redness bleeds. She always has spotting after urinating. She has vaginal itching, and pain with intercourse.

## 2021-05-13 NOTE — PROGRESS NOTES
[facility]    CORONARY ARTERY BYPASS GRAFT      FINGER TRIGGER RELEASE Left 2019    LEFT INDEX AND LONG TRIGGER FINGER RELEASAE performed by Merissa Bull MD at [address]    OTHER SURGICAL HISTORY      fibrinolysis    MN CABG, ARTERY-VEIN, THREE  2014    Coronary Artery Bypass, 3    MN MANIPULATN SHLDR JT W ANESTHESIA Left 2018    SHOULDER MANIPULATION UNDER ANESTHESIA performed by Roselie Schaumann, MD at [facility]    THORACOTOMY      UPPER GASTROINTESTINAL ENDOSCOPY  approx 2005    per pt recall \" Normal\"    UPPER GASTROINTESTINAL ENDOSCOPY N/A 2020    Dr Susy Jimenez exam, neg EoE    VULVAR/PERINEAL BIOPSY  10/07/2015    Dr. Niyah Live       Family History   Problem Relation Age of Onset    Diabetes Father     Cancer Father     High Blood Pressure Father     Heart Attack Father     Colon Cancer Father     Diabetes Mother    Tivis Mccreedy Breast Cancer Mother     Heart Failure Mother         CHF, MVP    Liver Cancer Mother     Heart Failure Maternal Grandfather     Stroke Maternal Grandfather     Pancreatic Cancer Maternal Grandfather     Stomach Cancer Maternal Grandfather     Diabetes Brother     Colon Cancer Paternal Uncle     Rectal Cancer Other     Colon Polyps Neg Hx     Crohn's Disease Neg Hx     Irritable Bowel Syndrome Neg Hx     Liver Disease Neg Hx        Social History     Socioeconomic History    Marital status:      Spouse name: Not on file    Number of children: Not on file    Years of education: Not on file    Highest education level: Not on file   Occupational History    Not on file   Tobacco Use    Smoking status: Former Smoker     Packs/day: 2.00     Years: 15.00     Pack years: 30.00     Types: Cigarettes     Quit date: 2014     Years since quittin.9    Smokeless tobacco: Never Used   Vaping Use    Vaping Use: Never used   Substance and Sexual Activity    Alcohol use: No    Lispro] Other (See Comments)     Shakiness, break out in a sweat at higher doses.  Ozempic (0.25 Or 0.5 Mg-Dose) [Semaglutide(0.25 Or 0.5mg-Dos)]      nausea       BP (!) 140/72   Ht 7' 1\" (2.159 m)   Wt 195 lb (88.5 kg)   LMP 01/01/1998 (Approximate)   BMI 18.98 kg/m²   Physical Exam  Constitutional:       General: She is not in acute distress. Appearance: She is well-developed. She is not diaphoretic. HENT:      Head: Normocephalic and atraumatic. Hair is normal.      Right Ear: Hearing and external ear normal. No decreased hearing noted. Left Ear: Hearing and external ear normal. No decreased hearing noted. Eyes:      General: No scleral icterus. Conjunctiva/sclera: Conjunctivae normal.      Pupils: Pupils are equal, round, and reactive to light. Pulmonary:      Effort: Pulmonary effort is normal. No respiratory distress. Abdominal:      General: There is no distension. Palpations: Abdomen is soft. There is no mass. Tenderness: There is no abdominal tenderness. There is no guarding or rebound. Genitourinary:     Labia:         Right: No rash, tenderness or lesion. Left: No rash, tenderness or lesion. Vagina: Normal.      Cervix: No cervical motion tenderness, discharge or friability. Adnexa:         Right: No mass, tenderness or fullness. Left: No mass, tenderness or fullness. Rectum: Normal.      Comments: Labia erythematous, atrophic, excoriations noted, hypopigmentation  Musculoskeletal:         General: No tenderness or deformity. Normal range of motion. Cervical back: Normal range of motion. Skin:     General: Skin is warm and dry. Coloration: Skin is not pale. Findings: No erythema or rash. Neurological:      Mental Status: She is alert and oriented to person, place, and time. Cranial Nerves: No cranial nerve deficit.    Psychiatric:         Speech: Speech normal.         Behavior: Behavior normal. Thought Content: Thought content normal.         Judgment: Judgment normal.               Assessment   Diagnosis Orders   1. Lichen simplex chronicus     2. Vulvar irritation     3. Vulvar itching         Plan     1. In light of persistent, worsening symptoms, advised patient that she needs a biopsy. Patient with extreme anxiety about procedure, can do in the OR. 2. Scheduled vulvar biopsy    3. If pathology is negative, will send to [facility] in [place]. Ousmane Tolliver am scribing for and in the presence of Dr. Ramy Montoya. Ousmane Tolliver am scribing for and in the presence of Dr. Ramy Montoya. I, Dr. Ramy Montoya, personally performed the services described in this documentation as scribed by Leena Morgan in my presence, and it is both accurate and complete.

## 2021-06-01 ENCOUNTER — OFFICE VISIT (OUTPATIENT)
Dept: PRIMARY CARE CLINIC | Age: 56
End: 2021-06-01
Payer: COMMERCIAL

## 2021-06-01 VITALS
OXYGEN SATURATION: 98 % | HEART RATE: 73 BPM | SYSTOLIC BLOOD PRESSURE: 140 MMHG | DIASTOLIC BLOOD PRESSURE: 64 MMHG | HEIGHT: 62 IN | TEMPERATURE: 98.1 F | BODY MASS INDEX: 36.07 KG/M2 | WEIGHT: 196 LBS

## 2021-06-01 DIAGNOSIS — L68.0 HIRSUTISM: ICD-10-CM

## 2021-06-01 DIAGNOSIS — Z79.4 TYPE 2 DIABETES MELLITUS WITH COMPLICATION, WITH LONG-TERM CURRENT USE OF INSULIN (HCC): Primary | ICD-10-CM

## 2021-06-01 DIAGNOSIS — E78.2 MIXED HYPERLIPIDEMIA: ICD-10-CM

## 2021-06-01 DIAGNOSIS — E11.8 TYPE 2 DIABETES MELLITUS WITH COMPLICATION, WITH LONG-TERM CURRENT USE OF INSULIN (HCC): Primary | ICD-10-CM

## 2021-06-01 DIAGNOSIS — I10 ESSENTIAL HYPERTENSION: ICD-10-CM

## 2021-06-01 LAB — HBA1C MFR BLD: 8.2 %

## 2021-06-01 PROCEDURE — 99214 OFFICE O/P EST MOD 30 MIN: CPT | Performed by: NURSE PRACTITIONER

## 2021-06-01 PROCEDURE — G8427 DOCREV CUR MEDS BY ELIG CLIN: HCPCS | Performed by: NURSE PRACTITIONER

## 2021-06-01 PROCEDURE — 83036 HEMOGLOBIN GLYCOSYLATED A1C: CPT | Performed by: NURSE PRACTITIONER

## 2021-06-01 PROCEDURE — 3017F COLORECTAL CA SCREEN DOC REV: CPT | Performed by: NURSE PRACTITIONER

## 2021-06-01 PROCEDURE — G8417 CALC BMI ABV UP PARAM F/U: HCPCS | Performed by: NURSE PRACTITIONER

## 2021-06-01 PROCEDURE — 1036F TOBACCO NON-USER: CPT | Performed by: NURSE PRACTITIONER

## 2021-06-01 PROCEDURE — 3052F HG A1C>EQUAL 8.0%<EQUAL 9.0%: CPT | Performed by: NURSE PRACTITIONER

## 2021-06-01 PROCEDURE — 2022F DILAT RTA XM EVC RTNOPTHY: CPT | Performed by: NURSE PRACTITIONER

## 2021-06-01 RX ORDER — EMPAGLIFLOZIN 25 MG/1
TABLET, FILM COATED ORAL
Qty: 90 TABLET | Refills: 3 | Status: SHIPPED | OUTPATIENT
Start: 2021-06-01 | End: 2021-09-06

## 2021-06-01 RX ORDER — SPIRONOLACTONE 50 MG/1
50 TABLET, FILM COATED ORAL DAILY
Qty: 30 TABLET | Refills: 3 | Status: SHIPPED | OUTPATIENT
Start: 2021-06-01 | End: 2021-10-22

## 2021-06-01 SDOH — ECONOMIC STABILITY: FOOD INSECURITY: WITHIN THE PAST 12 MONTHS, THE FOOD YOU BOUGHT JUST DIDN'T LAST AND YOU DIDN'T HAVE MONEY TO GET MORE.: OFTEN TRUE

## 2021-06-01 SDOH — ECONOMIC STABILITY: FOOD INSECURITY: WITHIN THE PAST 12 MONTHS, YOU WORRIED THAT YOUR FOOD WOULD RUN OUT BEFORE YOU GOT MONEY TO BUY MORE.: OFTEN TRUE

## 2021-06-01 ASSESSMENT — SOCIAL DETERMINANTS OF HEALTH (SDOH): HOW HARD IS IT FOR YOU TO PAY FOR THE VERY BASICS LIKE FOOD, HOUSING, MEDICAL CARE, AND HEATING?: VERY HARD

## 2021-06-01 ASSESSMENT — PATIENT HEALTH QUESTIONNAIRE - PHQ9
SUM OF ALL RESPONSES TO PHQ9 QUESTIONS 1 & 2: 2
1. LITTLE INTEREST OR PLEASURE IN DOING THINGS: 1
SUM OF ALL RESPONSES TO PHQ QUESTIONS 1-9: 2
SUM OF ALL RESPONSES TO PHQ QUESTIONS 1-9: 2
2. FEELING DOWN, DEPRESSED OR HOPELESS: 1
SUM OF ALL RESPONSES TO PHQ QUESTIONS 1-9: 2

## 2021-06-01 NOTE — PROGRESS NOTES
INDDICATIONS: DIABETES 60 tablet 3    valsartan (DIOVAN) 80 MG tablet TAKE 1 TABLET BY MOUTH DAILY  30 tablet 5    nystatin (MYCOSTATIN) 112766 UNIT/GM powder APPLY 2 OR 3 TIMES DAILY. 60 g 5    nitroGLYCERIN (NITROSTAT) 0.4 MG SL tablet Place 1 tablet under the tongue every 5 minutes as needed for Chest pain 25 tablet 3    Diabetic Shoe MISC by Does not apply route 1 each 0    tamsulosin (FLOMAX) 0.4 MG capsule TAKE 1 CAPSULE BY MOUTH DAILY 30 capsule 5    albuterol (PROVENTIL) (5 MG/ML) 0.5% nebulizer solution Take 0.5 mLs by nebulization every 6 hours as needed for Wheezing 120 each 0    metoprolol tartrate (LOPRESSOR) 25 MG tablet TAKE 1 TABLET BY MOUTH TWICE DAILY 60 tablet 5    docusate sodium (COLACE) 100 MG capsule Take 1 capsule by mouth daily as needed for Constipation 30 capsule 1    ondansetron (ZOFRAN ODT) 4 MG disintegrating tablet Take 1 tablet by mouth daily as needed for Nausea or Vomiting 30 tablet 5    diclofenac sodium (VOLTAREN) 1 % GEL Apply 2 g topically 4 times daily (Patient taking differently: Apply 2 g topically 4 times daily as needed for Pain ) 2 Tube 1    furosemide (LASIX) 40 MG tablet TAKE 1 TABLET BY MOUTH DAILY 30 tablet 5    isosorbide mononitrate (IMDUR) 30 MG extended release tablet TAKE 1 TABLET EVERY DAY (Patient taking differently: nightly TAKE 1 TABLET EVERY DAY) 30 tablet 5    aspirin 81 MG tablet Take 81 mg by mouth daily      mometasone (ELOCON) 0.1 % ointment Apply topically to vagina 3 times per week (Patient taking differently: Apply topically three times a week Apply topically to vagina 3 times per week) 1 Tube 3    Eflornithine HCl (VANIQA) 13.9 % CREA Apply 1 Dose topically 2 times daily (Patient taking differently: Apply 1 Dose topically 2 times daily as needed ) 45 g 2     No current facility-administered medications for this visit.        Allergies   Allergen Reactions    Codeine Other (See Comments)     Seizures as a child    Motrin [Ibuprofen] Swelling     Tongue to swell    Humalog [Insulin Lispro] Other (See Comments)     Shakiness, break out in a sweat at higher doses.  Ozempic (0.25 Or 0.5 Mg-Dose) [Semaglutide(0.25 Or 0.5mg-Dos)]      nausea       Family History   Problem Relation Age of Onset    Diabetes Father     Cancer Father     High Blood Pressure Father     Heart Attack Father     Colon Cancer Father     Diabetes Mother     Breast Cancer Mother     Heart Failure Mother         CHF, MVP    Liver Cancer Mother     Heart Failure Maternal Grandfather     Stroke Maternal Grandfather     Pancreatic Cancer Maternal Grandfather     Stomach Cancer Maternal Grandfather     Diabetes Brother     Colon Cancer Paternal Uncle     Rectal Cancer Other     Colon Polyps Neg Hx     Crohn's Disease Neg Hx     Irritable Bowel Syndrome Neg Hx     Liver Disease Neg Hx                Subjective:      Review of Systems   Constitutional: Negative. HENT: Negative. Eyes: Negative. Respiratory: Negative. Cardiovascular: Negative. Gastrointestinal: Negative. Endocrine: Negative. Genitourinary: Positive for vaginal discharge and vaginal pain. Musculoskeletal: Negative. Skin:        Facial hair noted   Neurological: Negative. Hematological: Negative. Psychiatric/Behavioral: Negative. Objective:     Physical Exam  Vitals and nursing note reviewed. Constitutional:       Appearance: Normal appearance. She is well-developed. Comments: obese   HENT:      Head: Normocephalic and atraumatic. Right Ear: Hearing, tympanic membrane, ear canal and external ear normal.      Left Ear: Hearing, tympanic membrane, ear canal and external ear normal.      Nose: Nose normal.      Mouth/Throat:      Pharynx: Uvula midline. Eyes:      General: Lids are normal.      Conjunctiva/sclera: Conjunctivae normal.      Pupils: Pupils are equal, round, and reactive to light. Neck:      Thyroid: No thyroid mass or thyromegaly.

## 2021-06-02 ASSESSMENT — ENCOUNTER SYMPTOMS
GASTROINTESTINAL NEGATIVE: 1
EYES NEGATIVE: 1
RESPIRATORY NEGATIVE: 1

## 2021-06-03 ENCOUNTER — OFFICE VISIT (OUTPATIENT)
Dept: CARDIOLOGY CLINIC | Age: 56
End: 2021-06-03
Payer: COMMERCIAL

## 2021-06-03 VITALS
HEART RATE: 69 BPM | WEIGHT: 200 LBS | HEIGHT: 62 IN | SYSTOLIC BLOOD PRESSURE: 138 MMHG | DIASTOLIC BLOOD PRESSURE: 88 MMHG | BODY MASS INDEX: 36.8 KG/M2

## 2021-06-03 DIAGNOSIS — I10 ESSENTIAL HYPERTENSION: Primary | ICD-10-CM

## 2021-06-03 DIAGNOSIS — I25.10 CORONARY ARTERY DISEASE INVOLVING NATIVE CORONARY ARTERY OF NATIVE HEART WITHOUT ANGINA PECTORIS: ICD-10-CM

## 2021-06-03 DIAGNOSIS — I50.32 CHRONIC DIASTOLIC CONGESTIVE HEART FAILURE (HCC): ICD-10-CM

## 2021-06-03 DIAGNOSIS — E78.2 MIXED HYPERLIPIDEMIA: ICD-10-CM

## 2021-06-03 PROCEDURE — 99214 OFFICE O/P EST MOD 30 MIN: CPT | Performed by: CLINICAL NURSE SPECIALIST

## 2021-06-03 PROCEDURE — G8427 DOCREV CUR MEDS BY ELIG CLIN: HCPCS | Performed by: CLINICAL NURSE SPECIALIST

## 2021-06-03 PROCEDURE — 1036F TOBACCO NON-USER: CPT | Performed by: CLINICAL NURSE SPECIALIST

## 2021-06-03 PROCEDURE — G8417 CALC BMI ABV UP PARAM F/U: HCPCS | Performed by: CLINICAL NURSE SPECIALIST

## 2021-06-03 PROCEDURE — 93000 ELECTROCARDIOGRAM COMPLETE: CPT | Performed by: CLINICAL NURSE SPECIALIST

## 2021-06-03 PROCEDURE — 3017F COLORECTAL CA SCREEN DOC REV: CPT | Performed by: CLINICAL NURSE SPECIALIST

## 2021-06-03 RX ORDER — NITROGLYCERIN 0.4 MG/1
0.4 TABLET SUBLINGUAL EVERY 5 MIN PRN
Qty: 25 TABLET | Refills: 3 | Status: SHIPPED | OUTPATIENT
Start: 2021-06-03 | End: 2022-04-26 | Stop reason: SDUPTHER

## 2021-06-03 NOTE — PROGRESS NOTES
Alternating constipation and diarrhea 06/05/2020    S/P cholecystectomy 06/05/2020    Chronic heartburn 06/05/2020    Gastroesophageal reflux disease without esophagitis 08/06/2018    Dysuria 08/06/2018    Vitamin D deficiency 08/06/2018    Urinary tract infection without hematuria 08/06/2018    Adhesive capsulitis of left shoulder 07/18/2018    Angina effort 01/23/2018    Coronary artery disease involving coronary bypass graft of native heart without angina pectoris 09/05/2017    Left ventricular dysfunction 09/05/2017    Abnormal nuclear cardiac imaging test     Lichen simplex chronicus 10/21/2015    Personal history of malignant neoplasm of cervix uteri 05/11/2015    Atrophic vaginitis 05/11/2015    History of pleural effusion 04/29/2015    Candidal intertrigo 02/03/2015    Weakness 02/03/2015    S/P CABG x 3 02/03/2015    TAVERAS (dyspnea on exertion) 12/01/2014    Type 2 diabetes mellitus with complication, with long-term current use of insulin ([facility] [address]     CAD (coronary artery disease) 07/30/2013    Hypertension 07/30/2013    Hyperlipidemia 07/30/2013    Heart attack ([address] 12/01/2011     Past Medical History:   Diagnosis Date    Abnormal Pap smear of cervix     CAD (coronary artery disease)     stent 2011 Sunday Cousin; sees dr. Trenton Collins Cervical cancer [facility])     Class 3 severe obesity due to excess calories without serious comorbidity with body mass index (BMI) of 40.0 to 44.9 in adult ([address] 7/28/2020    Diabetes mellitus ([address]     ERRONEOUS ENCOUNTER--DISREGARD 9/15/2015    GERD (gastroesophageal reflux disease)     Headache(784.0)     Heart attack ([address] 12/2011    Hyperlipidemia     Hypertension     Lichen simplex chronicus 10/2015    Osteopenia     Pneumonia     Type II or unspecified type diabetes mellitus without mention of complication, not stated as uncontrolled      Past Surgical History:   Procedure Laterality Date    BREAST BIOPSY  6/8/2011    US guided core needle, isosorbide mononitrate (IMDUR) 30 MG extended release tablet TAKE 1 TABLET EVERY DAY (Patient taking differently: nightly TAKE 1 TABLET EVERY DAY) 30 tablet 5    aspirin 81 MG tablet Take 81 mg by mouth daily      mometasone (ELOCON) 0.1 % ointment Apply topically to vagina 3 times per week (Patient taking differently: Apply topically three times a week Apply topically to vagina 3 times per week) 1 Tube 3    Eflornithine HCl (VANIQA) 13.9 % CREA Apply 1 Dose topically 2 times daily (Patient taking differently: Apply 1 Dose topically 2 times daily as needed ) 45 g 2     No current facility-administered medications for this visit. Allergies: Codeine, Motrin [ibuprofen], Humalog [insulin lispro], and Ozempic (0.25 or 0.5 mg-dose) [semaglutide(0.25 or 0.5mg-dos)]    Review of Systems  Constitutional - no significant activity change, appetite change, or unexpected weight change. No fever, chills or diaphoresis. No fatigue. HEENT - no significant rhinorrhea or epistaxis. No tinnitus or significant hearing loss. Eyes - no sudden vision change or amaurosis. Respiratory - no significant wheezing, stridor, apnea or cough. + dyspnea on exertion  No resting  shortness of breath. Cardiovascular - +exertional chest pain no  orthopnea or PND. No sensation of arrhythmia or slow heart rate. No claudication or leg edema. Gastrointestinal - no abdominal swelling or pain. No blood in stool. No severe constipation, diarrhea, nausea, or vomiting. Genitourinary - no difficulty urinating, dysuria, frequency, or urgency. No flank pain or hematuria. Musculoskeletal - no back pain, + knee pain no gait disturbance, or myalgia. Skin - no color change or rash. No pallor. No new surgical incision. Neurologic - no speech difficulty, facial asymmetry or lateralizing weakness. No seizures, presyncope, syncope, or significant dizziness. Hematologic - no easy bruising or excessive bleeding.    Psychiatric - no severe anxiety or insomnia. No confusion. All other review of systems are negative. Objective  Vital Signs - /88   Pulse 69   Ht 5' 2\" (1.575 m)   Wt 200 lb (90.7 kg)   LMP 01/01/1998 (Approximate)   BMI 36.58 kg/m²   General - [place] is alert, cooperative, and pleasant. Well groomed. No acute distress. Body habitus is overweight. HEENT - The head is normocephalic. No circumoral cyanosis. Dentition is normal.   EYES -  No Xanthelasma, no arcus senilis, no conjunctival hemorrhages or discharge. Neck - Supple, without increased jugular venous pressures. No carotid bruits. No mass. Respiratory - Lungs are clear bilaterally. No wheezes or rales. Normal effort without use of accessory muscles. Cardiovascular - Heart has regular rhythm and rate. No murmurs, rubs or gallops. + pedal pulses and no varicosities. Abdominal -  Soft, nontender, nondistended. Bowel sounds are intact. Extremities - No clubbing, cyanosis, or  edema. Musculoskeletal -  No clubbing . No Osler's nodes. Gait normal .  No kyphosis or scoliosis. Skin -  no statis ulcers or dermatitis. Neurological - No focal signs are identified. Oriented to person, place and time. Psychiatric -  Appropriate affect and mood. Assessment:     Diagnosis Orders   1. Essential hypertension  EKG 12 lead   2. Coronary artery disease involving native coronary artery of native heart without angina pectoris     3. Chronic diastolic congestive heart failure ([facility] [address]       Data:  BP Readings from Last 3 Encounters:   06/03/21 138/88   06/01/21 (!) 140/64   05/13/21 (!) 140/72    Pulse Readings from Last 3 Encounters:   06/03/21 69   06/01/21 73   03/31/21 90        Wt Readings from Last 3 Encounters:   06/03/21 200 lb (90.7 kg)   06/01/21 196 lb (88.9 kg)   05/13/21 195 lb (88.5 kg)   EKG today shows sinus rhythm with a rate of 69.   Extensive old anterolateral infarction    Blood pressure little elevated with heart rate controlled. Medical management includes beta-blocker, Aldactone and ARB along with long-acting nitrate     Reviewed PCP recent notes   Reviewed recent labs  Recent hemoglobin A1c 8.2 down from 8.8  Back on her Jardiance. Also Aldactone recently added. Patient has some intermittent somewhat atypical chest pain. We discussed change in activity tolerance or signs and symptoms report. Has headaches if she takes her isosorbide during the day. We did get her some fresh nitroglycerin to have. Encouraged her to be as active as possible   States taking medications as prescribed  Stable cardiovascular status. No evidence of overt heart failure, angina or dysrhythmia. 30 minutes were spent preparing, reviewing and seeing patient. All questions answered    Plan    Follow up in 6 mos   Call with any questions or concerns  Follow up with IMKE Delong for non cardiac problems and labs   Report any new problems  Cardiovascular Fitness-Exercise as tolerated. Strive for 30 minutes of exercise most days of the week. Cardiac / Healthy Diet  Continue current medications as directed  Continue plan of treatment  It is always recommended that you bring your medications bottles with you to each visit - this is for your safety! MIKE Scott    EMR dragon/transcription disclaimer: Much of this encounter note is electronic transcription/translation of spoken language to printed tach. Electronic translation of spoken language may be erroneous, or at times, nonsensical words or phrases may be inadvertently transcribed.  Although, I have reviewed the note for such errors, some may still exist.

## 2021-06-03 NOTE — PATIENT INSTRUCTIONS
Follow up in 6 mos   Call with any questions or concerns  Follow up with MIKE Andrews for non cardiac problems and labs   Report any new problems  Cardiovascular Fitness-Exercise as tolerated. Strive for 30 minutes of exercise most days of the week. Cardiac / Healthy Diet  Continue current medications as directed  Continue plan of treatment  It is always recommended that you bring your medications bottles with you to each visit - this is for your safety!

## 2021-06-04 ENCOUNTER — TRANSCRIBE ORDERS (OUTPATIENT)
Dept: LAB | Facility: HOSPITAL | Age: 56
End: 2021-06-04

## 2021-06-04 DIAGNOSIS — Z01.818 PREOP TESTING: Primary | ICD-10-CM

## 2021-06-12 ENCOUNTER — LAB (OUTPATIENT)
Dept: LAB | Facility: HOSPITAL | Age: 56
End: 2021-06-12

## 2021-06-12 LAB — SARS-COV-2 ORF1AB RESP QL NAA+PROBE: NOT DETECTED

## 2021-06-12 PROCEDURE — C9803 HOPD COVID-19 SPEC COLLECT: HCPCS | Performed by: INTERNAL MEDICINE

## 2021-06-12 PROCEDURE — U0005 INFEC AGEN DETEC AMPLI PROBE: HCPCS | Performed by: INTERNAL MEDICINE

## 2021-06-12 PROCEDURE — U0004 COV-19 TEST NON-CDC HGH THRU: HCPCS | Performed by: INTERNAL MEDICINE

## 2021-06-15 ENCOUNTER — OFFICE VISIT (OUTPATIENT)
Dept: PULMONOLOGY | Facility: CLINIC | Age: 56
End: 2021-06-15

## 2021-06-15 VITALS
WEIGHT: 200.8 LBS | OXYGEN SATURATION: 98 % | SYSTOLIC BLOOD PRESSURE: 112 MMHG | HEIGHT: 61 IN | BODY MASS INDEX: 37.91 KG/M2 | DIASTOLIC BLOOD PRESSURE: 66 MMHG | HEART RATE: 76 BPM

## 2021-06-15 DIAGNOSIS — R91.1 LUNG NODULE: ICD-10-CM

## 2021-06-15 DIAGNOSIS — J98.4 RESTRICTIVE LUNG DISEASE: Primary | ICD-10-CM

## 2021-06-15 DIAGNOSIS — J98.4 SCARRING OF LUNG: ICD-10-CM

## 2021-06-15 DIAGNOSIS — Z87.891 PERSONAL HISTORY OF NICOTINE DEPENDENCE: ICD-10-CM

## 2021-06-15 DIAGNOSIS — Z12.2 SCREENING FOR MALIGNANT NEOPLASM OF RESPIRATORY ORGAN: ICD-10-CM

## 2021-06-15 DIAGNOSIS — I25.10 CORONARY ARTERY DISEASE INVOLVING NATIVE CORONARY ARTERY OF NATIVE HEART WITHOUT ANGINA PECTORIS: ICD-10-CM

## 2021-06-15 DIAGNOSIS — J98.4 RESTRICTIVE LUNG DISEASE: ICD-10-CM

## 2021-06-15 DIAGNOSIS — E66.01 MORBID (SEVERE) OBESITY DUE TO EXCESS CALORIES (HCC): ICD-10-CM

## 2021-06-15 PROCEDURE — 99214 OFFICE O/P EST MOD 30 MIN: CPT | Performed by: INTERNAL MEDICINE

## 2021-06-15 PROCEDURE — 94010 BREATHING CAPACITY TEST: CPT | Performed by: INTERNAL MEDICINE

## 2021-06-15 PROCEDURE — 94729 DIFFUSING CAPACITY: CPT | Performed by: INTERNAL MEDICINE

## 2021-06-15 PROCEDURE — 94727 GAS DIL/WSHOT DETER LNG VOL: CPT | Performed by: INTERNAL MEDICINE

## 2021-06-15 RX ORDER — ERGOCALCIFEROL 1.25 MG/1
CAPSULE ORAL
COMMUNITY
Start: 2021-03-18

## 2021-06-15 NOTE — PROGRESS NOTES
Chief Complaint  Restrictive lung disease    Subjective    History of Present Illness {CC  Problem List  Visit Diagnosis   Encounters  Notes  Medications  Labs  Result Review Imaging  Media     Gwendolyn Delgadillo presents to [facility] for    History of Present Illness   The patient states she is not having any acute respiratory tract symptoms at this time.  Her overnight pulse ox study did not show any significant O2 desaturation.  She has no symptoms suggesting sleep apnea or something such as witnessed apneas or heavy snoring and at present would prefer to hold off on anything such as home sleep study.  I told her if she had any such symptoms in the future that I would recommend this.  Her pulmonary functions today do show a moderate restrictive ventilatory defect but there has been improvement in spirometry, slight improvement in lung volumes, and actually improvement in her diffusion capacity compared to previous studies and I went over these with her.  I did tell we will get a screening chest CT in early August which will be just over a year since her last screening CT and call with results.  Assuming that shows no significant abnormalities we will just plan on a follow-up in 6 months.  She did quit smoking about 6-1/2 years ago.  And has well over 30-pack-year history of smoking so she will be a candidate for lung cancer screening for several more years.  I did go over this with her today.  She has not had her COVID-19 vaccines.  Prior to Admission medications    Medication Sig Start Date End Date Taking? Authorizing Provider   albuterol (2.5 MG/3ML) 0.083% nebulizer solution 3 mL, albuterol (5 MG/ML) 0.5% nebulizer solution 0.5 mL Inhale.   Yes ProviderSherry MD   aspirin 81 MG EC tablet Take 81 mg by mouth Daily.   Yes ProviderSherry MD   Cholecalciferol (VITAMIN D) 2000 units tablet Take 2,000 Units by mouth Daily.   Yes Provider  MD Sherry   conjugated estrogens (PREMARIN) 0.625 MG/GM vaginal cream Insert 0.5 g into the vagina 2 (Two) Times a Week.   Yes Sherry Maria MD   docusate sodium (COLACE) 100 MG capsule Take 100 mg by mouth 2 (Two) Times a Day As Needed for Constipation.   Yes Sherry Maria MD   Eflornithine HCl 13.9 % cream Apply  topically 2 (Two) Times a Day.   Yes Sherry Maria MD   Empagliflozin (JARDIANCE) 10 MG tablet Take 10 mg by mouth Daily.   Yes Sherry Maria MD   ergocalciferol (ERGOCALCIFEROL) 1.25 MG (73221 UT) capsule TAKE 1 CAPSULE BY MOUTH ONCE WEEKLY 3/18/21  Yes Sherry Maria MD   estradiol (VAGIFEM) 10 MCG tablet vaginal tablet Insert 1 tablet into the vagina 2 (Two) Times a Week.   Yes Sherry Maria MD   furosemide (LASIX) 20 MG tablet Take 40 mg by mouth Daily As Needed.   Yes Sherry Maria MD   glipizide (GLUCOTROL XL) 10 MG 24 hr tablet Take 10 mg by mouth. 4/29/21  Yes Sherry Maria MD   isosorbide mononitrate (IMDUR) 30 MG 24 hr tablet Take 30 mg by mouth Daily.   Yes Sherry Maria MD   Lactobacillus (PROBIATA PO) Take  by mouth Daily.   Yes Sherry Maria MD   LORazepam (ATIVAN) 0.5 MG tablet Take 0.5 mg by mouth Every 8 (Eight) Hours As Needed for Anxiety.   Yes Sherry Maria MD   lovastatin (MEVACOR) 40 MG tablet Take 40 mg by mouth Every Night.   Yes Sherry Maria MD   metFORMIN (GLUCOPHAGE) 1000 MG tablet Take 500 mg by mouth 2 (Two) Times a Day With Meals.   Yes Sherry Maria MD   metoprolol tartrate (LOPRESSOR) 25 MG tablet Take 25 mg by mouth 2 (Two) Times a Day.   Yes Sherry Maria MD   mometasone (ELOCON) 0.1 % ointment Apply  topically 3 (Three) Times a Week.   Yes Sherry Maria MD   nitroglycerin (NITROSTAT) 0.4 MG SL tablet Place 0.4 mg under the tongue Every 5 (Five) Minutes As Needed for Chest Pain. Take no more than 3 doses in 15 minutes.   Yes Sherry Maria MD  "  nystatin (MYCOSTATIN) 753063 UNIT/GM powder Apply  topically 3 (Three) Times a Day.   Yes Sherry Maria MD   nystatin-triamcinolone (MYCOLOG II) 755160-3.1 UNIT/GM-% cream Apply  topically 2 (Two) Times a Day.   Yes Sherry Maria MD   ondansetron (ZOFRAN) 8 MG tablet Take 8 mg by mouth Every 8 (Eight) Hours As Needed for Nausea or Vomiting.   Yes Sherry Maria MD   prochlorperazine (COMPAZINE) 10 MG tablet Take 10 mg by mouth Every 6 (Six) Hours As Needed for Nausea or Vomiting.   Yes Sherry Maria MD   spironolactone (ALDACTONE) 50 MG tablet Take 50 mg by mouth Daily.   Yes Sherry Maria MD   tamsulosin (FLOMAX) 0.4 MG capsule 24 hr capsule Take 0.4 mg by mouth. 20  Yes Sherry Maria MD   valsartan (DIOVAN) 80 MG tablet Take 80 mg by mouth Daily.   Yes Sherry Maria MD   famotidine (PEPCID) 20 MG tablet Take 20 mg by mouth 2 (Two) Times a Day.  6/15/21  Sherry Maria MD   insulin glargine (LANTUS) 100 UNIT/ML injection Inject 15 Units under the skin Daily.  6/15/21  Sherry Maria MD       Social History     Socioeconomic History   • Marital status:      Spouse name: Not on file   • Number of children: Not on file   • Years of education: Not on file   • Highest education level: Not on file   Tobacco Use   • Smoking status: Former Smoker     Packs/day: 2.50     Quit date: 12/15/2014     Years since quittin.5   • Smokeless tobacco: Never Used   • Tobacco comment: 87.5 pack-years    Vaping Use   • Vaping Use: Never used   Substance and Sexual Activity   • Alcohol use: No   • Drug use: No   • Sexual activity: Defer       Objective   Vital Signs:   /66 (BP Location: Left arm, Patient Position: Sitting, Cuff Size: Adult)   Pulse 76   Ht 154.9 cm (61\")   Wt 91.1 kg (200 lb 12.8 oz)   SpO2 98%   BMI 37.94 kg/m²     Physical Exam  Vitals and nursing note reviewed.   Constitutional:       Comments: She is an obese white female " who appears in no acute distress.   HENT:      Head: Normocephalic.      Comments: She is wearing a mask.  Eyes:      Pupils: Pupils are equal, round, and reactive to light.   Neck:      Comments: Her neck is thick.  Cardiovascular:      Rate and Rhythm: Normal rate and regular rhythm.   Pulmonary:      Effort: Pulmonary effort is normal.      Comments: She has reasonable air movement bilaterally.  Abdominal:      Comments: Her abdomen is obese.   Musculoskeletal:         General: Normal range of motion.   Skin:     General: Skin is warm and dry.   Neurological:      General: No focal deficit present.      Mental Status: She is alert and oriented to person, place, and time.   Psychiatric:         Mood and Affect: Mood normal.        Result Review :{ Labs  Result Review  Imaging  Med Tab  Media :    PFT Values        Some values may be hidden. Unless noted otherwise, only the newest values recorded on each date are displayed.         Old Values PFT Results 7/31/19 6/15/21   FVC 57%    FEV1 59%    FEV1/FVC 85.14%    DLCO 75%    TLC 61%       Pre Drug PFT Results 7/31/19 6/15/21   FVC  66   FEV1  67   FEF 25-75%  67   FEV1/FVC  83.14      Post Drug PFT Results 7/31/19 6/15/21   No data to display.      Other Tests PFT Results 7/31/19 6/15/21   TLC  66   RV  69   DLCO  85   D/VAsb  147               Results for orders placed in visit on 06/15/21    Full Pulmonary Function Test Without Bronchodilator    Narrative  Full Pulmonary Function Test Without Bronchodilator  Performed by: Freda Cummings, RRT  Authorized by: Dimitri Felix MD    Pre Drug % Predicted  FVC: 66%  FEV1: 67%  FEF 25-75%: 67%  FEV1/FVC: 83.14%  T%  RV: 69%  DLCO: 85%  D/VAsb: 147%    Interpretation  Spirometry  Spirometry shows moderate restriction. There is reduced midflow suggesting small airway/airflow obstruction.  Lung Volume Measurements  Measurements show reduced lung volumes consistent with restriction.  Diffusion  Capacity  The patient's diffusion capacity is normal.  Diffusion capacity is normal when corrected for alveolar volume.  Overall comments: The patient's baseline spirometry is consistent with a moderate restrictive ventilatory defect with coexisting small airways disease.  Lung volumes confirm a moderate restrictive ventilatory defect.  There is also a decrease in inspiratory capacity.  Diffusion capacity is within normal limits and when corrected for alveolar volume is actually supranormal.  When compared to studies done here in the office on July 31 of 2019, she has had improvement in her FVC, FEV1, total lung capacity, and diffusion capacity particularly when corrected for alveolar volume.      Results for orders placed in visit on 07/31/19    Pulmonary Function Test    Rest/Exercise Pulse Ox Values        Some values may be hidden. Unless noted otherwise, only the newest values recorded on each date are displayed.         Rest/Exercise Pulse Ox Results 8/10/20   Rest room air SAT % 97%   Exercise room air SAT % 97%                       My interpretation of imaging:    CT Chest Low Dose Wo (07/28/2020 00:00)      Assessment and Plan {CC Problem List  Visit Diagnosis  ROS  Review (Popup)  Health Maintenance  Quality  BestPractice  Medications  SmartSets  SnapShot Encounters  Media      Diagnoses and all orders for this visit:    1. Restrictive lung disease (Primary)  Assessment & Plan:  This likely relates to her weight although the previous noted lung scarring could be a contributing factor.  Again we will get a follow-up screening CT in early August and call her with results.  Otherwise she will return in 6 months.  Her PFTs that were done today did show some improvement from previous studies done in 2019.      2. Scarring of lung  Assessment & Plan:  This was stable on her last chest CT.      3. Lung nodule  Assessment & Plan:  This actually had decreased in size on her last CT.      4. Coronary  artery disease involving native coronary artery of native heart without angina pectoris  Assessment & Plan:  She will continue her beta-blocker and nitrate therapy.      5. Morbid (severe) obesity due to excess calories (CMS/HCC)  Assessment & Plan:  Diet and exercise are encouraged and she will otherwise follow-up with her primary healthcare provider regarding her elevated BMI.      6. Personal history of nicotine dependence  Assessment & Plan:  I will schedule a follow-up screening CT at [facility] for early August and call her with results.    Orders:  -      CT Chest Low Dose Cancer Screening WO; Future    7. Screening for malignant neoplasm of respiratory organ  -      CT Chest Low Dose Cancer Screening WO; Future      Patient's Body mass index is 37.94 kg/m². indicating that she is morbidly obese (BMI > 40 or > 35 with obesity - related health condition). Obesity-related health conditions include the following: coronary heart disease. Obesity is worsening.  Diet and exercise are encouraged and she otherwise will follow up with her primary healthcare provider regarding her elevated BMI.      Dimitri Felix MD  6/15/2021  16:07 CDT    Follow Up {Instructions Charge Capture  Follow-up Communications   Return in about 6 months (around 12/15/2021) for To see me specifically.    Patient was given instructions and counseling regarding her condition or for health maintenance advice. Please see specific information pulled into the AVS if appropriate.

## 2021-06-15 NOTE — PATIENT INSTRUCTIONS
The patient's pulmonary functions today still show restrictive pattern but with improvement compared to studies that were done here in the office in July 2019.  She would be due for a follow-up screening chest CT after July 28 and I will schedule that for the first week of August and call her with results.  Assuming it shows no new or suspicious lesions we will just plan on a follow-up 6 months from now.

## 2021-06-15 NOTE — PROCEDURES
Full Pulmonary Function Test Without Bronchodilator  Performed by: Freda Cummings, RRT  Authorized by: Dimitri Felix MD      Pre Drug % Predicted    FVC: 66%   FEV1: 67%   FEF 25-75%: 67%   FEV1/FVC: 83.14%   T%   RV: 69%   DLCO: 85%   D/VAsb: 147%    Interpretation   Spirometry   Spirometry shows moderate restriction. There is reduced midflow suggesting small airway/airflow obstruction.   Lung Volume Measurements  Measurements show reduced lung volumes consistent with restriction.   Diffusion Capacity  The patient's diffusion capacity is normal.  Diffusion capacity is normal when corrected for alveolar volume.   Overall comments: The patient's baseline spirometry is consistent with a moderate restrictive ventilatory defect with coexisting small airways disease.  Lung volumes confirm a moderate restrictive ventilatory defect.  There is also a decrease in inspiratory capacity.  Diffusion capacity is within normal limits and when corrected for alveolar volume is actually supranormal.  When compared to studies done here in the office on , she has had improvement in her FVC, FEV1, total lung capacity, and diffusion capacity particularly when corrected for alveolar volume.

## 2021-06-15 NOTE — ASSESSMENT & PLAN NOTE
This likely relates to her weight although the previous noted lung scarring could be a contributing factor.  Again we will get a follow-up screening CT in early August and call her with results.  Otherwise she will return in 6 months.  Her PFTs that were done today did show some improvement from previous studies done in 2019.

## 2021-06-15 NOTE — ASSESSMENT & PLAN NOTE
Diet and exercise are encouraged and she will otherwise follow-up with her primary healthcare provider regarding her elevated BMI.

## 2021-06-15 NOTE — ASSESSMENT & PLAN NOTE
I will schedule a follow-up screening CT at [facility] for early August and call her with results.

## 2021-06-16 ENCOUNTER — HOSPITAL ENCOUNTER (OUTPATIENT)
Dept: PREADMISSION TESTING | Age: 56
Discharge: HOME | End: 2021-06-20
Payer: COMMERCIAL

## 2021-06-16 VITALS — BODY MASS INDEX: 36.8 KG/M2 | WEIGHT: 200 LBS | HEIGHT: 62 IN

## 2021-06-16 LAB
ANION GAP SERPL CALCULATED.3IONS-SCNC: 9 MMOL/L (ref 7–19)
BASOPHILS ABSOLUTE: 0.1 K/UL (ref 0–0.2)
BASOPHILS RELATIVE PERCENT: 1 % (ref 0–1)
BUN BLDV-MCNC: 14 MG/DL (ref 6–20)
CALCIUM SERPL-MCNC: 9.3 MG/DL (ref 8.6–10)
CHLORIDE BLD-SCNC: 103 MMOL/L (ref 98–111)
CO2: 28 MMOL/L (ref 22–29)
CREAT SERPL-MCNC: 0.8 MG/DL (ref 0.5–0.9)
EOSINOPHILS ABSOLUTE: 0.2 K/UL (ref 0–0.6)
EOSINOPHILS RELATIVE PERCENT: 2.5 % (ref 0–5)
GFR AFRICAN AMERICAN: >59
GFR NON-AFRICAN AMERICAN: >60
GLUCOSE BLD-MCNC: 187 MG/DL (ref 74–109)
HCT VFR BLD CALC: 42.7 % (ref 37–47)
HEMOGLOBIN: 14.7 G/DL (ref 12–16)
IMMATURE GRANULOCYTES #: 0 K/UL
LYMPHOCYTES ABSOLUTE: 3.1 K/UL (ref 1.1–4.5)
LYMPHOCYTES RELATIVE PERCENT: 34.5 % (ref 20–40)
MCH RBC QN AUTO: 29.9 PG (ref 27–31)
MCHC RBC AUTO-ENTMCNC: 34.4 G/DL (ref 33–37)
MCV RBC AUTO: 86.8 FL (ref 81–99)
MONOCYTES ABSOLUTE: 0.6 K/UL (ref 0–0.9)
MONOCYTES RELATIVE PERCENT: 6.5 % (ref 0–10)
NEUTROPHILS ABSOLUTE: 5 K/UL (ref 1.5–7.5)
NEUTROPHILS RELATIVE PERCENT: 55.2 % (ref 50–65)
PDW BLD-RTO: 13.3 % (ref 11.5–14.5)
PLATELET # BLD: 236 K/UL (ref 130–400)
PMV BLD AUTO: 9.9 FL (ref 9.4–12.3)
POTASSIUM SERPL-SCNC: 4 MMOL/L (ref 3.5–5)
RBC # BLD: 4.92 M/UL (ref 4.2–5.4)
SARS-COV-2, PCR: NOT DETECTED
SODIUM BLD-SCNC: 140 MMOL/L (ref 136–145)
WBC # BLD: 9 K/UL (ref 4.8–10.8)

## 2021-06-16 PROCEDURE — 80048 BASIC METABOLIC PNL TOTAL CA: CPT

## 2021-06-16 PROCEDURE — 85025 COMPLETE CBC W/AUTO DIFF WBC: CPT

## 2021-06-16 PROCEDURE — U0003 INFECTIOUS AGENT DETECTION BY NUCLEIC ACID (DNA OR RNA); SEVERE ACUTE RESPIRATORY SYNDROME CORONAVIRUS 2 (SARS-COV-2) (CORONAVIRUS DISEASE [COVID-19]), AMPLIFIED PROBE TECHNIQUE, MAKING USE OF HIGH THROUGHPUT TECHNOLOGIES AS DESCRIBED BY CMS-2020-01-R: HCPCS

## 2021-06-16 PROCEDURE — U0005 INFEC AGEN DETEC AMPLI PROBE: HCPCS

## 2021-06-16 RX ORDER — ISOSORBIDE MONONITRATE 30 MG/1
30 TABLET, EXTENDED RELEASE ORAL NIGHTLY
COMMUNITY
End: 2022-06-09 | Stop reason: ALTCHOICE

## 2021-06-17 ENCOUNTER — TELEPHONE (OUTPATIENT)
Dept: OBGYN CLINIC | Age: 56
End: 2021-06-17

## 2021-06-18 ENCOUNTER — TELEPHONE (OUTPATIENT)
Dept: OBGYN CLINIC | Age: 56
End: 2021-06-18
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
## 2021-06-21 NOTE — TELEPHONE ENCOUNTER
Returned call to pt. Vulvar biopsy rescheduled for 7-2-21 at 7:30 am, arrive at 6 am.  Pt is to fast after midnight. Pt v/u.

## 2021-06-22 DIAGNOSIS — I10 ESSENTIAL HYPERTENSION: ICD-10-CM

## 2021-06-22 RX ORDER — ERGOCALCIFEROL 1.25 MG/1
CAPSULE ORAL
Qty: 4 CAPSULE | Refills: 2 | Status: SHIPPED | OUTPATIENT
Start: 2021-06-22 | End: 2021-10-22

## 2021-06-28 RX ORDER — OMEPRAZOLE 20 MG/1
CAPSULE, DELAYED RELEASE ORAL
Qty: 90 CAPSULE | Refills: 1 | Status: ON HOLD | OUTPATIENT
Start: 2021-06-28 | End: 2021-07-02

## 2021-07-01 ENCOUNTER — HOSPITAL ENCOUNTER (OUTPATIENT)
Dept: PREADMISSION TESTING | Age: 56
Discharge: HOME | End: 2021-07-05
Payer: COMMERCIAL

## 2021-07-01 ENCOUNTER — ANESTHESIA EVENT (OUTPATIENT)
Dept: OPERATING ROOM | Age: 56
End: 2021-07-01
Payer: COMMERCIAL

## 2021-07-01 ENCOUNTER — TELEPHONE (OUTPATIENT)
Dept: OBGYN CLINIC | Age: 56
End: 2021-07-01

## 2021-07-01 VITALS — HEIGHT: 62 IN | WEIGHT: 200 LBS | BODY MASS INDEX: 36.8 KG/M2

## 2021-07-01 DIAGNOSIS — I10 ESSENTIAL HYPERTENSION: ICD-10-CM

## 2021-07-01 DIAGNOSIS — E11.8 TYPE 2 DIABETES MELLITUS WITH COMPLICATION, WITH LONG-TERM CURRENT USE OF INSULIN (HCC): ICD-10-CM

## 2021-07-01 DIAGNOSIS — E78.2 MIXED HYPERLIPIDEMIA: ICD-10-CM

## 2021-07-01 DIAGNOSIS — Z79.4 TYPE 2 DIABETES MELLITUS WITH COMPLICATION, WITH LONG-TERM CURRENT USE OF INSULIN (HCC): ICD-10-CM

## 2021-07-01 LAB — SARS-COV-2, PCR: NOT DETECTED

## 2021-07-01 PROCEDURE — U0003 INFECTIOUS AGENT DETECTION BY NUCLEIC ACID (DNA OR RNA); SEVERE ACUTE RESPIRATORY SYNDROME CORONAVIRUS 2 (SARS-COV-2) (CORONAVIRUS DISEASE [COVID-19]), AMPLIFIED PROBE TECHNIQUE, MAKING USE OF HIGH THROUGHPUT TECHNOLOGIES AS DESCRIBED BY CMS-2020-01-R: HCPCS

## 2021-07-01 PROCEDURE — U0005 INFEC AGEN DETEC AMPLI PROBE: HCPCS

## 2021-07-02 ENCOUNTER — HOSPITAL ENCOUNTER (OUTPATIENT)
Age: 56
Setting detail: OUTPATIENT SURGERY
Discharge: HOME | End: 2021-07-02
Attending: OBSTETRICS & GYNECOLOGY | Admitting: OBSTETRICS & GYNECOLOGY
Payer: COMMERCIAL

## 2021-07-02 ENCOUNTER — ANESTHESIA (OUTPATIENT)
Dept: OPERATING ROOM | Age: 56
End: 2021-07-02
Payer: COMMERCIAL

## 2021-07-02 VITALS — SYSTOLIC BLOOD PRESSURE: 121 MMHG | OXYGEN SATURATION: 96 % | DIASTOLIC BLOOD PRESSURE: 59 MMHG | TEMPERATURE: 98 F

## 2021-07-02 VITALS
OXYGEN SATURATION: 100 % | BODY MASS INDEX: 38.68 KG/M2 | RESPIRATION RATE: 16 BRPM | HEIGHT: 60 IN | WEIGHT: 197 LBS | HEART RATE: 77 BPM | TEMPERATURE: 97.5 F | DIASTOLIC BLOOD PRESSURE: 68 MMHG | SYSTOLIC BLOOD PRESSURE: 146 MMHG

## 2021-07-02 LAB
GLUCOSE BLD-MCNC: 131 MG/DL (ref 70–99)
PERFORMED ON: ABNORMAL

## 2021-07-02 PROCEDURE — 2500000003 HC RX 250 WO HCPCS: Performed by: NURSE ANESTHETIST, CERTIFIED REGISTERED

## 2021-07-02 PROCEDURE — 6370000000 HC RX 637 (ALT 250 FOR IP): Performed by: ANESTHESIOLOGY

## 2021-07-02 PROCEDURE — 7100000001 HC PACU RECOVERY - ADDTL 15 MIN: Performed by: OBSTETRICS & GYNECOLOGY

## 2021-07-02 PROCEDURE — 88305 TISSUE EXAM BY PATHOLOGIST: CPT

## 2021-07-02 PROCEDURE — 3600000014 HC SURGERY LEVEL 4 ADDTL 15MIN: Performed by: OBSTETRICS & GYNECOLOGY

## 2021-07-02 PROCEDURE — 3700000000 HC ANESTHESIA ATTENDED CARE: Performed by: OBSTETRICS & GYNECOLOGY

## 2021-07-02 PROCEDURE — 3600000004 HC SURGERY LEVEL 4 BASE: Performed by: OBSTETRICS & GYNECOLOGY

## 2021-07-02 PROCEDURE — 7100000010 HC PHASE II RECOVERY - FIRST 15 MIN: Performed by: OBSTETRICS & GYNECOLOGY

## 2021-07-02 PROCEDURE — 82947 ASSAY GLUCOSE BLOOD QUANT: CPT

## 2021-07-02 PROCEDURE — 2500000003 HC RX 250 WO HCPCS: Performed by: OBSTETRICS & GYNECOLOGY

## 2021-07-02 PROCEDURE — 6360000002 HC RX W HCPCS: Performed by: OBSTETRICS & GYNECOLOGY

## 2021-07-02 PROCEDURE — 88312 SPECIAL STAINS GROUP 1: CPT

## 2021-07-02 PROCEDURE — 2709999900 HC NON-CHARGEABLE SUPPLY: Performed by: OBSTETRICS & GYNECOLOGY

## 2021-07-02 PROCEDURE — 2580000003 HC RX 258: Performed by: ANESTHESIOLOGY

## 2021-07-02 PROCEDURE — 7100000000 HC PACU RECOVERY - FIRST 15 MIN: Performed by: OBSTETRICS & GYNECOLOGY

## 2021-07-02 PROCEDURE — 6360000002 HC RX W HCPCS: Performed by: NURSE ANESTHETIST, CERTIFIED REGISTERED

## 2021-07-02 PROCEDURE — 3700000001 HC ADD 15 MINUTES (ANESTHESIA): Performed by: OBSTETRICS & GYNECOLOGY

## 2021-07-02 RX ORDER — MEPERIDINE HYDROCHLORIDE 50 MG/ML
12.5 INJECTION INTRAMUSCULAR; INTRAVENOUS; SUBCUTANEOUS EVERY 5 MIN PRN
Status: DISCONTINUED | OUTPATIENT
Start: 2021-07-02 | End: 2021-07-02 | Stop reason: HOSPADM

## 2021-07-02 RX ORDER — SUCCINYLCHOLINE CHLORIDE 20 MG/ML
INJECTION INTRAMUSCULAR; INTRAVENOUS PRN
Status: DISCONTINUED | OUTPATIENT
Start: 2021-07-02 | End: 2021-07-02 | Stop reason: SDUPTHER

## 2021-07-02 RX ORDER — LABETALOL HYDROCHLORIDE 5 MG/ML
5 INJECTION, SOLUTION INTRAVENOUS EVERY 10 MIN PRN
Status: DISCONTINUED | OUTPATIENT
Start: 2021-07-02 | End: 2021-07-02 | Stop reason: HOSPADM

## 2021-07-02 RX ORDER — ONDANSETRON 2 MG/ML
INJECTION INTRAMUSCULAR; INTRAVENOUS PRN
Status: DISCONTINUED | OUTPATIENT
Start: 2021-07-02 | End: 2021-07-02 | Stop reason: SDUPTHER

## 2021-07-02 RX ORDER — HYDROMORPHONE HYDROCHLORIDE 1 MG/ML
0.5 INJECTION, SOLUTION INTRAMUSCULAR; INTRAVENOUS; SUBCUTANEOUS EVERY 5 MIN PRN
Status: DISCONTINUED | OUTPATIENT
Start: 2021-07-02 | End: 2021-07-02 | Stop reason: HOSPADM

## 2021-07-02 RX ORDER — METOCLOPRAMIDE HYDROCHLORIDE 5 MG/ML
10 INJECTION INTRAMUSCULAR; INTRAVENOUS
Status: DISCONTINUED | OUTPATIENT
Start: 2021-07-02 | End: 2021-07-02 | Stop reason: HOSPADM

## 2021-07-02 RX ORDER — FENTANYL CITRATE 50 UG/ML
INJECTION, SOLUTION INTRAMUSCULAR; INTRAVENOUS PRN
Status: DISCONTINUED | OUTPATIENT
Start: 2021-07-02 | End: 2021-07-02 | Stop reason: SDUPTHER

## 2021-07-02 RX ORDER — SODIUM CHLORIDE, SODIUM LACTATE, POTASSIUM CHLORIDE, CALCIUM CHLORIDE 600; 310; 30; 20 MG/100ML; MG/100ML; MG/100ML; MG/100ML
INJECTION, SOLUTION INTRAVENOUS CONTINUOUS
Status: DISCONTINUED | OUTPATIENT
Start: 2021-07-02 | End: 2021-07-02 | Stop reason: HOSPADM

## 2021-07-02 RX ORDER — DIPHENHYDRAMINE HYDROCHLORIDE 50 MG/ML
12.5 INJECTION INTRAMUSCULAR; INTRAVENOUS
Status: DISCONTINUED | OUTPATIENT
Start: 2021-07-02 | End: 2021-07-02 | Stop reason: HOSPADM

## 2021-07-02 RX ORDER — SCOLOPAMINE TRANSDERMAL SYSTEM 1 MG/1
1 PATCH, EXTENDED RELEASE TRANSDERMAL ONCE
Status: DISCONTINUED | OUTPATIENT
Start: 2021-07-02 | End: 2021-07-02 | Stop reason: HOSPADM

## 2021-07-02 RX ORDER — FENTANYL CITRATE 50 UG/ML
50 INJECTION, SOLUTION INTRAMUSCULAR; INTRAVENOUS
Status: DISCONTINUED | OUTPATIENT
Start: 2021-07-02 | End: 2021-07-02 | Stop reason: HOSPADM

## 2021-07-02 RX ORDER — ROCURONIUM BROMIDE 10 MG/ML
INJECTION, SOLUTION INTRAVENOUS PRN
Status: DISCONTINUED | OUTPATIENT
Start: 2021-07-02 | End: 2021-07-02 | Stop reason: SDUPTHER

## 2021-07-02 RX ORDER — MIDAZOLAM HYDROCHLORIDE 1 MG/ML
2 INJECTION INTRAMUSCULAR; INTRAVENOUS
Status: DISCONTINUED | OUTPATIENT
Start: 2021-07-02 | End: 2021-07-02 | Stop reason: HOSPADM

## 2021-07-02 RX ORDER — LIDOCAINE HYDROCHLORIDE 10 MG/ML
INJECTION, SOLUTION INFILTRATION; PERINEURAL PRN
Status: DISCONTINUED | OUTPATIENT
Start: 2021-07-02 | End: 2021-07-02 | Stop reason: SDUPTHER

## 2021-07-02 RX ORDER — HYDROMORPHONE HYDROCHLORIDE 1 MG/ML
0.25 INJECTION, SOLUTION INTRAMUSCULAR; INTRAVENOUS; SUBCUTANEOUS EVERY 5 MIN PRN
Status: DISCONTINUED | OUTPATIENT
Start: 2021-07-02 | End: 2021-07-02 | Stop reason: HOSPADM

## 2021-07-02 RX ORDER — MORPHINE SULFATE 4 MG/ML
4 INJECTION, SOLUTION INTRAMUSCULAR; INTRAVENOUS EVERY 5 MIN PRN
Status: DISCONTINUED | OUTPATIENT
Start: 2021-07-02 | End: 2021-07-02 | Stop reason: HOSPADM

## 2021-07-02 RX ORDER — MORPHINE SULFATE 4 MG/ML
2 INJECTION, SOLUTION INTRAMUSCULAR; INTRAVENOUS EVERY 5 MIN PRN
Status: DISCONTINUED | OUTPATIENT
Start: 2021-07-02 | End: 2021-07-02 | Stop reason: HOSPADM

## 2021-07-02 RX ORDER — SODIUM CHLORIDE 0.9 % (FLUSH) 0.9 %
5-40 SYRINGE (ML) INJECTION EVERY 12 HOURS SCHEDULED
Status: DISCONTINUED | OUTPATIENT
Start: 2021-07-02 | End: 2021-07-02 | Stop reason: HOSPADM

## 2021-07-02 RX ORDER — ONDANSETRON 2 MG/ML
4 INJECTION INTRAMUSCULAR; INTRAVENOUS ONCE
Status: COMPLETED | OUTPATIENT
Start: 2021-07-02 | End: 2021-07-02

## 2021-07-02 RX ORDER — SODIUM CHLORIDE 0.9 % (FLUSH) 0.9 %
5-40 SYRINGE (ML) INJECTION PRN
Status: DISCONTINUED | OUTPATIENT
Start: 2021-07-02 | End: 2021-07-02 | Stop reason: HOSPADM

## 2021-07-02 RX ORDER — MORPHINE SULFATE 4 MG/ML
4 INJECTION, SOLUTION INTRAMUSCULAR; INTRAVENOUS
Status: DISCONTINUED | OUTPATIENT
Start: 2021-07-02 | End: 2021-07-02 | Stop reason: HOSPADM

## 2021-07-02 RX ORDER — DEXAMETHASONE SODIUM PHOSPHATE 10 MG/ML
INJECTION, SOLUTION INTRAMUSCULAR; INTRAVENOUS PRN
Status: DISCONTINUED | OUTPATIENT
Start: 2021-07-02 | End: 2021-07-02 | Stop reason: SDUPTHER

## 2021-07-02 RX ORDER — SODIUM CHLORIDE 9 MG/ML
25 INJECTION, SOLUTION INTRAVENOUS PRN
Status: DISCONTINUED | OUTPATIENT
Start: 2021-07-02 | End: 2021-07-02 | Stop reason: HOSPADM

## 2021-07-02 RX ORDER — PROPOFOL 10 MG/ML
INJECTION, EMULSION INTRAVENOUS PRN
Status: DISCONTINUED | OUTPATIENT
Start: 2021-07-02 | End: 2021-07-02 | Stop reason: SDUPTHER

## 2021-07-02 RX ORDER — HYDRALAZINE HYDROCHLORIDE 20 MG/ML
5 INJECTION INTRAMUSCULAR; INTRAVENOUS EVERY 10 MIN PRN
Status: DISCONTINUED | OUTPATIENT
Start: 2021-07-02 | End: 2021-07-02 | Stop reason: HOSPADM

## 2021-07-02 RX ORDER — LIDOCAINE HYDROCHLORIDE 10 MG/ML
1 INJECTION, SOLUTION EPIDURAL; INFILTRATION; INTRACAUDAL; PERINEURAL
Status: DISCONTINUED | OUTPATIENT
Start: 2021-07-02 | End: 2021-07-02 | Stop reason: HOSPADM

## 2021-07-02 RX ORDER — EPHEDRINE SULFATE 50 MG/ML
INJECTION, SOLUTION INTRAVENOUS PRN
Status: DISCONTINUED | OUTPATIENT
Start: 2021-07-02 | End: 2021-07-02 | Stop reason: SDUPTHER

## 2021-07-02 RX ORDER — BUPIVACAINE HYDROCHLORIDE AND EPINEPHRINE 2.5; 5 MG/ML; UG/ML
INJECTION, SOLUTION INFILTRATION; PERINEURAL PRN
Status: DISCONTINUED | OUTPATIENT
Start: 2021-07-02 | End: 2021-07-02 | Stop reason: ALTCHOICE

## 2021-07-02 RX ORDER — PROMETHAZINE HYDROCHLORIDE 25 MG/ML
6.25 INJECTION, SOLUTION INTRAMUSCULAR; INTRAVENOUS
Status: DISCONTINUED | OUTPATIENT
Start: 2021-07-02 | End: 2021-07-02 | Stop reason: HOSPADM

## 2021-07-02 RX ADMIN — FENTANYL CITRATE 100 MCG: 50 INJECTION, SOLUTION INTRAMUSCULAR; INTRAVENOUS at 08:01

## 2021-07-02 RX ADMIN — PROPOFOL 140 MG: 10 INJECTION, EMULSION INTRAVENOUS at 08:01

## 2021-07-02 RX ADMIN — ONDANSETRON HYDROCHLORIDE 4 MG: 2 SOLUTION INTRAMUSCULAR; INTRAVENOUS at 07:01

## 2021-07-02 RX ADMIN — ONDANSETRON HYDROCHLORIDE 4 MG: 2 INJECTION, SOLUTION INTRAMUSCULAR; INTRAVENOUS at 08:06

## 2021-07-02 RX ADMIN — LIDOCAINE HYDROCHLORIDE 50 MG: 10 INJECTION, SOLUTION INFILTRATION; PERINEURAL at 08:01

## 2021-07-02 RX ADMIN — DEXAMETHASONE SODIUM PHOSPHATE 10 MG: 10 INJECTION, SOLUTION INTRAMUSCULAR; INTRAVENOUS at 08:06

## 2021-07-02 RX ADMIN — EPHEDRINE SULFATE 10 MG: 50 INJECTION INTRAMUSCULAR; INTRAVENOUS; SUBCUTANEOUS at 08:10

## 2021-07-02 RX ADMIN — SUCCINYLCHOLINE CHLORIDE 120 MG: 20 INJECTION, SOLUTION INTRAMUSCULAR; INTRAVENOUS at 08:01

## 2021-07-02 RX ADMIN — SODIUM CHLORIDE, POTASSIUM CHLORIDE, SODIUM LACTATE AND CALCIUM CHLORIDE: 600; 310; 30; 20 INJECTION, SOLUTION INTRAVENOUS at 06:42

## 2021-07-02 RX ADMIN — EPHEDRINE SULFATE 10 MG: 50 INJECTION INTRAMUSCULAR; INTRAVENOUS; SUBCUTANEOUS at 08:25

## 2021-07-02 RX ADMIN — ROCURONIUM BROMIDE 30 MG: 10 INJECTION, SOLUTION INTRAVENOUS at 08:06

## 2021-07-02 ASSESSMENT — PAIN - FUNCTIONAL ASSESSMENT: PAIN_FUNCTIONAL_ASSESSMENT: 0-10

## 2021-07-02 ASSESSMENT — PAIN DESCRIPTION - PAIN TYPE
TYPE: SURGICAL PAIN
TYPE: SURGICAL PAIN

## 2021-07-02 ASSESSMENT — LIFESTYLE VARIABLES: SMOKING_STATUS: 0

## 2021-07-02 ASSESSMENT — PAIN DESCRIPTION - DESCRIPTORS
DESCRIPTORS: BURNING
DESCRIPTORS: BURNING

## 2021-07-02 ASSESSMENT — PAIN SCALES - GENERAL
PAINLEVEL_OUTOF10: 2
PAINLEVEL_OUTOF10: 2

## 2021-07-02 ASSESSMENT — PAIN DESCRIPTION - LOCATION: LOCATION: INCISION

## 2021-07-02 ASSESSMENT — ENCOUNTER SYMPTOMS: SHORTNESS OF BREATH: 1

## 2021-07-02 NOTE — ANESTHESIA POSTPROCEDURE EVALUATION
Department of Anesthesiology  Postprocedure Note    Patient: Frandy Granados  MRN: [id number]  YOB: 1965  Date of evaluation: 7/2/2021  Time:  8:34 AM     Procedure Summary     Date: 07/02/21 Room / Location: [address]    Anesthesia Start: 2987 Anesthesia Stop: 8682    Procedure: VULVAR BIOPSY (N/A ) Diagnosis: (VULVAR LESION, LICHEN CLEROSUS)    Surgeons: Gurinder Luna MD Responsible Provider: MIKE Santana CRNA    Anesthesia Type: general ASA Status: 3          Anesthesia Type: general    Marian Phase I:      Marian Phase II:      Last vitals: Reviewed and per EMR flowsheets.        Anesthesia Post Evaluation    Patient location during evaluation: PACU  Patient participation: complete - patient participated  Level of consciousness: awake and alert  Pain score: 0  Airway patency: patent  Nausea & Vomiting: no nausea and no vomiting  Complications: no  Cardiovascular status: hemodynamically stable  Respiratory status: acceptable  Hydration status: euvolemic

## 2021-07-02 NOTE — H&P
FLOYD Isbell is a 60 y.o. female with h/o recurrent vulvar irritation, itching and multiple lesions who presents for vulvar biopsy. She is doing well without complaints.     Past Medical History:   Diagnosis Date    Abnormal Pap smear of cervix     Arthritis     CAD (coronary artery disease)     stent 2011 Michael Luu; [facility]    [facility]erv[facility]     Class 3 severe obesity due to excess calories without serious comorbidity with body mass index (BMI) of 40.0 to 44.9 in adult ([facility] 7/28/2020    Diabetes mellitus ([facility]     ERRONEOUS ENCOUNTER--DISREGARD 09/15/2015    GERD (gastroesophageal reflux disease)     Headache(784.0)     Heart attack ([facility] 12/2011    Hyperlipidemia     Hypertension     Lichen simplex chronicus 10/2015    Osteopenia     Pneumonia     Type II or unspecified type diabetes mellitus without mention of complication, not stated as uncontrolled      Past Surgical History:   Procedure Laterality Date    BREAST BIOPSY  6/8/2011    US guided core needle, right, benign    CARDIAC CATHETERIZATION  12/22/14  JDT    EF 50%    CARDIAC CATHETERIZATION  06/05/2017        CHG FLUOROSCOPIC GUIDANCE NEEDLE PLACEMENT ADD ON Left 7/19/2018    CORTICOSTEROID INJECTION performed by Kim Ivory MD at [address] [place] [address]  2006    COLONOSCOPY  approx 2005    Dr. Vikas Vines per pt recall \" Normal\"   [address] WITH STENT PLACEMENT  12/2011    SABA to [address] RELEASE Left 11/8/2019    LEFT INDEX AND LONG TRIGGER FINGER RELEASAE performed by Domi Lancaster MD at [facility]    HYSTERECTOMY      OTHER SURGICAL HISTORY      fibrinolysis    [place] CABG, ARTERY-VEIN, THREE  12/23/2014    Coronary Artery Bypass, 3    NH ALEKSANDARULADONAVAN MCGREGOR JT W ANESTHESIA Left 7/19/2018    SHOULDER MANIPULATION UNDER ANESTHESIA performed by Kim Ivory MD at [facility]  THORACOTOMY      UPPER GASTROINTESTINAL ENDOSCOPY  approx 2005    per pt recall \" Normal\"    UPPER GASTROINTESTINAL ENDOSCOPY N/A 2020    Dr Anca Archuleta exam, neg EoE    VULVAR/PERINEAL BIOPSY  10/07/2015    Dr. Adeel De Santiago     Family History   Problem Relation Age of Onset    Diabetes Father     Cancer Father     High Blood Pressure Father     Heart Attack Father     Colon Cancer Father     Diabetes Mother    Sabrina Sparks Breast Cancer Mother     Heart Failure Mother         CHF, MVP    Liver Cancer Mother     Heart Failure Maternal Grandfather     Stroke Maternal Grandfather     Pancreatic Cancer Maternal Grandfather     Stomach Cancer Maternal Grandfather     Diabetes Brother     Colon Cancer Paternal Uncle     Rectal Cancer Other     Colon Polyps Neg Hx     Crohn's Disease Neg Hx     Irritable Bowel Syndrome Neg Hx     Liver Disease Neg Hx      Social History     Tobacco Use    Smoking status: Former Smoker     Packs/day: 2.00     Years: 15.00     Pack years: 30.00     Types: Cigarettes     Quit date: 2014     Years since quittin.0    Smokeless tobacco: Never Used   Vaping Use    Vaping Use: Never used   Substance Use Topics    Alcohol use: No    Drug use: No     No current facility-administered medications on file prior to encounter.      Current Outpatient Medications on File Prior to Encounter   Medication Sig Dispense Refill    isosorbide mononitrate (IMDUR) 30 MG extended release tablet Take 30 mg by mouth nightly Indications: High Blood Pressure Disorder      nitroGLYCERIN (NITROSTAT) 0.4 MG SL tablet Place 1 tablet under the tongue every 5 minutes as needed for Chest pain 25 tablet 3    spironolactone (ALDACTONE) 50 MG tablet Take 1 tablet by mouth daily 30 tablet 3    empagliflozin (JARDIANCE) 25 MG tablet Take 1 tablet by mouth once daily 90 tablet 3    glipiZIDE (GLUCOTROL XL) 10 MG extended release tablet Take 1 tablet by mouth daily 30 tablet 3    lovastatin (MEVACOR) 40 MG tablet TAKE 1 TABLET AT BEDTIME  (Patient taking differently: Take 40 mg by mouth nightly TAKE 1 TABLET AT BEDTIME) 30 tablet 4    metFORMIN (GLUCOPHAGE-XR) 500 MG extended release tablet TAKE 1 TABLET BY MOUTH 2 TIMES DAILY. INDDICATIONS: DIABETES (Patient taking differently: Take 500 mg by mouth 2 times daily TAKE 1 TABLET BY MOUTH 2 TIMES DAILY. INDDICATIONS: DIABETES ) 60 tablet 3    valsartan (DIOVAN) 80 MG tablet TAKE 1 TABLET BY MOUTH DAILY  30 tablet 5    nystatin (MYCOSTATIN) 969277 UNIT/GM powder APPLY 2 OR 3 TIMES DAILY. (Patient taking differently: Apply topically as needed APPLY 2 OR 3 TIMES DAILY.  ) 60 g 5    Diabetic Shoe MISC by Does not apply route 1 each 0    tamsulosin (FLOMAX) 0.4 MG capsule TAKE 1 CAPSULE BY MOUTH DAILY 30 capsule 5    albuterol (PROVENTIL) (5 MG/ML) 0.5% nebulizer solution Take 0.5 mLs by nebulization every 6 hours as needed for Wheezing 120 each 0    docusate sodium (COLACE) 100 MG capsule Take 1 capsule by mouth daily as needed for Constipation 30 capsule 1    ondansetron (ZOFRAN ODT) 4 MG disintegrating tablet Take 1 tablet by mouth daily as needed for Nausea or Vomiting 30 tablet 5    diclofenac sodium (VOLTAREN) 1 % GEL Apply 2 g topically 4 times daily (Patient taking differently: Apply 2 g topically 4 times daily as needed for Pain ) 2 Tube 1    furosemide (LASIX) 40 MG tablet TAKE 1 TABLET BY MOUTH DAILY 30 tablet 5    aspirin 81 MG tablet Take 81 mg by mouth daily      mometasone (ELOCON) 0.1 % ointment Apply topically to vagina 3 times per week (Patient taking differently: Apply topically as needed Apply topically to vagina 3 times per week) 1 Tube 3    Eflornithine HCl (VANIQA) 13.9 % CREA Apply 1 Dose topically 2 times daily (Patient taking differently: Apply 1 Dose topically 2 times daily as needed ) 45 g 2     Allergies   Allergen Reactions    Codeine Other (See Comments)     Seizures as a child    Motrin [Ibuprofen] Swelling Tongue to swell    Humalog [Insulin Lispro] Other (See Comments)     Shakiness, break out in a sweat at higher doses.  Ozempic (0.25 Or 0.5 Mg-Dose) [Semaglutide(0.25 Or 0.5mg-Dos)]      nausea     BP (!) 143/79   Pulse 66   Temp 97.7 °F (36.5 °C) (Temporal)   Resp 20   Ht 5' (1.524 m)   Wt 197 lb (89.4 kg)   LMP 01/01/1998 (Approximate)   SpO2 100%   BMI 38.47 kg/m²   Physical Exam  Constitutional:       General: She is not in acute distress. Appearance: Normal appearance. She is not ill-appearing or diaphoretic. HENT:      Head: Normocephalic and atraumatic. Nose: Nose normal. No rhinorrhea. Eyes:      General: No scleral icterus. Right eye: No discharge. Left eye: No discharge. Extraocular Movements: Extraocular movements intact. Pulmonary:      Effort: Pulmonary effort is normal. No respiratory distress. Musculoskeletal:         General: Normal range of motion. Skin:     Coloration: Skin is not pale. Findings: No erythema or rash. Neurological:      Mental Status: She is alert and oriented to person, place, and time. Psychiatric:         Attention and Perception: Attention and perception normal.         Mood and Affect: Mood and affect normal.         Speech: Speech normal.         Behavior: Behavior normal. Behavior is cooperative. Thought Content: Thought content normal.         Cognition and Memory: Cognition and memory normal.         Judgment: Judgment normal.           Assessment  1. History of lichen sclerosus  2. Persistent vulvar itching not resolved with intervention    Plan  To OR for vulvar biopsy. Risks of bleeding, infection and pain reviewed.   Consents signed

## 2021-07-02 NOTE — OP NOTE
Operative Note      Patient: Ashley Pickens  YOB: 1965  MRN: [id number]    Date of Procedure: 7/2/2021    Pre-Op Diagnosis: VULVAR LESION, LICHEN CLEROSUS    Post-Op Diagnosis: Same       Procedure(s):  VULVAR BIOPSY    Surgeon(s):  Lizet Gonzales MD    Assistant:   * No surgical staff found *    Anesthesia: General    Estimated Blood Loss (mL): Minimal    Complications: None    Specimens:   ID Type Source Tests Collected by Time Destination   A : right vulvar bx Tissue Perineum SURGICAL PATHOLOGY Lizet Gonzales MD 7/2/2021 0815    B : left vulvar bx Tissue Perineum SURGICAL PATHOLOGY Lizet Gonzales MD 7/2/2021 0815        Implants:  * No implants in log *      Drains: * No LDAs found *    Findings: Erythematous plaques covering entire vulva and perineum with multiple excoriations and erosions. Peripheral to inflammation is pale, atrophic tissue. Detailed Description of Procedure: The patient was taken to the OR where she was placed under general anesthesia. She was positioned in dorsal lithotomy, prepped and draped in usual sterile fashion. An elliptical biopsy was excised from the lower right vulva at the junction of the buttock. The defect was closed with 3-0 vicryl. Hemostasis was obtained. A 5 mm punch was then taken from the left vulva. Defect was closed with 3-0 vicryl. Hemostasis obtained. The procedure was concluded. Patient tolerated the procedure well and is in stable condition.     Electronically signed by Lizet Gonzales MD on 7/2/2021 at 8:42 AM

## 2021-07-02 NOTE — ANESTHESIA PRE PROCEDURE
Department of Anesthesiology  Preprocedure Note       Name:  Rayne Lopez   Age:  60 y.o.  :  1965                                          MRN:  [id number]         Date:  2021      Surgeon: Arti Freire):  Mila Josue MD    Procedure: Procedure(s):  EXAMINATION UNDER ANESTHESIA  VULVAR BIOPSY    Medications prior to admission:   Prior to Admission medications    Medication Sig Start Date End Date Taking? Authorizing Provider   metoprolol tartrate (LOPRESSOR) 25 MG tablet TAKE 1 TABLET BY MOUTH TWICE DAILY  21  Yes MIKE Armando   vitamin D (ERGOCALCIFEROL) 1.25 MG (77335 UT) CAPS capsule TAKE 1 CAPSULE BY MOUTH ONCE WEEKLY  21   MIKE Armando   isosorbide mononitrate (IMDUR) 30 MG extended release tablet Take 30 mg by mouth nightly Indications: High Blood Pressure Disorder    Historical Provider, MD   nitroGLYCERIN (NITROSTAT) 0.4 MG SL tablet Place 1 tablet under the tongue every 5 minutes as needed for Chest pain 6/3/21   Caren Gosselin, APRN   spironolactone (ALDACTONE) 50 MG tablet Take 1 tablet by mouth daily 21   MKIE Armando   empagliflozin (JARDIANCE) 25 MG tablet Take 1 tablet by mouth once daily 21   MIKE Armando   glipiZIDE (GLUCOTROL XL) 10 MG extended release tablet Take 1 tablet by mouth daily 21   MIKE Armando   lovastatin (MEVACOR) 40 MG tablet TAKE 1 TABLET AT BEDTIME   Patient taking differently: Take 40 mg by mouth nightly TAKE 1 TABLET AT BEDTIME 4/15/21   MIKE Armando   metFORMIN (GLUCOPHAGE-XR) 500 MG extended release tablet TAKE 1 TABLET BY MOUTH 2 TIMES DAILY. INDDICATIONS: DIABETES  Patient taking differently: Take 500 mg by mouth 2 times daily TAKE 1 TABLET BY MOUTH 2 TIMES DAILY. INDDICATIONS: DIABETES  3/18/21   MIKE Armando   valsartan (DIOVAN) 80 MG tablet TAKE 1 TABLET BY MOUTH DAILY  3/18/21   MIKE Armando   nystatin (MYCOSTATIN) 920675 UNIT/GM powder APPLY 2 OR 3 TIMES DAILY.   Patient taking differently: Apply topically as needed APPLY 2 OR 3 TIMES DAILY. 12/23/20   MIKE Brothers   Diabetic Shoe MISC by Does not apply route 11/12/20   Mercedes Koch MD   tamsulosin [facility]) 0.4 MG capsule TAKE 1 CAPSULE BY MOUTH DAILY 11/6/20   MIKE Brothers   albuterol (PROVENTIL) (5 MG/ML) 0.5% nebulizer solution Take 0.5 mLs by nebulization every 6 hours as needed for Wheezing 11/59/79   Wash VickieMIKE   docusate sodium (COLACE) 100 MG capsule Take 1 capsule by mouth daily as needed for Constipation 8/7/20   MIKE Brothers   ondansetron (ZOFRAN ODT) 4 MG disintegrating tablet Take 1 tablet by mouth daily as needed for Nausea or Vomiting 7/24/20   MIKE Ba   diclofenac sodium (VOLTAREN) 1 % GEL Apply 2 g topically 4 times daily  Patient taking differently: Apply 2 g topically 4 times daily as needed for Pain  3/24/20   MIKE Brothers   furosemide (LASIX) 40 MG tablet TAKE 1 TABLET BY MOUTH DAILY 12/20/19   MIKE Stanley   aspirin 81 MG tablet Take 81 mg by mouth daily    Historical Provider, MD   mometasone (ELOCON) 0.1 % ointment Apply topically to vagina 3 times per week  Patient taking differently: Apply topically as needed Apply topically to vagina 3 times per week 11/7/16   Galilea Chirinos MD   Eflornithine HCl (VANIQA) 13.9 % CREA Apply 1 Dose topically 2 times daily  Patient taking differently: Apply 1 Dose topically 2 times daily as needed  11/20/15   MIKE Carey       Current medications:    Current Facility-Administered Medications   Medication Dose Route Frequency Provider Last Rate Last Admin    lactated ringers infusion   Intravenous Continuous Isha Deng MD           Allergies: Allergies   Allergen Reactions    Codeine Other (See Comments)     Seizures as a child    Motrin [Ibuprofen] Swelling     Tongue to swell    Humalog [Insulin Lispro] Other (See Comments)     Shakiness, break out in a sweat at higher doses.     Ozempic (0.25 Or 0.5 Mg-Dose) [Semaglutide(0.25 Or 0.5mg-Dos)]      nausea       Problem List:    Patient Active Problem List   Diagnosis Code    CAD (coronary artery disease) I25.10    Hypertension I10    Hyperlipidemia E78.5    Type 2 diabetes mellitus with complication, with long-term current use of insulin ([facility] E11.8, Z79.4    Heart attack ([facility] I21.9    TAVERAS (dyspnea on exertion) R06.00    Candidal intertrigo B37.2    Weakness R53.1    S/P CABG x 3 Z95.1    History of pleural effusion Z87.09    Personal history of malignant neoplasm of cervix uteri Z85.41    Atrophic vaginitis Y59.6    Lichen simplex chronicus L28.0    Abnormal nuclear cardiac imaging test R93.1    Coronary artery disease involving coronary bypass graft of native heart without angina pectoris I25.810    Left ventricular dysfunction I51.9    Angina effort I20.8    Adhesive capsulitis of left shoulder M75.02    Gastroesophageal reflux disease without esophagitis K21.9    Dysuria R30.0    Vitamin D deficiency E55.9    Urinary tract infection without hematuria N39.0    Chronic nausea R11.0    Chronic vomiting R11.10    Change in bowel habits R19.4    Family history of colon cancer Z80.0    Alternating constipation and diarrhea R19.8    S/P cholecystectomy Z90.49    Chronic heartburn R12    Morbid (severe) obesity due to excess calories (HCC) E66.01    Chronic diastolic congestive heart failure (HCC) I50.32       Past Medical History:        Diagnosis Date    Abnormal Pap smear of cervix     Arthritis     CAD (coronary artery disease)     stent 2011 Michael Decree; [facility] cardiology    Cervical cancer ([facility]     Class 3 severe obesity due to excess calories without serious comorbidity with body mass index (BMI) of 40.0 to 44.9 in adult ([facility] 7/28/2020    Diabetes mellitus ([facility]     ERRONEOUS ENCOUNTER--DISREGARD 09/15/2015    GERD (gastroesophageal reflux disease)     Headache(784.0)     Heart attack ([address] 12/2011    Hyperlipidemia     Hypertension     Lichen simplex chronicus 10/2015    Osteopenia     Pneumonia     Type II or unspecified type diabetes mellitus without mention of complication, not stated as uncontrolled        Past Surgical History:        Procedure Laterality Date    BREAST BIOPSY  2011    US guided core needle, right, benign    CARDIAC CATHETERIZATION  14  JDT    EF 50%    CARDIAC CATHETERIZATION  2017        CHG FLUOROSCOPIC GUIDANCE NEEDLE PLACEMENT ADD ON Left 2018    CORTICOSTEROID INJECTION performed by Michael Cristobal MD at [address] [place] [address]    COLONOSCOPY  approx     Dr. Alvin Hernandez per pt recall \" Normal\"   [address]  2011    [address] to [address] RELEASE Left 2019    LEFT INDEX AND LONG TRIGGER FINGER RELEASAE performed by Susy Molina MD at [address] [place] [address]    HYSTERECTOMY      OTHER SURGICAL HISTORY      fibrinolysis    [place] CABG, ARTERY-VEIN, THREE  2014    Coronary Artery Bypass, 3    HI MANIPULATN JOSELDR JT W ANESTHESIA Left 2018    SHOULDER MANIPULATION UNDER ANESTHESIA performed by Michael Cristobal MD at [address] [place] [address] ENDOSCOPY  approx     per pt recall \" Normal\"    UPPER GASTROINTESTINAL ENDOSCOPY N/A 2020    Dr Ava Butler exam, neg EoE    VULVAR/PERINEAL BIOPSY  10/07/2015    Dr. Denise Gupta       Social History:    Social History     Tobacco Use    Smoking status: Former Smoker     Packs/day: 2.00     Years: 15.00     Pack years: 30.00     Types: Cigarettes     Quit date: 2014     Years since quittin.0    Smokeless tobacco: Never Used   Substance Use Topics    Alcohol use:  No                                Counseling given: Not Answered      Vital Signs (Current):   Vitals:    21 0628   BP: (!) 143/79   Pulse: 66 Resp: 20   Temp: 97.7 °F (36.5 °C)   TempSrc: Temporal   SpO2: 100%   Weight: 197 lb (89.4 kg)   Height: 5' (1.524 m)                                              BP Readings from Last 3 Encounters:   07/02/21 (!) 143/79   06/03/21 138/88   06/01/21 (!) 140/64       NPO Status:                                                                                 BMI:   Wt Readings from Last 3 Encounters:   07/02/21 197 lb (89.4 kg)   07/01/21 200 lb (90.7 kg)   06/16/21 200 lb (90.7 kg)     Body mass index is 38.47 kg/m². CBC:   Lab Results   Component Value Date    WBC 9.0 06/16/2021    RBC 4.92 06/16/2021    HGB 14.7 06/16/2021    HCT 42.7 06/16/2021    MCV 86.8 06/16/2021    RDW 13.3 06/16/2021     06/16/2021       CMP:   Lab Results   Component Value Date     06/16/2021    K 4.0 06/16/2021    K 3.8 05/22/2019     06/16/2021    CO2 28 06/16/2021    BUN 14 06/16/2021    CREATININE 0.8 06/16/2021    GFRAA >59 06/16/2021    LABGLOM >60 06/16/2021    GLUCOSE 187 06/16/2021    PROT 7.5 11/13/2020    CALCIUM 9.3 06/16/2021    BILITOT 0.7 11/13/2020    ALKPHOS 81 11/13/2020    AST 22 11/13/2020    ALT 20 11/13/2020       POC Tests: No results for input(s): POCGLU, POCNA, POCK, POCCL, POCBUN, POCHEMO, POCHCT in the last 72 hours.     Coags:   Lab Results   Component Value Date    PROTIME 12.8 02/25/2018    INR 0.97 02/25/2018       HCG (If Applicable): No results found for: PREGTESTUR, PREGSERUM, HCG, HCGQUANT     ABGs:   Lab Results   Component Value Date    PO2ART 383 12/23/2014        Type & Screen (If Applicable):  No results found for: LABABO, LABRH    Drug/Infectious Status (If Applicable):  No results found for: HIV, HEPCAB    COVID-19 Screening (If Applicable):   Lab Results   Component Value Date    COVID19 Not Detected 07/01/2021           Anesthesia Evaluation  Patient summary reviewed and Nursing notes reviewed no history of anesthetic complications:   Airway: Mallampati: II  TM distance: >3 FB   Neck ROM: full  Mouth opening: > = 3 FB Dental: normal exam   (+) upper dentures  Comment: Severe dental loss, brokenteeth     Pulmonary:Negative Pulmonary ROS and normal exam  breath sounds clear to auscultation  (+) pneumonia:  shortness of breath:      (-) not a current smoker          Patient did not smoke on day of surgery. Cardiovascular:    (+) hypertension:, angina:, past MI:, CAD:, CABG/stent:, CHF: diastolic, TAVERAS:,       NYHA Classification: I  ECG reviewed  Rhythm: regular  Rate: normal           Beta Blocker:  Dose within 24 Hrs      ROS comment: Conclusions      Summary   Structurally normal mitral valve with normal leaflet mobility. No evidence   of mitral valve stenosis or mild mitral regurgitation. Aortic valve appears to be tricuspid. Structurally normal aortic valve. No significant aortic regurgitation or stenosis is noted. Tricuspid valve is structurally normal.   mild Tr   pasp 30 mm hg   Normal left ventricular size with preserved LV function and an estimated   ejection fraction of approximately 55-60%. No evidence of left ventricular mass or thrombus noted. Normal right atrial dimension with no evidence of thrombus or mass noted. No evidence of significant pericardial effusion is noted. Signature      ----------------------------------------------------------------     Neuro/Psych:   Negative Neuro/Psych ROS  (+) headaches:,    (-) seizures, CVA and depression/anxiety            GI/Hepatic/Renal: Neg GI/Hepatic/Renal ROS  (+) GERD:, morbid obesity     (-) hiatal hernia       Endo/Other: Negative Endo/Other ROS   (+) DiabetesType II DM, , .          Pt had PAT visit. Abdominal:       Abdomen: soft. Vascular: Other Findings:             Anesthesia Plan      general     ASA 3     (Iv zofran within 30 min of closing   Nauseated thisamm,,   RSI/GETA)  Induction: intravenous.   BIS  MIPS: Postoperative opioids intended and Prophylactic antiemetics administered. Anesthetic plan and risks discussed with patient. Use of blood products discussed with patient whom. Plan discussed with CRNA.     Attending anesthesiologist reviewed and agrees with Pre Eval content              Farhana Brizuela MD   7/2/2021

## 2021-07-13 ENCOUNTER — TELEPHONE (OUTPATIENT)
Dept: OBGYN CLINIC | Age: 56
End: 2021-07-13

## 2021-07-13 NOTE — TELEPHONE ENCOUNTER
----- Message from Dianna Taylor MD sent at 7/13/2021 12:01 PM CDT -----  Benign vulvar biopsies.   Consider vulvar clinic referral to [facility]

## 2021-07-14 NOTE — TELEPHONE ENCOUNTER
Left a detailed message on identified voicemail giving results and if she desires referral, she is to contact office.

## 2021-07-15 ENCOUNTER — TELEPHONE (OUTPATIENT)
Dept: OBGYN CLINIC | Age: 56
End: 2021-07-15

## 2021-07-15 DIAGNOSIS — L29.2 VULVAR ITCHING: ICD-10-CM

## 2021-07-15 DIAGNOSIS — N90.89 VULVAR IRRITATION: Primary | ICD-10-CM

## 2021-07-15 DIAGNOSIS — L28.0 LICHEN SIMPLEX CHRONICUS: ICD-10-CM

## 2021-07-15 NOTE — TELEPHONE ENCOUNTER
Returned call to pt, she states she would like a referral to [facility]. Appt made, 10-6-21 at 11:10 at [facility] with Dr. Reema Bey. Phone number and address given. Pt would like a compound from [facility] for vaginal itching and burning. She has been using Clobetasol with little relief. I told her I would call this in today. Pt v/u.

## 2021-07-15 NOTE — TELEPHONE ENCOUNTER
Pt called and is wanting to discuss her vulvar biopsy results that she had done on 7/02/2021 in the hospital. [id number]

## 2021-07-19 ENCOUNTER — OFFICE VISIT (OUTPATIENT)
Dept: OBGYN CLINIC | Age: 56
End: 2021-07-19

## 2021-07-19 VITALS
SYSTOLIC BLOOD PRESSURE: 126 MMHG | BODY MASS INDEX: 39.07 KG/M2 | WEIGHT: 199 LBS | HEIGHT: 60 IN | DIASTOLIC BLOOD PRESSURE: 64 MMHG | HEART RATE: 63 BPM

## 2021-07-19 DIAGNOSIS — N89.8 VAGINAL ITCHING: ICD-10-CM

## 2021-07-19 DIAGNOSIS — Z48.89 ENCOUNTER FOR POSTOPERATIVE WOUND CHECK: ICD-10-CM

## 2021-07-19 DIAGNOSIS — Z09 POSTOP CHECK: Primary | ICD-10-CM

## 2021-07-19 PROCEDURE — 99024 POSTOP FOLLOW-UP VISIT: CPT | Performed by: NURSE PRACTITIONER

## 2021-07-19 RX ORDER — FLUCONAZOLE 150 MG/1
TABLET ORAL
Qty: 2 TABLET | Refills: 1 | Status: SHIPPED | OUTPATIENT
Start: 2021-07-19 | End: 2021-10-20

## 2021-07-19 ASSESSMENT — ENCOUNTER SYMPTOMS
GASTROINTESTINAL NEGATIVE: 1
RESPIRATORY NEGATIVE: 1
EYES NEGATIVE: 1

## 2021-07-19 NOTE — PROGRESS NOTES
Ollie Rodríguez is a 60 y.o. female who presents today for her medical conditions/ complaints as noted below. Ollie Rodríguez is c/o of Post-Op Check        HPI  Patient presents today for post op- vulvar biopsy done on 07/02/2021   She states the first 3 days she cried from pain but she has improved. She wasn't aware she had sutures and caught ring on them in the shower. Patient's last menstrual period was 01/01/1998 (approximate).   [id number]    Past Medical History:   Diagnosis Date    Abnormal Pap smear of cervix     Arthritis     CAD (coronary artery disease)     stent 2011 Michael Nieto; [facility] cardiology    Cervical cancer ([facility]     Class 3 severe obesity due to excess calories without serious comorbidity with body mass index (BMI) of 40.0 to 44.9 in adult ([facility] 7/28/2020    Diabetes mellitus ([facility]     ERRONEOUS ENCOUNTER--DISREGARD 09/15/2015    GERD (gastroesophageal reflux disease)     Headache(784.0)     Heart attack ([facility] 12/2011    Hyperlipidemia     Hypertension     Lichen simplex chronicus 10/2015    Osteopenia     Pneumonia     Type II or unspecified type diabetes mellitus without mention of complication, not stated as uncontrolled      Past Surgical History:   Procedure Laterality Date    BREAST BIOPSY  6/8/2011    US guided core needle, right, benign    CARDIAC CATHETERIZATION  12/22/14  JDT    EF 50%    CARDIAC CATHETERIZATION  06/05/2017        CHG FLUOROSCOPIC GUIDANCE NEEDLE PLACEMENT ADD ON Left 7/19/2018    CORTICOSTEROID INJECTION performed by Michael Cristobal MD at [address] [place] [address]  2006    COLONOSCOPY  approx 2005    Dr. Avlin Hernandez per pt recall \" Normal\"   [address] WITH STENT PLACEMENT  12/2011    SABA to [address] RELEASE Left 11/8/2019    LEFT INDEX AND LONG TRIGGER FINGER RELEASAE performed by Susy Molina MD at [address] [facility]      OTHER SURGICAL HISTORY      fibrinolysis    CA CABG, ARTERY-VEIN, THREE  2014    Coronary Artery Bypass, 3    CA MANIPULATN SHLDR JT W ANESTHESIA Left 2018    SHOULDER MANIPULATION UNDER ANESTHESIA performed by Michael Cristobal MD at [facility]    THORACOTOMY      UPPER GASTROINTESTINAL ENDOSCOPY  approx 2005    per pt recall \" Normal\"    UPPER GASTROINTESTINAL ENDOSCOPY N/A 2020    Dr Ava Butler exam, neg EoE    VULVAR/PERINEAL BIOPSY  10/07/2015    Dr. Denise Gupta    VULVAR/PERINEAL BIOPSY N/A 2021    VULVAR BIOPSY [facility] by [facility]ille [facility]ring [facility] at [facility]     Family History   Problem Relation Age of Onset    Diabetes Father     Cancer Father     High Blood Pressure Father     Heart Attack Father     Colon Cancer Father     Diabetes Mother    [employer] Breast Cancer Mother     Heart Failure Mother         CHF, MVP    Liver Cancer Mother     Heart Failure Maternal Grandfather     Stroke Maternal Grandfather     Pancreatic Cancer Maternal Grandfather     Stomach Cancer Maternal Grandfather     Diabetes Brother     Colon Cancer Paternal Uncle     Rectal Cancer Other     Colon Polyps Neg Hx     Crohn's Disease Neg Hx     Irritable Bowel Syndrome Neg Hx     Liver Disease Neg Hx      Social History     Tobacco Use    Smoking status: Former Smoker     Packs/day: 2.00     Years: 15.00     Pack years: 30.00     Types: Cigarettes     Quit date: 2014     Years since quittin.0    Smokeless tobacco: Never Used   Substance Use Topics    Alcohol use: No       Current Outpatient Medications   Medication Sig Dispense Refill    fluconazole (DIFLUCAN) 150 MG tablet Take 1 now and 1 in three days.  2 tablet 1    vitamin D (ERGOCALCIFEROL) 1.25 MG (85795 UT) CAPS capsule TAKE 1 CAPSULE BY MOUTH ONCE WEEKLY  4 capsule 2    metoprolol tartrate (LOPRESSOR) 25 MG tablet TAKE 1 TABLET BY MOUTH TWICE DAILY  60 tablet 5    isosorbide mononitrate (IMDUR) 30 MG extended release tablet Take 30 mg by mouth nightly Indications: High Blood Pressure Disorder      nitroGLYCERIN (NITROSTAT) 0.4 MG SL tablet Place 1 tablet under the tongue every 5 minutes as needed for Chest pain 25 tablet 3    spironolactone (ALDACTONE) 50 MG tablet Take 1 tablet by mouth daily 30 tablet 3    empagliflozin (JARDIANCE) 25 MG tablet Take 1 tablet by mouth once daily 90 tablet 3    glipiZIDE (GLUCOTROL XL) 10 MG extended release tablet Take 1 tablet by mouth daily 30 tablet 3    lovastatin (MEVACOR) 40 MG tablet TAKE 1 TABLET AT BEDTIME  (Patient taking differently: Take 40 mg by mouth nightly TAKE 1 TABLET AT BEDTIME) 30 tablet 4    metFORMIN (GLUCOPHAGE-XR) 500 MG extended release tablet TAKE 1 TABLET BY MOUTH 2 TIMES DAILY. INDDICATIONS: DIABETES (Patient taking differently: Take 500 mg by mouth 2 times daily TAKE 1 TABLET BY MOUTH 2 TIMES DAILY. INDDICATIONS: DIABETES ) 60 tablet 3    valsartan (DIOVAN) 80 MG tablet TAKE 1 TABLET BY MOUTH DAILY  30 tablet 5    nystatin (MYCOSTATIN) 521971 UNIT/GM powder APPLY 2 OR 3 TIMES DAILY. (Patient taking differently: Apply topically as needed APPLY 2 OR 3 TIMES DAILY.  ) 60 g 5    Diabetic Shoe MISC by Does not apply route 1 each 0    tamsulosin (FLOMAX) 0.4 MG capsule TAKE 1 CAPSULE BY MOUTH DAILY 30 capsule 5    albuterol (PROVENTIL) (5 MG/ML) 0.5% nebulizer solution Take 0.5 mLs by nebulization every 6 hours as needed for Wheezing 120 each 0    docusate sodium (COLACE) 100 MG capsule Take 1 capsule by mouth daily as needed for Constipation 30 capsule 1    ondansetron (ZOFRAN ODT) 4 MG disintegrating tablet Take 1 tablet by mouth daily as needed for Nausea or Vomiting 30 tablet 5    diclofenac sodium (VOLTAREN) 1 % GEL Apply 2 g topically 4 times daily (Patient taking differently: Apply 2 g topically 4 times daily as needed for Pain ) 2 Tube 1    furosemide (LASIX) 40 MG tablet TAKE 1 TABLET BY MOUTH DAILY 30 tablet 5    aspirin 81 MG tablet Take 81 mg by mouth daily      mometasone (ELOCON) 0.1 % ointment Apply topically to vagina 3 times per week (Patient taking differently: Apply topically as needed Apply topically to vagina 3 times per week) 1 Tube 3    Eflornithine HCl (VANIQA) 13.9 % CREA Apply 1 Dose topically 2 times daily (Patient taking differently: Apply 1 Dose topically 2 times daily as needed ) 45 g 2     No current facility-administered medications for this visit. Allergies   Allergen Reactions    Codeine Other (See Comments)     Seizures as a child    Motrin [Ibuprofen] Swelling     Tongue to swell    Humalog [Insulin Lispro] Other (See Comments)     Shakiness, break out in a sweat at higher doses.  Ozempic (0.25 Or 0.5 Mg-Dose) [Semaglutide(0.25 Or 0.5mg-Dos)]      nausea     Vitals:    07/19/21 1348   BP: 126/64   Pulse: 63     Body mass index is 38.86 kg/m². Review of Systems   Constitutional: Negative. HENT: Negative. Eyes: Negative. Respiratory: Negative. Cardiovascular: Negative. Gastrointestinal: Negative. Genitourinary: Negative for difficulty urinating, dyspareunia, dysuria, enuresis, frequency, hematuria, menstrual problem, pelvic pain, urgency and vaginal discharge. Musculoskeletal: Negative. Skin: Negative. Neurological: Negative. Psychiatric/Behavioral: Negative. Physical Exam  Vitals and nursing note reviewed. Constitutional:       General: She is not in acute distress. Appearance: She is well-developed. She is not diaphoretic. HENT:      Head: Normocephalic and atraumatic. Eyes:      Conjunctiva/sclera: Conjunctivae normal.      Pupils: Pupils are equal, round, and reactive to light. Pulmonary:      Effort: Pulmonary effort is normal.   Abdominal:      Tenderness: There is no guarding. Genitourinary:         Comments: bx sites with sutures noted, not dissolved yet. Sutures removed.  Vulvar still irritated, pale and swollen in appearance  Musculoskeletal:         General: Normal range of motion. Cervical back: Normal range of motion. Comments: Normal ROM in all 4 extremities; normal gait   Skin:     General: Skin is warm and dry. Neurological:      Mental Status: She is alert and oriented to person, place, and time. Motor: No abnormal muscle tone. Coordination: Coordination normal.   Psychiatric:         Behavior: Behavior normal.          Diagnosis Orders   1. Postop check   - ID REMOVAL OF SUTURES   2. Vaginal itching     3. Encounter for postoperative wound check         MEDICATIONS:  Orders Placed This Encounter   Medications    fluconazole (DIFLUCAN) 150 MG tablet     Sig: Take 1 now and 1 in three days. Dispense:  2 tablet     Refill:  1       ORDERS:  Orders Placed This Encounter   Procedures     - ID REMOVAL OF SUTURES       PLAN:  Sutures removed  Will send diflucan in case some yeast as well  Counseling was offered and accepted by patient. The operative report and surgical findings were discussed with the patient. Aware negative path. She was instructed to gradually resume normal activity. She was also instructed to call or return to the office if complications occur. Patient voiced understanding of above. Is getting compound Dr Joe Meckel sent to [facility] on Friday. Expensive she states. Recommended organic coconut oil until then  She is considering Allyne Felling as discussed with dr Lucille Oliveira. There are no Patient Instructions on file for this visit.

## 2021-07-22 DIAGNOSIS — E11.8 TYPE 2 DIABETES MELLITUS WITH COMPLICATION, WITH LONG-TERM CURRENT USE OF INSULIN (HCC): ICD-10-CM

## 2021-07-22 DIAGNOSIS — Z79.4 TYPE 2 DIABETES MELLITUS WITH COMPLICATION, WITH LONG-TERM CURRENT USE OF INSULIN (HCC): ICD-10-CM

## 2021-07-22 RX ORDER — METFORMIN HYDROCHLORIDE 500 MG/1
TABLET, EXTENDED RELEASE ORAL
Qty: 60 TABLET | Refills: 3 | Status: SHIPPED | OUTPATIENT
Start: 2021-07-22 | End: 2021-12-23

## 2021-07-23 RX ORDER — TAMSULOSIN HYDROCHLORIDE 0.4 MG/1
0.4 CAPSULE ORAL DAILY
Qty: 30 CAPSULE | Refills: 5 | Status: SHIPPED | OUTPATIENT
Start: 2021-07-23 | End: 2022-04-26 | Stop reason: SDUPTHER

## 2021-08-27 ENCOUNTER — HOSPITAL ENCOUNTER (OUTPATIENT)
Dept: CT IMAGING | Age: 56
Discharge: HOME | End: 2021-08-27
Payer: COMMERCIAL

## 2021-08-27 DIAGNOSIS — Z87.891 PERSONAL HISTORY OF NICOTINE DEPENDENCE: ICD-10-CM

## 2021-08-27 DIAGNOSIS — Z12.2 SCREENING FOR MALIGNANT NEOPLASM OF RESPIRATORY ORGAN: ICD-10-CM

## 2021-08-27 DIAGNOSIS — Z87.891 PERSONAL HISTORY OF SMOKING: ICD-10-CM

## 2021-08-27 PROCEDURE — 71250 CT THORAX DX C-: CPT

## 2021-09-06 DIAGNOSIS — Z79.4 TYPE 2 DIABETES MELLITUS WITH COMPLICATION, WITH LONG-TERM CURRENT USE OF INSULIN (HCC): ICD-10-CM

## 2021-09-06 DIAGNOSIS — E11.8 TYPE 2 DIABETES MELLITUS WITH COMPLICATION, WITH LONG-TERM CURRENT USE OF INSULIN (HCC): ICD-10-CM

## 2021-09-06 RX ORDER — EMPAGLIFLOZIN 25 MG/1
TABLET, FILM COATED ORAL
Qty: 90 TABLET | Refills: 0 | Status: SHIPPED | OUTPATIENT
Start: 2021-09-06 | End: 2021-12-23

## 2021-09-08 ENCOUNTER — OFFICE VISIT (OUTPATIENT)
Dept: PRIMARY CARE CLINIC | Age: 56
End: 2021-09-08
Payer: COMMERCIAL

## 2021-09-08 VITALS
DIASTOLIC BLOOD PRESSURE: 70 MMHG | BODY MASS INDEX: 36.1 KG/M2 | HEIGHT: 62 IN | SYSTOLIC BLOOD PRESSURE: 126 MMHG | TEMPERATURE: 97.4 F | OXYGEN SATURATION: 99 % | HEART RATE: 62 BPM | WEIGHT: 196.2 LBS

## 2021-09-08 DIAGNOSIS — E11.8 TYPE 2 DIABETES MELLITUS WITH COMPLICATION, WITH LONG-TERM CURRENT USE OF INSULIN (HCC): Primary | ICD-10-CM

## 2021-09-08 DIAGNOSIS — R11.10 CHRONIC VOMITING: ICD-10-CM

## 2021-09-08 DIAGNOSIS — F43.9 STRESS AT HOME: ICD-10-CM

## 2021-09-08 DIAGNOSIS — R11.0 CHRONIC NAUSEA: ICD-10-CM

## 2021-09-08 DIAGNOSIS — K31.84 GASTROPARESIS DUE TO DM (HCC): ICD-10-CM

## 2021-09-08 DIAGNOSIS — Z79.4 TYPE 2 DIABETES MELLITUS WITH COMPLICATION, WITH LONG-TERM CURRENT USE OF INSULIN (HCC): Primary | ICD-10-CM

## 2021-09-08 DIAGNOSIS — F51.01 PRIMARY INSOMNIA: ICD-10-CM

## 2021-09-08 DIAGNOSIS — E11.43 GASTROPARESIS DUE TO DM (HCC): ICD-10-CM

## 2021-09-08 LAB — HBA1C MFR BLD: 9.6 %

## 2021-09-08 PROCEDURE — 3046F HEMOGLOBIN A1C LEVEL >9.0%: CPT | Performed by: NURSE PRACTITIONER

## 2021-09-08 PROCEDURE — 2022F DILAT RTA XM EVC RTNOPTHY: CPT | Performed by: NURSE PRACTITIONER

## 2021-09-08 PROCEDURE — 83036 HEMOGLOBIN GLYCOSYLATED A1C: CPT | Performed by: NURSE PRACTITIONER

## 2021-09-08 PROCEDURE — 99214 OFFICE O/P EST MOD 30 MIN: CPT | Performed by: NURSE PRACTITIONER

## 2021-09-08 PROCEDURE — G8427 DOCREV CUR MEDS BY ELIG CLIN: HCPCS | Performed by: NURSE PRACTITIONER

## 2021-09-08 PROCEDURE — G8417 CALC BMI ABV UP PARAM F/U: HCPCS | Performed by: NURSE PRACTITIONER

## 2021-09-08 PROCEDURE — 1036F TOBACCO NON-USER: CPT | Performed by: NURSE PRACTITIONER

## 2021-09-08 PROCEDURE — 3017F COLORECTAL CA SCREEN DOC REV: CPT | Performed by: NURSE PRACTITIONER

## 2021-09-08 RX ORDER — AMITRIPTYLINE HYDROCHLORIDE 10 MG/1
10 TABLET, FILM COATED ORAL NIGHTLY
Qty: 30 TABLET | Refills: 5 | Status: SHIPPED | OUTPATIENT
Start: 2021-09-08

## 2021-09-08 RX ORDER — ONDANSETRON 4 MG/1
4 TABLET, ORALLY DISINTEGRATING ORAL DAILY PRN
Qty: 30 TABLET | Refills: 5 | Status: SHIPPED | OUTPATIENT
Start: 2021-09-08 | End: 2022-04-26 | Stop reason: SDUPTHER

## 2021-09-08 ASSESSMENT — ENCOUNTER SYMPTOMS
GASTROINTESTINAL NEGATIVE: 1
EYES NEGATIVE: 1
RESPIRATORY NEGATIVE: 1

## 2021-09-08 NOTE — PROGRESS NOTES
[address] [facility]  [id number] [place] [address]  [address] [address]  Dept: [phone]  Dept Fax: [phone]  Loc: [phone]    Becki Morrell is a 60 y.o. female who presents today for her medical conditions/complaints as noted below. Becki Morrell is c/o of Diabetes (A1C), Insomnia, and Headache      Chief Complaint   Patient presents with    Diabetes     A1C    Insomnia    Headache       HPI:     HPI  Patient is in office today to follow up on chronic conditions including DM, HTN, and HLD. She states she is also having trouble sleeping. She is up all night eating, so she can only imagine what her A1C is going to be. Pt also complaining of a lot of headaches. She is having a lot of increased stress at home.     Past Medical History:   Diagnosis Date    Abnormal Pap smear of cervix     Arthritis     CAD (coronary artery disease)     stent 2011 Mata Mendoza; sees [facility] cardiology    [facility]     Class 3 severe obesity due to excess calories without serious comorbidity with body mass index (BMI) of 40.0 to 44.9 in adult ([facility] 7/28/2020    Diabetes mellitus ([facility]     ERRONEOUS ENCOUNTER--DISREGARD 09/15/2015    GERD (gastroesophageal reflux disease)     Headache(784.0)     Heart attack ([address] 12/2011    Hyperlipidemia     Hypertension     Lichen simplex chronicus 10/2015    Osteopenia     Pneumonia     Type II or unspecified type diabetes mellitus without mention of complication, not stated as uncontrolled         Past Surgical History:   Procedure Laterality Date    BREAST BIOPSY  6/8/2011    US guided core needle, right, benign    CARDIAC CATHETERIZATION  12/22/14  JDT    EF 50%    CARDIAC CATHETERIZATION  06/05/2017        G FLUOROSCOPIC GUIDANCE NEEDLE PLACEMENT ADD ON Left 7/19/2018    CORTICOSTEROID INJECTION performed by Kaley Ngo MD at [address] [place] [address]  2006    COLONOSCOPY  approx 2005    Dr. Carlos Yang per days. 2 tablet 1    vitamin D (ERGOCALCIFEROL) 1.25 MG (97165 UT) CAPS capsule TAKE 1 CAPSULE BY MOUTH ONCE WEEKLY  4 capsule 2    metoprolol tartrate (LOPRESSOR) 25 MG tablet TAKE 1 TABLET BY MOUTH TWICE DAILY  60 tablet 5    isosorbide mononitrate (IMDUR) 30 MG extended release tablet Take 30 mg by mouth nightly Indications: High Blood Pressure Disorder      nitroGLYCERIN (NITROSTAT) 0.4 MG SL tablet Place 1 tablet under the tongue every 5 minutes as needed for Chest pain 25 tablet 3    spironolactone (ALDACTONE) 50 MG tablet Take 1 tablet by mouth daily 30 tablet 3    glipiZIDE (GLUCOTROL XL) 10 MG extended release tablet Take 1 tablet by mouth daily 30 tablet 3    lovastatin (MEVACOR) 40 MG tablet TAKE 1 TABLET AT BEDTIME  (Patient taking differently: Take 40 mg by mouth nightly TAKE 1 TABLET AT BEDTIME) 30 tablet 4    valsartan (DIOVAN) 80 MG tablet TAKE 1 TABLET BY MOUTH DAILY  30 tablet 5    nystatin (MYCOSTATIN) 343603 UNIT/GM powder APPLY 2 OR 3 TIMES DAILY. (Patient taking differently: Apply topically as needed APPLY 2 OR 3 TIMES DAILY.  ) 60 g 5    Diabetic Shoe MISC by Does not apply route 1 each 0    albuterol (PROVENTIL) (5 MG/ML) 0.5% nebulizer solution Take 0.5 mLs by nebulization every 6 hours as needed for Wheezing 120 each 0    docusate sodium (COLACE) 100 MG capsule Take 1 capsule by mouth daily as needed for Constipation 30 capsule 1    diclofenac sodium (VOLTAREN) 1 % GEL Apply 2 g topically 4 times daily (Patient taking differently: Apply 2 g topically 4 times daily as needed for Pain ) 2 Tube 1    furosemide (LASIX) 40 MG tablet TAKE 1 TABLET BY MOUTH DAILY 30 tablet 5    aspirin 81 MG tablet Take 81 mg by mouth daily      mometasone (ELOCON) 0.1 % ointment Apply topically to vagina 3 times per week (Patient taking differently: Apply topically as needed Apply topically to vagina 3 times per week) 1 Tube 3    Eflornithine HCl (VANIQA) 13.9 % CREA Apply 1 Dose topically 2 times daily (Patient taking differently: Apply 1 Dose topically 2 times daily as needed ) 45 g 2     No current facility-administered medications for this visit. Allergies   Allergen Reactions    Codeine Other (See Comments)     Seizures as a child    Motrin [Ibuprofen] Swelling     Tongue to swell    Humalog [Insulin Lispro] Other (See Comments)     Shakiness, break out in a sweat at higher doses.  Ozempic (0.25 Or 0.5 Mg-Dose) [Semaglutide(0.25 Or 0.5mg-Dos)]      nausea       Family History   Problem Relation Age of Onset    Diabetes Father     Cancer Father     High Blood Pressure Father     Heart Attack Father     Colon Cancer Father     Diabetes Mother     Breast Cancer Mother     Heart Failure Mother         CHF, MVP    Liver Cancer Mother     Heart Failure Maternal Grandfather     Stroke Maternal Grandfather     Pancreatic Cancer Maternal Grandfather     Stomach Cancer Maternal Grandfather     Diabetes Brother     Colon Cancer Paternal Uncle     Rectal Cancer Other     Colon Polyps Neg Hx     Crohn's Disease Neg Hx     Irritable Bowel Syndrome Neg Hx     Liver Disease Neg Hx                Subjective:      Review of Systems   Constitutional: Negative. HENT: Negative. Eyes: Negative. Respiratory: Negative. Cardiovascular: Negative. Gastrointestinal: Negative. Endocrine: Negative. Genitourinary: Negative. Musculoskeletal: Negative. Skin: Negative. Neurological: Positive for headaches. Hematological: Negative. Psychiatric/Behavioral: Positive for sleep disturbance. The patient is nervous/anxious. Objective:     Physical Exam  Vitals and nursing note reviewed. Constitutional:       Appearance: Normal appearance. She is well-developed. Comments: obese   HENT:      Head: Normocephalic and atraumatic.       Right Ear: Hearing, tympanic membrane, ear canal and external ear normal.      Left Ear: Hearing, tympanic membrane, ear canal and external ear normal.      Nose: Nose normal.      Mouth/Throat:      Pharynx: Uvula midline. Eyes:      General: Lids are normal.      Conjunctiva/sclera: Conjunctivae normal.      Pupils: Pupils are equal, round, and reactive to light. Neck:      Thyroid: No thyroid mass or thyromegaly. Trachea: Trachea normal.   Cardiovascular:      Rate and Rhythm: Normal rate and regular rhythm. Heart sounds: Normal heart sounds. Pulmonary:      Effort: Pulmonary effort is normal.      Breath sounds: Normal breath sounds. Abdominal:      General: Bowel sounds are normal.      Palpations: Abdomen is soft. Musculoskeletal:         General: Normal range of motion. Cervical back: Normal range of motion and neck supple. No tenderness. Thoracic back: Normal. No tenderness. Normal range of motion. Lumbar back: Normal. No tenderness. Normal range of motion. Skin:     General: Skin is warm and dry. Neurological:      Mental Status: She is alert and oriented to person, place, and time. Psychiatric:         Speech: Speech normal.         Behavior: Behavior normal.         Thought Content: Thought content normal.         Judgment: Judgment normal.         /70   Pulse 62   Temp 97.4 °F (36.3 °C)   Ht 5' 2\" (1.575 m)   Wt 196 lb 3.2 oz (89 kg)   LMP 01/01/1998 (Approximate)   SpO2 99%   BMI 35.89 kg/m²     Assessment:      Diagnosis Orders   1. Type 2 diabetes mellitus with complication, with long-term current use of insulin ([facility])  POCT glycosylated hemoglobin (Hb A1C)   2. Gastroparesis due to DM ([facility])  ondansetron (ZOFRAN ODT) 4 MG disintegrating tablet   3. Chronic nausea  ondansetron (ZOFRAN ODT) 4 MG disintegrating tablet   4. Chronic vomiting  ondansetron (ZOFRAN ODT) 4 MG disintegrating tablet   5. Primary insomnia     6.  Stress at home         Results for orders placed or performed in visit on 09/08/21   POCT glycosylated hemoglobin (Hb A1C)   Result Value Ref Range    Hemoglobin A1C 9.6 %       Plan:     Refill on zofran as discussed. a1c has increased. Starting patient on Trulicity. She is aware that if side effects of this medication develop she is to stop the medication. Starting elavil for insomnia and headaches. More than 50% of the time was spent counseling and coordinating care for a total time of 34 mins face to face. Return in about 6 weeks (around 10/20/2021) for POCT A1c, Medication Follow Up. Orders Placed This Encounter   Procedures    POCT glycosylated hemoglobin (Hb A1C)       Orders Placed This Encounter   Medications    ondansetron (ZOFRAN ODT) 4 MG disintegrating tablet     Sig: Take 1 tablet by mouth daily as needed for Nausea or Vomiting     Dispense:  30 tablet     Refill:  5    DISCONTD: Dulaglutide 0.75 MG/0.5ML SOPN     Sig: Inject 0.75 mg into the skin once a week     Dispense:  4 pen     Refill:  2    Dulaglutide 0.75 MG/0.5ML SOPN     Sig: Inject 0.75 mg into the skin once a week     Dispense:  4 pen     Refill:  2    amitriptyline (ELAVIL) 10 MG tablet     Sig: Take 1 tablet by mouth nightly     Dispense:  30 tablet     Refill:  5            Patient offered educational handouts and has had all questions answered. Patient voices understanding and agrees to plans along with risks and benefits of plan. Patient is instructed to continue prior meds, diet, and exercise plans as instructed. Patient agrees to follow up as instructed and sooner if needed. Patient agrees to go to ER if condition becomes emergent. EMR Dragon/transcription disclaimer: Some of this encounter note is an electronic transcription/translation of spoken language to printed text. The electronic translation of spoken language may permit erroneous, or at times, nonsensical words or phrases to be inadvertently transcribed.  Although I have reviewed the note for such errors, some may still exist.    Electronically signed by MIKE Bell on 9/8/2021 at 10:04 PM

## 2021-09-20 DIAGNOSIS — Z79.4 TYPE 2 DIABETES MELLITUS WITH COMPLICATION, WITH LONG-TERM CURRENT USE OF INSULIN (HCC): ICD-10-CM

## 2021-09-20 DIAGNOSIS — E11.8 TYPE 2 DIABETES MELLITUS WITH COMPLICATION, WITH LONG-TERM CURRENT USE OF INSULIN (HCC): ICD-10-CM

## 2021-09-20 RX ORDER — GLIPIZIDE 10 MG/1
10 TABLET, FILM COATED, EXTENDED RELEASE ORAL DAILY
Qty: 30 TABLET | Refills: 3 | Status: SHIPPED | OUTPATIENT
Start: 2021-09-20 | End: 2021-12-23

## 2021-09-21 ENCOUNTER — TELEPHONE (OUTPATIENT)
Dept: PRIMARY CARE CLINIC | Age: 56
End: 2021-09-21

## 2021-09-21 NOTE — TELEPHONE ENCOUNTER
Pt called stating she has a UTI it burns when she pees but does not have the gas to make it to [place] for an appointment so was wondering if you would send something in?

## 2021-09-22 RX ORDER — NITROFURANTOIN 25; 75 MG/1; MG/1
100 CAPSULE ORAL 2 TIMES DAILY
Qty: 20 CAPSULE | Refills: 0 | Status: SHIPPED | OUTPATIENT
Start: 2021-09-22 | End: 2021-10-02

## 2021-09-22 NOTE — TELEPHONE ENCOUNTER
I have sent in an oral abx, but if symptoms do not resolve I will need to see her and have urine sample completed.

## 2021-10-07 ENCOUNTER — OFFICE VISIT (OUTPATIENT)
Dept: URGENT CARE | Age: 56
End: 2021-10-07
Payer: COMMERCIAL

## 2021-10-07 VITALS
HEIGHT: 62 IN | DIASTOLIC BLOOD PRESSURE: 79 MMHG | OXYGEN SATURATION: 97 % | SYSTOLIC BLOOD PRESSURE: 145 MMHG | BODY MASS INDEX: 36.07 KG/M2 | TEMPERATURE: 97.8 F | WEIGHT: 196 LBS | HEART RATE: 81 BPM

## 2021-10-07 DIAGNOSIS — R30.0 DYSURIA: Primary | ICD-10-CM

## 2021-10-07 DIAGNOSIS — N30.00 ACUTE CYSTITIS WITHOUT HEMATURIA: ICD-10-CM

## 2021-10-07 LAB
APPEARANCE FLUID: ABNORMAL
BILIRUBIN, POC: ABNORMAL
BLOOD URINE, POC: ABNORMAL
CLARITY, POC: CLEAR
COLOR, POC: YELLOW
GLUCOSE URINE, POC: ABNORMAL
KETONES, POC: ABNORMAL
LEUKOCYTE EST, POC: ABNORMAL
NITRITE, POC: ABNORMAL
PH, POC: 6
PROTEIN, POC: ABNORMAL
SPECIFIC GRAVITY, POC: 1.02
UROBILINOGEN, POC: 0.2

## 2021-10-07 PROCEDURE — G8484 FLU IMMUNIZE NO ADMIN: HCPCS | Performed by: NURSE PRACTITIONER

## 2021-10-07 PROCEDURE — 3017F COLORECTAL CA SCREEN DOC REV: CPT | Performed by: NURSE PRACTITIONER

## 2021-10-07 PROCEDURE — 1036F TOBACCO NON-USER: CPT | Performed by: NURSE PRACTITIONER

## 2021-10-07 PROCEDURE — G8427 DOCREV CUR MEDS BY ELIG CLIN: HCPCS | Performed by: NURSE PRACTITIONER

## 2021-10-07 PROCEDURE — G8417 CALC BMI ABV UP PARAM F/U: HCPCS | Performed by: NURSE PRACTITIONER

## 2021-10-07 PROCEDURE — 81002 URINALYSIS NONAUTO W/O SCOPE: CPT | Performed by: NURSE PRACTITIONER

## 2021-10-07 PROCEDURE — 99214 OFFICE O/P EST MOD 30 MIN: CPT | Performed by: NURSE PRACTITIONER

## 2021-10-07 RX ORDER — SULFAMETHOXAZOLE AND TRIMETHOPRIM 800; 160 MG/1; MG/1
1 TABLET ORAL 2 TIMES DAILY
Qty: 14 TABLET | Refills: 0 | Status: SHIPPED | OUTPATIENT
Start: 2021-10-07 | End: 2021-10-09 | Stop reason: ALTCHOICE

## 2021-10-07 RX ORDER — PHENAZOPYRIDINE HYDROCHLORIDE 200 MG/1
200 TABLET, FILM COATED ORAL 3 TIMES DAILY PRN
Qty: 9 TABLET | Refills: 0 | Status: SHIPPED | OUTPATIENT
Start: 2021-10-07 | End: 2021-10-10

## 2021-10-07 ASSESSMENT — ENCOUNTER SYMPTOMS
ABDOMINAL PAIN: 0
ALLERGIC/IMMUNOLOGIC NEGATIVE: 1
DIARRHEA: 0
BACK PAIN: 1
NAUSEA: 0
VOMITING: 0

## 2021-10-07 ASSESSMENT — VISUAL ACUITY: OU: 1

## 2021-10-07 NOTE — PATIENT INSTRUCTIONS
Plenty of fluids- push plenty of water  Rest  Pyridium as directed   Bactrim as directed   Urine culture we will call results in 3-4 days  Follow up with PCP or return to Urgent Care for worsening or unresolved symptoms. Patient Education        Painful Urination (Dysuria): Care Instructions  Your Care Instructions  Burning pain with urination (dysuria) is a common symptom of a urinary tract infection or other urinary problems. The bladder may become inflamed. This can cause pain when the bladder fills and empties. You may also feel pain if the tube that carries urine from the bladder to the outside of the body (urethra) gets irritated or infected. Sexually transmitted infections (STIs) also may cause pain when you urinate. Sometimes the pain can be caused by things other than an infection. The urethra can be irritated by soaps, perfumes, or foreign objects in the urethra. Kidney stones can cause pain when they pass through the urethra. The cause may be hard to find. You may need tests. Treatment for painful urination depends on the cause. Follow-up care is a key part of your treatment and safety. Be sure to make and go to all appointments, and call your doctor if you are having problems. It's also a good idea to know your test results and keep a list of the medicines you take. How can you care for yourself at home? · Drink extra water for the next day or two. This will help make the urine less concentrated. (If you have kidney, heart, or liver disease and have to limit fluids, talk with your doctor before you increase the amount of fluids you drink.)  · Avoid drinks that are carbonated or have caffeine. They can irritate the bladder. · Urinate often. Try to empty your bladder each time. For women:  · Urinate right after you have sex. · After going to the bathroom, wipe from front to back. · Avoid douches, bubble baths, and feminine hygiene sprays.  And avoid other feminine hygiene products that have deodorants. When should you call for help? Call your doctor now or seek immediate medical care if:    · You have new symptoms, such as fever, nausea, or vomiting.     · You have new or worse symptoms of a urinary problem. For example:  ? You have blood or pus in your urine. ? You have chills or body aches. ? It hurts worse to urinate. ? You have groin or belly pain. ? You have pain in your back just below your rib cage (the flank area). Watch closely for changes in your health, and be sure to contact your doctor if you have any problems. Where can you learn more? Go to https://[employer] Apppepiceweb.In[employer]ate[employer]. org and sign in to your Stitch account. Enter Y326 in the [employer]asha box to learn more about \"Painful Urination (Dysuria): Care Instructions. \"     If you do not have an account, please click on the \"Sign Up Now\" link. Current as of: February 10, 2021               Content Version: 13.0  © 2006-2021 [employer]. Care instructions adapted under license by [facility] ([facility]). If you have questions about a medical condition or this instruction, always ask your healthcare professional. [place] [address] any warranty or liability for your use of this information. Patient Education        Urinary Tract Infection (UTI) in Women: Care Instructions  Overview     A urinary tract infection, or UTI, is a general term for an infection anywhere between the kidneys and the urethra (where urine comes out). Most UTIs are bladder infections. They often cause pain or burning when you urinate. UTIs are caused by bacteria and can be cured with antibiotics. Be sure to complete your treatment so that the infection does not get worse. Follow-up care is a key part of your treatment and safety. Be sure to make and go to all appointments, and call your doctor if you are having problems. It's also a good idea to know your test results and keep a list of the medicines you take.   How can you care for yourself at home? · Take your antibiotics as directed. Do not stop taking them just because you feel better. You need to take the full course of antibiotics. · Drink extra water and other fluids for the next day or two. This will help make the urine less concentrated and help wash out the bacteria that are causing the infection. (If you have kidney, heart, or liver disease and have to limit fluids, talk with your doctor before you increase the amount of fluids you drink.)  · Avoid drinks that are carbonated or have caffeine. They can irritate the bladder. · Urinate often. Try to empty your bladder each time. · To relieve pain, take a hot bath or lay a heating pad set on low over your lower belly or genital area. Never go to sleep with a heating pad in place. To prevent UTIs  · Drink plenty of water each day. This helps you urinate often, which clears bacteria from your system. (If you have kidney, heart, or liver disease and have to limit fluids, talk with your doctor before you increase the amount of fluids you drink.)  · Urinate when you need to. · If you are sexually active, urinate right after you have sex. · Change sanitary pads often. · Avoid douches, bubble baths, feminine hygiene sprays, and other feminine hygiene products that have deodorants. · After going to the bathroom, wipe from front to back. When should you call for help? Call your doctor now or seek immediate medical care if:    · Symptoms such as fever, chills, nausea, or vomiting get worse or appear for the first time.     · You have new pain in your back just below your rib cage. This is called flank pain.     · There is new blood or pus in your urine.     · You have any problems with your antibiotic medicine. Watch closely for changes in your health, and be sure to contact your doctor if:    · You are not getting better after taking an antibiotic for 2 days.     · Your symptoms go away but then come back.    Where can you learn more? Go to https://chpepiceweb.[email or web address]. org and sign in to your HelloSign account. Enter D545 in the [facility] box to learn more about \"Urinary Tract Infection (UTI) in Women: Care Instructions. \"     If you do not have an account, please click on the \"Sign Up Now\" link. Current as of: February 10, 2021               Content Version: 13.0  © 2006-2021 [employer]. Care instructions adapted under license by [facility] ([facility]). If you have questions about a medical condition or this instruction, always ask your healthcare professional. [address] any warranty or liability for your use of this information.

## 2021-10-07 NOTE — PROGRESS NOTES
[facility] [address] [facility]  [address] [place] [address] [address] [id number]  Dept: [phone]  Dept Fax: [phone]  Loc: [phone]    Leana Jessica is a 60 y.o. female who presents today for her medical conditions/complaintsas noted below. Leana Jessica is c/o of Urinary Tract Infection, Lower Back Pain, Urinary Frequency, and Dysuria (just finihsed ABX from PCP, no relief)        HPI:     Urinary Tract Infection   This is a new problem. The current episode started 1 to 4 weeks ago (started 2 weeks ago). The problem occurs every urination. The problem has been gradually worsening. The quality of the pain is described as aching and burning. The pain is at a severity of 6/10. The pain is moderate. There has been no fever. She is not sexually active. There is no history of pyelonephritis. Associated symptoms include chills, frequency and sweats. Pertinent negatives include no nausea or vomiting. Associated symptoms comments: Low back pain. She has tried antibiotics (Pt took Macrobid for 10 days) for the symptoms. The treatment provided no relief. Reyes Rivera received a prescription for Macrobid from her PCP on 9-22-21 and took this for 10 days for dysuria. About 3 days ago symptoms began to worsen again and she is having terrible dysuria since last night. She is drinking plenty of water. She reports chills but no fever.   Past Medical History:   Diagnosis Date    Abnormal Pap smear of cervix     Arthritis     CAD (coronary artery disease)     stent 2011 Og Escobar; sees [facility] cardiology    Cervical cancer ([facility] [address] [address]     Class 3 severe obesity due to excess calories without serious comorbidity with body mass index (BMI) of 40.0 to 44.9 in adult [facility]) 7/28/2020    Diabetes mellitus ([facility] [address]     ERRONEOUS ENCOUNTER--DISREGARD 09/15/2015    GERD (gastroesophageal reflux disease)     Headache(784.0)     Heart attack ([facility] [address] 12/2011    Hyperlipidemia     Hypertension     Lichen simplex chronicus 10/2015    Osteopenia     Pneumonia     Type II or unspecified type diabetes mellitus without mention of complication, not stated as uncontrolled      Past Surgical History:   Procedure Laterality Date    BREAST BIOPSY  6/8/2011    US guided core needle, right, benign    CARDIAC CATHETERIZATION  12/22/14  JDT    EF 50%    CARDIAC CATHETERIZATION  06/05/2017    Dr.Talley Rosemary [facility]ERS[facility] FLUOROSCOPIC GUIDANCE NEEDLE PLACEMENT ADD ON Left 7/19/2018    CORTICOSTEROID INJECTION performed by Sukh Arriaza MD at [address] [address]    COLONOSCOPY  approx 2005    Dr. Galina Glasgow per pt recall \" Normal\"   [address] WITH STENT PLACEMENT  12/2011    [facility]    CORONARY ARTERY BYPASS GRAFT      FINGER TRIGGER RELEASE Left 11/8/2019    LEFT INDEX AND LONG TRIGGER FINGER RELEASAE performed by Vandana Medrano MD at [facility]    HYSTERECTOMY      OTHER SURGICAL HISTORY      fibrinolysis    SC CABG, ARTERY-VEIN, THREE  12/23/2014    Coronary Artery Bypass, 3    SC MANIPULATN MILLERR JT W ANESTHESIA Left 7/19/2018    SHOULDER MANIPULATION UNDER ANESTHESIA performed by Sukh Arriaza MD at [address] UPPER GASTROINTESTINAL ENDOSCOPY  approx 2005    per pt recall \" Normal\"    UPPER GASTROINTESTINAL ENDOSCOPY N/A 6/19/2020    Dr David Rea exam, neg EoE    VULVAR/PERINEAL BIOPSY  10/07/2015    Dr. Chary Nath    VULVAR/PERINEAL BIOPSY N/A 7/2/2021    VULVAR BIOPSY [facility]re[facility]       Family History   Problem Relation Age of Onset    Diabetes Father     Cancer Father     High Blood Pressure Father     Heart Attack Father     Colon Cancer Father     Diabetes Mother     Breast Cancer Mother     Heart Failure Mother         CHF, MVP    Liver Cancer Mother     Heart Failure Maternal Grandfather     Stroke Maternal Grandfather     Pancreatic Cancer Maternal Grandfather     Stomach Cancer Maternal Grandfather     Diabetes Brother     Colon Cancer Paternal Uncle     Rectal Cancer Other     Colon Polyps Neg Hx     Crohn's Disease Neg Hx     Irritable Bowel Syndrome Neg Hx     Liver Disease Neg Hx        Social History     Tobacco Use    Smoking status: Former Smoker     Packs/day: 2.00     Years: 15.00     Pack years: 30.00     Types: Cigarettes     Quit date: 2014     Years since quittin.2    Smokeless tobacco: Never Used   Substance Use Topics    Alcohol use: No      Current Outpatient Medications   Medication Sig Dispense Refill    sulfamethoxazole-trimethoprim (BACTRIM DS;SEPTRA DS) 800-160 MG per tablet Take 1 tablet by mouth 2 times daily for 7 days 14 tablet 0    phenazopyridine (PYRIDIUM) 200 MG tablet Take 1 tablet by mouth 3 times daily as needed for Pain 9 tablet 0    glipiZIDE (GLUCOTROL XL) 10 MG extended release tablet TAKE 1 TABLET BY MOUTH DAILY  30 tablet 3    ondansetron (ZOFRAN ODT) 4 MG disintegrating tablet Take 1 tablet by mouth daily as needed for Nausea or Vomiting 30 tablet 5    Dulaglutide 0.75 MG/0.5ML SOPN Inject 0.75 mg into the skin once a week 4 pen 2    amitriptyline (ELAVIL) 10 MG tablet Take 1 tablet by mouth nightly 30 tablet 5    JARDIANCE 25 MG tablet Take 1 tablet by mouth once daily 90 tablet 0    tamsulosin (FLOMAX) 0.4 MG capsule TAKE 1 CAPSULE BY MOUTH DAILY  30 capsule 5    metFORMIN (GLUCOPHAGE-XR) 500 MG extended release tablet TAKE 1 TABLET BY MOUTH 2 TIMES DAILY. INDDICATIONS: DIABETES  60 tablet 3    fluconazole (DIFLUCAN) 150 MG tablet Take 1 now and 1 in three days.  2 tablet 1    vitamin D (ERGOCALCIFEROL) 1.25 MG (88749 UT) CAPS capsule TAKE 1 CAPSULE BY MOUTH ONCE WEEKLY  4 capsule 2    metoprolol tartrate (LOPRESSOR) 25 MG tablet TAKE 1 TABLET BY MOUTH TWICE DAILY  60 tablet 5    isosorbide mononitrate (IMDUR) 30 MG extended release tablet Take 30 mg by mouth nightly Indications: High Blood Pressure Disorder      nitroGLYCERIN (NITROSTAT) 0.4 MG SL tablet Place 1 tablet under the tongue every 5 minutes as needed for Chest pain 25 tablet 3    spironolactone (ALDACTONE) 50 MG tablet Take 1 tablet by mouth daily 30 tablet 3    lovastatin (MEVACOR) 40 MG tablet TAKE 1 TABLET AT BEDTIME  (Patient taking differently: Take 40 mg by mouth nightly TAKE 1 TABLET AT BEDTIME) 30 tablet 4    valsartan (DIOVAN) 80 MG tablet TAKE 1 TABLET BY MOUTH DAILY  30 tablet 5    nystatin (MYCOSTATIN) 792123 UNIT/GM powder APPLY 2 OR 3 TIMES DAILY. (Patient taking differently: Apply topically as needed APPLY 2 OR 3 TIMES DAILY. ) 60 g 5    Diabetic Shoe MISC by Does not apply route 1 each 0    albuterol (PROVENTIL) (5 MG/ML) 0.5% nebulizer solution Take 0.5 mLs by nebulization every 6 hours as needed for Wheezing 120 each 0    docusate sodium (COLACE) 100 MG capsule Take 1 capsule by mouth daily as needed for Constipation 30 capsule 1    diclofenac sodium (VOLTAREN) 1 % GEL Apply 2 g topically 4 times daily (Patient taking differently: Apply 2 g topically 4 times daily as needed for Pain ) 2 Tube 1    furosemide (LASIX) 40 MG tablet TAKE 1 TABLET BY MOUTH DAILY 30 tablet 5    aspirin 81 MG tablet Take 81 mg by mouth daily      mometasone (ELOCON) 0.1 % ointment Apply topically to vagina 3 times per week (Patient taking differently: Apply topically as needed Apply topically to vagina 3 times per week) 1 Tube 3    Eflornithine HCl (VANIQA) 13.9 % CREA Apply 1 Dose topically 2 times daily (Patient taking differently: Apply 1 Dose topically 2 times daily as needed ) 45 g 2     No current facility-administered medications for this visit. Allergies   Allergen Reactions    Codeine Other (See Comments)     Seizures as a child    Motrin [Ibuprofen] Swelling     Tongue to swell    Humalog [Insulin Lispro] Other (See Comments)     Shakiness, break out in a sweat at higher doses.     Ozempic (0.25 Or 0.5 Mg-Dose) [Semaglutide(0.25 Or 0.5mg-Dos)]      nausea       Health Maintenance   Topic Date Due    COVID-19 Vaccine (1) Never done    Hepatitis B vaccine (1 of 3 - Risk 3-dose series) Never done    DTaP/Tdap/Td vaccine (1 - Tdap) Never done    Shingles Vaccine (1 of 2) Never done    Colon Cancer Screen FIT/FOBT  05/08/2016    Diabetic retinal exam  01/11/2017    Diabetic microalbuminuria test  10/17/2019    Breast cancer screen  12/09/2020    Flu vaccine (1) Never done    Pneumococcal 0-64 years Vaccine (1 of 2 - PPSV23) 10/28/2024 (Originally 10/30/1971)    Diabetic foot exam  11/12/2021    Lipid screen  11/13/2021    A1C test (Diabetic or Prediabetic)  12/08/2021    Potassium monitoring  06/16/2022    Creatinine monitoring  06/16/2022    Low dose CT lung screening  08/27/2022    Hepatitis C screen  Completed    HIV screen  Completed    Hepatitis A vaccine  Aged Out    Hib vaccine  Aged Out    Meningococcal (ACWY) vaccine  Aged Out       Subjective:     Review of Systems   Constitutional: Positive for chills. Negative for activity change, appetite change and fever. Cardiovascular: Negative. Gastrointestinal: Negative for abdominal pain, diarrhea, nausea and vomiting. Genitourinary: Positive for dysuria and frequency. Musculoskeletal: Positive for back pain. Negative for arthralgias and myalgias. Low back pain   Skin: Negative for rash. Allergic/Immunologic: Negative. Neurological: Negative.        :Objective      Physical Exam  Vitals and nursing note reviewed. Constitutional:       General: She is awake. She is not in acute distress. Appearance: Normal appearance. She is well-developed, well-groomed and overweight. She is not ill-appearing. HENT:      Head: Normocephalic. Right Ear: Hearing normal.      Left Ear: Hearing normal.      Nose: Nose normal.      Mouth/Throat:      Lips: Pink. Eyes:      General: Vision grossly intact.    Neck:      Trachea: Phonation normal.   Cardiovascular:      Rate and Rhythm: Normal rate and regular rhythm. Heart sounds: Normal heart sounds, S1 normal and S2 normal. No murmur heard. No friction rub. No gallop. Pulmonary:      Effort: Pulmonary effort is normal. No respiratory distress. Breath sounds: Normal breath sounds and air entry. No wheezing, rhonchi or rales. Abdominal:      General: Abdomen is flat. Bowel sounds are normal.      Palpations: Abdomen is soft. Tenderness: There is abdominal tenderness in the suprapubic area. There is left CVA tenderness. There is no right CVA tenderness, guarding or rebound. Musculoskeletal:         General: No tenderness or deformity. Normal range of motion. Cervical back: Neck supple. Skin:     General: Skin is warm and dry. Capillary Refill: Capillary refill takes less than 2 seconds. Neurological:      General: No focal deficit present. Mental Status: She is alert, oriented to person, place, and time and easily aroused. Psychiatric:         Attention and Perception: Attention normal.         Mood and Affect: Mood normal.         Speech: Speech normal.         Behavior: Behavior normal. Behavior is cooperative. BP (!) 145/79   Pulse 81   Temp 97.8 °F (36.6 °C) (Temporal)   Ht 5' 2\" (1.575 m)   Wt 196 lb (88.9 kg)   LMP 01/01/1998 (Approximate)   SpO2 97%   BMI 35.85 kg/m²     :Assessment       Diagnosis Orders   1. Dysuria  POCT Urinalysis no Micro    Culture, Urine    sulfamethoxazole-trimethoprim (BACTRIM DS;SEPTRA DS) 800-160 MG per tablet   2. Acute cystitis without hematuria  Culture, Urine    sulfamethoxazole-trimethoprim (BACTRIM DS;SEPTRA DS) 800-160 MG per tablet       :Plan      Orders Placed This Encounter   Procedures    Culture, Urine     Order Specific Question:   Specify (ex-cath, midstream, cysto, etc)?      Answer:   clean catch    POCT Urinalysis no Micro     Results for orders placed or performed in visit on 10/07/21 POCT Urinalysis no Micro   Result Value Ref Range    Color, UA yellow     Clarity, UA clear     Glucose, UA POC neg     Bilirubin, UA neg     Ketones, UA neg     Spec Grav, UA 1.020     Blood, UA POC neg     pH, UA 6.0     Protein, UA POC neg     Urobilinogen, UA 0.2     Leukocytes, UA small (A)     Nitrite, UA neg     Appearance, Fluid         No follow-ups on file. Orders Placed This Encounter   Medications    sulfamethoxazole-trimethoprim (BACTRIM DS;SEPTRA DS) 800-160 MG per tablet     Sig: Take 1 tablet by mouth 2 times daily for 7 days     Dispense:  14 tablet     Refill:  0    phenazopyridine (PYRIDIUM) 200 MG tablet     Sig: Take 1 tablet by mouth 3 times daily as needed for Pain     Dispense:  9 tablet     Refill:  0        Patient Instructions     Plenty of fluids- push plenty of water  Rest  Pyridium as directed   Bactrim as directed   Urine culture we will call results in 3-4 days  Follow up with PCP or return to Urgent Care for worsening or unresolved symptoms. Patient Education        Painful Urination (Dysuria): Care Instructions  Your Care Instructions  Burning pain with urination (dysuria) is a common symptom of a urinary tract infection or other urinary problems. The bladder may become inflamed. This can cause pain when the bladder fills and empties. You may also feel pain if the tube that carries urine from the bladder to the outside of the body (urethra) gets irritated or infected. Sexually transmitted infections (STIs) also may cause pain when you urinate. Sometimes the pain can be caused by things other than an infection. The urethra can be irritated by soaps, perfumes, or foreign objects in the urethra. Kidney stones can cause pain when they pass through the urethra. The cause may be hard to find. You may need tests. Treatment for painful urination depends on the cause. Follow-up care is a key part of your treatment and safety.  Be sure to make and go to all appointments, and call your doctor if you are having problems. It's also a good idea to know your test results and keep a list of the medicines you take. How can you care for yourself at home? · Drink extra water for the next day or two. This will help make the urine less concentrated. (If you have kidney, heart, or liver disease and have to limit fluids, talk with your doctor before you increase the amount of fluids you drink.)  · Avoid drinks that are carbonated or have caffeine. They can irritate the bladder. · Urinate often. Try to empty your bladder each time. For women:  · Urinate right after you have sex. · After going to the bathroom, wipe from front to back. · Avoid douches, bubble baths, and feminine hygiene sprays. And avoid other feminine hygiene products that have deodorants. When should you call for help? Call your doctor now or seek immediate medical care if:    · You have new symptoms, such as fever, nausea, or vomiting.     · You have new or worse symptoms of a urinary problem. For example:  ? You have blood or pus in your urine. ? You have chills or body aches. ? It hurts worse to urinate. ? You have groin or belly pain. ? You have pain in your back just below your rib cage (the flank area). Watch closely for changes in your health, and be sure to contact your doctor if you have any problems. Where can you learn more? Go to https://[employer]digestlucinda[employer]s.[employer]. org and sign in to your [employer] account. Enter Z987 in the [facility] box to learn more about \"Painful Urination (Dysuria): Care Instructions. \"     If you do not have an account, please click on the \"Sign Up Now\" link. Current as of: February 10, 2021               Content Version: 13.0  © 2006-2021 Healthwise, Incorporated. Care instructions adapted under license by [facility] ([facility]).  If you have questions about a medical condition or this instruction, always ask your healthcare professional. Ferdinand Miller any warranty or liability for your use of this information. Patient Education        Urinary Tract Infection (UTI) in Women: Care Instructions  Overview     A urinary tract infection, or UTI, is a general term for an infection anywhere between the kidneys and the urethra (where urine comes out). Most UTIs are bladder infections. They often cause pain or burning when you urinate. UTIs are caused by bacteria and can be cured with antibiotics. Be sure to complete your treatment so that the infection does not get worse. Follow-up care is a key part of your treatment and safety. Be sure to make and go to all appointments, and call your doctor if you are having problems. It's also a good idea to know your test results and keep a list of the medicines you take. How can you care for yourself at home? · Take your antibiotics as directed. Do not stop taking them just because you feel better. You need to take the full course of antibiotics. · Drink extra water and other fluids for the next day or two. This will help make the urine less concentrated and help wash out the bacteria that are causing the infection. (If you have kidney, heart, or liver disease and have to limit fluids, talk with your doctor before you increase the amount of fluids you drink.)  · Avoid drinks that are carbonated or have caffeine. They can irritate the bladder. · Urinate often. Try to empty your bladder each time. · To relieve pain, take a hot bath or lay a heating pad set on low over your lower belly or genital area. Never go to sleep with a heating pad in place. To prevent UTIs  · Drink plenty of water each day. This helps you urinate often, which clears bacteria from your system. (If you have kidney, heart, or liver disease and have to limit fluids, talk with your doctor before you increase the amount of fluids you drink.)  · Urinate when you need to. · If you are sexually active, urinate right after you have sex.   · Change sanitary pads often. · Avoid douches, bubble baths, feminine hygiene sprays, and other feminine hygiene products that have deodorants. · After going to the bathroom, wipe from front to back. When should you call for help? Call your doctor now or seek immediate medical care if:    · Symptoms such as fever, chills, nausea, or vomiting get worse or appear for the first time.     · You have new pain in your back just below your rib cage. This is called flank pain.     · There is new blood or pus in your urine.     · You have any problems with your antibiotic medicine. Watch closely for changes in your health, and be sure to contact your doctor if:    · You are not getting better after taking an antibiotic for 2 days.     · Your symptoms go away but then come back. Where can you learn more? Go to https://chpejennifereweb.[employer]. org and sign in to your [employer] account. Enter F068 in the [employer] box to learn more about \"Urinary Tract Infection (UTI) in Women: Care Instructions. \"     If you do not have an account, please click on the \"Sign Up Now\" link. Current as of: February 10, 2021               Content Version: 13.0  © 5505-6173 Healthwise, [facility]. Care instructions adapted under license by [facility] ([facility]). If you have questions about a medical condition or this instruction, always ask your healthcare professional. [place] [address] any warranty or liability for your use of this information. Patient given educational materials- see patient instructions. Discussed use, benefit, and side effects of prescribedmedications. All patient questions answered. Pt voiced understanding.        Electronically signed by MIKE Arroyo CNP on 10/7/2021 at 2:25 PM

## 2021-10-09 LAB
ORGANISM: ABNORMAL
URINE CULTURE, ROUTINE: ABNORMAL
URINE CULTURE, ROUTINE: ABNORMAL

## 2021-10-09 RX ORDER — AMOXICILLIN 500 MG/1
500 CAPSULE ORAL 2 TIMES DAILY
Qty: 14 CAPSULE | Refills: 0 | Status: SHIPPED | OUTPATIENT
Start: 2021-10-09 | End: 2021-10-16

## 2021-10-19 DIAGNOSIS — K21.9 GASTROESOPHAGEAL REFLUX DISEASE WITHOUT ESOPHAGITIS: ICD-10-CM

## 2021-10-19 DIAGNOSIS — Z12.11 SCREENING FOR COLON CANCER: ICD-10-CM

## 2021-10-19 DIAGNOSIS — I10 ESSENTIAL HYPERTENSION: ICD-10-CM

## 2021-10-19 DIAGNOSIS — L68.0 HIRSUTISM: ICD-10-CM

## 2021-10-19 DIAGNOSIS — Z23 NEEDS FLU SHOT: ICD-10-CM

## 2021-10-19 DIAGNOSIS — Z11.59 NEED FOR HEPATITIS C SCREENING TEST: ICD-10-CM

## 2021-10-19 DIAGNOSIS — Z11.4 SCREENING FOR HIV WITHOUT PRESENCE OF RISK FACTORS: ICD-10-CM

## 2021-10-20 ENCOUNTER — OFFICE VISIT (OUTPATIENT)
Dept: PRIMARY CARE CLINIC | Age: 56
End: 2021-10-20
Payer: MEDICARE

## 2021-10-20 VITALS
WEIGHT: 199.6 LBS | BODY MASS INDEX: 37.69 KG/M2 | TEMPERATURE: 97.8 F | DIASTOLIC BLOOD PRESSURE: 70 MMHG | SYSTOLIC BLOOD PRESSURE: 126 MMHG | HEIGHT: 61 IN | OXYGEN SATURATION: 97 % | HEART RATE: 80 BPM

## 2021-10-20 DIAGNOSIS — Z00.00 ROUTINE GENERAL MEDICAL EXAMINATION AT A HEALTH CARE FACILITY: Primary | ICD-10-CM

## 2021-10-20 DIAGNOSIS — N30.00 ACUTE CYSTITIS WITHOUT HEMATURIA: ICD-10-CM

## 2021-10-20 DIAGNOSIS — Z12.31 ENCOUNTER FOR SCREENING MAMMOGRAM FOR MALIGNANT NEOPLASM OF BREAST: ICD-10-CM

## 2021-10-20 DIAGNOSIS — Z13.6 SCREENING FOR CARDIOVASCULAR CONDITION: ICD-10-CM

## 2021-10-20 DIAGNOSIS — Z23 COVID-19 VACCINE ADMINISTERED: ICD-10-CM

## 2021-10-20 DIAGNOSIS — E11.8 TYPE 2 DIABETES MELLITUS WITH COMPLICATION, WITH LONG-TERM CURRENT USE OF INSULIN (HCC): ICD-10-CM

## 2021-10-20 DIAGNOSIS — Z79.4 TYPE 2 DIABETES MELLITUS WITH COMPLICATION, WITH LONG-TERM CURRENT USE OF INSULIN (HCC): ICD-10-CM

## 2021-10-20 DIAGNOSIS — Z71.89 ADVANCE CARE PLANNING: ICD-10-CM

## 2021-10-20 LAB — HBA1C MFR BLD: 8.3 %

## 2021-10-20 PROCEDURE — 3052F HG A1C>EQUAL 8.0%<EQUAL 9.0%: CPT | Performed by: NURSE PRACTITIONER

## 2021-10-20 PROCEDURE — G8417 CALC BMI ABV UP PARAM F/U: HCPCS | Performed by: NURSE PRACTITIONER

## 2021-10-20 PROCEDURE — G0446 INTENS BEHAVE THER CARDIO DX: HCPCS | Performed by: NURSE PRACTITIONER

## 2021-10-20 PROCEDURE — 99213 OFFICE O/P EST LOW 20 MIN: CPT | Performed by: NURSE PRACTITIONER

## 2021-10-20 PROCEDURE — G0447 BEHAVIOR COUNSEL OBESITY 15M: HCPCS | Performed by: NURSE PRACTITIONER

## 2021-10-20 PROCEDURE — 99497 ADVNCD CARE PLAN 30 MIN: CPT | Performed by: NURSE PRACTITIONER

## 2021-10-20 PROCEDURE — G8427 DOCREV CUR MEDS BY ELIG CLIN: HCPCS | Performed by: NURSE PRACTITIONER

## 2021-10-20 PROCEDURE — 91300 COVID-19, PFIZER VACCINE 30MCG/0.3ML DOSE: CPT | Performed by: NURSE PRACTITIONER

## 2021-10-20 PROCEDURE — G8484 FLU IMMUNIZE NO ADMIN: HCPCS | Performed by: NURSE PRACTITIONER

## 2021-10-20 PROCEDURE — 0001A COVID-19, PFIZER VACCINE 30MCG/0.3ML DOSE: CPT | Performed by: NURSE PRACTITIONER

## 2021-10-20 PROCEDURE — 1036F TOBACCO NON-USER: CPT | Performed by: NURSE PRACTITIONER

## 2021-10-20 PROCEDURE — 2022F DILAT RTA XM EVC RTNOPTHY: CPT | Performed by: NURSE PRACTITIONER

## 2021-10-20 PROCEDURE — 3017F COLORECTAL CA SCREEN DOC REV: CPT | Performed by: NURSE PRACTITIONER

## 2021-10-20 PROCEDURE — G0438 PPPS, INITIAL VISIT: HCPCS | Performed by: NURSE PRACTITIONER

## 2021-10-20 RX ORDER — FLUCONAZOLE 150 MG/1
TABLET ORAL
Qty: 2 TABLET | Refills: 0 | Status: SHIPPED | OUTPATIENT
Start: 2021-10-20 | End: 2022-01-25 | Stop reason: ALTCHOICE

## 2021-10-20 RX ORDER — AMOXICILLIN AND CLAVULANATE POTASSIUM 875; 125 MG/1; MG/1
1 TABLET, FILM COATED ORAL 2 TIMES DAILY
Qty: 20 TABLET | Refills: 0 | Status: SHIPPED | OUTPATIENT
Start: 2021-10-20 | End: 2021-10-30

## 2021-10-20 ASSESSMENT — PATIENT HEALTH QUESTIONNAIRE - PHQ9
2. FEELING DOWN, DEPRESSED OR HOPELESS: 1
SUM OF ALL RESPONSES TO PHQ QUESTIONS 1-9: 1
SUM OF ALL RESPONSES TO PHQ QUESTIONS 1-9: 1
SUM OF ALL RESPONSES TO PHQ9 QUESTIONS 1 & 2: 1
SUM OF ALL RESPONSES TO PHQ QUESTIONS 1-9: 1
1. LITTLE INTEREST OR PLEASURE IN DOING THINGS: 0

## 2021-10-20 ASSESSMENT — LIFESTYLE VARIABLES: HOW OFTEN DO YOU HAVE A DRINK CONTAINING ALCOHOL: 0

## 2021-10-20 ASSESSMENT — ENCOUNTER SYMPTOMS
NAUSEA: 1
EYES NEGATIVE: 1
RESPIRATORY NEGATIVE: 1

## 2021-10-20 NOTE — PROGRESS NOTES
[address] [facility]  [address] [place] [address]  [address] [address]  Dept: [phone]  Dept Fax: [phone]  Loc: [phone]    Mallory Dc is a 60 y.o. female who presents today for her medical conditions/complaints as noted below. Mallory Dc is c/o of Medication Check and Medicare AWV      Chief Complaint   Patient presents with    Medication Check    Medicare AWV       HPI:     HPI  Patient is here for 2 separate visits including Medicare Annual Wellness Visit and acute/chronic issues including continued UTI symptoms. She has taken Macrobid and amoxicillin without much improvement. She is also following up for DM. She has been monitoring blood sugars and they have improved. She has been taking Trulicity as prescribed. She has been under more stress with spouse being diagnosed with COVID and was out of state. She would like to discuss the COIVD vaccine as well and receive this in the office if she qualifies. See attached note for Medicare annual wellness visit. The below review of systems and physical exam applies to both encounters.       Past Medical History:   Diagnosis Date    Abnormal Pap smear of cervix     Arthritis     CAD (coronary artery disease)     stent 2011 Chip Marus; sees [facility] cardiology    [facility]     Class 3 severe obesity due to excess calories without serious comorbidity with body mass index (BMI) of 40.0 to 44.9 in adult ([address] 7/28/2020    Diabetes mellitus ([address]     ERRONEOUS ENCOUNTER--DISREGARD 09/15/2015    GERD (gastroesophageal reflux disease)     Headache(784.0)     Heart attack ([address] 12/2011    Hyperlipidemia     Hypertension     Lichen simplex chronicus 10/2015    Osteopenia     Pneumonia     Type II or unspecified type diabetes mellitus without mention of complication, not stated as uncontrolled         Past Surgical History:   Procedure Laterality Date    BREAST BIOPSY  6/8/2011     guided core needle, right, benign    CARDIAC CATHETERIZATION  14  JDT    EF 50%    CARDIAC CATHETERIZATION  2017        G FLUOROSCOPIC GUIDANCE NEEDLE PLACEMENT ADD ON Left 2018    CORTICOSTEROID INJECTION performed by Swathi Mike MD at [address] [facility] [address]  2006    COLONOSCOPY  approx     Dr. Vivien Marmolejo per pt recall \" Normal\"   [address]  2011    [address] to [address] RELEASE Left 2019    LEFT INDEX AND LONG TRIGGER FINGER RELEASAE performed by Benjamin Brenner MD at [facility]    HYSTERECTOMY      OTHER SURGICAL HISTORY      fibrinolysis    SD CABG, ARTERY-VEIN, THREE  2014    Coronary Artery Bypass, 3    SD MANIPULATN SHLDR JT W ANESTHESIA Left 2018    SHOULDER MANIPULATION UNDER ANESTHESIA performed by Swathi Mike MD at [address] [place] [address] ENDOSCOPY  approx     per pt recall \" Normal\"    UPPER GASTROINTESTINAL ENDOSCOPY N/A 2020    Dr Vera exam, neg EoE    VULVAR/PERINEAL BIOPSY  10/07/2015    Dr. Liset Ardon N/A 2021    VULVAR BIOPSY performed by Mandie Craig MD at [place] [address] History     Tobacco Use    Smoking status: Former Smoker     Packs/day: 2.00     Years: 15.00     Pack years: 30.00     Types: Cigarettes     Quit date: 2014     Years since quittin.3    Smokeless tobacco: Never Used   Substance Use Topics    Alcohol use: No        Current Outpatient Medications   Medication Sig Dispense Refill    amoxicillin-clavulanate (AUGMENTIN) 875-125 MG per tablet Take 1 tablet by mouth 2 times daily for 10 days 20 tablet 0    fluconazole (DIFLUCAN) 150 MG tablet Take 1 tablet today then 1 tablet at the end of the antibiotics 2 tablet 0    Dulaglutide 0.75 MG/0.5ML SOPN Inject 0.75 mg into the skin once a week 12 pen 2    glipiZIDE (GLUCOTROL XL) 10 MG extended release tablet TAKE 1 TABLET BY MOUTH DAILY  30 tablet 3    ondansetron (ZOFRAN ODT) 4 MG disintegrating tablet Take 1 tablet by mouth daily as needed for Nausea or Vomiting 30 tablet 5    amitriptyline (ELAVIL) 10 MG tablet Take 1 tablet by mouth nightly 30 tablet 5    JARDIANCE 25 MG tablet Take 1 tablet by mouth once daily 90 tablet 0    tamsulosin (FLOMAX) 0.4 MG capsule TAKE 1 CAPSULE BY MOUTH DAILY  30 capsule 5    metFORMIN (GLUCOPHAGE-XR) 500 MG extended release tablet TAKE 1 TABLET BY MOUTH 2 TIMES DAILY. INDDICATIONS: DIABETES  60 tablet 3    vitamin D (ERGOCALCIFEROL) 1.25 MG (05142 UT) CAPS capsule TAKE 1 CAPSULE BY MOUTH ONCE WEEKLY  4 capsule 2    metoprolol tartrate (LOPRESSOR) 25 MG tablet TAKE 1 TABLET BY MOUTH TWICE DAILY  60 tablet 5    isosorbide mononitrate (IMDUR) 30 MG extended release tablet Take 30 mg by mouth nightly Indications: High Blood Pressure Disorder      nitroGLYCERIN (NITROSTAT) 0.4 MG SL tablet Place 1 tablet under the tongue every 5 minutes as needed for Chest pain 25 tablet 3    spironolactone (ALDACTONE) 50 MG tablet Take 1 tablet by mouth daily 30 tablet 3    lovastatin (MEVACOR) 40 MG tablet TAKE 1 TABLET AT BEDTIME  (Patient taking differently: Take 40 mg by mouth nightly TAKE 1 TABLET AT BEDTIME) 30 tablet 4    valsartan (DIOVAN) 80 MG tablet TAKE 1 TABLET BY MOUTH DAILY  30 tablet 5    nystatin (MYCOSTATIN) 651039 UNIT/GM powder APPLY 2 OR 3 TIMES DAILY. (Patient taking differently: Apply topically as needed APPLY 2 OR 3 TIMES DAILY.  ) 60 g 5    Diabetic Shoe MISC by Does not apply route 1 each 0    albuterol (PROVENTIL) (5 MG/ML) 0.5% nebulizer solution Take 0.5 mLs by nebulization every 6 hours as needed for Wheezing 120 each 0    docusate sodium (COLACE) 100 MG capsule Take 1 capsule by mouth daily as needed for Constipation 30 capsule 1    diclofenac sodium (VOLTAREN) 1 % GEL Apply 2 g topically 4 times daily (Patient taking differently: Apply 2 g topically 4 times daily as needed for Pain ) 2 Tube 1    furosemide (LASIX) 40 MG tablet TAKE 1 TABLET BY MOUTH DAILY 30 tablet 5    aspirin 81 MG tablet Take 81 mg by mouth daily      mometasone (ELOCON) 0.1 % ointment Apply topically to vagina 3 times per week (Patient taking differently: Apply topically as needed Apply topically to vagina 3 times per week) 1 Tube 3    Eflornithine HCl (VANIQA) 13.9 % CREA Apply 1 Dose topically 2 times daily (Patient taking differently: Apply 1 Dose topically 2 times daily as needed ) 45 g 2     No current facility-administered medications for this visit. Allergies   Allergen Reactions    Codeine Other (See Comments)     Seizures as a child    Motrin [Ibuprofen] Swelling     Tongue to swell    Humalog [Insulin Lispro] Other (See Comments)     Shakiness, break out in a sweat at higher doses.  Ozempic (0.25 Or 0.5 Mg-Dose) [Semaglutide(0.25 Or 0.5mg-Dos)]      nausea       Family History   Problem Relation Age of Onset    Diabetes Father     Cancer Father     High Blood Pressure Father     Heart Attack Father     Colon Cancer Father     Diabetes Mother     Breast Cancer Mother     Heart Failure Mother         CHF, MVP    Liver Cancer Mother     Heart Failure Maternal Grandfather     Stroke Maternal Grandfather     Pancreatic Cancer Maternal Grandfather     Stomach Cancer Maternal Grandfather     Diabetes Brother     Colon Cancer Paternal Uncle     Rectal Cancer Other     Colon Polyps Neg Hx     Crohn's Disease Neg Hx     Irritable Bowel Syndrome Neg Hx     Liver Disease Neg Hx                Subjective:      Review of Systems   Constitutional: Negative. HENT: Negative. Eyes: Negative. Respiratory: Negative. Cardiovascular: Negative. Gastrointestinal: Positive for nausea (intermittent due to recent gastritis). Endocrine: Negative. Genitourinary: Negative. Musculoskeletal: Negative. Skin: Negative. Neurological: Negative. Hematological: Negative. Psychiatric/Behavioral: Negative. Objective:     Physical Exam  Vitals and nursing note reviewed. Constitutional:       Appearance: Normal appearance. She is well-developed. Comments: obese   HENT:      Head: Normocephalic and atraumatic. Right Ear: Hearing, tympanic membrane, ear canal and external ear normal.      Left Ear: Hearing, tympanic membrane, ear canal and external ear normal.      Nose: Nose normal.      Mouth/Throat:      Pharynx: Uvula midline. Eyes:      General: Lids are normal.      Conjunctiva/sclera: Conjunctivae normal.      Pupils: Pupils are equal, round, and reactive to light. Neck:      Thyroid: No thyroid mass or thyromegaly. Trachea: Trachea normal.   Cardiovascular:      Rate and Rhythm: Normal rate and regular rhythm. Pulses: Normal pulses. Heart sounds: Normal heart sounds. Pulmonary:      Effort: Pulmonary effort is normal.      Breath sounds: Normal breath sounds. Abdominal:      General: Bowel sounds are normal.      Palpations: Abdomen is soft. Tenderness: There is abdominal tenderness in the suprapubic area. Musculoskeletal:         General: Normal range of motion. Cervical back: Normal range of motion and neck supple. No tenderness. Thoracic back: Normal. No tenderness. Normal range of motion. Lumbar back: Normal. No tenderness. Normal range of motion. Skin:     General: Skin is warm and dry. Neurological:      Mental Status: She is alert and oriented to person, place, and time. Psychiatric:         Speech: Speech normal.         Behavior: Behavior normal.         Thought Content:  Thought content normal.         Judgment: Judgment normal.         /70   Pulse 80   Temp 97.8 °F (36.6 °C)   Ht 5' 1\" (1.549 m)   Wt 199 lb 9.6 oz (90.5 kg)   LMP 01/01/1998 (Approximate)   SpO2 97%   BMI 37.71 kg/m² Assessment:      Diagnosis Orders   1. Routine general medical examination at a health care facility     2. Advance care planning  [place] [facility]AN FACE TO [address] [facility], [address] [facility] [[id number]]   3. BMI 37.0-37.9, adult  [place] Behavior  obesity 15m []   4. Screening for cardiovascular condition  [place] Intens behave ther cardio dx, 15 minutes []   5. Encounter for screening mammogram for malignant neoplasm of breast  [facility] DIGITAL SCREEN W OR WO CAD BILATERAL   6. Type 2 diabetes mellitus with complication, with long-term current use of insulin (HCC)  POCT glycosylated hemoglobin (Hb A1C)    Dulaglutide 0.75 MG/0.5ML SOPN   7. Acute cystitis without hematuria  amoxicillin-clavulanate (AUGMENTIN) 875-125 MG per tablet   8. COVID-19 vaccine administered  COVID-19, Pfizer Vaccine 30MCG/0.3mL Dose       Results for orders placed or performed in visit on 10/20/21   POCT glycosylated hemoglobin (Hb A1C)   Result Value Ref Range    Hemoglobin A1C 8.3 %       Plan:     Plan #1: Medicare annual wellness visit  See below note. The below encounter contains the health risk assessment questions and verbal and documented form. This is performed with provider in office and plan as noted below as well and instructions to patient verbally and written. Plan #2:  abx for UTI. Will treat for vaginal yeast as well. Will continue trulicity due to I1M improving so much from last visit. Mammogram ordered. COVID vaccine in office. Return for Medicare Annual Wellness Visit in 1 year.     Orders Placed This Encounter   Procedures    [facility] DIGITAL SCREEN W OR WO CAD BILATERAL     Standing Status:   Future     Standing Expiration Date:   12/20/2022    COVID-19, Pfizer Vaccine 30MCG/0.3mL Dose    POCT glycosylated hemoglobin (Hb A1C)    [place] [facility] PLAN FACE TO FACE, 1ST 30MIN [[id number]]    [place] Behavior  obesity 15m []    [place] Intens behave ther cardio dx, 15 minutes []       Orders Placed This Encounter Medications    amoxicillin-clavulanate (AUGMENTIN) 875-125 MG per tablet     Sig: Take 1 tablet by mouth 2 times daily for 10 days     Dispense:  20 tablet     Refill:  0    fluconazole (DIFLUCAN) 150 MG tablet     Sig: Take 1 tablet today then 1 tablet at the end of the antibiotics     Dispense:  2 tablet     Refill:  0    Dulaglutide 0.75 MG/0.5ML SOPN     Sig: Inject 0.75 mg into the skin once a week     Dispense:  12 pen     Refill:  2            Patient offered educational handouts and has had all questions answered. Patient voices understanding and agrees to plans along with risks and benefits of plan. Patient is instructed to continue prior meds, diet, and exercise plans as instructed. Patient agrees to follow up as instructed and sooner if needed. Patient agrees to go to ER if condition becomes emergent. EMR Dragon/transcription disclaimer: Some of this encounter note is an electronic transcription/translation of spoken language to printed text. The electronic translation of spoken language may permit erroneous, or at times, nonsensical words or phrases to be inadvertently transcribed.  Although I have reviewed the note for such errors, some may still exist.    Electronically signed by MIKE Almazan on 10/20/2021 at 8:01 PM

## 2021-10-20 NOTE — PROGRESS NOTES
Medicare Annual Wellness Visit  Name: José David Date: 10/20/2021   MRN: [id number] Sex: Female   Age: 60 y.o. Ethnicity: Non- / Non    : 1965 Race: White (non-)      Mallory Dc is here for Medication Check and Medicare AWV    Screenings for behavioral, psychosocial and functional/safety risks, and cognitive dysfunction are all negative except as indicated below. These results, as well as other patient data from the [address] form, are documented in Flowsheets linked to this Encounter. Allergies   Allergen Reactions    Codeine Other (See Comments)     Seizures as a child    Motrin [Ibuprofen] Swelling     Tongue to swell    Humalog [Insulin Lispro] Other (See Comments)     Shakiness, break out in a sweat at higher doses.  Ozempic (0.25 Or 0.5 Mg-Dose) [Semaglutide(0.25 Or 0.5mg-Dos)]      nausea       Prior to Visit Medications    Medication Sig Taking? Authorizing Provider   glipiZIDE (GLUCOTROL XL) 10 MG extended release tablet TAKE 1 TABLET BY MOUTH DAILY  Yes Algis Like, APRN   ondansetron (ZOFRAN ODT) 4 MG disintegrating tablet Take 1 tablet by mouth daily as needed for Nausea or Vomiting Yes Algis Like, APRN   Dulaglutide 0.75 MG/0.5ML SOPN Inject 0.75 mg into the skin once a week Yes Algis Like, APRN   amitriptyline (ELAVIL) 10 MG tablet Take 1 tablet by mouth nightly Yes Algis Like, APRN   JARDIANCE 25 MG tablet Take 1 tablet by mouth once daily Yes Algis Like, APRN   tamsulosin (FLOMAX) 0.4 MG capsule TAKE 1 CAPSULE BY MOUTH DAILY  Yes Algis Like, APRN   metFORMIN (GLUCOPHAGE-XR) 500 MG extended release tablet TAKE 1 TABLET BY MOUTH 2 TIMES DAILY. INDDICATIONS: DIABETES  Yes Algis Like, APRN   fluconazole (DIFLUCAN) 150 MG tablet Take 1 now and 1 in three days.  Yes MIKE Guajardo   vitamin D (ERGOCALCIFEROL) 1.25 MG (30007 UT) CAPS capsule TAKE 1 CAPSULE BY MOUTH ONCE WEEKLY  Yes Algis Like, APRN   metoprolol tartrate (LOPRESSOR) 25 MG tablet TAKE 1 TABLET BY MOUTH TWICE DAILY  Yes MIKE Grewal   isosorbide mononitrate (IMDUR) 30 MG extended release tablet Take 30 mg by mouth nightly Indications: High Blood Pressure Disorder Yes Historical Provider, MD   nitroGLYCERIN (NITROSTAT) 0.4 MG SL tablet Place 1 tablet under the tongue every 5 minutes as needed for Chest pain Yes MIKE Conner   spironolactone (ALDACTONE) 50 MG tablet Take 1 tablet by mouth daily Yes MIKE Grewal   lovastatin (MEVACOR) 40 MG tablet TAKE 1 TABLET AT BEDTIME   Patient taking differently: Take 40 mg by mouth nightly TAKE 1 TABLET AT BEDTIME Yes MIKE Grewal   valsartan (DIOVAN) 80 MG tablet TAKE 1 TABLET BY MOUTH DAILY  Yes MIKE Grewal   nystatin (MYCOSTATIN) 015683 UNIT/GM powder APPLY 2 OR 3 TIMES DAILY. Patient taking differently: Apply topically as needed APPLY 2 OR 3 TIMES DAILY.   Yes MIKE Grewal   Diabetic Shoe MISC by Does not apply route Yes Brandon Phelan MD   albuterol (PROVENTIL) (5 MG/ML) 0.5% nebulizer solution Take 0.5 mLs by nebulization every 6 hours as needed for Wheezing Yes Lory PotashMIKE   docusate sodium (COLACE) 100 MG capsule Take 1 capsule by mouth daily as needed for Constipation Yes MIKE Grewal   diclofenac sodium (VOLTAREN) 1 % GEL Apply 2 g topically 4 times daily  Patient taking differently: Apply 2 g topically 4 times daily as needed for Pain  Yes MIKE Grewal   furosemide (LASIX) 40 MG tablet TAKE 1 TABLET BY MOUTH DAILY Yes MIKE Conner   aspirin 81 MG tablet Take 81 mg by mouth daily Yes Historical Provider, MD   mometasone (ELOCON) 0.1 % ointment Apply topically to vagina 3 times per week  Patient taking differently: Apply topically as needed Apply topically to vagina 3 times per week Yes Ag Sarmiento MD   Eflornithine HCl (VANIQA) 13.9 % CREA Apply 1 Dose topically 2 times daily  Patient taking differently: Apply 1 Dose topically 2 times daily as 6/19/2020    Dr Ruff Oms exam, neg EoE    VULVAR/PERINEAL BIOPSY  10/07/2015    Dr. Adarsh Arnold    VULVAR/PERINEAL BIOPSY N/A 7/2/2021    VULVAR BIOPSY performed by Mike Frias [facility] at [facility]       Family History   Problem Relation Age of Onset    Diabetes Father     Cancer Father     High Blood Pressure Father     Heart Attack Father     Colon Cancer Father     Diabetes Mother     Breast Cancer Mother     Heart Failure Mother         CHF, MVP    Liver Cancer Mother     Heart Failure Maternal Grandfather     Stroke Maternal Grandfather     Pancreatic Cancer Maternal Grandfather     Stomach Cancer Maternal Grandfather     Diabetes Brother     Colon Cancer Paternal Uncle     Rectal Cancer Other     Colon Polyps Neg Hx     Crohn's Disease Neg Hx     Irritable Bowel Syndrome Neg Hx     Liver Disease Neg Hx        CareTeam (Including outside providers/suppliers regularly involved in providing care):   Patient Care Team:  MIKE Sullivan as PCP - General (Nurse Practitioner Family)  MI[facility]  Gabriel Navarrete MD (Cardiology)  Missy Wyman MD (Cardiothoracic Surgery)  MIKE Nelson as Nurse Practitioner (Certified Nurse Specialist)    Wt Readings from Last 3 Encounters:   10/20/21 199 lb 9.6 oz (90.5 kg)   10/07/21 196 lb (88.9 kg)   09/08/21 196 lb 3.2 oz (89 kg)     Vitals:    10/20/21 1424   BP: 126/70   Pulse: 80   Temp: 97.8 °F (36.6 °C)   SpO2: 97%   Weight: 199 lb 9.6 oz (90.5 kg)   Height: 5' 1\" (1.549 m)     Body mass index is 37.71 kg/m². Based upon direct observation of the patient, evaluation of cognition reveals recent and remote memory intact. Patient's complete Health Risk Assessment and screening values have been reviewed and are found in Flowsheets. The following problems were reviewed today and where indicated follow up appointments were made and/or referrals ordered.     Positive Risk Factor Screenings with Interventions:          General Health and ACP:  General  In general, how would you say your health is?: (!) Poor  In the past 7 days, have you experienced any of the following? New or Increased Pain, New or Increased Fatigue, Loneliness, Social Isolation, Stress or Anger?: (!) New or Increased Pain, New or Increased Fatigue, Loneliness, Social Isolation, Stress, Anger  Do you get the social and emotional support that you need?: (!) No (only cats and dogs)  Do you have a Living Will?: (!) No  Advance Directives     Power of  Living Will ACP-Advance Directive ACP-Power of     Not on File Not on File Filed Not on File      General Health Risk Interventions:  · No Living Will: Advance Care Planning addressed with patient today    Health Habits/Nutrition:  Health Habits/Nutrition  Do you exercise for at least 20 minutes 2-3 times per week?: Yes  Have you lost any weight without trying in the past 3 months?: No  Do you eat only one meal per day?: No  Have you seen the dentist within the past year?: (!) No  Body mass index: (!) 37.71  Health Habits/Nutrition Interventions:  · Dental exam overdue:  patient encouraged to make appointment with his/her dentist      ADL:  ADLs  In the past 7 days, did you need help from others to perform any of the following everyday activities? Eating, dressing, grooming, bathing, toileting, or walking/balance?: None  In the past 7 days, did you need help from others to take care of any of the following? Laundry, housekeeping, banking/finances, shopping, telephone use, food preparation, transportation, or taking medications?: (!) Housekeeping  ADL Interventions:  · Patient declines any further evaluation/treatment for this issue    Personalized Preventive Plan   Current Health Maintenance Status    There is no immunization history on file for this patient.      Health Maintenance   Topic Date Due    COVID-19 Vaccine (1) Never done    Hepatitis B vaccine (1 of 3 - Risk 3-dose receive should they become terminally ill or permanently unconscious. Explained the state requirement to complete the forms in the presence of two eligible witnesses OR in the presence of a . Patient was asked to provide a copy of the signed forms to the practice office. Time spent (minutes): 30     Obesity Counseling: Assessed behavioral health risks and factors affecting choice of behavior. Suggested weight control approaches, including dietary changes behavioral modification and follow up plan. Provided educational and support documentation. Time spent (minutes): 15    Cardiovascular Disease Risk Counseling: Assessed the patient's risk to develop cardiovascular disease and reviewed main risk factors. Reviewed steps to reduce disease risk including:   · Quitting tobacco use, reducing amount smoked, or not starting the habit  · Making healthy food choices  · Being physically active and gradualy increasing activity levels   · Reduce weight and determine a healthy BMI goal  · Monitor blood pressure and treat if higher than 140/90 mmHg  · Maintain blood total cholesterol levels under 5 mmol/l or 190 mg/dl  · Maintain LDL cholesterol levels under 3.0 mmol/l or 115 mg/dl   · Control blood glucose levels  · Consider taking aspirin (75 mg daily), once blood pressure is controlled   Provided a follow up plan.   Time spent (minutes): 30

## 2021-10-20 NOTE — PATIENT INSTRUCTIONS
Advance Directives: Care Instructions  Overview  An advance directive is a legal way to state your wishes at the end of your life. It tells your family and your doctor what to do if you can't say what you want. There are two main types of advance directives. You can change them any time your wishes change. Living will. This form tells your family and your doctor your wishes about life support and other treatment. The form is also called a declaration. Medical power of . This form lets you name a person to make treatment decisions for you when you can't speak for yourself. This person is called a health care agent (health care proxy, health care surrogate). The form is also called a durable power of  for health care. If you do not have an advance directive, decisions about your medical care may be made by a family member, or by a doctor or a  who doesn't know you. It may help to think of an advance directive as a gift to the people who care for you. If you have one, they won't have to make tough decisions by themselves. Follow-up care is a key part of your treatment and safety. Be sure to make and go to all appointments, and call your doctor if you are having problems. It's also a good idea to know your test results and keep a list of the medicines you take. What should you include in an advance directive? Many states have a unique advance directive form. (It may ask you to address specific issues.) Or you might use a universal form that's approved by many states. If your form doesn't tell you what to address, it may be hard to know what to include in your advance directive. Use the questions below to help you get started. · Who do you want to make decisions about your medical care if you are not able to? · What life-support measures do you want if you have a serious illness that gets worse over time or can't be cured? · What are you most afraid of that might happen? (Maybe you're afraid of having pain, losing your independence, or being kept alive by machines.)  · Where would you prefer to die? (Your home? A hospital? A nursing home?)  · Do you want to donate your organs when you die? · Do you want certain Samaritan practices performed before you die? When should you call for help? Be sure to contact your doctor if you have any questions. Where can you learn more? Go to https://chpepiceweb.[email or web address] org and sign in to your [employer] account. Enter R264 in the [facility] box to learn more about \"Advance Directives: Care Instructions. \"     If you do not have an account, please click on the \"Sign Up Now\" link. Current as of: March 17, 2021               Content Version: 13.0  © 8824-6548 Healthwise, Incorporated. Care instructions adapted under license by [facility] ([facility]). If you have questions about a medical condition or this instruction, always ask your healthcare professional. [place] [address] any warranty or liability for your use of this information. Learning About Living Rubi Lucianosal  What is a living will? A living will, also called a declaration, is a legal form. It tells your family and your doctor your wishes when you can't speak for yourself. It's used by the health professionals who will treat you as you near the end of your life or if you get seriously hurt or ill. If you put your wishes in writing, your loved ones and others will know what kind of care you want. They won't need to guess. This can ease your mind and be helpful to others. And you can change or cancel your living will at any time. A living will is not the same as an estate or property will. An estate will explains what you want to happen with your money and property after you die. How do you use it? A living will is used to describe the kinds of treatment or life support you want as you near the end of your life or if you get seriously hurt or ill. Keep these facts in mind about living jamil. · Your living will is used only if you can't speak or make decisions for yourself. Most often, one or more doctors must certify that you can't speak or decide for yourself before your living will takes effect. · If you get better and can speak for yourself again, you can accept or refuse any treatment. It doesn't matter what you said in your living will. · Some states may limit your right to refuse treatment in certain cases. For example, you may need to clearly state in your living will that you don't want artificial hydration and nutrition, such as being fed through a tube. Is a living will a legal document? A living will is a legal document. Each state has its own laws about living jamil. And a living will may be called something else in your state. Here are some things to know about living jamil. · You don't need an  to complete a living will. But legal advice can be helpful if your state's laws are unclear. It can also help if your health history is complicated or your family can't agree on what should be in your living will. · You can change your living will at any time. Some people find that their wishes about end-of-life care change as their health changes. If you make big changes to your living will, complete a new form. · If you move to another state, make sure that your living will is legal in the state where you now live. In most cases, doctors will respect your wishes even if you have a form from a different state. · You might use a universal form that has been approved by many states. This kind of form can sometimes be filled out and stored online. Your digital copy will then be available wherever you have a connection to the internet. The doctors and nurses who need to treat you can find it right away. · Your state may offer an online registry. This is another place where you can store your living will online.   · It's a good idea to get your living will notarized. This means using a person called a  to watch two people sign, or witness, your living will. What should you know when you create a living will? Here are some questions to ask yourself as you make your living will:  · Do you know enough about life support methods that might be used? If not, talk to your doctor so you know what might be done if you can't breathe on your own, your heart stops, or you can't swallow. · What things would you still want to be able to do after you receive life-support methods? Would you want to be able to walk? To speak? To eat on your own? To live without the help of machines? · Do you want certain Spiritism practices performed if you become very ill? · If you have a choice, where do you want to be cared for? In your home? At a hospital or nursing home? · If you have a choice at the end of your life, where would you prefer to die? At home? In a hospital or nursing home? Somewhere else? · Would you prefer to be buried or cremated? · Do you want your organs to be donated after you die? What should you do with your living will? · Make sure that your family members and your health care agent have copies of your living will (also called a declaration). · Give your doctor a copy of your living will. Ask him or her to keep it as part of your medical record. If you have more than one doctor, make sure that each one has a copy. · Put a copy of your living will where it can be easily found. For example, some people may put a copy on their refrigerator door. If you are using a digital copy, be sure your doctor, family members, and health care agent know how to find and access it. Where can you learn more? Go to https://chpejenniferewtarsha.Digital Global [email or web address] org and sign in to your [employer] account. Enter B201 in the e27 box to learn more about \"Learning About Living [place]. \"     If you do not have an account, please click on the \"Sign Up Now\" link. Current as of: March 17, 2021               Content Version: 13.0  © 2006-2021 Healthwise, [employer]. Care instructions adapted under license by [facility] ([facility]). If you have questions about a medical condition or this instruction, always ask your healthcare professional. [address] any warranty or liability for your use of this information. Body Mass Index: Care Instructions  Your Care Instructions     Body mass index (BMI) can help you see if your weight is raising your risk for health problems. It uses a formula to compare how much you weigh with how tall you are. · A BMI lower than 18.5 is considered underweight. · A BMI between 18.5 and 24.9 is considered healthy. · A BMI between 25 and 29.9 is considered overweight. A BMI of 30 or higher is considered obese. If your BMI is in the normal range, it means that you have a lower risk for weight-related health problems. If your BMI is in the overweight or obese range, you may be at increased risk for weight-related health problems, such as high blood pressure, heart disease, stroke, arthritis or joint pain, and diabetes. If your BMI is in the underweight range, you may be at increased risk for health problems such as fatigue, lower protection (immunity) against illness, muscle loss, bone loss, hair loss, and hormone problems. BMI is just one measure of your risk for weight-related health problems. You may be at higher risk for health problems if you are not active, you eat an unhealthy diet, or you drink too much alcohol or use tobacco products. Follow-up care is a key part of your treatment and safety. Be sure to make and go to all appointments, and call your doctor if you are having problems. It's also a good idea to know your test results and keep a list of the medicines you take. How can you care for yourself at home? · Practice healthy eating habits.  This includes eating plenty of fruits, vegetables, whole grains, lean protein, and low-fat dairy. · If your doctor recommends it, get more exercise. Walking is a good choice. Bit by bit, increase the amount you walk every day. Try for at least 30 minutes on most days of the week. · Do not smoke. Smoking can increase your risk for health problems. If you need help quitting, talk to your doctor about stop-smoking programs and medicines. These can increase your chances of quitting for good. · Limit alcohol to 2 drinks a day for men and 1 drink a day for women. Too much alcohol can cause health problems. If you have a BMI higher than 25  · Your doctor may do other tests to check your risk for weight-related health problems. This may include measuring the distance around your waist. A waist measurement of more than 40 inches in men or 35 inches in women can increase the risk of weight-related health problems. · Talk with your doctor about steps you can take to stay healthy or improve your health. You may need to make lifestyle changes to lose weight and stay healthy, such as changing your diet and getting regular exercise. If you have a BMI lower than 18.5  · Your doctor may do other tests to check your risk for health problems. · Talk with your doctor about steps you can take to stay healthy or improve your health. You may need to make lifestyle changes to gain or maintain weight and stay healthy, such as getting more healthy foods in your diet and doing exercises to build muscle. Where can you learn more? Go to https://tadeo.healthAvtal24. org and sign in to your [employer] account. Enter S176 in the [facility] box to learn more about \"Body Mass Index: Care Instructions. \"     If you do not have an account, please click on the \"Sign Up Now\" link. Current as of: March 17, 2021               Content Version: 13.0  © 0509-6690 Healthwise, Incorporated. Care instructions adapted under license by [facility] ([facility]).  If you have questions about a medical condition or this instruction, always ask your healthcare professional. [address] any warranty or liability for your use of this information. Eating Healthy Foods: Care Instructions  Your Care Instructions     Eating healthy foods can help lower your risk for disease. Healthy food gives you energy and keeps your heart strong, your brain active, your muscles working, and your bones strong. A healthy diet includes a variety of foods from the basic food groups: grains, vegetables, fruits, milk and milk products, and meat and beans. Some people may eat more of their favorite foods from only one food group and, as a result, miss getting the nutrients they need. So, it is important to pay attention not only to what you eat but also to what you are missing from your diet. You can eat a healthy, balanced diet by making a few small changes. Follow-up care is a key part of your treatment and safety. Be sure to make and go to all appointments, and call your doctor if you are having problems. It's also a good idea to know your test results and keep a list of the medicines you take. How can you care for yourself at home? Look at what you eat  · Keep a food diary for a week or two and record everything you eat or drink. Track the number of servings you eat from each food group. · For a balanced diet every day, eat a variety of:  ? 6 or more ounce-equivalents of grains, such as cereals, breads, crackers, rice, or pasta, every day. An ounce-equivalent is 1 slice of bread, 1 cup of ready-to-eat cereal, or ½ cup of cooked rice, cooked pasta, or cooked cereal.  ? 2½ cups of vegetables, especially:  § Dark-green vegetables such as broccoli and spinach. § Orange vegetables such as carrots and sweet potatoes. § Dry beans (such as alcaraz and kidney beans) and peas (such as lentils). ? 2 cups of fresh, frozen, or canned fruit. A small apple or 1 banana or orange equals 1 cup.   ? 3 cups of nonfat or low-fat milk, yogurt, or other milk products. ? 5½ ounces of meat and beans, such as chicken, fish, lean meat, beans, nuts, and seeds. One egg, 1 tablespoon of peanut butter, ½ ounce nuts or seeds, or ¼ cup of cooked beans equals 1 ounce of meat. · Learn how to read food labels for serving sizes and ingredients. Fast-food and convenience-food meals often contain few or no fruits or vegetables. Make sure you eat some fruits and vegetables to make the meal more nutritious. · Look at your food diary. For each food group, add up what you have eaten and then divide the total by the number of days. This will give you an idea of how much you are eating from each food group. See if you can find some ways to change your diet to make it more healthy. Start small  · Do not try to make dramatic changes to your diet all at once. You might feel that you are missing out on your favorite foods and then be more likely to fail. · Start slowly, and gradually change your habits. Try some of the following:  ? Use whole wheat bread instead of white bread. ? Use nonfat or low-fat milk instead of whole milk. ? Eat brown rice instead of white rice, and eat whole wheat pasta instead of white-flour pasta. ? Try low-fat cheeses and low-fat yogurt. ? Add more fruits and vegetables to meals and have them for snacks. ? Add lettuce, tomato, cucumber, and onion to sandwiches. ? Add fruit to yogurt and cereal.  Enjoy food  · You can still eat your favorite foods. You just may need to eat less of them. If your favorite foods are high in fat, salt, and sugar, limit how often you eat them, but do not cut them out entirely. · Eat a wide variety of foods. Make healthy choices when eating out  · The type of restaurant you choose can help you make healthy choices. Even fast-food chains are now offering more low-fat or healthier choices on the menu. · Choose smaller portions, or take half of your meal home. · When eating out, try:  ?  A veggie pizza with a whole wheat crust or grilled chicken (instead of sausage or pepperoni). ? Pasta with roasted vegetables, grilled chicken, or marinara sauce instead of cream sauce. ? A vegetable wrap or grilled chicken wrap. ? Broiled or poached food instead of fried or breaded items. Make healthy choices easy  · Buy packaged, prewashed, ready-to-eat fresh vegetables and fruits, such as baby carrots, salad mixes, and chopped or shredded broccoli and cauliflower. · Buy packaged, presliced fruits, such as melon or pineapple. · Choose 100% fruit or vegetable juice instead of soda. Limit juice intake to 4 to 6 oz (½ to ¾ cup) a day. · Blend low-fat yogurt, fruit juice, and canned or frozen fruit to make a smoothie for breakfast or a snack. Where can you learn more? Go to https://Organic AvenuepeChasqui Bus.Machine Perception Technologies. org and sign in to your [facility] account. Enter R922 in the [employer] box to learn more about \"Eating Healthy Foods: Care Instructions. \"     If you do not have an account, please click on the \"Sign Up Now\" link. Current as of: December 17, 2020               Content Version: 13.0  © 6331-0654 Healthwise, Incorporated. Care instructions adapted under license by [facility] ([facility]). If you have questions about a medical condition or this instruction, always ask your healthcare professional. [place] [address] any warranty or liability for your use of this information. Personalized Preventive Plan for Leana He - 10/20/2021  Medicare offers a range of preventive health benefits. Some of the tests and screenings are paid in full while other may be subject to a deductible, co-insurance, and/or copay. Some of these benefits include a comprehensive review of your medical history including lifestyle, illnesses that may run in your family, and various assessments and screenings as appropriate.     After reviewing your medical record and screening and assessments performed today your provider may have ordered immunizations, labs, imaging, and/or referrals for you. A list of these orders (if applicable) as well as your Preventive Care list are included within your After Visit Summary for your review. Other Preventive Recommendations:    · A preventive eye exam performed by an eye specialist is recommended every 1-2 years to screen for glaucoma; cataracts, macular degeneration, and other eye disorders. · A preventive dental visit is recommended every 6 months. · Try to get at least 150 minutes of exercise per week or 10,000 steps per day on a pedometer . · Order or download the FREE \"Exercise & Physical Activity: Your Everyday Guide\" from The [employer] A[employer]. Call 6-647.999.1240 or search [employer] A[employer] online. · You need [phone] mg of calcium and 1059-7333 IU of vitamin D per day. It is possible to meet your calcium requirement with diet alone, but a vitamin D supplement is usually necessary to meet this goal.  · When exposed to the sun, use a sunscreen that protects against both UVA and UVB radiation with an SPF of 30 or greater. Reapply every 2 to 3 hours or after sweating, drying off with a towel, or swimming. · Always wear a seat belt when traveling in a car. Always wear a helmet when riding a bicycle or motorcycle.

## 2021-10-21 NOTE — PROGRESS NOTES
After obtaining consent, and per orders of Lynne MCKEON, injection of Pfizer given in Right deltoid by Tung Willams MA. Patient instructed to remain in clinic for 20 minutes afterwards, and to report any adverse reaction to me immediately.

## 2021-10-22 RX ORDER — SPIRONOLACTONE 50 MG/1
50 TABLET, FILM COATED ORAL DAILY
Qty: 30 TABLET | Refills: 3 | Status: SHIPPED | OUTPATIENT
Start: 2021-10-22 | End: 2022-04-26

## 2021-10-22 RX ORDER — ERGOCALCIFEROL 1.25 MG/1
CAPSULE ORAL
Qty: 4 CAPSULE | Refills: 2 | Status: SHIPPED | OUTPATIENT
Start: 2021-10-22 | End: 2021-12-23

## 2021-10-22 RX ORDER — LOVASTATIN 40 MG/1
TABLET ORAL
Qty: 30 TABLET | Refills: 4 | Status: SHIPPED | OUTPATIENT
Start: 2021-10-22 | End: 2022-04-26 | Stop reason: SDUPTHER

## 2021-10-22 RX ORDER — VALSARTAN 80 MG/1
80 TABLET ORAL DAILY
Qty: 30 TABLET | Refills: 5 | Status: SHIPPED | OUTPATIENT
Start: 2021-10-22 | End: 2022-04-26 | Stop reason: SDUPTHER

## 2021-11-01 ENCOUNTER — OFFICE VISIT (OUTPATIENT)
Dept: PRIMARY CARE CLINIC | Age: 56
End: 2021-11-01
Payer: COMMERCIAL

## 2021-11-01 VITALS
HEART RATE: 80 BPM | WEIGHT: 205 LBS | OXYGEN SATURATION: 98 % | SYSTOLIC BLOOD PRESSURE: 126 MMHG | DIASTOLIC BLOOD PRESSURE: 70 MMHG | TEMPERATURE: 97.6 F | BODY MASS INDEX: 38.73 KG/M2

## 2021-11-01 DIAGNOSIS — Z20.822 CLOSE EXPOSURE TO COVID-19 VIRUS: ICD-10-CM

## 2021-11-01 DIAGNOSIS — Z20.822 CLOSE EXPOSURE TO COVID-19 VIRUS: Primary | ICD-10-CM

## 2021-11-01 LAB — SARS-COV-2, PCR: NOT DETECTED

## 2021-11-01 PROCEDURE — G8417 CALC BMI ABV UP PARAM F/U: HCPCS | Performed by: NURSE PRACTITIONER

## 2021-11-01 PROCEDURE — 99213 OFFICE O/P EST LOW 20 MIN: CPT | Performed by: NURSE PRACTITIONER

## 2021-11-01 PROCEDURE — G8484 FLU IMMUNIZE NO ADMIN: HCPCS | Performed by: NURSE PRACTITIONER

## 2021-11-01 PROCEDURE — G8427 DOCREV CUR MEDS BY ELIG CLIN: HCPCS | Performed by: NURSE PRACTITIONER

## 2021-11-01 PROCEDURE — 1036F TOBACCO NON-USER: CPT | Performed by: NURSE PRACTITIONER

## 2021-11-01 PROCEDURE — 3017F COLORECTAL CA SCREEN DOC REV: CPT | Performed by: NURSE PRACTITIONER

## 2021-11-01 ASSESSMENT — ENCOUNTER SYMPTOMS
BACK PAIN: 0
PHOTOPHOBIA: 0
VOMITING: 0
RHINORRHEA: 0
COLOR CHANGE: 0
VOICE CHANGE: 0
SHORTNESS OF BREATH: 0
COUGH: 1
NAUSEA: 0

## 2021-11-01 NOTE — PROGRESS NOTES
[facility] PRIMARY CARE  [address] [address]  [address] [address]  Dept: [phone]  Dept Fax: [phone]  Loc: [phone]    Rosy Ricks is a 60 y.o. female who presents today for her medical conditions/complaints as noted below. Rosy Ricks is c/o of Fatigue      Chief Complaint   Patient presents with    Fatigue       HPI:     HPI    Patient presents today with complaints of fatigue. Her  was recently admitted to the hospital with covid while in [place] for work. He was discharged home after 5 days. She reports that she got her first covid vaccine on 10/20/2021 which was the day her  returned home. She had pain at the injection site and mild symptoms that resolved within 72 hours. A couple of days later she began experiencing fatigue, cough, and body aches. She feels as if she is \"coming down with something\" and would like to be tested for covid at this time.      Past Medical History:   Diagnosis Date    Abnormal Pap smear of cervix     Arthritis     CAD (coronary artery disease)     stent 2011 Keagan Duckworth; [facility]    C[facility]     Class 3 severe obesity due to excess calories without serious comorbidity with body mass index (BMI) of 40.0 to 44.9 in adult ([address] 7/28/2020    Diabetes mellitus ([address]     ERRONEOUS ENCOUNTER--DISREGARD 09/15/2015    GERD (gastroesophageal reflux disease)     Headache(784.0)     Heart attack ([address] 12/2011    Hyperlipidemia     Hypertension     Lichen simplex chronicus 10/2015    Osteopenia     Pneumonia     Type II or unspecified type diabetes mellitus without mention of complication, not stated as uncontrolled         Past Surgical History:   Procedure Laterality Date    BREAST BIOPSY  6/8/2011    US guided core needle, right, benign    CARDIAC CATHETERIZATION  12/22/14  JDT    EF 50%    CARDIAC CATHETERIZATION  06/05/2017        CHG FLUOROSCOPIC GUIDANCE NEEDLE PLACEMENT ADD ON Left 2018    CORTICOSTEROID INJECTION performed by Yani Jacobs MD at [address] [address]    COLONOSCOPY  approx     Dr. Bebo Espinosa per pt recall \" Normal\"   [address] WITH STENT PLACEMENT  2011    SABA to [address] RELEASE Left 2019    LEFT INDEX AND LONG TRIGGER FINGER RELEASAE performed by Maisha Mullen MD at [address]    HYSTERECTOMY      OTHER SURGICAL HISTORY      fibrinolysis    FL CABG, ARTERY-VEIN, THREE  2014    Coronary Artery Bypass, 3    FL MANIPULATN SHLDR JT W ANESTHESIA Left 2018    SHOULDER MANIPULATION UNDER ANESTHESIA performed by Yani Jacobs MD at [address] [place] [address] ENDOSCOPY  approx     per pt recall \" Normal\"    UPPER GASTROINTESTINAL ENDOSCOPY N/A 2020    Dr Brown Power exam, neg EoE    VULVAR/PERINEAL BIOPSY  10/07/2015    Dr. Anne Right    VULVAR/PERINEAL BIOPSY N/A 2021    VULVAR [facility]OPSY [facility]g Leblanc [facility] at [facility] OR       Social History     Tobacco Use    Smoking status: Former Smoker     Packs/day: 2.00     Years: 15.00     Pack years: 30.00     Types: Cigarettes     Quit date: 2014     Years since quittin.3    Smokeless tobacco: Never Used   Substance Use Topics    Alcohol use: No        Current Outpatient Medications   Medication Sig Dispense Refill    vitamin D (ERGOCALCIFEROL) 1.25 MG (74942 UT) CAPS capsule TAKE 1 CAPSULE BY MOUTH ONCE WEEKLY  4 capsule 2    lovastatin (MEVACOR) 40 MG tablet TAKE 1 TABLET AT BEDTIME  30 tablet 4    spironolactone (ALDACTONE) 50 MG tablet TAKE 1 TABLET BY MOUTH DAILY  30 tablet 3    valsartan (DIOVAN) 80 MG tablet TAKE 1 TABLET BY MOUTH DAILY  30 tablet 5    fluconazole (DIFLUCAN) 150 MG tablet Take 1 tablet today then 1 tablet at the end of the antibiotics 2 tablet 0    Dulaglutide 0.75 MG/0.5ML SOPN Inject 0.75 mg into the skin once a week 12 pen 2    glipiZIDE (GLUCOTROL XL) 10 MG extended release tablet TAKE 1 TABLET BY MOUTH DAILY  30 tablet 3    ondansetron (ZOFRAN ODT) 4 MG disintegrating tablet Take 1 tablet by mouth daily as needed for Nausea or Vomiting 30 tablet 5    amitriptyline (ELAVIL) 10 MG tablet Take 1 tablet by mouth nightly 30 tablet 5    JARDIANCE 25 MG tablet Take 1 tablet by mouth once daily 90 tablet 0    tamsulosin (FLOMAX) 0.4 MG capsule TAKE 1 CAPSULE BY MOUTH DAILY  30 capsule 5    metFORMIN (GLUCOPHAGE-XR) 500 MG extended release tablet TAKE 1 TABLET BY MOUTH 2 TIMES DAILY. INDDICATIONS: DIABETES  60 tablet 3    metoprolol tartrate (LOPRESSOR) 25 MG tablet TAKE 1 TABLET BY MOUTH TWICE DAILY  60 tablet 5    isosorbide mononitrate (IMDUR) 30 MG extended release tablet Take 30 mg by mouth nightly Indications: High Blood Pressure Disorder      nitroGLYCERIN (NITROSTAT) 0.4 MG SL tablet Place 1 tablet under the tongue every 5 minutes as needed for Chest pain 25 tablet 3    nystatin (MYCOSTATIN) 816095 UNIT/GM powder APPLY 2 OR 3 TIMES DAILY. (Patient taking differently: Apply topically as needed APPLY 2 OR 3 TIMES DAILY.  ) 60 g 5    Diabetic Shoe MISC by Does not apply route 1 each 0    albuterol (PROVENTIL) (5 MG/ML) 0.5% nebulizer solution Take 0.5 mLs by nebulization every 6 hours as needed for Wheezing 120 each 0    docusate sodium (COLACE) 100 MG capsule Take 1 capsule by mouth daily as needed for Constipation 30 capsule 1    diclofenac sodium (VOLTAREN) 1 % GEL Apply 2 g topically 4 times daily (Patient taking differently: Apply 2 g topically 4 times daily as needed for Pain ) 2 Tube 1    furosemide (LASIX) 40 MG tablet TAKE 1 TABLET BY MOUTH DAILY 30 tablet 5    aspirin 81 MG tablet Take 81 mg by mouth daily      mometasone (ELOCON) 0.1 % ointment Apply topically to vagina 3 times per week (Patient taking differently: Apply topically as needed Apply topically to vagina 3 times per week) 1 Tube 3    Eflornithine HCl (VANIQA) 13.9 % CREA Apply 1 Dose topically 2 times daily (Patient taking differently: Apply 1 Dose topically 2 times daily as needed ) 45 g 2     No current facility-administered medications for this visit. Allergies   Allergen Reactions    Codeine Other (See Comments)     Seizures as a child    Motrin [Ibuprofen] Swelling     Tongue to swell    Humalog [Insulin Lispro] Other (See Comments)     Shakiness, break out in a sweat at higher doses.  Ozempic (0.25 Or 0.5 Mg-Dose) [Semaglutide(0.25 Or 0.5mg-Dos)]      nausea       Family History   Problem Relation Age of Onset    Diabetes Father     Cancer Father     High Blood Pressure Father     Heart Attack Father     Colon Cancer Father     Diabetes Mother     Breast Cancer Mother     Heart Failure Mother         CHF, MVP    Liver Cancer Mother     Heart Failure Maternal Grandfather     Stroke Maternal Grandfather     Pancreatic Cancer Maternal Grandfather     Stomach Cancer Maternal Grandfather     Diabetes Brother     Colon Cancer Paternal Uncle     Rectal Cancer Other     Colon Polyps Neg Hx     Crohn's Disease Neg Hx     Irritable Bowel Syndrome Neg Hx     Liver Disease Neg Hx                Subjective:      Review of Systems   Constitutional: Positive for chills and fatigue. Negative for fever. HENT: Negative for ear pain, hearing loss, rhinorrhea and voice change. Eyes: Negative for photophobia and visual disturbance. Respiratory: Positive for cough. Negative for shortness of breath. Cardiovascular: Negative for chest pain and palpitations. Gastrointestinal: Negative for nausea and vomiting. Endocrine: Negative. Negative for cold intolerance and heat intolerance. Genitourinary: Negative for difficulty urinating and flank pain. Musculoskeletal: Negative for back pain and neck pain. Skin: Negative for color change and rash. Allergic/Immunologic: Negative for environmental allergies and food allergies. Neurological: Negative for dizziness, speech difficulty and headaches. Hematological: Does not bruise/bleed easily. Psychiatric/Behavioral: Negative for sleep disturbance and suicidal ideas. Objective:     Physical Exam  Vitals and nursing note reviewed. Constitutional:       Appearance: She is well-developed. HENT:      Head: Atraumatic. Right Ear: External ear normal.      Left Ear: External ear normal.      Nose: Nose normal.   Eyes:      Conjunctiva/sclera: Conjunctivae normal.      Pupils: Pupils are equal, round, and reactive to light. Cardiovascular:      Rate and Rhythm: Normal rate and regular rhythm. Heart sounds: Normal heart sounds, S1 normal and S2 normal.   Pulmonary:      Effort: Pulmonary effort is normal.      Breath sounds: Normal breath sounds. Abdominal:      General: Bowel sounds are normal.      Palpations: Abdomen is soft. Musculoskeletal:         General: Normal range of motion. Cervical back: Normal range of motion and neck supple. Skin:     General: Skin is warm and dry. Neurological:      Mental Status: She is alert and oriented to person, place, and time. Psychiatric:         Behavior: Behavior normal.       /70   Pulse 80   Temp 97.6 °F (36.4 °C)   Wt 205 lb (93 kg)   LMP 01/01/1998 (Approximate)   SpO2 98%   BMI 38.73 kg/m²     Assessment:      Diagnosis Orders   1. Close exposure to COVID-19 virus  COVID-19       No results found for this visit on 11/01/21. Plan:     Covid test today. May use delsym over-the-counter for cough and motrin/tylenol as needed. If symptoms worsen she is to follow-up. Return if symptoms worsen or fail to improve.     Orders Placed This Encounter   Procedures    COVID-19     Standing Status:   Future     Standing Expiration Date:   11/1/2022     Scheduling Instructions:      1) Due to current limited availability of the COVID-19 test, tests will be prioritized based on responses to questions above. Testing may be delayed due to volume. 2) Print and instruct patient to adhere to CDC home isolation program. (Link Above)              3) Set up or refer patient for a monitoring program.              4) Have patient sign up for and leverage MyChart (if not previously done). Order Specific Question:   Is this test for diagnosis or screening? Answer:   Diagnosis of ill patient     Order Specific Question:   Symptomatic for COVID-19 as defined by CDC? Answer:   Yes     Order Specific Question:   Date of Symptom Onset     Answer:   10/27/2021     Order Specific Question:   Hospitalized for COVID-19? Answer:   No     Order Specific Question:   Admitted to ICU for COVID-19? Answer:   No     Order Specific Question:   Employed in healthcare setting? Answer:   No     Order Specific Question:   Resident in a congregate (group) care setting? Answer:   No     Order Specific Question:   Pregnant? Answer:   No     Order Specific Question:   Previously tested for COVID-19? Answer:   Yes       No orders of the defined types were placed in this encounter. Patient offered educational handouts and has had all questions answered. Patient voices understanding and agrees to plans along with risks and benefits of plan. Patient is instructed to continue prior meds, diet, and exercise plans as instructed. Patient agrees to follow up as instructed and sooner if needed. Patient agrees to go to ER if condition becomes emergent. EMR Dragon/transcription disclaimer: Some of this encounter note is an electronic transcription/translation of spoken language to printed text. The electronic translation of spoken language may permit erroneous, or at times, nonsensical words or phrases to be inadvertently transcribed.  Although I have reviewed the note for such errors, some may still exist.    Electronically signed by MIKE Brar on 11/1/2021 at 2:31 PM

## 2021-11-10 ENCOUNTER — NURSE ONLY (OUTPATIENT)
Dept: PRIMARY CARE CLINIC | Age: 56
End: 2021-11-10
Payer: MEDICARE

## 2021-11-10 PROCEDURE — 91300 COVID-19, PFIZER VACCINE 30MCG/0.3ML DOSE: CPT | Performed by: NURSE PRACTITIONER

## 2021-11-10 PROCEDURE — 0002A COVID-19, PFIZER VACCINE 30MCG/0.3ML DOSE: CPT | Performed by: NURSE PRACTITIONER

## 2021-11-29 ENCOUNTER — OFFICE VISIT (OUTPATIENT)
Dept: PRIMARY CARE CLINIC | Age: 56
End: 2021-11-29
Payer: COMMERCIAL

## 2021-11-29 VITALS
SYSTOLIC BLOOD PRESSURE: 112 MMHG | BODY MASS INDEX: 36.07 KG/M2 | HEART RATE: 87 BPM | OXYGEN SATURATION: 98 % | WEIGHT: 196 LBS | TEMPERATURE: 97.2 F | DIASTOLIC BLOOD PRESSURE: 62 MMHG | HEIGHT: 62 IN

## 2021-11-29 DIAGNOSIS — R09.82 POST-NASAL DRAINAGE: ICD-10-CM

## 2021-11-29 DIAGNOSIS — J06.9 VIRAL UPPER RESPIRATORY TRACT INFECTION: Primary | ICD-10-CM

## 2021-11-29 PROCEDURE — G8484 FLU IMMUNIZE NO ADMIN: HCPCS | Performed by: FAMILY MEDICINE

## 2021-11-29 PROCEDURE — 99213 OFFICE O/P EST LOW 20 MIN: CPT | Performed by: FAMILY MEDICINE

## 2021-11-29 PROCEDURE — 3017F COLORECTAL CA SCREEN DOC REV: CPT | Performed by: FAMILY MEDICINE

## 2021-11-29 PROCEDURE — G8417 CALC BMI ABV UP PARAM F/U: HCPCS | Performed by: FAMILY MEDICINE

## 2021-11-29 PROCEDURE — G8427 DOCREV CUR MEDS BY ELIG CLIN: HCPCS | Performed by: FAMILY MEDICINE

## 2021-11-29 PROCEDURE — 1036F TOBACCO NON-USER: CPT | Performed by: FAMILY MEDICINE

## 2021-11-29 ASSESSMENT — ENCOUNTER SYMPTOMS
SORE THROAT: 1
VOMITING: 1
RHINORRHEA: 1
NAUSEA: 1
EYES NEGATIVE: 1
FACIAL SWELLING: 0
CHEST TIGHTNESS: 1
COUGH: 1

## 2021-11-29 NOTE — PROGRESS NOTES
[address] PRIMARY [facility]ARE  [address] [address] [address] [address]  Dept: [phone]  Dept Fax: [phone]  Loc: [phone]      Subjective:     Chief Complaint   Patient presents with    Cough    Pharyngitis     sore throat started saturday morning, leg and back aches. HPI:  Lul Mon is a 60 y.o. female presents today with acute onset of sore throat, hurts to swallow. No fever. She also complains of chest congestion with episodic coughing followed by emesis. Pt states she quit smoking a long time a go. Appetite is poor. Pt is Covid vaccinated. No known sick contacts at home. ROS:   Review of Systems   Constitutional: Positive for appetite change. Negative for activity change and fever. HENT: Positive for ear pain, postnasal drip, rhinorrhea and sore throat. Negative for facial swelling. Eyes: Negative. Respiratory: Positive for cough and chest tightness. Gastrointestinal: Positive for nausea and vomiting. Genitourinary: Negative. Musculoskeletal: Positive for arthralgias. Skin: Negative. Negative for rash.        PMHx:  Past Medical History:   Diagnosis Date    Abnormal Pap smear of cervix     Arthritis     CAD (coronary artery disease)     stent 2011 Supriya Carlisle; [facility] cardiology    [facility]     Class 3 severe obesity due to excess calories without serious comorbidity with body mass index (BMI) of 40.0 to 44.9 in adult ([facility] 7/28/2020    Diabetes mellitus ([facility]     ERRONEOUS ENCOUNTER--DISREGARD 09/15/2015    GERD (gastroesophageal reflux disease)     Headache(784.0)     Heart attack ([facility] 12/2011    Hyperlipidemia     Hypertension     Lichen simplex chronicus 10/2015    Osteopenia     Pneumonia     Type II or unspecified type diabetes mellitus without mention of complication, not stated as uncontrolled      Patient Active Problem List   Diagnosis    CAD (coronary artery disease)    Hypertension ANESTHESIA Left 2018    SHOULDER MANIPULATION UNDER ANESTHESIA performed by Albin Agee MD at [facility]    THORACOTOMY      UPPER GASTROINTESTINAL ENDOSCOPY  approx 2005    per pt recall \" Normal\"    UPPER GASTROINTESTINAL ENDOSCOPY N/A 2020    Dr Nydia Sandoval exam, neg EoE    VULVAR/PERINEAL BIOPSY  10/07/2015    Dr. Chang Tan    VULVAR/PERINEAL BIOPSY N/A 2021    VULVAR BIOPSY performed by Chuyita Blackman [facility] at [facility] OR       PFHx:  Family History   Problem Relation Age of Onset    Diabetes Father     Cancer Father     High Blood Pressure Father     Heart Attack Father     Colon Cancer Father     Diabetes Mother     Breast Cancer Mother     Heart Failure Mother         CHF, MVP    Liver Cancer Mother     Heart Failure Maternal Grandfather     Stroke Maternal Grandfather     Pancreatic Cancer Maternal Grandfather     Stomach Cancer Maternal Grandfather     Diabetes Brother     Colon Cancer Paternal Uncle     Rectal Cancer Other     Colon Polyps Neg Hx     Crohn's Disease Neg Hx     Irritable Bowel Syndrome Neg Hx     Liver Disease Neg Hx        SocialHx:  Social History     Tobacco Use    Smoking status: Former Smoker     Packs/day: 2.00     Years: 15.00     Pack years: 30.00     Types: Cigarettes     Quit date: 2014     Years since quittin.4    Smokeless tobacco: Never Used   Substance Use Topics    Alcohol use: No       Allergies: Allergies   Allergen Reactions    Codeine Other (See Comments)     Seizures as a child    Motrin [Ibuprofen] Swelling     Tongue to swell    Humalog [Insulin Lispro] Other (See Comments)     Shakiness, break out in a sweat at higher doses.     Ozempic (0.25 Or 0.5 Mg-Dose) [Semaglutide(0.25 Or 0.5mg-Dos)]      nausea       Medications:  Current Outpatient Medications   Medication Sig Dispense Refill    vitamin D (ERGOCALCIFEROL) 1.25 MG (41482 UT) CAPS capsule TAKE 1 CAPSULE BY MOUTH ONCE WEEKLY 4 capsule 2    lovastatin (MEVACOR) 40 MG tablet TAKE 1 TABLET AT BEDTIME  30 tablet 4    spironolactone (ALDACTONE) 50 MG tablet TAKE 1 TABLET BY MOUTH DAILY  30 tablet 3    valsartan (DIOVAN) 80 MG tablet TAKE 1 TABLET BY MOUTH DAILY  30 tablet 5    fluconazole (DIFLUCAN) 150 MG tablet Take 1 tablet today then 1 tablet at the end of the antibiotics 2 tablet 0    Dulaglutide 0.75 MG/0.5ML SOPN Inject 0.75 mg into the skin once a week 12 pen 2    glipiZIDE (GLUCOTROL XL) 10 MG extended release tablet TAKE 1 TABLET BY MOUTH DAILY  30 tablet 3    ondansetron (ZOFRAN ODT) 4 MG disintegrating tablet Take 1 tablet by mouth daily as needed for Nausea or Vomiting 30 tablet 5    amitriptyline (ELAVIL) 10 MG tablet Take 1 tablet by mouth nightly 30 tablet 5    JARDIANCE 25 MG tablet Take 1 tablet by mouth once daily 90 tablet 0    tamsulosin (FLOMAX) 0.4 MG capsule TAKE 1 CAPSULE BY MOUTH DAILY  30 capsule 5    metFORMIN (GLUCOPHAGE-XR) 500 MG extended release tablet TAKE 1 TABLET BY MOUTH 2 TIMES DAILY. INDDICATIONS: DIABETES  60 tablet 3    metoprolol tartrate (LOPRESSOR) 25 MG tablet TAKE 1 TABLET BY MOUTH TWICE DAILY  60 tablet 5    isosorbide mononitrate (IMDUR) 30 MG extended release tablet Take 30 mg by mouth nightly Indications: High Blood Pressure Disorder      nitroGLYCERIN (NITROSTAT) 0.4 MG SL tablet Place 1 tablet under the tongue every 5 minutes as needed for Chest pain 25 tablet 3    nystatin (MYCOSTATIN) 684699 UNIT/GM powder APPLY 2 OR 3 TIMES DAILY. (Patient taking differently: Apply topically as needed APPLY 2 OR 3 TIMES DAILY.  ) 60 g 5    Diabetic Shoe MISC by Does not apply route 1 each 0    albuterol (PROVENTIL) (5 MG/ML) 0.5% nebulizer solution Take 0.5 mLs by nebulization every 6 hours as needed for Wheezing 120 each 0    docusate sodium (COLACE) 100 MG capsule Take 1 capsule by mouth daily as needed for Constipation 30 capsule 1    diclofenac sodium (VOLTAREN) 1 % GEL Apply 2 g topically 4 times daily (Patient taking differently: Apply 2 g topically 4 times daily as needed for Pain ) 2 Tube 1    furosemide (LASIX) 40 MG tablet TAKE 1 TABLET BY MOUTH DAILY 30 tablet 5    aspirin 81 MG tablet Take 81 mg by mouth daily      mometasone (ELOCON) 0.1 % ointment Apply topically to vagina 3 times per week (Patient taking differently: Apply topically as needed Apply topically to vagina 3 times per week) 1 Tube 3    Eflornithine HCl (VANIQA) 13.9 % CREA Apply 1 Dose topically 2 times daily (Patient taking differently: Apply 1 Dose topically 2 times daily as needed ) 45 g 2     No current facility-administered medications for this visit. Objective:   PE:  /62   Pulse 87   Temp 97.2 °F (36.2 °C)   Ht 5' 2\" (1.575 m)   Wt 196 lb (88.9 kg)   LMP 01/01/1998 (Approximate)   SpO2 98%   BMI 35.85 kg/m²   Physical Exam  Vitals and nursing note reviewed. Constitutional:       General: She is not in acute distress. Appearance: She is obese. She is ill-appearing (mild). HENT:      Head: Normocephalic. Nose: Congestion and rhinorrhea present. Mouth/Throat:      Pharynx: Uvula midline. Comments: +PND  Eyes:      Extraocular Movements: Extraocular movements intact. Pupils: Pupils are equal, round, and reactive to light. Cardiovascular:      Rate and Rhythm: Normal rate and regular rhythm. Pulses: Normal pulses. Heart sounds: Normal heart sounds. Pulmonary:      Breath sounds: No wheezing, rhonchi or rales. Abdominal:      Palpations: Abdomen is soft. Musculoskeletal:      Cervical back: Neck supple. Lymphadenopathy:      Cervical: No cervical adenopathy. Skin:     General: Skin is warm and dry. Neurological:      Mental Status: She is alert and oriented to person, place, and time.    Psychiatric:         Mood and Affect: Mood normal.         Behavior: Behavior normal.            Assessment & Plan   Demi Dawson was seen today for cough and pharyngitis. Diagnoses and all orders for this visit:    Viral upper respiratory tract infection    Post-nasal drainage      Reassured of the viral nature of the illness  No need for abx at this time  Symptomatic treatment only for fever,, cough and cold  Increase oral fluid intake - warm liquids preferably  Okay to take extra Vit C or airborne  Warm saline gargles  REST  Continue to practice universal precaution, hand washing,  use and wearing well fitted masks  Consider flu vaccine if not yet received. Call with new or worsening symptoms    Return if symptoms worsen or fail to improve. All questions were answered. Medications, including possible adverse effects, and instructions were reviewed and  understanding was confirmed. Follow-up recommendations, including when to contact or return to office (ie; if symptoms worsen or fail to improve), were discussed and acknowledged.     Electronically signed by Carolin Velazco MD on 11/29/21 at 4:31 PM CST

## 2021-12-03 ENCOUNTER — TELEPHONE (OUTPATIENT)
Dept: CARDIOLOGY CLINIC | Age: 56
End: 2021-12-03

## 2021-12-08 ENCOUNTER — OFFICE VISIT (OUTPATIENT)
Dept: CARDIOLOGY CLINIC | Age: 56
End: 2021-12-08
Payer: COMMERCIAL

## 2021-12-08 VITALS
SYSTOLIC BLOOD PRESSURE: 100 MMHG | HEART RATE: 81 BPM | WEIGHT: 199 LBS | BODY MASS INDEX: 36.62 KG/M2 | OXYGEN SATURATION: 98 % | DIASTOLIC BLOOD PRESSURE: 68 MMHG | HEIGHT: 62 IN

## 2021-12-08 DIAGNOSIS — I25.10 CORONARY ARTERY DISEASE INVOLVING NATIVE CORONARY ARTERY OF NATIVE HEART WITHOUT ANGINA PECTORIS: Primary | ICD-10-CM

## 2021-12-08 DIAGNOSIS — E78.2 MIXED HYPERLIPIDEMIA: ICD-10-CM

## 2021-12-08 DIAGNOSIS — I10 ESSENTIAL HYPERTENSION: ICD-10-CM

## 2021-12-08 DIAGNOSIS — I50.32 CHRONIC DIASTOLIC CONGESTIVE HEART FAILURE (HCC): ICD-10-CM

## 2021-12-08 PROCEDURE — 3017F COLORECTAL CA SCREEN DOC REV: CPT | Performed by: CLINICAL NURSE SPECIALIST

## 2021-12-08 PROCEDURE — G8427 DOCREV CUR MEDS BY ELIG CLIN: HCPCS | Performed by: CLINICAL NURSE SPECIALIST

## 2021-12-08 PROCEDURE — G8484 FLU IMMUNIZE NO ADMIN: HCPCS | Performed by: CLINICAL NURSE SPECIALIST

## 2021-12-08 PROCEDURE — G8417 CALC BMI ABV UP PARAM F/U: HCPCS | Performed by: CLINICAL NURSE SPECIALIST

## 2021-12-08 PROCEDURE — 99214 OFFICE O/P EST MOD 30 MIN: CPT | Performed by: CLINICAL NURSE SPECIALIST

## 2021-12-08 PROCEDURE — 1036F TOBACCO NON-USER: CPT | Performed by: CLINICAL NURSE SPECIALIST

## 2021-12-08 NOTE — PATIENT INSTRUCTIONS
Keep working on getting your HgbA1c closer to 7  Follow up in 6 mos   Call with any questions or concerns  Follow up with MIKE Broussard for non cardiac problems and labs   Report any new problems  Cardiovascular Fitness-Exercise as tolerated. Strive for 30 minutes of exercise most days of the week. Cardiac / Healthy Diet  Continue current medications as directed  Continue plan of treatment  It is always recommended that you bring your medications bottles with you to each visit - this is for your safety!

## 2021-12-08 NOTE — PROGRESS NOTES
[facility] Cardiology  [facility] [place]  [address]  Phone: [phone]  Fax: [phone]    OFFICE VISIT:  2021    [place] Deal - : 1965    Reason For Visit:  Dwaine Chacon is a 60 y.o. female who is here for 6 Month Follow-Up (pt has soa), Hypertension, Coronary Artery Disease, and Congestive Heart Failure  CAD with CABG ×3 in , hypertension, hyperlipidemia and diabetes . Heart catheterization in  showed occluded LIMA to the LAD with patient and grafts. 19  Cath  Occluded LIMA-LAD at the anastomosis to the LAD itself, patent VG-diag, patent VG-PDA, apical akinesis, EF 45%  Recommend continuing ongoing medical management     2D echo 3/4/2021 shows normal LV size and function with EF 55 to 60%. No significant valvular disease noted    She is here in follow up-  She has had a respiratory infection recently but it is improving. Has some ongoing TAVERAS that is fairly stable. Patient has frequency and dysuria recently-has follow with primary care for frequent UTIs. Has not seen urology      Subjective  Dwaine Chacon denies exertional chest pain, shortness of breath, orthopnea, paroxysmal nocturnal dyspnea, syncope, presyncope, arrhythmia, edema and fatigue. The patient denies numbness or weakness to suggest cerebrovascular accident or transient ischemic attack. MIKE Gardner is PCP and follows labs.  Aldo Bi has the following history as recorded in [facility]:    Patient Active Problem List    Diagnosis Date Noted    Chronic diastolic congestive heart failure ([address] 2021    Morbid (severe) obesity due to excess calories ([address] 2020    Chronic nausea 2020    Chronic vomiting 2020    Change in bowel habits 2020    Family history of colon cancer 2020    Alternating constipation and diarrhea 2020    S/P cholecystectomy 2020    Chronic heartburn 2020    Gastroesophageal reflux disease without esophagitis 2018  Dysuria 08/06/2018    Vitamin D deficiency 08/06/2018    Urinary tract infection without hematuria 08/06/2018    Adhesive capsulitis of left shoulder 07/18/2018    Angina effort 01/23/2018    Coronary artery disease involving coronary bypass graft of native heart without angina pectoris 09/05/2017    Left ventricular dysfunction 09/05/2017    Abnormal nuclear cardiac imaging test     Lichen simplex chronicus 10/21/2015    Personal history of malignant neoplasm of cervix uteri 05/11/2015    Atrophic vaginitis 05/11/2015    History of pleural effusion 04/29/2015    Candidal intertrigo 02/03/2015    Weakness 02/03/2015    S/P CABG x 3 02/03/2015    TAVERAS (dyspnea on exertion) 12/01/2014    Type 2 diabetes mellitus with complication, with long-term current use of insulin ([address]     CAD (coronary artery disease) 07/30/2013    Hypertension 07/30/2013    Hyperlipidemia 07/30/2013    Heart attack ([address] 12/01/2011     Past Medical History:   Diagnosis Date    Abnormal Pap smear of cervix     Arthritis     CAD (coronary artery disease)     stent 2011 Mirna Dean; [facility] cardiology    Cervical cancer ([address]     Class 3 severe obesity due to excess calories without serious comorbidity with body mass index (BMI) of 40.0 to 44.9 in adult ([address] 7/28/2020    Diabetes mellitus ([address]     ERRONEOUS ENCOUNTER--DISREGARD 09/15/2015    GERD (gastroesophageal reflux disease)     Headache(784.0)     Heart attack ([address] 12/2011    Hyperlipidemia     Hypertension     Lichen simplex chronicus 10/2015    Osteopenia     Pneumonia     Type II or unspecified type diabetes mellitus without mention of complication, not stated as uncontrolled      Past Surgical History:   Procedure Laterality Date    BREAST BIOPSY  6/8/2011    US guided core needle, right, benign    CARDIAC CATHETERIZATION  12/22/14  JDT    EF 50%    CARDIAC CATHETERIZATION  06/05/2017        G FLUOROSCOPIC GUIDANCE NEEDLE PLACEMENT ADD ON Left 7/19/2018    CORTICOSTEROID INJECTION performed by Gagan Fitch MD at [address] [address]    COLONOSCOPY  approx 2005    Dr. Abbi Redding per pt recall \" Normal\"   [address] WITH STENT PLACEMENT  12/2011    S[facility] to First diag    CORONARY ARTERY BYPASS GRAFT      FINGER TRIGGER RELEASE Left 11/8/2019    LEFT INDEX AND LONG TRIGGER FINGER RELEASAE performed by Danny Johnson MD at [address]    HYSTERECTOMY      OTHER SURGICAL HISTORY      fibrinolysis    IA CABG, ARTERY-VEIN, THREE  12/23/2014    Coronary Artery Bypass, 3    IA MANIPULATN SHLDR JT W ANESTHESIA Left 7/19/2018    SHOULDER MANIPULATION UNDER ANESTHESIA performed by Gagan Fitch MD at [address]      UPPER GASTROINTESTINAL ENDOSCOPY  approx 2005    per pt recall \" Normal\"    UPPER GASTROINTESTINAL ENDOSCOPY N/A 6/19/2020    Dr Giancarlo Miranda exam, neg EoE    VULVAR/PERINEAL BIOPSY  10/07/2015    Dr. Barfield Eth    VULVAR/PERINEAL BIOPSY N/A 7/2/2021    VULVAR BIOPSY performed by Gh[facility]enee [facility] at [facility] OR     Family History   Problem Relation Age of Onset    Diabetes Father     Cancer Father     High Blood Pressure Father     Heart Attack Father     Colon Cancer Father     Diabetes Mother     Breast Cancer Mother     Heart Failure Mother         CHF, MVP    Liver Cancer Mother     Heart Failure Maternal Grandfather     Stroke Maternal Grandfather     Pancreatic Cancer Maternal Grandfather     Stomach Cancer Maternal Grandfather     Diabetes Brother     Colon Cancer Paternal Uncle     Rectal Cancer Other     Colon Polyps Neg Hx     Crohn's Disease Neg Hx     Irritable Bowel Syndrome Neg Hx     Liver Disease Neg Hx      Social History     Tobacco Use    Smoking status: Former Smoker     Packs/day: 2.00     Years: 15.00     Pack years: 30.00     Types: Cigarettes     Quit date: 7/2/2014     Years since quittin.4    Smokeless tobacco: Never Used   Substance Use Topics    Alcohol use: No      Current Outpatient Medications   Medication Sig Dispense Refill    vitamin D (ERGOCALCIFEROL) 1.25 MG (02490 UT) CAPS capsule TAKE 1 CAPSULE BY MOUTH ONCE WEEKLY  4 capsule 2    lovastatin (MEVACOR) 40 MG tablet TAKE 1 TABLET AT BEDTIME  30 tablet 4    spironolactone (ALDACTONE) 50 MG tablet TAKE 1 TABLET BY MOUTH DAILY  30 tablet 3    valsartan (DIOVAN) 80 MG tablet TAKE 1 TABLET BY MOUTH DAILY  30 tablet 5    fluconazole (DIFLUCAN) 150 MG tablet Take 1 tablet today then 1 tablet at the end of the antibiotics 2 tablet 0    Dulaglutide 0.75 MG/0.5ML SOPN Inject 0.75 mg into the skin once a week 12 pen 2    glipiZIDE (GLUCOTROL XL) 10 MG extended release tablet TAKE 1 TABLET BY MOUTH DAILY  30 tablet 3    ondansetron (ZOFRAN ODT) 4 MG disintegrating tablet Take 1 tablet by mouth daily as needed for Nausea or Vomiting 30 tablet 5    amitriptyline (ELAVIL) 10 MG tablet Take 1 tablet by mouth nightly 30 tablet 5    JARDIANCE 25 MG tablet Take 1 tablet by mouth once daily 90 tablet 0    tamsulosin (FLOMAX) 0.4 MG capsule TAKE 1 CAPSULE BY MOUTH DAILY  30 capsule 5    metFORMIN (GLUCOPHAGE-XR) 500 MG extended release tablet TAKE 1 TABLET BY MOUTH 2 TIMES DAILY. INDDICATIONS: DIABETES  60 tablet 3    metoprolol tartrate (LOPRESSOR) 25 MG tablet TAKE 1 TABLET BY MOUTH TWICE DAILY  60 tablet 5    isosorbide mononitrate (IMDUR) 30 MG extended release tablet Take 30 mg by mouth nightly Indications: High Blood Pressure Disorder      nitroGLYCERIN (NITROSTAT) 0.4 MG SL tablet Place 1 tablet under the tongue every 5 minutes as needed for Chest pain 25 tablet 3    nystatin (MYCOSTATIN) 826492 UNIT/GM powder APPLY 2 OR 3 TIMES DAILY. (Patient taking differently: Apply topically as needed APPLY 2 OR 3 TIMES DAILY.  ) 60 g 5    Diabetic Shoe MISC by Does not apply route 1 each 0    albuterol (PROVENTIL) (5 MG/ML) 0.5% nebulizer solution Take 0.5 mLs by nebulization every 6 hours as needed for Wheezing 120 each 0    docusate sodium (COLACE) 100 MG capsule Take 1 capsule by mouth daily as needed for Constipation 30 capsule 1    diclofenac sodium (VOLTAREN) 1 % GEL Apply 2 g topically 4 times daily (Patient taking differently: Apply 2 g topically 4 times daily as needed for Pain ) 2 Tube 1    furosemide (LASIX) 40 MG tablet TAKE 1 TABLET BY MOUTH DAILY 30 tablet 5    aspirin 81 MG tablet Take 81 mg by mouth daily      mometasone (ELOCON) 0.1 % ointment Apply topically to vagina 3 times per week (Patient taking differently: Apply topically as needed Apply topically to vagina 3 times per week) 1 Tube 3    Eflornithine HCl (VANIQA) 13.9 % CREA Apply 1 Dose topically 2 times daily (Patient taking differently: Apply 1 Dose topically 2 times daily as needed ) 45 g 2     No current facility-administered medications for this visit. Allergies: Codeine, Motrin [ibuprofen], Humalog [insulin lispro], and Ozempic (0.25 or 0.5 mg-dose) [semaglutide(0.25 or 0.5mg-dos)]    Review of Systems  Constitutional - no significant activity change, appetite change, or unexpected weight change. No fever, chills or diaphoresis. No fatigue. HEENT - no significant rhinorrhea or epistaxis. No tinnitus or significant hearing loss. Eyes - no sudden vision change or amaurosis. Respiratory - no significant wheezing, stridor, apnea or cough. + dyspnea on exertion no resting  shortness of breath. Cardiovascular - no exertional chest pain, orthopnea or PND. No sensation of arrhythmia or slow heart rate. No claudication or leg edema. Gastrointestinal - no abdominal swelling or pain. No blood in stool. No severe constipation, diarrhea, nausea, or vomiting. Genitourinary - no difficulty urinating, +dysuria, +frequency,   + urgency. No flank pain or hematuria. Musculoskeletal - no back pain, gait disturbance, or myalgia.    Skin - no color change or rash. No pallor. No new surgical incision. Neurologic - no speech difficulty, facial asymmetry or lateralizing weakness. No seizures, presyncope, syncope, or significant dizziness. Hematologic - no easy bruising or excessive bleeding. Psychiatric - no severe anxiety or insomnia. No confusion. All other review of systems are negative. Objective  Vital Signs - /68   Pulse 81   Ht 5' 2\" (1.575 m)   Wt 199 lb (90.3 kg)   LMP 01/01/1998 (Approximate)   SpO2 98%   BMI 36.40 kg/m²   General - [facility] is alert, cooperative, and pleasant. Well groomed. No acute distress. Body habitus is obese. HEENT - The head is normocephalic. No circumoral cyanosis. Dentition is normal.   EYES -  No Xanthelasma, no arcus senilis, no conjunctival hemorrhages or discharge. Neck - Supple, without increased jugular venous pressures. No carotid bruits. No mass. Respiratory - Lungs are clear bilaterally. No wheezes or rales. Normal effort without use of accessory muscles. Cardiovascular - Heart has regular rhythm and rate. No murmurs, rubs or gallops. + pedal pulses and no varicosities. Abdominal -  Soft, nontender, nondistended. Bowel sounds are intact. Extremities - No clubbing, cyanosis, or  edema. Musculoskeletal -  No clubbing . No Osler's nodes. Gait normal .  No kyphosis or scoliosis. Skin -  no statis ulcers or dermatitis. Neurological - No focal signs are identified. Oriented to person, place and time. Psychiatric -  Appropriate affect and mood. Assessment:     Diagnosis Orders   1. Coronary artery disease involving native coronary artery of native heart without angina pectoris     2. Essential hypertension     3. Chronic diastolic congestive heart failure ([facility]     4.  Mixed hyperlipidemia       Data:  BP Readings from Last 3 Encounters:   12/08/21 100/68   11/29/21 112/62   11/01/21 126/70    Pulse Readings from Last 3 Encounters:   12/08/21 81   11/29/21 87 11/01/21 80        Wt Readings from Last 3 Encounters:   12/08/21 199 lb (90.3 kg)   11/29/21 196 lb (88.9 kg)   11/01/21 205 lb (93 kg)     Blood pressure and heart rate well controlled. Medical management includes ARB, beta-blocker, long-acting nitrate, statin and diuretic. Also on low-dose aspirin   Reviewed PCP recent notes   Reviewed recent labs  Last hemoglobin A1c 8.3, down from 9.6  Discussed the importance of good diabetes control. Encouraged her to do some regular activity    Recurrent UTIs with dysuria again. Encouraged her to follow-up with primary care and probably will need urology referral   States taking medications as prescribed  Stable cardiovascular status. No evidence of overt heart failure, angina or dysrhythmia. 30 minutes were spent preparing, reviewing and seeing patient. All questions answered    Plan  Keep working on getting your HgbA1c closer to 7  Follow up in 6 mos   Call with any questions or concerns  Follow up with MIKE Garcia for non cardiac problems and labs   Report any new problems  Cardiovascular Fitness-Exercise as tolerated. Strive for 30 minutes of exercise most days of the week. Cardiac / Healthy Diet  Continue current medications as directed  Continue plan of treatment  It is always recommended that you bring your medications bottles with you to each visit - this is for your safety! MIKE May    EMR dragon/transcription disclaimer: Much of this encounter note is electronic transcription/translation of spoken language to printed tach. Electronic translation of spoken language may be erroneous, or at times, nonsensical words or phrases may be inadvertently transcribed.  Although, I have reviewed the note for such errors, some may still exist.

## 2021-12-14 ENCOUNTER — TELEPHONE (OUTPATIENT)
Dept: PULMONOLOGY | Facility: CLINIC | Age: 56
End: 2021-12-14

## 2021-12-14 ENCOUNTER — OFFICE VISIT (OUTPATIENT)
Dept: PULMONOLOGY | Facility: CLINIC | Age: 56
End: 2021-12-14

## 2021-12-14 VITALS
DIASTOLIC BLOOD PRESSURE: 78 MMHG | BODY MASS INDEX: 37 KG/M2 | OXYGEN SATURATION: 97 % | HEIGHT: 61 IN | SYSTOLIC BLOOD PRESSURE: 138 MMHG | WEIGHT: 196 LBS | HEART RATE: 80 BPM

## 2021-12-14 DIAGNOSIS — R06.02 SHORTNESS OF BREATH: ICD-10-CM

## 2021-12-14 DIAGNOSIS — E66.9 OBESITY (BMI 30-39.9): ICD-10-CM

## 2021-12-14 DIAGNOSIS — J98.4 RESTRICTIVE LUNG DISEASE: Primary | ICD-10-CM

## 2021-12-14 DIAGNOSIS — J98.4 SCARRING OF LUNG: ICD-10-CM

## 2021-12-14 DIAGNOSIS — Z87.891 PERSONAL HISTORY OF NICOTINE DEPENDENCE: ICD-10-CM

## 2021-12-14 PROCEDURE — 99214 OFFICE O/P EST MOD 30 MIN: CPT | Performed by: INTERNAL MEDICINE

## 2021-12-14 NOTE — ASSESSMENT & PLAN NOTE
Diet and exercise are encouraged and she will follow up with her primary healthcare provider regarding her elevated BMI otherwise.

## 2021-12-14 NOTE — TELEPHONE ENCOUNTER
I have scheduled the patient come back to see me in June for her pulmonary functions.  I thought her screening CT from this year was performed in June but actually it was performed in August.  I would have to wait until late August of next year to get the screening CT done so it would be at least a year from her last scan.  If it is fine with her I would just tell her to keep the appointment in June for the pulmonary functions and I will schedule the chest CT for late August and call her with the results.  Just let me know what she decides.

## 2021-12-14 NOTE — PROGRESS NOTES
Chief Complaint  restrictive lung disease    Subjective    History of Present Illness {CC  Problem List  Visit Diagnosis   Encounters  Notes  Medications  Labs  Result Review Imaging  Media     Gwendolyn Delgadillo presents to [facility] for restrictive lung disease and shortness of breath.    History of Present Illness   The patient does states she has issues with shortness of breath with exertion but occasionally at rest.  She does have a history of ischemic heart disease and is followed by cardiology at [facility].  Her last 2D echo was in March of this year and showed a good ejection fraction.  She appeared to have a mild increase in pulmonary artery systolic pressure.  I did inquire about issues she may have with daytime somnolence or snoring or witnessed apneas and she denies any such problems.  I told her we get a follow-up pulmonary function study when she returns in 6 months but if she would like to do it sooner at the hospital we could do so but she prefers to wait.  Her last screening chest CT which did not show any specific lesions we will repeat the scan this summer.  The scan was on August 27 so we will have to do it on or after August 27 of next year.  He has had her COVID-19 vaccine in the form of the Pfizer vaccine but has not yet had a booster.  She has not had the flu shot and I recommended she have it administered.  We informed her we did not have it available here at the office but she can get it through her primary healthcare provider or one of the local pharmacies.  Prior to Admission medications    Medication Sig Start Date End Date Taking? Authorizing Provider   albuterol (2.5 MG/3ML) 0.083% nebulizer solution 3 mL, albuterol (5 MG/ML) 0.5% nebulizer solution 0.5 mL Inhale.   Yes ProviderSherry MD   aspirin 81 MG EC tablet Take 81 mg by mouth Daily.   Yes Sherry Maria MD   conjugated estrogens (PREMARIN) 0.625 MG/GM vaginal cream  Insert 0.5 g into the vagina 2 (Two) Times a Week.   Yes Sherry Maria MD   docusate sodium (COLACE) 100 MG capsule Take 100 mg by mouth 2 (Two) Times a Day As Needed for Constipation.   Yes Sherry Maria MD   Eflornithine HCl 13.9 % cream Apply  topically 2 (Two) Times a Day.   Yes Sherry Maria MD   Empagliflozin (JARDIANCE) 10 MG tablet Take 10 mg by mouth Daily.   Yes Sherry Maria MD   ergocalciferol (ERGOCALCIFEROL) 1.25 MG (32899 UT) capsule TAKE 1 CAPSULE BY MOUTH ONCE WEEKLY 3/18/21  Yes Sherry Maria MD   estradiol (VAGIFEM) 10 MCG tablet vaginal tablet Insert 1 tablet into the vagina 2 (Two) Times a Week.   Yes Sherry Maria MD   furosemide (LASIX) 20 MG tablet Take 40 mg by mouth Daily As Needed.   Yes Sherry Maria MD   glipizide (GLUCOTROL XL) 10 MG 24 hr tablet Take 10 mg by mouth. 4/29/21  Yes Provider, Historical, MD   isosorbide mononitrate (IMDUR) 30 MG 24 hr tablet Take 30 mg by mouth Daily.   Yes Sherry Maria MD   Lactobacillus (PROBIATA PO) Take  by mouth Daily.   Yes Sherry Maria MD   LORazepam (ATIVAN) 0.5 MG tablet Take 0.5 mg by mouth Every 8 (Eight) Hours As Needed for Anxiety.   Yes Sherry Maria MD   lovastatin (MEVACOR) 40 MG tablet Take 40 mg by mouth Every Night.   Yes Sherry Maria MD   metFORMIN (GLUCOPHAGE) 1000 MG tablet Take 500 mg by mouth 2 (Two) Times a Day With Meals.   Yes Sherry Maria MD   metoprolol tartrate (LOPRESSOR) 25 MG tablet Take 25 mg by mouth 2 (Two) Times a Day.   Yes Sherry Maria MD   mometasone (ELOCON) 0.1 % ointment Apply  topically 3 (Three) Times a Week.   Yes Sherry Maria MD   nitroglycerin (NITROSTAT) 0.4 MG SL tablet Place 0.4 mg under the tongue Every 5 (Five) Minutes As Needed for Chest Pain. Take no more than 3 doses in 15 minutes.   Yes Sherry Maria MD   nystatin (MYCOSTATIN) 153104 UNIT/GM powder Apply  topically 3 (Three)  "Times a Day.   Yes Sherry Maria MD   ondansetron (ZOFRAN) 8 MG tablet Take 8 mg by mouth Every 8 (Eight) Hours As Needed for Nausea or Vomiting.   Yes Sherry Maria MD   prochlorperazine (COMPAZINE) 10 MG tablet Take 10 mg by mouth Every 6 (Six) Hours As Needed for Nausea or Vomiting.   Yes Sherry Maria MD   spironolactone (ALDACTONE) 50 MG tablet Take 50 mg by mouth Daily.   Yes Sherry Maria MD   tamsulosin (FLOMAX) 0.4 MG capsule 24 hr capsule Take 0.4 mg by mouth. 20  Yes Sherry Maria MD   valsartan (DIOVAN) 80 MG tablet Take 80 mg by mouth Daily.   Yes Sherry Maria MD   Cholecalciferol (VITAMIN D) 2000 units tablet Take 2,000 Units by mouth Daily.  21  Sherry Maria MD   nystatin-triamcinolone (MYCOLOG II) 822496-8.1 UNIT/GM-% cream Apply  topically 2 (Two) Times a Day.  21  Sherry Maria MD       Social History     Socioeconomic History   • Marital status:    Tobacco Use   • Smoking status: Former Smoker     Packs/day: 2.50     Years: 30.00     Pack years: 75.00     Quit date: 12/15/2014     Years since quittin.0   • Smokeless tobacco: Never Used   • Tobacco comment: 87.5 pack-years    Vaping Use   • Vaping Use: Never used   Substance and Sexual Activity   • Alcohol use: No   • Drug use: No   • Sexual activity: Defer       Objective   Vital Signs:   /78   Pulse 80   Ht 154.9 cm (61\")   Wt 88.9 kg (196 lb)   SpO2 97% Comment: RA  BMI 37.03 kg/m²     Physical Exam  Vitals and nursing note reviewed.   Constitutional:       Appearance: She is obese.   HENT:      Head: Normocephalic.      Comments: She is wearing a mask.  Eyes:      Extraocular Movements: Extraocular movements intact.      Pupils: Pupils are equal, round, and reactive to light.   Cardiovascular:      Rate and Rhythm: Normal rate and regular rhythm.   Pulmonary:      Effort: Pulmonary effort is normal.      Breath sounds: Normal breath sounds. "   Musculoskeletal:         General: Normal range of motion.   Skin:     General: Skin is warm and dry.   Neurological:      General: No focal deficit present.      Mental Status: She is alert and oriented to person, place, and time.   Psychiatric:         Mood and Affect: Mood normal.         Behavior: Behavior normal.        Result Review :{ Labs  Result Review  Imaging  Med Tab  Media :    PFT Values        Some values may be hidden. Unless noted otherwise, only the newest values recorded on each date are displayed.         Old Values PFT Results 6/15/21   No data to display.      Pre Drug PFT Results 6/15/21   FVC 66   FEV1 67   FEF 25-75% 67   FEV1/FVC 83.14      Post Drug PFT Results 6/15/21   No data to display.      Other Tests PFT Results 6/15/21   TLC 66   RV 69   DLCO 85   D/VAsb 147               Results for orders placed in visit on 06/15/21    Full Pulmonary Function Test Without Bronchodilator    Narrative  Full Pulmonary Function Test Without Bronchodilator  Performed by: Freda Cummings, RRT  Authorized by: Dimitri Felix MD    Pre Drug % Predicted  FVC: 66%  FEV1: 67%  FEF 25-75%: 67%  FEV1/FVC: 83.14%  T%  RV: 69%  DLCO: 85%  D/VAsb: 147%    Interpretation  Spirometry  Spirometry shows moderate restriction. There is reduced midflow suggesting small airway/airflow obstruction.  Lung Volume Measurements  Measurements show reduced lung volumes consistent with restriction.  Diffusion Capacity  The patient's diffusion capacity is normal.  Diffusion capacity is normal when corrected for alveolar volume.  Overall comments: The patient's baseline spirometry is consistent with a moderate restrictive ventilatory defect with coexisting small airways disease.  Lung volumes confirm a moderate restrictive ventilatory defect.  There is also a decrease in inspiratory capacity.  Diffusion capacity is within normal limits and when corrected for alveolar volume is actually supranormal.  When  compared to studies done here in the office on July 31 of 2019, she has had improvement in her FVC, FEV1, total lung capacity, and diffusion capacity particularly when corrected for alveolar volume.      Results for orders placed in visit on 07/31/19    Pulmonary Function Test    Rest/Exercise Pulse Ox Values        Some values may be hidden. Unless noted otherwise, only the newest values recorded on each date are displayed.         Rest/Exercise Pulse Ox Results 8/10/20   Rest room air SAT % 97%   Exercise room air SAT % 97%                      My interpretation of imaging:    CT Chest Low Dose Cancer Screening WO (08/27/2021 00:00)        Assessment and Plan {CC Problem List  Visit Diagnosis  ROS  Review (Popup)  Health Maintenance  Quality  BestPractice  Medications  SmartSets  SnapShot Encounters  Media      Diagnoses and all orders for this visit:    1. Restrictive lung disease (Primary)  Assessment & Plan:  This likely relates to combination of her weight and lung scarring.      2. Scarring of lung  Assessment & Plan:  This was noted on her recent chest CT and was stable.      3. Shortness of breath  Assessment & Plan:  She may continue her albuterol neb treatments as needed.      4. Personal history of nicotine dependence  Assessment & Plan:  She quit smoking in 2014.  She would be a candidate for a follow-up screening CT on or after August 27 of next year.      5. Obesity (BMI 30-39.9)  Assessment & Plan:  Diet and exercise are encouraged and she will follow up with her primary healthcare provider regarding her elevated BMI otherwise.        Patient's Body mass index is 37.03 kg/m². indicating that she is morbidly obese (BMI > 40 or > 35 with obesity - related health condition). Obesity-related health conditions include the following: coronary heart disease. Obesity is unchanged.  Diet and exercise are encouraged and she will follow up with her primary healthcare provider regarding her elevated  BMI otherwise.      Dimitri Felix MD  12/14/2021  16:35 CST    Follow Up {Instructions Charge Capture  Follow-up Communications   Return in about 27 weeks (around 6/21/2022) for To see me specifically, Complete PFT.    Patient was given instructions and counseling regarding her condition or for health maintenance advice. Please see specific information pulled into the AVS if appropriate.

## 2021-12-14 NOTE — ASSESSMENT & PLAN NOTE
[facility]  pregnancy and late in this pregnancy    History of blood transfusion     Hypertension     with pregnancy    MRSA infection     Pneumonia 02/22/2020    Postpartum depression     Prolonged emergence from general anesthesia     after gastric bypass She quit smoking in 2014.  She would be a candidate for a follow-up screening CT on or after August 27 of next year.   Prenatal Vit-Fe Fumarate-FA (PRENATAL BROOKLYN Fisher CNP  [facility], [address] [place] [address] Rd  [address] [id number] [phone]    BROOKLYN Oscar CNP, APRN - CNP  08/13/21 [id number]

## 2021-12-14 NOTE — PATIENT INSTRUCTIONS
The patient is still having some issues with shortness of breath sometimes with exertion but sometimes at rest.  I told her we will get a follow-up CT scan in mid June and then see her back shortly thereafter with pulmonary functions.  She will continue her current regimen otherwise.

## 2021-12-23 DIAGNOSIS — E11.8 TYPE 2 DIABETES MELLITUS WITH COMPLICATION, WITH LONG-TERM CURRENT USE OF INSULIN (HCC): ICD-10-CM

## 2021-12-23 DIAGNOSIS — I10 ESSENTIAL HYPERTENSION: ICD-10-CM

## 2021-12-23 DIAGNOSIS — Z79.4 TYPE 2 DIABETES MELLITUS WITH COMPLICATION, WITH LONG-TERM CURRENT USE OF INSULIN (HCC): ICD-10-CM

## 2021-12-23 RX ORDER — OMEPRAZOLE 20 MG/1
CAPSULE, DELAYED RELEASE ORAL
Qty: 90 CAPSULE | Refills: 1 | OUTPATIENT
Start: 2021-12-23

## 2021-12-23 RX ORDER — METFORMIN HYDROCHLORIDE 500 MG/1
TABLET, EXTENDED RELEASE ORAL
Qty: 60 TABLET | Refills: 5 | Status: SHIPPED | OUTPATIENT
Start: 2021-12-23 | End: 2022-04-26 | Stop reason: SDUPTHER

## 2021-12-23 RX ORDER — EMPAGLIFLOZIN 25 MG/1
TABLET, FILM COATED ORAL
Qty: 90 TABLET | Refills: 1 | Status: SHIPPED | OUTPATIENT
Start: 2021-12-23 | End: 2022-02-24 | Stop reason: SDUPTHER

## 2021-12-23 RX ORDER — ERGOCALCIFEROL 1.25 MG/1
CAPSULE ORAL
Qty: 4 CAPSULE | Refills: 2 | Status: SHIPPED | OUTPATIENT
Start: 2021-12-23 | End: 2022-04-26 | Stop reason: SDUPTHER

## 2021-12-23 RX ORDER — GLIPIZIDE 10 MG/1
10 TABLET, FILM COATED, EXTENDED RELEASE ORAL DAILY
Qty: 30 TABLET | Refills: 3 | Status: SHIPPED | OUTPATIENT
Start: 2021-12-23 | End: 2022-04-26 | Stop reason: SDUPTHER

## 2021-12-23 NOTE — TELEPHONE ENCOUNTER
Tereza Ghotra called to request a refill on her medication.       Last office visit : 11/29/2021   Next office visit : 1/25/2022     Requested Prescriptions     Signed Prescriptions Disp Refills    JARDIANCE 25 MG tablet 90 tablet 1     Sig: Take 1 tablet by mouth once daily     Authorizing Provider: Cheryl Siegel     Ordering User: Haris Fernandez MA

## 2021-12-23 NOTE — TELEPHONE ENCOUNTER
Kristal Cordon called to request a refill on her medication. Last office visit : 11/29/2021   Next office visit : 12/23/2021     Requested Prescriptions     Signed Prescriptions Disp Refills    metoprolol tartrate (LOPRESSOR) 25 MG tablet 60 tablet 5     Sig: TAKE 1 TABLET BY MOUTH TWICE DAILY     Authorizing Provider: Nirali Navarrete User: Rosalie Bowling    metFORMIN (GLUCOPHAGE-XR) 500 MG extended release tablet 60 tablet 5     Sig: TAKE 1 TABLET BY MOUTH 2 TIMES DAILY.  INDDICATIONS: DIABETES     Authorizing Provider: Nirali Nichols     Ordering User: oRsalie Bowling     Refused Prescriptions Disp Refills    omeprazole (PRILOSEC) 20 MG delayed release capsule [Pharmacy Med Name: OMEPRAZOLE 20MG CAPSULE DR] 90 capsule 1     Sig: TAKE 1 CAPSULE BY MOUTH EVERY MORNING (BEFORE BREAKFAST)     Refused By: Rosalie Bowling     Reason for Refusal: Medication discontinued            Nida Mclain MA EOAE (evoked otoacoustic emission)

## 2021-12-23 NOTE — TELEPHONE ENCOUNTER
Rosy Ricks called to request a refill on her medication.       Last office visit : 11/29/2021   Next office visit : 12/23/2021     Requested Prescriptions     Signed Prescriptions Disp Refills    glipiZIDE (GLUCOTROL XL) 10 MG extended release tablet 30 tablet 3     Sig: TAKE 1 TABLET BY MOUTH DAILY     Authorizing Provider: Clarence Navarrete User: Gemini Brown vitamin D (ERGOCALCIFEROL) 1.25 MG (46992 UT) CAPS capsule 4 capsule 2     Sig: TAKE 1 CAPSULE BY MOUTH ONCE WEEKLY     Authorizing Provider: Clarence Navarrete User: Margaret Berg MA

## 2022-01-09 ENCOUNTER — HOSPITAL ENCOUNTER (EMERGENCY)
Age: 57
Discharge: HOME | End: 2022-01-09
Payer: COMMERCIAL

## 2022-01-09 VITALS
RESPIRATION RATE: 18 BRPM | SYSTOLIC BLOOD PRESSURE: 139 MMHG | TEMPERATURE: 98 F | HEART RATE: 70 BPM | BODY MASS INDEX: 35.7 KG/M2 | DIASTOLIC BLOOD PRESSURE: 62 MMHG | HEIGHT: 62 IN | OXYGEN SATURATION: 98 % | WEIGHT: 194 LBS

## 2022-01-09 DIAGNOSIS — N30.00 ACUTE CYSTITIS WITHOUT HEMATURIA: Primary | ICD-10-CM

## 2022-01-09 LAB
BACTERIA: ABNORMAL /HPF
BILIRUBIN URINE: NEGATIVE
BLOOD, URINE: NEGATIVE
CLARITY: ABNORMAL
COLOR: YELLOW
CRYSTALS, UA: ABNORMAL /HPF
EPITHELIAL CELLS, UA: 2 /HPF (ref 0–5)
GLUCOSE URINE: =>1000 MG/DL
HYALINE CASTS: 2 /HPF (ref 0–8)
KETONES, URINE: NEGATIVE MG/DL
LEUKOCYTE ESTERASE, URINE: ABNORMAL
NITRITE, URINE: POSITIVE
PH UA: 5 (ref 5–8)
PROTEIN UA: NEGATIVE MG/DL
RBC UA: 2 /HPF (ref 0–4)
SPECIFIC GRAVITY UA: 1.03 (ref 1–1.03)
UROBILINOGEN, URINE: 0.2 E.U./DL
WBC UA: 128 /HPF (ref 0–5)

## 2022-01-09 PROCEDURE — 87186 SC STD MICRODIL/AGAR DIL: CPT

## 2022-01-09 PROCEDURE — 81001 URINALYSIS AUTO W/SCOPE: CPT

## 2022-01-09 PROCEDURE — 87077 CULTURE AEROBIC IDENTIFY: CPT

## 2022-01-09 PROCEDURE — 87086 URINE CULTURE/COLONY COUNT: CPT

## 2022-01-09 PROCEDURE — 99282 EMERGENCY DEPT VISIT SF MDM: CPT

## 2022-01-09 RX ORDER — CEPHALEXIN 500 MG/1
500 CAPSULE ORAL 2 TIMES DAILY
Qty: 14 CAPSULE | Refills: 0 | Status: SHIPPED | OUTPATIENT
Start: 2022-01-09 | End: 2022-01-16

## 2022-01-09 ASSESSMENT — PAIN DESCRIPTION - LOCATION: LOCATION: ABDOMEN

## 2022-01-09 ASSESSMENT — ENCOUNTER SYMPTOMS
RHINORRHEA: 0
EYE DISCHARGE: 0
COUGH: 0
NAUSEA: 0
APNEA: 0
ABDOMINAL DISTENTION: 0
BACK PAIN: 0
SORE THROAT: 0
PHOTOPHOBIA: 0
SHORTNESS OF BREATH: 0
ABDOMINAL PAIN: 0
EYE PAIN: 0
COLOR CHANGE: 0

## 2022-01-09 ASSESSMENT — PAIN DESCRIPTION - PAIN TYPE: TYPE: ACUTE PAIN

## 2022-01-09 ASSESSMENT — PAIN DESCRIPTION - ORIENTATION: ORIENTATION: LOWER

## 2022-01-09 ASSESSMENT — PAIN SCALES - GENERAL: PAINLEVEL_OUTOF10: 7

## 2022-01-09 ASSESSMENT — PAIN DESCRIPTION - DESCRIPTORS: DESCRIPTORS: BURNING

## 2022-01-09 NOTE — ED PROVIDER NOTES
[facility] EMERGENCY DEPT  eMERGENCYdEPARTMENT eNCOUnter      Pt Name: Florin Ornelas  MRN: [id number]  [place] 1965  Date of evaluation: 1/9/2022  Provider:LORI Godwin    CHIEF COMPLAINT       Chief Complaint   Patient presents with    Abdominal Pain     painful urination         HISTORY OF PRESENT ILLNESS  (Location/Symptom, Timing/Onset, Context/Setting, Quality, Duration, Modifying Factors, Severity.)   Florin Ornelas is a 60 y.o. female who presents to the emergency department with complaints of frequency bladder fullness and dysuria at the end of urinating described as burning concern for UTI she avgs 2 a month. Her PCP aware and told her if this continues she will need urology referral. The patient denies bleeding or flank pain no nausea or chills. She tells this is 2-3 days into symptoms for this episode. Denies trouble voiding or feeling she doesn't fully empty. HPI    Nursing Notes were reviewed and I agree. REVIEW OF SYSTEMS    (2-9 systems for level 4, 10 or more for level 5)     Review of Systems   Constitutional: Negative for activity change, appetite change, chills and fever. HENT: Negative for congestion, postnasal drip, rhinorrhea and sore throat. Eyes: Negative for photophobia, pain, discharge and visual disturbance. Respiratory: Negative for apnea, cough and shortness of breath. Cardiovascular: Negative for chest pain and leg swelling. Gastrointestinal: Negative for abdominal distention, abdominal pain and nausea. Genitourinary: Positive for dysuria, frequency and pelvic pain. Negative for vaginal bleeding. Musculoskeletal: Negative for arthralgias, back pain, joint swelling, neck pain and neck stiffness. Skin: Negative for color change and rash. Neurological: Negative for dizziness, syncope, facial asymmetry and headaches. Hematological: Negative for adenopathy. Does not bruise/bleed easily.    Psychiatric/Behavioral: Negative for agitation, behavioral problems and confusion. Except as noted above the remainder of the review of systems was reviewed and negative.        PAST MEDICAL HISTORY     Past Medical History:   Diagnosis Date    Abnormal Pap smear of cervix     Arthritis     CAD (coronary artery disease)     stent 2011 [facility]    [facility]     Class 3 severe obesity due to excess calories without serious comorbidity with body mass index (BMI) of 40.0 to 44.9 in adult ([facility] 7/28/2020    Diabetes mellitus ([facility]     ERRONEOUS ENCOUNTER--DISREGARD 09/15/2015    GERD (gastroesophageal reflux disease)     Headache(784.0)     Heart attack ([facility] 12/2011    Hyperlipidemia     Hypertension     Lichen simplex chronicus 10/2015    Osteopenia     Pneumonia     Type II or unspecified type diabetes mellitus without mention of complication, not stated as uncontrolled          SURGICAL HISTORY       Past Surgical History:   Procedure Laterality Date    BREAST BIOPSY  6/8/2011    US guided core needle, right, benign    CARDIAC CATHETERIZATION  12/22/14  JDT    EF 50%    CARDIAC CATHETERIZATION  06/05/2017        CHG FLUOROSCOPIC GUIDANCE NEEDLE PLACEMENT ADD ON Left 7/19/2018    CORTICOSTEROID INJECTION performed by Ana M Talamantes MD at [address] [place] [address]  2006    COLONOSCOPY  approx 2005    Dr. Miguel Treviño per pt recall \" Normal\"   [address] WITH STENT PLACEMENT  12/2011    SABA to [address] RELEASE Left 11/8/2019    LEFT INDEX AND LONG TRIGGER FINGER RELEASAE performed by David Hong MD at [facility]    HYSTERECTOMY      OTHER SURGICAL HISTORY      fibrinolysis    NH CABG, ARTERY-VEIN, THREE  12/23/2014    Coronary Artery Bypass, 3    NH NEAL MCGREGOR JT W ANESTHESIA Left 7/19/2018    SHOULDER MANIPULATION UNDER ANESTHESIA performed by Ana M Talamatnes MD at [facility]  UPPER GASTROINTESTINAL ENDOSCOPY  approx 2005    per pt recall \" Normal\"    UPPER GASTROINTESTINAL ENDOSCOPY N/A 6/19/2020    Dr Delmy Baca exam, neg EoE    VULVAR/PERINEAL BIOPSY  10/07/2015    Dr. Vitor Gonzalez N/A 7/2/2021    VULVAR BIOPSY performed by Adriel John MD at [address] [place] [address]       Discharge Medication List as of 1/9/2022  5:10 PM      CONTINUE these medications which have NOT CHANGED    Details   metoprolol tartrate (LOPRESSOR) 25 MG tablet TAKE 1 TABLET BY MOUTH TWICE DAILY, Disp-60 tablet, R-5Normal      metFORMIN (GLUCOPHAGE-XR) 500 MG extended release tablet TAKE 1 TABLET BY MOUTH 2 TIMES DAILY.  INDDICATIONS: DIABETES, Disp-60 tablet, R-5Normal      glipiZIDE (GLUCOTROL XL) 10 MG extended release tablet TAKE 1 TABLET BY MOUTH DAILY, Disp-30 tablet, R-3Normal      vitamin D (ERGOCALCIFEROL) 1.25 MG (71388 UT) CAPS capsule TAKE 1 CAPSULE BY MOUTH ONCE WEEKLY, Disp-4 capsule, R-2Normal      JARDIANCE 25 MG tablet Take 1 tablet by mouth once daily, Disp-90 tablet, R-1Normal      lovastatin (MEVACOR) 40 MG tablet TAKE 1 TABLET AT BEDTIME , Disp-30 tablet, R-4Normal      spironolactone (ALDACTONE) 50 MG tablet TAKE 1 TABLET BY MOUTH DAILY , Disp-30 tablet, R-3Normal      valsartan (DIOVAN) 80 MG tablet TAKE 1 TABLET BY MOUTH DAILY , Disp-30 tablet, R-5Normal      fluconazole (DIFLUCAN) 150 MG tablet Take 1 tablet today then 1 tablet at the end of the antibiotics, Disp-2 tablet, R-0Normal      Dulaglutide 0.75 MG/0.5ML SOPN Inject 0.75 mg into the skin once a week, Disp-12 pen, R-2Normal      ondansetron (ZOFRAN ODT) 4 MG disintegrating tablet Take 1 tablet by mouth daily as needed for Nausea or Vomiting, Disp-30 tablet, R-5Normal      amitriptyline (ELAVIL) 10 MG tablet Take 1 tablet by mouth nightly, Disp-30 tablet, R-5Normal      tamsulosin (FLOMAX) 0.4 MG capsule TAKE 1 CAPSULE BY MOUTH DAILY , Disp-30 capsule, R-5Normal isosorbide mononitrate (IMDUR) 30 MG extended release tablet Take 30 mg by mouth nightly Indications: High Blood Pressure DisorderHistorical Med      nitroGLYCERIN (NITROSTAT) 0.4 MG SL tablet Place 1 tablet under the tongue every 5 minutes as needed for Chest pain, Disp-25 tablet, R-3Normal      nystatin (MYCOSTATIN) 024032 UNIT/GM powder APPLY 2 OR 3 TIMES DAILY. , Disp-60 g, R-5, Normal      Diabetic Shoe MISC Starting Thu 11/12/2020, Disp-1 each,R-0, Print      albuterol (PROVENTIL) (5 MG/ML) 0.5% nebulizer solution Take 0.5 mLs by nebulization every 6 hours as needed for Wheezing, Disp-120 each,R-0Normal      docusate sodium (COLACE) 100 MG capsule Take 1 capsule by mouth daily as needed for Constipation, Disp-30 capsule,R-1Normal      diclofenac sodium (VOLTAREN) 1 % GEL Apply 2 g topically 4 times daily, Topical, 4 TIMES DAILY Starting Tue 3/24/2020, Disp-2 Tube, R-1, Normal      furosemide (LASIX) 40 MG tablet TAKE 1 TABLET BY MOUTH DAILY, Disp-30 tablet, R-5Normal      aspirin 81 MG tablet Take 81 mg by mouth dailyHistorical Med      mometasone (ELOCON) 0.1 % ointment Apply topically to vagina 3 times per week, Disp-1 Tube, R-3, Normal      Eflornithine HCl (VANIQA) 13.9 % CREA Apply 1 Dose topically 2 times daily, Disp-45 g, R-2             ALLERGIES     Codeine, Motrin [ibuprofen], Humalog [insulin lispro], and Ozempic (0.25 or 0.5 mg-dose) [semaglutide(0.25 or 0.5mg-dos)]    FAMILY HISTORY       Family History   Problem Relation Age of Onset    Diabetes Father     Cancer Father     High Blood Pressure Father     Heart Attack Father     Colon Cancer Father     Diabetes Mother     Breast Cancer Mother     Heart Failure Mother         CHF, MVP    Liver Cancer Mother     Heart Failure Maternal Grandfather     Stroke Maternal Grandfather     Pancreatic Cancer Maternal Grandfather     Stomach Cancer Maternal Grandfather     Diabetes Brother     Colon Cancer Paternal Uncle     Rectal Cancer Other     Colon Polyps Neg Hx     Crohn's Disease Neg Hx     Irritable Bowel Syndrome Neg Hx     Liver Disease Neg Hx           SOCIAL HISTORY       Social History     Socioeconomic History    Marital status:      Spouse name: Not on file    Number of children: Not on file    Years of education: Not on file    Highest education level: Not on file   Occupational History    Not on file   Tobacco Use    Smoking status: Former Smoker     Packs/day: 2.00     Years: 15.00     Pack years: 30.00     Types: Cigarettes     Quit date: 2014     Years since quittin.5    Smokeless tobacco: Never Used   Vaping Use    Vaping Use: Never used   Substance and Sexual Activity    Alcohol use: No    Drug use: No    Sexual activity: Not Currently   Other Topics Concern    Not on file   Social History Narrative    Not on file     Social Determinants of Health     Financial Resource Strain: High Risk    Difficulty of Paying Living Expenses: Very hard   Food Insecurity: Food Insecurity Present    Worried About Running Out of Food in the Last Year: Often true    Grant of Food in the Last Year: Often true   Transportation Needs:     Lack of Transportation (Medical): Not on file    Lack of Transportation (Non-Medical):  Not on file   Physical Activity:     Days of Exercise per Week: Not on file    Minutes of Exercise per Session: Not on file   Stress:     Feeling of Stress : Not on file   Social Connections:     Frequency of Communication with Friends and Family: Not on file    Frequency of Social Gatherings with Friends and Family: Not on file    Attends [employer] Services: Not on file    Active Member of Clubs or Organizations: Not on file    Attends Club or Organization Meetings: Not on file    Marital Status: Not on file   Intimate Partner Violence:     Fear of Current or Ex-Partner: Not on file    Emotionally Abused: Not on file    Physically Abused: Not on file    Sexually Abused: Not on file   Housing Stability:     Unable to Pay for Housing in the Last Year: Not on file    Number of Places Lived in the Last Year: Not on file    Unstable Housing in the Last Year: Not on file       SCREENINGS           PHYSICAL EXAM    (up to 7 forlevel 4, 8 or more for level 5)     ED Triage Vitals [01/09/22 1509]   BP Temp Temp Source Pulse Resp SpO2 Height Weight   139/62 98 °F (36.7 °C) Oral 70 18 98 % 5' 2\" (1.575 m) 194 lb (88 kg)       Physical Exam  Vitals and nursing note reviewed. Constitutional:       General: She is not in acute distress. Appearance: She is well-developed. She is not diaphoretic. HENT:      Head: Normocephalic and atraumatic. Right Ear: External ear normal.      Left Ear: External ear normal.      Mouth/Throat:      Pharynx: No oropharyngeal exudate. Eyes:      General:         Right eye: No discharge. Left eye: No discharge. Pupils: Pupils are equal, round, and reactive to light. Neck:      Thyroid: No thyromegaly. Cardiovascular:      Rate and Rhythm: Normal rate and regular rhythm. Heart sounds: Normal heart sounds. No murmur heard. No friction rub. Pulmonary:      Effort: Pulmonary effort is normal. No respiratory distress. Breath sounds: Normal breath sounds. No stridor. No wheezing. Abdominal:      General: Bowel sounds are normal. There is no distension. Palpations: Abdomen is soft. Tenderness: There is no abdominal tenderness. Musculoskeletal:         General: Normal range of motion. Cervical back: Normal range of motion and neck supple. Skin:     General: Skin is warm and dry. Capillary Refill: Capillary refill takes less than 2 seconds. Findings: No rash. Neurological:      Mental Status: She is alert and oriented to person, place, and time. Cranial Nerves: No cranial nerve deficit. Sensory: No sensory deficit.       Coordination: Coordination normal.   Psychiatric:         Behavior: Behavior normal.         Thought Content: Thought content normal.           DIAGNOSTIC RESULTS     RADIOLOGY:   Non-plain film images such as CT, Ultrasound and MRI are read by the radiologist. Plain radiographic images are visualized and preliminarilyinterpreted by No att. providers found with the below findings:      Interpretation per the Radiologist below, if available at the time of this note:    No orders to display       LABS:  Labs Reviewed   URINE RT REFLEX TO CULTURE - Abnormal; Notable for the following components:       Result Value    Clarity, UA CLOUDY (*)     Nitrite, Urine POSITIVE (*)     Leukocyte Esterase, Urine SMALL (*)     All other components within normal limits   MICROSCOPIC URINALYSIS - Abnormal; Notable for the following components:    Bacteria, UA 4+ (*)     Crystals, UA NEG (*)     WBC,  (*)     All other components within normal limits   CULTURE, URINE       All other labs were within normal range or notreturned as of this dictation. RE-ASSESSMENT        EMERGENCY DEPARTMENT COURSE and DIFFERENTIAL DIAGNOSIS/MDM:   Vitals:    Vitals:    01/09/22 1509   BP: 139/62   Pulse: 70   Resp: 18   Temp: 98 °F (36.7 °C)   TempSrc: Oral   SpO2: 98%   Weight: 194 lb (88 kg)   Height: 5' 2\" (1.575 m)       MDM  Findings are consistent with 4+ bacteria clean-catch and prior visits that I can see on encounters her cultures have grown out fairly basic bacteria Im going to give her some Keflex we will culture this of course and follow sensitivities. It sounds like she has been talking to her PCP about urology referral I given her the number to utilize with her Dr. Yifan Bergman office and I did still like her to follow closely with her PCP on this. Plan for discharge. PROCEDURES:    Procedures      FINAL IMPRESSION      1.  Acute cystitis without hematuria          DISPOSITION/PLAN   DISPOSITION Decision To Discharge 01/09/2022 04:22:51 PM      PATIENT REFERRED TO:  [facility] EMERGENCY DEPT  [address] [place] [address]  [phone]    If symptoms worsen    Radha Ruiz, [address]  [phone]      urology referral    Christie Porter MD  [address] [place] [address] [phone]    Schedule an appointment as soon as possible for a visit         DISCHARGE MEDICATIONS:  Discharge Medication List as of 1/9/2022  5:10 PM      START taking these medications    Details   cephALEXin (KEFLEX) 500 MG capsule Take 1 capsule by mouth 2 times daily for 7 days, Disp-14 capsule, R-0Normal             (Please note that portions of this note were completed with a voice recognition program.  Efforts were made to edit the dictations but occasionallywords are mis-transcribed.)    Jennifer Romo [address] [place] [address]  01/09/22 [id number]

## 2022-01-11 ENCOUNTER — TELEPHONE (OUTPATIENT)
Dept: UROLOGY | Age: 57
End: 2022-01-11

## 2022-01-11 LAB
ORGANISM: ABNORMAL
URINE CULTURE, ROUTINE: ABNORMAL
URINE CULTURE, ROUTINE: ABNORMAL

## 2022-01-11 NOTE — TELEPHONE ENCOUNTER
Tired to call patient today to see how she was doing and to get her scheduled for a er follow up. Patient is a new patient for recurrent UTIs/ Hematuria.

## 2022-01-20 ENCOUNTER — TELEPHONE (OUTPATIENT)
Dept: PRIMARY CARE CLINIC | Age: 57
End: 2022-01-20

## 2022-01-20 NOTE — TELEPHONE ENCOUNTER
I called pt bc I saw that her  had an appt today. They are from [place] so I offered to move her appt from 1/25 to today with her . She declined appt for today and said she would like to keep her appt time next week.
Statement Selected

## 2022-01-25 ENCOUNTER — VIRTUAL VISIT (OUTPATIENT)
Dept: PRIMARY CARE CLINIC | Age: 57
End: 2022-01-25
Payer: COMMERCIAL

## 2022-01-25 DIAGNOSIS — R30.0 DYSURIA: Primary | ICD-10-CM

## 2022-01-25 DIAGNOSIS — R30.0 DYSURIA: ICD-10-CM

## 2022-01-25 LAB
BILIRUBIN, POC: NORMAL
BLOOD URINE, POC: NORMAL
CLARITY, POC: NORMAL
COLOR, POC: NORMAL
GLUCOSE URINE, POC: >1000
KETONES, POC: NORMAL
LEUKOCYTE EST, POC: NORMAL
NITRITE, POC: NORMAL
PH, POC: 5.5
PROTEIN, POC: NORMAL
SPECIFIC GRAVITY, POC: 1.02
UROBILINOGEN, POC: 0.2

## 2022-01-25 PROCEDURE — 99213 OFFICE O/P EST LOW 20 MIN: CPT | Performed by: NURSE PRACTITIONER

## 2022-01-25 PROCEDURE — 81002 URINALYSIS NONAUTO W/O SCOPE: CPT | Performed by: NURSE PRACTITIONER

## 2022-01-25 PROCEDURE — G8484 FLU IMMUNIZE NO ADMIN: HCPCS | Performed by: NURSE PRACTITIONER

## 2022-01-25 PROCEDURE — 3017F COLORECTAL CA SCREEN DOC REV: CPT | Performed by: NURSE PRACTITIONER

## 2022-01-25 PROCEDURE — 1036F TOBACCO NON-USER: CPT | Performed by: NURSE PRACTITIONER

## 2022-01-25 PROCEDURE — G8417 CALC BMI ABV UP PARAM F/U: HCPCS | Performed by: NURSE PRACTITIONER

## 2022-01-25 PROCEDURE — G8427 DOCREV CUR MEDS BY ELIG CLIN: HCPCS | Performed by: NURSE PRACTITIONER

## 2022-01-25 RX ORDER — SULFAMETHOXAZOLE AND TRIMETHOPRIM 800; 160 MG/1; MG/1
1 TABLET ORAL 2 TIMES DAILY
Qty: 20 TABLET | Refills: 0 | Status: SHIPPED | OUTPATIENT
Start: 2022-01-25 | End: 2022-02-04

## 2022-01-25 RX ORDER — PHENAZOPYRIDINE HYDROCHLORIDE 100 MG/1
100 TABLET, FILM COATED ORAL 3 TIMES DAILY PRN
Qty: 21 TABLET | Refills: 0 | Status: SHIPPED | OUTPATIENT
Start: 2022-01-25 | End: 2022-02-01

## 2022-01-25 ASSESSMENT — ENCOUNTER SYMPTOMS
VOMITING: 0
COUGH: 0
ABDOMINAL PAIN: 0
SORE THROAT: 0
DIARRHEA: 0
NAUSEA: 0
CHEST TIGHTNESS: 0
SHORTNESS OF BREATH: 0
COLOR CHANGE: 0

## 2022-01-25 NOTE — PROGRESS NOTES
Giuseppe Galaviz (:  1965) is a Established patient, here for evaluation of the following: Patient reports urinary burning even after done urinating along with urinary frequency and urgency. Patient reports this started a few weeks ago and she went to the emergency room because urgent care was full. She reports they gave her kelfex for her urinary tract infection x 7 days. She reports she finished the keflex and  her symptoms reoccurred in 3 days. Discussed hygiene to use scent free soaps, and detergents and have well controlled blood sugars. Patient reports she is doing her best with all of this. Would like to discuss with Christean Hodgkin about a urology referral.     Assessment & Plan   Below is the assessment and plan developed based on review of pertinent history, physical exam, labs, studies, and medications. 1. Dysuria  -     sulfamethoxazole-trimethoprim (BACTRIM DS;SEPTRA DS) 800-160 MG per tablet; Take 1 tablet by mouth 2 times daily for 10 days, Disp-20 tablet, R-0Normal  -     phenazopyridine (PYRIDIUM) 100 MG tablet; Take 1 tablet by mouth 3 times daily as needed for Pain, Disp-21 tablet, R-0Normal  -     POCT Urinalysis no Micro  -     Culture, Urine; Future    Return in about 1 month (around 2022). Subjective   HPI  Review of Systems   Constitutional: Negative for activity change and fever. HENT: Negative for congestion, ear pain and sore throat. Respiratory: Negative for cough, chest tightness and shortness of breath. Cardiovascular: Negative for chest pain. Gastrointestinal: Negative for abdominal pain, diarrhea, nausea and vomiting. Genitourinary: Positive for dysuria, frequency and urgency. Musculoskeletal: Negative for arthralgias and myalgias. Skin: Negative for color change. Neurological: Negative for dizziness, weakness and numbness. Psychiatric/Behavioral: Negative for agitation. The patient is not nervous/anxious.            Objective   Patient-Reported Vitals  No data recorded     Physical Exam  [INSTRUCTIONS:  \"[x]\" Indicates a positive item  \"[]\" Indicates a negative item  -- DELETE ALL ITEMS NOT EXAMINED]    Constitutional: [x] Appears well-developed and well-nourished [x] No apparent distress      [] Abnormal -     Mental status: [x] Alert and awake  [x] Oriented to person/place/time [x] Able to follow commands    [] Abnormal -     Eyes:   EOM    [x]  Normal    [] Abnormal -   Sclera  [x]  Normal    [] Abnormal -          Discharge [x]  None visible   [] Abnormal -     HENT: [x] Normocephalic, atraumatic  [] Abnormal -   [x] Mouth/Throat: Mucous membranes are moist    External Ears [x] Normal  [] Abnormal -    Neck: [x] No visualized mass [] Abnormal -     Pulmonary/Chest: [x] Respiratory effort normal   [x] No visualized signs of difficulty breathing or respiratory distress        [] Abnormal -      Musculoskeletal:   [x] Normal gait with no signs of ataxia         [x] Normal range of motion of neck        [] Abnormal -     Neurological:        [x] No Facial Asymmetry (Cranial nerve 7 motor function) (limited exam due to video visit)          [x] No gaze palsy        [] Abnormal -          Skin:        [x] No significant exanthematous lesions or discoloration noted on facial skin         [] Abnormal -            Psychiatric:       [x] Normal Affect [] Abnormal -        [x] No Hallucinations    Other pertinent observable physical exam findings:-      On this date 1/25/2022 I have spent 21 minutes reviewing previous notes, test results and face to face (virtual) with the patient discussing the diagnosis and importance of compliance with the treatment plan as well as documenting on the day of the visitDavid Schulz, was evaluated through a synchronous (real-time) audio-video encounter. The patient (or guardian if applicable) is aware that this is a billable service, which includes applicable co-pays.  This Virtual Visit was conducted with patient's (and/or legal guardian's) consent. The visit was conducted pursuant to the emergency declaration under the [address] [place] [address] authority and the [employer] and [employer] General Act. Patient identification was verified, and a caregiver was present when appropriate. The patient was located at home in a state where the provider was licensed to provide care.        --Earlene Oviedo, MIKE - CNP

## 2022-01-27 LAB
ORGANISM: ABNORMAL
URINE CULTURE, ROUTINE: ABNORMAL
URINE CULTURE, ROUTINE: ABNORMAL

## 2022-02-21 ENCOUNTER — HOSPITAL ENCOUNTER (OUTPATIENT)
Dept: WOMENS IMAGING | Age: 57
Discharge: HOME | End: 2022-02-21
Payer: COMMERCIAL

## 2022-02-21 DIAGNOSIS — Z12.31 ENCOUNTER FOR SCREENING MAMMOGRAM FOR MALIGNANT NEOPLASM OF BREAST: ICD-10-CM

## 2022-02-21 PROCEDURE — 77063 BREAST TOMOSYNTHESIS BI: CPT

## 2022-02-22 ENCOUNTER — HOSPITAL ENCOUNTER (EMERGENCY)
Age: 57
Discharge: HOME | End: 2022-02-22
Payer: COMMERCIAL

## 2022-02-22 ENCOUNTER — APPOINTMENT (OUTPATIENT)
Dept: GENERAL RADIOLOGY | Age: 57
End: 2022-02-22
Payer: COMMERCIAL

## 2022-02-22 VITALS
BODY MASS INDEX: 36.58 KG/M2 | WEIGHT: 200 LBS | DIASTOLIC BLOOD PRESSURE: 61 MMHG | TEMPERATURE: 98.9 F | HEART RATE: 90 BPM | RESPIRATION RATE: 22 BRPM | OXYGEN SATURATION: 94 % | SYSTOLIC BLOOD PRESSURE: 144 MMHG

## 2022-02-22 DIAGNOSIS — S20.211A CONTUSION OF RIB ON RIGHT SIDE, INITIAL ENCOUNTER: ICD-10-CM

## 2022-02-22 DIAGNOSIS — R07.81 RIB PAIN ON RIGHT SIDE: Primary | ICD-10-CM

## 2022-02-22 PROCEDURE — 71101 X-RAY EXAM UNILAT RIBS/CHEST: CPT

## 2022-02-22 PROCEDURE — 99285 EMERGENCY DEPT VISIT HI MDM: CPT

## 2022-02-22 PROCEDURE — 96372 THER/PROPH/DIAG INJ SC/IM: CPT

## 2022-02-22 PROCEDURE — 6360000002 HC RX W HCPCS: Performed by: PHYSICIAN ASSISTANT

## 2022-02-22 PROCEDURE — 6370000000 HC RX 637 (ALT 250 FOR IP): Performed by: PHYSICIAN ASSISTANT

## 2022-02-22 RX ORDER — LIDOCAINE 4 G/G
1 PATCH TOPICAL DAILY
Status: DISCONTINUED | OUTPATIENT
Start: 2022-02-22 | End: 2022-02-22 | Stop reason: HOSPADM

## 2022-02-22 RX ORDER — DEXAMETHASONE SODIUM PHOSPHATE 4 MG/ML
4 INJECTION, SOLUTION INTRA-ARTICULAR; INTRALESIONAL; INTRAMUSCULAR; INTRAVENOUS; SOFT TISSUE ONCE
Status: COMPLETED | OUTPATIENT
Start: 2022-02-22 | End: 2022-02-22

## 2022-02-22 RX ORDER — LIDOCAINE 50 MG/G
1 PATCH TOPICAL DAILY
Qty: 10 PATCH | Refills: 0 | Status: SHIPPED | OUTPATIENT
Start: 2022-02-22 | End: 2022-02-22 | Stop reason: SDUPTHER

## 2022-02-22 RX ORDER — PREDNISONE 10 MG/1
10 TABLET ORAL DAILY
Qty: 10 TABLET | Refills: 0 | Status: SHIPPED | OUTPATIENT
Start: 2022-02-22 | End: 2022-03-04

## 2022-02-22 RX ORDER — CYCLOBENZAPRINE HCL 5 MG
5 TABLET ORAL NIGHTLY PRN
Qty: 30 TABLET | Refills: 0 | Status: SHIPPED | OUTPATIENT
Start: 2022-02-22 | End: 2022-03-04

## 2022-02-22 RX ORDER — LIDOCAINE 50 MG/G
1 PATCH TOPICAL DAILY
Qty: 10 PATCH | Refills: 0 | Status: SHIPPED | OUTPATIENT
Start: 2022-02-22 | End: 2022-02-24 | Stop reason: SDUPTHER

## 2022-02-22 RX ORDER — CYCLOBENZAPRINE HCL 5 MG
5 TABLET ORAL NIGHTLY PRN
Qty: 30 TABLET | Refills: 0 | Status: SHIPPED | OUTPATIENT
Start: 2022-02-22 | End: 2022-02-22 | Stop reason: SDUPTHER

## 2022-02-22 RX ORDER — PREDNISONE 10 MG/1
10 TABLET ORAL DAILY
Qty: 10 TABLET | Refills: 0 | Status: SHIPPED | OUTPATIENT
Start: 2022-02-22 | End: 2022-02-22 | Stop reason: SDUPTHER

## 2022-02-22 RX ADMIN — DEXAMETHASONE SODIUM PHOSPHATE 4 MG: 4 INJECTION, SOLUTION INTRAMUSCULAR; INTRAVENOUS at 17:22

## 2022-02-22 ASSESSMENT — ENCOUNTER SYMPTOMS
ABDOMINAL PAIN: 0
EYE PAIN: 0
APNEA: 0
PHOTOPHOBIA: 0
ABDOMINAL DISTENTION: 0
RHINORRHEA: 0
COUGH: 0
SHORTNESS OF BREATH: 0
SORE THROAT: 0
BACK PAIN: 0
NAUSEA: 0
COLOR CHANGE: 0
EYE DISCHARGE: 0

## 2022-02-22 NOTE — ED PROVIDER NOTES
[facility] EMERGENCY DEPT  eMERGENCYdEPARTMENT eNCOUnter      Pt Name: Dereje Chandler  MRN: [id number]  [place] 1965  Date of evaluation: 2/22/2022  Provider:LORI Godwin    CHIEF COMPLAINT       Chief Complaint   Patient presents with    Fall     fell on sunday, worsening symptoms    Rib Pain         HISTORY OF PRESENT ILLNESS  (Location/Symptom, Timing/Onset, Context/Setting, Quality, Duration, Modifying Factors, Severity.)   Dereje Chandler is a 60 y.o. female who presents to the emergency department with complaints door slamming into her right rib Sunday the wind blew the door into her. She endorsing pain with inspiration no bruising correlates with injury having some pain about her right shoulder as well. She drove herself here. No neck pain or headache. Takes aspirin and has blood sugar hx. HPI    Nursing Notes were reviewed and I agree. REVIEW OF SYSTEMS    (2-9 systems for level 4, 10 or more for level 5)     Review of Systems   Constitutional: Negative for activity change, appetite change, chills and fever. HENT: Negative for congestion, postnasal drip, rhinorrhea and sore throat. Eyes: Negative for photophobia, pain, discharge and visual disturbance. Respiratory: Negative for apnea, cough and shortness of breath. Cardiovascular: Negative for chest pain and leg swelling. Gastrointestinal: Negative for abdominal distention, abdominal pain and nausea. Genitourinary: Negative for vaginal bleeding. Musculoskeletal: Positive for arthralgias and myalgias. Negative for back pain, joint swelling, neck pain and neck stiffness. Skin: Negative for color change and rash. Neurological: Negative for dizziness, syncope, facial asymmetry and headaches. Hematological: Negative for adenopathy. Does not bruise/bleed easily. Psychiatric/Behavioral: Negative for agitation, behavioral problems and confusion.         Except as noted above the remainder of the review of systems was reviewed and negative.        PAST MEDICAL HISTORY     Past Medical History:   Diagnosis Date    Abnormal Pap smear of cervix     Arthritis     CAD (coronary artery disease)     stent 2011 Abrahan Barth; kamini [facility] cardiology    Cervical cancer ([facility]     Class 3 severe obesity due to excess calories without serious comorbidity with body mass index (BMI) of 40.0 to 44.9 in adult ([facility] 7/28/2020    Diabetes [facility]     ERRONEOUS ENCOUNTER--DISREGARD 09/15/2015    GERD (gastroesophageal reflux disease)     Headache(784.0)     Heart attack ([facility] 12/2011    Hyperlipidemia     Hypertension     Lichen simplex chronicus 10/2015    Osteopenia     Pneumonia     Type II or unspecified type diabetes mellitus without mention of complication, not stated as uncontrolled          SURGICAL HISTORY       Past Surgical History:   Procedure Laterality Date    BREAST BIOPSY Right 6/8/2011    US guided core needle, right, benign    CARDIAC CATHETERIZATION  12/22/14  JDT    EF 50%    CARDIAC CATHETERIZATION  06/05/2017        G FLUOROSCOPIC GUIDANCE NEEDLE PLACEMENT ADD ON Left 7/19/2018    CORTICOSTEROID INJECTION performed by Clovis Trent MD at [address] [place] [address]    COLONOSCOPY  approx 2005    Dr. Emmy Stack per pt recall \" Normal\"   [address] WITH STENT PLACEMENT  12/2011    S[address] to [address] RELEASE Left 11/8/2019    LEFT INDEX AND LONG TRIGGER FINGER RELEASAE performed by Carolynn Lomas MD at [address] [place] [address]    OTHER SURGICAL HISTORY      fibrinolysis    WI CABG, ARTERY-VEIN, THREE  12/23/2014    Coronary Artery Bypass, 3    WI MANIPULATN MILLERR JT W ANESTHESIA Left 7/19/2018    SHOULDER MANIPULATION UNDER ANESTHESIA performed by Clovis Trent MD at [facility] Bilateral 1995    age 27   [address] [facility] [address] ENDOSCOPY  approx 2005    per pt recall \" Normal\"    UPPER GASTROINTESTINAL ENDOSCOPY N/A 6/19/2020    Dr Jazzmine Harmon exam, neg EoE    VULVAR/PERINEAL BIOPSY  10/07/2015    Dr. Carmen Farmer N/A 7/2/2021    VULVAR BIOPSY performed by Frederick Matamoros MD at [address] [facility]       Discharge Medication List as of 2/22/2022  6:07 PM      CONTINUE these medications which have NOT CHANGED    Details   metoprolol tartrate (LOPRESSOR) 25 MG tablet TAKE 1 TABLET BY MOUTH TWICE DAILY, Disp-60 tablet, R-5Normal      metFORMIN (GLUCOPHAGE-XR) 500 MG extended release tablet TAKE 1 TABLET BY MOUTH 2 TIMES DAILY.  INDDICATIONS: DIABETES, Disp-60 tablet, R-5Normal      glipiZIDE (GLUCOTROL XL) 10 MG extended release tablet TAKE 1 TABLET BY MOUTH DAILY, Disp-30 tablet, R-3Normal      vitamin D (ERGOCALCIFEROL) 1.25 MG (94809 UT) CAPS capsule TAKE 1 CAPSULE BY MOUTH ONCE WEEKLY, Disp-4 capsule, R-2Normal      JARDIANCE 25 MG tablet Take 1 tablet by mouth once daily, Disp-90 tablet, R-1Normal      lovastatin (MEVACOR) 40 MG tablet TAKE 1 TABLET AT BEDTIME , Disp-30 tablet, R-4Normal      spironolactone (ALDACTONE) 50 MG tablet TAKE 1 TABLET BY MOUTH DAILY , Disp-30 tablet, R-3Normal      valsartan (DIOVAN) 80 MG tablet TAKE 1 TABLET BY MOUTH DAILY , Disp-30 tablet, R-5Normal      Dulaglutide 0.75 MG/0.5ML SOPN Inject 0.75 mg into the skin once a week, Disp-12 pen, R-2Normal      ondansetron (ZOFRAN ODT) 4 MG disintegrating tablet Take 1 tablet by mouth daily as needed for Nausea or Vomiting, Disp-30 tablet, R-5Normal      amitriptyline (ELAVIL) 10 MG tablet Take 1 tablet by mouth nightly, Disp-30 tablet, R-5Normal      tamsulosin (FLOMAX) 0.4 MG capsule TAKE 1 CAPSULE BY MOUTH DAILY , Disp-30 capsule, R-5Normal      isosorbide mononitrate (IMDUR) 30 MG extended release tablet Take 30 mg by mouth nightly Indications: High Blood Pressure DisorderHistorical Med      nitroGLYCERIN (NITROSTAT) 0.4 MG SL tablet Place 1 tablet under the tongue every 5 minutes as needed for Chest pain, Disp-25 tablet, R-3Normal      nystatin (MYCOSTATIN) 004854 UNIT/GM powder APPLY 2 OR 3 TIMES DAILY. , Disp-60 g, R-5, Normal      Diabetic Shoe MISC Starting Thu 11/12/2020, Disp-1 each,R-0, Print      albuterol (PROVENTIL) (5 MG/ML) 0.5% nebulizer solution Take 0.5 mLs by nebulization every 6 hours as needed for Wheezing, Disp-120 each,R-0Normal      docusate sodium (COLACE) 100 MG capsule Take 1 capsule by mouth daily as needed for Constipation, Disp-30 capsule,R-1Normal      diclofenac sodium (VOLTAREN) 1 % GEL Apply 2 g topically 4 times daily, Topical, 4 TIMES DAILY Starting Tue 3/24/2020, Disp-2 Tube, R-1, Normal      furosemide (LASIX) 40 MG tablet TAKE 1 TABLET BY MOUTH DAILY, Disp-30 tablet, R-5Normal      aspirin 81 MG tablet Take 81 mg by mouth dailyHistorical Med      mometasone (ELOCON) 0.1 % ointment Apply topically to vagina 3 times per week, Disp-1 Tube, R-3, Normal      Eflornithine HCl (VANIQA) 13.9 % CREA Apply 1 Dose topically 2 times daily, Disp-45 g, R-2             ALLERGIES     Codeine, Motrin [ibuprofen], Humalog [insulin lispro], and Ozempic (0.25 or 0.5 mg-dose) [semaglutide(0.25 or 0.5mg-dos)]    FAMILY HISTORY       Family History   Problem Relation Age of Onset    Diabetes Father     Cancer Father     High Blood Pressure Father     Heart Attack Father     Colon Cancer Father     Diabetes Mother     Breast Cancer Mother 47    Heart Failure Mother         CHF, MVP    Liver Cancer Mother     Heart Failure Maternal Grandfather     Stroke Maternal Grandfather     Pancreatic Cancer Maternal Grandfather     Stomach Cancer Maternal Grandfather     Diabetes Brother     Colon Cancer Paternal Uncle     Rectal Cancer Other     Breast Cancer Maternal Grandmother 78    Colon Polyps Neg Hx     Crohn's Disease Neg Hx     Irritable Bowel Syndrome Neg Hx     Liver Disease Neg Hx           SOCIAL HISTORY       Social History Socioeconomic History    Marital status:      Spouse name: None    Number of children: None    Years of education: None    Highest education level: None   Occupational History    None   Tobacco Use    Smoking status: Former Smoker     Packs/day: 2.00     Years: 15.00     Pack years: 30.00     Types: Cigarettes     Quit date: 2014     Years since quittin.6    Smokeless tobacco: Never Used   Vaping Use    Vaping Use: Never used   Substance and Sexual Activity    Alcohol use: No    Drug use: No    Sexual activity: Not Currently   Other Topics Concern    None   Social History Narrative    None     Social Determinants of Health     Financial Resource Strain: High Risk    Difficulty of Paying Living Expenses: Very hard   Food Insecurity: Food Insecurity Present    Worried About Running Out of Food in the Last Year: Often true    Grant of Food in the Last Year: Often true   Transportation Needs:     Lack of Transportation (Medical): Not on file    Lack of Transportation (Non-Medical):  Not on file   Physical Activity:     Days of Exercise per Week: Not on file    Minutes of Exercise per Session: Not on file   Stress:     Feeling of Stress : Not on file   Social Connections:     Frequency of Communication with Friends and Family: Not on file    Frequency of Social Gatherings with Friends and Family: Not on file    Attends [employer] Services: Not on file    Active Member of [address] [place] [address] or Organizations: Not on file    Attends Club or Organization Meetings: Not on file    Marital Status: Not on file   Intimate Partner Violence:     Fear of Current or Ex-Partner: Not on file    Emotionally Abused: Not on file    Physically Abused: Not on file    Sexually Abused: Not on file   Housing Stability:     Unable to Pay for Housing in the Last Year: Not on file    Number of [place] in the Last Year: Not on file    Unstable Housing in the Last Year: Not on file       SCREENINGS Ran Coma Scale  Eye Opening: Spontaneous  Best Verbal Response: Oriented  Best Motor Response: Obeys commands  Ran Coma Scale Score: 15      PHYSICAL EXAM    (up to 7 forlevel 4, 8 or more for level 5)     ED Triage Vitals   BP Temp Temp Source Pulse Resp SpO2 Height Weight   02/22/22 1701 02/22/22 1700 02/22/22 1700 02/22/22 1701 02/22/22 1700 02/22/22 1701 -- 02/22/22 1700   (!) 144/61 98.9 °F (37.2 °C) Temporal 90 22 94 %  200 lb (90.7 kg)       Physical Exam  Vitals and nursing note reviewed. Constitutional:       General: She is not in acute distress. Appearance: Normal appearance. She is well-developed. She is not diaphoretic. HENT:      Head: Normocephalic and atraumatic. Right Ear: Tympanic membrane, ear canal and external ear normal.      Left Ear: Tympanic membrane, ear canal and external ear normal.      Nose: Nose normal.      Mouth/Throat:      Mouth: Mucous membranes are moist.      Pharynx: No oropharyngeal exudate. Eyes:      General:         Right eye: No discharge. Left eye: No discharge. Pupils: Pupils are equal, round, and reactive to light. Neck:      Thyroid: No thyromegaly. Cardiovascular:      Rate and Rhythm: Normal rate and regular rhythm. Pulses: Normal pulses. Heart sounds: Normal heart sounds. No murmur heard. No friction rub. Pulmonary:      Effort: Pulmonary effort is normal. No respiratory distress. Breath sounds: Normal breath sounds. No stridor. No wheezing. Abdominal:      General: Abdomen is flat. Bowel sounds are normal. There is no distension. Palpations: Abdomen is soft. Tenderness: There is no abdominal tenderness. Musculoskeletal:         General: Tenderness and signs of injury present. Normal range of motion. Arms:       Cervical back: Normal range of motion and neck supple. Skin:     General: Skin is warm and dry. Capillary Refill: Capillary refill takes less than 2 seconds.       Findings: No rash. Neurological:      General: No focal deficit present. Mental Status: She is alert and oriented to person, place, and time. Cranial Nerves: No cranial nerve deficit. Sensory: No sensory deficit. Coordination: Coordination normal.   Psychiatric:         Mood and Affect: Mood normal.         Behavior: Behavior normal.         Thought Content: Thought content normal.         Judgment: Judgment normal.           DIAGNOSTIC RESULTS     RADIOLOGY:   Non-plain film images such as CT, Ultrasound and MRI are read by the radiologist. Plain radiographic images are visualized and preliminarilyinterpreted by No att. providers found with the below findings:      Interpretation per the Radiologist below, if available at the time of this note:    XR RIBS RIGHT INCLUDE CHEST (MIN 3 VIEWS)   Final Result   1. Chronic lung changes. 2. No right rib fracture or pneumothorax. Signed by Dr Jeremy Collazo:  Labs Reviewed - No data to display    All other labs were within normal range or notreturned as of this dictation. RE-ASSESSMENT        EMERGENCY DEPARTMENT COURSE and DIFFERENTIAL DIAGNOSIS/MDM:   Vitals:    Vitals:    02/22/22 1700 02/22/22 1701   BP:  (!) 144/61   Pulse:  90   Resp: 22    Temp: 98.9 °F (37.2 °C)    TempSrc: Temporal    SpO2:  94%   Weight: 200 lb (90.7 kg)        MDM  Patient has no other complaints on physical exam she is very tender about this area no findings obvious on x-ray for fracture or developing consolidation. Appropriate vitals no hypoxia concerns we given her Lidoderm patch here education on incentive spirometry plan will be for pain control discharge and close follow-up with PCP. She is educated on signs and symptoms of pneumonia to watch for at home and return as needed. PROCEDURES:    Procedures      FINAL IMPRESSION      1. Rib pain on right side    2.  Contusion of rib on right side, initial encounter          DISPOSITION/PLAN   DISPOSITION Decision To Discharge 02/22/2022 06:14:26 PM      PATIENT REFERRED TO:  [facility] EMERGENCY DEPT  Arvin Faviola  [phone]    If symptoms worsen    Eliane Holcomb, [address] [place] [address]  [phone]      As needed      DISCHARGE MEDICATIONS:  Discharge Medication List as of 2/22/2022  6:07 PM      START taking these medications    Details   cyclobenzaprine (FLEXERIL) 5 MG tablet Take 1 tablet by mouth nightly as needed for Muscle spasms, Disp-30 tablet, R-0Normal      lidocaine (LIDODERM) 5 % Place 1 patch onto the skin daily for 10 days 12 hours on, 12 hours off., Disp-10 patch, R-0Normal      predniSONE (DELTASONE) 10 MG tablet Take 1 tablet by mouth daily for 10 days, Disp-10 tablet, R-0Normal             (Please note that portions of this note were completed with a voice recognition program.  Efforts were made to edit the dictations but occasionallywords are mis-transcribed.)    Jennifer Sal [address] [place] [address]  02/23/22 2025

## 2022-02-24 ENCOUNTER — OFFICE VISIT (OUTPATIENT)
Dept: PRIMARY CARE CLINIC | Age: 57
End: 2022-02-24
Payer: COMMERCIAL

## 2022-02-24 VITALS
SYSTOLIC BLOOD PRESSURE: 130 MMHG | DIASTOLIC BLOOD PRESSURE: 68 MMHG | OXYGEN SATURATION: 98 % | BODY MASS INDEX: 36.29 KG/M2 | TEMPERATURE: 97.8 F | WEIGHT: 197.2 LBS | HEART RATE: 73 BPM | HEIGHT: 62 IN

## 2022-02-24 DIAGNOSIS — Z79.4 TYPE 2 DIABETES MELLITUS WITH COMPLICATION, WITH LONG-TERM CURRENT USE OF INSULIN (HCC): Primary | ICD-10-CM

## 2022-02-24 DIAGNOSIS — E78.2 MIXED HYPERLIPIDEMIA: ICD-10-CM

## 2022-02-24 DIAGNOSIS — E11.8 TYPE 2 DIABETES MELLITUS WITH COMPLICATION, WITH LONG-TERM CURRENT USE OF INSULIN (HCC): Primary | ICD-10-CM

## 2022-02-24 DIAGNOSIS — R07.81 RIB PAIN ON RIGHT SIDE: ICD-10-CM

## 2022-02-24 DIAGNOSIS — I10 ESSENTIAL HYPERTENSION: ICD-10-CM

## 2022-02-24 PROCEDURE — 2022F DILAT RTA XM EVC RTNOPTHY: CPT | Performed by: NURSE PRACTITIONER

## 2022-02-24 PROCEDURE — 1036F TOBACCO NON-USER: CPT | Performed by: NURSE PRACTITIONER

## 2022-02-24 PROCEDURE — 3017F COLORECTAL CA SCREEN DOC REV: CPT | Performed by: NURSE PRACTITIONER

## 2022-02-24 PROCEDURE — 3046F HEMOGLOBIN A1C LEVEL >9.0%: CPT | Performed by: NURSE PRACTITIONER

## 2022-02-24 PROCEDURE — G8427 DOCREV CUR MEDS BY ELIG CLIN: HCPCS | Performed by: NURSE PRACTITIONER

## 2022-02-24 PROCEDURE — G8417 CALC BMI ABV UP PARAM F/U: HCPCS | Performed by: NURSE PRACTITIONER

## 2022-02-24 PROCEDURE — G8484 FLU IMMUNIZE NO ADMIN: HCPCS | Performed by: NURSE PRACTITIONER

## 2022-02-24 PROCEDURE — 99214 OFFICE O/P EST MOD 30 MIN: CPT | Performed by: NURSE PRACTITIONER

## 2022-02-24 RX ORDER — DICLOFENAC EPOLAMINE 0.01 G/1
1 SYSTEM TOPICAL EVERY 24 HOURS
Qty: 30 PATCH | Refills: 1 | Status: SHIPPED | OUTPATIENT
Start: 2022-02-24 | End: 2022-05-24

## 2022-02-24 RX ORDER — DULAGLUTIDE 0.75 MG/.5ML
0.75 INJECTION, SOLUTION SUBCUTANEOUS
COMMUNITY
Start: 2021-09-08 | End: 2022-02-24

## 2022-02-24 RX ORDER — LIDOCAINE 50 MG/G
1 PATCH TOPICAL DAILY
Qty: 10 PATCH | Refills: 0 | Status: SHIPPED | OUTPATIENT
Start: 2022-02-24 | End: 2022-03-06

## 2022-02-24 RX ORDER — EMPAGLIFLOZIN 25 MG/1
TABLET, FILM COATED ORAL
Qty: 90 TABLET | Refills: 1 | Status: SHIPPED | OUTPATIENT
Start: 2022-02-24 | End: 2022-04-26 | Stop reason: CLARIF

## 2022-02-24 ASSESSMENT — ENCOUNTER SYMPTOMS
RESPIRATORY NEGATIVE: 1
GASTROINTESTINAL NEGATIVE: 1
EYES NEGATIVE: 1

## 2022-02-24 NOTE — PROGRESS NOTES
[address] [facility]  [address] [place] [address]  [address] [address]  Dept: [phone]  Dept Fax: [phone]  Loc: [phone]    Karla Be is a 60 y.o. female who presents today for her medical conditions/complaints as noted below. Karla Be is c/o of Discuss Medications and Diabetes      Chief Complaint   Patient presents with    Discuss Medications    Diabetes       HPI:     HPI   Patient states she is feeling much better and does not need to be seen for UTI symptoms. She said it was because her blood sugar was out of range. Patient is having trouble with her ins and a savings card paying for her Jardiance and Trulicity. She would like to discuss this today. Patient is also complaining of right rib pain after having a fall. She was prescribed a pain patch and reports it did help, but her INS would not cover the cost.  She would like to try another patch for the pain. She reports hypertension has been well controlled. She has been taking her medications for hyperlipidemia as well.     Past Medical History:   Diagnosis Date    Abnormal Pap smear of cervix     Arthritis     CAD (coronary artery disease)     stent 2011 Mario Sharp; sees [facility]    [facility]     Class 3 severe obesity due to excess calories without serious comorbidity with body mass index (BMI) of 40.0 to 44.9 in adult ([address] 7/28/2020    Diabetes mellitus ([facility]     ERRONEOUS ENCOUNTER--DISREGARD 09/15/2015    GERD (gastroesophageal reflux disease)     Headache(784.0)     Heart attack ([address] 12/2011    Hyperlipidemia     Hypertension     Lichen simplex chronicus 10/2015    Osteopenia     Pneumonia     Type II or unspecified type diabetes mellitus without mention of complication, not stated as uncontrolled         Past Surgical History:   Procedure Laterality Date    BREAST BIOPSY Right 6/8/2011    US guided core needle, right, benign    CARDIAC CATHETERIZATION  14  JDT    EF 50%    CARDIAC CATHETERIZATION  2017        G FLUOROSCOPIC GUIDANCE NEEDLE PLACEMENT ADD ON Left 2018    CORTICOSTEROID INJECTION performed by Lanny Peters MD at [address] [place] [address]    COLONOSCOPY  approx     Dr. Jared Schmidt per pt recall \" Normal\"   [address]  2011    SABA to [address] RELEASE Left 2019    LEFT INDEX AND LONG TRIGGER FINGER RELEASAE performed by Miguel Angel Gottlieb MD at [address] [place] [address]    OTHER SURGICAL HISTORY      fibrinolysis    GA CABG, ARTERY-VEIN, THREE  2014    Coronary Artery Bypass, 3    GA MANIPULATN SHLDR JT W ANESTHESIA Left 2018    SHOULDER MANIPULATION UNDER ANESTHESIA performed by Lanny Peters MD at [facility] OR    [facility] AND BSO Bilateral     age 27    [facility] ENDOSCOPY  approx     per pt recall \" Normal\"    UPPER GASTROINTESTINAL ENDOSCOPY N/A 2020    Dr Speedy Conde exam, neg EoE    VULVAR/PERINEAL BIOPSY  10/07/2015    Dr. Jhonatan Castaneda N/A 2021    VULVAR BIOPSY performed by Anne Turpin MD at [place] [address] History     Tobacco Use    Smoking status: Former Smoker     Packs/day: 2.00     Years: 15.00     Pack years: 30.00     Types: Cigarettes     Quit date: 2014     Years since quittin.6    Smokeless tobacco: Never Used   Substance Use Topics    Alcohol use: No        Current Outpatient Medications   Medication Sig Dispense Refill    lidocaine (LIDODERM) 5 % Place 1 patch onto the skin daily for 10 days 12 hours on, 12 hours off.  10 patch 0    Diclofenac Epolamine (LICART) 1.3 % TT85 Apply 1 patch topically every 24 hours 30 patch 1    empagliflozin (JARDIANCE) 25 MG tablet Take 1 tablet by mouth once daily 90 tablet 1    Dulaglutide 0.75 MG/0.5ML SOPN Inject 0.75 mg into the skin once a week 12 pen 2    cyclobenzaprine (FLEXERIL) 5 MG tablet Take 1 tablet by mouth nightly as needed for Muscle spasms 30 tablet 0    predniSONE (DELTASONE) 10 MG tablet Take 1 tablet by mouth daily for 10 days 10 tablet 0    metoprolol tartrate (LOPRESSOR) 25 MG tablet TAKE 1 TABLET BY MOUTH TWICE DAILY 60 tablet 5    metFORMIN (GLUCOPHAGE-XR) 500 MG extended release tablet TAKE 1 TABLET BY MOUTH 2 TIMES DAILY. INDDICATIONS: DIABETES 60 tablet 5    glipiZIDE (GLUCOTROL XL) 10 MG extended release tablet TAKE 1 TABLET BY MOUTH DAILY 30 tablet 3    vitamin D (ERGOCALCIFEROL) 1.25 MG (25824 UT) CAPS capsule TAKE 1 CAPSULE BY MOUTH ONCE WEEKLY 4 capsule 2    lovastatin (MEVACOR) 40 MG tablet TAKE 1 TABLET AT BEDTIME  30 tablet 4    spironolactone (ALDACTONE) 50 MG tablet TAKE 1 TABLET BY MOUTH DAILY  30 tablet 3    valsartan (DIOVAN) 80 MG tablet TAKE 1 TABLET BY MOUTH DAILY  30 tablet 5    ondansetron (ZOFRAN ODT) 4 MG disintegrating tablet Take 1 tablet by mouth daily as needed for Nausea or Vomiting 30 tablet 5    amitriptyline (ELAVIL) 10 MG tablet Take 1 tablet by mouth nightly 30 tablet 5    tamsulosin (FLOMAX) 0.4 MG capsule TAKE 1 CAPSULE BY MOUTH DAILY  30 capsule 5    isosorbide mononitrate (IMDUR) 30 MG extended release tablet Take 30 mg by mouth nightly Indications: High Blood Pressure Disorder      nitroGLYCERIN (NITROSTAT) 0.4 MG SL tablet Place 1 tablet under the tongue every 5 minutes as needed for Chest pain 25 tablet 3    nystatin (MYCOSTATIN) 368039 UNIT/GM powder APPLY 2 OR 3 TIMES DAILY. (Patient taking differently: Apply topically as needed APPLY 2 OR 3 TIMES DAILY.  ) 60 g 5    Diabetic Shoe MISC by Does not apply route 1 each 0    albuterol (PROVENTIL) (5 MG/ML) 0.5% nebulizer solution Take 0.5 mLs by nebulization every 6 hours as needed for Wheezing 120 each 0    docusate sodium (COLACE) 100 MG capsule Take 1 capsule by mouth daily as needed for Constipation 30 capsule 1    furosemide (LASIX) 40 MG tablet TAKE 1 TABLET BY MOUTH DAILY 30 tablet 5    aspirin 81 MG tablet Take 81 mg by mouth daily      mometasone (ELOCON) 0.1 % ointment Apply topically to vagina 3 times per week (Patient taking differently: Apply topically as needed Apply topically to vagina 3 times per week) 1 Tube 3    Eflornithine HCl (VANIQA) 13.9 % CREA Apply 1 Dose topically 2 times daily (Patient taking differently: Apply 1 Dose topically 2 times daily as needed ) 45 g 2    empagliflozin (JARDIANCE) 25 MG tablet Take 25 mg by mouth daily      diclofenac sodium (VOLTAREN) 1 % GEL Apply 2 g topically 4 times daily (Patient taking differently: Apply 2 g topically 4 times daily as needed for Pain ) 2 Tube 1     No current facility-administered medications for this visit. Allergies   Allergen Reactions    Codeine Other (See Comments)     Seizures as a child    Motrin [Ibuprofen] Swelling     Tongue to swell    Humalog [Insulin Lispro] Other (See Comments)     Shakiness, break out in a sweat at higher doses.  Ozempic (0.25 Or 0.5 Mg-Dose) [Semaglutide(0.25 Or 0.5mg-Dos)]      nausea       Family History   Problem Relation Age of Onset    Diabetes Father     Cancer Father     High Blood Pressure Father     Heart Attack Father     Colon Cancer Father     Diabetes Mother     Breast Cancer Mother 47    Heart Failure Mother         CHF, MVP    Liver Cancer Mother     Heart Failure Maternal Grandfather     Stroke Maternal Grandfather     Pancreatic Cancer Maternal Grandfather     Stomach Cancer Maternal Grandfather     Diabetes Brother     Colon Cancer Paternal Uncle     Rectal Cancer Other     Breast Cancer Maternal Grandmother 78    Colon Polyps Neg Hx     Crohn's Disease Neg Hx     Irritable Bowel Syndrome Neg Hx     Liver Disease Neg Hx                Subjective:      Review of Systems   Constitutional: Negative. HENT: Negative.     Eyes: Negative. Respiratory: Negative. Cardiovascular: Negative. Gastrointestinal: Negative. Endocrine: Negative. Genitourinary: Negative. Musculoskeletal:        Right rib pain   Skin: Negative. Neurological: Negative. Hematological: Negative. Psychiatric/Behavioral: Negative. Objective:     Physical Exam  Vitals and nursing note reviewed. Constitutional:       Appearance: Normal appearance. She is well-developed. Comments: obese   HENT:      Head: Normocephalic and atraumatic. Right Ear: Hearing, tympanic membrane, ear canal and external ear normal.      Left Ear: Hearing, tympanic membrane, ear canal and external ear normal.      Nose: Nose normal.      Mouth/Throat:      Pharynx: Uvula midline. Eyes:      General: Lids are normal.      Conjunctiva/sclera: Conjunctivae normal.      Pupils: Pupils are equal, round, and reactive to light. Neck:      Thyroid: No thyroid mass or thyromegaly. Trachea: Trachea normal.   Cardiovascular:      Rate and Rhythm: Normal rate and regular rhythm. Heart sounds: Normal heart sounds. Pulmonary:      Effort: Pulmonary effort is normal.      Breath sounds: Normal breath sounds. Chest:      Chest wall: Tenderness present. Abdominal:      General: Bowel sounds are normal.      Palpations: Abdomen is soft. Musculoskeletal:         General: Normal range of motion. Cervical back: Normal range of motion and neck supple. No tenderness. Thoracic back: Normal. No tenderness. Normal range of motion. Lumbar back: Normal. No tenderness. Normal range of motion. Skin:     General: Skin is warm and dry. Neurological:      Mental Status: She is alert and oriented to person, place, and time. Psychiatric:         Speech: Speech normal.         Behavior: Behavior normal.         Thought Content:  Thought content normal.         Judgment: Judgment normal.         /68 (Site: Left Upper Arm, Position: Sitting) Pulse 73   Temp 97.8 °F (36.6 °C) (Temporal)   Ht 5' 2\" (1.575 m)   Wt 197 lb 3.2 oz (89.4 kg)   LMP 01/01/1998 (Approximate)   SpO2 98%   BMI 36.07 kg/m²     Assessment:      Diagnosis Orders   1. Type 2 diabetes mellitus with complication, with long-term current use of insulin ([facility])  Hemoglobin A1C    Lipid, Fasting    empagliflozin (JARDIANCE) 25 MG tablet    Dulaglutide 0.75 MG/0.5ML SOPN   2. Essential hypertension  CBC with Auto Differential    Comprehensive Metabolic Panel    TSH with Reflex to FT4   3. Rib pain on right side     4. Mixed hyperlipidemia  Lipid, Fasting       No results found for this visit on 02/24/22. Plan:     Prescription given for Jardiance and Trulicity to take for another pharmacy to see if they can help with the cost.  If they cannot we will need to start patient on other medications possible insulin. Patient is to have labs prior to follow-up visit in 2 months. Pain patch sent to the pharmacy as well due to the rib discomfort. We will continue current medication for hyperlipidemia and hypertension. More than 50% of the time was spent counseling and coordinating care for a total time of 34 mins face to face. Return in about 2 months (around 4/24/2022) for Diabetic Follow up. Orders Placed This Encounter   Procedures    CBC with Auto Differential     Standing Status:   Future     Standing Expiration Date:   2/24/2023    Comprehensive Metabolic Panel     Standing Status:   Future     Standing Expiration Date:   2/24/2023    Hemoglobin A1C     Standing Status:   Future     Standing Expiration Date:   2/24/2023    Lipid, Fasting     Standing Status:   Future     Standing Expiration Date:   2/24/2023    TSH with Reflex to FT4     Standing Status:   Future     Standing Expiration Date:   2/24/2023       Orders Placed This Encounter   Medications    lidocaine (LIDODERM) 5 %     Sig: Place 1 patch onto the skin daily for 10 days 12 hours on, 12 hours off. Dispense:  10 patch     Refill:  0    Diclofenac Epolamine (LICART) 1.3 % OX54     Sig: Apply 1 patch topically every 24 hours     Dispense:  30 patch     Refill:  1    empagliflozin (JARDIANCE) 25 MG tablet     Sig: Take 1 tablet by mouth once daily     Dispense:  90 tablet     Refill:  1    Dulaglutide 0.75 MG/0.5ML SOPN     Sig: Inject 0.75 mg into the skin once a week     Dispense:  12 pen     Refill:  2            Patient offered educational handouts and has had all questions answered. Patient voices understanding and agrees to plans along with risks and benefits of plan. Patient is instructed to continue prior meds, diet, and exercise plans as instructed. Patient agrees to follow up as instructed and sooner if needed. Patient agrees to go to ER if condition becomes emergent. EMR Dragon/transcription disclaimer: Some of this encounter note is an electronic transcription/translation of spoken language to printed text. The electronic translation of spoken language may permit erroneous, or at times, nonsensical words or phrases to be inadvertently transcribed.  Although I have reviewed the note for such errors, some may still exist.    Electronically signed by MIKE Curry on 2/25/2022 at 12:58 PM

## 2022-02-27 ASSESSMENT — PATIENT HEALTH QUESTIONNAIRE - PHQ9
SUM OF ALL RESPONSES TO PHQ QUESTIONS 1-9: 0
SUM OF ALL RESPONSES TO PHQ QUESTIONS 1-9: 0
1. LITTLE INTEREST OR PLEASURE IN DOING THINGS: 0
SUM OF ALL RESPONSES TO PHQ QUESTIONS 1-9: 0
SUM OF ALL RESPONSES TO PHQ9 QUESTIONS 1 & 2: 0
2. FEELING DOWN, DEPRESSED OR HOPELESS: 0
SUM OF ALL RESPONSES TO PHQ QUESTIONS 1-9: 0

## 2022-03-04 ENCOUNTER — OFFICE VISIT (OUTPATIENT)
Age: 57
End: 2022-03-04
Payer: COMMERCIAL

## 2022-03-04 VITALS
BODY MASS INDEX: 35.51 KG/M2 | TEMPERATURE: 97.7 F | HEIGHT: 62 IN | SYSTOLIC BLOOD PRESSURE: 132 MMHG | HEART RATE: 79 BPM | DIASTOLIC BLOOD PRESSURE: 64 MMHG | RESPIRATION RATE: 18 BRPM | WEIGHT: 193 LBS | OXYGEN SATURATION: 97 %

## 2022-03-04 DIAGNOSIS — Z11.52 ENCOUNTER FOR SCREENING FOR COVID-19: ICD-10-CM

## 2022-03-04 DIAGNOSIS — J06.9 VIRAL URI: Primary | ICD-10-CM

## 2022-03-04 LAB — SARS-COV-2, PCR: DETECTED

## 2022-03-04 PROCEDURE — 99212 OFFICE O/P EST SF 10 MIN: CPT

## 2022-03-04 PROCEDURE — G8427 DOCREV CUR MEDS BY ELIG CLIN: HCPCS

## 2022-03-04 PROCEDURE — 1036F TOBACCO NON-USER: CPT

## 2022-03-04 PROCEDURE — G8484 FLU IMMUNIZE NO ADMIN: HCPCS

## 2022-03-04 PROCEDURE — G8417 CALC BMI ABV UP PARAM F/U: HCPCS

## 2022-03-04 PROCEDURE — 3017F COLORECTAL CA SCREEN DOC REV: CPT

## 2022-03-04 NOTE — PROGRESS NOTES
J Luis Hunt presents to the clinic today requesting COVID-19 testing. She complains of congestion and body aches. She has had positive exposure. On exam, patient appears in no acute distress. Speech is clear. Breathing is unlabored. Moves all extremities and is ambulatory. We will order a COVID test today to be performed in clinic. The patient and appropriate authorities will be informed of the results. She should quarantine at home until results are returned. If she tests positive, she should quarantine for a total of 10 days after symptoms began and 24 hours after fever resolves without fever reducing medications per CDC guidelines. Patient was advised that even if asymptomatic, after a positive result she should quarantine for 10 days per [facility] guidelines. Patient left in stable condition. Total of 15 minutes spent, which includes face to face with patient, record review, evaluation, planning, and education as well as coordination of her care.

## 2022-04-04 ENCOUNTER — TELEPHONE (OUTPATIENT)
Dept: PRIMARY CARE CLINIC | Age: 57
End: 2022-04-04

## 2022-04-04 NOTE — TELEPHONE ENCOUNTER
----- Message from Terri Pittman sent at 4/1/2022  4:41 PM CDT -----  Subject: Message to Provider    QUESTIONS  Information for Provider? pt called and stated the meds she was on for a   uti isn't helping at all, asking for something else   ---------------------------------------------------------------------------  --------------  [address]  What is the best way for the office to contact you? OK to leave message on   voicemail  Preferred Call Back Phone Number? [phone]  ---------------------------------------------------------------------------  --------------  SCRIPT ANSWERS  Relationship to Patient?  Self

## 2022-04-05 RX ORDER — SULFAMETHOXAZOLE AND TRIMETHOPRIM 400; 80 MG/1; MG/1
1 TABLET ORAL DAILY
Qty: 10 TABLET | Refills: 0 | Status: SHIPPED | OUTPATIENT
Start: 2022-04-05 | End: 2022-04-15

## 2022-04-26 ENCOUNTER — OFFICE VISIT (OUTPATIENT)
Dept: PRIMARY CARE CLINIC | Age: 57
End: 2022-04-26
Payer: COMMERCIAL

## 2022-04-26 VITALS
OXYGEN SATURATION: 99 % | WEIGHT: 197 LBS | HEART RATE: 71 BPM | SYSTOLIC BLOOD PRESSURE: 120 MMHG | TEMPERATURE: 97.6 F | HEIGHT: 63 IN | DIASTOLIC BLOOD PRESSURE: 68 MMHG | BODY MASS INDEX: 34.91 KG/M2

## 2022-04-26 DIAGNOSIS — K59.04 CHRONIC IDIOPATHIC CONSTIPATION: ICD-10-CM

## 2022-04-26 DIAGNOSIS — I10 ESSENTIAL HYPERTENSION: ICD-10-CM

## 2022-04-26 DIAGNOSIS — E55.9 VITAMIN D DEFICIENCY: ICD-10-CM

## 2022-04-26 DIAGNOSIS — K31.84 GASTROPARESIS DUE TO DM (HCC): ICD-10-CM

## 2022-04-26 DIAGNOSIS — Z79.4 TYPE 2 DIABETES MELLITUS WITH COMPLICATION, WITH LONG-TERM CURRENT USE OF INSULIN (HCC): ICD-10-CM

## 2022-04-26 DIAGNOSIS — E11.8 TYPE 2 DIABETES MELLITUS WITH COMPLICATION, WITH LONG-TERM CURRENT USE OF INSULIN (HCC): ICD-10-CM

## 2022-04-26 DIAGNOSIS — E11.43 GASTROPARESIS DUE TO DM (HCC): ICD-10-CM

## 2022-04-26 DIAGNOSIS — Z79.4 TYPE 2 DIABETES MELLITUS WITH COMPLICATION, WITH LONG-TERM CURRENT USE OF INSULIN (HCC): Primary | ICD-10-CM

## 2022-04-26 DIAGNOSIS — E11.8 TYPE 2 DIABETES MELLITUS WITH COMPLICATION, WITH LONG-TERM CURRENT USE OF INSULIN (HCC): Primary | ICD-10-CM

## 2022-04-26 LAB
ALBUMIN SERPL-MCNC: 4.7 G/DL (ref 3.5–5.2)
ALP BLD-CCNC: 85 U/L (ref 35–104)
ALT SERPL-CCNC: 21 U/L (ref 5–33)
ANION GAP SERPL CALCULATED.3IONS-SCNC: 15 MMOL/L (ref 7–19)
AST SERPL-CCNC: 19 U/L (ref 5–32)
BASOPHILS ABSOLUTE: 0.1 K/UL (ref 0–0.2)
BASOPHILS RELATIVE PERCENT: 0.9 % (ref 0–1)
BILIRUB SERPL-MCNC: 0.5 MG/DL (ref 0.2–1.2)
BUN BLDV-MCNC: 12 MG/DL (ref 6–20)
C-REACTIVE PROTEIN: 0.4 MG/DL (ref 0–0.5)
CALCIUM SERPL-MCNC: 10.5 MG/DL (ref 8.6–10)
CHLORIDE BLD-SCNC: 106 MMOL/L (ref 98–111)
CHOLESTEROL, FASTING: 181 MG/DL (ref 160–199)
CO2: 24 MMOL/L (ref 22–29)
CREAT SERPL-MCNC: 0.8 MG/DL (ref 0.5–0.9)
EOSINOPHILS ABSOLUTE: 0.2 K/UL (ref 0–0.6)
EOSINOPHILS RELATIVE PERCENT: 2.3 % (ref 0–5)
GFR AFRICAN AMERICAN: >59
GFR NON-AFRICAN AMERICAN: >60
GLUCOSE BLD-MCNC: 237 MG/DL (ref 74–109)
HBA1C MFR BLD: 8.5 % (ref 4–6)
HCT VFR BLD CALC: 44.8 % (ref 37–47)
HDLC SERPL-MCNC: 59 MG/DL (ref 65–121)
HEMOGLOBIN: 15 G/DL (ref 12–16)
IMMATURE GRANULOCYTES #: 0 K/UL
LDL CHOLESTEROL CALCULATED: 101 MG/DL
LYMPHOCYTES ABSOLUTE: 2.7 K/UL (ref 1.1–4.5)
LYMPHOCYTES RELATIVE PERCENT: 33.7 % (ref 20–40)
MCH RBC QN AUTO: 29.2 PG (ref 27–31)
MCHC RBC AUTO-ENTMCNC: 33.5 G/DL (ref 33–37)
MCV RBC AUTO: 87.2 FL (ref 81–99)
MONOCYTES ABSOLUTE: 0.5 K/UL (ref 0–0.9)
MONOCYTES RELATIVE PERCENT: 6.3 % (ref 0–10)
NEUTROPHILS ABSOLUTE: 4.5 K/UL (ref 1.5–7.5)
NEUTROPHILS RELATIVE PERCENT: 56.5 % (ref 50–65)
PDW BLD-RTO: 13.2 % (ref 11.5–14.5)
PLATELET # BLD: 251 K/UL (ref 130–400)
PMV BLD AUTO: 10.4 FL (ref 9.4–12.3)
POTASSIUM SERPL-SCNC: 4.9 MMOL/L (ref 3.5–5)
RBC # BLD: 5.14 M/UL (ref 4.2–5.4)
SEDIMENTATION RATE, ERYTHROCYTE: 8 MM/HR (ref 0–25)
SODIUM BLD-SCNC: 145 MMOL/L (ref 136–145)
TOTAL PROTEIN: 7.3 G/DL (ref 6.6–8.7)
TRIGLYCERIDE, FASTING: 105 MG/DL (ref 0–149)
TSH REFLEX FT4: 2.52 UIU/ML (ref 0.35–5.5)
VITAMIN B-12: 341 PG/ML (ref 211–946)
VITAMIN D 25-HYDROXY: 18.9 NG/ML
WBC # BLD: 8 K/UL (ref 4.8–10.8)

## 2022-04-26 PROCEDURE — 1036F TOBACCO NON-USER: CPT | Performed by: NURSE PRACTITIONER

## 2022-04-26 PROCEDURE — G8417 CALC BMI ABV UP PARAM F/U: HCPCS | Performed by: NURSE PRACTITIONER

## 2022-04-26 PROCEDURE — 3046F HEMOGLOBIN A1C LEVEL >9.0%: CPT | Performed by: NURSE PRACTITIONER

## 2022-04-26 PROCEDURE — G8427 DOCREV CUR MEDS BY ELIG CLIN: HCPCS | Performed by: NURSE PRACTITIONER

## 2022-04-26 PROCEDURE — 2022F DILAT RTA XM EVC RTNOPTHY: CPT | Performed by: NURSE PRACTITIONER

## 2022-04-26 PROCEDURE — 3017F COLORECTAL CA SCREEN DOC REV: CPT | Performed by: NURSE PRACTITIONER

## 2022-04-26 PROCEDURE — 99214 OFFICE O/P EST MOD 30 MIN: CPT | Performed by: NURSE PRACTITIONER

## 2022-04-26 RX ORDER — TAMSULOSIN HYDROCHLORIDE 0.4 MG/1
0.4 CAPSULE ORAL DAILY
Qty: 30 CAPSULE | Refills: 5 | Status: SHIPPED | OUTPATIENT
Start: 2022-04-26

## 2022-04-26 RX ORDER — GLIPIZIDE 10 MG/1
10 TABLET, FILM COATED, EXTENDED RELEASE ORAL DAILY
Qty: 30 TABLET | Refills: 3 | Status: SHIPPED | OUTPATIENT
Start: 2022-04-26

## 2022-04-26 RX ORDER — METFORMIN HYDROCHLORIDE 500 MG/1
TABLET, EXTENDED RELEASE ORAL
Qty: 60 TABLET | Refills: 5 | Status: SHIPPED | OUTPATIENT
Start: 2022-04-26

## 2022-04-26 RX ORDER — ERGOCALCIFEROL 1.25 MG/1
CAPSULE ORAL
Qty: 4 CAPSULE | Refills: 2 | Status: SHIPPED | OUTPATIENT
Start: 2022-04-26 | End: 2022-05-24 | Stop reason: SDUPTHER

## 2022-04-26 RX ORDER — VALSARTAN 80 MG/1
80 TABLET ORAL DAILY
Qty: 30 TABLET | Refills: 5 | Status: SHIPPED | OUTPATIENT
Start: 2022-04-26

## 2022-04-26 RX ORDER — LOVASTATIN 40 MG/1
TABLET ORAL
Qty: 30 TABLET | Refills: 4 | Status: SHIPPED | OUTPATIENT
Start: 2022-04-26

## 2022-04-26 RX ORDER — NITROGLYCERIN 0.4 MG/1
0.4 TABLET SUBLINGUAL EVERY 5 MIN PRN
Qty: 25 TABLET | Refills: 3 | Status: SHIPPED | OUTPATIENT
Start: 2022-04-26

## 2022-04-26 RX ORDER — DOCUSATE SODIUM 100 MG/1
100 CAPSULE, LIQUID FILLED ORAL DAILY PRN
Qty: 30 CAPSULE | Refills: 1 | Status: SHIPPED | OUTPATIENT
Start: 2022-04-26

## 2022-04-26 RX ORDER — FUROSEMIDE 40 MG/1
40 TABLET ORAL DAILY
Qty: 30 TABLET | Refills: 5 | Status: SHIPPED | OUTPATIENT
Start: 2022-04-26

## 2022-04-26 RX ORDER — ONDANSETRON 4 MG/1
4 TABLET, ORALLY DISINTEGRATING ORAL DAILY PRN
Qty: 30 TABLET | Refills: 5 | Status: SHIPPED | OUTPATIENT
Start: 2022-04-26

## 2022-04-26 ASSESSMENT — ENCOUNTER SYMPTOMS
EYES NEGATIVE: 1
RESPIRATORY NEGATIVE: 1
DIARRHEA: 1

## 2022-04-26 NOTE — PROGRESS NOTES
[address] [facility]  [address] [address]  [address] [address]  Dept: [phone]  Dept Fax: [phone]  Loc: [phone]    Riley Le is a 60 y.o. female who presents today for her medical conditions/complaints as noted below. Riley Le is c/o of Diabetes and Follow-up      Chief Complaint   Patient presents with    Diabetes    Follow-up       HPI:     HPI  Patient is here for follow up on chronic conditions including DM, HTN, and HLD. She has been taking meds as prescribed. She is still having issues with getting Trulicity due to the cost.    She is also complaining of worsening fatigue. She reports that these symptoms have worsened since COVID a few months ago. She is also complaining increased joint pain. Patient is also complaining of teeth concerns. She is needing multiple teeth pulled.       Past Medical History:   Diagnosis Date    Abnormal Pap smear of cervix     Arthritis     CAD (coronary artery disease)     stent 2011 Pat Board; sees [facility] cardiology    [facility]ervical [facility]     Class 3 severe obesity due to excess calories without serious comorbidity with body mass index (BMI) of 40.0 to 44.9 in adult ([facility] 7/28/2020    Diabetes mellitus ([facility]     ERRONEOUS ENCOUNTER--DISREGARD 09/15/2015    GERD (gastroesophageal reflux disease)     Headache(784.0)     Heart attack ([address] 12/2011    Hyperlipidemia     Hypertension     Lichen simplex chronicus 10/2015    Osteopenia     Pneumonia     Type II or unspecified type diabetes mellitus without mention of complication, not stated as uncontrolled         Past Surgical History:   Procedure Laterality Date    BREAST BIOPSY Right 6/8/2011    US guided core needle, right, benign    CARDIAC CATHETERIZATION  12/22/14  JDT    EF 50%    CARDIAC CATHETERIZATION  06/05/2017        G FLUOROSCOPIC GUIDANCE NEEDLE PLACEMENT ADD ON Left 7/19/2018    CORTICOSTEROID INJECTION performed by Jersey Cuevas MD at [address] [place] [address]    COLONOSCOPY  approx     Dr. Addison Soliman per pt recall \" Normal\"   [address] WITH STENT PLACEMENT  2011    SABA to [address] RELEASE Left 2019    LEFT INDEX AND LONG TRIGGER FINGER RELEASAE performed by Pavithra Madsen MD at [address] [place] [address]    OTHER SURGICAL HISTORY      fibrinolysis    AL CABG, ARTERY-VEIN, THREE  2014    Coronary Artery Bypass, 3    AL MANIPULATN SHLDR JT W ANESTHESIA Left 2018    SHOULDER MANIPULATION UNDER ANESTHESIA performed by Jersey Cuevas MD at [facility] OR    [facility] AND BSO Bilateral     age 27    [facility] ENDOSCOPY  approx     per pt recall \" Normal\"    UPPER GASTROINTESTINAL ENDOSCOPY N/A 2020    Dr Gleason exam, neg EoE    VULVAR/PERINEAL BIOPSY  10/07/2015    Dr. Rosie Thomas    VULVAR/PERINEAL BIOPSY N/A 2021    VULVAR BIOPSY performed by Sumaya Schmidt MD at [facility] OR       Social History     Tobacco Use    Smoking status: Former Smoker     Packs/day: 2.00     Years: 15.00     Pack years: 30.00     Types: Cigarettes     Quit date: 2014     Years since quittin.8    Smokeless tobacco: Never Used   Substance Use Topics    Alcohol use: No        Current Outpatient Medications   Medication Sig Dispense Refill    vitamin D (ERGOCALCIFEROL) 1.25 MG (56140 UT) CAPS capsule Take one capsule weekly 4 capsule 2    valsartan (DIOVAN) 80 MG tablet Take 1 tablet by mouth daily 30 tablet 5    tamsulosin (FLOMAX) 0.4 MG capsule Take 1 capsule by mouth daily 30 capsule 5    ondansetron (ZOFRAN ODT) 4 MG disintegrating tablet Take 1 tablet by mouth daily as needed for Nausea or Vomiting 30 tablet 5    nitroGLYCERIN (NITROSTAT) 0.4 MG SL tablet Place 1 tablet under the tongue every 5 minutes as needed for Chest pain 25 tablet 3    metoprolol tartrate (LOPRESSOR) 25 MG tablet Take one tablet by mouth twice daily 60 tablet 5    metFORMIN (GLUCOPHAGE-XR) 500 MG extended release tablet Take one tablet by mouth 2 times daily 60 tablet 5    lovastatin (MEVACOR) 40 MG tablet Take one tablet at bedtime 30 tablet 4    glipiZIDE (GLUCOTROL XL) 10 MG extended release tablet Take 1 tablet by mouth daily 30 tablet 3    furosemide (LASIX) 40 MG tablet Take 1 tablet by mouth daily 30 tablet 5    Dulaglutide 0.75 MG/0.5ML SOPN Inject 0.75 mg into the skin once a week 12 pen 2    docusate sodium (COLACE) 100 MG capsule Take 1 capsule by mouth daily as needed for Constipation 30 capsule 1    empagliflozin (JARDIANCE) 25 MG tablet Take 25 mg by mouth daily      Diclofenac Epolamine (LICART) 1.3 % PB03 Apply 1 patch topically every 24 hours 30 patch 1    amitriptyline (ELAVIL) 10 MG tablet Take 1 tablet by mouth nightly 30 tablet 5    isosorbide mononitrate (IMDUR) 30 MG extended release tablet Take 30 mg by mouth nightly Indications: High Blood Pressure Disorder      nystatin (MYCOSTATIN) 246106 UNIT/GM powder APPLY 2 OR 3 TIMES DAILY. (Patient taking differently: Apply topically as needed APPLY 2 OR 3 TIMES DAILY.  ) 60 g 5    Diabetic Shoe MISC by Does not apply route 1 each 0    albuterol (PROVENTIL) (5 MG/ML) 0.5% nebulizer solution Take 0.5 mLs by nebulization every 6 hours as needed for Wheezing 120 each 0    diclofenac sodium (VOLTAREN) 1 % GEL Apply 2 g topically 4 times daily (Patient taking differently: Apply 2 g topically 4 times daily as needed for Pain ) 2 Tube 1    aspirin 81 MG tablet Take 81 mg by mouth daily      mometasone (ELOCON) 0.1 % ointment Apply topically to vagina 3 times per week (Patient taking differently: Apply topically as needed Apply topically to vagina 3 times per week) 1 Tube 3    Eflornithine HCl (VANIQA) 13.9 % CREA Apply 1 Dose topically 2 times daily (Patient taking differently: Apply 1 Dose topically 2 times daily as needed ) 45 g 2     No current facility-administered medications for this visit. Allergies   Allergen Reactions    Codeine Other (See Comments)     Seizures as a child    Motrin [Ibuprofen] Swelling     Tongue to swell    Humalog [Insulin Lispro] Other (See Comments)     Shakiness, break out in a sweat at higher doses.  Ozempic (0.25 Or 0.5 Mg-Dose) [Semaglutide(0.25 Or 0.5mg-Dos)]      nausea       Family History   Problem Relation Age of Onset    Diabetes Father     Cancer Father     High Blood Pressure Father     Heart Attack Father     Colon Cancer Father     Diabetes Mother     Breast Cancer Mother 47    Heart Failure Mother         CHF, MVP    Liver Cancer Mother     Heart Failure Maternal Grandfather     Stroke Maternal Grandfather     Pancreatic Cancer Maternal Grandfather     Stomach Cancer Maternal Grandfather     Diabetes Brother     Colon Cancer Paternal Uncle     Rectal Cancer Other     Breast Cancer Maternal Grandmother 78    Colon Polyps Neg Hx     Crohn's Disease Neg Hx     Irritable Bowel Syndrome Neg Hx     Liver Disease Neg Hx                Subjective:      Review of Systems   Constitutional: Positive for fatigue. HENT: Negative. Eyes: Negative. Respiratory: Negative. Cardiovascular: Negative. Gastrointestinal: Positive for diarrhea. Endocrine: Negative. Genitourinary: Negative. Musculoskeletal: Negative. Skin: Negative. Neurological: Positive for weakness. Hematological: Negative. Psychiatric/Behavioral: Negative. Objective:     Physical Exam  Vitals and nursing note reviewed. Constitutional:       Appearance: Normal appearance. She is well-developed. Comments: obese   HENT:      Head: Normocephalic and atraumatic.       Right Ear: Hearing, tympanic membrane, ear canal and external ear normal.      Left Ear: Hearing, tympanic membrane, ear canal and external ear normal.      Nose: Nose normal.      Mouth/Throat:      Dentition: Dental caries present. Pharynx: Uvula midline. Eyes:      General: Lids are normal.      Conjunctiva/sclera: Conjunctivae normal.      Pupils: Pupils are equal, round, and reactive to light. Neck:      Thyroid: No thyroid mass or thyromegaly. Trachea: Trachea normal.   Cardiovascular:      Rate and Rhythm: Normal rate and regular rhythm. Heart sounds: Normal heart sounds. Pulmonary:      Effort: Pulmonary effort is normal.      Breath sounds: Normal breath sounds. Abdominal:      General: Bowel sounds are normal.      Palpations: Abdomen is soft. Musculoskeletal:         General: Normal range of motion. Cervical back: Normal range of motion and neck supple. No tenderness. Thoracic back: Normal. No tenderness. Normal range of motion. Lumbar back: Normal. No tenderness. Normal range of motion. Skin:     General: Skin is warm and dry. Neurological:      Mental Status: She is alert and oriented to person, place, and time. Psychiatric:         Speech: Speech normal.         Behavior: Behavior normal.         Thought Content: Thought content normal.         Judgment: Judgment normal.         /68 (Site: Left Upper Arm, Position: Sitting, Cuff Size: Medium Adult)   Pulse 71   Temp 97.6 °F (36.4 °C)   Ht 5' 3\" (1.6 m)   Wt 197 lb (89.4 kg)   LMP 01/01/1998 (Approximate)   SpO2 99%   BMI 34.90 kg/m²     Assessment:      Diagnosis Orders   1. Type 2 diabetes mellitus with complication, with long-term current use of insulin ([facility])  metFORMIN (GLUCOPHAGE-XR) 500 MG extended release tablet    lovastatin (MEVACOR) 40 MG tablet    glipiZIDE (GLUCOTROL XL) 10 MG extended release tablet    Dulaglutide 0.75 MG/0.5ML SOPN    Lipid, Fasting    Hemoglobin A1C    TSH with Reflex to FT4   2.  Gastroparesis due to DM ([facility])  ondansetron (ZOFRAN ODT) 4 MG disintegrating tablet    LOUISE Screen with Reflex    C-Reactive Protein    Sedimentation Rate    TSH with Reflex to FT4    Vitamin B12   3. Essential hypertension  metoprolol tartrate (LOPRESSOR) 25 MG tablet    CBC with Auto Differential    Comprehensive Metabolic Panel   4. Chronic idiopathic constipation  docusate sodium (COLACE) 100 MG capsule   5. Vitamin D deficiency  Vitamin D 25 Hydroxy       No results found for this visit on 04/26/22. Plan:     1. Gastroparesis due to DM [facility])  Checking labs -we will call with these results. - ondansetron (ZOFRAN ODT) 4 MG disintegrating tablet; Take 1 tablet by mouth daily as needed for Nausea or Vomiting  Dispense: 30 tablet; Refill: 5  - LOUISE Screen with Reflex; Future  - C-Reactive Protein; Future  - Sedimentation Rate; Future  - TSH with Reflex to FT4; Future  - Vitamin B12; Future    2. Essential hypertension  Stable on current regimen. - metoprolol tartrate (LOPRESSOR) 25 MG tablet; Take one tablet by mouth twice daily  Dispense: 60 tablet; Refill: 5  - CBC with Auto Differential; Future  - Comprehensive Metabolic Panel; Future    3. Type 2 diabetes mellitus with complication, with long-term current use of insulin ([facility])  Discussed Trulicity. Refill sent to pharmacy. Sample provided. If needed may try B-Cise due to cost.   - metFORMIN (GLUCOPHAGE-XR) 500 MG extended release tablet; Take one tablet by mouth 2 times daily  Dispense: 60 tablet; Refill: 5  - lovastatin (MEVACOR) 40 MG tablet; Take one tablet at bedtime  Dispense: 30 tablet; Refill: 4  - glipiZIDE (GLUCOTROL XL) 10 MG extended release tablet; Take 1 tablet by mouth daily  Dispense: 30 tablet; Refill: 3  - Dulaglutide 0.75 MG/0.5ML SOPN; Inject 0.75 mg into the skin once a week  Dispense: 12 pen; Refill: 2  - Lipid, Fasting; Future  - Hemoglobin A1C; Future  - TSH with Reflex to FT4; Future    4. Chronic idiopathic constipation    - docusate sodium (COLACE) 100 MG capsule; Take 1 capsule by mouth daily as needed for Constipation  Dispense: 30 capsule; Refill: 1    5.  Vitamin D deficiency    - Vitamin D 25 Hydroxy; Future       I do believe a lot of her symptoms are related to post COVID. Will hopefully improve over the next few months. Checking labs to rule out other causes. Return in about 4 weeks (around 5/24/2022) [facility] at [facility].     Orders Placed This Encounter   Procedures    LOUISE Screen with Reflex     Standing Status:   Future     Number of Occurrences:   1     Standing Expiration Date:   4/26/2023    C-Reactive Protein     Standing Status:   Future     Number of Occurrences:   1     Standing Expiration Date:   4/26/2023    Sedimentation Rate     Standing Status:   Future     Number of Occurrences:   1     Standing Expiration Date:   4/26/2023    CBC with Auto Differential     Standing Status:   Future     Number of Occurrences:   1     Standing Expiration Date:   4/26/2023    Comprehensive Metabolic Panel     Standing Status:   Future     Number of Occurrences:   1     Standing Expiration Date:   4/26/2023    Lipid, Fasting     Standing Status:   Future     Number of Occurrences:   1     Standing Expiration Date:   4/26/2023    Hemoglobin A1C     Standing Status:   Future     Number of Occurrences:   1     Standing Expiration Date:   4/26/2023    TSH with Reflex to FT4     Standing Status:   Future     Number of Occurrences:   1     Standing Expiration Date:   4/26/2023    Vitamin D 25 Hydroxy     Standing Status:   Future     Number of Occurrences:   1     Standing Expiration Date:   4/26/2023    Vitamin B12     Standing Status:   Future     Number of Occurrences:   1     Standing Expiration Date:   4/26/2023       Orders Placed This Encounter   Medications    vitamin D (ERGOCALCIFEROL) 1.25 MG (10723 UT) CAPS capsule     Sig: Take one capsule weekly     Dispense:  4 capsule     Refill:  2    valsartan (DIOVAN) 80 MG tablet     Sig: Take 1 tablet by mouth daily     Dispense:  30 tablet     Refill:  5    tamsulosin (FLOMAX) 0.4 MG capsule     Sig: Take 1 capsule by mouth daily     Dispense:  30 capsule     Refill:  5    ondansetron (ZOFRAN ODT) 4 MG disintegrating tablet     Sig: Take 1 tablet by mouth daily as needed for Nausea or Vomiting     Dispense:  30 tablet     Refill:  5    nitroGLYCERIN (NITROSTAT) 0.4 MG SL tablet     Sig: Place 1 tablet under the tongue every 5 minutes as needed for Chest pain     Dispense:  25 tablet     Refill:  3    metoprolol tartrate (LOPRESSOR) 25 MG tablet     Sig: Take one tablet by mouth twice daily     Dispense:  60 tablet     Refill:  5    metFORMIN (GLUCOPHAGE-XR) 500 MG extended release tablet     Sig: Take one tablet by mouth 2 times daily     Dispense:  60 tablet     Refill:  5    lovastatin (MEVACOR) 40 MG tablet     Sig: Take one tablet at bedtime     Dispense:  30 tablet     Refill:  4    glipiZIDE (GLUCOTROL XL) 10 MG extended release tablet     Sig: Take 1 tablet by mouth daily     Dispense:  30 tablet     Refill:  3    furosemide (LASIX) 40 MG tablet     Sig: Take 1 tablet by mouth daily     Dispense:  30 tablet     Refill:  5    Dulaglutide 0.75 MG/0.5ML SOPN     Sig: Inject 0.75 mg into the skin once a week     Dispense:  12 pen     Refill:  2    docusate sodium (COLACE) 100 MG capsule     Sig: Take 1 capsule by mouth daily as needed for Constipation     Dispense:  30 capsule     Refill:  1            Patient offered educational handouts and has had all questions answered. Patient voices understanding and agrees to plans along with risks and benefits of plan. Patient is instructed to continue prior meds, diet, and exercise plans as instructed. Patient agrees to follow up as instructed and sooner if needed. Patient agrees to go to ER if condition becomes emergent. EMR Dragon/transcription disclaimer: Some of this encounter note is an electronic transcription/translation of spoken language to printed text.  The electronic translation of spoken language may permit erroneous, or at times, nonsensical words or phrases to be inadvertently transcribed.  Although I have reviewed the note for such errors, some may still exist.    Electronically signed by MIKE Gonzalez on 4/26/2022 at 3:12 PM

## 2022-04-28 ENCOUNTER — TELEPHONE (OUTPATIENT)
Dept: PRIMARY CARE CLINIC | Age: 57
End: 2022-04-28

## 2022-04-28 RX ORDER — ERGOCALCIFEROL 1.25 MG/1
50000 CAPSULE ORAL WEEKLY
Qty: 12 CAPSULE | Refills: 1 | Status: SHIPPED | OUTPATIENT
Start: 2022-04-28

## 2022-04-28 NOTE — TELEPHONE ENCOUNTER
Pt expressed verbal understanding she stated she did miss 2 months of trulisity she could not afford it was almost $300 she said she thinks it is straight now and that she was given a sample in the office so she hopes her A1c will go down and she will start the vitamin D 50,000 .

## 2022-04-28 NOTE — RESULT ENCOUNTER NOTE
Please let patient know that labs have returned. Vit d was too low. Please send in 50,000 units once weekly for 12 weeks. A1c was 8.5%. please find out if she has missed any of the Trulicity doses. All other labs were stable. Still waiting for LOUISE to return. Thanks.

## 2022-04-28 NOTE — TELEPHONE ENCOUNTER
----- Message from MIKE Frank sent at 4/28/2022 10:17 AM CDT -----  Please let patient know that labs have returned. Vit d was too low. Please send in 50,000 units once weekly for 12 weeks. A1c was 8.5%. please find out if she has missed any of the Trulicity doses. All other labs were stable. Still waiting for LOUISE to return. Thanks.

## 2022-04-29 LAB — ANA IGG, ELISA: NORMAL

## 2022-05-24 ENCOUNTER — OFFICE VISIT (OUTPATIENT)
Dept: FAMILY MEDICINE CLINIC | Age: 57
End: 2022-05-24
Payer: COMMERCIAL

## 2022-05-24 VITALS
WEIGHT: 195 LBS | BODY MASS INDEX: 34.54 KG/M2 | HEART RATE: 74 BPM | TEMPERATURE: 97.6 F | SYSTOLIC BLOOD PRESSURE: 122 MMHG | OXYGEN SATURATION: 98 % | DIASTOLIC BLOOD PRESSURE: 80 MMHG

## 2022-05-24 DIAGNOSIS — E11.43 GASTROPARESIS DUE TO DM (HCC): ICD-10-CM

## 2022-05-24 DIAGNOSIS — U09.9 COVID-19 LONG HAULER: ICD-10-CM

## 2022-05-24 DIAGNOSIS — N32.89 BLADDER SPASM: ICD-10-CM

## 2022-05-24 DIAGNOSIS — K31.84 GASTROPARESIS DUE TO DM (HCC): ICD-10-CM

## 2022-05-24 DIAGNOSIS — Z79.4 TYPE 2 DIABETES MELLITUS WITH COMPLICATION, WITH LONG-TERM CURRENT USE OF INSULIN (HCC): Primary | ICD-10-CM

## 2022-05-24 DIAGNOSIS — R19.7 DIARRHEA, UNSPECIFIED TYPE: ICD-10-CM

## 2022-05-24 DIAGNOSIS — E11.8 TYPE 2 DIABETES MELLITUS WITH COMPLICATION, WITH LONG-TERM CURRENT USE OF INSULIN (HCC): Primary | ICD-10-CM

## 2022-05-24 LAB — HBA1C MFR BLD: 7.6 %

## 2022-05-24 PROCEDURE — 3017F COLORECTAL CA SCREEN DOC REV: CPT | Performed by: NURSE PRACTITIONER

## 2022-05-24 PROCEDURE — G8417 CALC BMI ABV UP PARAM F/U: HCPCS | Performed by: NURSE PRACTITIONER

## 2022-05-24 PROCEDURE — 3051F HG A1C>EQUAL 7.0%<8.0%: CPT | Performed by: NURSE PRACTITIONER

## 2022-05-24 PROCEDURE — 2022F DILAT RTA XM EVC RTNOPTHY: CPT | Performed by: NURSE PRACTITIONER

## 2022-05-24 PROCEDURE — 99214 OFFICE O/P EST MOD 30 MIN: CPT | Performed by: NURSE PRACTITIONER

## 2022-05-24 PROCEDURE — 1036F TOBACCO NON-USER: CPT | Performed by: NURSE PRACTITIONER

## 2022-05-24 PROCEDURE — 83036 HEMOGLOBIN GLYCOSYLATED A1C: CPT | Performed by: NURSE PRACTITIONER

## 2022-05-24 PROCEDURE — G8427 DOCREV CUR MEDS BY ELIG CLIN: HCPCS | Performed by: NURSE PRACTITIONER

## 2022-05-24 RX ORDER — OXYBUTYNIN CHLORIDE 5 MG/1
5 TABLET, EXTENDED RELEASE ORAL DAILY
Qty: 30 TABLET | Refills: 3 | Status: SHIPPED | OUTPATIENT
Start: 2022-05-24 | End: 2022-09-02 | Stop reason: SDUPTHER

## 2022-05-24 ASSESSMENT — ENCOUNTER SYMPTOMS
EYES NEGATIVE: 1
RESPIRATORY NEGATIVE: 1

## 2022-05-24 NOTE — PROGRESS NOTES
[facility] PHYSICIAN SERVICES  [facility] [place]  [address] [address]  Dept: [phone]  Dept Fax: [phone]  Loc: [phone]    Rashel Ken is a 60 y.o. female who presents today for her medical conditions/complaints as noted below. Rashel Ken is c/o of Diabetes (f/u)      Chief Complaint   Patient presents with    Diabetes     f/u       HPI:     HPI   Patient is here for follow up on DM and increased fatigue. She reports symptoms have continued with the fatigue. Vit d was low last month on check. Once weekly vit d supplemenet was started. Patient is having cardiology appointment June 8th. She is needing to have a lot of dental work done and is needing cardiac clearance prior. She is also having left knee pain as well. This is intermittent. Patient is also having intermittent constipation and diarrhea. She has not had colonoscopy in some time.       Past Medical History:   Diagnosis Date    Abnormal Pap smear of cervix     Arthritis     CAD (coronary artery disease)     stent 2011 Claude Cedar; [facility]    [facility]     Class 3 severe obesity due to excess calories without serious comorbidity with body mass index (BMI) of 40.0 to 44.9 in adult ([address] 7/28/2020    Diabetes mellitus ([address]     ERRONEOUS ENCOUNTER--DISREGARD 09/15/2015    GERD (gastroesophageal reflux disease)     Headache(784.0)     Heart attack ([address] 12/2011    Hyperlipidemia     Hypertension     Lichen simplex chronicus 10/2015    Osteopenia     Pneumonia     Type II or unspecified type diabetes mellitus without mention of complication, not stated as uncontrolled         Past Surgical History:   Procedure Laterality Date    BREAST BIOPSY Right 6/8/2011    US guided core needle, right, benign    CARDIAC CATHETERIZATION  12/22/14  JDT    EF 50%    CARDIAC CATHETERIZATION  06/05/2017        CHG FLUOROSCOPIC GUIDANCE NEEDLE PLACEMENT ADD ON Left 7/19/2018 CORTICOSTEROID INJECTION performed by Stefanie Colin MD at [address] [place] [address]    COLONOSCOPY  approx     Dr. Miriam Durbin per pt recall \" Normal\"   [address] WITH STENT PLACEMENT  2011    SABA to [address] RELEASE Left 2019    LEFT INDEX AND LONG TRIGGER FINGER RELEASAE performed by Diana Correa MD at [address] [place] [address]    OTHER SURGICAL HISTORY      fibrinolysis    NH CABG, ARTERY-VEIN, THREE  2014    Coronary Artery Bypass, 3    NH MANIPULATN SHLDR JT W ANESTHESIA Left 2018    SHOULDER MANIPULATION UNDER ANESTHESIA performed by Stefanie Colin MD at [facility] OR    [facility] AND BSO Bilateral     age 27    [facility] ENDOSCOPY  approx     per pt recall \" Normal\"    UPPER GASTROINTESTINAL ENDOSCOPY N/A 2020    Dr Doug Yoo exam, neg EoE    VULVAR/PERINEAL BIOPSY  10/07/2015    Dr. Brigido Perez    VULVAR/PERINEAL BIOPSY N/A 2021    VULVAR BIOPSY performed by Gamaliel Lord MD at [facility] OR       Social History     Tobacco Use    Smoking status: Former Smoker     Packs/day: 2.00     Years: 15.00     Pack years: 30.00     Types: Cigarettes     Quit date: 2014     Years since quittin.9    Smokeless tobacco: Never Used   Substance Use Topics    Alcohol use: No        Current Outpatient Medications   Medication Sig Dispense Refill    oxybutynin (DITROPAN-XL) 5 MG extended release tablet Take 1 tablet by mouth daily 30 tablet 3    vitamin D (ERGOCALCIFEROL) 1.25 MG (57604 UT) CAPS capsule Take 1 capsule by mouth once a week 12 capsule 1    valsartan (DIOVAN) 80 MG tablet Take 1 tablet by mouth daily 30 tablet 5    tamsulosin (FLOMAX) 0.4 MG capsule Take 1 capsule by mouth daily 30 capsule 5    ondansetron (ZOFRAN ODT) 4 MG disintegrating tablet Take 1 tablet by mouth daily as needed for Nausea or Vomiting 30 tablet 5    nitroGLYCERIN (NITROSTAT) 0.4 MG SL tablet Place 1 tablet under the tongue every 5 minutes as needed for Chest pain 25 tablet 3    metoprolol tartrate (LOPRESSOR) 25 MG tablet Take one tablet by mouth twice daily 60 tablet 5    metFORMIN (GLUCOPHAGE-XR) 500 MG extended release tablet Take one tablet by mouth 2 times daily 60 tablet 5    lovastatin (MEVACOR) 40 MG tablet Take one tablet at bedtime 30 tablet 4    glipiZIDE (GLUCOTROL XL) 10 MG extended release tablet Take 1 tablet by mouth daily 30 tablet 3    furosemide (LASIX) 40 MG tablet Take 1 tablet by mouth daily 30 tablet 5    Dulaglutide 0.75 MG/0.5ML SOPN Inject 0.75 mg into the skin once a week 12 pen 2    docusate sodium (COLACE) 100 MG capsule Take 1 capsule by mouth daily as needed for Constipation 30 capsule 1    empagliflozin (JARDIANCE) 25 MG tablet Take 25 mg by mouth daily      amitriptyline (ELAVIL) 10 MG tablet Take 1 tablet by mouth nightly 30 tablet 5    isosorbide mononitrate (IMDUR) 30 MG extended release tablet Take 30 mg by mouth nightly Indications: High Blood Pressure Disorder      nystatin (MYCOSTATIN) 758644 UNIT/GM powder APPLY 2 OR 3 TIMES DAILY. (Patient taking differently: Apply topically as needed APPLY 2 OR 3 TIMES DAILY.  ) 60 g 5    Diabetic Shoe MISC by Does not apply route 1 each 0    albuterol (PROVENTIL) (5 MG/ML) 0.5% nebulizer solution Take 0.5 mLs by nebulization every 6 hours as needed for Wheezing 120 each 0    diclofenac sodium (VOLTAREN) 1 % GEL Apply 2 g topically 4 times daily (Patient taking differently: Apply 2 g topically 4 times daily as needed for Pain ) 2 Tube 1    aspirin 81 MG tablet Take 81 mg by mouth daily      mometasone (ELOCON) 0.1 % ointment Apply topically to vagina 3 times per week (Patient taking differently: Apply topically as needed Apply topically to vagina 3 times per week) 1 Tube 3    Eflornithine HCl (VANIQA) 13.9 % CREA Apply 1 Dose topically 2 times daily (Patient taking differently: Apply 1 Dose topically 2 times daily as needed ) 45 g 2     No current facility-administered medications for this visit. Allergies   Allergen Reactions    Codeine Other (See Comments)     Seizures as a child    Motrin [Ibuprofen] Swelling     Tongue to swell    Humalog [Insulin Lispro] Other (See Comments)     Shakiness, break out in a sweat at higher doses.  Ozempic (0.25 Or 0.5 Mg-Dose) [Semaglutide(0.25 Or 0.5mg-Dos)]      nausea       Family History   Problem Relation Age of Onset    Diabetes Father     Cancer Father     High Blood Pressure Father     Heart Attack Father     Colon Cancer Father     Diabetes Mother     Breast Cancer Mother 47    Heart Failure Mother         CHF, MVP    Liver Cancer Mother     Heart Failure Maternal Grandfather     Stroke Maternal Grandfather     Pancreatic Cancer Maternal Grandfather     Stomach Cancer Maternal Grandfather     Diabetes Brother     Colon Cancer Paternal Uncle     Rectal Cancer Other     Breast Cancer Maternal Grandmother 78    Colon Polyps Neg Hx     Crohn's Disease Neg Hx     Irritable Bowel Syndrome Neg Hx     Liver Disease Neg Hx                Subjective:      Review of Systems   Constitutional: Positive for fatigue. HENT: Negative. Eyes: Negative. Respiratory: Negative. Cardiovascular: Negative. Gastrointestinal: Positive for diarrhea. Endocrine: Negative. Genitourinary: Negative. Musculoskeletal: Negative. Skin: Negative. Neurological: Positive for weakness. Hematological: Negative. Psychiatric/Behavioral: Negative. Objective:     Physical Exam  Vitals and nursing note reviewed. Constitutional:       Appearance: Normal appearance. She is well-developed. Comments: obese   HENT:      Head: Normocephalic and atraumatic.       Right Ear: Hearing, tympanic membrane, ear canal and external ear normal.      Left Ear: Hearing, tympanic membrane, ear canal and external ear normal.      Nose: Nose normal.      Mouth/Throat:      Pharynx: Uvula midline. Eyes:      General: Lids are normal.      Conjunctiva/sclera: Conjunctivae normal.      Pupils: Pupils are equal, round, and reactive to light. Neck:      Thyroid: No thyroid mass or thyromegaly. Trachea: Trachea normal.   Cardiovascular:      Rate and Rhythm: Normal rate and regular rhythm. Heart sounds: Normal heart sounds. Pulmonary:      Effort: Pulmonary effort is normal.      Breath sounds: Normal breath sounds. Abdominal:      General: Bowel sounds are normal.      Palpations: Abdomen is soft. Musculoskeletal:         General: Normal range of motion. Cervical back: Normal range of motion and neck supple. No tenderness. Thoracic back: Normal. No tenderness. Normal range of motion. Lumbar back: Normal. No tenderness. Normal range of motion. Skin:     General: Skin is warm and dry. Neurological:      Mental Status: She is alert and oriented to person, place, and time. Psychiatric:         Speech: Speech normal.         Behavior: Behavior normal.         Thought Content: Thought content normal.         Judgment: Judgment normal.         /80 (Site: Right Upper Arm, Position: Sitting, Cuff Size: Large Adult)   Pulse 74   Temp 97.6 °F (36.4 °C) (Temporal)   Wt 195 lb (88.5 kg)   LMP 01/01/1998 (Approximate)   SpO2 98%   BMI 34.54 kg/m²     Assessment:      Diagnosis Orders   1. Type 2 diabetes mellitus with complication, with long-term current use of insulin ([facility])  POCT glycosylated hemoglobin (Hb A1C)   2. Diarrhea, unspecified type  Lori Sapp, MIKE Lynn, Gastroenterology, [facility]   3. Gastroparesis due to DM ([facility] [address]     4. COVID-19 long hauler     5.  Bladder spasm  oxybutynin (DITROPAN-XL) 5 MG extended release tablet       Results for orders placed or performed in visit on 05/24/22   POCT glycosylated hemoglobin (Hb A1C)   Result Value Ref Range    Hemoglobin A1C 7.6 %       Plan:     1. Type 2 diabetes mellitus with complication, with long-term current use of insulin (HCC)  a1c has improved. Will continue current regimen.      - POCT glycosylated hemoglobin (Hb A1C)    2. Diarrhea, unspecified type  Referral to GI due to intermittent diarrhea and constipation. Patient even complaining of having compaction at some point.    - Lori 62, [facility], APRN, Gastroenterology, [facility]    3. Gastroparesis due to DM ([facility] [address]      4. COVID-19 long hauler  I believe continued fatigue is related to [address]. 5. Bladder spasm  Will try Oxybutynin for bladder spasms. - oxybutynin (DITROPAN-XL) 5 MG extended release tablet; Take 1 tablet by mouth daily  Dispense: 30 tablet; Refill: 3       Return in about 2 months (around 7/24/2022) for Follow up chronic conditions. Orders Placed This Encounter   Procedures   Chito Griffiths, [facility]astroenterology, [facility]     Referral Priority:   Routine     Referral Type:   Eval and Treat     Referral Reason:   Specialty Services Required     Referred to Provider:   MIKE Hameed     Requested Specialty:   [facility] Nurse Practitioner     Number of Visits Requested:   1    POCT glycosylated hemoglobin (Hb A1C)       Orders Placed This Encounter   Medications    oxybutynin (DITROPAN-XL) 5 MG extended release tablet     Sig: Take 1 tablet by mouth daily     Dispense:  30 tablet     Refill:  3            Patient offered educational handouts and has had all questions answered. Patient voices understanding and agrees to plans along with risks and benefits of plan. Patient is instructed to continue prior meds, diet, and exercise plans as instructed. Patient agrees to follow up as instructed and sooner if needed. Patient agrees to go to ER if condition becomes emergent. EMR Dragon/transcription disclaimer: Some of this encounter note is an electronic transcription/translation of spoken language to printed text.  The electronic translation of spoken language may permit erroneous, or at times, nonsensical words or phrases to be inadvertently transcribed.  Although I have reviewed the note for such errors, some may still exist.    Electronically signed by MIKE Moreno on 5/25/2022 at 8:49 AM

## 2022-05-25 ASSESSMENT — ENCOUNTER SYMPTOMS: DIARRHEA: 1

## 2022-05-31 ENCOUNTER — NURSE ONLY (OUTPATIENT)
Dept: PRIMARY CARE CLINIC | Age: 57
End: 2022-05-31
Payer: COMMERCIAL

## 2022-05-31 DIAGNOSIS — Z23 NEED FOR COVID-19 VACCINE: Primary | ICD-10-CM

## 2022-05-31 PROCEDURE — 91305 COVID-19, PFIZER GRAY TOP, DO NOT DILUTE, TRIS-SUCROSE, 12+ YRS, PF, 30MCG/ 0.3 ML DOSE: CPT | Performed by: NURSE PRACTITIONER

## 2022-05-31 PROCEDURE — 0054A COVID-19, PFIZER GRAY TOP, DO NOT DILUTE, TRIS-SUCROSE, 12+ YRS, PF, 30MCG/ 0.3 ML DOSE: CPT | Performed by: NURSE PRACTITIONER

## 2022-05-31 NOTE — PROGRESS NOTES
After obtaining consent, and per orders of Rosas Can, injection of covid 19 given in Right deltoid by [address] Patient instructed to remain in clinic for 20 minutes afterwards, and to report any adverse reaction to me immediately.

## 2022-06-08 ENCOUNTER — OFFICE VISIT (OUTPATIENT)
Dept: CARDIOLOGY CLINIC | Age: 57
End: 2022-06-08
Payer: COMMERCIAL

## 2022-06-08 VITALS
WEIGHT: 198 LBS | HEIGHT: 62 IN | DIASTOLIC BLOOD PRESSURE: 82 MMHG | SYSTOLIC BLOOD PRESSURE: 134 MMHG | BODY MASS INDEX: 36.44 KG/M2

## 2022-06-08 DIAGNOSIS — I25.10 CORONARY ARTERY DISEASE INVOLVING NATIVE CORONARY ARTERY OF NATIVE HEART WITHOUT ANGINA PECTORIS: Primary | ICD-10-CM

## 2022-06-08 DIAGNOSIS — R06.09 DOE (DYSPNEA ON EXERTION): ICD-10-CM

## 2022-06-08 DIAGNOSIS — I50.32 CHRONIC DIASTOLIC CONGESTIVE HEART FAILURE (HCC): ICD-10-CM

## 2022-06-08 DIAGNOSIS — I10 ESSENTIAL HYPERTENSION: ICD-10-CM

## 2022-06-08 DIAGNOSIS — R40.0 DAYTIME SOMNOLENCE: ICD-10-CM

## 2022-06-08 DIAGNOSIS — E78.2 MIXED HYPERLIPIDEMIA: ICD-10-CM

## 2022-06-08 PROCEDURE — G8417 CALC BMI ABV UP PARAM F/U: HCPCS | Performed by: CLINICAL NURSE SPECIALIST

## 2022-06-08 PROCEDURE — 3017F COLORECTAL CA SCREEN DOC REV: CPT | Performed by: CLINICAL NURSE SPECIALIST

## 2022-06-08 PROCEDURE — 99214 OFFICE O/P EST MOD 30 MIN: CPT | Performed by: CLINICAL NURSE SPECIALIST

## 2022-06-08 PROCEDURE — 93000 ELECTROCARDIOGRAM COMPLETE: CPT | Performed by: CLINICAL NURSE SPECIALIST

## 2022-06-08 PROCEDURE — 1036F TOBACCO NON-USER: CPT | Performed by: CLINICAL NURSE SPECIALIST

## 2022-06-08 PROCEDURE — G8427 DOCREV CUR MEDS BY ELIG CLIN: HCPCS | Performed by: CLINICAL NURSE SPECIALIST

## 2022-06-08 RX ORDER — RANOLAZINE 500 MG/1
500 TABLET, EXTENDED RELEASE ORAL 2 TIMES DAILY
Qty: 60 TABLET | Refills: 3 | Status: SHIPPED | OUTPATIENT
Start: 2022-06-08

## 2022-06-08 RX ORDER — FLUCONAZOLE 200 MG/1
TABLET ORAL
COMMUNITY
Start: 2022-05-10 | End: 2022-06-09 | Stop reason: ALTCHOICE

## 2022-06-08 NOTE — PATIENT INSTRUCTIONS
Start Ranexa 500mg twice a day- this is to help with exertional chset pain  Recommend sleep study - they will call and set up  Follow up in 4-6 weeks   Call with any questions or concerns  Follow up with IMKE Dvaid for non cardiac problems  Report any new problems  Cardiovascular Fitness-Exercise as tolerated. Strive for 30 minutes of exercise most days of the week. Cardiac / Healthy Diet- low sodium  Continue keeping glucose under control   Continue current medications as directed  Continue plan of treatment  It is always recommended that you bring your medications bottles with you to each visit - this is for your safety! Patient Education        Sleep Apnea: Care Instructions  Overview     Sleep apnea means that you frequently stop breathing for 10 seconds or longer during sleep. It can be mild to severe, based on the number of times an hourthat you stop breathing. Blocked or narrowed airways in your nose, mouth, or throat can cause sleep apnea. Your airway can become blocked when your throat muscles and tongue relaxduring sleep. You can help treat sleep apnea at home by making lifestyle changes. You also can use a CPAP breathing machine that keeps tissues in the throat from blocking your airway. Or your doctor may suggest that you use a breathing device while you sleep. It helps keep your airway open. This could be a device that you put in your mouth. In some cases, surgery may be needed to remove enlarged tissuesin the throat. Follow-up care is a key part of your treatment and safety. Be sure to make and go to all appointments, and call your doctor if you are having problems. It's also a good idea to know your test results and keep alist of the medicines you take. How can you care for yourself at home?  Lose weight, if needed.  Sleep on your side. It may help mild apnea.  Avoid alcohol and medicines such as sleeping pills, opioids, or sedatives before bed.  Don't smoke.  If you need help quitting, talk to your doctor.  Prop up the head of your bed.  Treat breathing problems, such as a stuffy nose, that are caused by a cold or allergies.  Try a continuous positive airway pressure (CPAP) breathing machine if your doctor recommends it.  If CPAP doesn't work for you, ask your doctor if you can try other masks, settings, or breathing machines.  Try oral breathing devices or other nasal devices.  Talk to your doctor if your nose feels dry or bleeds, or if it gets runny or stuffy when you use a breathing machine.  Tell your doctor if you're sleepy during the day and it affects your daily life. Don't drive or operate machinery when you're drowsy. When should you call for help? Watch closely for changes in your health, and be sure to contact your doctor if:     You still have sleep apnea even though you have made lifestyle changes.      You are thinking of trying a device such as CPAP.      You are having problems using a CPAP or similar machine.      You are still sleepy during the day, and it affects your daily life. Where can you learn more? Go to https://[employer]utshell.Thinkr. org and sign in to your [employer] account. Enter X228 in the K[employer]nech box to learn more about \"Sleep Apnea: Care Instructions. \"     If you do not have an account, please click on the \"Sign Up Now\" link. Current as of: July 6, 2021               Content Version: 13.2  © 2006-2022 [employer]wise, [employer]. Care instructions adapted under license by [address] If you have questions about a medical condition or this instruction, always ask your healthcare professional. [place] [address] any warranty or liability for your use of this information.

## 2022-06-08 NOTE — PROGRESS NOTES
[facility]  [facility] [address]  [address]  Phone: [phone]  Fax: [phone]    OFFICE VISIT:  2022    Maurilio Bowen - : 1965    Reason For Visit:  Merline Dull is a 60 y.o. female who is here for 6 Month Follow-Up (pt has soa), Coronary Artery Disease, Congestive Heart Failure, and Hypertension  CAD with CABG ×3 in , hypertension, hyperlipidemia and diabetes . Heart catheterization in  showed occluded LIMA to the LAD with patient and grafts. 19  Cath  Occluded LIMA-LAD at the anastomosis to the LAD itself, patent VG-diag, patent VG-PDA, apical akinesis, EF 45%  Recommend continuing ongoing medical management     2D echo 3/4/2021 shows normal LV size and function with EF 55 to 60%.  No significant valvular disease noted    She has noticed more TAVERAS, fatigue and falling asleep during the day. Try to do some walking but just gets so exhausted. Has some mild edema but overall stable. States she gets up several times a night to go to the bathroom  She has had some episodes of chest tightness with activity. She is taking nitro occasionally. Last episode was a few weeks ago. Zaira Petties denies staying shortness of breath, orthopnea, paroxysmal nocturnal dyspnea, syncope, presyncope, arrhythmia, . The patient denies numbness or weakness to suggest cerebrovascular accident or transient ischemic attack. MIKE Beyer is PCP and follows labs including lipids.   Maurilio Bowen has the following history as recorded in [facility]:    Patient Active Problem List    Diagnosis Date Noted    Gastroparesis due to DM ([facility] 2022    Chronic diastolic congestive heart failure ([address] 2021    Morbid (severe) obesity due to excess calories (HCC) 2020    Chronic nausea 2020    Chronic vomiting 2020    Change in bowel habits 2020    Family history of colon cancer 2020    Alternating constipation and diarrhea 2020    S/P cholecystectomy 06/05/2020    Chronic heartburn 06/05/2020    Gastroesophageal reflux disease without esophagitis 08/06/2018    Dysuria 08/06/2018    Vitamin D deficiency 08/06/2018    Urinary tract infection without hematuria 08/06/2018    Adhesive capsulitis of left shoulder 07/18/2018    Angina effort 01/23/2018    Coronary artery disease involving coronary bypass graft of native heart without angina pectoris 09/05/2017    Left ventricular dysfunction 09/05/2017    Abnormal nuclear cardiac imaging test     Lichen simplex chronicus 10/21/2015    Personal history of malignant neoplasm of cervix uteri 05/11/2015    Atrophic vaginitis 05/11/2015    History of pleural effusion 04/29/2015    Candidal intertrigo 02/03/2015    Weakness 02/03/2015    S/P CABG x 3 02/03/2015    TAVERAS (dyspnea on exertion) 12/01/2014    Type 2 diabetes mellitus with complication, with long-term current use of insulin ([address]     CAD (coronary artery disease) 07/30/2013    Essential hypertension 07/30/2013    Hyperlipidemia 07/30/2013    Heart attack ([address] 12/01/2011     Past Medical History:   Diagnosis Date    Abnormal Pap smear of cervix     Arthritis     CAD (coronary artery disease)     stent 2011 Cathryne Room; sees [facility] cardiology    Cervical cancer ([address]     Class 3 severe obesity due to excess calories without serious comorbidity with body mass index (BMI) of 40.0 to 44.9 in adult ([address] 07/28/2020    Diabetes mellitus ([address]     ERRONEOUS ENCOUNTER--DISREGARD 09/15/2015    GERD (gastroesophageal reflux disease)     Headache(784.0)     Heart attack ([address] 12/2011    Hyperlipidemia     Hypertension     Lichen simplex chronicus 10/2015    Osteopenia     Pneumonia     Type II or unspecified type diabetes mellitus without mention of complication, not stated as uncontrolled      Past Surgical History:   Procedure Laterality Date    BREAST BIOPSY Right 06/08/2011    US guided core needle, right, benign  CARDIAC CATHETERIZATION  12/22/14  JDT    EF 50%    CARDIAC CATHETERIZATION  06/05/2017        CHG FLUOROSCOPIC GUIDANCE NEEDLE PLACEMENT ADD ON Left 07/19/2018    CORTICOSTEROID INJECTION performed by Chase Alatorre MD at [address] [place] [address]    COLONOSCOPY  approx 2005    Dr. Mary Zheng per pt recall \" Normal\"   [address] WITH STENT PLACEMENT  12/2011    SABA to [address]    HYSTERECTOMY  1995    OTHER SURGICAL HISTORY      fibrinolysis    [place] CABG, ARTERY-VEIN, THREE  12/23/2014    Coronary Artery Bypass, 3    WI MANIPULATN SHLDR JT W ANESTHESIA Left 07/19/2018    SHOULDER MANIPULATION UNDER ANESTHESIA performed by Chase Alatorre MD at [facility] W/ BILATERAL SALPINGOOPHORECTOMY Bilateral 1995    age 27    [address] Left 11/08/2019    LEFT INDEX AND LONG TRIGGER FINGER RELEASAE performed by Pedro Garza MD at [place] [address] ENDOSCOPY  approx 2005    per pt recall \" Normal\"    UPPER GASTROINTESTINAL ENDOSCOPY N/A 06/19/2020    Dr Emilie Torres exam, neg EoE    VULVAR/PERINEAL BIOPSY  10/07/2015    Dr. Nina Madison    VULVAR/PERINEAL BIOPSY N/A 07/02/2021    VULVAR BIOPSY performed by Ant Dow MD at [facility] OR     Family History   Problem Relation Age of Onset    Diabetes Father     Cancer Father     High Blood Pressure Father     Heart Attack Father     Colon Cancer Father     Diabetes Mother     Breast Cancer Mother 47    Heart Failure Mother         CHF, MVP    Liver Cancer Mother     Heart Failure Maternal Grandfather     Stroke Maternal Grandfather     Pancreatic Cancer Maternal Grandfather     Stomach Cancer Maternal Grandfather     Diabetes Brother     Colon Cancer Paternal Uncle     Rectal Cancer Other     Breast Cancer Maternal Grandmother 78    Colon Polyps Neg Hx     Crohn's Disease Neg Hx     Irritable Bowel Syndrome Neg Hx     Liver Disease Neg Hx      Social History     Tobacco Use    Smoking status: Former Smoker     Packs/day: 2.00     Years: 15.00     Pack years: 30.00     Types: Cigarettes     Quit date: 2014     Years since quittin.9    Smokeless tobacco: Never Used   Substance Use Topics    Alcohol use: No      Current Outpatient Medications   Medication Sig Dispense Refill    ranolazine (RANEXA) 500 MG extended release tablet Take 1 tablet by mouth 2 times daily 60 tablet 3    oxybutynin (DITROPAN-XL) 5 MG extended release tablet Take 1 tablet by mouth daily 30 tablet 3    vitamin D (ERGOCALCIFEROL) 1.25 MG (24398 UT) CAPS capsule Take 1 capsule by mouth once a week 12 capsule 1    valsartan (DIOVAN) 80 MG tablet Take 1 tablet by mouth daily 30 tablet 5    tamsulosin (FLOMAX) 0.4 MG capsule Take 1 capsule by mouth daily 30 capsule 5    ondansetron (ZOFRAN ODT) 4 MG disintegrating tablet Take 1 tablet by mouth daily as needed for Nausea or Vomiting 30 tablet 5    nitroGLYCERIN (NITROSTAT) 0.4 MG SL tablet Place 1 tablet under the tongue every 5 minutes as needed for Chest pain 25 tablet 3    metoprolol tartrate (LOPRESSOR) 25 MG tablet Take one tablet by mouth twice daily 60 tablet 5    metFORMIN (GLUCOPHAGE-XR) 500 MG extended release tablet Take one tablet by mouth 2 times daily 60 tablet 5    lovastatin (MEVACOR) 40 MG tablet Take one tablet at bedtime 30 tablet 4    glipiZIDE (GLUCOTROL XL) 10 MG extended release tablet Take 1 tablet by mouth daily 30 tablet 3    furosemide (LASIX) 40 MG tablet Take 1 tablet by mouth daily 30 tablet 5    Dulaglutide 0.75 MG/0.5ML SOPN Inject 0.75 mg into the skin once a week 12 pen 2    docusate sodium (COLACE) 100 MG capsule Take 1 capsule by mouth daily as needed for Constipation 30 capsule 1    empagliflozin (JARDIANCE) 25 MG tablet Take 25 mg by mouth daily      amitriptyline (ELAVIL) 10 MG tablet Take 1 tablet by mouth nightly 30 tablet 5    isosorbide mononitrate (IMDUR) 30 MG extended release tablet Take 30 mg by mouth nightly Indications: High Blood Pressure Disorder      nystatin (MYCOSTATIN) 346781 UNIT/GM powder APPLY 2 OR 3 TIMES DAILY. (Patient taking differently: Apply topically as needed APPLY 2 OR 3 TIMES DAILY. ) 60 g 5    Diabetic Shoe MISC by Does not apply route 1 each 0    albuterol (PROVENTIL) (5 MG/ML) 0.5% nebulizer solution Take 0.5 mLs by nebulization every 6 hours as needed for Wheezing 120 each 0    diclofenac sodium (VOLTAREN) 1 % GEL Apply 2 g topically 4 times daily (Patient taking differently: Apply 2 g topically 4 times daily as needed for Pain ) 2 Tube 1    aspirin 81 MG tablet Take 81 mg by mouth daily      mometasone (ELOCON) 0.1 % ointment Apply topically to vagina 3 times per week (Patient taking differently: Apply topically as needed Apply topically to vagina 3 times per week) 1 Tube 3    Eflornithine HCl (VANIQA) 13.9 % CREA Apply 1 Dose topically 2 times daily (Patient taking differently: Apply 1 Dose topically 2 times daily as needed ) 45 g 2    fluconazole (DIFLUCAN) 200 MG tablet        No current facility-administered medications for this visit. Allergies: Codeine, Motrin [ibuprofen], Humalog [insulin lispro], and Ozempic (0.25 or 0.5 mg-dose) [semaglutide(0.25 or 0.5mg-dos)]    Review of Systems  Constitutional -+ significant change in activity tolerance. No, appetite change, or unexpected weight change. No fever, chills or diaphoresis. + fatigue. HEENT - no significant rhinorrhea or epistaxis. No tinnitus or significant hearing loss. Eyes - no sudden vision change or amaurosis. Respiratory - no significant wheezing, stridor, apnea or cough. + dyspnea on exertion no resting shortness of breath. Cardiovascular - no exertional chest pain, orthopnea or PND. No sensation of arrhythmia or slow heart rate. No claudication or leg edema.   Gastrointestinal - no abdominal swelling or pain. No blood in stool. No severe constipation, diarrhea, nausea, or vomiting. Genitourinary - no difficulty urinating, dysuria, frequency, or urgency. No flank pain or hematuria. Musculoskeletal - no back pain, gait disturbance, or myalgia. Skin - no color change or rash. No pallor. No new surgical incision. Neurologic - no speech difficulty, facial asymmetry or lateralizing weakness. No seizures, presyncope, syncope, or significant dizziness. Hematologic - no easy bruising or excessive bleeding. Psychiatric - no severe anxiety or insomnia. No confusion. All other review of systems are negative. Objective  Vital Signs - /82   Ht 5' 2\" (1.575 m)   Wt 198 lb (89.8 kg)   LMP 01/01/1998 (Approximate)   BMI 36.21 kg/m²   General - Bella Mortimer is alert, cooperative, and pleasant. Well groomed. No acute distress. Body habitus is obese. HEENT - The head is normocephalic. No circumoral cyanosis. Dentition is normal.   EYES -  No Xanthelasma, no arcus senilis, no conjunctival hemorrhages or discharge. Neck - Supple, without increased jugular venous pressures. No carotid bruits. No mass. Respiratory - Lungs are clear bilaterally. No wheezes or rales. Normal effort without use of accessory muscles. Cardiovascular - Heart has regular rhythm and rate. No murmurs, rubs or gallops. + pedal pulses and no varicosities. Abdominal -  Soft, nontender, nondistended. Bowel sounds are intact. Extremities - No clubbing, cyanosis, or  edema. Musculoskeletal -  No clubbing . No Osler's nodes. Gait normal .  No kyphosis or scoliosis. Skin -  no statis ulcers or dermatitis. Neurological - No focal signs are identified. Oriented to person, place and time. Psychiatric -  Appropriate affect and mood. Assessment:     Diagnosis Orders   1. Coronary artery disease involving native coronary artery of native heart without angina pectoris     2.  Essential diabetic disease with collateral circulation. We will try adding Ranexa to see if we can improve her activity tolerance. Encouraged to be as active as possible. TAVERAS is multifactorial with obesity, chronic diastolic heart failure and inactivity. Appears euvolemic. She does follow with pulmonology. They are following knowledgeable, Dr. Leatha Parada has appointment later this month with PFTs    At risk for JAYSON with obesity. Reports daytime somnolence and increasing fatigue. We discussed relationship of untreated JAYSON and worsening of heart failure, blood pressure and arrhythmias. We will get a referral for sleep study. She is agreeable      Has upcoming GI apt due to constipation/diarrhea      2D echo 2/9/2021  Structurally normal mitral valve with normal leaflet mobility. No evidence   of mitral valve stenosis or mild mitral regurgitation. Aortic valve appears to be tricuspid. Structurally normal aortic valve. No significant aortic regurgitation or stenosis is noted. Tricuspid valve is structurally normal.   mild Tr   pasp 30 mm hg   Normal left ventricular size with preserved LV function and an estimated   ejection fraction of approximately 55-60%. No evidence of left ventricular mass or thrombus noted. Normal right atrial dimension with no evidence of thrombus or mass noted. No evidence of significant pericardial effusion is noted. -------------------------------------------------   Electronically signed by Natalya Us MD(Interpreting   physician) on 03/05/2021 08:10 AM       States taking medications as prescribed    30 minutes were spent preparing, reviewing and seeing patient.   All questions answered    Plan    Start Ranexa 500mg twice a day- this is to help with exertional chset pain  Recommend sleep study - they will call and set up  Follow up in 4-6 weeks   Call with any questions or concerns  Follow up with MIKE Espinal for non cardiac problems  Report any new problems  Cardiovascular Fitness-Exercise as tolerated. Strive for 30 minutes of exercise most days of the week. Cardiac / Healthy Diet- low sodium  Continue keeping glucose under control   Continue current medications as directed  Continue plan of treatment  It is always recommended that you bring your medications bottles with you to each visit - this is for your safety! MIKE Ramos    EMR dragon/transcription disclaimer: Much of this encounter note is electronic transcription/translation of spoken language to printed tach. Electronic translation of spoken language may be erroneous, or at times, nonsensical words or phrases may be inadvertently transcribed.  Although, I have reviewed the note for such errors, some may still exist.

## 2022-06-09 ENCOUNTER — HOSPITAL ENCOUNTER (OUTPATIENT)
Dept: GENERAL RADIOLOGY | Age: 57
Discharge: HOME | End: 2022-06-09
Payer: COMMERCIAL

## 2022-06-09 ENCOUNTER — OFFICE VISIT (OUTPATIENT)
Dept: GASTROENTEROLOGY | Age: 57
End: 2022-06-09
Payer: COMMERCIAL

## 2022-06-09 VITALS
DIASTOLIC BLOOD PRESSURE: 66 MMHG | HEART RATE: 69 BPM | WEIGHT: 195.8 LBS | BODY MASS INDEX: 34.69 KG/M2 | SYSTOLIC BLOOD PRESSURE: 114 MMHG | OXYGEN SATURATION: 98 % | HEIGHT: 63 IN

## 2022-06-09 DIAGNOSIS — E11.43 GASTROPARESIS DUE TO DM (HCC): ICD-10-CM

## 2022-06-09 DIAGNOSIS — K59.00 CONSTIPATION, UNSPECIFIED CONSTIPATION TYPE: ICD-10-CM

## 2022-06-09 DIAGNOSIS — R19.7 DIARRHEA, UNSPECIFIED TYPE: ICD-10-CM

## 2022-06-09 DIAGNOSIS — R11.0 CHRONIC NAUSEA: ICD-10-CM

## 2022-06-09 DIAGNOSIS — K31.84 GASTROPARESIS DUE TO DM (HCC): ICD-10-CM

## 2022-06-09 DIAGNOSIS — R10.9 RIGHT SIDED ABDOMINAL PAIN: ICD-10-CM

## 2022-06-09 DIAGNOSIS — R11.10 CHRONIC VOMITING: ICD-10-CM

## 2022-06-09 DIAGNOSIS — R10.9 LEFT SIDED ABDOMINAL PAIN: ICD-10-CM

## 2022-06-09 DIAGNOSIS — R19.7 DIARRHEA, UNSPECIFIED TYPE: Primary | ICD-10-CM

## 2022-06-09 PROCEDURE — G8427 DOCREV CUR MEDS BY ELIG CLIN: HCPCS | Performed by: NURSE PRACTITIONER

## 2022-06-09 PROCEDURE — 3051F HG A1C>EQUAL 7.0%<8.0%: CPT | Performed by: NURSE PRACTITIONER

## 2022-06-09 PROCEDURE — 99214 OFFICE O/P EST MOD 30 MIN: CPT | Performed by: NURSE PRACTITIONER

## 2022-06-09 PROCEDURE — 2022F DILAT RTA XM EVC RTNOPTHY: CPT | Performed by: NURSE PRACTITIONER

## 2022-06-09 PROCEDURE — 74018 RADEX ABDOMEN 1 VIEW: CPT

## 2022-06-09 PROCEDURE — 3017F COLORECTAL CA SCREEN DOC REV: CPT | Performed by: NURSE PRACTITIONER

## 2022-06-09 PROCEDURE — G8417 CALC BMI ABV UP PARAM F/U: HCPCS | Performed by: NURSE PRACTITIONER

## 2022-06-09 PROCEDURE — 74018 RADEX ABDOMEN 1 VIEW: CPT | Performed by: RADIOLOGY

## 2022-06-09 PROCEDURE — 1036F TOBACCO NON-USER: CPT | Performed by: NURSE PRACTITIONER

## 2022-06-09 ASSESSMENT — ENCOUNTER SYMPTOMS
RECTAL PAIN: 0
ABDOMINAL DISTENTION: 0
BLOOD IN STOOL: 0
ABDOMINAL PAIN: 1
CONSTIPATION: 1
BACK PAIN: 1
NAUSEA: 1
VOMITING: 1
TROUBLE SWALLOWING: 0
SORE THROAT: 0
ANAL BLEEDING: 0
SHORTNESS OF BREATH: 1
VOICE CHANGE: 0
COUGH: 0
DIARRHEA: 1

## 2022-06-09 NOTE — PATIENT INSTRUCTIONS

## 2022-06-09 NOTE — PROGRESS NOTES
Subjective:      Edin Jack is a59 y.o. female  Chief Complaint   Patient presents with    Diarrhea    Constipation       HPI  PCP: MIKE Samuel  Pt reports alternating constipation with diarrhea  Chronic for many years  Reports she has 10+ diarrhea with fecal incontinence then the next day she passes a single \"goat pellet\" stool and has to strain to get it out  This pattern all recycles  No blood in stool    Reports bilateral side pain  Really only occurs when she bends over   Occurs a few times a week on average  Describes as \"sharp pain\"  A big knot comes on, then she has to press on   And within a minute or 2 the pain resolves on its own  Chronic for 10 years    Has gastroparesis  With chronic nausea and vomiting  Uses zofran prn and this regimen works best her for  Has tried bethanchol in the past and this worked good but she likes the zofran better       Family HX:  Father had colon cancer, paternal uncle had colon cancer  Pt denies family hx of inflammatory bowel dx, gastric CA and esophageal CA.     Past Medical History:   Diagnosis Date    Abnormal Pap smear of cervix     Arthritis     CAD (coronary artery disease)     stent 2011 Cathryne Room; sees [facility] cardiology    [facility]     Class 3 severe obesity due to excess calories without serious comorbidity with body mass index (BMI) of 40.0 to 44.9 in adult ([address] 07/28/2020    Diabetes mellitus ([address]     ERRONEOUS ENCOUNTER--DISREGARD 09/15/2015    GERD (gastroesophageal reflux disease)     Headache(784.0)     Heart attack ([address] 12/2011    Hyperlipidemia     Hypertension     Lichen simplex chronicus 10/2015    Osteopenia     Pneumonia     Type II or unspecified type diabetes mellitus without mention of complication, not stated as uncontrolled           Past Surgical History:   Procedure Laterality Date    BREAST BIOPSY Right 06/08/2011    US guided core needle, right, benign    CARDIAC CATHETERIZATION  12/22/14  JDT    EF 50%    CARDIAC CATHETERIZATION  2017        CHG FLUOROSCOPIC GUIDANCE NEEDLE PLACEMENT ADD ON Left 2018    CORTICOSTEROID INJECTION performed by Yasir Trinh MD at [address] [place] [address]    COLONOSCOPY  approx     Dr. Lopez per pt recall \" Normal\"   [address] WITH STENT PLACEMENT  2011    SABA to [address] RELEASE Left 2019    LEFT INDEX AND LONG TRIGGER FINGER RELEASAE performed by Juan Luis Elise MD at [address] [place] [address]    HYSTERECTOMY (CERVIX STATUS UNKNOWN)      OTHER SURGICAL HISTORY      fibrinolysis    AL CABG, ARTERY-VEIN, THREE  2014    Coronary Artery Bypass, 3    AL MANIPULATN SHLDR JT W ANESTHESIA Left 2018    SHOULDER MANIPULATION UNDER ANESTHESIA performed [facility] at [facility] OR    [facility] (CERVIX REMOVED) Bilateral     age 27    THORACOTOMY      UPPER GASTROINTESTINAL ENDOSCOPY  approx     per pt recall \" Normal\"    UPPER GASTROINTESTINAL ENDOSCOPY N/A 2020    Dr Jaye Mead exam, neg EoE    VULVAR/PERINEAL BIOPSY  10/07/2015    Dr. Loaiza Morning N/A 2021    VULVAR BIOPSY performed by Yanet Reed MD at [place] [address] History     Socioeconomic History    Marital status:      Spouse name: None    Number of children: None    Years of education: None    Highest education level: None   Occupational History    None   Tobacco Use    Smoking status: Former Smoker     Years: 15.00     Types: Cigarettes     Quit date: 2014     Years since quittin.9    Smokeless tobacco: Never Used   Vaping Use    Vaping Use: Never used   Substance and Sexual Activity    Alcohol use: No    Drug use: No    Sexual activity: Not Currently   Other Topics Concern    None   Social History Narrative    None     Social Determinants of Health     Financial Resource Strain:     Difficulty of Paying Living Expenses: Not on file   Food Insecurity:     Worried About Running Out of Food in the Last Year: Not on file    Grant of Food in the Last Year: Not on file   Transportation Needs:     Lack of Transportation (Medical): Not on file    Lack of Transportation (Non-Medical): Not on file   Physical Activity:     Days of Exercise per Week: Not on file    Minutes of Exercise per Session: Not on file   Stress:     Feeling of Stress : Not on file   Social Connections:     Frequency of Communication with Friends and Family: Not on file    Frequency of Social Gatherings with Friends and Family: Not on file    Attends Yazdanism Services: Not on file    Active Member of [address] [place] [address] or Organizations: Not on file    Attends Club or Organization Meetings: Not on file    Marital Status: Not on file   Intimate Partner Violence:     Fear of Current or Ex-Partner: Not on file    Emotionally Abused: Not on file    Physically Abused: Not on file    Sexually Abused: Not on file   Housing Stability:     Unable to Pay for Housing in the Last Year: Not on file    Number of [place] in the Last Year: Not on file    Unstable Housing in the Last Year: Not on file       Allergies   Allergen Reactions    Codeine Other (See Comments)     Seizures as a child    Motrin [Ibuprofen] Swelling     Tongue to swell    Humalog [Insulin Lispro] Other (See Comments)     Shakiness, break out in a sweat at higher doses.     Ozempic (0.25 Or 0.5 Mg-Dose) [Semaglutide(0.25 Or 0.5mg-Dos)]      nausea       Current Outpatient Medications   Medication Sig Dispense Refill    ranolazine (RANEXA) 500 MG extended release tablet Take 1 tablet by mouth 2 times daily 60 tablet 3    oxybutynin (DITROPAN-XL) 5 MG extended release tablet Take 1 tablet by mouth daily 30 tablet 3    vitamin D (ERGOCALCIFEROL) 1.25 MG (17183 UT) CAPS capsule Take 1 capsule by mouth once a week 12 capsule 1    valsartan (DIOVAN) vagina 3 times per week) 1 Tube 3    Eflornithine HCl (VANIQA) 13.9 % CREA Apply 1 Dose topically 2 times daily (Patient taking differently: Apply 1 Dose topically 2 times daily as needed ) 45 g 2     No current facility-administered medications for this visit. Review of Systems   Constitutional: Positive for fatigue. Negative for appetite change, fever and unexpected weight change. HENT: Negative for sore throat, trouble swallowing and voice change. Respiratory: Positive for shortness of breath (chronically). Negative for cough. Cardiovascular: Negative for chest pain, palpitations and leg swelling. Gastrointestinal: Positive for abdominal pain, constipation, diarrhea, nausea and vomiting. Negative for abdominal distention, anal bleeding, blood in stool and rectal pain. Genitourinary: Negative for hematuria. Musculoskeletal: Positive for back pain. Negative for arthralgias and neck pain. Neurological: Negative for dizziness, weakness, light-headedness and headaches. Psychiatric/Behavioral: Positive for dysphoric mood. Negative for sleep disturbance. The patient is nervous/anxious. All other systems reviewed and are negative. Objective:     Physical Exam  Vitals and nursing note reviewed. Constitutional:       Appearance: She is well-developed. Comments: /66   Pulse 69   Ht 5' 2.5\" (1.588 m)   Wt 195 lb 12.8 oz (88.8 kg)   LMP 01/01/1998 (Approximate)   SpO2 98%   BMI 35.24 kg/m²    Eyes:      General: No scleral icterus. Conjunctiva/sclera: Conjunctivae normal.      Pupils: Pupils are equal, round, and reactive to light. Neck:      Thyroid: No thyromegaly. Cardiovascular:      Rate and Rhythm: Normal rate and regular rhythm. Heart sounds: Normal heart sounds. No murmur heard. No friction rub. No gallop. Pulmonary:      Effort: Pulmonary effort is normal. No respiratory distress. Breath sounds: Normal breath sounds.    Abdominal:      General: Bowel sounds are normal. There is no distension. Palpations: Abdomen is soft. Tenderness: There is no abdominal tenderness. There is no rebound. Comments: No pain at present, but pain comes on here when it comes on   Musculoskeletal:         General: No deformity. Normal range of motion. Cervical back: Normal range of motion and neck supple. Neurological:      Mental Status: She is alert and oriented to person, place, and time. Cranial Nerves: No cranial nerve deficit. Psychiatric:         Judgment: Judgment normal.           Assessment:       Diagnosis Orders   1. Diarrhea, unspecified type  XR ABDOMEN (KUB) (SINGLE AP VIEW)    COLONOSCOPY W/ OR W/O BIOPSY    ESOPHAGOSCOPY / EGD   2. Constipation, unspecified constipation type  XR ABDOMEN (KUB) (SINGLE AP VIEW)    COLONOSCOPY W/ OR W/O BIOPSY   3. Gastroparesis due to DM ([address]  ESOPHAGOSCOPY / EGD   4. Chronic nausea  ESOPHAGOSCOPY / EGD   5. Chronic vomiting  ESOPHAGOSCOPY / EGD   6. Right sided abdominal pain  COLONOSCOPY W/ OR W/O BIOPSY    ESOPHAGOSCOPY / EGD   7. Left sided abdominal pain  COLONOSCOPY W/ OR W/O BIOPSY    ESOPHAGOSCOPY / EGD         Plan:      1. Sent pt home with diatherix stool test kit to r/o infectious etiology  2. KUB today  3. Schedule outpatient endoscopy r/o h pylori and celiac. Patient advised no Aspirin, Fish Oil, Vit E or NSAIDs 5 (five) days before procedure. Follow-up Visit: per Dr. Amol Encarnacion  Pt Education:   Risks, benefits, and alternatives to endoscopy were discussed. Patient voices understanding of risks of, but not limited to, perforation, bleeding, and infection. The risk of perforation is increased with esophageal dilatation. All questions answered to patient's satisfaction. Patient is agreable to proceed. 4. Schedule outpatient colonoscopy with random colon bx. Patient advised no Aspirin, Fish Oil, Vit E or NSAIDs 5 (five) days before procedure.   Follow-up Visit: per Dr Amol Encarnacion  Pt education:  Risks, benefits, and alternatives to colonoscopy were discussed. Risks of colonoscopy include, but are not limited to, perforation, bleeding, and infection. We discussed that the risk for perforation is 1-3 in 5,000  at the time of colonoscopy;   and 1-2% risk of bleeding post-polypectomy. All questions answered to the satisfaction of the patient. Pt is agreeable to proceed.

## 2022-06-14 ENCOUNTER — TELEPHONE (OUTPATIENT)
Dept: GASTROENTEROLOGY | Age: 57
End: 2022-06-14

## 2022-06-14 NOTE — TELEPHONE ENCOUNTER
Today at noon i called radiology and talked to a tech and told her I need to speak with a radioligist about this dictation and she reported there was not a radiologist in the building and she would have Richi Ingram call me. It is now 5pm and I havent gotten a call back yet. Debora Cantrell, can you call radiology again tomorrow and let them know I am still waiting on Todd to call me back on actually 3 patients for this same thing.   thanks

## 2022-06-16 ENCOUNTER — TELEPHONE (OUTPATIENT)
Dept: GASTROENTEROLOGY | Age: 57
End: 2022-06-16

## 2022-06-16 NOTE — TELEPHONE ENCOUNTER
Please let Albert Beard know I am VERY sorry for the delay on letting her know the results of this abdominal xray. I reached out to the radiologist on Monday needing to know if there was stool noted in her colon because this wasn't even mentioned, and he just go back to me this morning at 6am.  She has a moderate amount of stool in her entire colon. Which means she is having constipation with wash around. I need to maximize treatment for constipation. Have her drink a bottle of mag citrate today and and then start taking miralax every day, increase it to BID dosing if needed. F/u in 3-4 weeks if not effective.

## 2022-06-16 NOTE — TELEPHONE ENCOUNTER
6-16-22 @ 11:00 am    Patient called from [phone], said she is returning a call to [facility] regarding results.  [facility]

## 2022-06-16 NOTE — TELEPHONE ENCOUNTER
PT HAS BEEN NOTIFIED AND SHE CAN NOT TAKE THE MAG CITRATE BECAUSE IT MAKES HER VOMIT. AND SHE HAD A TOUGH OF FOOD POISON THIS WK-END HAS BEEN VOMITING ALL WK-END. CAN YOU SUGGEST SOMETHING ELSE?

## 2022-06-16 NOTE — TELEPHONE ENCOUNTER
Then just use the miralax.   But use it BID dosing for about a week and then can decrease to once daily dosing thereafter if needed

## 2022-06-20 ENCOUNTER — LAB (OUTPATIENT)
Dept: LAB | Facility: HOSPITAL | Age: 57
End: 2022-06-20

## 2022-06-20 DIAGNOSIS — Z01.812 ENCOUNTER FOR PREOPERATIVE SCREENING LABORATORY TESTING FOR COVID-19 VIRUS: ICD-10-CM

## 2022-06-20 DIAGNOSIS — Z20.822 ENCOUNTER FOR PREOPERATIVE SCREENING LABORATORY TESTING FOR COVID-19 VIRUS: ICD-10-CM

## 2022-06-20 LAB — SARS-COV-2 ORF1AB RESP QL NAA+PROBE: NOT DETECTED

## 2022-06-20 PROCEDURE — U0004 COV-19 TEST NON-CDC HGH THRU: HCPCS

## 2022-06-20 PROCEDURE — C9803 HOPD COVID-19 SPEC COLLECT: HCPCS

## 2022-06-22 ENCOUNTER — OFFICE VISIT (OUTPATIENT)
Dept: PULMONOLOGY | Facility: CLINIC | Age: 57
End: 2022-06-22

## 2022-06-22 VITALS
WEIGHT: 195.4 LBS | OXYGEN SATURATION: 98 % | HEART RATE: 89 BPM | HEIGHT: 60 IN | SYSTOLIC BLOOD PRESSURE: 132 MMHG | BODY MASS INDEX: 38.36 KG/M2 | DIASTOLIC BLOOD PRESSURE: 82 MMHG

## 2022-06-22 DIAGNOSIS — J98.4 RESTRICTIVE LUNG DISEASE: Primary | ICD-10-CM

## 2022-06-22 DIAGNOSIS — Z87.891 PERSONAL HISTORY OF NICOTINE DEPENDENCE: ICD-10-CM

## 2022-06-22 DIAGNOSIS — Z12.2 SCREENING FOR MALIGNANT NEOPLASM OF RESPIRATORY ORGAN: ICD-10-CM

## 2022-06-22 DIAGNOSIS — E66.01 SEVERE OBESITY (BMI 35.0-39.9) WITH COMORBIDITY: ICD-10-CM

## 2022-06-22 DIAGNOSIS — J98.4 SCARRING OF LUNG: ICD-10-CM

## 2022-06-22 DIAGNOSIS — R91.1 LUNG NODULE: ICD-10-CM

## 2022-06-22 DIAGNOSIS — Z20.822 ENCOUNTER FOR PREOPERATIVE SCREENING LABORATORY TESTING FOR COVID-19 VIRUS: ICD-10-CM

## 2022-06-22 DIAGNOSIS — Z01.812 ENCOUNTER FOR PREOPERATIVE SCREENING LABORATORY TESTING FOR COVID-19 VIRUS: ICD-10-CM

## 2022-06-22 DIAGNOSIS — J98.4 RESTRICTIVE LUNG DISEASE: ICD-10-CM

## 2022-06-22 PROBLEM — Z11.52 ENCOUNTER FOR PREOPERATIVE SCREENING LABORATORY TESTING FOR COVID-19 VIRUS: Status: ACTIVE | Noted: 2022-06-22

## 2022-06-22 PROCEDURE — 99214 OFFICE O/P EST MOD 30 MIN: CPT | Performed by: INTERNAL MEDICINE

## 2022-06-22 PROCEDURE — 94010 BREATHING CAPACITY TEST: CPT | Performed by: INTERNAL MEDICINE

## 2022-06-22 PROCEDURE — 94727 GAS DIL/WSHOT DETER LNG VOL: CPT | Performed by: INTERNAL MEDICINE

## 2022-06-22 PROCEDURE — 94729 DIFFUSING CAPACITY: CPT | Performed by: INTERNAL MEDICINE

## 2022-06-22 RX ORDER — RANOLAZINE 500 MG/1
1 TABLET, EXTENDED RELEASE ORAL 2 TIMES DAILY
COMMUNITY
Start: 2022-06-08

## 2022-06-22 NOTE — PROGRESS NOTES
Chief Complaint  RESTRICTIVE LUNG DISEASE and Post COVID syndrome    Subjective    History of Present Illness     Gwendolyn Delgadillo presents to [facility] for restrictive lung disease and post COVID syndrome.    History of Present Illness   The patient have a COVID-19 infection in February and states she is still having some issues with shortness of breath with exertion.  Pulmonary functions are obtained today and are consistent with a moderate restrictive ventilatory defect with no  significant change in spirometry lung volumes compared to studies from [place]ha 15, 2021.  When corrected for alveolar volume there was a decline in diffusion capacity compared to previous although it remains supranormal.  This could relate to her recent COVID-19 infection.  I did tell her that we will get a follow-up CT for lung cancer screening purposes in late August and I will see her back in early September.  She is going to have further cardiac evaluation also evaluation of possible sleep apnea through [facility] sometime in July.  She has had the COVID-19 vaccine including 2 boosters in the form of the Pfizer vaccine and will be a candidate for second booster in the future although she just had her first booster about 3 weeks ago.  She declines a flu shot.  Prior to Admission medications    Medication Sig Start Date End Date Taking? Authorizing Provider   albuterol (2.5 MG/3ML) 0.083% nebulizer solution 3 mL, albuterol (5 MG/ML) 0.5% nebulizer solution 0.5 mL Inhale.   Yes Sherry Maria MD   aspirin 81 MG EC tablet Take 81 mg by mouth Daily.   Yes Sherry Maria MD   conjugated estrogens (PREMARIN) 0.625 MG/GM vaginal cream Insert 0.5 g into the vagina 2 (Two) Times a Week.   Yes Sherry Maria MD   docusate sodium (COLACE) 100 MG capsule Take 100 mg by mouth 2 (Two) Times a Day As Needed for Constipation.   Yes Sherry Maria MD   Dulaglutide 0.75 MG/0.5ML  solution pen-injector Inject 0.75 mg under the skin into the appropriate area as directed. 4/26/22  Yes Sherry Maria MD   Eflornithine HCl 13.9 % cream Apply  topically 2 (Two) Times a Day.   Yes Sherry Maria MD   empagliflozin (JARDIANCE) 10 MG tablet tablet Take 10 mg by mouth Daily.   Yes Sherry Maria MD   ergocalciferol (ERGOCALCIFEROL) 1.25 MG (44071 UT) capsule TAKE 1 CAPSULE BY MOUTH ONCE WEEKLY 3/18/21  Yes Sherry Maria MD   estradiol (VAGIFEM) 10 MCG tablet vaginal tablet Insert 1 tablet into the vagina 2 (Two) Times a Week.   Yes Sherry Maria MD   furosemide (LASIX) 20 MG tablet Take 40 mg by mouth Daily As Needed.   Yes Provider, Historical, MD   glipizide (GLUCOTROL XL) 10 MG 24 hr tablet Take 10 mg by mouth. 4/29/21  Yes Provider, Historical, MD   LORazepam (ATIVAN) 0.5 MG tablet Take 0.5 mg by mouth Every 8 (Eight) Hours As Needed for Anxiety.   Yes Sherry Maria MD   lovastatin (MEVACOR) 40 MG tablet Take 40 mg by mouth Every Night.   Yes Sherry Maria MD   metFORMIN (GLUCOPHAGE) 1000 MG tablet Take 500 mg by mouth 2 (Two) Times a Day With Meals.   Yes Sherry Maria MD   metoprolol tartrate (LOPRESSOR) 25 MG tablet Take 25 mg by mouth 2 (Two) Times a Day.   Yes Sherry Maria MD   mometasone (ELOCON) 0.1 % ointment Apply  topically 3 (Three) Times a Week.   Yes Sherry Maria MD   nitroglycerin (NITROSTAT) 0.4 MG SL tablet Place 0.4 mg under the tongue Every 5 (Five) Minutes As Needed for Chest Pain. Take no more than 3 doses in 15 minutes.   Yes Sherry Maria MD   nystatin (MYCOSTATIN) 464338 UNIT/GM powder Apply  topically 3 (Three) Times a Day.   Yes Provider, Historical, MD   ondansetron (ZOFRAN) 8 MG tablet Take 8 mg by mouth Every 8 (Eight) Hours As Needed for Nausea or Vomiting.   Yes Sherry Maria MD   prochlorperazine (COMPAZINE) 10 MG tablet Take 10 mg by mouth Every 6 (Six) Hours As Needed for  "Nausea or Vomiting.   Yes Sherry Maria MD   ranolazine (RANEXA) 500 MG 12 hr tablet Take 1 tablet by mouth 2 (Two) Times a Day. 22  Yes Sherry Maria MD   spironolactone (ALDACTONE) 50 MG tablet Take 50 mg by mouth Daily.   Yes Sherry Maria MD   tamsulosin (FLOMAX) 0.4 MG capsule 24 hr capsule Take 0.4 mg by mouth. 20  Yes Sherry Maria MD   valsartan (DIOVAN) 80 MG tablet Take 80 mg by mouth Daily.   Yes Sherry Maria MD   isosorbide mononitrate (IMDUR) 30 MG 24 hr tablet Take 30 mg by mouth Daily.  22  Sherry Maria MD   Lactobacillus (PROBIATA PO) Take  by mouth Daily.  22  Sherry Maria MD       Social History     Socioeconomic History   • Marital status:    Tobacco Use   • Smoking status: Former Smoker     Packs/day: 2.50     Years: 30.00     Pack years: 75.00     Quit date: 12/15/2014     Years since quittin.5   • Smokeless tobacco: Never Used   • Tobacco comment: 87.5 pack-years    Vaping Use   • Vaping Use: Never used   Substance and Sexual Activity   • Alcohol use: No   • Drug use: No   • Sexual activity: Defer       Objective   Vital Signs:   /82   Pulse 89   Ht 152.4 cm (60\")   Wt 88.6 kg (195 lb 6.4 oz)   SpO2 98% Comment: RA  BMI 38.16 kg/m²     Physical Exam  Vitals and nursing note reviewed.   Constitutional:       Appearance: She is obese.   HENT:      Head: Normocephalic.      Comments: She is wearing a mask.  Eyes:      Extraocular Movements: Extraocular movements intact.      Pupils: Pupils are equal, round, and reactive to light.   Cardiovascular:      Rate and Rhythm: Normal rate and regular rhythm.   Pulmonary:      Effort: Pulmonary effort is normal.      Comments: She has reasonable air movement bilaterally with no adventitious sounds heard.  Musculoskeletal:         General: Normal range of motion.   Skin:     General: Skin is warm and dry.   Neurological:      General: No focal deficit " present.      Mental Status: She is alert and oriented to person, place, and time.   Psychiatric:         Mood and Affect: Mood normal.         Behavior: Behavior normal.        Result Review :    PFT Values        Some values may be hidden. Unless noted otherwise, only the newest values recorded on each date are displayed.         Old Values PFT Results 6/15/21 6/22/22   No data to display.      Pre Drug PFT Results 6/15/21 6/22/22   FVC 66 59   FEV1 67 66   FEF 25-75% 67 82   FEV1/FVC 83.14 89      Post Drug PFT Results 6/15/21 6/22/22   No data to display.      Other Tests PFT Results 6/15/21 6/22/22   TLC 66 62   RV 69 59   DLCO 85 92   D/VAsb 147 137               Results for orders placed in visit on 22    Full Pulmonary Function Test Without Bronchodilator    Narrative  Full Pulmonary Function Test Without Bronchodilator  Performed by: Freda Cummings, RRT  Authorized by: Dimitri Felix MD    Pre Drug % Predicted  FVC: 59%  FEV1: 66%  FEF 25-75%: 82%  FEV1/FVC: 89%  T%  RV: 59%  DLCO: 92%  D/VAsb: 137%    Interpretation  Spirometry  Spirometry shows moderate restriction.  Lung Volume Measurements  Measurements show reduced lung volumes consistent with restriction.  Diffusion Capacity  The patient's diffusion capacity is normal.  Diffusion capacity is normal when corrected for alveolar volume.  Overall comments: The patient's spirometry is consistent with a moderate restrictive ventilatory defect.  Lung volumes confirm a moderate restrictive ventilatory defect.  Diffusion capacity is within normal limits and when corrected for alveolar volume is supranormal.  When current studies are compared to [facility], she has had a slight but not significant drop in her FVC compared to previous but actually some improvement in her FEV1 and also total lung capacity compared to previous.  When corrected for alveolar volume there has been a drop  in diffusion capacity compared to previous although it remains supranormal.      Results for orders placed in visit on 06/15/21    Full Pulmonary Function Test Without Bronchodilator    Narrative  Full Pulmonary Function Test Without Bronchodilator  Performed by: Freda Cummings, RRT  Authorized by: Dimitri Felix MD    Pre Drug % Predicted  FVC: 66%  FEV1: 67%  FEF 25-75%: 67%  FEV1/FVC: 83.14%  T%  RV: 69%  DLCO: 85%  D/VAsb: 147%    Interpretation  Spirometry  Spirometry shows moderate restriction. There is reduced midflow suggesting small airway/airflow obstruction.  Lung Volume Measurements  Measurements show reduced lung volumes consistent with restriction.  Diffusion Capacity  The patient's diffusion capacity is normal.  Diffusion capacity is normal when corrected for alveolar volume.  Overall comments: The patient's baseline spirometry is consistent with a moderate restrictive ventilatory defect with coexisting small airways disease.  Lung volumes confirm a moderate restrictive ventilatory defect.  There is also a decrease in inspiratory capacity.  Diffusion capacity is within normal limits and when corrected for alveolar volume is actually supranormal.  When compared to studies done here in the office on , she has had improvement in her FVC, FEV1, total lung capacity, and diffusion capacity particularly when corrected for alveolar volume.      Results for orders placed in visit on 19    Pulmonary Function Test    Rest/Exercise Pulse Ox Values        Some values may be hidden. Unless noted otherwise, only the newest values recorded on each date are displayed.         Rest/Exercise Pulse Ox Results 8/10/20   Rest room air SAT % 97%   Exercise room air SAT % 97%                        My interpretation of labs:   COVID PRE-OP / PRE-PROCEDURE SCREENING ORDER (NO ISOLATION) - Swab, Nasal Cavity (2022 12:36)      Assessment and Plan     Diagnoses and all orders for  this visit:    1. Restrictive lung disease (Primary)  Assessment & Plan:  Her PFTs are reasonably stable today.      2. Lung nodule  Assessment & Plan:  I will get a follow-up chest CT for lung cancer screening purposes in late August.  Her last CT showed a decrease in the size of the previously noted nodule.      3. Scarring of lung  Assessment & Plan:  This has been noted on previous chest CT scans.      4. Personal history of nicotine dependence  Assessment & Plan:  She will get her screening CT in late August and I will see her back shortly thereafter.    Orders:  -      CT Chest Low Dose Cancer Screening WO; Future    5. Severe obesity (BMI 35.0-39.9) with comorbidity (HCC)  Assessment & Plan:  Patient's (Body mass index is 38.16 kg/m².) indicates that they are morbidly obese (BMI > 40 or > 35 with obesity - related health condition) with health conditions that include dyslipidemias . Weight is unchanged.  Diet and exercise are encouraged and she will follow-up with her primary healthcare provider regarding her elevated BMI otherwise.      6. Encounter for preoperative screening laboratory testing for COVID-19 virus  Assessment & Plan:  Her COVID test was negative.    Orders:  -     COVID PRE-OP / PRE-PROCEDURE SCREENING ORDER (NO ISOLATION) - Swab, Nasal Cavity; Future    7. Screening for malignant neoplasm of respiratory organ  -      CT Chest Low Dose Cancer Screening WO; Future          Dimitri Felix MD  6/22/2022  17:29 CDT    Follow Up   Return in about 11 weeks (around 9/7/2022) for To see me specifically.    Patient was given instructions and counseling regarding her condition or for health maintenance advice. Please see specific information pulled into the AVS if appropriate.

## 2022-06-22 NOTE — ASSESSMENT & PLAN NOTE
Patient's (Body mass index is 38.16 kg/m².) indicates that they are morbidly obese (BMI > 40 or > 35 with obesity - related health condition) with health conditions that include dyslipidemias . Weight is unchanged.  Diet and exercise are encouraged and she will follow-up with her primary healthcare provider regarding her elevated BMI otherwise.

## 2022-06-22 NOTE — ASSESSMENT & PLAN NOTE
I will get a follow-up chest CT for lung cancer screening purposes in late August.  Her last CT showed a decrease in the size of the previously noted nodule.

## 2022-06-22 NOTE — PROCEDURES
Full Pulmonary Function Test Without Bronchodilator  Performed by: Freda Cummings, RRT  Authorized by: Dimitri Felix MD      Pre Drug % Predicted    FVC: 59%   FEV1: 66%   FEF 25-75%: 82%   FEV1/FVC: 89%   T%   RV: 59%   DLCO: 92%   D/VAsb: 137%    Interpretation   Spirometry   Spirometry shows moderate restriction.   Lung Volume Measurements  Measurements show reduced lung volumes consistent with restriction.   Diffusion Capacity  The patient's diffusion capacity is normal.  Diffusion capacity is normal when corrected for alveolar volume.   Overall comments: The patient's spirometry is consistent with a moderate restrictive ventilatory defect.  Lung volumes confirm a moderate restrictive ventilatory defect.  Diffusion capacity is within normal limits and when corrected for alveolar volume is supranormal.  When current studies are compared to [facility] from [place] [facility], she has had a slight but not significant drop in her FVC compared to previous but actually some improvement in her FEV1 and also total lung capacity compared to previous.  When corrected for alveolar volume there has been a drop in diffusion capacity compared to previous although it remains supranormal.

## 2022-06-22 NOTE — PATIENT INSTRUCTIONS
The patient did have COVID in February and still has some issues with shortness of breath.  Her pulmonary functions are stable overall.  I will plan on a screening chest CT in late August at [facility] and then see her back in early September to go over the results of the above.  She is also scheduled for further cardiac evaluation and for evaluation regarding a possible sleep study sometime in July.

## 2022-07-07 ENCOUNTER — OFFICE VISIT (OUTPATIENT)
Dept: PULMONOLOGY | Age: 57
End: 2022-07-07
Payer: COMMERCIAL

## 2022-07-07 VITALS
HEIGHT: 63 IN | BODY MASS INDEX: 35.61 KG/M2 | HEART RATE: 72 BPM | OXYGEN SATURATION: 97 % | DIASTOLIC BLOOD PRESSURE: 70 MMHG | SYSTOLIC BLOOD PRESSURE: 110 MMHG | WEIGHT: 201 LBS

## 2022-07-07 DIAGNOSIS — G47.8 NON-RESTORATIVE SLEEP: ICD-10-CM

## 2022-07-07 DIAGNOSIS — J98.4 RESTRICTIVE LUNG DISEASE: ICD-10-CM

## 2022-07-07 DIAGNOSIS — E66.9 OBESITY (BMI 30-39.9): ICD-10-CM

## 2022-07-07 DIAGNOSIS — G47.10 HYPERSOMNIA: ICD-10-CM

## 2022-07-07 DIAGNOSIS — R06.09 DOE (DYSPNEA ON EXERTION): ICD-10-CM

## 2022-07-07 DIAGNOSIS — G47.33 OSA (OBSTRUCTIVE SLEEP APNEA): Primary | ICD-10-CM

## 2022-07-07 DIAGNOSIS — J98.6 DIAPHRAGMATIC DISORDER: ICD-10-CM

## 2022-07-07 PROCEDURE — 3017F COLORECTAL CA SCREEN DOC REV: CPT | Performed by: INTERNAL MEDICINE

## 2022-07-07 PROCEDURE — 1036F TOBACCO NON-USER: CPT | Performed by: INTERNAL MEDICINE

## 2022-07-07 PROCEDURE — G8427 DOCREV CUR MEDS BY ELIG CLIN: HCPCS | Performed by: INTERNAL MEDICINE

## 2022-07-07 PROCEDURE — 99204 OFFICE O/P NEW MOD 45 MIN: CPT | Performed by: INTERNAL MEDICINE

## 2022-07-07 PROCEDURE — G8417 CALC BMI ABV UP PARAM F/U: HCPCS | Performed by: INTERNAL MEDICINE

## 2022-07-07 ASSESSMENT — ENCOUNTER SYMPTOMS
COUGH: 0
SHORTNESS OF BREATH: 1
ABDOMINAL DISTENTION: 0
ANAL BLEEDING: 0
BACK PAIN: 0
WHEEZING: 0
APNEA: 0
CHEST TIGHTNESS: 1
ABDOMINAL PAIN: 0
RHINORRHEA: 0

## 2022-07-07 NOTE — PROGRESS NOTES
Pulmonary and Sleep Medicine    Juan Carlos Wild (:  1965) is a 60 y.o. female,New patient, here for evaluation of the following chief complaint(s):  New Patient (Daytime somnolence)      Referring physician:  Kary Daley 55, Kin Crawford 281,  Alicia 7     ASSESSMENT/PLAN:  1. JAYSON (obstructive sleep apnea)  -     Ambulatory referral to Sleep Medicine  -     Home Sleep Study; Future  2. Hypersomnia  -     Ambulatory referral to Sleep Medicine  -     Home Sleep Study; Future  3. Non-restorative sleep  -     Ambulatory referral to Sleep Medicine  -     Home Sleep Study; Future  4. Obesity (BMI 30-39.9)  5. TAVERAS (dyspnea on exertion)  6. Diaphragmatic disorder  -     XR CHEST (2 VW); Future  -     FL SNIFF TEST; Future  7. Restrictive lung disease        Restrictive lung disease seen on pulmonary function study done at [facility] and most likely secondary to body habitus and likely diaphragmatic dysfunction on the left. We will do a sniff test to evaluate her diaphragmatic function. No evidence of small airway obstruction on PFT. She does have significant risk factors for obstructive sleep apnea. Obstructive sleep apnea could be high risk condition her situation due to her cardiovascular comorbidities. We will proceed with a home sleep study. Veronica Mak MD, [facility]    Return in about 6 weeks (around 2022). SUBJECTIVE/OBJECTIVE:  The patient is here for evaluation of shortness of breath. She is known to have coronary artery disease status post triple bypass surgery about 6 years ago. The patient says that she gets short of breath with minimal activity such as standing up doing her dishes. She had a pulmonary function study done recently at [facility] that that showed restrictive lung disease. No evidence of COPD or small airway obstruction. On recent imaging of her chest that she did have left hemidiaphragmatic elevation. She is diabetic.   Although she denies snoring her sleep is nonrefreshing. She has to wake up multiple times through the night to go to the bathroom. She is sleepy through the daytime. The she does take naps during the daytime. Prior to Visit Medications    Medication Sig Taking?  Authorizing Provider   ranolazine (RANEXA) 500 MG extended release tablet Take 1 tablet by mouth 2 times daily Yes MIKE Donovan   oxybutynin (DITROPAN-XL) 5 MG extended release tablet Take 1 tablet by mouth daily Yes MIKE Romero   vitamin D (ERGOCALCIFEROL) 1.25 MG (67426 UT) CAPS capsule Take 1 capsule by mouth once a week Yes MIKE Romero   valsartan (DIOVAN) 80 MG tablet Take 1 tablet by mouth daily Yes MIKE Romero   tamsulosin (FLOMAX) 0.4 MG capsule Take 1 capsule by mouth daily Yes MIKE Romero   ondansetron (ZOFRAN ODT) 4 MG disintegrating tablet Take 1 tablet by mouth daily as needed for Nausea or Vomiting Yes MIKE Romero   nitroGLYCERIN (NITROSTAT) 0.4 MG SL tablet Place 1 tablet under the tongue every 5 minutes as needed for Chest pain Yes MIKE Romero   metoprolol tartrate (LOPRESSOR) 25 MG tablet Take one tablet by mouth twice daily Yes MIKE Romero   metFORMIN (GLUCOPHAGE-XR) 500 MG extended release tablet Take one tablet by mouth 2 times daily Yes MIKE Romero   lovastatin (MEVACOR) 40 MG tablet Take one tablet at bedtime Yes MIKE Romero   glipiZIDE (GLUCOTROL XL) 10 MG extended release tablet Take 1 tablet by mouth daily Yes MIKE Romero   furosemide (LASIX) 40 MG tablet Take 1 tablet by mouth daily Yes MIKE Romero   Dulaglutide 0.75 MG/0.5ML SOPN Inject 0.75 mg into the skin once a week Yes MIKE Romero   docusate sodium (COLACE) 100 MG capsule Take 1 capsule by mouth daily as needed for Constipation Yes MIKE Romero   empagliflozin (JARDIANCE) 25 MG tablet Take 25 mg by mouth daily Yes Historical Provider, MD   amitriptyline (ELAVIL) 10 MG tablet Take 1 tablet by mouth nightly Yes Algis Like, APRN   nystatin (MYCOSTATIN) 705449 UNIT/GM powder APPLY 2 OR 3 TIMES DAILY. Patient taking differently: Apply topically as needed APPLY 2 OR 3 TIMES DAILY. Yes Algis Like, APRN   Diabetic Shoe MISC by Does not apply route Yes Dexter Weber MD   albuterol (PROVENTIL) (5 MG/ML) 0.5% nebulizer solution Take 0.5 mLs by nebulization every 6 hours as needed for Wheezing Yes MIKE Martinez   diclofenac sodium (VOLTAREN) 1 % GEL Apply 2 g topically 4 times daily  Patient taking differently: Apply 2 g topically 4 times daily as needed for Pain  Yes Algis Like, MIKE   aspirin 81 MG tablet Take 81 mg by mouth daily Yes Historical Provider, MD   mometasone (ELOCON) 0.1 % ointment Apply topically to vagina 3 times per week  Patient taking differently: Apply topically as needed Apply topically to vagina 3 times per week Yes Maricarmen Francis MD   Eflornithine HCl (VANIQA) 13.9 % CREA Apply 1 Dose topically 2 times daily  Patient taking differently: Apply 1 Dose topically 2 times daily as needed  Yes MIKE iPtts        Review of Systems   Constitutional: Negative for activity change, appetite change, chills, diaphoresis and fatigue. HENT: Negative for congestion, dental problem, drooling, ear discharge, postnasal drip and rhinorrhea. Eyes: Negative for visual disturbance. Respiratory: Positive for chest tightness and shortness of breath. Negative for apnea, cough and wheezing. Gastrointestinal: Negative for abdominal distention, abdominal pain and anal bleeding. Endocrine: Negative for cold intolerance, heat intolerance and polydipsia. Genitourinary: Negative for difficulty urinating, dysuria, enuresis and flank pain. Musculoskeletal: Negative for arthralgias, back pain and gait problem. Allergic/Immunologic: Negative for environmental allergies. Neurological: Negative for dizziness, facial asymmetry, light-headedness and headaches. Vitals:    07/07/22 1440   BP: 110/70   Pulse: 72   SpO2: 97%     BMI Readings from Last 1 Encounters:   07/07/22 36.18 kg/m²         Physical Exam  Vitals reviewed. Constitutional:       Appearance: Normal appearance. HENT:      Head: Normocephalic and atraumatic. Nose: Nose normal.   Eyes:      Extraocular Movements: Extraocular movements intact. Conjunctiva/sclera: Conjunctivae normal.   Cardiovascular:      Rate and Rhythm: Normal rate and regular rhythm. Heart sounds: No murmur heard. No friction rub. Pulmonary:      Effort: Pulmonary effort is normal. No respiratory distress. Breath sounds: Normal breath sounds. No stridor. No wheezing, rhonchi or rales. Abdominal:      General: There is no distension. Palpations: There is no mass. Tenderness: There is no abdominal tenderness. There is no guarding or rebound. Musculoskeletal:      Cervical back: Normal range of motion and neck supple. Neurological:      Mental Status: She is alert and oriented to person, place, and time. This note was generated used a voice recognition software. Errors in voice recognition may have occurred. An electronic signature was used to authenticate this note.     --Princess Rushing MD

## 2022-07-14 ENCOUNTER — HOSPITAL ENCOUNTER (OUTPATIENT)
Dept: GENERAL RADIOLOGY | Age: 57
End: 2022-07-14
Payer: COMMERCIAL

## 2022-07-14 ENCOUNTER — HOSPITAL ENCOUNTER (OUTPATIENT)
Dept: GENERAL RADIOLOGY | Age: 57
Discharge: HOME | End: 2022-07-14
Payer: COMMERCIAL

## 2022-07-14 DIAGNOSIS — J98.6 DIAPHRAGMATIC DISORDER: ICD-10-CM

## 2022-07-14 PROCEDURE — 71046 X-RAY EXAM CHEST 2 VIEWS: CPT | Performed by: RADIOLOGY

## 2022-07-14 PROCEDURE — 71046 X-RAY EXAM CHEST 2 VIEWS: CPT

## 2022-07-14 PROCEDURE — 76000 FLUOROSCOPY <1 HR PHYS/QHP: CPT | Performed by: RADIOLOGY

## 2022-07-14 PROCEDURE — 76000 FLUOROSCOPY <1 HR PHYS/QHP: CPT

## 2022-08-05 ENCOUNTER — TELEPHONE (OUTPATIENT)
Dept: PULMONOLOGY | Age: 57
End: 2022-08-05

## 2022-08-05 NOTE — TELEPHONE ENCOUNTER
Left voicemail to call office. Called to reschedule 8/18/22 appointment. Dr. Cheryl Ruelas is only in office on Monday, Tuesday, or Wednesday now.

## 2022-09-02 ENCOUNTER — OFFICE VISIT (OUTPATIENT)
Dept: FAMILY MEDICINE CLINIC | Age: 57
End: 2022-09-02

## 2022-09-02 VITALS
TEMPERATURE: 97 F | WEIGHT: 202 LBS | OXYGEN SATURATION: 97 % | HEART RATE: 86 BPM | SYSTOLIC BLOOD PRESSURE: 130 MMHG | DIASTOLIC BLOOD PRESSURE: 86 MMHG | BODY MASS INDEX: 36.36 KG/M2

## 2022-09-02 DIAGNOSIS — Z79.4 TYPE 2 DIABETES MELLITUS WITH COMPLICATION, WITH LONG-TERM CURRENT USE OF INSULIN (HCC): Primary | ICD-10-CM

## 2022-09-02 DIAGNOSIS — E66.01 SEVERE OBESITY (BMI 35.0-39.9) WITH COMORBIDITY (HCC): ICD-10-CM

## 2022-09-02 DIAGNOSIS — E11.8 TYPE 2 DIABETES MELLITUS WITH COMPLICATION, WITH LONG-TERM CURRENT USE OF INSULIN (HCC): Primary | ICD-10-CM

## 2022-09-02 DIAGNOSIS — N32.89 BLADDER SPASM: ICD-10-CM

## 2022-09-02 LAB — HBA1C MFR BLD: 8.6 %

## 2022-09-02 PROCEDURE — 99214 OFFICE O/P EST MOD 30 MIN: CPT | Performed by: NURSE PRACTITIONER

## 2022-09-02 PROCEDURE — 2022F DILAT RTA XM EVC RTNOPTHY: CPT | Performed by: NURSE PRACTITIONER

## 2022-09-02 PROCEDURE — G8417 CALC BMI ABV UP PARAM F/U: HCPCS | Performed by: NURSE PRACTITIONER

## 2022-09-02 PROCEDURE — 83036 HEMOGLOBIN GLYCOSYLATED A1C: CPT | Performed by: NURSE PRACTITIONER

## 2022-09-02 PROCEDURE — 1036F TOBACCO NON-USER: CPT | Performed by: NURSE PRACTITIONER

## 2022-09-02 PROCEDURE — 3052F HG A1C>EQUAL 8.0%<EQUAL 9.0%: CPT | Performed by: NURSE PRACTITIONER

## 2022-09-02 PROCEDURE — 3017F COLORECTAL CA SCREEN DOC REV: CPT | Performed by: NURSE PRACTITIONER

## 2022-09-02 PROCEDURE — G8427 DOCREV CUR MEDS BY ELIG CLIN: HCPCS | Performed by: NURSE PRACTITIONER

## 2022-09-02 RX ORDER — OXYBUTYNIN CHLORIDE 10 MG/1
10 TABLET, EXTENDED RELEASE ORAL DAILY
Qty: 30 TABLET | Refills: 2 | Status: SHIPPED | OUTPATIENT
Start: 2022-09-02

## 2022-09-02 SDOH — ECONOMIC STABILITY: FOOD INSECURITY: WITHIN THE PAST 12 MONTHS, THE FOOD YOU BOUGHT JUST DIDN'T LAST AND YOU DIDN'T HAVE MONEY TO GET MORE.: NEVER TRUE

## 2022-09-02 SDOH — ECONOMIC STABILITY: FOOD INSECURITY: WITHIN THE PAST 12 MONTHS, YOU WORRIED THAT YOUR FOOD WOULD RUN OUT BEFORE YOU GOT MONEY TO BUY MORE.: NEVER TRUE

## 2022-09-02 ASSESSMENT — SOCIAL DETERMINANTS OF HEALTH (SDOH): HOW HARD IS IT FOR YOU TO PAY FOR THE VERY BASICS LIKE FOOD, HOUSING, MEDICAL CARE, AND HEATING?: NOT HARD AT ALL

## 2022-09-02 NOTE — PROGRESS NOTES
[facility]IC[facility]  [place] [place]  [address] [address]  Dept: [phone]  Dept Fax: [phone]  Loc: [phone]    Maisha Mg is a 60 y.o. female who presents today for her medical conditions/complaints as noted below. Maisha Mg is c/o of Diabetes Care Management      Chief Complaint   Patient presents with    Diabetes Care Management       HPI:     HPI  Patient is here for follow up on chronic conditions including DM, HLD, and HTN. During last visit we started oxybutynin for bladder spasms as well. Patient reports that the medication has helped with the bladder spasms and urination at night. Patient has been monitoring blood sugars at home, but they have been elevated.   She has not been able to take her meds due to cost.      Past Medical History:   Diagnosis Date    Abnormal Pap smear of cervix     Arthritis     CAD (coronary artery disease)     stent 2011 Maedward Bump; sees [facility] cardiology    Cervical cancer ([facility] [address]     Class 3 severe obesity due to excess calories without serious comorbidity with body mass index (BMI) of 40.0 to 44.9 in adult [facility]) 07/28/2020    Diabetes mellitus ([facility] [address]     ERRONEOUS ENCOUNTER--DISREGARD 09/15/2015    GERD (gastroesophageal reflux disease)     Headache(784.0)     Heart attack ([facility] [address] 12/2011    Hyperlipidemia     Hypertension     Lichen simplex chronicus 10/2015    Osteopenia     Pneumonia     Type II or unspecified type diabetes mellitus without mention of complication, not stated as uncontrolled         Past Surgical History:   Procedure Laterality Date    BREAST BIOPSY Right 06/08/2011    US guided core needle, right, benign    CARDIAC CATHETERIZATION  12/22/14  JDT    EF 50%    CARDIAC CATHETERIZATION  06/05/2017        CHG FLUOROSCOPIC GUIDANCE NEEDLE PLACEMENT ADD ON Left 07/19/2018    CORTICOSTEROID INJECTION performed by Bear Delgado MD at [address]  2006    COLONOSCOPY  approx 2005     nitroGLYCERIN (NITROSTAT) 0.4 MG SL tablet Place 1 tablet under the tongue every 5 minutes as needed for Chest pain 25 tablet 3    metoprolol tartrate (LOPRESSOR) 25 MG tablet Take one tablet by mouth twice daily 60 tablet 5    metFORMIN (GLUCOPHAGE-XR) 500 MG extended release tablet Take one tablet by mouth 2 times daily 60 tablet 5    lovastatin (MEVACOR) 40 MG tablet Take one tablet at bedtime 30 tablet 4    glipiZIDE (GLUCOTROL XL) 10 MG extended release tablet Take 1 tablet by mouth daily 30 tablet 3    furosemide (LASIX) 40 MG tablet Take 1 tablet by mouth daily 30 tablet 5    docusate sodium (COLACE) 100 MG capsule Take 1 capsule by mouth daily as needed for Constipation 30 capsule 1    empagliflozin (JARDIANCE) 25 MG tablet Take 25 mg by mouth daily      amitriptyline (ELAVIL) 10 MG tablet Take 1 tablet by mouth nightly 30 tablet 5    nystatin (MYCOSTATIN) 039506 UNIT/GM powder APPLY 2 OR 3 TIMES DAILY. (Patient taking differently: Apply topically as needed APPLY 2 OR 3 TIMES DAILY. ) 60 g 5    diclofenac sodium (VOLTAREN) 1 % GEL Apply 2 g topically 4 times daily (Patient taking differently: Apply 2 g topically 4 times daily as needed for Pain) 2 Tube 1    aspirin 81 MG tablet Take 81 mg by mouth daily      mometasone (ELOCON) 0.1 % ointment Apply topically to vagina 3 times per week (Patient taking differently: Apply topically as needed Apply topically to vagina 3 times per week) 1 Tube 3    Eflornithine HCl (VANIQA) 13.9 % CREA Apply 1 Dose topically 2 times daily (Patient taking differently: Apply 1 Dose topically 2 times daily as needed) 45 g 2    Dulaglutide 0.75 MG/0.5ML SOPN Inject 0.75 mg into the skin once a week (Patient not taking: Reported on 9/2/2022) 12 pen 2    Diabetic Shoe MISC by Does not apply route (Patient not taking: Reported on 9/2/2022) 1 each 0    albuterol (PROVENTIL) (5 MG/ML) 0.5% nebulizer solution Take 0.5 mLs by nebulization every 6 hours as needed for Wheezing (Patient not taking: Reported on 9/2/2022) 120 each 0     No current facility-administered medications for this visit. Allergies   Allergen Reactions    Codeine Other (See Comments)     Seizures as a child    Motrin [Ibuprofen] Swelling     Tongue to swell    Humalog [Insulin Lispro] Other (See Comments)     Shakiness, break out in a sweat at higher doses. Ozempic (0.25 Or 0.5 Mg-Dose) [Semaglutide(0.25 Or 0.5mg-Dos)]      nausea       Family History   Problem Relation Age of Onset    Diabetes Father     Cancer Father     High Blood Pressure Father     Heart Attack Father     Colon Cancer Father     Diabetes Mother     Breast Cancer Mother 47    Heart Failure Mother         CHF, MVP    Liver Cancer Mother     Heart Failure Maternal Grandfather     Stroke Maternal Grandfather     Pancreatic Cancer Maternal Grandfather     Stomach Cancer Maternal Grandfather     Diabetes Brother     Colon Cancer Paternal Uncle     Rectal Cancer Other     Breast Cancer Maternal Grandmother 78    Colon Polyps Neg Hx     Crohn's Disease Neg Hx     Irritable Bowel Syndrome Neg Hx     Liver Disease Neg Hx                Subjective:      Review of Systems   Constitutional: Negative. HENT: Negative. Eyes: Negative. Respiratory: Negative. Cardiovascular: Negative. Gastrointestinal: Negative. Endocrine: Negative. Genitourinary: Negative. Musculoskeletal: Negative. Skin: Negative. Neurological: Negative. Hematological: Negative. Psychiatric/Behavioral: Negative. Objective:     Physical Exam  Vitals and nursing note reviewed. Constitutional:       Appearance: Normal appearance. She is well-developed. She is obese. HENT:      Head: Normocephalic and atraumatic. Right Ear: Hearing, tympanic membrane, ear canal and external ear normal.      Left Ear: Hearing, tympanic membrane, ear canal and external ear normal.      Nose: Nose normal.      Mouth/Throat:      Lips: No lesions.       Pharynx: Oropharynx is clear. Uvula midline. Eyes:      General: Lids are normal.      Extraocular Movements: Extraocular movements intact. Conjunctiva/sclera: Conjunctivae normal.      Pupils: Pupils are equal, round, and reactive to light. Neck:      Thyroid: No thyroid mass or thyromegaly. Trachea: Trachea normal.   Cardiovascular:      Rate and Rhythm: Normal rate and regular rhythm. Heart sounds: Normal heart sounds. Pulmonary:      Effort: Pulmonary effort is normal.      Breath sounds: Normal breath sounds. Abdominal:      General: Bowel sounds are normal.      Palpations: Abdomen is soft. Musculoskeletal:         General: Normal range of motion. Cervical back: Normal, normal range of motion and neck supple. No tenderness. Thoracic back: Normal. No tenderness. Normal range of motion. Lumbar back: Normal. No tenderness. Normal range of motion. Skin:     General: Skin is warm and dry. Neurological:      Mental Status: She is alert and oriented to person, place, and time. Psychiatric:         Attention and Perception: Attention normal.         Mood and Affect: Mood and affect normal.         Speech: Speech normal.         Behavior: Behavior normal.         Thought Content: Thought content normal.         Cognition and Memory: Cognition normal.         Judgment: Judgment normal.       /86 (Site: Left Upper Arm)   Pulse 86   Temp 97 °F (36.1 °C)   Wt 202 lb (91.6 kg)   LMP 01/01/1998 (Approximate)   SpO2 97%   BMI 36.36 kg/m²     Assessment:      Diagnosis Orders   1. Type 2 diabetes mellitus with complication, with long-term current use of insulin ([facility])  POCT glycosylated hemoglobin (Hb A1C)      2. Bladder spasm  oxybutynin (DITROPAN-XL) 10 MG extended release tablet      3. Severe obesity (BMI 35.0-39. 9) with comorbidity ([address]            Results for orders placed or performed in visit on 09/02/22   POCT glycosylated hemoglobin (Hb A1C)   Result Value Ref Range Hemoglobin A1C 8.6 (H) %       Plan:     1. Type 2 diabetes mellitus with complication, with long-term current use of insulin (HCC)  A1c has increased. Restart Trulicity as discussed. Sample provided. - POCT glycosylated hemoglobin (Hb A1C)    2. Bladder spasm  Continue Oxybutynin, but increasing to 10 mg daily. - oxybutynin (DITROPAN-XL) 10 MG extended release tablet; Take 1 tablet by mouth daily  Dispense: 30 tablet; Refill: 2    3. Severe obesity (BMI 35.0-39. 9) with comorbidity ([facility] [address]  Discussed weight loss and monitoring blood sugars. Return in about 3 months (around 12/2/2022) for Diabetic Follow up, POCT A1c. Orders Placed This Encounter   Procedures    POCT glycosylated hemoglobin (Hb A1C)       Orders Placed This Encounter   Medications    oxybutynin (DITROPAN-XL) 10 MG extended release tablet     Sig: Take 1 tablet by mouth daily     Dispense:  30 tablet     Refill:  2              Patient offered educational handouts and has had all questions answered. Patient voices understanding and agrees to plans along with risks and benefits of plan. Patient is instructed to continue prior meds, diet, and exercise plans as instructed. Patient agrees to follow up as instructed and sooner if needed. Patient agrees to go to ER if condition becomes emergent. EMR Dragon/transcription disclaimer: Some of this encounter note is an electronic transcription/translation of spoken language to printed text. The electronic translation of spoken language may permit erroneous, or at times, nonsensical words or phrases to be inadvertently transcribed.  Although I have reviewed the note for such errors, some may still exist.    Electronically signed by MIKE Prasad on 9/7/2022 at 8:47 AM

## 2022-09-07 ASSESSMENT — ENCOUNTER SYMPTOMS
EYES NEGATIVE: 1
RESPIRATORY NEGATIVE: 1
GASTROINTESTINAL NEGATIVE: 1

## 2022-10-03 ENCOUNTER — HOSPITAL ENCOUNTER (OUTPATIENT)
Dept: SLEEP CENTER | Age: 57
Discharge: HOME | End: 2022-10-05
Payer: COMMERCIAL

## 2022-10-03 PROCEDURE — G0399 HOME SLEEP TEST/TYPE 3 PORTA: HCPCS

## 2022-10-04 PROCEDURE — G0399 HOME SLEEP TEST/TYPE 3 PORTA: HCPCS

## 2022-10-05 ENCOUNTER — HOSPITAL ENCOUNTER (OUTPATIENT)
Dept: CT IMAGING | Age: 57
Discharge: HOME | End: 2022-10-05
Payer: COMMERCIAL

## 2022-10-05 DIAGNOSIS — Z87.891 PERSONAL HISTORY OF NICOTINE DEPENDENCE: ICD-10-CM

## 2022-10-05 DIAGNOSIS — Z12.2 ENCOUNTER FOR SCREENING FOR MALIGNANT NEOPLASM OF RESPIRATORY ORGANS: ICD-10-CM

## 2022-10-05 PROCEDURE — G0399 HOME SLEEP TEST/TYPE 3 PORTA: HCPCS

## 2022-10-05 PROCEDURE — 71271 CT THORAX LUNG CANCER SCR C-: CPT

## 2022-10-10 ENCOUNTER — OFFICE VISIT (OUTPATIENT)
Dept: PULMONOLOGY | Age: 57
End: 2022-10-10
Payer: MEDICARE

## 2022-10-10 ENCOUNTER — OFFICE VISIT (OUTPATIENT)
Dept: PULMONOLOGY | Facility: CLINIC | Age: 57
End: 2022-10-10

## 2022-10-10 ENCOUNTER — HOSPITAL ENCOUNTER (OUTPATIENT)
Dept: GENERAL RADIOLOGY | Age: 57
Discharge: HOME | End: 2022-10-10
Payer: MEDICARE

## 2022-10-10 VITALS
HEART RATE: 74 BPM | TEMPERATURE: 97.1 F | WEIGHT: 197.8 LBS | BODY MASS INDEX: 36.4 KG/M2 | OXYGEN SATURATION: 98 % | HEIGHT: 62 IN | DIASTOLIC BLOOD PRESSURE: 70 MMHG | SYSTOLIC BLOOD PRESSURE: 116 MMHG

## 2022-10-10 VITALS
SYSTOLIC BLOOD PRESSURE: 158 MMHG | WEIGHT: 198 LBS | HEART RATE: 75 BPM | HEIGHT: 60 IN | OXYGEN SATURATION: 99 % | DIASTOLIC BLOOD PRESSURE: 82 MMHG | BODY MASS INDEX: 38.87 KG/M2

## 2022-10-10 DIAGNOSIS — R06.09 DOE (DYSPNEA ON EXERTION): ICD-10-CM

## 2022-10-10 DIAGNOSIS — J98.4 RESTRICTIVE LUNG DISEASE: Primary | ICD-10-CM

## 2022-10-10 DIAGNOSIS — R91.1 LUNG NODULE: ICD-10-CM

## 2022-10-10 DIAGNOSIS — G47.10 HYPERSOMNIA: ICD-10-CM

## 2022-10-10 DIAGNOSIS — E66.9 OBESITY (BMI 30-39.9): ICD-10-CM

## 2022-10-10 DIAGNOSIS — G47.33 OSA (OBSTRUCTIVE SLEEP APNEA): ICD-10-CM

## 2022-10-10 DIAGNOSIS — J98.6 DIAPHRAGMATIC DISORDER: Primary | ICD-10-CM

## 2022-10-10 DIAGNOSIS — J98.6 DIAPHRAGMATIC DISORDER: ICD-10-CM

## 2022-10-10 DIAGNOSIS — J98.4 SCARRING OF LUNG: ICD-10-CM

## 2022-10-10 DIAGNOSIS — Z87.891 PERSONAL HISTORY OF NICOTINE DEPENDENCE: ICD-10-CM

## 2022-10-10 DIAGNOSIS — E66.01 SEVERE OBESITY (BMI 35.0-39.9) WITH COMORBIDITY: ICD-10-CM

## 2022-10-10 PROCEDURE — G8427 DOCREV CUR MEDS BY ELIG CLIN: HCPCS | Performed by: INTERNAL MEDICINE

## 2022-10-10 PROCEDURE — 1036F TOBACCO NON-USER: CPT | Performed by: INTERNAL MEDICINE

## 2022-10-10 PROCEDURE — 3017F COLORECTAL CA SCREEN DOC REV: CPT | Performed by: INTERNAL MEDICINE

## 2022-10-10 PROCEDURE — G8484 FLU IMMUNIZE NO ADMIN: HCPCS | Performed by: INTERNAL MEDICINE

## 2022-10-10 PROCEDURE — 99214 OFFICE O/P EST MOD 30 MIN: CPT | Performed by: INTERNAL MEDICINE

## 2022-10-10 PROCEDURE — G8417 CALC BMI ABV UP PARAM F/U: HCPCS | Performed by: INTERNAL MEDICINE

## 2022-10-10 PROCEDURE — 71046 X-RAY EXAM CHEST 2 VIEWS: CPT

## 2022-10-10 RX ORDER — OXYBUTYNIN CHLORIDE 10 MG/1
1 TABLET, EXTENDED RELEASE ORAL DAILY
COMMUNITY
Start: 2022-09-02

## 2022-10-10 ASSESSMENT — ENCOUNTER SYMPTOMS
COUGH: 0
WHEEZING: 0
ANAL BLEEDING: 0
CHEST TIGHTNESS: 0
SHORTNESS OF BREATH: 1
APNEA: 1
RHINORRHEA: 0
BACK PAIN: 0
ABDOMINAL DISTENTION: 0
ABDOMINAL PAIN: 0

## 2022-10-10 NOTE — ASSESSMENT & PLAN NOTE
I will get follow-up pulmonary functions on a return visit in 6 months.  I think her restriction is due to a combination of lung scarring on the left and her weight.

## 2022-10-10 NOTE — ASSESSMENT & PLAN NOTE
She quit smoking in 2014.  We will continue yearly screening screening chest CT scans as long she meets criteria.

## 2022-10-10 NOTE — ASSESSMENT & PLAN NOTE
Patient's (Body mass index is 38.67 kg/m².) indicates that they are morbidly obese (BMI > 40 or > 35 with obesity - related health condition) with health conditions that include coronary heart disease . Weight is unchanged.  Diet and exercise are encouraged and she will follow-up with her primary healthcare provider regarding her elevated BMI otherwise.

## 2022-10-10 NOTE — PROGRESS NOTES
Chief Complaint  Restrictive lung disease    Subjective    History of Present Illness     Gwendolyn Delgadillo presents to [facility] for restrictive lung disease.    History of Present Illness   The patient did have her chest CT performed last week and I do not have the actual scan for review but I will get it on disc from [facility] and call her after I review the actual scan itself.  We actually notified her by phone last week that the scan did appear to be stable per report.  She did have issues with a left pleural effusion post bypass surgery in the past for which she required chest tube drainage.  The changes on her chest CT would be consistent with scarring related to this previous pleural drainage procedure.  She had seen Dr. Paz at [facility] [place] this past summer and part of her work-up included a sniff test which showed no evidence of definite diaphragmatic paralysis on the left but there was some limited movement and excursion again likely related to chronic pleural scarring.  She also has had a recent home sleep study and is scheduled to be followed up at [facility] later today regarding the results.  She did wear a CPAP device after her prior cardiac surgery because some problems with atelectasis and even wore that at home for a period of time and did not tolerate it.  I had discussed possible evaluation for sleep apnea in the past on prior office visits and she had declined to pursue any such work-up because of her concern that she could not tolerate a CPAP device.  I discussed other potential options if she were felt to have significant sleep apnea such as the inspire device and she is not interested in that either.  The other option would be some sort of oral appliance.  Again that is being addressed through physicians at [facility].  She has had the COVID-19 vaccine in the form of the Pfizer vaccine including 1 booster.  She has declined the flu  shot in the past.  Prior to Admission medications    Medication Sig Start Date End Date Taking? Authorizing Provider   albuterol (2.5 MG/3ML) 0.083% nebulizer solution 3 mL, albuterol (5 MG/ML) 0.5% nebulizer solution 0.5 mL Inhale.   Yes Sherry Maria MD   aspirin 81 MG EC tablet Take 81 mg by mouth Daily.   Yes Sherry Maria MD   conjugated estrogens (PREMARIN) 0.625 MG/GM vaginal cream Insert 0.5 g into the vagina 2 (Two) Times a Week.   Yes Sherry Maria MD   docusate sodium (COLACE) 100 MG capsule Take 100 mg by mouth 2 (Two) Times a Day As Needed for Constipation.   Yes Sherry Maria MD   Dulaglutide 0.75 MG/0.5ML solution pen-injector Inject 0.75 mg under the skin into the appropriate area as directed. 4/26/22  Yes Sherry Maria MD   Eflornithine HCl 13.9 % cream Apply  topically 2 (Two) Times a Day.   Yes Sherry Maria MD   empagliflozin (JARDIANCE) 10 MG tablet tablet Take 10 mg by mouth Daily.   Yes Sherry Maria MD   ergocalciferol (ERGOCALCIFEROL) 1.25 MG (38352 UT) capsule TAKE 1 CAPSULE BY MOUTH ONCE WEEKLY 3/18/21  Yes Sherry Maria MD   estradiol (VAGIFEM) 10 MCG tablet vaginal tablet Insert 1 tablet into the vagina 2 (Two) Times a Week.   Yes Sherry Maria MD   furosemide (LASIX) 20 MG tablet Take 40 mg by mouth Daily As Needed.   Yes Sherry Maria MD   glipizide (GLUCOTROL XL) 10 MG 24 hr tablet Take 10 mg by mouth. 4/29/21  Yes Sherry Maria MD   LORazepam (ATIVAN) 0.5 MG tablet Take 0.5 mg by mouth Every 8 (Eight) Hours As Needed for Anxiety.   Yes Sherry Maria MD   lovastatin (MEVACOR) 40 MG tablet Take 40 mg by mouth Every Night.   Yes Provider, Historical, MD   metFORMIN (GLUCOPHAGE) 1000 MG tablet Take 500 mg by mouth 2 (Two) Times a Day With Meals.   Yes Sherry Maria MD   metoprolol tartrate (LOPRESSOR) 25 MG tablet Take 25 mg by mouth 2 (Two) Times a Day.   Yes Sherry Maria MD  "  mometasone (ELOCON) 0.1 % ointment Apply  topically 3 (Three) Times a Week.   Yes Sherry Maria MD   nitroglycerin (NITROSTAT) 0.4 MG SL tablet Place 0.4 mg under the tongue Every 5 (Five) Minutes As Needed for Chest Pain. Take no more than 3 doses in 15 minutes.   Yes Sherry Maria MD   nystatin (MYCOSTATIN) 312398 UNIT/GM powder Apply  topically 3 (Three) Times a Day.   Yes Sherry Maria MD   ondansetron (ZOFRAN) 8 MG tablet Take 8 mg by mouth Every 8 (Eight) Hours As Needed for Nausea or Vomiting.   Yes Sherry Maria MD   oxybutynin XL (DITROPAN-XL) 10 MG 24 hr tablet Take 1 tablet by mouth Daily. 22  Yes Provider, Historical, MD   prochlorperazine (COMPAZINE) 10 MG tablet Take 10 mg by mouth Every 6 (Six) Hours As Needed for Nausea or Vomiting.   Yes Sherry Maria MD   ranolazine (RANEXA) 500 MG 12 hr tablet Take 1 tablet by mouth 2 (Two) Times a Day. 22  Yes Sherry Maria MD   spironolactone (ALDACTONE) 50 MG tablet Take 50 mg by mouth Daily.   Yes Sherry Maria MD   tamsulosin (FLOMAX) 0.4 MG capsule 24 hr capsule Take 0.4 mg by mouth. 20  Yes Provider, Historical, MD   valsartan (DIOVAN) 80 MG tablet Take 80 mg by mouth Daily.   Yes Sherry Maria MD       Social History     Socioeconomic History   • Marital status:    Tobacco Use   • Smoking status: Former     Packs/day: 2.50     Years: 30.00     Pack years: 75.00     Types: Cigarettes     Quit date: 12/15/2014     Years since quittin.8   • Smokeless tobacco: Never   • Tobacco comments:     87.5 pack-years    Vaping Use   • Vaping Use: Never used   Substance and Sexual Activity   • Alcohol use: No   • Drug use: No   • Sexual activity: Defer       Objective   Vital Signs:   /82   Pulse 75   Ht 152.4 cm (60\")   Wt 89.8 kg (198 lb)   SpO2 99% Comment: RA  BMI 38.67 kg/m²     Physical Exam  Vitals and nursing note reviewed.   Constitutional:       Appearance: She " is obese.      Comments: Her systolic blood pressure is somewhat elevated and she will follow-up with her primary healthcare provider on this.   HENT:      Head: Normocephalic.      Comments: She is wearing a mask.  Eyes:      Extraocular Movements: Extraocular movements intact.      Pupils: Pupils are equal, round, and reactive to light.   Cardiovascular:      Rate and Rhythm: Normal rate and regular rhythm.      Comments: Again her systolic blood pressure is somewhat elevated and she will follow-up with her primary care physician on this.  Pulmonary:      Effort: Pulmonary effort is normal.      Comments: She has reasonable air movement bilaterally although breath sounds are somewhat diminished at the left base.  No adventitious sounds are heard.  Musculoskeletal:         General: Normal range of motion.   Skin:     General: Skin is warm and dry.   Neurological:      General: No focal deficit present.      Mental Status: She is alert and oriented to person, place, and time.   Psychiatric:         Mood and Affect: Mood normal.         Behavior: Behavior normal.        Result Review :    PFT Values        Some values may be hidden. Unless noted otherwise, only the newest values recorded on each date are displayed.         Old Values PFT Results 6/15/21 6/22/22   No data to display.      Pre Drug PFT Results 6/15/21 6/22/22   FVC 66 59   FEV1 67 66   FEF 25-75% 67 82   FEV1/FVC 83.14 89      Post Drug PFT Results 6/15/21 6/22/22   No data to display.      Other Tests PFT Results 6/15/21 6/22/22   TLC 66 62   RV 69 59   DLCO 85 92   D/VAsb 147 137               Results for orders placed in visit on 22    Full Pulmonary Function Test Without Bronchodilator    Narrative  Full Pulmonary Function Test Without Bronchodilator  Performed by: Freda Cummings, RRT  Authorized by: Dimitri Felix MD    Pre Drug % Predicted  FVC: 59%  FEV1: 66%  FEF 25-75%: 82%  FEV1/FVC: 89%  T%  RV: 59%  DLCO:  92%  D/VAsb: 137%    Interpretation  Spirometry  Spirometry shows moderate restriction.  Lung Volume Measurements  Measurements show reduced lung volumes consistent with restriction.  Diffusion Capacity  The patient's diffusion capacity is normal.  Diffusion capacity is normal when corrected for alveolar volume.  Overall comments: The patient's spirometry is consistent with a moderate restrictive ventilatory defect.  Lung volumes confirm a moderate restrictive ventilatory defect.  Diffusion capacity is within normal limits and when corrected for alveolar volume is supranormal.  When current studies are compared to [facility] [place] [facility], she has had a slight but not significant drop in her FVC compared to previous but actually some improvement in her FEV1 and also total lung capacity compared to previous.  When corrected for alveolar volume there has been a drop in diffusion capacity compared to previous although it remains supranormal.      Results for orders placed in visit on 06/15/21    Full Pulmonary Function Test Without Bronchodilator    Narrative  Full Pulmonary Function Test Without Bronchodilator  Performed by: Freda Cummings, RRT  Authorized by: Dimitri Felix MD    Pre Drug % Predicted  FVC: 66%  FEV1: 67%  FEF 25-75%: 67%  FEV1/FVC: 83.14%  T%  RV: 69%  DLCO: 85%  D/VAsb: 147%    Interpretation  Spirometry  Spirometry shows moderate restriction. There is reduced midflow suggesting small airway/airflow obstruction.  Lung Volume Measurements  Measurements show reduced lung volumes consistent with restriction.  Diffusion Capacity  The patient's diffusion capacity is normal.  Diffusion capacity is normal when corrected for alveolar volume.  Overall comments: The patient's baseline spirometry is consistent with a moderate restrictive ventilatory defect with coexisting small airways disease.  Lung volumes confirm a moderate restrictive ventilatory  defect.  There is also a decrease in inspiratory capacity.  Diffusion capacity is within normal limits and when corrected for alveolar volume is actually supranormal.  When compared to studies done here in the office on July 31 of 2019, she has had improvement in her FVC, FEV1, total lung capacity, and diffusion capacity particularly when corrected for alveolar volume.      Results for orders placed in visit on 07/31/19    Pulmonary Function Test                 My interpretation of imaging:    SCANNED - PULMONARY RESULTS (10/05/2022 00:00)      Assessment and Plan     Diagnoses and all orders for this visit:    1. Restrictive lung disease (Primary)  Assessment & Plan:  I will get follow-up pulmonary functions on a return visit in 6 months.  I think her restriction is due to a combination of lung scarring on the left and her weight.    Orders:  -     Full Pulmonary Function Test Without Bronchodilator; Future    2. Lung nodule  Assessment & Plan:  Per report no new or suspicious nodules were noted on her recent scan.  I will get the actual scan for review.      3. Scarring of lung  Assessment & Plan:  This is been noted on prior CAT scans and her left basilar scarring in particular is almost certainly related to her previous left pleural effusion post bypass surgery and subsequent need for chest tube drainage.      4. Personal history of nicotine dependence  Assessment & Plan:  She quit smoking in 2014.  We will continue yearly screening screening chest CT scans as long she meets criteria.      5. Severe obesity (BMI 35.0-39.9) with comorbidity (HCC)  Assessment & Plan:  Patient's (Body mass index is 38.67 kg/m².) indicates that they are morbidly obese (BMI > 40 or > 35 with obesity - related health condition) with health conditions that include coronary heart disease . Weight is unchanged.  Diet and exercise are encouraged and she will follow-up with her primary healthcare provider regarding her elevated BMI  otherwise.            Dimitri Felix MD  10/10/2022  17:28 CDT    Follow Up   Return in about 6 months (around 4/10/2023) for Complete PFT, To see me specifically.    Patient was given instructions and counseling regarding her condition or for health maintenance advice. Please see specific information pulled into the AVS if appropriate.

## 2022-10-10 NOTE — PROGRESS NOTES
Pulmonary and Sleep Medicine    Santos Hoang (:  1965) is a 60 y.o. female,Established patient, here for evaluation of the following chief complaint(s):  Follow-up (Follow up- Chest xray )      Referring physician:  No referring provider defined for this encounter. ASSESSMENT/PLAN:  1. Diaphragmatic disorder  2. Hypersomnia  3. TAVERAS (dyspnea on exertion)  4. Obesity (BMI 30-39.9)  5. JAYSON (obstructive sleep apnea)      We will await the results of the sleep study. Discussed options for diaphragmatic paralysis over the patient does not seem to be inclined towards a surgical options. Follow-up in 6 weeks. Lian Babb MD, [facility], [facility]    Return in about 6 weeks (around 2022). SUBJECTIVE/OBJECTIVE:  The patient is here for follow-up on diaphragmatic dysfunction and sleep apnea. She did have a sleep study however unfortunately it has not been scored yet. 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 Prior to Visit Medications    Medication Sig Taking?  Authorizing Provider   oxybutynin (DITROPAN-XL) 10 MG extended release tablet Take 1 tablet by mouth daily Yes MIKE Inman   ranolazine (RANEXA) 500 MG extended release tablet Take 1 tablet by mouth 2 times daily Yes MIKE Moon   vitamin D (ERGOCALCIFEROL) 1.25 MG (02377 UT) CAPS capsule Take 1 capsule by mouth once a week Yes MIKE Inman   valsartan (DIOVAN) 80 MG tablet Take 1 tablet by mouth daily Yes MIKE Inman   tamsulosin (FLOMAX) 0.4 MG capsule Take 1 capsule by mouth daily Yes MIKE Inman   ondansetron (ZOFRAN ODT) 4 MG disintegrating tablet Take 1 tablet by mouth daily as needed for Nausea or Vomiting Yes MIKE Inman   nitroGLYCERIN (NITROSTAT) 0.4 MG SL tablet Place 1 tablet under the tongue every 5 minutes as needed for Chest pain Yes MIKE Inman   metoprolol tartrate (LOPRESSOR) 25 MG tablet Take one tablet by mouth twice daily Yes MIKE Velez   metFORMIN (GLUCOPHAGE-XR) 500 MG extended release tablet Take one tablet by mouth 2 times daily Yes MIKE Velez   lovastatin (MEVACOR) 40 MG tablet Take one tablet at bedtime Yes MIKE Velez   glipiZIDE (GLUCOTROL XL) 10 MG extended release tablet Take 1 tablet by mouth daily Yes MIKE Velez   furosemide (LASIX) 40 MG tablet Take 1 tablet by mouth daily Yes MIKE Velez   docusate sodium (COLACE) 100 MG capsule Take 1 capsule by mouth daily as needed for Constipation Yes MIKE Velez   empagliflozin (JARDIANCE) 25 MG tablet Take 25 mg by mouth daily Yes Historical Provider, MD   amitriptyline (ELAVIL) 10 MG tablet Take 1 tablet by mouth nightly Yes MIKE Velez   nystatin (MYCOSTATIN) 352202 UNIT/GM powder APPLY 2 OR 3 TIMES DAILY. Patient taking differently: Apply topically as needed APPLY 2 OR 3 TIMES DAILY.  Yes MIKE Velez   diclofenac sodium (VOLTAREN) 1 % GEL Apply 2 g topically 4 times daily  Patient taking differently: Apply 2 g topically 4 times daily as needed for Pain Yes MIKE Velez   aspirin 81 MG tablet Take 81 mg by mouth daily Yes Historical Provider, MD   Eflornithine HCl (VANIQA) 13.9 % CREA Apply 1 Dose topically 2 times daily  Patient taking differently: Apply 1 Dose topically 2 times daily as needed Yes MIKE Roberto   Dulaglutide 0.75 MG/0.5ML SOPN Inject 0.75 mg into the skin once a week  Patient not taking: Reported on 9/2/2022  MIKE Velez   Diabetic Shoe MISC by Does not apply route  Patient not taking: No sig reported  Melvina Burns MD   albuterol (PROVENTIL) (5 MG/ML) 0.5% nebulizer solution Take 0.5 mLs by nebulization every 6 hours as needed for Wheezing  Patient not taking: Reported on 8/7/4333  MIKE Wilson   mometasone (ELOCON) 0.1 % ointment Apply topically to vagina 3 times per week  Patient taking differently: Apply topically as needed Apply topically to vagina 3 times per week  Roger Lane MD        Review of Systems   Constitutional:  Negative for activity change, appetite change, chills, diaphoresis and fatigue. HENT:  Negative for congestion, dental problem, drooling, ear discharge, postnasal drip and rhinorrhea. Eyes:  Negative for visual disturbance. Respiratory:  Positive for apnea and shortness of breath. Negative for cough, chest tightness and wheezing. Gastrointestinal:  Negative for abdominal distention, abdominal pain and anal bleeding. Endocrine: Negative for cold intolerance, heat intolerance and polydipsia. Genitourinary:  Negative for difficulty urinating, dysuria, enuresis and flank pain. Musculoskeletal:  Negative for arthralgias, back pain and gait problem. Allergic/Immunologic: Negative for environmental allergies. Neurological:  Negative for dizziness, facial asymmetry, light-headedness and headaches. Vitals:    10/10/22 1523   BP: 116/70   Pulse: 74   Temp: 97.1 °F (36.2 °C)   SpO2: 98%     BMI Readings from Last 1 Encounters:   10/10/22 36.18 kg/m²         Physical Exam  Vitals reviewed. Constitutional:       Appearance: Normal appearance. HENT:      Head: Normocephalic and atraumatic. Nose: Nose normal.   Eyes:      Extraocular Movements: Extraocular movements intact. Conjunctiva/sclera: Conjunctivae normal.   Cardiovascular:      Rate and Rhythm: Normal rate and regular rhythm. Heart sounds: No murmur heard. No friction rub. Pulmonary:      Effort: Pulmonary effort is normal. No respiratory distress. Breath sounds: Normal breath sounds. No stridor. No wheezing, rhonchi or rales. Abdominal:      General: There is no distension. Palpations: There is no mass. Tenderness: There is no abdominal tenderness. There is no guarding or rebound. Musculoskeletal:      Cervical back: Normal range of motion and neck supple.    Neurological:      Mental Status: She is alert and oriented to person, place, and time. This note was generated used a voice recognition software. Errors in voice recognition may have occurred. An electronic signature was used to authenticate this note.     --Meena Mcclelland MD

## 2022-10-10 NOTE — ASSESSMENT & PLAN NOTE
Per report no new or suspicious nodules were noted on her recent scan.  I will get the actual scan for review.

## 2022-10-10 NOTE — PATIENT INSTRUCTIONS
The patient's chest CT did show chronic scarring at the left base but per report no change from previous.  I will get the scan on disc for review and call her after I have reviewed it.  She is scheduled to be seen at [facility] later today regarding results of her recent sleep study.  I told her if she had any questions after getting the results she could contact the office and I will be glad to review the study results.  Otherwise she will return in 6 months with pulmonary functions.  
No

## 2022-10-10 NOTE — ASSESSMENT & PLAN NOTE
This is been noted on prior CAT scans and her left basilar scarring in particular is almost certainly related to her previous left pleural effusion post bypass surgery and subsequent need for chest tube drainage.

## 2022-10-11 NOTE — PROGRESS NOTES
[address] [facility]  Phone [phone] Fax [phone]        Patient Name Abbey Aldrich Account Number [de-identified]    1965 Referring Provider Ridge Fowler M.D., [place] [facility], [facility]   Age/ Gender 60 years/F Interpreting physician Ridge Fowler M.D., [facility], [facility]   Neck circumference Unavailable Device Stardust II   Mallampati classification Unavailable Scoring Technician Sejal Stephen, CRT, RPSGT   [place] score  Indications for the test excessive daytime somnolence   Height 62.0 in Test Home Sleep Apnea Test   Weight 201.0 lbs Date of test 10/4/2022   BMI 36.8 Time in Bed (TIB) 262.8 minutes     Events   Index (#/hr) Total # Events Mean Duration (sec) Max Duration (sec) Supine        # Index   Central Apneas 0.0 0 0.0 0.0 0 0.0   Obstructive Apneas 14.4 63 14.7 35.0 61 23.5   Mixed Apneas 0.0 0 0.0 0.0 0 0.0   Hypopneas 5.7 26 16.3 39.5 21 8.1   Estimated AHI  #events/TIB (hrs) 20.3 89 15.1 39.5 31.6   Time in Position (minutes) 155.5     Snoring  Snoring Rating (loudness 0-4) 1   Snoring Amount (% of total sleep time with snoring) 88.3     Oximetry distribution  <90 %  (minutes) 11.7   <85 %  (minutes) 0.6   <80 %  (minutes) 0.0   <75 %  (minutes) 0.0   <70 %  (minutes) 0.0   <60 %  (minutes) 0.0   <50 %  (minutes) 0.0   Average (%) 94   Desat Index (#/hour) 16.7   Desat Max (%) 16   Desat Max dur (sec) 75.5   Lowest SpO2 (³ 2 sec) (%) 80     Heart Rate  Mean HR (BPM) 64.3   # of LHR 7   LHR min (BPM) 59   # of HHR 2   HHR max (BPM) 76       Interpretation:     Moderate obstructive sleep apnea. Obesity. Subjective hypersomnia. Hypoxia during sleep. Recommendations:    Consider Auto CPAP to titrate between 8cm water pressure and 20cm water pressure. Weight loss and exercise. Avoid risky activity such as driving if sleepy. Discuss sleep hygiene with the patient. Monitor PAP use and effectiveness.        Alyssa MD Gaby, [facility], [facility]

## 2022-10-11 NOTE — PROGRESS NOTES
[address] Y[place] huggins [address]  Phone [phone] Fax [phone]        Patient Name Fely Aldrich Account Number [de-identified]    1965 Referring Provider Sruthi Simms M.D., [place] [facility], [facility]   Age/ Gender 60 years/F Interpreting physician Sruthi Simms M.D., [facility], [facility]   Neck circumference Unavailable Device Stardust II   Mallampati classification Unavailable Scoring Technician Raf Yates, CRT, RPSGT   [place] score  Indications for the test excessive daytime somnolence   Height 62.0 in Test Home Sleep Apnea Test   Weight 201.0 lbs Date of test 10/5/2022   BMI 36.8 Time in Bed (TIB) 317.5 minutes     Events   Index (#/hr) Total # Events Mean Duration (sec) Max Duration (sec) Supine        # Index   Central Apneas 0.0 0 0.0 0.0 0 0.0   Obstructive Apneas 5.9 31 18.0 42.5 0 0.0   Mixed Apneas 0.0 0 0.0 0.0 0 0.0   Hypopneas 7.4 39 16.8 25.0 2 1.2   Estimated AHI  #events/TIB (hrs) 13.2 70 17.4 42.5 1.2   Time in Position (minutes) 100.7     Snoring  Snoring Rating (loudness 0-4) 1   Snoring Amount (% of total sleep time with snoring) 66.6     Oximetry distribution  <90 %  (minutes) 4.4   <85 %  (minutes) 0.2   <80 %  (minutes) 0.2   <75 %  (minutes) 0.2   <70 %  (minutes) 0.2   <60 %  (minutes) 0.2   <50 %  (minutes) 0.2   Average (%) 94   Desat Index (#/hour) 18.7   Desat Max (%) 13   Desat Max dur (sec) 73.0   Lowest SpO2 (³ 2 sec) (%) 84     Heart Rate  Mean HR (BPM) 69.5   # of LHR 13   LHR min (BPM) 59   # of HHR 4   HHR max (BPM) 84       Interpretation:      Mild  obstructive sleep apnea. Obesity. Subjective hypersomnia. Hypoxia during sleep. Recommendations:     Consider Auto CPAP to titrate between 8cm water pressure and 20cm water pressure. Weight loss and exercise. Avoid risky activity such as driving if sleepy. Discuss sleep hygiene with the patient. Monitor PAP use and effectiveness.         Alyssa MD Gaby, [facility], [facility]

## 2022-10-13 DIAGNOSIS — G47.33 OSA (OBSTRUCTIVE SLEEP APNEA): Primary | ICD-10-CM

## 2022-10-13 PROCEDURE — 95806 SLEEP STUDY UNATT&RESP EFFT: CPT | Performed by: INTERNAL MEDICINE

## 2022-11-22 ENCOUNTER — OFFICE VISIT (OUTPATIENT)
Dept: FAMILY MEDICINE CLINIC | Age: 57
End: 2022-11-22
Payer: MEDICARE

## 2022-11-22 VITALS
SYSTOLIC BLOOD PRESSURE: 120 MMHG | WEIGHT: 198 LBS | OXYGEN SATURATION: 98 % | BODY MASS INDEX: 36.44 KG/M2 | HEIGHT: 62 IN | HEART RATE: 90 BPM | TEMPERATURE: 97.7 F | DIASTOLIC BLOOD PRESSURE: 70 MMHG

## 2022-11-22 DIAGNOSIS — R09.89 CHEST CONGESTION: ICD-10-CM

## 2022-11-22 DIAGNOSIS — R50.9 FEVER, UNSPECIFIED FEVER CAUSE: Primary | ICD-10-CM

## 2022-11-22 LAB
INFLUENZA A ANTIBODY: NORMAL
INFLUENZA B ANTIBODY: NORMAL
S PYO AG THROAT QL: NORMAL
SARS-COV-2, PCR: NOT DETECTED

## 2022-11-22 PROCEDURE — 99214 OFFICE O/P EST MOD 30 MIN: CPT | Performed by: NURSE PRACTITIONER

## 2022-11-22 PROCEDURE — 3017F COLORECTAL CA SCREEN DOC REV: CPT | Performed by: NURSE PRACTITIONER

## 2022-11-22 PROCEDURE — G8417 CALC BMI ABV UP PARAM F/U: HCPCS | Performed by: NURSE PRACTITIONER

## 2022-11-22 PROCEDURE — 87880 STREP A ASSAY W/OPTIC: CPT | Performed by: NURSE PRACTITIONER

## 2022-11-22 PROCEDURE — 3078F DIAST BP <80 MM HG: CPT | Performed by: NURSE PRACTITIONER

## 2022-11-22 PROCEDURE — 87804 INFLUENZA ASSAY W/OPTIC: CPT | Performed by: NURSE PRACTITIONER

## 2022-11-22 PROCEDURE — 3074F SYST BP LT 130 MM HG: CPT | Performed by: NURSE PRACTITIONER

## 2022-11-22 PROCEDURE — 1036F TOBACCO NON-USER: CPT | Performed by: NURSE PRACTITIONER

## 2022-11-22 PROCEDURE — G8484 FLU IMMUNIZE NO ADMIN: HCPCS | Performed by: NURSE PRACTITIONER

## 2022-11-22 PROCEDURE — G8427 DOCREV CUR MEDS BY ELIG CLIN: HCPCS | Performed by: NURSE PRACTITIONER

## 2022-11-22 ASSESSMENT — ENCOUNTER SYMPTOMS
COUGH: 1
SINUS PRESSURE: 1
SORE THROAT: 1

## 2022-11-22 NOTE — PROGRESS NOTES
Formerly [facility] PHYSICIAN SERVICES  [facility] [place]  [address] [phone]  Dept: [phone]  Dept Fax: [phone]  Loc: [phone]    Hema Carpenter is a 59 y.o. female who presents today for her medical conditions/complaints as noted below. Hema Carpenter is c/o of Generalized Body Aches and Pharyngitis      Chief Complaint   Patient presents with    Generalized Body Aches    Pharyngitis       HPI:     HPI  Patient presents today with sore throat, fever, and chills. She states that symptoms started yesterday. She has not gotten her flu vaccine and she has not been around anyone else that has been sick that she is aware of.       Past Medical History:   Diagnosis Date    Abnormal Pap smear of cervix     Arthritis     CAD (coronary artery disease)     stent 2011 Lyndon Gaston; sees [facility] cardiology    Cervical cancer ([facility] [address]     Class 3 severe obesity due to excess calories without serious comorbidity with body mass index (BMI) of 40.0 to 44.9 in adult [facility]) 07/28/2020    Diabetes mellitus ([facility] [address]     ERRONEOUS ENCOUNTER--DISREGARD 09/15/2015    GERD (gastroesophageal reflux disease)     Headache(784.0)     Heart attack ([facility] [address] 12/2011    Hyperlipidemia     Hypertension     Lichen simplex chronicus 10/2015    Osteopenia     Pneumonia     Type II or unspecified type diabetes mellitus without mention of complication, not stated as uncontrolled         Past Surgical History:   Procedure Laterality Date    BREAST BIOPSY Right 06/08/2011    US guided core needle, right, benign    CARDIAC CATHETERIZATION  12/22/14  JDT    EF 50%    CARDIAC CATHETERIZATION  06/05/2017        G FLUOROSCOPIC GUIDANCE NEEDLE PLACEMENT ADD ON Left 07/19/2018    CORTICOSTEROID INJECTION performed by Salbador Ruiz MD at [address]  2006    COLONOSCOPY  approx 2005    Dr. Gino Courtney per pt recall \" Normal\"    CORONARY ANGIOPLASTY WITH STENT PLACEMENT  12/2011    SABA to [address] GRAFT      FINGER TRIGGER RELEASE Left 2019    LEFT INDEX AND LONG TRIGGER FINGER RELEASAE performed by Dora Puri MD at [address] [facility] (CERVIX STATUS UNKNOWN)      OTHER SURGICAL HISTORY      fibrinolysis    TN CABG, ARTERY-VEIN, THREE  2014    Coronary Artery Bypass, 3    TN MANIPULATN SHLDR JT W ANESTHESIA Left 2018    SHOULDER MANIPULATION UNDER ANESTHESIA performed by Kendra Negron MD at [facility] OR    [facility] AND BSO (CERVIX REMOVED) Bilateral     age 27    THORACOTOMY      UPPER GASTROINTESTINAL ENDOSCOPY  approx     per pt recall \" Normal\"    UPPER GASTROINTESTINAL ENDOSCOPY N/A 2020    Dr Dipesh Alex exam, neg EoE    VULVAR/PERINEAL BIOPSY  10/07/2015    Dr. Alex Gardner    VULVAR/PERINEAL BIOPSY N/A 2021    VULVAR BIOPSY performed by Timmy Tubbs MD at [place] [address] History     Tobacco Use    Smoking status: Former     Packs/day: 2.00     Years: 15.00     Pack years: 30.00     Types: Cigarettes     Quit date: 2014     Years since quittin.3    Smokeless tobacco: Never   Substance Use Topics    Alcohol use: No        Current Outpatient Medications   Medication Sig Dispense Refill    oxybutynin (DITROPAN-XL) 10 MG extended release tablet Take 1 tablet by mouth daily 30 tablet 2    ranolazine (RANEXA) 500 MG extended release tablet Take 1 tablet by mouth 2 times daily 60 tablet 3    vitamin D (ERGOCALCIFEROL) 1.25 MG (89931 UT) CAPS capsule Take 1 capsule by mouth once a week 12 capsule 1    valsartan (DIOVAN) 80 MG tablet Take 1 tablet by mouth daily 30 tablet 5    tamsulosin (FLOMAX) 0.4 MG capsule Take 1 capsule by mouth daily 30 capsule 5    ondansetron (ZOFRAN ODT) 4 MG disintegrating tablet Take 1 tablet by mouth daily as needed for Nausea or Vomiting 30 tablet 5    nitroGLYCERIN (NITROSTAT) 0.4 MG SL tablet Place 1 tablet under the tongue every 5 minutes as needed for Chest pain 25 tablet 3 metoprolol tartrate (LOPRESSOR) 25 MG tablet Take one tablet by mouth twice daily 60 tablet 5    metFORMIN (GLUCOPHAGE-XR) 500 MG extended release tablet Take one tablet by mouth 2 times daily 60 tablet 5    lovastatin (MEVACOR) 40 MG tablet Take one tablet at bedtime 30 tablet 4    glipiZIDE (GLUCOTROL XL) 10 MG extended release tablet Take 1 tablet by mouth daily 30 tablet 3    furosemide (LASIX) 40 MG tablet Take 1 tablet by mouth daily 30 tablet 5    docusate sodium (COLACE) 100 MG capsule Take 1 capsule by mouth daily as needed for Constipation 30 capsule 1    empagliflozin (JARDIANCE) 25 MG tablet Take 25 mg by mouth daily      amitriptyline (ELAVIL) 10 MG tablet Take 1 tablet by mouth nightly 30 tablet 5    nystatin (MYCOSTATIN) 385064 UNIT/GM powder APPLY 2 OR 3 TIMES DAILY. (Patient taking differently: Apply topically as needed APPLY 2 OR 3 TIMES DAILY. ) 60 g 5    diclofenac sodium (VOLTAREN) 1 % GEL Apply 2 g topically 4 times daily (Patient taking differently: Apply 2 g topically 4 times daily as needed for Pain) 2 Tube 1    aspirin 81 MG tablet Take 81 mg by mouth daily      mometasone (ELOCON) 0.1 % ointment Apply topically to vagina 3 times per week (Patient taking differently: Apply topically as needed Apply topically to vagina 3 times per week) 1 Tube 3    Eflornithine HCl (VANIQA) 13.9 % CREA Apply 1 Dose topically 2 times daily (Patient taking differently: Apply 1 Dose topically 2 times daily as needed) 45 g 2    Dulaglutide 0.75 MG/0.5ML SOPN Inject 0.75 mg into the skin once a week (Patient not taking: No sig reported) 12 pen 2    Diabetic Shoe MISC by Does not apply route (Patient not taking: No sig reported) 1 each 0    albuterol (PROVENTIL) (5 MG/ML) 0.5% nebulizer solution Take 0.5 mLs by nebulization every 6 hours as needed for Wheezing (Patient not taking: No sig reported) 120 each 0     No current facility-administered medications for this visit.        Allergies   Allergen Reactions Codeine Other (See Comments)     Seizures as a child    Motrin [Ibuprofen] Swelling     Tongue to swell    Humalog [Insulin Lispro] Other (See Comments)     Shakiness, break out in a sweat at higher doses. Ozempic (0.25 Or 0.5 Mg-Dose) [Semaglutide(0.25 Or 0.5mg-Dos)]      nausea       Family History   Problem Relation Age of Onset    Diabetes Father     Cancer Father     High Blood Pressure Father     Heart Attack Father     Colon Cancer Father     Diabetes Mother     Breast Cancer Mother 47    Heart Failure Mother         CHF, MVP    Liver Cancer Mother     Heart Failure Maternal Grandfather     Stroke Maternal Grandfather     Pancreatic Cancer Maternal Grandfather     Stomach Cancer Maternal Grandfather     Diabetes Brother     Colon Cancer Paternal Uncle     Rectal Cancer Other     Breast Cancer Maternal Grandmother 78    Colon Polyps Neg Hx     Crohn's Disease Neg Hx     Irritable Bowel Syndrome Neg Hx     Liver Disease Neg Hx                Subjective:      Review of Systems   Constitutional:  Positive for chills, fatigue and fever. HENT:  Positive for congestion, sinus pressure and sore throat. Respiratory:  Positive for cough. Neurological:  Positive for headaches. Objective:     Physical Exam  Vitals and nursing note reviewed. Constitutional:       Appearance: Normal appearance. She is well-developed. HENT:      Head: Normocephalic and atraumatic. Right Ear: Hearing, ear canal and external ear normal. A middle ear effusion is present. Left Ear: Hearing, ear canal and external ear normal. A middle ear effusion is present. Nose: Mucosal edema and congestion present. Right Sinus: Maxillary sinus tenderness and frontal sinus tenderness present. Left Sinus: Maxillary sinus tenderness and frontal sinus tenderness present. Mouth/Throat:      Pharynx: Uvula midline. Posterior oropharyngeal erythema present. No oropharyngeal exudate.    Eyes:      General: Lids are normal.      Conjunctiva/sclera: Conjunctivae normal.      Pupils: Pupils are equal, round, and reactive to light. Neck:      Thyroid: No thyroid mass or thyromegaly. Trachea: Trachea normal.   Cardiovascular:      Rate and Rhythm: Normal rate and regular rhythm. Heart sounds: Normal heart sounds. Pulmonary:      Effort: Pulmonary effort is normal.      Breath sounds: Normal breath sounds. Abdominal:      General: Bowel sounds are normal.      Palpations: Abdomen is soft. Musculoskeletal:         General: Normal range of motion. Cervical back: Normal range of motion and neck supple. No tenderness. Thoracic back: Normal. No tenderness. Normal range of motion. Lumbar back: Normal. No tenderness. Normal range of motion. Lymphadenopathy:      Cervical: Cervical adenopathy present. Skin:     General: Skin is warm and dry. Capillary Refill: Capillary refill takes less than 2 seconds. Neurological:      Mental Status: She is alert and oriented to person, place, and time. Psychiatric:         Attention and Perception: Attention normal.         Mood and Affect: Mood and affect normal.         Speech: Speech normal.         Behavior: Behavior normal.         Thought Content: Thought content normal.         Judgment: Judgment normal.       /70   Pulse 90   Temp 97.7 °F (36.5 °C)   Ht 5' 2\" (1.575 m)   Wt 198 lb (89.8 kg)   LMP 01/01/1998 (Approximate)   SpO2 98%   BMI 36.21 kg/m²     Assessment:      Diagnosis Orders   1. Fever, unspecified fever cause  POCT Influenza A/B    POCT rapid strep A    COVID-19    Respiratory Virus PCR Panel      2. Chest congestion            Results for orders placed or performed in visit on 11/22/22   POCT Influenza A/B   Result Value Ref Range    Influenza A Ab neg     Influenza B Ab neg    POCT rapid strep A   Result Value Ref Range    Strep A Ag None Detected None Detected       Plan:     1.  Fever, unspecified fever cause    - POCT Influenza A/B  - POCT rapid strep A  - COVID-19  - Respiratory Virus PCR Panel; Future    2. Chest congestion    Will await treatment based on results. If flu+ then will treat with tamiflu. Symptomatic treatment for now. Increase water intake. Flonase  Tylenol for fever/aches. Return if symptoms worsen or fail to improve, for Keep follow up as scheduled. Orders Placed This Encounter   Procedures    Respiratory Virus PCR Panel     Standing Status:   Future     Standing Expiration Date:   11/22/2023    COVID-19     Scheduling Instructions:      1) Due to current limited availability of the COVID-19 test, tests will be prioritized based on responses to questions above. Testing may be delayed due to volume. 2) Print and instruct patient to adhere to [facility] home isolation program. (Link Above)              3) Set up or refer patient for a monitoring program.              4) Have patient sign up for and leverage MyChart (if not previously done). Order Specific Question:   Previously tested for COVID-19? Answer:   Yes     Order Specific Question:   Pregnant? Answer:   No    POCT Influenza A/B    POCT rapid strep A       No orders of the defined types were placed in this encounter. Patient offered educational handouts and has had all questions answered. Patient voices understanding and agrees to plans along with risks and benefits of plan. Patient is instructed to continue prior meds, diet, and exercise plans as instructed. Patient agrees to follow up as instructed and sooner if needed. Patient agrees to go to ER if condition becomes emergent. EMR Dragon/transcription disclaimer: Some of this encounter note is an electronic transcription/translation of spoken language to printed text. The electronic translation of spoken language may permit erroneous, or at times, nonsensical words or phrases to be inadvertently transcribed.  Although I have reviewed the note for such errors, some may still exist.    Electronically signed by MIKE Lozano on 11/22/2022 at 3:49 PM

## 2022-11-23 RX ORDER — FLUTICASONE PROPIONATE 50 MCG
2 SPRAY, SUSPENSION (ML) NASAL DAILY
Qty: 16 G | Refills: 3 | Status: SHIPPED | OUTPATIENT
Start: 2022-11-23

## 2022-11-23 RX ORDER — PSEUDOEPHEDRINE HCL 30 MG
30 TABLET ORAL EVERY 6 HOURS PRN
Qty: 30 TABLET | Refills: 0 | Status: SHIPPED | OUTPATIENT
Start: 2022-11-23 | End: 2022-11-26

## 2022-11-23 RX ORDER — METHYLPREDNISOLONE 4 MG/1
TABLET ORAL
Qty: 1 KIT | Refills: 0 | Status: SHIPPED | OUTPATIENT
Start: 2022-11-23

## 2022-12-13 ENCOUNTER — OFFICE VISIT (OUTPATIENT)
Dept: FAMILY MEDICINE CLINIC | Age: 57
End: 2022-12-13
Payer: MEDICARE

## 2022-12-13 VITALS
OXYGEN SATURATION: 97 % | BODY MASS INDEX: 35.7 KG/M2 | HEIGHT: 62 IN | HEART RATE: 68 BPM | WEIGHT: 194 LBS | SYSTOLIC BLOOD PRESSURE: 118 MMHG | DIASTOLIC BLOOD PRESSURE: 70 MMHG | TEMPERATURE: 97.2 F

## 2022-12-13 DIAGNOSIS — M54.2 CERVICAL MUSCLE PAIN: ICD-10-CM

## 2022-12-13 DIAGNOSIS — R10.12 LEFT UPPER QUADRANT ABDOMINAL PAIN: ICD-10-CM

## 2022-12-13 DIAGNOSIS — Z79.4 TYPE 2 DIABETES MELLITUS WITH COMPLICATION, WITH LONG-TERM CURRENT USE OF INSULIN (HCC): Primary | ICD-10-CM

## 2022-12-13 DIAGNOSIS — R10.31 RIGHT LOWER QUADRANT ABDOMINAL PAIN: ICD-10-CM

## 2022-12-13 DIAGNOSIS — E11.8 TYPE 2 DIABETES MELLITUS WITH COMPLICATION, WITH LONG-TERM CURRENT USE OF INSULIN (HCC): Primary | ICD-10-CM

## 2022-12-13 DIAGNOSIS — F43.9 STRESS AT HOME: ICD-10-CM

## 2022-12-13 LAB — HBA1C MFR BLD: 7.2 %

## 2022-12-13 PROCEDURE — G8484 FLU IMMUNIZE NO ADMIN: HCPCS | Performed by: NURSE PRACTITIONER

## 2022-12-13 PROCEDURE — 2022F DILAT RTA XM EVC RTNOPTHY: CPT | Performed by: NURSE PRACTITIONER

## 2022-12-13 PROCEDURE — G8427 DOCREV CUR MEDS BY ELIG CLIN: HCPCS | Performed by: NURSE PRACTITIONER

## 2022-12-13 PROCEDURE — 3074F SYST BP LT 130 MM HG: CPT | Performed by: NURSE PRACTITIONER

## 2022-12-13 PROCEDURE — 3051F HG A1C>EQUAL 7.0%<8.0%: CPT | Performed by: NURSE PRACTITIONER

## 2022-12-13 PROCEDURE — 83036 HEMOGLOBIN GLYCOSYLATED A1C: CPT | Performed by: NURSE PRACTITIONER

## 2022-12-13 PROCEDURE — 1036F TOBACCO NON-USER: CPT | Performed by: NURSE PRACTITIONER

## 2022-12-13 PROCEDURE — 3017F COLORECTAL CA SCREEN DOC REV: CPT | Performed by: NURSE PRACTITIONER

## 2022-12-13 PROCEDURE — G8417 CALC BMI ABV UP PARAM F/U: HCPCS | Performed by: NURSE PRACTITIONER

## 2022-12-13 PROCEDURE — 99214 OFFICE O/P EST MOD 30 MIN: CPT | Performed by: NURSE PRACTITIONER

## 2022-12-13 PROCEDURE — 3078F DIAST BP <80 MM HG: CPT | Performed by: NURSE PRACTITIONER

## 2022-12-13 NOTE — PROGRESS NOTES
[facility] PHYSICIAN SERVICES  [facility] [place]  [address] [address]  Dept: [phone]  Dept Fax: [phone]  Loc: [phone]    Alice Crocker is a 59 y.o. female who presents today for her medical conditions/complaints as noted below. lAice Crocker is c/o of Follow-up Chronic Condition      Chief Complaint   Patient presents with    Follow-up Chronic Condition       HPI:     HPI  Patient presents for a routine follow up of DM. Patient states that she is having neck pain. She has a hx of this and had to do a course of PT. Patient is also complaining of left sided abd pain. She reports that when she bends over the pain is worsening. She is having a spasm in that area. She is having a lot of stress at home with her spouse. She is having a lot of the neck pain. She has been using tens unit and asper cream.  This is helping some. Patient is also having worsening left upper abd pain. This has been ongoing for greater than 3 months.     Past Medical History:   Diagnosis Date    Abnormal Pap smear of cervix     Arthritis     CAD (coronary artery disease)     stent 2011 Vinod Ni; [facility] cardiology    Cervical cancer ([facility]     Class 3 severe obesity due to excess calories without serious comorbidity with body mass index (BMI) of 40.0 to 44.9 in adult [facility]) 07/28/2020    Diabetes mellitus ([facility]     ERRONEOUS ENCOUNTER--DISREGARD 09/15/2015    GERD (gastroesophageal reflux disease)     Headache(784.0)     Heart attack ([facility] 12/2011    Hyperlipidemia     Hypertension     Lichen simplex chronicus 10/2015    Osteopenia     Pneumonia     Type II or unspecified type diabetes mellitus without mention of complication, not stated as uncontrolled         Past Surgical History:   Procedure Laterality Date    BREAST BIOPSY Right 06/08/2011    US guided core needle, right, benign    CARDIAC CATHETERIZATION  12/22/14  JDT    EF 50%    CARDIAC CATHETERIZATION  06/05/2017        [facility] FLUOROSCOPIC GUIDANCE NEEDLE PLACEMENT ADD ON Left 2018    CORTICOSTEROID INJECTION performed by Jonh Short MD at [address]  2006    COLONOSCOPY  approx     Dr. Alondra Johnson per pt recall \" Normal\"    CORONARY ANGIOPLASTY WITH STENT PLACEMENT  2011    SABA to [address] Left 2019    LEFT INDEX AND LONG TRIGGER FINGER RELEASAE performed by Caleb Calvert MD at [address] [facility] (CERVIX STATUS UNKNOWN)      OTHER SURGICAL HISTORY      fibrinolysis    KS CABG, ARTERY-VEIN, THREE  2014    Coronary Artery Bypass, 3    KS MANIPULATN SHLDR JT W ANESTHESIA Left 2018    SHOULDER MANIPULATION UNDER ANESTHESIA performed by Jonh Short MD at [address] [facility] (CERVIX REMOVED) Bilateral     age 27    THORACOTOMY      UPPER GASTROINTESTINAL ENDOSCOPY  approx     per pt recall \" Normal\"    UPPER GASTROINTESTINAL ENDOSCOPY N/A 2020    Dr Kirby Pierson exam, neg EoE    VULVAR/PERINEAL BIOPSY  10/07/2015    Dr. Trey Fuchs    VULVAR/PERINEAL BIOPSY N/A 2021    VULVAR BIOPSY performed by Estrella Ramos MD at [place] [address] History     Tobacco Use    Smoking status: Former     Packs/day: 2.00     Years: 15.00     Pack years: 30.00     Types: Cigarettes     Quit date: 2014     Years since quittin.4    Smokeless tobacco: Never   Substance Use Topics    Alcohol use: No        Current Outpatient Medications   Medication Sig Dispense Refill    methylPREDNISolone (MEDROL DOSEPACK) 4 MG tablet Take by mouth.  1 kit 0    fluticasone (FLONASE) 50 MCG/ACT nasal spray 2 sprays by Nasal route daily 16 g 3    oxybutynin (DITROPAN-XL) 10 MG extended release tablet Take 1 tablet by mouth daily 30 tablet 2    ranolazine (RANEXA) 500 MG extended release tablet Take 1 tablet by mouth 2 times daily 60 tablet 3    vitamin D (ERGOCALCIFEROL) 1.25 MG (76869 UT) CAPS capsule Take 1 capsule by mouth once a week 12 capsule 1    valsartan (DIOVAN) 80 MG tablet Take 1 tablet by mouth daily 30 tablet 5    tamsulosin (FLOMAX) 0.4 MG capsule Take 1 capsule by mouth daily 30 capsule 5    ondansetron (ZOFRAN ODT) 4 MG disintegrating tablet Take 1 tablet by mouth daily as needed for Nausea or Vomiting 30 tablet 5    nitroGLYCERIN (NITROSTAT) 0.4 MG SL tablet Place 1 tablet under the tongue every 5 minutes as needed for Chest pain 25 tablet 3    metoprolol tartrate (LOPRESSOR) 25 MG tablet Take one tablet by mouth twice daily 60 tablet 5    metFORMIN (GLUCOPHAGE-XR) 500 MG extended release tablet Take one tablet by mouth 2 times daily 60 tablet 5    lovastatin (MEVACOR) 40 MG tablet Take one tablet at bedtime 30 tablet 4    glipiZIDE (GLUCOTROL XL) 10 MG extended release tablet Take 1 tablet by mouth daily 30 tablet 3    furosemide (LASIX) 40 MG tablet Take 1 tablet by mouth daily 30 tablet 5    docusate sodium (COLACE) 100 MG capsule Take 1 capsule by mouth daily as needed for Constipation 30 capsule 1    empagliflozin (JARDIANCE) 25 MG tablet Take 25 mg by mouth daily      amitriptyline (ELAVIL) 10 MG tablet Take 1 tablet by mouth nightly 30 tablet 5    nystatin (MYCOSTATIN) 663481 UNIT/GM powder APPLY 2 OR 3 TIMES DAILY. (Patient taking differently: Apply topically as needed APPLY 2 OR 3 TIMES DAILY. ) 60 g 5    diclofenac sodium (VOLTAREN) 1 % GEL Apply 2 g topically 4 times daily (Patient taking differently: Apply 2 g topically 4 times daily as needed for Pain) 2 Tube 1    aspirin 81 MG tablet Take 81 mg by mouth daily      mometasone (ELOCON) 0.1 % ointment Apply topically to vagina 3 times per week (Patient taking differently: Apply topically as needed Apply topically to vagina 3 times per week) 1 Tube 3    Eflornithine HCl (VANIQA) 13.9 % CREA Apply 1 Dose topically 2 times daily (Patient taking differently: Apply 1 Dose topically 2 times daily as needed) 45 g 2 Dulaglutide 0.75 MG/0.5ML SOPN Inject 0.75 mg into the skin once a week (Patient not taking: No sig reported) 12 pen 2    Diabetic Shoe MISC by Does not apply route (Patient not taking: No sig reported) 1 each 0    albuterol (PROVENTIL) (5 MG/ML) 0.5% nebulizer solution Take 0.5 mLs by nebulization every 6 hours as needed for Wheezing (Patient not taking: No sig reported) 120 each 0     No current facility-administered medications for this visit. Allergies   Allergen Reactions    Codeine Other (See Comments)     Seizures as a child    Motrin [Ibuprofen] Swelling     Tongue to swell    Humalog [Insulin Lispro] Other (See Comments)     Shakiness, break out in a sweat at higher doses. Ozempic (0.25 Or 0.5 Mg-Dose) [Semaglutide(0.25 Or 0.5mg-Dos)]      nausea       Family History   Problem Relation Age of Onset    Diabetes Father     Cancer Father     High Blood Pressure Father     Heart Attack Father     Colon Cancer Father     Diabetes Mother     Breast Cancer Mother 47    Heart Failure Mother         CHF, MVP    Liver Cancer Mother     Heart Failure Maternal Grandfather     Stroke Maternal Grandfather     Pancreatic Cancer Maternal Grandfather     Stomach Cancer Maternal Grandfather     Diabetes Brother     Colon Cancer Paternal Uncle     Rectal Cancer Other     Breast Cancer Maternal Grandmother 78    Colon Polyps Neg Hx     Crohn's Disease Neg Hx     Irritable Bowel Syndrome Neg Hx     Liver Disease Neg Hx                Subjective:      Review of Systems   Constitutional: Negative. HENT: Negative. Eyes: Negative. Respiratory: Negative. Cardiovascular: Negative. Gastrointestinal:  Positive for abdominal pain. Endocrine: Negative. Genitourinary: Negative. Musculoskeletal:  Positive for arthralgias and neck pain. Skin: Negative. Neurological: Negative. Hematological: Negative. Psychiatric/Behavioral: Negative.        Objective:     Physical Exam  Vitals and nursing note reviewed. Constitutional:       Appearance: Normal appearance. She is well-developed. She is obese. HENT:      Head: Normocephalic and atraumatic. Right Ear: Hearing, tympanic membrane, ear canal and external ear normal.      Left Ear: Hearing, tympanic membrane, ear canal and external ear normal.      Nose: Nose normal.      Mouth/Throat:      Lips: No lesions. Pharynx: Oropharynx is clear. Uvula midline. Eyes:      General: Lids are normal.      Extraocular Movements: Extraocular movements intact. Conjunctiva/sclera: Conjunctivae normal.      Pupils: Pupils are equal, round, and reactive to light. Neck:      Thyroid: No thyroid mass or thyromegaly. Trachea: Trachea normal.   Cardiovascular:      Rate and Rhythm: Normal rate and regular rhythm. Heart sounds: Normal heart sounds. Pulmonary:      Effort: Pulmonary effort is normal.      Breath sounds: Normal breath sounds. Abdominal:      General: Bowel sounds are normal.      Palpations: Abdomen is soft. Musculoskeletal:      Cervical back: Neck supple. Tenderness present. Decreased range of motion. Thoracic back: No tenderness. Decreased range of motion. Lumbar back: No tenderness. Decreased range of motion. Skin:     General: Skin is warm and dry. Neurological:      Mental Status: She is alert and oriented to person, place, and time. Psychiatric:         Attention and Perception: Attention normal.         Mood and Affect: Mood and affect normal.         Speech: Speech normal.         Behavior: Behavior normal.         Thought Content: Thought content normal.         Cognition and Memory: Cognition normal.         Judgment: Judgment normal.       /70   Pulse 68   Temp 97.2 °F (36.2 °C)   Ht 5' 2\" (1.575 m)   Wt 194 lb (88 kg)   LMP 01/01/1998 (Approximate)   SpO2 97%   BMI 35.48 kg/m²     Assessment:      Diagnosis Orders   1.  Type 2 diabetes mellitus with complication, with long-term current use of insulin (HCC)  POCT glycosylated hemoglobin (Hb A1C)      2. Stress at home        3. Cervical muscle pain        4. Left upper quadrant abdominal pain  CT ABDOMEN PELVIS W WO CONTRAST Additional Contrast? None      5. Right lower quadrant abdominal pain  CT ABDOMEN PELVIS W WO CONTRAST Additional Contrast? None          Results for orders placed or performed in visit on 12/13/22   POCT glycosylated hemoglobin (Hb A1C)   Result Value Ref Range    Hemoglobin A1C 7.2 %       Plan:     1. Type 2 diabetes mellitus with complication, with long-term current use of insulin (HCC)  A1c has improved. Will continue current regimen  - POCT glycosylated hemoglobin (Hb A1C)    2. Stress at home  Discussed stress and different outlets    3. Cervical muscle pain      4. Left upper quadrant abdominal pain  Ct of abd due to worsening pain and discomfort.    - CT ABDOMEN PELVIS W WO CONTRAST Additional Contrast? None; Future    5. Right lower quadrant abdominal pain    - CT ABDOMEN PELVIS W WO CONTRAST Additional Contrast? None; Future     Trigger point injections dicussed. She does not want these at this time. Return in about 3 months (around 3/13/2023), or if symptoms worsen or fail to improve, for Follow up chronic conditions. Orders Placed This Encounter   Procedures    CT ABDOMEN PELVIS W WO CONTRAST Additional Contrast? None     Standing Status:   Future     Number of Occurrences:   1     Standing Expiration Date:   12/13/2023     Order Specific Question:   Additional Contrast?     Answer:   None     Order Specific Question:   STAT Creatinine as needed:     Answer:   Yes    POCT glycosylated hemoglobin (Hb A1C)         No orders of the defined types were placed in this encounter. Patient offered educational handouts and has had all questions answered. Patient voices understanding and agrees to plans along with risks and benefits of plan.  Patient is instructed to continue prior meds, diet, and exercise plans as instructed. Patient agrees to follow up as instructed and sooner if needed. Patient agrees to go to ER if condition becomes emergent. EMR Dragon/transcription disclaimer: Some of this encounter note is an electronic transcription/translation of spoken language to printed text. The electronic translation of spoken language may permit erroneous, or at times, nonsensical words or phrases to be inadvertently transcribed.  Although I have reviewed the note for such errors, some may still exist.    Electronically signed by MIKE Brian on 12/15/2022 at 9:59 PM

## 2022-12-15 ENCOUNTER — HOSPITAL ENCOUNTER (OUTPATIENT)
Dept: GENERAL RADIOLOGY | Age: 57
Discharge: HOME | End: 2022-12-15
Payer: MEDICARE

## 2022-12-15 DIAGNOSIS — R10.31 RIGHT LOWER QUADRANT ABDOMINAL PAIN: ICD-10-CM

## 2022-12-15 DIAGNOSIS — R10.12 LEFT UPPER QUADRANT ABDOMINAL PAIN: ICD-10-CM

## 2022-12-15 PROCEDURE — 6360000004 HC RX CONTRAST MEDICATION: Performed by: NURSE PRACTITIONER

## 2022-12-15 PROCEDURE — 74178 CT ABD&PLV WO CNTR FLWD CNTR: CPT | Performed by: RADIOLOGY

## 2022-12-15 PROCEDURE — 74178 CT ABD&PLV WO CNTR FLWD CNTR: CPT

## 2022-12-15 RX ADMIN — IOPAMIDOL 85 ML: 755 INJECTION, SOLUTION INTRAVENOUS at 09:50

## 2022-12-15 ASSESSMENT — ENCOUNTER SYMPTOMS
RESPIRATORY NEGATIVE: 1
ABDOMINAL PAIN: 1
EYES NEGATIVE: 1

## 2022-12-19 ENCOUNTER — TELEPHONE (OUTPATIENT)
Dept: FAMILY MEDICINE CLINIC | Age: 57
End: 2022-12-19

## 2022-12-19 NOTE — TELEPHONE ENCOUNTER
Patient called and states that she is now wanting to do Trigger point injections for her neck. I saw this was discussed at her visit, but was not sure if you wanted 15 or 30 min appt for this?   Thanks

## 2022-12-21 ENCOUNTER — OFFICE VISIT (OUTPATIENT)
Dept: FAMILY MEDICINE CLINIC | Age: 57
End: 2022-12-21

## 2022-12-21 VITALS
TEMPERATURE: 97.2 F | SYSTOLIC BLOOD PRESSURE: 122 MMHG | DIASTOLIC BLOOD PRESSURE: 80 MMHG | HEART RATE: 76 BPM | HEIGHT: 62 IN | OXYGEN SATURATION: 98 % | WEIGHT: 194 LBS | BODY MASS INDEX: 35.7 KG/M2

## 2022-12-21 DIAGNOSIS — M54.2 CERVICAL MUSCLE PAIN: Primary | ICD-10-CM

## 2022-12-21 RX ORDER — BUPIVACAINE HYDROCHLORIDE 2.5 MG/ML
5 INJECTION, SOLUTION INFILTRATION; PERINEURAL ONCE
Status: SHIPPED | OUTPATIENT
Start: 2022-12-21

## 2022-12-21 NOTE — PROGRESS NOTES
A trigger point injection was performed at the site of maximal tenderness on left trap area using 1% plain Bupivacaine. This was well tolerated and followed by relief of pain. A total of 3 trigger injections were placed in this area. With a total of 5 cc of Bupivacaine injected.

## 2023-01-24 DIAGNOSIS — N32.89 BLADDER SPASM: ICD-10-CM

## 2023-01-24 RX ORDER — OXYBUTYNIN CHLORIDE 10 MG/1
10 TABLET, EXTENDED RELEASE ORAL DAILY
Qty: 30 TABLET | Refills: 2 | Status: SHIPPED | OUTPATIENT
Start: 2023-01-24

## 2023-01-24 NOTE — TELEPHONE ENCOUNTER
Kim Ghotra called requesting a refill of the below medication which has been pended for you:     Requested Prescriptions     Pending Prescriptions Disp Refills    oxybutynin (DITROPAN-XL) 10 MG extended release tablet [Pharmacy Med Name: OXYBUTYNIN CHLORIDE ER 10MG ER TABLET ER 24HR] 30 tablet 2     Sig: TAKE 1 TABLET BY MOUTH DAILY       Last Appointment Date: 12/21/2022  Next Appointment Date: 3/14/2023    Allergies   Allergen Reactions    Codeine Other (See Comments)     Seizures as a child    Motrin [Ibuprofen] Swelling     Tongue to swell    Humalog [Insulin Lispro] Other (See Comments)     Shakiness, break out in a sweat at higher doses.     Ozempic (0.25 Or 0.5 Mg-Dose) [Semaglutide(0.25 Or 0.5mg-Dos)]      nausea

## 2023-02-09 ENCOUNTER — OFFICE VISIT (OUTPATIENT)
Dept: CARDIOLOGY CLINIC | Age: 58
End: 2023-02-09
Payer: MEDICARE

## 2023-02-09 ENCOUNTER — TELEPHONE (OUTPATIENT)
Dept: CARDIOLOGY CLINIC | Age: 58
End: 2023-02-09

## 2023-02-09 VITALS
WEIGHT: 194 LBS | DIASTOLIC BLOOD PRESSURE: 88 MMHG | SYSTOLIC BLOOD PRESSURE: 128 MMHG | HEIGHT: 62 IN | BODY MASS INDEX: 35.7 KG/M2 | HEART RATE: 67 BPM | OXYGEN SATURATION: 98 %

## 2023-02-09 DIAGNOSIS — I25.118 CORONARY ARTERY DISEASE OF NATIVE ARTERY OF NATIVE HEART WITH STABLE ANGINA PECTORIS (HCC): ICD-10-CM

## 2023-02-09 DIAGNOSIS — E78.2 MIXED HYPERLIPIDEMIA: ICD-10-CM

## 2023-02-09 DIAGNOSIS — I25.10 CORONARY ARTERY DISEASE INVOLVING NATIVE CORONARY ARTERY OF NATIVE HEART WITHOUT ANGINA PECTORIS: Primary | ICD-10-CM

## 2023-02-09 DIAGNOSIS — I10 ESSENTIAL HYPERTENSION: ICD-10-CM

## 2023-02-09 DIAGNOSIS — I50.32 CHRONIC DIASTOLIC CONGESTIVE HEART FAILURE (HCC): ICD-10-CM

## 2023-02-09 PROCEDURE — 99214 OFFICE O/P EST MOD 30 MIN: CPT | Performed by: CLINICAL NURSE SPECIALIST

## 2023-02-09 PROCEDURE — G8484 FLU IMMUNIZE NO ADMIN: HCPCS | Performed by: CLINICAL NURSE SPECIALIST

## 2023-02-09 PROCEDURE — 3074F SYST BP LT 130 MM HG: CPT | Performed by: CLINICAL NURSE SPECIALIST

## 2023-02-09 PROCEDURE — 3017F COLORECTAL CA SCREEN DOC REV: CPT | Performed by: CLINICAL NURSE SPECIALIST

## 2023-02-09 PROCEDURE — G8417 CALC BMI ABV UP PARAM F/U: HCPCS | Performed by: CLINICAL NURSE SPECIALIST

## 2023-02-09 PROCEDURE — 1036F TOBACCO NON-USER: CPT | Performed by: CLINICAL NURSE SPECIALIST

## 2023-02-09 PROCEDURE — 3079F DIAST BP 80-89 MM HG: CPT | Performed by: CLINICAL NURSE SPECIALIST

## 2023-02-09 PROCEDURE — G8427 DOCREV CUR MEDS BY ELIG CLIN: HCPCS | Performed by: CLINICAL NURSE SPECIALIST

## 2023-02-09 RX ORDER — RANOLAZINE 1000 MG/1
1000 TABLET, EXTENDED RELEASE ORAL 2 TIMES DAILY
Qty: 180 TABLET | Refills: 3 | Status: SHIPPED | OUTPATIENT
Start: 2023-02-09

## 2023-02-09 NOTE — PATIENT INSTRUCTIONS
Increase  your Ranexa to 1000mg twice a day to see if that helps your exertional angina  Lexiscan soon   Maintain good blood pressure control-goal<130/80 at rest  Maintain good cholesterol control LDL goal<70 with arterial disease  If you are diabetic work to keep/obtain hemoglobin A1c< 7  Follow up after testing  Call with any questions or concerns  Follow up with MIKE Ramos for non cardiac problems  Report any new problems  Cardiovascular Fitness-Exercise as tolerated. Strive for 30 minutes of exercise most days of the week. Cardiac / Healthy Diet- Avoid processed high fat foods, maintain low sodium/salt   Continue current medications as directed  Continue plan of treatment  It is always recommended that you bring your medications bottles with you to each visit - this is for your safety! [place] at the [facility] located on the first floor of [place] [address] through hospital main entrance and turn immediately to your left. Patient's contact number:  [phone] (home)      Lexiscan Stress Test      Lexiscan (regadenoson injection) is a prescription drug given through an IV line that increases blood flow through the arteries of the heart during a cardiac nuclear stress test.     There are two parts to a Lexiscan stress test: the rest portion and the exercise portion. For the rest portion, a radioactive tracer is injected into your arm through the IV. After 30 to 60 minutes, the process of imaging will begin. A nuclear camera will be placed on your chest area and images are taken for the next 15 to 20 minutes. For the exercise portion, a nurse will attach EKG electrodes to your chest to monitor your heart rate. The drug Nelma Overcast is administered to simulate stress on the heart. Your heart rhythm will then be monitored for the next few minutes. Your blood pressure will also be monitored throughout the exercise portion.   [place] through the exercise portion, a second round of radioactive tracer is injected into your body. Your heart rate and EKG will be monitored for another few minutes after administering the drug. Test Preparation:    Bring a list of your current medications. Do not take any of your medications the morning of the test, but bring all morning medications with you as you will take them after the stress portion of the test is completed. Do not eat Bananas 24 hours prior to test.    No caffeine 24 hours prior to the testing. This includes: coffee, pop/soda, chocolate, cold medications, etc.  Any product that might contain caffeine. No nicotine or alcohol 12 hours prior to your test.   Nothing to eat or drink 6-8 hours prior to appointment time. It is okay to drink small amounts of water during the four hours prior to the test.  Nitroglycerin patches must be taken off 1 hour before testing. Wear comfortable clothing. Please refrain from any strenuous exercise or activities the day before your test, or the day of your test.  The Nuclear Lexiscan Stress test takes about 2 ½ to 3 hours to complete. If for any reason you are unable to keep this appointment, please contact Outpatient Scheduling, [phone], as soon as possible to reschedule.

## 2023-02-09 NOTE — TELEPHONE ENCOUNTER
Patient is requesting a return call from [facility] or the office in regards to her upcoming lexiscan stress test scheduled for 3/3. She is unable to do the test on that day due to another obligation she has. She says she was told the next available wont be until May and she says she can't wait that long to have this test. Please return her call to discuss other options. The best time to reach her is as soon as possible. Thank you!

## 2023-02-14 ENCOUNTER — HOSPITAL ENCOUNTER (OUTPATIENT)
Dept: NUCLEAR MEDICINE | Age: 58
Discharge: HOME | End: 2023-02-16
Payer: MEDICARE

## 2023-02-14 DIAGNOSIS — I25.10 CORONARY ARTERY DISEASE INVOLVING NATIVE CORONARY ARTERY OF NATIVE HEART WITHOUT ANGINA PECTORIS: ICD-10-CM

## 2023-02-14 DIAGNOSIS — I25.118 CORONARY ARTERY DISEASE OF NATIVE ARTERY OF NATIVE HEART WITH STABLE ANGINA PECTORIS (HCC): ICD-10-CM

## 2023-02-14 LAB
LV EF: 77 %
LVEF MODALITY: NORMAL

## 2023-02-14 PROCEDURE — 93016 CV STRESS TEST SUPVJ ONLY: CPT | Performed by: INTERNAL MEDICINE

## 2023-02-14 PROCEDURE — A9502 TC99M TETROFOSMIN: HCPCS | Performed by: CLINICAL NURSE SPECIALIST

## 2023-02-14 PROCEDURE — 93018 CV STRESS TEST I&R ONLY: CPT | Performed by: INTERNAL MEDICINE

## 2023-02-14 PROCEDURE — 93017 CV STRESS TEST TRACING ONLY: CPT

## 2023-02-14 PROCEDURE — 6360000002 HC RX W HCPCS: Performed by: CLINICAL NURSE SPECIALIST

## 2023-02-14 PROCEDURE — 3430000000 HC RX DIAGNOSTIC RADIOPHARMACEUTICAL: Performed by: CLINICAL NURSE SPECIALIST

## 2023-02-14 PROCEDURE — 78452 HT MUSCLE IMAGE SPECT MULT: CPT | Performed by: INTERNAL MEDICINE

## 2023-02-14 RX ADMIN — TETROFOSMIN 8 MILLICURIE: 1.38 INJECTION, POWDER, LYOPHILIZED, FOR SOLUTION INTRAVENOUS at 11:18

## 2023-02-14 RX ADMIN — TETROFOSMIN 24 MILLICURIE: 1.38 INJECTION, POWDER, LYOPHILIZED, FOR SOLUTION INTRAVENOUS at 11:17

## 2023-02-14 RX ADMIN — REGADENOSON 0.4 MG: 0.08 INJECTION, SOLUTION INTRAVENOUS at 10:06

## 2023-02-24 ENCOUNTER — APPOINTMENT (OUTPATIENT)
Dept: CT IMAGING | Age: 58
End: 2023-02-24
Payer: MEDICARE

## 2023-02-24 ENCOUNTER — HOSPITAL ENCOUNTER (EMERGENCY)
Age: 58
Discharge: HOME | End: 2023-02-24
Payer: MEDICARE

## 2023-02-24 ENCOUNTER — TELEPHONE (OUTPATIENT)
Dept: FAMILY MEDICINE CLINIC | Age: 58
End: 2023-02-24

## 2023-02-24 VITALS
BODY MASS INDEX: 35.7 KG/M2 | HEART RATE: 72 BPM | RESPIRATION RATE: 17 BRPM | TEMPERATURE: 97 F | OXYGEN SATURATION: 96 % | SYSTOLIC BLOOD PRESSURE: 138 MMHG | HEIGHT: 62 IN | DIASTOLIC BLOOD PRESSURE: 69 MMHG | WEIGHT: 194 LBS

## 2023-02-24 DIAGNOSIS — R73.9 HYPERGLYCEMIA: Primary | ICD-10-CM

## 2023-02-24 DIAGNOSIS — G62.9 PERIPHERAL POLYNEUROPATHY: ICD-10-CM

## 2023-02-24 LAB
ALBUMIN SERPL-MCNC: 4 G/DL (ref 3.5–5.2)
ALP BLD-CCNC: 100 U/L (ref 35–104)
ALT SERPL-CCNC: 21 U/L (ref 5–33)
ANION GAP SERPL CALCULATED.3IONS-SCNC: 12 MMOL/L (ref 7–19)
AST SERPL-CCNC: 19 U/L (ref 5–32)
BASOPHILS ABSOLUTE: 0.1 K/UL (ref 0–0.2)
BASOPHILS RELATIVE PERCENT: 1 % (ref 0–1)
BILIRUB SERPL-MCNC: 0.5 MG/DL (ref 0.2–1.2)
BUN BLDV-MCNC: 8 MG/DL (ref 6–20)
CALCIUM SERPL-MCNC: 9.3 MG/DL (ref 8.6–10)
CHLORIDE BLD-SCNC: 94 MMOL/L (ref 98–111)
CO2: 25 MMOL/L (ref 22–29)
CREAT SERPL-MCNC: 0.7 MG/DL (ref 0.5–0.9)
EOSINOPHILS ABSOLUTE: 0.1 K/UL (ref 0–0.6)
EOSINOPHILS RELATIVE PERCENT: 1.7 % (ref 0–5)
GFR SERPL CREATININE-BSD FRML MDRD: >60 ML/MIN/{1.73_M2}
GLUCOSE BLD-MCNC: 172 MG/DL
GLUCOSE BLD-MCNC: 172 MG/DL (ref 70–99)
GLUCOSE BLD-MCNC: 505 MG/DL (ref 74–109)
HCT VFR BLD CALC: 43.9 % (ref 37–47)
HEMOGLOBIN: 15.4 G/DL (ref 12–16)
IMMATURE GRANULOCYTES #: 0 K/UL
LYMPHOCYTES ABSOLUTE: 2.4 K/UL (ref 1.1–4.5)
LYMPHOCYTES RELATIVE PERCENT: 29.7 % (ref 20–40)
MCH RBC QN AUTO: 29.8 PG (ref 27–31)
MCHC RBC AUTO-ENTMCNC: 35.1 G/DL (ref 33–37)
MCV RBC AUTO: 85.1 FL (ref 81–99)
MONOCYTES ABSOLUTE: 0.5 K/UL (ref 0–0.9)
MONOCYTES RELATIVE PERCENT: 5.7 % (ref 0–10)
NEUTROPHILS ABSOLUTE: 5 K/UL (ref 1.5–7.5)
NEUTROPHILS RELATIVE PERCENT: 61.8 % (ref 50–65)
PDW BLD-RTO: 12.6 % (ref 11.5–14.5)
PERFORMED ON: ABNORMAL
PLATELET # BLD: 228 K/UL (ref 130–400)
PMV BLD AUTO: 9.9 FL (ref 9.4–12.3)
POTASSIUM REFLEX MAGNESIUM: 4.2 MMOL/L (ref 3.5–5)
RBC # BLD: 5.16 M/UL (ref 4.2–5.4)
SODIUM BLD-SCNC: 131 MMOL/L (ref 136–145)
TOTAL PROTEIN: 6.9 G/DL (ref 6.6–8.7)
WBC # BLD: 8.1 K/UL (ref 4.8–10.8)

## 2023-02-24 PROCEDURE — 36415 COLL VENOUS BLD VENIPUNCTURE: CPT

## 2023-02-24 PROCEDURE — 96372 THER/PROPH/DIAG INJ SC/IM: CPT

## 2023-02-24 PROCEDURE — 6370000000 HC RX 637 (ALT 250 FOR IP): Performed by: NURSE PRACTITIONER

## 2023-02-24 PROCEDURE — 72125 CT NECK SPINE W/O DYE: CPT

## 2023-02-24 PROCEDURE — 85025 COMPLETE CBC W/AUTO DIFF WBC: CPT

## 2023-02-24 PROCEDURE — 2580000003 HC RX 258: Performed by: NURSE PRACTITIONER

## 2023-02-24 PROCEDURE — 82962 GLUCOSE BLOOD TEST: CPT

## 2023-02-24 PROCEDURE — 80053 COMPREHEN METABOLIC PANEL: CPT

## 2023-02-24 PROCEDURE — 99284 EMERGENCY DEPT VISIT MOD MDM: CPT

## 2023-02-24 PROCEDURE — 70450 CT HEAD/BRAIN W/O DYE: CPT

## 2023-02-24 RX ORDER — 0.9 % SODIUM CHLORIDE 0.9 %
500 INTRAVENOUS SOLUTION INTRAVENOUS ONCE
Status: COMPLETED | OUTPATIENT
Start: 2023-02-24 | End: 2023-02-24

## 2023-02-24 RX ADMIN — SODIUM CHLORIDE 500 ML: 9 INJECTION, SOLUTION INTRAVENOUS at 19:29

## 2023-02-24 RX ADMIN — INSULIN HUMAN 8 UNITS: 100 INJECTION, SOLUTION PARENTERAL at 19:30

## 2023-02-24 ASSESSMENT — PAIN - FUNCTIONAL ASSESSMENT: PAIN_FUNCTIONAL_ASSESSMENT: 0-10

## 2023-02-24 ASSESSMENT — PAIN SCALES - GENERAL: PAINLEVEL_OUTOF10: 6

## 2023-02-24 NOTE — TELEPHONE ENCOUNTER
Patient called stating she's having numbess down her left side. I advised her to go to ER. She refused. Scheduled appointment with Stephy Martin on march 1st. Patient did not want to be scheduled with any other provider to get in earlier.

## 2023-02-24 NOTE — ED NOTES
Pt states she hasn't had her trulicity since December, due to the fact that she couldn't afford it - pt states she also ran out of strips and she hasn't checked her BG in at least two weeks     JOLEEN Bhat  02/24/23 1989

## 2023-02-25 ENCOUNTER — APPOINTMENT (OUTPATIENT)
Dept: CT IMAGING | Age: 58
End: 2023-02-25
Payer: MEDICARE

## 2023-02-25 ENCOUNTER — APPOINTMENT (OUTPATIENT)
Dept: GENERAL RADIOLOGY | Age: 58
End: 2023-02-25
Payer: MEDICARE

## 2023-02-25 ENCOUNTER — HOSPITAL ENCOUNTER (EMERGENCY)
Age: 58
Discharge: HOME | End: 2023-02-25
Payer: MEDICARE

## 2023-02-25 VITALS
RESPIRATION RATE: 18 BRPM | WEIGHT: 192 LBS | DIASTOLIC BLOOD PRESSURE: 70 MMHG | OXYGEN SATURATION: 95 % | HEIGHT: 62 IN | SYSTOLIC BLOOD PRESSURE: 162 MMHG | HEART RATE: 88 BPM | BODY MASS INDEX: 35.33 KG/M2 | TEMPERATURE: 97.5 F

## 2023-02-25 DIAGNOSIS — S00.03XA CONTUSION OF SCALP, INITIAL ENCOUNTER: Primary | ICD-10-CM

## 2023-02-25 DIAGNOSIS — S90.121A CONTUSION OF RIGHT FOOT INCLUDING TOES, INITIAL ENCOUNTER: ICD-10-CM

## 2023-02-25 DIAGNOSIS — S90.31XA CONTUSION OF RIGHT FOOT INCLUDING TOES, INITIAL ENCOUNTER: ICD-10-CM

## 2023-02-25 PROCEDURE — 6360000002 HC RX W HCPCS: Performed by: PHYSICIAN ASSISTANT

## 2023-02-25 PROCEDURE — 73630 X-RAY EXAM OF FOOT: CPT

## 2023-02-25 PROCEDURE — 72128 CT CHEST SPINE W/O DYE: CPT

## 2023-02-25 PROCEDURE — 73130 X-RAY EXAM OF HAND: CPT

## 2023-02-25 PROCEDURE — 96372 THER/PROPH/DIAG INJ SC/IM: CPT

## 2023-02-25 PROCEDURE — 99284 EMERGENCY DEPT VISIT MOD MDM: CPT

## 2023-02-25 PROCEDURE — 72125 CT NECK SPINE W/O DYE: CPT

## 2023-02-25 PROCEDURE — 70450 CT HEAD/BRAIN W/O DYE: CPT

## 2023-02-25 RX ORDER — DEXAMETHASONE SODIUM PHOSPHATE 4 MG/ML
4 INJECTION, SOLUTION INTRA-ARTICULAR; INTRALESIONAL; INTRAMUSCULAR; INTRAVENOUS; SOFT TISSUE ONCE
Status: COMPLETED | OUTPATIENT
Start: 2023-02-25 | End: 2023-02-25

## 2023-02-25 RX ORDER — PREDNISONE 10 MG/1
10 TABLET ORAL DAILY
Qty: 10 TABLET | Refills: 0 | Status: SHIPPED | OUTPATIENT
Start: 2023-02-25 | End: 2023-03-01 | Stop reason: SINTOL

## 2023-02-25 RX ADMIN — DEXAMETHASONE SODIUM PHOSPHATE 4 MG: 4 INJECTION, SOLUTION INTRAMUSCULAR; INTRAVENOUS at 16:48

## 2023-02-25 ASSESSMENT — ENCOUNTER SYMPTOMS
SHORTNESS OF BREATH: 0
NAUSEA: 0
EYE DISCHARGE: 0
RHINORRHEA: 0
BACK PAIN: 0
EYE PAIN: 0
ABDOMINAL DISTENTION: 0
PHOTOPHOBIA: 0
SORE THROAT: 0
COUGH: 0
ABDOMINAL PAIN: 0
SHORTNESS OF BREATH: 0
APNEA: 0
COLOR CHANGE: 0

## 2023-02-25 ASSESSMENT — PAIN SCALES - GENERAL: PAINLEVEL_OUTOF10: 8

## 2023-02-25 ASSESSMENT — PAIN - FUNCTIONAL ASSESSMENT: PAIN_FUNCTIONAL_ASSESSMENT: 0-10
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
## 2023-02-25 NOTE — ED PROVIDER NOTES
[facility]  eMERGENCY dEPARTMENT eNCOUnter      Pt Name: Yuliet Gibbons  MRN: [id number]  [place] 1965  Date of evaluation: 2/24/2023  Provider: Ida Maria, [address]       Chief Complaint   Patient presents with    Peripheral Neuropathy     Left hand and left foot tingles with pain went to  and sent to ER         HISTORY OF PRESENT ILLNESS   (Location/Symptom, Timing/Onset,Context/Setting, Quality, Duration, Modifying Factors, Severity)  Note limiting factors. Yuliet Gibbons is a 59 y.o. female who presents to the emergency department with left hand and left foot pain that is tingling. Has been going on for 4 days. NO change in vision or weakness. The numbness is not in the whole arm or leg but just the foot and hand on the left. NO difficulty with speech or thinking.  + neck pain which is chronic. No prior surgeries to her neck. MIld headache which is not uncommmon. Pt is diabetic and has been out of meds due to insurance issues. NO chest pain    The history is provided by the patient. Peripheral Neuropathy  This is a new problem. The current episode started more than 2 days ago. The problem occurs constantly. The problem has not changed since onset. Associated symptoms include headaches. Pertinent negatives include no chest pain and no shortness of breath. NursingNotes were reviewed. REVIEW OF SYSTEMS    (2-9 systems for level 4, 10 or more for level 5)     Review of Systems   Constitutional:  Negative for fever. Respiratory:  Negative for shortness of breath. Cardiovascular:  Negative for chest pain. Neurological:  Positive for numbness and headaches. Negative for dizziness, tremors, syncope, facial asymmetry, speech difficulty and weakness. Except as noted above the remainder of the review of systems was reviewed and negative.        PAST MEDICAL HISTORY     Past Medical History:   Diagnosis Date    Abnormal Pap smear of cervix     Arthritis     CAD (coronary artery disease)     [facility]    Cervical cancer ([facility] [address]     Class 3 severe obesity due to excess calories without serious comorbidity with body mass index (BMI) of 40.0 to 44.9 in adult [facility]) 07/28/2020    Diabetes mellitus ([facility] [address]     ERRONEOUS ENCOUNTER--DISREGARD 09/15/2015    GERD (gastroesophageal reflux disease)     Headache(784.0)     Heart attack ([facility] [address] 12/2011    Hyperlipidemia     Hypertension     Lichen simplex chronicus 10/2015    Osteopenia     Pneumonia     Type II or unspecified type diabetes mellitus without mention of complication, not stated as uncontrolled          SURGICALHISTORY       Past Surgical History:   Procedure Laterality Date    BREAST BIOPSY Right 06/08/2011    US guided core needle, right, benign    CARDIAC CATHETERIZATION  12/22/14  JDT    EF 50%    CARDIAC CATHETERIZATION  06/05/2017        CHG FLUOROSCOPIC GUIDANCE NEEDLE PLACEMENT ADD ON Left 07/19/2018    CORTICOSTEROID INJECTION performed by Deacon Garcia MD at [address]  2006    COLONOSCOPY  approx 2005    Dr. Kiko Presley per pt recall \" Normal\"    CORONARY ANGIOPLASTY WITH STENT PLACEMENT  12/2011    SABA to [address] Left 11/08/2019    LEFT INDEX AND LONG TRIGGER FINGER RELEASAE performed by Syeda Collado MD at [address] [facility] (CERVIX STATUS UNKNOWN)  1995    OTHER SURGICAL HISTORY      fibrinolysis    TN CORONARY ARTERY BYP W/VEIN & ARTERY GRAFT 3 VEIN  12/23/2014    Coronary Artery Bypass, 3    TN MNPJ W/ANES SHOULDER JT APPL FIXATION APPARATUS Left 07/19/2018    SHOULDER MANIPULATION UNDER ANESTHESIA performed by Deacon Garcia MD at [facility] (CERVIX REMOVED) Bilateral 1995    age 27    THORACOTOMY      UPPER GASTROINTESTINAL ENDOSCOPY  approx 2005    per pt recall \" Normal\"    UPPER GASTROINTESTINAL ENDOSCOPY N/A 06/19/2020     Lauren-NORMAL exam, neg EoE    VULVAR/PERINEAL BIOPSY  10/07/2015    Dr. Nusrat Cross    VULVAR/PERINEAL BIOPSY N/A 07/02/2021    VULVAR BIOPSY performed by Estuardo Gusman MD at [address] [place] [address]       Discharge Medication List as of 2/24/2023  9:32 PM        CONTINUE these medications which have NOT CHANGED    Details   ranolazine (RANEXA) 1000 MG extended release tablet Take 1 tablet by mouth 2 times daily, Disp-180 tablet, R-3Normal      oxybutynin (DITROPAN-XL) 10 MG extended release tablet TAKE 1 TABLET BY MOUTH DAILY, Disp-30 tablet, R-2Normal      fluticasone (FLONASE) 50 MCG/ACT nasal spray 2 sprays by Nasal route daily, Disp-16 g, R-3Normal      vitamin D (ERGOCALCIFEROL) 1.25 MG (23998 UT) CAPS capsule Take 1 capsule by mouth once a week, Disp-12 capsule, R-1Normal      valsartan (DIOVAN) 80 MG tablet Take 1 tablet by mouth daily, Disp-30 tablet, R-5Normal      tamsulosin (FLOMAX) 0.4 MG capsule Take 1 capsule by mouth daily, Disp-30 capsule, R-5Normal      ondansetron (ZOFRAN ODT) 4 MG disintegrating tablet Take 1 tablet by mouth daily as needed for Nausea or Vomiting, Disp-30 tablet, R-5Normal      nitroGLYCERIN (NITROSTAT) 0.4 MG SL tablet Place 1 tablet under the tongue every 5 minutes as needed for Chest pain, Disp-25 tablet, R-3Normal      metoprolol tartrate (LOPRESSOR) 25 MG tablet Take one tablet by mouth twice daily, Disp-60 tablet, R-5Normal      metFORMIN (GLUCOPHAGE-XR) 500 MG extended release tablet Take one tablet by mouth 2 times daily, Disp-60 tablet, R-5Normal      lovastatin (MEVACOR) 40 MG tablet Take one tablet at bedtime, Disp-30 tablet, R-4Normal      glipiZIDE (GLUCOTROL XL) 10 MG extended release tablet Take 1 tablet by mouth daily, Disp-30 tablet, R-3Normal      furosemide (LASIX) 40 MG tablet Take 1 tablet by mouth daily, Disp-30 tablet, R-5Normal      Dulaglutide 0.75 MG/0.5ML SOPN Inject 0.75 mg into the skin once a week, Disp-12 pen, R-2Normal docusate sodium (COLACE) 100 MG capsule Take 1 capsule by mouth daily as needed for Constipation, Disp-30 capsule, R-1Normal      empagliflozin (JARDIANCE) 25 MG tablet Take 25 mg by mouth dailyHistorical Med      amitriptyline (ELAVIL) 10 MG tablet Take 1 tablet by mouth nightly, Disp-30 tablet, R-5Normal      nystatin (MYCOSTATIN) 857992 UNIT/GM powder APPLY 2 OR 3 TIMES DAILY. , Disp-60 g, R-5, Normal      Diabetic Shoe MISC Starting Thu 11/12/2020, Disp-1 each,R-0, Print      albuterol (PROVENTIL) (5 MG/ML) 0.5% nebulizer solution Take 0.5 mLs by nebulization every 6 hours as needed for Wheezing, Disp-120 each,R-0Normal      diclofenac sodium (VOLTAREN) 1 % GEL Apply 2 g topically 4 times daily, Topical, 4 TIMES DAILY Starting Tue 3/24/2020, Disp-2 Tube, R-1, Normal      aspirin 81 MG tablet Take 81 mg by mouth dailyHistorical Med      mometasone (ELOCON) 0.1 % ointment Apply topically to vagina 3 times per week, Disp-1 Tube, R-3, Normal      Eflornithine HCl (VANIQA) 13.9 % CREA Apply 1 Dose topically 2 times daily, Disp-45 g, R-2                  Codeine, Motrin [ibuprofen], Humalog [insulin lispro], and Ozempic (0.25 or 0.5 mg-dose) [semaglutide(0.25 or 0.5mg-dos)]    FAMILY HISTORY       Family History   Problem Relation Age of Onset    Diabetes Father     Cancer Father     High Blood Pressure Father     Heart Attack Father     Colon Cancer Father     Diabetes Mother     Breast Cancer Mother 47    Heart Failure Mother         CHF, MVP    Liver Cancer Mother     Heart Failure Maternal Grandfather     Stroke Maternal Grandfather     Pancreatic Cancer Maternal Grandfather     Stomach Cancer Maternal Grandfather     Diabetes Brother     Colon Cancer Paternal Uncle     Rectal Cancer Other     Breast Cancer Maternal Grandmother 78    Colon Polyps Neg Hx     Crohn's Disease Neg Hx     Irritable Bowel Syndrome Neg Hx     Liver Disease Neg Hx           SOCIAL HISTORY       Social History     Socioeconomic History Marital status:      Spouse name: None    Number of children: None    Years of education: None    Highest education level: None   Tobacco Use    Smoking status: Former     Packs/day: 2.00     Years: 15.00     Pack years: 30.00     Types: Cigarettes     Quit date: 2014     Years since quittin.6    Smokeless tobacco: Never   Vaping Use    Vaping Use: Never used   Substance and Sexual Activity    Alcohol use: No    Drug use: No    Sexual activity: Not Currently     Social Determinants of Health     Financial Resource Strain: Low Risk     Difficulty of Paying Living Expenses: Not hard at all   Food Insecurity: No Food Insecurity    Worried About [employer] in the Last Year: Never true    Ran Out of Food in the Last Year: Never true       SCREENINGS    Ran Coma Scale  Eye Opening: Spontaneous  Best Verbal Response: Oriented  Best Motor Response: Obeys commands  [place] Coma Scale Score: 15        PHYSICAL EXAM    (up to 7 for level 4, 8 or more for level 5)     ED Triage Vitals [23 1618]   BP Temp Temp src Heart Rate Resp SpO2 Height Weight   (!) 156/69 97 °F (36.1 °C) -- 79 18 96 % 5' 2\" (1.575 m) 194 lb (88 kg)       Physical Exam  Vitals and nursing note reviewed. Constitutional:       Appearance: Normal appearance. She is well-developed. HENT:      Head: Normocephalic and atraumatic. Eyes:      General: No scleral icterus. Right eye: No discharge. Left eye: No discharge. Cardiovascular:      Rate and Rhythm: Normal rate. Pulmonary:      Effort: No respiratory distress. Musculoskeletal:      Cervical back: Normal range of motion and neck supple. Neurological:      Mental Status: She is alert and oriented to person, place, and time. Cranial Nerves: Cranial nerves 2-12 are intact. Sensory: Sensation is intact. Motor: Motor function is intact. No weakness or abnormal muscle tone. Gait: Gait is intact.    Psychiatric:         Behavior: Behavior normal.       RESULTS     EKG: All EKG's are interpreted by the Emergency Department Physician who either signs or Co-signsthis chart in the absence of a cardiologist.        RADIOLOGY:   Harry Larve such as CT, Ultrasound and MRI are read by the radiologist. Plain radiographic images are visualized and preliminarily interpreted by the emergency physician with the below findings:      Interpretation per the Radiologist below, if available at the time of this note:    CT CERVICAL SPINE WO CONTRAST   Final Result   No acute osseous abnormality of the cervical spine. If there is ongoing concern   for neurologic impingement, recommend MRI. CT HEAD WO CONTRAST   Final Result   1. No acute intracranial abnormality identified. ED BEDSIDEULTRASOUND:   Performed by ED Physician -none    LABS:  Labs Reviewed   COMPREHENSIVE METABOLIC PANEL W/ REFLEX TO MG FOR LOW K - Abnormal; Notable for the following components:       Result Value    Sodium 131 (*)     Chloride 94 (*)     Glucose 505 (*)     All other components within normal limits    Narrative:     CALL  [place]  [facility] tel. ,  Chemistry results called to and read back by kiera panchal rn er, 02/24/2023  17:33, by [facility]   POCT GLUCOSE - Abnormal; Notable for the following components:    POC Glucose 172 (*)     All other components within normal limits   POCT GLUCOSE - Normal   CBC WITH AUTO DIFFERENTIAL       All other labs were within normal range or not returned as of this dictation.     EMERGENCY DEPARTMENT COURSE and DIFFERENTIALDIAGNOSIS/MDM:   Vitals:    Vitals:    02/24/23 1830 02/24/23 1935 02/24/23 2031 02/24/23 2130   BP: (!) 161/70 (!) 143/70 (!) 154/78 138/69   Pulse:  74  72   Resp:  17     Temp:       SpO2: 97% 98% 97% 96%   Weight:       Height:               MDM  Number of Diagnoses or Management Options  Hyperglycemia: new and requires workup  Peripheral polyneuropathy: new and requires workup  Diagnosis management comments: Pt needs to see her primary about her out of control blood sugar. Not sure about the neuropathy but do not feel that it is a stroke or cervical radiculopathy. Blood sugar has been treated with fluids and insulin andis improved. Pt educated       Amount and/or Complexity of Data Reviewed  Clinical lab tests: ordered and reviewed  Tests in the radiology section of CPT®: ordered               CONSULTS:  None    PROCEDURES:  Unless otherwise noted below, none     Procedures    FINAL IMPRESSION      1. Hyperglycemia    2.  Peripheral polyneuropathy        DISPOSITION/PLAN   DISPOSITION Decision To Discharge 02/24/2023 09:19:44 PM      PATIENT REFERRED TO:  Dipak Miller [address] [place] [address]  [phone]            DISCHARGE MEDICATIONS:  Discharge Medication List as of 2/24/2023  9:32 PM             (Please note that portions of this note were completed with a voice recognitionprogram.  Efforts were made to edit the dictations but occasionally words are mis-transcribed.)    MIKE Oscar (electronically signed)          MIKE Oscar  02/25/23 1128

## 2023-02-28 ENCOUNTER — OFFICE VISIT (OUTPATIENT)
Dept: CARDIOLOGY CLINIC | Age: 58
End: 2023-02-28
Payer: MEDICARE

## 2023-02-28 VITALS
DIASTOLIC BLOOD PRESSURE: 88 MMHG | WEIGHT: 189 LBS | HEIGHT: 62 IN | BODY MASS INDEX: 34.78 KG/M2 | HEART RATE: 65 BPM | OXYGEN SATURATION: 97 % | SYSTOLIC BLOOD PRESSURE: 138 MMHG

## 2023-02-28 DIAGNOSIS — I25.10 CORONARY ARTERY DISEASE INVOLVING NATIVE CORONARY ARTERY OF NATIVE HEART WITHOUT ANGINA PECTORIS: Primary | ICD-10-CM

## 2023-02-28 DIAGNOSIS — I10 ESSENTIAL HYPERTENSION: ICD-10-CM

## 2023-02-28 DIAGNOSIS — I50.32 CHRONIC DIASTOLIC CONGESTIVE HEART FAILURE (HCC): ICD-10-CM

## 2023-02-28 DIAGNOSIS — E78.2 MIXED HYPERLIPIDEMIA: ICD-10-CM

## 2023-02-28 PROCEDURE — G8417 CALC BMI ABV UP PARAM F/U: HCPCS | Performed by: CLINICAL NURSE SPECIALIST

## 2023-02-28 PROCEDURE — 99214 OFFICE O/P EST MOD 30 MIN: CPT | Performed by: CLINICAL NURSE SPECIALIST

## 2023-02-28 PROCEDURE — 3075F SYST BP GE 130 - 139MM HG: CPT | Performed by: CLINICAL NURSE SPECIALIST

## 2023-02-28 PROCEDURE — 1036F TOBACCO NON-USER: CPT | Performed by: CLINICAL NURSE SPECIALIST

## 2023-02-28 PROCEDURE — 3017F COLORECTAL CA SCREEN DOC REV: CPT | Performed by: CLINICAL NURSE SPECIALIST

## 2023-02-28 PROCEDURE — G8484 FLU IMMUNIZE NO ADMIN: HCPCS | Performed by: CLINICAL NURSE SPECIALIST

## 2023-02-28 PROCEDURE — G8427 DOCREV CUR MEDS BY ELIG CLIN: HCPCS | Performed by: CLINICAL NURSE SPECIALIST

## 2023-02-28 PROCEDURE — 3079F DIAST BP 80-89 MM HG: CPT | Performed by: CLINICAL NURSE SPECIALIST
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
## 2023-02-28 NOTE — PATIENT INSTRUCTIONS
Follow up with DR Cr in 6 mos   Maintain good blood pressure control-goal<130/80 at rest  Maintain good cholesterol control LDL goal<70 with arterial disease  If you are diabetic work to keep/obtain hemoglobin A1c< 7  Follow up after testing  Call with any questions or concerns  Follow up with Ethelene Halsted, APRN for non cardiac problems  Report any new problems  Cardiovascular Fitness-Exercise as tolerated. Strive for 30 minutes of exercise most days of the week. Cardiac / Healthy Diet- Avoid processed high fat foods, maintain low sodium/salt   Continue current medications as directed  Continue plan of treatment  It is always recommended that you bring your medications bottles with you to each visit - this is for your safety!

## 2023-03-01 ENCOUNTER — OFFICE VISIT (OUTPATIENT)
Dept: FAMILY MEDICINE CLINIC | Age: 58
End: 2023-03-01
Payer: MEDICARE

## 2023-03-01 ENCOUNTER — HOSPITAL ENCOUNTER (OUTPATIENT)
Dept: GENERAL RADIOLOGY | Age: 58
Discharge: HOME | End: 2023-03-01
Payer: MEDICARE

## 2023-03-01 VITALS
HEIGHT: 62 IN | TEMPERATURE: 98.7 F | HEART RATE: 64 BPM | DIASTOLIC BLOOD PRESSURE: 76 MMHG | SYSTOLIC BLOOD PRESSURE: 126 MMHG | BODY MASS INDEX: 34.96 KG/M2 | OXYGEN SATURATION: 100 % | WEIGHT: 190 LBS

## 2023-03-01 DIAGNOSIS — M25.551 ACUTE RIGHT HIP PAIN: ICD-10-CM

## 2023-03-01 DIAGNOSIS — Z79.4 TYPE 2 DIABETES MELLITUS WITH COMPLICATION, WITH LONG-TERM CURRENT USE OF INSULIN (HCC): ICD-10-CM

## 2023-03-01 DIAGNOSIS — S79.911D INJURY OF RIGHT HIP AND THIGH, SUBSEQUENT ENCOUNTER: ICD-10-CM

## 2023-03-01 DIAGNOSIS — S79.921D INJURY OF RIGHT HIP AND THIGH, SUBSEQUENT ENCOUNTER: ICD-10-CM

## 2023-03-01 DIAGNOSIS — M79.671 RIGHT FOOT PAIN: ICD-10-CM

## 2023-03-01 DIAGNOSIS — S99.921D INJURY OF RIGHT FOOT, SUBSEQUENT ENCOUNTER: ICD-10-CM

## 2023-03-01 DIAGNOSIS — E11.8 TYPE 2 DIABETES MELLITUS WITH COMPLICATION, WITH LONG-TERM CURRENT USE OF INSULIN (HCC): ICD-10-CM

## 2023-03-01 DIAGNOSIS — M79.671 RIGHT FOOT PAIN: Primary | ICD-10-CM

## 2023-03-01 PROCEDURE — G8427 DOCREV CUR MEDS BY ELIG CLIN: HCPCS | Performed by: NURSE PRACTITIONER

## 2023-03-01 PROCEDURE — 1036F TOBACCO NON-USER: CPT | Performed by: NURSE PRACTITIONER

## 2023-03-01 PROCEDURE — 73630 X-RAY EXAM OF FOOT: CPT

## 2023-03-01 PROCEDURE — 3046F HEMOGLOBIN A1C LEVEL >9.0%: CPT | Performed by: NURSE PRACTITIONER

## 2023-03-01 PROCEDURE — 3017F COLORECTAL CA SCREEN DOC REV: CPT | Performed by: NURSE PRACTITIONER

## 2023-03-01 PROCEDURE — G8484 FLU IMMUNIZE NO ADMIN: HCPCS | Performed by: NURSE PRACTITIONER

## 2023-03-01 PROCEDURE — G8417 CALC BMI ABV UP PARAM F/U: HCPCS | Performed by: NURSE PRACTITIONER

## 2023-03-01 PROCEDURE — 99214 OFFICE O/P EST MOD 30 MIN: CPT | Performed by: NURSE PRACTITIONER

## 2023-03-01 PROCEDURE — 3078F DIAST BP <80 MM HG: CPT | Performed by: NURSE PRACTITIONER

## 2023-03-01 PROCEDURE — 3074F SYST BP LT 130 MM HG: CPT | Performed by: NURSE PRACTITIONER

## 2023-03-01 PROCEDURE — 2022F DILAT RTA XM EVC RTNOPTHY: CPT | Performed by: NURSE PRACTITIONER

## 2023-03-01 PROCEDURE — 73502 X-RAY EXAM HIP UNI 2-3 VIEWS: CPT

## 2023-03-01 ASSESSMENT — ENCOUNTER SYMPTOMS
RESPIRATORY NEGATIVE: 1
GASTROINTESTINAL NEGATIVE: 1
EYES NEGATIVE: 1

## 2023-03-01 ASSESSMENT — PATIENT HEALTH QUESTIONNAIRE - PHQ9
SUM OF ALL RESPONSES TO PHQ9 QUESTIONS 1 & 2: 0
SUM OF ALL RESPONSES TO PHQ QUESTIONS 1-9: 0
2. FEELING DOWN, DEPRESSED OR HOPELESS: 0
SUM OF ALL RESPONSES TO PHQ QUESTIONS 1-9: 0
1. LITTLE INTEREST OR PLEASURE IN DOING THINGS: 0
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
## 2023-03-02 ENCOUNTER — TELEPHONE (OUTPATIENT)
Dept: FAMILY MEDICINE CLINIC | Age: 58
End: 2023-03-02

## 2023-03-02 NOTE — TELEPHONE ENCOUNTER
----- Message from MIKE Morrell sent at 3/2/2023 11:57 AM CST -----  No fracture noted in right hip. Likely just hematoma. Just symptomatic care.

## 2023-03-02 NOTE — TELEPHONE ENCOUNTER
Pt called wanting to know her results cause she hadn't heard anything. I explained that Cecilia Barger spoke with her  earlier. Told pt  results and recommendations. Pt understood.

## 2023-03-02 NOTE — TELEPHONE ENCOUNTER
----- Message from MIKE Ordoñez sent at 3/2/2023 11:57 AM CST -----  Please let patient know that no fracture was noted in x-ray of foot. Continue to use postop shoe as provided from ER.

## 2023-03-15 ENCOUNTER — OFFICE VISIT (OUTPATIENT)
Dept: FAMILY MEDICINE CLINIC | Age: 58
End: 2023-03-15

## 2023-03-15 VITALS
HEART RATE: 78 BPM | OXYGEN SATURATION: 99 % | TEMPERATURE: 98.1 F | BODY MASS INDEX: 35.15 KG/M2 | SYSTOLIC BLOOD PRESSURE: 130 MMHG | WEIGHT: 191 LBS | HEIGHT: 62 IN | DIASTOLIC BLOOD PRESSURE: 82 MMHG

## 2023-03-15 DIAGNOSIS — M79.671 RIGHT FOOT PAIN: ICD-10-CM

## 2023-03-15 DIAGNOSIS — F41.9 ANXIETY: ICD-10-CM

## 2023-03-15 DIAGNOSIS — Z79.4 TYPE 2 DIABETES MELLITUS WITH COMPLICATION, WITH LONG-TERM CURRENT USE OF INSULIN (HCC): Primary | ICD-10-CM

## 2023-03-15 DIAGNOSIS — E11.8 TYPE 2 DIABETES MELLITUS WITH COMPLICATION, WITH LONG-TERM CURRENT USE OF INSULIN (HCC): Primary | ICD-10-CM

## 2023-03-15 DIAGNOSIS — S99.921D INJURY OF RIGHT FOOT, SUBSEQUENT ENCOUNTER: ICD-10-CM

## 2023-03-15 LAB — HBA1C MFR BLD: 13.5 %

## 2023-03-15 RX ORDER — INSULIN ASPART 100 [IU]/ML
5 INJECTION, SOLUTION INTRAVENOUS; SUBCUTANEOUS
Qty: 5 ADJUSTABLE DOSE PRE-FILLED PEN SYRINGE | Refills: 3 | Status: SHIPPED | OUTPATIENT
Start: 2023-03-15

## 2023-03-15 RX ORDER — BUSPIRONE HYDROCHLORIDE 5 MG/1
5 TABLET ORAL 3 TIMES DAILY PRN
Qty: 90 TABLET | Refills: 0 | Status: SHIPPED | OUTPATIENT
Start: 2023-03-15 | End: 2023-04-14

## 2023-03-15 ASSESSMENT — ENCOUNTER SYMPTOMS
RESPIRATORY NEGATIVE: 1
GASTROINTESTINAL NEGATIVE: 1
EYES NEGATIVE: 1

## 2023-03-15 NOTE — PROGRESS NOTES
[facility] PHYSICIAN SERVICES  [facility] [facility]  [address] [address]  Dept: [phone]  Dept Fax: [phone]  Loc: [phone]    Alice Crocker is a 59 y.o. female who presents today for her medical conditions/complaints as noted below. Alice Crocker is c/o of Follow-up Chronic Condition and Anxiety      Chief Complaint   Patient presents with    Follow-up Chronic Condition    Anxiety       HPI:     HPI  Patient presents today for diabetic follow up. She is also having some anxiety related to her . She states that his mood is awful and it it really effecting her. Patient is still having issues with her right foot as well. The pain is worsening. Last xray was negative for any acute cause of pain. She reports that the has not improved at all and it is difficult to walk on the foot sometimes. She has been using Toujeo 14 units at night, but still having higher glucose readings. She has had a few readings of 300. Needing dental work. She was cleared by cardiology for dental procedure. She is also checking glucose during the day and they are higher levels.      Past Medical History:   Diagnosis Date    Abnormal Pap smear of cervix     Arthritis     CAD (coronary artery disease)     stent 2011 Vinod Ni; sees [facility] cardiology    Cervical cancer ([facility] [address]     Class 3 severe obesity due to excess calories without serious comorbidity with body mass index (BMI) of 40.0 to 44.9 in adult [facility]) 07/28/2020    Diabetes mellitus ([facility] [address]     ERRONEOUS ENCOUNTER--DISREGARD 09/15/2015    GERD (gastroesophageal reflux disease)     Headache(784.0)     Heart attack ([address] 12/2011    Hyperlipidemia     Hypertension     Lichen simplex chronicus 10/2015    Osteopenia     Pneumonia     Type II or unspecified type diabetes mellitus without mention of complication, not stated as uncontrolled         Past Surgical History:   Procedure Laterality Date    BREAST BIOPSY Right 06/08/2011    US guided core needle, right, benign    CARDIAC CATHETERIZATION  14  JDT    EF 50%    CARDIAC CATHETERIZATION  2017        CHG FLUOROSCOPIC GUIDANCE NEEDLE PLACEMENT ADD ON Left 2018    CORTICOSTEROID INJECTION performed by Gurvinder Bond MD at [address]  2006    COLONOSCOPY  approx     Dr. Elva Rey per pt recall \" Normal\"    CORONARY ANGIOPLASTY WITH STENT PLACEMENT  2011    SABA to [address] Left 2019    LEFT INDEX AND LONG TRIGGER FINGER RELEASAE performed by Dhruv Mendieta MD at [address] [place] [address] (CERVIX STATUS UNKNOWN)      OTHER SURGICAL HISTORY      fibrinolysis    CO CORONARY ARTERY BYP W/VEIN & ARTERY GRAFT 3 VEIN  2014    Coronary Artery Bypass, 3    CO MNPJ W/ANES SHOULDER JT APPL FIXATION APPARATUS Left 2018    SHOULDER MANIPULATION UNDER ANESTHESIA performed by Gurvinder Bond MD at [address] [place] [address] (CERVIX REMOVED) Bilateral     age 27    THORACOTOMY      UPPER GASTROINTESTINAL ENDOSCOPY  approx     per pt recall \" Normal\"    UPPER GASTROINTESTINAL ENDOSCOPY N/A 2020    Dr Richelle Espinal exam, neg EoE    VULVAR/PERINEAL BIOPSY  10/07/2015    Dr. Galina Andino    VULVAR/PERINEAL BIOPSY N/A 2021    VULVAR BIOPSY performed by Jimi Patel MD at [place] [address] History     Tobacco Use    Smoking status: Former     Packs/day: 2.00     Years: 15.00     Pack years: 30.00     Types: Cigarettes     Quit date: 2014     Years since quittin.7    Smokeless tobacco: Never   Substance Use Topics    Alcohol use: No        Current Outpatient Medications   Medication Sig Dispense Refill    busPIRone (BUSPAR) 5 MG tablet Take 1 tablet by mouth 3 times daily as needed (anxiety) 90 tablet 0    insulin aspart (NOVOLOG FLEXPEN) 100 UNIT/ML injection pen Inject 5 Units into the skin 3 times daily (before meals) 5 Adjustable Dose Pre-filled Pen Syringe 3    ranolazine (RANEXA) 1000 MG extended release tablet Take 1 tablet by mouth 2 times daily 180 tablet 3    oxybutynin (DITROPAN-XL) 10 MG extended release tablet TAKE 1 TABLET BY MOUTH DAILY 30 tablet 2    fluticasone (FLONASE) 50 MCG/ACT nasal spray 2 sprays by Nasal route daily 16 g 3    vitamin D (ERGOCALCIFEROL) 1.25 MG (55405 UT) CAPS capsule Take 1 capsule by mouth once a week 12 capsule 1    valsartan (DIOVAN) 80 MG tablet Take 1 tablet by mouth daily 30 tablet 5    tamsulosin (FLOMAX) 0.4 MG capsule Take 1 capsule by mouth daily 30 capsule 5    ondansetron (ZOFRAN ODT) 4 MG disintegrating tablet Take 1 tablet by mouth daily as needed for Nausea or Vomiting 30 tablet 5    nitroGLYCERIN (NITROSTAT) 0.4 MG SL tablet Place 1 tablet under the tongue every 5 minutes as needed for Chest pain 25 tablet 3    metoprolol tartrate (LOPRESSOR) 25 MG tablet Take one tablet by mouth twice daily 60 tablet 5    metFORMIN (GLUCOPHAGE-XR) 500 MG extended release tablet Take one tablet by mouth 2 times daily 60 tablet 5    lovastatin (MEVACOR) 40 MG tablet Take one tablet at bedtime 30 tablet 4    glipiZIDE (GLUCOTROL XL) 10 MG extended release tablet Take 1 tablet by mouth daily 30 tablet 3    furosemide (LASIX) 40 MG tablet Take 1 tablet by mouth daily 30 tablet 5    docusate sodium (COLACE) 100 MG capsule Take 1 capsule by mouth daily as needed for Constipation 30 capsule 1    empagliflozin (JARDIANCE) 25 MG tablet Take 25 mg by mouth daily      amitriptyline (ELAVIL) 10 MG tablet Take 1 tablet by mouth nightly 30 tablet 5    nystatin (MYCOSTATIN) 423105 UNIT/GM powder APPLY 2 OR 3 TIMES DAILY. (Patient taking differently: Apply topically as needed APPLY 2 OR 3 TIMES DAILY. ) 60 g 5    albuterol (PROVENTIL) (5 MG/ML) 0.5% nebulizer solution Take 0.5 mLs by nebulization every 6 hours as needed for Wheezing 120 each 0    diclofenac sodium (VOLTAREN) 1 % GEL Apply 2 g topically 4 times daily (Patient taking differently: Apply 2 g topically 4 times daily as needed for Pain) 2 Tube 1    aspirin 81 MG tablet Take 81 mg by mouth daily       No current facility-administered medications for this visit. Allergies   Allergen Reactions    Codeine Other (See Comments)     Seizures as a child    Motrin [Ibuprofen] Swelling     Tongue to swell    Humalog [Insulin Lispro] Other (See Comments)     Shakiness, break out in a sweat at higher doses. Ozempic (0.25 Or 0.5 Mg-Dose) [Semaglutide(0.25 Or 0.5mg-Dos)]      nausea       Family History   Problem Relation Age of Onset    Diabetes Father     Cancer Father     High Blood Pressure Father     Heart Attack Father     Colon Cancer Father     Diabetes Mother     Breast Cancer Mother 47    Heart Failure Mother         CHF, MVP    Liver Cancer Mother     Heart Failure Maternal Grandfather     Stroke Maternal Grandfather     Pancreatic Cancer Maternal Grandfather     Stomach Cancer Maternal Grandfather     Diabetes Brother     Colon Cancer Paternal Uncle     Rectal Cancer Other     Breast Cancer Maternal Grandmother 78    Colon Polyps Neg Hx     Crohn's Disease Neg Hx     Irritable Bowel Syndrome Neg Hx     Liver Disease Neg Hx                Subjective:      Review of Systems   Constitutional: Negative. HENT: Negative. Eyes: Negative. Respiratory: Negative. Cardiovascular: Negative. Gastrointestinal: Negative. Endocrine: Negative. Genitourinary: Negative. Musculoskeletal:  Positive for arthralgias (right foot pain). Skin: Negative. Neurological: Negative. Hematological: Negative. Psychiatric/Behavioral:  The patient is nervous/anxious. Objective:     Physical Exam  Vitals and nursing note reviewed. Constitutional:       Appearance: Normal appearance. HENT:      Head: Normocephalic and atraumatic. Jaw: There is normal jaw occlusion.       Right Ear: Hearing, tympanic membrane, ear canal and external ear normal. Left Ear: Hearing, tympanic membrane, ear canal and external ear normal.      Nose: Nose normal.      Mouth/Throat:      Lips: Pink. Mouth: Mucous membranes are moist.      Pharynx: Oropharynx is clear. Eyes:      General: Lids are normal.      Extraocular Movements: Extraocular movements intact. Conjunctiva/sclera: Conjunctivae normal.      Pupils: Pupils are equal, round, and reactive to light. Neck:      Thyroid: No thyromegaly. Trachea: Trachea normal.   Cardiovascular:      Rate and Rhythm: Normal rate and regular rhythm. Pulses: Normal pulses. Dorsalis pedis pulses are 2+ on the right side and 2+ on the left side. Posterior tibial pulses are 2+ on the right side and 2+ on the left side. Heart sounds: Normal heart sounds. No murmur heard. Pulmonary:      Effort: Pulmonary effort is normal.      Breath sounds: Normal breath sounds and air entry. Abdominal:      General: Bowel sounds are normal.      Palpations: Abdomen is soft. Musculoskeletal:      Cervical back: Full passive range of motion without pain, normal range of motion and neck supple. Thoracic back: Normal range of motion. Lumbar back: Normal range of motion. Right lower leg: No edema. Left lower leg: No edema. Right foot: Decreased range of motion. Tenderness present. Legs:    Lymphadenopathy:      Cervical: No cervical adenopathy. Skin:     General: Skin is warm and dry. Capillary Refill: Capillary refill takes less than 2 seconds. Neurological:      General: No focal deficit present. Mental Status: She is alert and oriented to person, place, and time. Mental status is at baseline. Psychiatric:         Attention and Perception: Attention normal.         Mood and Affect: Mood normal.         Speech: Speech normal.         Behavior: Behavior normal.         Thought Content:  Thought content normal.         Cognition and Memory: Cognition normal. Judgment: Judgment normal.       /82   Pulse 78   Temp 98.1 °F (36.7 °C)   Ht 5' 2\" (1.575 m)   Wt 191 lb (86.6 kg)   LMP 01/01/1998 (Approximate)   SpO2 99%   BMI 34.93 kg/m²     Assessment:      Diagnosis Orders   1. Type 2 diabetes mellitus with complication, with long-term current use of insulin ([facility])  POCT glycosylated hemoglobin (Hb A1C)    insulin aspart (NOVOLOG FLEXPEN) 100 UNIT/ML injection pen      2. Injury of right foot, subsequent encounter  MRI FOOT RIGHT WO CONTRAST      3. Right foot pain  MRI FOOT RIGHT WO CONTRAST      4. Anxiety  busPIRone (BUSPAR) 5 MG tablet          Results for orders placed or performed in visit on 03/15/23   POCT glycosylated hemoglobin (Hb A1C)   Result Value Ref Range    Hemoglobin A1C 13.5 %       Plan:     1. Type 2 diabetes mellitus with complication, with long-term current use of insulin ([facility])  A1c has worsened. Adding Novolog 5 units with meals. Toujeo samples provided. Increase to 20 units at night.    - POCT glycosylated hemoglobin (Hb A1C)  - insulin aspart (NOVOLOG FLEXPEN) 100 UNIT/ML injection pen; Inject 5 Units into the skin 3 times daily (before meals)  Dispense: 5 Adjustable Dose Pre-filled Pen Syringe; Refill: 3    2. Injury of right foot, subsequent encounter  MRI of foot due to worsening pain. Will likely need referral to ortho    - MRI FOOT RIGHT WO CONTRAST; Future    3. Right foot pain    - MRI FOOT RIGHT WO CONTRAST; Future    4. Anxiety  Starting  Buspar to use as needed for breakthrough episodes of anxiety and agitation.     - busPIRone (BUSPAR) 5 MG tablet; Take 1 tablet by mouth 3 times daily as needed (anxiety)  Dispense: 90 tablet; Refill: 0      Return in about 2 months (around 5/15/2023) for Diabetic Follow up.     Orders Placed This Encounter   Procedures    MRI FOOT RIGHT WO CONTRAST     Standing Status:   Future     Standing Expiration Date:   3/15/2024    POCT glycosylated hemoglobin (Hb A1C)       Orders Placed This Encounter   Medications    busPIRone (BUSPAR) 5 MG tablet     Sig: Take 1 tablet by mouth 3 times daily as needed (anxiety)     Dispense:  90 tablet     Refill:  0    insulin aspart (NOVOLOG FLEXPEN) 100 UNIT/ML injection pen     Sig: Inject 5 Units into the skin 3 times daily (before meals)     Dispense:  5 Adjustable Dose Pre-filled Pen Syringe     Refill:  3              Patient offered educational handouts and has had all questions answered. Patient voices understanding and agrees to plans along with risks and benefits of plan. Patient is instructed to continue prior meds, diet, and exercise plans as instructed. Patient agrees to follow up as instructed and sooner if needed. Patient agrees to go to ER if condition becomes emergent. EMR Dragon/transcription disclaimer: Some of this encounter note is an electronic transcription/translation of spoken language to printed text. The electronic translation of spoken language may permit erroneous, or at times, nonsensical words or phrases to be inadvertently transcribed.  Although I have reviewed the note for such errors, some may still exist.    Electronically signed by MIKE Mary on 3/15/2023 at 12:59 PM

## 2023-03-21 ENCOUNTER — OFFICE VISIT (OUTPATIENT)
Dept: PULMONOLOGY | Age: 58
End: 2023-03-21
Payer: MEDICARE

## 2023-03-21 VITALS
SYSTOLIC BLOOD PRESSURE: 117 MMHG | DIASTOLIC BLOOD PRESSURE: 62 MMHG | HEIGHT: 62 IN | TEMPERATURE: 97.6 F | HEART RATE: 64 BPM | WEIGHT: 190.2 LBS | BODY MASS INDEX: 35 KG/M2 | OXYGEN SATURATION: 96 %

## 2023-03-21 DIAGNOSIS — R06.09 DOE (DYSPNEA ON EXERTION): ICD-10-CM

## 2023-03-21 DIAGNOSIS — E66.9 OBESITY (BMI 30-39.9): ICD-10-CM

## 2023-03-21 DIAGNOSIS — G47.33 OSA (OBSTRUCTIVE SLEEP APNEA): Primary | ICD-10-CM

## 2023-03-21 DIAGNOSIS — J98.6 DIAPHRAGMATIC DISORDER: ICD-10-CM

## 2023-03-21 DIAGNOSIS — G47.10 HYPERSOMNIA: ICD-10-CM

## 2023-03-21 PROCEDURE — G8427 DOCREV CUR MEDS BY ELIG CLIN: HCPCS | Performed by: INTERNAL MEDICINE

## 2023-03-21 PROCEDURE — 3017F COLORECTAL CA SCREEN DOC REV: CPT | Performed by: INTERNAL MEDICINE

## 2023-03-21 PROCEDURE — G8484 FLU IMMUNIZE NO ADMIN: HCPCS | Performed by: INTERNAL MEDICINE

## 2023-03-21 PROCEDURE — 1036F TOBACCO NON-USER: CPT | Performed by: INTERNAL MEDICINE

## 2023-03-21 PROCEDURE — 99214 OFFICE O/P EST MOD 30 MIN: CPT | Performed by: INTERNAL MEDICINE

## 2023-03-21 PROCEDURE — 3078F DIAST BP <80 MM HG: CPT | Performed by: INTERNAL MEDICINE

## 2023-03-21 PROCEDURE — 3074F SYST BP LT 130 MM HG: CPT | Performed by: INTERNAL MEDICINE

## 2023-03-21 PROCEDURE — G8417 CALC BMI ABV UP PARAM F/U: HCPCS | Performed by: INTERNAL MEDICINE

## 2023-03-21 ASSESSMENT — ENCOUNTER SYMPTOMS
ABDOMINAL DISTENTION: 0
SHORTNESS OF BREATH: 1
COUGH: 0
ABDOMINAL PAIN: 0
ANAL BLEEDING: 0
BACK PAIN: 0
RHINORRHEA: 0
APNEA: 1
CHEST TIGHTNESS: 0
WHEEZING: 0

## 2023-03-21 NOTE — PROGRESS NOTES
apnea and shortness of breath. Negative for cough, chest tightness and wheezing. Gastrointestinal:  Negative for abdominal distention, abdominal pain and anal bleeding. Endocrine: Negative for cold intolerance, heat intolerance and polydipsia. Genitourinary:  Negative for difficulty urinating, dysuria, enuresis and flank pain. Musculoskeletal:  Negative for arthralgias, back pain and gait problem. Allergic/Immunologic: Negative for environmental allergies. Neurological:  Negative for dizziness, facial asymmetry, light-headedness and headaches. Vitals:    03/21/23 1426   BP: 117/62   Pulse: 64   Temp: 97.6 °F (36.4 °C)   SpO2: 96%     BMI Readings from Last 1 Encounters:   03/21/23 34.79 kg/m²         Physical Exam  Vitals reviewed. Constitutional:       Appearance: Normal appearance. She is obese. HENT:      Head: Normocephalic and atraumatic. Nose: Nose normal.   Eyes:      Extraocular Movements: Extraocular movements intact. Conjunctiva/sclera: Conjunctivae normal.   Cardiovascular:      Rate and Rhythm: Normal rate and regular rhythm. Heart sounds: No murmur heard. No friction rub. Pulmonary:      Effort: Pulmonary effort is normal. No respiratory distress. Breath sounds: Normal breath sounds. No stridor. No wheezing, rhonchi or rales. Abdominal:      General: There is no distension. Palpations: There is no mass. Tenderness: There is no abdominal tenderness. There is no guarding or rebound. Musculoskeletal:      Cervical back: Normal range of motion and neck supple. Neurological:      Mental Status: She is alert and oriented to person, place, and time. This note was generated using a voice recognition software. Errors in voice recognition may have occurred. An electronic signature was used to authenticate this note.     --Sherif Skelton MD

## 2023-03-30 ENCOUNTER — HOSPITAL ENCOUNTER (OUTPATIENT)
Dept: MRI IMAGING | Age: 58
Discharge: HOME | End: 2023-03-30
Payer: MEDICARE

## 2023-03-30 DIAGNOSIS — S99.921D INJURY OF RIGHT FOOT, SUBSEQUENT ENCOUNTER: ICD-10-CM

## 2023-03-30 DIAGNOSIS — M79.671 RIGHT FOOT PAIN: ICD-10-CM

## 2023-03-30 PROCEDURE — 73718 MRI LOWER EXTREMITY W/O DYE: CPT

## 2023-04-03 ENCOUNTER — TELEPHONE (OUTPATIENT)
Dept: FAMILY MEDICINE CLINIC | Age: 58
End: 2023-04-03

## 2023-04-03 DIAGNOSIS — M79.671 RIGHT FOOT PAIN: ICD-10-CM

## 2023-04-03 DIAGNOSIS — R93.89 ABNORMAL MRI: Primary | ICD-10-CM

## 2023-04-03 DIAGNOSIS — E11.8 TYPE 2 DIABETES MELLITUS WITH COMPLICATION, WITH LONG-TERM CURRENT USE OF INSULIN (HCC): ICD-10-CM

## 2023-04-03 DIAGNOSIS — E11.43 GASTROPARESIS DUE TO DM (HCC): ICD-10-CM

## 2023-04-03 DIAGNOSIS — K31.84 GASTROPARESIS DUE TO DM (HCC): ICD-10-CM

## 2023-04-03 DIAGNOSIS — I10 ESSENTIAL HYPERTENSION: ICD-10-CM

## 2023-04-03 DIAGNOSIS — Z79.4 TYPE 2 DIABETES MELLITUS WITH COMPLICATION, WITH LONG-TERM CURRENT USE OF INSULIN (HCC): ICD-10-CM

## 2023-04-03 DIAGNOSIS — F41.9 ANXIETY: ICD-10-CM

## 2023-04-03 DIAGNOSIS — N32.89 BLADDER SPASM: ICD-10-CM

## 2023-04-03 DIAGNOSIS — S99.921D INJURY OF RIGHT FOOT, SUBSEQUENT ENCOUNTER: ICD-10-CM

## 2023-04-03 DIAGNOSIS — K59.04 CHRONIC IDIOPATHIC CONSTIPATION: ICD-10-CM

## 2023-04-03 NOTE — TELEPHONE ENCOUNTER
----- Message from MIKE Chicas sent at 4/3/2023 12:35 PM CDT -----  Please call patient and let them know results. MRI shows edema of the fourth metatarsal questionable stress fracture.   Continue to wear boot and refer to Dr. Fausto Saravia with orthopedics

## 2023-04-03 NOTE — TELEPHONE ENCOUNTER
Called patient, spoke with: Patient regarding the results of the patients most recent results. I advised Patient of Surendra Gomez recommendations.    Patient did voice understanding

## 2023-04-03 NOTE — TELEPHONE ENCOUNTER
Received call/My Chart Message from patient requesting refill on medication(s). Pt was last seen in office on 3/15/2023  and has a follow up scheduled for 2023. Will send request to provider for authorization.      Requested Prescriptions     Pending Prescriptions Disp Refills    amitriptyline (ELAVIL) 10 MG tablet 30 tablet 5     Sig: Take 1 tablet by mouth nightly    empagliflozin (JARDIANCE) 25 MG tablet 30 tablet 5     Sig: Take 1 tablet by mouth daily    docusate sodium (COLACE) 100 MG capsule 30 capsule 1     Sig: Take 1 capsule by mouth daily as needed for Constipation    furosemide (LASIX) 40 MG tablet 30 tablet 5     Sig: Take 1 tablet by mouth daily    glipiZIDE (GLUCOTROL XL) 10 MG extended release tablet 30 tablet 3     Sig: Take 1 tablet by mouth daily    lovastatin (MEVACOR) 40 MG tablet 30 tablet 4     Sig: Take one tablet at bedtime    metFORMIN (GLUCOPHAGE-XR) 500 MG extended release tablet 60 tablet 5     Sig: Take one tablet by mouth 2 times daily    metoprolol tartrate (LOPRESSOR) 25 MG tablet 60 tablet 5     Sig: Take one tablet by mouth twice daily    nitroGLYCERIN (NITROSTAT) 0.4 MG SL tablet 25 tablet 3     Sig: Place 1 tablet under the tongue every 5 minutes as needed for Chest pain    ondansetron (ZOFRAN ODT) 4 MG disintegrating tablet 30 tablet 5     Sig: Take 1 tablet by mouth daily as needed for Nausea or Vomiting    tamsulosin (FLOMAX) 0.4 MG capsule 30 capsule 5     Sig: Take 1 capsule by mouth daily    valsartan (DIOVAN) 80 MG tablet 30 tablet 5     Sig: Take 1 tablet by mouth daily    vitamin D (ERGOCALCIFEROL) 1.25 MG (13734 UT) CAPS capsule 12 capsule 1     Sig: Take 1 capsule by mouth once a week    fluticasone (FLONASE) 50 MCG/ACT nasal spray 16 g 3     Si sprays by Nasal route daily    oxybutynin (DITROPAN-XL) 10 MG extended release tablet 30 tablet 2     Sig: Take 1 tablet by mouth daily    busPIRone (BUSPAR) 5 MG tablet 90 tablet 0     Sig: Take 1 tablet by mouth 3

## 2023-04-04 RX ORDER — NITROGLYCERIN 0.4 MG/1
0.4 TABLET SUBLINGUAL EVERY 5 MIN PRN
Qty: 25 TABLET | Refills: 3 | Status: SHIPPED | OUTPATIENT
Start: 2023-04-04

## 2023-04-04 RX ORDER — METFORMIN HYDROCHLORIDE 500 MG/1
TABLET, EXTENDED RELEASE ORAL
Qty: 60 TABLET | Refills: 5 | Status: SHIPPED | OUTPATIENT
Start: 2023-04-04

## 2023-04-04 RX ORDER — FUROSEMIDE 40 MG/1
40 TABLET ORAL DAILY
Qty: 30 TABLET | Refills: 5 | Status: SHIPPED | OUTPATIENT
Start: 2023-04-04

## 2023-04-04 RX ORDER — BUSPIRONE HYDROCHLORIDE 5 MG/1
5 TABLET ORAL 3 TIMES DAILY PRN
Qty: 90 TABLET | Refills: 0 | Status: SHIPPED | OUTPATIENT
Start: 2023-04-04 | End: 2023-05-04

## 2023-04-04 RX ORDER — TAMSULOSIN HYDROCHLORIDE 0.4 MG/1
0.4 CAPSULE ORAL DAILY
Qty: 30 CAPSULE | Refills: 5 | Status: SHIPPED | OUTPATIENT
Start: 2023-04-04

## 2023-04-04 RX ORDER — ONDANSETRON 4 MG/1
4 TABLET, ORALLY DISINTEGRATING ORAL DAILY PRN
Qty: 30 TABLET | Refills: 5 | Status: SHIPPED | OUTPATIENT
Start: 2023-04-04

## 2023-04-04 RX ORDER — AMITRIPTYLINE HYDROCHLORIDE 10 MG/1
10 TABLET, FILM COATED ORAL NIGHTLY
Qty: 30 TABLET | Refills: 5 | Status: SHIPPED | OUTPATIENT
Start: 2023-04-04

## 2023-04-04 RX ORDER — OXYBUTYNIN CHLORIDE 10 MG/1
10 TABLET, EXTENDED RELEASE ORAL DAILY
Qty: 30 TABLET | Refills: 2 | Status: SHIPPED | OUTPATIENT
Start: 2023-04-04

## 2023-04-04 RX ORDER — ERGOCALCIFEROL 1.25 MG/1
50000 CAPSULE ORAL WEEKLY
Qty: 12 CAPSULE | Refills: 1 | Status: SHIPPED | OUTPATIENT
Start: 2023-04-04

## 2023-04-04 RX ORDER — LOVASTATIN 40 MG/1
TABLET ORAL
Qty: 30 TABLET | Refills: 4 | Status: SHIPPED | OUTPATIENT
Start: 2023-04-04

## 2023-04-04 RX ORDER — FLUTICASONE PROPIONATE 50 MCG
2 SPRAY, SUSPENSION (ML) NASAL DAILY
Qty: 16 G | Refills: 3 | Status: SHIPPED | OUTPATIENT
Start: 2023-04-04

## 2023-04-04 RX ORDER — VALSARTAN 80 MG/1
80 TABLET ORAL DAILY
Qty: 30 TABLET | Refills: 5 | Status: SHIPPED | OUTPATIENT
Start: 2023-04-04

## 2023-04-04 RX ORDER — DOCUSATE SODIUM 100 MG/1
100 CAPSULE, LIQUID FILLED ORAL DAILY PRN
Qty: 30 CAPSULE | Refills: 1 | Status: SHIPPED | OUTPATIENT
Start: 2023-04-04

## 2023-04-04 RX ORDER — GLIPIZIDE 10 MG/1
10 TABLET, FILM COATED, EXTENDED RELEASE ORAL DAILY
Qty: 30 TABLET | Refills: 3 | Status: SHIPPED | OUTPATIENT
Start: 2023-04-04

## 2023-05-12 ENCOUNTER — OFFICE VISIT (OUTPATIENT)
Dept: FAMILY MEDICINE CLINIC | Age: 58
End: 2023-05-12
Payer: MEDICARE

## 2023-05-12 VITALS
OXYGEN SATURATION: 99 % | HEIGHT: 62 IN | SYSTOLIC BLOOD PRESSURE: 126 MMHG | DIASTOLIC BLOOD PRESSURE: 78 MMHG | WEIGHT: 195 LBS | HEART RATE: 72 BPM | BODY MASS INDEX: 35.88 KG/M2

## 2023-05-12 DIAGNOSIS — I10 ESSENTIAL HYPERTENSION: ICD-10-CM

## 2023-05-12 DIAGNOSIS — Z79.4 TYPE 2 DIABETES MELLITUS WITH COMPLICATION, WITH LONG-TERM CURRENT USE OF INSULIN (HCC): Primary | ICD-10-CM

## 2023-05-12 DIAGNOSIS — E11.8 TYPE 2 DIABETES MELLITUS WITH COMPLICATION, WITH LONG-TERM CURRENT USE OF INSULIN (HCC): Primary | ICD-10-CM

## 2023-05-12 DIAGNOSIS — S99.921D INJURY OF RIGHT FOOT, SUBSEQUENT ENCOUNTER: ICD-10-CM

## 2023-05-12 LAB — HBA1C MFR BLD: 9.5 %

## 2023-05-12 PROCEDURE — 3017F COLORECTAL CA SCREEN DOC REV: CPT | Performed by: NURSE PRACTITIONER

## 2023-05-12 PROCEDURE — 3074F SYST BP LT 130 MM HG: CPT | Performed by: NURSE PRACTITIONER

## 2023-05-12 PROCEDURE — G8427 DOCREV CUR MEDS BY ELIG CLIN: HCPCS | Performed by: NURSE PRACTITIONER

## 2023-05-12 PROCEDURE — 3078F DIAST BP <80 MM HG: CPT | Performed by: NURSE PRACTITIONER

## 2023-05-12 PROCEDURE — 99214 OFFICE O/P EST MOD 30 MIN: CPT | Performed by: NURSE PRACTITIONER

## 2023-05-12 PROCEDURE — 3046F HEMOGLOBIN A1C LEVEL >9.0%: CPT | Performed by: NURSE PRACTITIONER

## 2023-05-12 PROCEDURE — 83036 HEMOGLOBIN GLYCOSYLATED A1C: CPT | Performed by: NURSE PRACTITIONER

## 2023-05-12 PROCEDURE — 1036F TOBACCO NON-USER: CPT | Performed by: NURSE PRACTITIONER

## 2023-05-12 PROCEDURE — G8417 CALC BMI ABV UP PARAM F/U: HCPCS | Performed by: NURSE PRACTITIONER

## 2023-05-12 PROCEDURE — 2022F DILAT RTA XM EVC RTNOPTHY: CPT | Performed by: NURSE PRACTITIONER

## 2023-05-12 RX ORDER — BLOOD-GLUCOSE SENSOR
EACH MISCELLANEOUS
Qty: 4 EACH | Refills: 5 | Status: SHIPPED | OUTPATIENT
Start: 2023-05-12

## 2023-05-12 RX ORDER — BLOOD-GLUCOSE,RECEIVER,CONT
EACH MISCELLANEOUS
Qty: 1 EACH | Refills: 0 | Status: SHIPPED | OUTPATIENT
Start: 2023-05-12

## 2023-05-12 RX ORDER — GABAPENTIN 100 MG/1
100 CAPSULE ORAL NIGHTLY
COMMUNITY

## 2023-05-12 ASSESSMENT — ENCOUNTER SYMPTOMS
EYES NEGATIVE: 1
RESPIRATORY NEGATIVE: 1
GASTROINTESTINAL NEGATIVE: 1

## 2023-05-12 NOTE — PROGRESS NOTES
[facility] PHYSICIAN SERVICES  [facility]  [address] [address]  Dept: [phone]  Dept Fax: [phone]  Loc: [phone]    Olena Barakat is a 59 y.o. female who presents today for her medical conditions/complaints as noted below. Olena Barakat is c/o of Follow-up Chronic Condition      Chief Complaint   Patient presents with    Follow-up Chronic Condition       HPI:     HPI  Patient presents today for routine follow up of chronic conditions including HTN and DM. She has seen ortho and they prescribed gabapentin and are requesting her to do PT for her foot. Patient has been taking her meds as prescribed. She reports that her glucose readings have improved since last visit. We did start Novolog and Toujeo from last visit. Patient is checking blood glucose 4 times per day if not 5.       Past Medical History:   Diagnosis Date    Abnormal Pap smear of cervix     Arthritis     CAD (coronary artery disease)     stent 2011 Dean Foot; sees [facility] cardiology    Cervical [facility] ([facility] [address]     Class 3 severe obesity due to excess calories without serious comorbidity with body mass index (BMI) of 40.0 to 44.9 in adult [facility]) 07/28/2020    Diabetes mellitus ([facility] [address]     ERRONEOUS ENCOUNTER--DISREGARD 09/15/2015    GERD (gastroesophageal reflux disease)     Headache(784.0)     Heart attack ([facility] [address] 12/2011    Hyperlipidemia     Hypertension     Lichen simplex chronicus 10/2015    Osteopenia     Pneumonia     Type II or unspecified type diabetes mellitus without mention of complication, not stated as uncontrolled         Past Surgical History:   Procedure Laterality Date    BREAST BIOPSY Right 06/08/2011    US guided core needle, right, benign    CARDIAC CATHETERIZATION  12/22/14  JDT    EF 50%    CARDIAC CATHETERIZATION  06/05/2017        CHG FLUOROSCOPIC GUIDANCE NEEDLE PLACEMENT ADD ON Left 07/19/2018    CORTICOSTEROID INJECTION performed by Dottie Amin MD at [address]

## 2023-06-19 ENCOUNTER — TELEPHONE (OUTPATIENT)
Dept: FAMILY MEDICINE CLINIC | Age: 58
End: 2023-06-19

## 2023-06-19 NOTE — TELEPHONE ENCOUNTER
[facility] called stating Mariella Lugo missed a few spots on paperwork for pt Dexcom g7.      On question for an option needs checked off and inial and date they will send back     They also request last OV note

## 2023-08-10 NOTE — PROGRESS NOTES
[address] [place], [address] [address]  Phone: [phone]  Fax: [phone]    OFFICE VISIT:  2023    Meliton Sales - : 1965    Reason For Visit:  Jose Nash is a 59 y.o. female who is here for 1 Year Follow Up (No cardiac symptoms), Coronary Artery Disease, Hypertension, and Congestive Heart Failure  CAD with CABG ×3 in , hypertension, hyperlipidemia and diabetes . Heart catheterization in  showed occluded LIMA to the LAD with patient and grafts. 19  Cath  Occluded LIMA-LAD at the anastomosis to the LAD itself, patent VG-diag, patent VG-PDA, apical akinesis, EF 45%  Recommend continuing ongoing medical management     2D echo 3/4/2021 shows normal LV size and function with EF 55 to 60%. No significant valvular disease noted  At previous visit in  patient complained of fatigue and daytime somnolence. Sent for sleep study/evaluation. Was found to have JAYSON and wearing her CPAP  and tring ot adjust    She is having mouth pain with bad teeth. She needs to get them pulled but it is difficult to find somebody that will do it. 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 She is under stress with  lost his job. Has had to take nitro a few times in the last   recently took a nitro for angina and relived with one dose       Subjective  Jose Nash denies exertional chest pain, shortness of breath, orthopnea, paroxysmal nocturnal dyspnea, syncope, presyncope, arrhythmia, edema and fatigue. The patient denies numbness or weakness to suggest cerebrovascular accident or transient ischemic attack.  File, APRN is PCP and follows labs .   Meliton Sales has the following history as recorded in [facility]:    Patient Active Problem List    Diagnosis Date Noted    JAYSON (obstructive sleep apnea) 2022    Hypersomnia 2022    Non-restorative sleep 2022    Obesity (BMI 30-39.9) 07/07/2022    Diaphragmatic disorder 07/07/2022    Restrictive lung disease 07/07/2022    Diarrhea 06/09/2022    Constipation 06/09/2022    Right sided abdominal pain 06/09/2022    Left sided abdominal pain 06/09/2022    Gastroparesis due to DM ([address] 04/26/2022    Chronic diastolic congestive heart failure ([address] 02/24/2021    Morbid (severe) obesity due to excess calories (HCC) 07/28/2020    Chronic nausea 06/05/2020    Chronic vomiting 06/05/2020    Change in bowel habits 06/05/2020    Family history of colon cancer 06/05/2020    Alternating constipation and diarrhea 06/05/2020    S/P cholecystectomy 06/05/2020    Chronic heartburn 06/05/2020    Gastroesophageal reflux disease without esophagitis 08/06/2018    Dysuria 08/06/2018    Vitamin D deficiency 08/06/2018    Urinary tract infection without hematuria 08/06/2018    Adhesive capsulitis of left shoulder 07/18/2018    Angina effort 01/23/2018    Coronary artery disease involving coronary bypass graft of native heart without angina pectoris 09/05/2017    Left ventricular dysfunction 09/05/2017    Abnormal nuclear cardiac imaging test     Lichen simplex chronicus 10/21/2015    Personal history of malignant neoplasm of cervix uteri 05/11/2015    Atrophic vaginitis 05/11/2015    History of pleural effusion 04/29/2015    Candidal intertrigo 02/03/2015    Weakness 02/03/2015    S/P CABG x 3 02/03/2015    TAVERAS (dyspnea on exertion) 12/01/2014    Type 2 diabetes mellitus with complication, with long-term current use of insulin ([address]     CAD (coronary artery disease) 07/30/2013    Essential hypertension 07/30/2013    Hyperlipidemia 07/30/2013    Heart attack ([address] 12/01/2011     Past Medical History:   Diagnosis Date    Abnormal Pap smear of cervix     Arthritis     CAD (coronary artery disease)     stent 2011 [facility] cardiology    Cervical cancer ([address]     Class 3 severe obesity due to excess calories without serious comorbidity with body mass index (BMI) of 40.0 to 44.9 in adult [facility]) 07/28/2020    Diabetes mellitus ([facility]     ERRONEOUS ENCOUNTER--DISREGARD 09/15/2015    GERD (gastroesophageal reflux disease)     Headache(784.0)     Heart attack ([facility] 12/2011    Hyperlipidemia     Hypertension     Lichen simplex chronicus 10/2015    Osteopenia     Pneumonia     Type II or unspecified type diabetes mellitus without mention of complication, not stated as uncontrolled      Past Surgical History:   Procedure Laterality Date    BREAST BIOPSY Right 06/08/2011    US guided core needle, right, benign    CARDIAC CATHETERIZATION  12/22/14  JDT    EF 50%    CARDIAC CATHETERIZATION  06/05/2017        G FLUOROSCOPIC GUIDANCE NEEDLE PLACEMENT ADD ON Left 07/19/2018    CORTICOSTEROID INJECTION performed by Merissa Carney MD at [address]  2006    COLONOSCOPY  approx 2005    Dr. Yousif Cain per pt recall \" Normal\"    CORONARY ANGIOPLASTY WITH STENT PLACEMENT  12/2011    SABA to [address] Left 11/08/2019    LEFT INDEX AND LONG TRIGGER FINGER RELEASAE performed by Bea Mi MD at [facility] (CERVIX STATUS UNKNOWN)  1995    OTHER SURGICAL HISTORY      fibrinolysis    WA CORONARY ARTERY BYP W/VEIN & ARTERY GRAFT 3 VEIN  12/23/2014    Coronary Artery Bypass, 3    WA MNPJ W/ANES SHOULDER JT APPL FIXATION APPARATUS Left 07/19/2018    SHOULDER MANIPULATION UNDER ANESTHESIA performed by Merissa Carney MD at [facility] (CERVIX REMOVED) Bilateral 1995    age 27    THORACOTOMY      UPPER GASTROINTESTINAL ENDOSCOPY  approx 2005    per pt recall \" Normal\"    UPPER GASTROINTESTINAL ENDOSCOPY N/A 06/19/2020    Dr Fouzia Blackman exam, neg EoE    VULVAR/PERINEAL BIOPSY  10/07/2015    Dr. Sherry Ken    VULVAR/PERINEAL BIOPSY N/A 07/02/2021    VULVAR BIOPSY performed by Sidney Yuan MD at [facility] OR     Family History   Problem Relation Age of Onset    Diabetes Father     Cancer Father     High Blood Pressure Father     Heart Attack Father     Colon Cancer Father     Diabetes Mother     Breast Cancer Mother 47    Heart Failure Mother         CHF, MVP    Liver Cancer Mother     Heart Failure Maternal Grandfather     Stroke Maternal Grandfather     Pancreatic Cancer Maternal Grandfather     Stomach Cancer Maternal Grandfather     Diabetes Brother     Colon Cancer Paternal Uncle     Rectal Cancer Other     Breast Cancer Maternal Grandmother 78    Colon Polyps Neg Hx     Crohn's Disease Neg Hx     Irritable Bowel Syndrome Neg Hx     Liver Disease Neg Hx      Social History     Tobacco Use    Smoking status: Former     Packs/day: 2.00     Years: 15.00     Pack years: 30.00     Types: Cigarettes     Quit date: 2014     Years since quittin.6    Smokeless tobacco: Never   Substance Use Topics    Alcohol use: No      Current Outpatient Medications   Medication Sig Dispense Refill    ranolazine (RANEXA) 1000 MG extended release tablet Take 1 tablet by mouth 2 times daily 180 tablet 3    oxybutynin (DITROPAN-XL) 10 MG extended release tablet TAKE 1 TABLET BY MOUTH DAILY 30 tablet 2    fluticasone (FLONASE) 50 MCG/ACT nasal spray 2 sprays by Nasal route daily 16 g 3    vitamin D (ERGOCALCIFEROL) 1.25 MG (41945 UT) CAPS capsule Take 1 capsule by mouth once a week 12 capsule 1    valsartan (DIOVAN) 80 MG tablet Take 1 tablet by mouth daily 30 tablet 5    tamsulosin (FLOMAX) 0.4 MG capsule Take 1 capsule by mouth daily 30 capsule 5    ondansetron (ZOFRAN ODT) 4 MG disintegrating tablet Take 1 tablet by mouth daily as needed for Nausea or Vomiting 30 tablet 5    nitroGLYCERIN (NITROSTAT) 0.4 MG SL tablet Place 1 tablet under the tongue every 5 minutes as needed for Chest pain 25 tablet 3    metoprolol tartrate (LOPRESSOR) 25 MG tablet Take one tablet by mouth twice daily 60 tablet 5    metFORMIN (GLUCOPHAGE-XR) 500 MG extended release tablet Take one tablet by mouth 2 times daily 60 tablet 5    lovastatin (MEVACOR) 40 MG tablet Take one tablet at bedtime 30 tablet 4    glipiZIDE (GLUCOTROL XL) 10 MG extended release tablet Take 1 tablet by mouth daily 30 tablet 3    furosemide (LASIX) 40 MG tablet Take 1 tablet by mouth daily 30 tablet 5    docusate sodium (COLACE) 100 MG capsule Take 1 capsule by mouth daily as needed for Constipation 30 capsule 1    empagliflozin (JARDIANCE) 25 MG tablet Take 25 mg by mouth daily      amitriptyline (ELAVIL) 10 MG tablet Take 1 tablet by mouth nightly 30 tablet 5    nystatin (MYCOSTATIN) 587310 UNIT/GM powder APPLY 2 OR 3 TIMES DAILY. (Patient taking differently: Apply topically as needed APPLY 2 OR 3 TIMES DAILY. ) 60 g 5    albuterol (PROVENTIL) (5 MG/ML) 0.5% nebulizer solution Take 0.5 mLs by nebulization every 6 hours as needed for Wheezing 120 each 0    diclofenac sodium (VOLTAREN) 1 % GEL Apply 2 g topically 4 times daily (Patient taking differently: Apply 2 g topically 4 times daily as needed for Pain) 2 Tube 1    aspirin 81 MG tablet Take 81 mg by mouth daily      mometasone (ELOCON) 0.1 % ointment Apply topically to vagina 3 times per week (Patient taking differently: Apply topically as needed Apply topically to vagina 3 times per week) 1 Tube 3    Eflornithine HCl (VANIQA) 13.9 % CREA Apply 1 Dose topically 2 times daily (Patient taking differently: Apply 1 Dose topically 2 times daily as needed) 45 g 2    Dulaglutide 0.75 MG/0.5ML SOPN Inject 0.75 mg into the skin once a week (Patient not taking: No sig reported) 12 pen 2    Diabetic Shoe MISC by Does not apply route (Patient not taking: No sig reported) 1 each 0     Current Facility-Administered Medications   Medication Dose Route Frequency Provider Last Rate Last Admin    bupivacaine (MARCAINE) 0.25 % injection 12.5 mg  5 mL IntraDERmal Once MIKE Casiano         Allergies: Codeine, Motrin [ibuprofen], Humalog [insulin lispro], and Ozempic (0.25 or 0.5 mg-dose) [semaglutide(0.25 or 0.5mg-dos)]    Review of Systems  Constitutional - no significant activity change, appetite change, or unexpected weight change. No fever, chills or diaphoresis. + fatigue. HEENT - no significant rhinorrhea or epistaxis. No tinnitus or significant hearing loss. Eyes - no sudden vision change or amaurosis. Respiratory - no significant wheezing, stridor, apnea or cough. + dyspnea on exertion no resting shortness of breath. Cardiovascular - +exertional chest pain, no orthopnea or PND. No sensation of arrhythmia or slow heart rate. No claudication or leg edema. Gastrointestinal - no abdominal swelling or pain. No blood in stool. No severe constipation, diarrhea, nausea, or vomiting. Genitourinary - no difficulty urinating, dysuria, frequency, or urgency. No flank pain or hematuria. Musculoskeletal - no back pain, gait disturbance, or myalgia. Skin - no color change or rash. No pallor. No new surgical incision. Neurologic - no speech difficulty, facial asymmetry or lateralizing weakness. No seizures, presyncope, syncope, or significant dizziness. Hematologic - no easy bruising or excessive bleeding. Psychiatric - no severe anxiety or insomnia. No confusion. All other review of systems are negative. Objective  Vital Signs - /88   Pulse 67   Ht 5' 2\" (1.575 m)   Wt 194 lb (88 kg)   LMP 01/01/1998 (Approximate)   SpO2 98%   BMI 35.48 kg/m²   General - Bautista Fierro is alert, cooperative, and pleasant. Well groomed. No acute distress. Body habitus is obese. HEENT - The head is normocephalic. No circumoral cyanosis. Dentition-multiple missing and broken teeth  EYES -  No Xanthelasma, no arcus senilis, no conjunctival hemorrhages or discharge. Neck - Supple, without increased jugular venous pressures. No carotid bruits. No mass. Respiratory - Lungs are clear bilaterally. No wheezes or rales.   Normal effort without use of accessory muscles. Cardiovascular - Heart has regular rhythm and rate. No murmurs, rubs or gallops. + pedal pulses and no varicosities. Abdominal -  Soft, nontender, nondistended. Bowel sounds are intact. Extremities - No clubbing, cyanosis, or  edema. Musculoskeletal -  No clubbing . No Osler's nodes. Gait normal .  No kyphosis or scoliosis. Skin -  no statis ulcers or dermatitis. Neurological - No focal signs are identified. Oriented to person, place and time. Psychiatric -  Appropriate affect and mood. Assessment:     Diagnosis Orders   1. Coronary artery disease involving native coronary artery of native heart without angina pectoris  NM MYOCARDIAL SPECT REST EXERCISE OR RX      2. Chronic diastolic congestive heart failure ([address]        3. Essential hypertension        4. Mixed hyperlipidemia        5. Coronary artery disease of native artery of native heart with stable angina pectoris (HCC)  NM MYOCARDIAL SPECT REST EXERCISE OR RX        Data:  BP Readings from Last 3 Encounters:   02/09/23 128/88   12/21/22 122/80   12/13/22 118/70    Pulse Readings from Last 3 Encounters:   02/09/23 67   12/21/22 76   12/13/22 68        Wt Readings from Last 3 Encounters:   02/09/23 194 lb (88 kg)   12/21/22 194 lb (88 kg)   12/13/22 194 lb (88 kg)       Blood pressure and heart rate are controlled. Medical management includes beta-blocker, ARB and Ranexa. Also on aspirin and statin    Patient had an increase in her exertional and stress related angina. She is required nitroglycerin off and on for the last few months. She is done well with Ranexa. We will try uptitrating that to 1000 mg twice a day. Previously isosorbide did not give her much improvement    Its been since 2019 since we did any kind of assessment for ischemia. We will get her set up for nuclear stress test    Undoubtedly with the stressors she has been under this has made her symptoms worse.   She does not have coverage for her medications. Despite normal job loss of her  [employer] has been unable to help them    We discussed the importance of keeping her blood pressure and her cholesterol and her sugar well controlled. Samples of Jardiance provided    She has been diagnosed with JAYSON. She has her CPAP and is trying to adjust to using it. Additionally she has significant oral pain due to broken and infected teeth and gums. 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 Successful femoral artery ultrasound  2. Successful femoral artery arteriogram  3. Supervision of the administration of moderate conscious sedation  4. Double  coronary artery disease  5. Left ventricular function is impaired in the inferior apical segment with a visually estimate ejection of 45%. 6.  Patent left CONSUELO to the LAD which is occluded at the CONSUELO - LAD anastomosis. 7.  Patent right CONSUELO, un grafted  8. Patent vein graft to the diagonal with retrograde filling of the LAD itself  9. Patent vein graft to the PDA        RECOMMENDATIONS:     1. Risk Factor Modification  2. On-going Medical Therapy  Electronically signed by Denise Munroe MD on 12/30/19      States taking medications as prescribed  30 minutes were spent preparing, reviewing and seeing patient. All questions answered    Plan    Increase  your Ranexa to 1000mg twice a day to see if that helps your exertional angina  Lexiscan soon   Maintain good blood pressure control-goal<130/80 at rest  Maintain good cholesterol control LDL goal<70 with arterial disease  If you are diabetic work to keep/obtain hemoglobin A1c< 7  Follow up after testing  Call with any questions or concerns  Follow up with MIKE Ashraf for non cardiac problems  Report any new problems  Cardiovascular Fitness-Exercise as tolerated.   Strive for 30 minutes of exercise most days of the week. Cardiac / Healthy Diet- Avoid processed high fat foods, maintain low sodium/salt   Continue current medications as directed  Continue plan of treatment  It is always recommended that you bring your medications bottles with you to each visit - this is for your safety! MIKE Azevedo    EMR dragon/transcription disclaimer: Much of this encounter note is electronic transcription/translation of spoken language to printed tach. Electronic translation of spoken language may be erroneous, or at times, nonsensical words or phrases may be inadvertently transcribed.  Although, I have reviewed the note for such errors, some may still exist. Spironolactone Counseling: Patient advised regarding risks of diarrhea, abdominal pain, hyperkalemia, birth defects (for female patients), liver toxicity and renal toxicity. The patient may need blood work to monitor liver and kidney function and potassium levels while on therapy. The patient verbalized understanding of the proper use and possible adverse effects of spironolactone.  All of the patient's questions and concerns were addressed.

## 2023-09-02 SDOH — ECONOMIC STABILITY: TRANSPORTATION INSECURITY
IN THE PAST 12 MONTHS, HAS LACK OF TRANSPORTATION KEPT YOU FROM MEETINGS, WORK, OR FROM GETTING THINGS NEEDED FOR DAILY LIVING?: NO

## 2023-09-02 SDOH — HEALTH STABILITY: PHYSICAL HEALTH: ON AVERAGE, HOW MANY MINUTES DO YOU ENGAGE IN EXERCISE AT THIS LEVEL?: 0 MIN

## 2023-09-02 SDOH — HEALTH STABILITY: PHYSICAL HEALTH: ON AVERAGE, HOW MANY DAYS PER WEEK DO YOU ENGAGE IN MODERATE TO STRENUOUS EXERCISE (LIKE A BRISK WALK)?: 0 DAYS

## 2023-09-02 SDOH — ECONOMIC STABILITY: FOOD INSECURITY: WITHIN THE PAST 12 MONTHS, THE FOOD YOU BOUGHT JUST DIDN'T LAST AND YOU DIDN'T HAVE MONEY TO GET MORE.: SOMETIMES TRUE

## 2023-09-02 SDOH — ECONOMIC STABILITY: HOUSING INSECURITY
IN THE LAST 12 MONTHS, WAS THERE A TIME WHEN YOU DID NOT HAVE A STEADY PLACE TO SLEEP OR SLEPT IN A SHELTER (INCLUDING NOW)?: NO

## 2023-09-02 SDOH — ECONOMIC STABILITY: INCOME INSECURITY: HOW HARD IS IT FOR YOU TO PAY FOR THE VERY BASICS LIKE FOOD, HOUSING, MEDICAL CARE, AND HEATING?: VERY HARD

## 2023-09-02 SDOH — ECONOMIC STABILITY: FOOD INSECURITY: WITHIN THE PAST 12 MONTHS, YOU WORRIED THAT YOUR FOOD WOULD RUN OUT BEFORE YOU GOT MONEY TO BUY MORE.: SOMETIMES TRUE

## 2023-09-02 ASSESSMENT — PATIENT HEALTH QUESTIONNAIRE - PHQ9
2. FEELING DOWN, DEPRESSED OR HOPELESS: 3
1. LITTLE INTEREST OR PLEASURE IN DOING THINGS: 3
3. TROUBLE FALLING OR STAYING ASLEEP: 3
4. FEELING TIRED OR HAVING LITTLE ENERGY: 3
7. TROUBLE CONCENTRATING ON THINGS, SUCH AS READING THE NEWSPAPER OR WATCHING TELEVISION: 3
SUM OF ALL RESPONSES TO PHQ QUESTIONS 1-9: 21
SUM OF ALL RESPONSES TO PHQ9 QUESTIONS 1 & 2: 6
10. IF YOU CHECKED OFF ANY PROBLEMS, HOW DIFFICULT HAVE THESE PROBLEMS MADE IT FOR YOU TO DO YOUR WORK, TAKE CARE OF THINGS AT HOME, OR GET ALONG WITH OTHER PEOPLE: 3
SUM OF ALL RESPONSES TO PHQ QUESTIONS 1-9: 21
6. FEELING BAD ABOUT YOURSELF - OR THAT YOU ARE A FAILURE OR HAVE LET YOURSELF OR YOUR FAMILY DOWN: 3
8. MOVING OR SPEAKING SO SLOWLY THAT OTHER PEOPLE COULD HAVE NOTICED. OR THE OPPOSITE, BEING SO FIGETY OR RESTLESS THAT YOU HAVE BEEN MOVING AROUND A LOT MORE THAN USUAL: 0
9. THOUGHTS THAT YOU WOULD BE BETTER OFF DEAD, OR OF HURTING YOURSELF: 0
SUM OF ALL RESPONSES TO PHQ QUESTIONS 1-9: 21
5. POOR APPETITE OR OVEREATING: 3
SUM OF ALL RESPONSES TO PHQ QUESTIONS 1-9: 21

## 2023-09-02 ASSESSMENT — LIFESTYLE VARIABLES
HOW MANY STANDARD DRINKS CONTAINING ALCOHOL DO YOU HAVE ON A TYPICAL DAY: PATIENT DOES NOT DRINK
HOW OFTEN DO YOU HAVE SIX OR MORE DRINKS ON ONE OCCASION: 1
HOW OFTEN DO YOU HAVE A DRINK CONTAINING ALCOHOL: NEVER
HOW MANY STANDARD DRINKS CONTAINING ALCOHOL DO YOU HAVE ON A TYPICAL DAY: 0
HOW OFTEN DO YOU HAVE A DRINK CONTAINING ALCOHOL: 1

## 2023-09-05 ENCOUNTER — OFFICE VISIT (OUTPATIENT)
Dept: FAMILY MEDICINE CLINIC | Age: 58
End: 2023-09-05
Payer: MEDICARE

## 2023-09-05 VITALS
DIASTOLIC BLOOD PRESSURE: 72 MMHG | HEIGHT: 62 IN | WEIGHT: 189 LBS | HEART RATE: 70 BPM | OXYGEN SATURATION: 97 % | SYSTOLIC BLOOD PRESSURE: 124 MMHG | TEMPERATURE: 97.9 F | BODY MASS INDEX: 34.78 KG/M2

## 2023-09-05 DIAGNOSIS — E11.8 TYPE 2 DIABETES MELLITUS WITH COMPLICATION, WITH LONG-TERM CURRENT USE OF INSULIN (HCC): ICD-10-CM

## 2023-09-05 DIAGNOSIS — Z00.00 MEDICARE ANNUAL WELLNESS VISIT, SUBSEQUENT: Primary | ICD-10-CM

## 2023-09-05 DIAGNOSIS — Z79.4 TYPE 2 DIABETES MELLITUS WITH COMPLICATION, WITH LONG-TERM CURRENT USE OF INSULIN (HCC): ICD-10-CM

## 2023-09-05 DIAGNOSIS — K04.7 DENTAL ABSCESS: ICD-10-CM

## 2023-09-05 PROCEDURE — 99214 OFFICE O/P EST MOD 30 MIN: CPT | Performed by: NURSE PRACTITIONER

## 2023-09-05 PROCEDURE — G8427 DOCREV CUR MEDS BY ELIG CLIN: HCPCS | Performed by: NURSE PRACTITIONER

## 2023-09-05 PROCEDURE — G8417 CALC BMI ABV UP PARAM F/U: HCPCS | Performed by: NURSE PRACTITIONER

## 2023-09-05 PROCEDURE — 1036F TOBACCO NON-USER: CPT | Performed by: NURSE PRACTITIONER

## 2023-09-05 PROCEDURE — 3017F COLORECTAL CA SCREEN DOC REV: CPT | Performed by: NURSE PRACTITIONER

## 2023-09-05 PROCEDURE — 3046F HEMOGLOBIN A1C LEVEL >9.0%: CPT | Performed by: NURSE PRACTITIONER

## 2023-09-05 PROCEDURE — 2022F DILAT RTA XM EVC RTNOPTHY: CPT | Performed by: NURSE PRACTITIONER

## 2023-09-05 PROCEDURE — 3074F SYST BP LT 130 MM HG: CPT | Performed by: NURSE PRACTITIONER

## 2023-09-05 PROCEDURE — G0439 PPPS, SUBSEQ VISIT: HCPCS | Performed by: NURSE PRACTITIONER

## 2023-09-05 PROCEDURE — 3078F DIAST BP <80 MM HG: CPT | Performed by: NURSE PRACTITIONER

## 2023-09-05 RX ORDER — INSULIN GLARGINE 100 [IU]/ML
15 INJECTION, SOLUTION SUBCUTANEOUS NIGHTLY
Qty: 5 ADJUSTABLE DOSE PRE-FILLED PEN SYRINGE | Refills: 0 | Status: SHIPPED | OUTPATIENT
Start: 2023-09-05

## 2023-09-05 RX ORDER — CLINDAMYCIN HYDROCHLORIDE 300 MG/1
300 CAPSULE ORAL 2 TIMES DAILY
Qty: 20 CAPSULE | Refills: 0 | Status: SHIPPED | OUTPATIENT
Start: 2023-09-05 | End: 2023-09-15

## 2023-09-05 RX ORDER — HYDROCODONE BITARTRATE AND ACETAMINOPHEN 5; 325 MG/1; MG/1
1 TABLET ORAL EVERY 6 HOURS PRN
COMMUNITY

## 2023-09-07 ASSESSMENT — ENCOUNTER SYMPTOMS
EYES NEGATIVE: 1
GASTROINTESTINAL NEGATIVE: 1
RESPIRATORY NEGATIVE: 1

## 2023-09-07 NOTE — PROGRESS NOTES
[facility] PHYSICIAN SERVICES  [facility] [place]  [address] [address]  Dept: [phone]  Dept Fax: [phone]  Loc: [phone]    January Babcock is a 59 y.o. female who presents today for her medical conditions/complaints as noted below. January Babcock is c/o of Medicare AWV      Chief Complaint   Patient presents with    Medicare AWV       HPI:     HPI  Patient is here for 2 separate visits including Medicare Annual Wellness Visit and acute/chronic issues including dental abscess and worsening DM. Patient has been monitoring blood sugars and they have been increasing. Patient also reports having a dental procedure in the next 2 weeks, but is having swelling irritation and dental pain from a broken tooth. She does believe this area is abscess. See attached note for Medicare annual wellness visit. The below review of systems and physical exam applies to both encounters.       Past Medical History:   Diagnosis Date    Abnormal Pap smear of cervix     Arthritis     CAD (coronary artery disease)     stent 2011 Matilda Ace; sees [facility] cardiology    Cervical cancer ([address])     Class 3 severe obesity due to excess calories without serious comorbidity with body mass index (BMI) of 40.0 to 44.9 in adult [facility]) 07/28/2020    Diabetes mellitus ([address])     ERRONEOUS ENCOUNTER--DISREGARD 09/15/2015    GERD (gastroesophageal reflux disease)     Headache(784.0)     Heart attack ([address]) 12/2011    Hyperlipidemia     Hypertension     Lichen simplex chronicus 10/2015    Osteopenia     Pneumonia     Type II or unspecified type diabetes mellitus without mention of complication, not stated as uncontrolled         Past Surgical History:   Procedure Laterality Date    BREAST BIOPSY Right 06/08/2011    US guided core needle, right, benign    CARDIAC CATHETERIZATION  12/22/14  JDT    EF 50%    CARDIAC CATHETERIZATION  06/05/2017    Dr.Talley FARRELL FLUOROSCOPIC GUIDANCE NEEDLE PLACEMENT ADD ON Left 07/19/2018
MIKE Vega   vitamin D (ERGOCALCIFEROL) 1.25 MG (53907 UT) CAPS capsule Take 1 capsule by mouth once a week Yes MIKE Vega   fluticasone (FLONASE) 50 MCG/ACT nasal spray 2 sprays by Nasal route daily Yes MIKE Vega   oxybutynin (DITROPAN-XL) 10 MG extended release tablet Take 1 tablet by mouth daily Yes MIKE Vega   Dulaglutide 0.75 MG/0.5ML SOPN Inject 0.75 mg into the skin once a week Yes MIKE Vega   insulin aspart (NOVOLOG FLEXPEN) 100 UNIT/ML injection pen Inject 5 Units into the skin 3 times daily (before meals) Yes MIKE Vega   ranolazine (RANEXA) 1000 MG extended release tablet Take 1 tablet by mouth 2 times daily Yes MIKE Be   nystatin (MYCOSTATIN) 322699 UNIT/GM powder APPLY 2 OR 3 TIMES DAILY. Patient taking differently: Apply topically as needed APPLY 2 OR 3 TIMES DAILY.  Yes MIKE Vega   albuterol (PROVENTIL) (5 MG/ML) 0.5% nebulizer solution Take 0.5 mLs by nebulization every 6 hours as needed for Wheezing Yes MIKE Pereira   diclofenac sodium (VOLTAREN) 1 % GEL Apply 2 g topically 4 times daily  Patient taking differently: Apply 2 g topically 4 times daily as needed for Pain Yes MIKE Vega   aspirin 81 MG tablet Take 1 tablet by mouth daily Yes Historical Provider, MD Harris (Including outside providers/suppliers regularly involved in providing care):   Patient Care Team:  MIKE Vega as PCP - General (Nurse Practitioner Family)  MIKE Vega as PCP - Empaneled Provider  Sadie Spence MD (Cardiology)  Marty Pickett MD (Cardiothoracic Surgery)  MIKE Be as Nurse Practitioner (Certified Nurse Specialist)     Reviewed and updated this visit:  Tobacco  Allergies  Meds  Problems  Med Hx  Surg Hx  Soc Hx  Fam Hx
translation of spoken language may permit erroneous, or at times, nonsensical words or phrases to be inadvertently transcribed.  Although I have reviewed the note for such errors, some may still exist.    Electronically signed by Mauri Stinson MA on 9/5/2023 at 1:59 PM

## 2023-09-16 ENCOUNTER — HOSPITAL ENCOUNTER (EMERGENCY)
Age: 58
Discharge: HOME | End: 2023-09-16
Attending: EMERGENCY MEDICINE
Payer: MEDICARE

## 2023-09-16 VITALS
SYSTOLIC BLOOD PRESSURE: 139 MMHG | DIASTOLIC BLOOD PRESSURE: 68 MMHG | OXYGEN SATURATION: 95 % | HEART RATE: 76 BPM | TEMPERATURE: 98.4 F | RESPIRATION RATE: 18 BRPM | BODY MASS INDEX: 34.78 KG/M2 | HEIGHT: 62 IN | WEIGHT: 189 LBS

## 2023-09-16 DIAGNOSIS — J06.9 ACUTE UPPER RESPIRATORY INFECTION: Primary | ICD-10-CM

## 2023-09-16 LAB
FLUAV AG NPH QL: NEGATIVE
FLUBV AG NPH QL: NEGATIVE
S PYO AG THROAT QL: NEGATIVE
SARS-COV-2 RDRP RESP QL NAA+PROBE: NOT DETECTED

## 2023-09-16 PROCEDURE — 87081 CULTURE SCREEN ONLY: CPT

## 2023-09-16 PROCEDURE — 87880 STREP A ASSAY W/OPTIC: CPT

## 2023-09-16 PROCEDURE — 87635 SARS-COV-2 COVID-19 AMP PRB: CPT

## 2023-09-16 PROCEDURE — 87186 SC STD MICRODIL/AGAR DIL: CPT

## 2023-09-16 PROCEDURE — 99283 EMERGENCY DEPT VISIT LOW MDM: CPT

## 2023-09-16 PROCEDURE — 87804 INFLUENZA ASSAY W/OPTIC: CPT

## 2023-09-16 ASSESSMENT — ENCOUNTER SYMPTOMS
SHORTNESS OF BREATH: 0
SORE THROAT: 1
DIARRHEA: 0
VOMITING: 0
SINUS PRESSURE: 0
ABDOMINAL PAIN: 0
RHINORRHEA: 0
NAUSEA: 0

## 2023-09-16 NOTE — ED PROVIDER NOTES
[address] [facility], [facility] EMERGENCY DEPT  eMERGENCY dEPARTMENT eNCOUnter      Pt Name: Joseph Trotter  MRN: [id number]  [address] [facility] 1965  Date of evaluation: 9/16/2023  Provider: Shasha Laura MD    CHIEF COMPLAINT       Chief Complaint   Patient presents with    Headache    Concern For COVID-19         HISTORY OF PRESENT ILLNESS   (Location/Symptom, Timing/Onset,Context/Setting, Quality, Duration, Modifying Factors, Severity)  Note limiting factors. Joseph Trotter is a 59 y.o. female who presents to the emergency department upper respiratory symptoms     HPI    Patient is a 18-year-old white female with a history of arthritis; CAD; obesity; diabetes mellitus; hyperlipidemia; hypertension; who presents with cough nasal congestion and sore throat for 1 day. Was concerned that she might have contracted COVID. No change in smell or taste. No significant shortness of breath. No fever but she noted some body aches. NursingNotes were reviewed. REVIEW OF SYSTEMS    (2-9 systems for level 4, 10 or more for level 5)     Review of Systems   Constitutional:  Negative for chills, diaphoresis, fatigue and fever. HENT:  Positive for sore throat. Negative for rhinorrhea and sinus pressure. Eyes:  Negative for visual disturbance. Respiratory:  Negative for shortness of breath. Cardiovascular:  Negative for chest pain. Gastrointestinal:  Negative for abdominal pain, diarrhea, nausea and vomiting. Genitourinary:  Negative for difficulty urinating and dysuria. Musculoskeletal:  Negative for arthralgias and myalgias. Skin:  Negative for rash. Neurological:  Negative for weakness. Psychiatric/Behavioral:  Negative for confusion. All other systems reviewed and are negative.            PAST MEDICALHISTORY     Past Medical History:   Diagnosis Date    Abnormal Pap smear of cervix     Arthritis     CAD (coronary artery disease)     stent 2011 Melchor Backbone; sees [facility] cardiology    Cervical cancer ([address] [facility])     Class 3 severe obesity due to DAILY.    ONDANSETRON (ZOFRAN ODT) 4 MG DISINTEGRATING TABLET    Take 1 tablet by mouth daily as needed for Nausea or Vomiting    OXYBUTYNIN (DITROPAN-XL) 10 MG EXTENDED RELEASE TABLET    Take 1 tablet by mouth daily    RANOLAZINE (RANEXA) 1000 MG EXTENDED RELEASE TABLET    Take 1 tablet by mouth 2 times daily    VALSARTAN (DIOVAN) 80 MG TABLET    Take 1 tablet by mouth daily    VITAMIN D (ERGOCALCIFEROL) 1.25 MG (83477 UT) CAPS CAPSULE    Take 1 capsule by mouth once a week       ALLERGIES     Codeine, Motrin [ibuprofen], Humalog [insulin lispro], and Ozempic (0.25 or 0.5 mg-dose) [semaglutide(0.25 or 0.5mg-dos)]    FAMILY HISTORY       Family History   Problem Relation Age of Onset    Diabetes Father     Cancer Father     High Blood Pressure Father     Heart Attack Father     Colon Cancer Father     Diabetes Mother     Breast Cancer Mother 47    Heart Failure Mother         CHF, MVP    Liver Cancer Mother     Heart Failure Maternal Grandfather     Stroke Maternal Grandfather     Pancreatic Cancer Maternal Grandfather     Stomach Cancer Maternal Grandfather     Diabetes Brother     Colon Cancer Paternal Uncle     Rectal Cancer Other     Breast Cancer Maternal Grandmother 78    Colon Polyps Neg Hx     Crohn's Disease Neg Hx     Irritable Bowel Syndrome Neg Hx     Liver Disease Neg Hx           SOCIAL HISTORY       Social History     Socioeconomic History    Marital status:      Spouse name: None    Number of children: None    Years of education: None    Highest education level: None   Tobacco Use    Smoking status: Former     Packs/day: 2.00     Years: 15.00     Additional pack years: 0.00     Total pack years: 30.00     Types: Cigarettes     Quit date: 2014     Years since quittin.2    Smokeless tobacco: Never   Vaping Use    Vaping Use: Never used   Substance and Sexual Activity    Alcohol use: No    Drug use: No    Sexual activity: Not Currently     Social Determinants of Health     Financial

## 2023-09-16 NOTE — DISCHARGE INSTRUCTIONS
Take Tylenol 650 mg every 4 hours for your symptoms. Follow-up with your primary care doctor for further treatment and evaluation. Return immediately to the emergency room for any new or worsening symptoms.

## 2023-09-17 LAB — S PYO THROAT QL CULT: NORMAL

## 2023-09-19 LAB
ORGANISM: ABNORMAL
S PYO THROAT QL CULT: ABNORMAL
S PYO THROAT QL CULT: ABNORMAL

## 2023-09-21 DIAGNOSIS — N32.89 BLADDER SPASM: ICD-10-CM

## 2023-09-21 NOTE — TELEPHONE ENCOUNTER
Received fax from pharmacy requesting refill on pts medication(s). Pt was last seen in office on 9/5/2023  and has a follow up scheduled for 11/7/2023. Will send request to  [facility]  for patient.      Requested Prescriptions     Pending Prescriptions Disp Refills    oxybutynin (DITROPAN-XL) 10 MG extended release tablet [Pharmacy Med Name: OXYBUTYNIN CHLORIDE ER 10MG ER TABLET ER 24HR] 90 tablet 3     Sig: Take 1 tablet by mouth daily

## 2023-09-26 RX ORDER — OXYBUTYNIN CHLORIDE 10 MG/1
10 TABLET, EXTENDED RELEASE ORAL DAILY
Qty: 90 TABLET | Refills: 3 | Status: SHIPPED | OUTPATIENT
Start: 2023-09-26

## 2023-10-12 RX ORDER — ONDANSETRON 4 MG/1
4 TABLET, ORALLY DISINTEGRATING ORAL DAILY PRN
COMMUNITY

## 2023-10-12 RX ORDER — AMITRIPTYLINE HYDROCHLORIDE 10 MG/1
10 TABLET, FILM COATED ORAL NIGHTLY
COMMUNITY

## 2023-10-12 RX ORDER — FLUTICASONE PROPIONATE 50 MCG
2 SPRAY, SUSPENSION (ML) NASAL DAILY
COMMUNITY

## 2023-10-12 RX ORDER — GABAPENTIN 100 MG/1
100 CAPSULE ORAL NIGHTLY
COMMUNITY

## 2023-10-19 ENCOUNTER — APPOINTMENT (OUTPATIENT)
Dept: GENERAL RADIOLOGY | Facility: HOSPITAL | Age: 58
End: 2023-10-19
Payer: MEDICARE

## 2023-10-19 ENCOUNTER — HOSPITAL ENCOUNTER (OUTPATIENT)
Facility: HOSPITAL | Age: 58
Setting detail: HOSPITAL OUTPATIENT SURGERY
Discharge: HOME | End: 2023-10-19
Attending: PODIATRIST | Admitting: PODIATRIST
Payer: MEDICARE

## 2023-10-19 ENCOUNTER — ANESTHESIA EVENT (OUTPATIENT)
Dept: PERIOP | Facility: HOSPITAL | Age: 58
End: 2023-10-19
Payer: MEDICARE

## 2023-10-19 ENCOUNTER — ANESTHESIA (OUTPATIENT)
Dept: PERIOP | Facility: HOSPITAL | Age: 58
End: 2023-10-19
Payer: MEDICARE

## 2023-10-19 VITALS
SYSTOLIC BLOOD PRESSURE: 150 MMHG | OXYGEN SATURATION: 98 % | HEART RATE: 64 BPM | BODY MASS INDEX: 35.92 KG/M2 | RESPIRATION RATE: 16 BRPM | HEIGHT: 61 IN | WEIGHT: 190.26 LBS | DIASTOLIC BLOOD PRESSURE: 59 MMHG | TEMPERATURE: 97.3 F

## 2023-10-19 LAB
ANION GAP SERPL CALCULATED.3IONS-SCNC: 8 MMOL/L (ref 5–15)
BUN SERPL-MCNC: 11 MG/DL (ref 6–20)
BUN/CREAT SERPL: 17.7 (ref 7–25)
CALCIUM SPEC-SCNC: 9.5 MG/DL (ref 8.6–10.5)
CHLORIDE SERPL-SCNC: 102 MMOL/L (ref 98–107)
CO2 SERPL-SCNC: 30 MMOL/L (ref 22–29)
CREAT SERPL-MCNC: 0.62 MG/DL (ref 0.57–1)
DEPRECATED RDW RBC AUTO: 40.7 FL (ref 37–54)
EGFRCR SERPLBLD CKD-EPI 2021: 102.7 ML/MIN/1.73
ERYTHROCYTE [DISTWIDTH] IN BLOOD BY AUTOMATED COUNT: 13.2 % (ref 12.3–15.4)
GLUCOSE BLDC GLUCOMTR-MCNC: 165 MG/DL (ref 70–130)
GLUCOSE BLDC GLUCOMTR-MCNC: 174 MG/DL (ref 70–130)
GLUCOSE SERPL-MCNC: 176 MG/DL (ref 65–99)
HCT VFR BLD AUTO: 41.1 % (ref 34–46.6)
HGB BLD-MCNC: 13.9 G/DL (ref 12–15.9)
MCH RBC QN AUTO: 28.3 PG (ref 26.6–33)
MCHC RBC AUTO-ENTMCNC: 33.8 G/DL (ref 31.5–35.7)
MCV RBC AUTO: 83.7 FL (ref 79–97)
PLATELET # BLD AUTO: 207 10*3/MM3 (ref 140–450)
PMV BLD AUTO: 9.7 FL (ref 6–12)
POTASSIUM SERPL-SCNC: 4.4 MMOL/L (ref 3.5–5.2)
RBC # BLD AUTO: 4.91 10*6/MM3 (ref 3.77–5.28)
SODIUM SERPL-SCNC: 140 MMOL/L (ref 136–145)
WBC NRBC COR # BLD: 7.56 10*3/MM3 (ref 3.4–10.8)

## 2023-10-19 PROCEDURE — 76000 FLUOROSCOPY <1 HR PHYS/QHP: CPT

## 2023-10-19 PROCEDURE — 25810000003 LACTATED RINGERS PER 1000 ML: Performed by: PODIATRIST

## 2023-10-19 PROCEDURE — 25010000002 BETAMETHASONE ACET & SOD PHOS PER 4 MG: Performed by: PODIATRIST

## 2023-10-19 PROCEDURE — 25010000002 PROPOFOL 200 MG/20ML EMULSION: Performed by: NURSE ANESTHETIST, CERTIFIED REGISTERED

## 2023-10-19 PROCEDURE — 80048 BASIC METABOLIC PNL TOTAL CA: CPT | Performed by: PODIATRIST

## 2023-10-19 PROCEDURE — 82948 REAGENT STRIP/BLOOD GLUCOSE: CPT

## 2023-10-19 PROCEDURE — 73620 X-RAY EXAM OF FOOT: CPT

## 2023-10-19 PROCEDURE — 85027 COMPLETE CBC AUTOMATED: CPT | Performed by: PODIATRIST

## 2023-10-19 PROCEDURE — 25010000002 ROPIVACAINE PER 1 MG: Performed by: PODIATRIST

## 2023-10-19 PROCEDURE — 25010000002 ONDANSETRON PER 1 MG: Performed by: ANESTHESIOLOGY

## 2023-10-19 RX ORDER — HYDROCODONE BITARTRATE AND ACETAMINOPHEN 5; 325 MG/1; MG/1
1 TABLET ORAL ONCE AS NEEDED
Status: DISCONTINUED | OUTPATIENT
Start: 2023-10-19 | End: 2023-10-19 | Stop reason: HOSPADM

## 2023-10-19 RX ORDER — SODIUM CHLORIDE, SODIUM LACTATE, POTASSIUM CHLORIDE, CALCIUM CHLORIDE 600; 310; 30; 20 MG/100ML; MG/100ML; MG/100ML; MG/100ML
100 INJECTION, SOLUTION INTRAVENOUS CONTINUOUS PRN
Status: DISCONTINUED | OUTPATIENT
Start: 2023-10-19 | End: 2023-10-19 | Stop reason: HOSPADM

## 2023-10-19 RX ORDER — DROPERIDOL 2.5 MG/ML
0.62 INJECTION, SOLUTION INTRAMUSCULAR; INTRAVENOUS ONCE AS NEEDED
Status: DISCONTINUED | OUTPATIENT
Start: 2023-10-19 | End: 2023-10-19 | Stop reason: HOSPADM

## 2023-10-19 RX ORDER — FAMOTIDINE 10 MG/ML
20 INJECTION, SOLUTION INTRAVENOUS
Status: COMPLETED | OUTPATIENT
Start: 2023-10-19 | End: 2023-10-19

## 2023-10-19 RX ORDER — PROPOFOL 10 MG/ML
INJECTION, EMULSION INTRAVENOUS AS NEEDED
Status: DISCONTINUED | OUTPATIENT
Start: 2023-10-19 | End: 2023-10-19 | Stop reason: SURG

## 2023-10-19 RX ORDER — FLUMAZENIL 0.1 MG/ML
0.2 INJECTION INTRAVENOUS AS NEEDED
Status: DISCONTINUED | OUTPATIENT
Start: 2023-10-19 | End: 2023-10-19 | Stop reason: HOSPADM

## 2023-10-19 RX ORDER — SODIUM CHLORIDE 0.9 % (FLUSH) 0.9 %
3 SYRINGE (ML) INJECTION AS NEEDED
Status: DISCONTINUED | OUTPATIENT
Start: 2023-10-19 | End: 2023-10-19 | Stop reason: HOSPADM

## 2023-10-19 RX ORDER — ONDANSETRON 2 MG/ML
4 INJECTION INTRAMUSCULAR; INTRAVENOUS ONCE AS NEEDED
Status: COMPLETED | OUTPATIENT
Start: 2023-10-19 | End: 2023-10-19

## 2023-10-19 RX ORDER — SODIUM CHLORIDE 9 MG/ML
40 INJECTION, SOLUTION INTRAVENOUS AS NEEDED
Status: DISCONTINUED | OUTPATIENT
Start: 2023-10-19 | End: 2023-10-19 | Stop reason: HOSPADM

## 2023-10-19 RX ORDER — HYDROCODONE BITARTRATE AND ACETAMINOPHEN 10; 325 MG/1; MG/1
1 TABLET ORAL EVERY 4 HOURS PRN
Status: DISCONTINUED | OUTPATIENT
Start: 2023-10-19 | End: 2023-10-19 | Stop reason: HOSPADM

## 2023-10-19 RX ORDER — HYDROMORPHONE HYDROCHLORIDE 1 MG/ML
0.5 INJECTION, SOLUTION INTRAMUSCULAR; INTRAVENOUS; SUBCUTANEOUS
Status: CANCELLED | OUTPATIENT
Start: 2023-10-19

## 2023-10-19 RX ORDER — ONDANSETRON 2 MG/ML
4 INJECTION INTRAMUSCULAR; INTRAVENOUS
Status: DISCONTINUED | OUTPATIENT
Start: 2023-10-19 | End: 2023-10-19 | Stop reason: HOSPADM

## 2023-10-19 RX ORDER — OXYCODONE AND ACETAMINOPHEN 10; 325 MG/1; MG/1
1 TABLET ORAL ONCE AS NEEDED
Status: CANCELLED | OUTPATIENT
Start: 2023-10-19

## 2023-10-19 RX ORDER — SODIUM CHLORIDE 0.9 % (FLUSH) 0.9 %
10 SYRINGE (ML) INJECTION AS NEEDED
Status: DISCONTINUED | OUTPATIENT
Start: 2023-10-19 | End: 2023-10-19 | Stop reason: HOSPADM

## 2023-10-19 RX ORDER — LIDOCAINE HYDROCHLORIDE 10 MG/ML
0.5 INJECTION, SOLUTION EPIDURAL; INFILTRATION; INTRACAUDAL; PERINEURAL ONCE AS NEEDED
Status: DISCONTINUED | OUTPATIENT
Start: 2023-10-19 | End: 2023-10-19 | Stop reason: HOSPADM

## 2023-10-19 RX ORDER — SODIUM CHLORIDE 0.9 % (FLUSH) 0.9 %
10 SYRINGE (ML) INJECTION EVERY 12 HOURS SCHEDULED
Status: DISCONTINUED | OUTPATIENT
Start: 2023-10-19 | End: 2023-10-19 | Stop reason: HOSPADM

## 2023-10-19 RX ORDER — SODIUM CHLORIDE, SODIUM LACTATE, POTASSIUM CHLORIDE, CALCIUM CHLORIDE 600; 310; 30; 20 MG/100ML; MG/100ML; MG/100ML; MG/100ML
1000 INJECTION, SOLUTION INTRAVENOUS CONTINUOUS
Status: DISCONTINUED | OUTPATIENT
Start: 2023-10-19 | End: 2023-10-19 | Stop reason: HOSPADM

## 2023-10-19 RX ORDER — LABETALOL HYDROCHLORIDE 5 MG/ML
5 INJECTION, SOLUTION INTRAVENOUS
Status: DISCONTINUED | OUTPATIENT
Start: 2023-10-19 | End: 2023-10-19 | Stop reason: HOSPADM

## 2023-10-19 RX ORDER — FENTANYL CITRATE 50 UG/ML
25 INJECTION, SOLUTION INTRAMUSCULAR; INTRAVENOUS
Status: DISCONTINUED | OUTPATIENT
Start: 2023-10-19 | End: 2023-10-19 | Stop reason: HOSPADM

## 2023-10-19 RX ORDER — NALOXONE HCL 0.4 MG/ML
0.04 VIAL (ML) INJECTION AS NEEDED
Status: DISCONTINUED | OUTPATIENT
Start: 2023-10-19 | End: 2023-10-19 | Stop reason: HOSPADM

## 2023-10-19 RX ADMIN — SODIUM CHLORIDE, POTASSIUM CHLORIDE, SODIUM LACTATE AND CALCIUM CHLORIDE 1000 ML: 600; 310; 30; 20 INJECTION, SOLUTION INTRAVENOUS at 07:52

## 2023-10-19 RX ADMIN — ONDANSETRON 4 MG: 2 INJECTION INTRAMUSCULAR; INTRAVENOUS at 07:58

## 2023-10-19 RX ADMIN — FAMOTIDINE 20 MG: 10 INJECTION INTRAVENOUS at 07:59

## 2023-10-19 RX ADMIN — PROPOFOL 100 MG: 10 INJECTION, EMULSION INTRAVENOUS at 08:24

## 2023-10-19 RX ADMIN — SODIUM CHLORIDE, POTASSIUM CHLORIDE, SODIUM LACTATE AND CALCIUM CHLORIDE: 600; 310; 30; 20 INJECTION, SOLUTION INTRAVENOUS at 08:21

## 2023-10-19 NOTE — OP NOTE
STEROID INJECTION  Procedure Note    Gwendolyn Delgadillo  10/19/2023    Pre-op Diagnosis:   Traumatic arthritis right foot, right foot pain    Post-op Diagnosis:     Post-Op Diagnosis Codes:     * Traumatic arthritis of right foot [M12.571]     * Right foot pain [M79.671]     Procedure/CPT Codes:       Procedure(s):  The articular CORTICOSTEROID INJECTION OF THE SECOND AND THIRD TARSOMETATARSAL JOINT UNDER SEDATION WITH FLUOROSCOPIC GUIDANCE, RIGHT FOOT    Surgeon(s):  Frank Herndon DPM    Anesthesia: Monitored Anesthesia Care    Staff:   Circulator: Art Colmenares RN  Scrub Person: Carmel Ward     was responsible for performing the following activities: Positioning and their skilled assistance was necessary for the success of this case.    Indications for procedure:  Pain    Procedure details:  The patient brought the operating room placed under sedation.  Following procedures were performed.    Procedure #1 intra-articular corticosteroid injection second and third tarsometatarsal joint with fluoroscopic guidance right foot    Attention was then directed to the second and third tarsometatarsal joints.  In usual fashion under aseptic technique and under direct fluoroscopic guidance and injection with a 26 5 gauge 1-1/2 inch needle was given which consisted of 12 mg of Celestone and 2 cc 0.5% ropivacaine plain.  Band-Aid was applied.    Estimated Blood Loss: none    Specimens:                None      Drains: * No LDAs found *    Implants: Nothing was implanted during the procedure     Complications: none           Follow up:   4 to 6 weeks for follow-up.    Frank Herndon DPM     Date: 10/19/2023  Time: 08:28 CDT

## 2023-10-19 NOTE — ANESTHESIA POSTPROCEDURE EVALUATION
Patient: Gwendolyn Delgadillo    Procedure Summary       Date: 10/19/23 Room / Location:  [facility] OR [address] [place] [address] [facility] OR    Anesthesia Start: 0821 Anesthesia Stop: 0833    Procedure: CORTICOSTEROID INJECTION OF THE SECOND AND THIRD TARSOMETATARSAL JOINT UNDER SEDATION WITH FLUOROSCOPIC GUIDANCE, RIGHT FOOT (Right) Diagnosis:       Traumatic arthritis of right foot      Right foot pain      (Traumatic arthritis right foot, right foot pain)    Surgeons: Frank Herndon DPM Provider: Reji Felix CRNA    Anesthesia Type: MAC ASA Status: 3            Anesthesia Type: MAC    Vitals  Vitals Value Taken Time   BP     Temp     Pulse 70 10/19/23 0833   Resp     SpO2 97 % 10/19/23 0833   Vitals shown include unfiled device data.        Post Anesthesia Care and Evaluation    Patient location during evaluation: PHASE II  Patient participation: complete - patient participated  Level of consciousness: awake and alert  Pain score: 0  Pain management: adequate    Airway patency: patent  Anesthetic complications: No anesthetic complications    Cardiovascular status: acceptable and stable  Respiratory status: room air  Hydration status: acceptable

## 2023-10-19 NOTE — ANESTHESIA PREPROCEDURE EVALUATION
Anesthesia Evaluation     history of anesthetic complications:  prolonged sedation  NPO Solid Status: > 8 hours  NPO Liquid Status: > 8 hours           Airway   Mallampati: I  TM distance: >3 FB  Neck ROM: full  No difficulty expected  Dental    (+) poor dentition        Pulmonary    (+) a smoker (quit 2014) Former,shortness of breath, sleep apnea on CPAP  Cardiovascular   Exercise tolerance: poor (<4 METS)    (+) hypertension, past MI , CAD, CABG (2014) >6 Months, cardiac stents (1 stent prior to cabg) more than 12 months ago , hyperlipidemia      Neuro/Psych  (-) seizures, TIA, CVA  GI/Hepatic/Renal/Endo    (+) obesity, GERD, diabetes mellitus type 2 using insulin  (-) liver disease, no renal disease    Musculoskeletal     Abdominal    Substance History      OB/GYN          Other      history of cancer (cervical cancer) remission                  Anesthesia Plan    ASA 3     MAC     intravenous induction     Anesthetic plan, risks, benefits, and alternatives have been provided, discussed and informed consent has been obtained with: patient.    CODE STATUS:

## 2023-11-07 ENCOUNTER — OFFICE VISIT (OUTPATIENT)
Dept: FAMILY MEDICINE CLINIC | Age: 58
End: 2023-11-07
Payer: MEDICARE

## 2023-11-07 VITALS
HEIGHT: 62 IN | OXYGEN SATURATION: 100 % | DIASTOLIC BLOOD PRESSURE: 82 MMHG | HEART RATE: 70 BPM | TEMPERATURE: 97.9 F | WEIGHT: 184 LBS | SYSTOLIC BLOOD PRESSURE: 124 MMHG | BODY MASS INDEX: 33.86 KG/M2

## 2023-11-07 DIAGNOSIS — G47.33 OSA (OBSTRUCTIVE SLEEP APNEA): ICD-10-CM

## 2023-11-07 DIAGNOSIS — Z79.4 TYPE 2 DIABETES MELLITUS WITH COMPLICATION, WITH LONG-TERM CURRENT USE OF INSULIN (HCC): Primary | ICD-10-CM

## 2023-11-07 DIAGNOSIS — E11.8 TYPE 2 DIABETES MELLITUS WITH COMPLICATION, WITH LONG-TERM CURRENT USE OF INSULIN (HCC): Primary | ICD-10-CM

## 2023-11-07 LAB — HBA1C MFR BLD: 11 %

## 2023-11-07 PROCEDURE — 3078F DIAST BP <80 MM HG: CPT | Performed by: NURSE PRACTITIONER

## 2023-11-07 PROCEDURE — 3017F COLORECTAL CA SCREEN DOC REV: CPT | Performed by: NURSE PRACTITIONER

## 2023-11-07 PROCEDURE — G8427 DOCREV CUR MEDS BY ELIG CLIN: HCPCS | Performed by: NURSE PRACTITIONER

## 2023-11-07 PROCEDURE — 3046F HEMOGLOBIN A1C LEVEL >9.0%: CPT | Performed by: NURSE PRACTITIONER

## 2023-11-07 PROCEDURE — 99214 OFFICE O/P EST MOD 30 MIN: CPT | Performed by: NURSE PRACTITIONER

## 2023-11-07 PROCEDURE — 1036F TOBACCO NON-USER: CPT | Performed by: NURSE PRACTITIONER

## 2023-11-07 PROCEDURE — G8417 CALC BMI ABV UP PARAM F/U: HCPCS | Performed by: NURSE PRACTITIONER

## 2023-11-07 PROCEDURE — 3074F SYST BP LT 130 MM HG: CPT | Performed by: NURSE PRACTITIONER

## 2023-11-07 PROCEDURE — 2022F DILAT RTA XM EVC RTNOPTHY: CPT | Performed by: NURSE PRACTITIONER

## 2023-11-07 PROCEDURE — G8484 FLU IMMUNIZE NO ADMIN: HCPCS | Performed by: NURSE PRACTITIONER

## 2023-11-07 RX ORDER — GLIMEPIRIDE 2 MG/1
2 TABLET ORAL
Qty: 30 TABLET | Refills: 5 | Status: SHIPPED | OUTPATIENT
Start: 2023-11-07

## 2023-11-07 NOTE — PROGRESS NOTES
[facility] PHYSICIAN SERVICES  [facility] [place]  [address] [address]  Dept: [phone]  Dept Fax: [phone]  Loc: [phone]    Florinda Flor is a 59 y.o. female who presents today for her medical conditions/complaints as noted below. Florinda Flor is c/o of Follow-up Chronic Condition      Chief Complaint   Patient presents with    Follow-up Chronic Condition       HPI:     HPI  Patient presents today for routine follow up of chronic conditions including DM, HTN, and HLD. She states that she is unsure if she is going to take trulicity as it has made her very nauseated. She does she Dr Cristiane Kirkland Dec 6. May have to have surgery on her foot.         Past Medical History:   Diagnosis Date    Abnormal Pap smear of cervix     Arthritis     CAD (coronary artery disease)     stent 2011 Nesha Crook; sees [facility] cardiology    Cervical cancer ([address])     Class 3 severe obesity due to excess calories without serious comorbidity with body mass index (BMI) of 40.0 to 44.9 in adult [facility]) 07/28/2020    Diabetes mellitus ([address])     ERRONEOUS ENCOUNTER--DISREGARD 09/15/2015    GERD (gastroesophageal reflux disease)     Headache(784.0)     Heart attack ([address]) 12/2011    Hyperlipidemia     Hypertension     Lichen simplex chronicus 10/2015    Osteopenia     Pneumonia     Type II or unspecified type diabetes mellitus without mention of complication, not stated as uncontrolled         Past Surgical History:   Procedure Laterality Date    BREAST BIOPSY Right 06/08/2011    US guided core needle, right, benign    CARDIAC CATHETERIZATION  12/22/14  JDT    EF 50%    CARDIAC CATHETERIZATION  06/05/2017        CHG FLUOROSCOPIC GUIDANCE NEEDLE PLACEMENT ADD ON Left 07/19/2018    CORTICOSTEROID INJECTION performed by Buddy Galeas MD at [address]  2006    COLONOSCOPY  approx 2005    Dr. Alisa Sherman per pt recall \" Normal\"    CORONARY ANGIOPLASTY WITH STENT PLACEMENT  12/2011    [facility]

## 2023-11-08 ASSESSMENT — ENCOUNTER SYMPTOMS
GASTROINTESTINAL NEGATIVE: 1
EYES NEGATIVE: 1
RESPIRATORY NEGATIVE: 1

## 2023-12-08 DIAGNOSIS — Z79.4 TYPE 2 DIABETES MELLITUS WITH COMPLICATION, WITH LONG-TERM CURRENT USE OF INSULIN (HCC): ICD-10-CM

## 2023-12-08 DIAGNOSIS — E11.8 TYPE 2 DIABETES MELLITUS WITH COMPLICATION, WITH LONG-TERM CURRENT USE OF INSULIN (HCC): ICD-10-CM

## 2023-12-08 NOTE — TELEPHONE ENCOUNTER
Received fax from pharmacy requesting refill on pts medication(s). Pt was last seen in office on 11/7/2023  and has a follow up scheduled for Visit date not found. Will send request to  [facility]  for patient.      Requested Prescriptions     Pending Prescriptions Disp Refills    Dulaglutide (TRULICITY) 7.32 WK/8.9BI SOPN [Pharmacy Med Name: TRULICITY 2.23/0.6 SOLN PEN-INJ] 12 mL 11     Sig: Inject 0.75 mg into the skin Once a week at 5 PM

## 2023-12-12 RX ORDER — DULAGLUTIDE 0.75 MG/.5ML
0.75 INJECTION, SOLUTION SUBCUTANEOUS WEEKLY
Qty: 12 ML | Refills: 11 | Status: SHIPPED | OUTPATIENT
Start: 2023-12-12

## 2023-12-29 DIAGNOSIS — E11.8 TYPE 2 DIABETES MELLITUS WITH COMPLICATION, WITH LONG-TERM CURRENT USE OF INSULIN (HCC): ICD-10-CM

## 2023-12-29 DIAGNOSIS — Z79.4 TYPE 2 DIABETES MELLITUS WITH COMPLICATION, WITH LONG-TERM CURRENT USE OF INSULIN (HCC): ICD-10-CM

## 2023-12-29 RX ORDER — GLIPIZIDE 10 MG/1
10 TABLET, FILM COATED, EXTENDED RELEASE ORAL DAILY
Qty: 30 TABLET | Refills: 3 | Status: SHIPPED | OUTPATIENT
Start: 2023-12-29

## 2023-12-29 NOTE — TELEPHONE ENCOUNTER
Sofy Huang called requesting a refill of the below medication which has been pended for you:     Requested Prescriptions     Pending Prescriptions Disp Refills    glipiZIDE (GLUCOTROL XL) 10 MG extended release tablet [Pharmacy Med Name: GLIPIZIDE ER 10MG TABLET ER 24HR] 30 tablet 3     Sig: TAKE 1 TABLET BY MOUTH DAILY       Last Appointment Date: 11/7/2023  Next Appointment Date: Visit date not found    Allergies   Allergen Reactions    Codeine Other (See Comments)     Seizures as a child    Motrin [Ibuprofen] Swelling     Tongue to swell    Humalog [Insulin Lispro] Other (See Comments)     Shakiness, break out in a sweat at higher doses.     Ozempic (0.25 Or 0.5 Mg-Dose) [Semaglutide(0.25 Or 0.5mg-Dos)]      nausea

## 2024-01-31 ENCOUNTER — OFFICE VISIT (OUTPATIENT)
Dept: FAMILY MEDICINE CLINIC | Age: 59
End: 2024-01-31
Payer: MEDICARE

## 2024-01-31 VITALS
WEIGHT: 195 LBS | DIASTOLIC BLOOD PRESSURE: 78 MMHG | HEART RATE: 74 BPM | TEMPERATURE: 97.9 F | OXYGEN SATURATION: 99 % | SYSTOLIC BLOOD PRESSURE: 126 MMHG | BODY MASS INDEX: 35.88 KG/M2 | HEIGHT: 62 IN

## 2024-01-31 DIAGNOSIS — Z00.00 MEDICARE ANNUAL WELLNESS VISIT, SUBSEQUENT: Primary | ICD-10-CM

## 2024-01-31 DIAGNOSIS — F43.9 STRESS AT HOME: ICD-10-CM

## 2024-01-31 DIAGNOSIS — K31.84 GASTROPARESIS DUE TO DM (HCC): ICD-10-CM

## 2024-01-31 DIAGNOSIS — Z79.899 DRUG THERAPY: ICD-10-CM

## 2024-01-31 DIAGNOSIS — E11.8 TYPE 2 DIABETES MELLITUS WITH COMPLICATION, WITH LONG-TERM CURRENT USE OF INSULIN (HCC): ICD-10-CM

## 2024-01-31 DIAGNOSIS — I10 ESSENTIAL HYPERTENSION: ICD-10-CM

## 2024-01-31 DIAGNOSIS — E11.43 GASTROPARESIS DUE TO DM (HCC): ICD-10-CM

## 2024-01-31 DIAGNOSIS — Z79.4 TYPE 2 DIABETES MELLITUS WITH COMPLICATION, WITH LONG-TERM CURRENT USE OF INSULIN (HCC): ICD-10-CM

## 2024-01-31 DIAGNOSIS — G62.9 NEUROPATHY: ICD-10-CM

## 2024-01-31 LAB
ALBUMIN SERPL-MCNC: 4.2 G/DL (ref 3.5–5.2)
ALCOHOL URINE: NORMAL
ALP SERPL-CCNC: 74 U/L (ref 35–104)
ALT SERPL-CCNC: 14 U/L (ref 5–33)
AMPHETAMINE SCREEN, URINE: NORMAL
ANION GAP SERPL CALCULATED.3IONS-SCNC: 11 MMOL/L (ref 7–19)
AST SERPL-CCNC: 13 U/L (ref 5–32)
BARBITURATE SCREEN, URINE: NORMAL
BASOPHILS # BLD: 0.1 K/UL (ref 0–0.2)
BASOPHILS NFR BLD: 0.9 % (ref 0–1)
BENZODIAZEPINE SCREEN, URINE: NORMAL
BILIRUB SERPL-MCNC: 0.4 MG/DL (ref 0.2–1.2)
BUN SERPL-MCNC: 13 MG/DL (ref 8–23)
BUPRENORPHINE URINE: NORMAL
CALCIUM SERPL-MCNC: 9.5 MG/DL (ref 8.8–10.2)
CHLORIDE SERPL-SCNC: 102 MMOL/L (ref 98–111)
CHOLEST SERPL-MCNC: 168 MG/DL (ref 160–199)
CO2 SERPL-SCNC: 28 MMOL/L (ref 22–29)
COCAINE METABOLITE SCREEN URINE: NORMAL
CREAT SERPL-MCNC: 0.6 MG/DL (ref 0.5–0.9)
EOSINOPHIL # BLD: 0.2 K/UL (ref 0–0.6)
EOSINOPHIL NFR BLD: 2.7 % (ref 0–5)
ERYTHROCYTE [DISTWIDTH] IN BLOOD BY AUTOMATED COUNT: 13.2 % (ref 11.5–14.5)
FENTANYL SCREEN, URINE: NORMAL
GABAPENTIN SCREEN, URINE: NORMAL
GLUCOSE SERPL-MCNC: 157 MG/DL (ref 74–109)
HBA1C MFR BLD: 9.3 % (ref 4–6)
HCT VFR BLD AUTO: 40.7 % (ref 37–47)
HDLC SERPL-MCNC: 66 MG/DL (ref 65–121)
HGB BLD-MCNC: 13.6 G/DL (ref 12–16)
IMM GRANULOCYTES # BLD: 0 K/UL
LDLC SERPL CALC-MCNC: 89 MG/DL
LYMPHOCYTES # BLD: 2.1 K/UL (ref 1.1–4.5)
LYMPHOCYTES NFR BLD: 30.1 % (ref 20–40)
MCH RBC QN AUTO: 29.8 PG (ref 27–31)
MCHC RBC AUTO-ENTMCNC: 33.4 G/DL (ref 33–37)
MCV RBC AUTO: 89.3 FL (ref 81–99)
MDMA URINE: NORMAL
METHADONE SCREEN, URINE: NORMAL
METHAMPHETAMINE, URINE: NORMAL
MONOCYTES # BLD: 0.5 K/UL (ref 0–0.9)
MONOCYTES NFR BLD: 7.5 % (ref 0–10)
NEUTROPHILS # BLD: 4.1 K/UL (ref 1.5–7.5)
NEUTS SEG NFR BLD: 58.5 % (ref 50–65)
OPIATE SCREEN URINE: NORMAL
OXYCODONE SCREEN URINE: NORMAL
PHENCYCLIDINE SCREEN URINE: NORMAL
PLATELET # BLD AUTO: 231 K/UL (ref 130–400)
PMV BLD AUTO: 10.3 FL (ref 9.4–12.3)
POTASSIUM SERPL-SCNC: 4.2 MMOL/L (ref 3.5–5)
PROPOXYPHENE SCREEN, URINE: NORMAL
PROT SERPL-MCNC: 6.9 G/DL (ref 6.6–8.7)
RBC # BLD AUTO: 4.56 M/UL (ref 4.2–5.4)
SODIUM SERPL-SCNC: 141 MMOL/L (ref 136–145)
SYNTHETIC CANNABINOIDS(K2) SCREEN, URINE: NORMAL
T4 FREE SERPL-MCNC: 0.84 NG/DL (ref 0.93–1.7)
THC SCREEN, URINE: NORMAL
TRAMADOL SCREEN URINE: NORMAL
TRICYCLIC ANTIDEPRESSANTS, UR: NORMAL
TRIGL SERPL-MCNC: 67 MG/DL (ref 0–149)
TSH SERPL DL<=0.005 MIU/L-ACNC: 4.75 UIU/ML (ref 0.35–5.5)
WBC # BLD AUTO: 7 K/UL (ref 4.8–10.8)

## 2024-01-31 PROCEDURE — G8484 FLU IMMUNIZE NO ADMIN: HCPCS | Performed by: NURSE PRACTITIONER

## 2024-01-31 PROCEDURE — 3074F SYST BP LT 130 MM HG: CPT | Performed by: NURSE PRACTITIONER

## 2024-01-31 PROCEDURE — G0439 PPPS, SUBSEQ VISIT: HCPCS | Performed by: NURSE PRACTITIONER

## 2024-01-31 PROCEDURE — 3046F HEMOGLOBIN A1C LEVEL >9.0%: CPT | Performed by: NURSE PRACTITIONER

## 2024-01-31 PROCEDURE — 2022F DILAT RTA XM EVC RTNOPTHY: CPT | Performed by: NURSE PRACTITIONER

## 2024-01-31 PROCEDURE — G8427 DOCREV CUR MEDS BY ELIG CLIN: HCPCS | Performed by: NURSE PRACTITIONER

## 2024-01-31 PROCEDURE — 3078F DIAST BP <80 MM HG: CPT | Performed by: NURSE PRACTITIONER

## 2024-01-31 PROCEDURE — G8417 CALC BMI ABV UP PARAM F/U: HCPCS | Performed by: NURSE PRACTITIONER

## 2024-01-31 PROCEDURE — 99214 OFFICE O/P EST MOD 30 MIN: CPT | Performed by: NURSE PRACTITIONER

## 2024-01-31 PROCEDURE — 3017F COLORECTAL CA SCREEN DOC REV: CPT | Performed by: NURSE PRACTITIONER

## 2024-01-31 PROCEDURE — 1036F TOBACCO NON-USER: CPT | Performed by: NURSE PRACTITIONER

## 2024-01-31 RX ORDER — INSULIN GLARGINE 100 [IU]/ML
25 INJECTION, SOLUTION SUBCUTANEOUS NIGHTLY
Qty: 5 ADJUSTABLE DOSE PRE-FILLED PEN SYRINGE | Refills: 3 | Status: SHIPPED | OUTPATIENT
Start: 2024-01-31

## 2024-01-31 RX ORDER — AMITRIPTYLINE HYDROCHLORIDE 10 MG/1
10 TABLET, FILM COATED ORAL NIGHTLY
Qty: 30 TABLET | Refills: 5 | Status: SHIPPED | OUTPATIENT
Start: 2024-01-31

## 2024-01-31 RX ORDER — PREGABALIN 75 MG/1
75 CAPSULE ORAL 2 TIMES DAILY
Qty: 60 CAPSULE | Refills: 3 | Status: SHIPPED | OUTPATIENT
Start: 2024-01-31 | End: 2024-03-01

## 2024-01-31 ASSESSMENT — ENCOUNTER SYMPTOMS
GASTROINTESTINAL NEGATIVE: 1
EYES NEGATIVE: 1
RESPIRATORY NEGATIVE: 1

## 2024-01-31 ASSESSMENT — PATIENT HEALTH QUESTIONNAIRE - PHQ9
7. TROUBLE CONCENTRATING ON THINGS, SUCH AS READING THE NEWSPAPER OR WATCHING TELEVISION: 3
4. FEELING TIRED OR HAVING LITTLE ENERGY: 3
SUM OF ALL RESPONSES TO PHQ QUESTIONS 1-9: 18
10. IF YOU CHECKED OFF ANY PROBLEMS, HOW DIFFICULT HAVE THESE PROBLEMS MADE IT FOR YOU TO DO YOUR WORK, TAKE CARE OF THINGS AT HOME, OR GET ALONG WITH OTHER PEOPLE: 1
6. FEELING BAD ABOUT YOURSELF - OR THAT YOU ARE A FAILURE OR HAVE LET YOURSELF OR YOUR FAMILY DOWN: 3
SUM OF ALL RESPONSES TO PHQ QUESTIONS 1-9: 18
1. LITTLE INTEREST OR PLEASURE IN DOING THINGS: 2
SUM OF ALL RESPONSES TO PHQ9 QUESTIONS 1 & 2: 4
SUM OF ALL RESPONSES TO PHQ QUESTIONS 1-9: 18
5. POOR APPETITE OR OVEREATING: 2
3. TROUBLE FALLING OR STAYING ASLEEP: 3
SUM OF ALL RESPONSES TO PHQ QUESTIONS 1-9: 18
9. THOUGHTS THAT YOU WOULD BE BETTER OFF DEAD, OR OF HURTING YOURSELF: 0
8. MOVING OR SPEAKING SO SLOWLY THAT OTHER PEOPLE COULD HAVE NOTICED. OR THE OPPOSITE, BEING SO FIGETY OR RESTLESS THAT YOU HAVE BEEN MOVING AROUND A LOT MORE THAN USUAL: 0
2. FEELING DOWN, DEPRESSED OR HOPELESS: 2

## 2024-01-31 ASSESSMENT — LIFESTYLE VARIABLES
HOW MANY STANDARD DRINKS CONTAINING ALCOHOL DO YOU HAVE ON A TYPICAL DAY: PATIENT DOES NOT DRINK
HOW OFTEN DO YOU HAVE A DRINK CONTAINING ALCOHOL: NEVER

## 2024-01-31 NOTE — PATIENT INSTRUCTIONS
It is never too late to quit.     Limit alcohol to 2 drinks a day for men and 1 drink a day for women. Too much alcohol can cause health problems.     Manage other health problems such as diabetes, high blood pressure, and high cholesterol. If you think you may have a problem with alcohol or drug use, talk to your doctor.   Medicines    Take your medicines exactly as prescribed. Call your doctor if you think you are having a problem with your medicine.     If your doctor recommends aspirin, take the amount directed each day. Make sure you take aspirin and not another kind of pain reliever, such as acetaminophen (Tylenol).   When should you call for help?   Call 911 if you have symptoms of a heart attack. These may include:    Chest pain or pressure, or a strange feeling in the chest.     Sweating.     Shortness of breath.     Pain, pressure, or a strange feeling in the back, neck, jaw, or upper belly or in one or both shoulders or arms.     Lightheadedness or sudden weakness.     A fast or irregular heartbeat.   After you call 911, the  may tell you to chew 1 adult-strength or 2 to 4 low-dose aspirin. Wait for an ambulance. Do not try to drive yourself.  Watch closely for changes in your health, and be sure to contact your doctor if you have any problems.  Where can you learn more?  Go to https://www.[employer]/patientEd and enter F075 to learn more about \"A Healthy Heart: Care Instructions.\"  Current as of: June 25, 2023               Content Version: 13.9  © 2006-2023 [employer].   Care instructions adapted under license by [employer]. If you have questions about a medical condition or this instruction, always ask your healthcare professional. [employer] disclaims any warranty or liability for your use of this information.      Personalized Preventive Plan for Shari Aldrich - 1/31/2024  Medicare offers a range of preventive health benefits. Some of the tests and screenings

## 2024-01-31 NOTE — PROGRESS NOTES
Medicare Annual Wellness Visit    Shari Aldrich is here for Medicare AWV and Hand Pain (bilat)    Assessment & Plan   Medicare annual wellness visit, subsequent  Type 2 diabetes mellitus with complication, with long-term current use of insulin (HCC)  -     TSH with Reflex to FT4; Future  -     Lipid, Fasting; Future  -     Hemoglobin A1C; Future  -     Urinalysis; Future  -     Microalbumin / Creatinine Urine Ratio; Future  -     empagliflozin (JARDIANCE) 25 MG tablet; Take 1 tablet by mouth daily, Disp-30 tablet, R-5Normal  -     Tirzepatide (MOUNJARO) 2.5 MG/0.5ML SOPN SC injection; Inject 0.5 mLs into the skin once a week, Disp-2 mL, R-1Normal  -     insulin glargine (LANTUS SOLOSTAR) 100 UNIT/ML injection pen; Inject 25 Units into the skin nightly, Disp-5 Adjustable Dose Pre-filled Pen Syringe, R-3Normal  Essential hypertension  -     CBC with Auto Differential; Future  -     Comprehensive Metabolic Panel; Future  Gastroparesis due to DM ([facility])  Neuropathy  -     pregabalin (LYRICA) 75 MG capsule; Take 1 capsule by mouth 2 times daily for 30 days. Max Daily Amount: 150 mg, Disp-60 capsule, R-3Normal  Drug therapy  -     POCT Rapid Drug Screen  Stress at home  -     amitriptyline (ELAVIL) 10 MG tablet; Take 1 tablet by mouth nightly, Disp-30 tablet, R-5Normal    Recommendations for Preventive Services Due: see orders and patient instructions/AVS.  Recommended screening schedule for the next 5-10 years is provided to the patient in written form: see Patient Instructions/AVS.     Return in about 6 weeks (around 3/13/2024), or if symptoms worsen or fail to improve, for Diabetic Follow up.     Subjective   The following acute and/or chronic problems were also addressed today:  See attached note    Patient's complete Health Risk Assessment and screening values have been reviewed and are found in Flowsheets. The following problems were reviewed today and where indicated follow up appointments were made and/or referrals 
0.5 mLs into the skin once a week  Dispense: 2 mL; Refill: 1  - insulin glargine (LANTUS SOLOSTAR) 100 UNIT/ML injection pen; Inject 25 Units into the skin nightly  Dispense: 5 Adjustable Dose Pre-filled Pen Syringe; Refill: 3    Essential hypertension  stable  - CBC with Auto Differential; Future  - Comprehensive Metabolic Panel; Future    Gastroparesis due to DM (HCC)  stable    Neuropathy  New.  Starting Lyrica to see if this will help   - pregabalin (LYRICA) 75 MG capsule; Take 1 capsule by mouth 2 times daily for 30 days. Max Daily Amount: 150 mg  Dispense: 60 capsule; Refill: 3    Drug therapy    - POCT Rapid Drug Screen     Stress at home  Restart Elavil to help with depression symptoms    - amitriptyline (ELAVIL) 10 MG tablet; Take 1 tablet by mouth nightly  Dispense: 30 tablet; Refill: 5       Return in about 6 weeks (around 3/13/2024), or if symptoms worsen or fail to improve, for Diabetic Follow up.    Orders Placed This Encounter   Procedures    CBC with Auto Differential     Standing Status:   Future     Number of Occurrences:   1     Standing Expiration Date:   1/31/2025    Comprehensive Metabolic Panel     Standing Status:   Future     Number of Occurrences:   1     Standing Expiration Date:   1/31/2025    TSH with Reflex to FT4     Standing Status:   Future     Number of Occurrences:   1     Standing Expiration Date:   1/31/2025    Lipid, Fasting     Standing Status:   Future     Number of Occurrences:   1     Standing Expiration Date:   1/31/2025    Hemoglobin A1C     Standing Status:   Future     Number of Occurrences:   1     Standing Expiration Date:   1/31/2025    Urinalysis     Standing Status:   Future     Number of Occurrences:   1     Standing Expiration Date:   1/31/2025    Microalbumin / Creatinine Urine Ratio     Standing Status:   Future     Number of Occurrences:   1     Standing Expiration Date:   1/31/2025    POCT Rapid Drug Screen       Orders Placed This Encounter   Medications

## 2024-02-01 ENCOUNTER — TELEPHONE (OUTPATIENT)
Dept: FAMILY MEDICINE CLINIC | Age: 59
End: 2024-02-01

## 2024-02-01 NOTE — TELEPHONE ENCOUNTER
Called patient, spoke with: Patient regarding the results of the patients most recent labs.  I advised Patient of Salma Gonzalez recommendations.   Patient did voice understanding

## 2024-02-01 NOTE — TELEPHONE ENCOUNTER
----- Message from MIKE Stafford sent at 2/1/2024 12:09 PM CST -----  Please let patient know that labs have returned  A1c has improved but is still too high. It is now 9.3%.  adjusted meds at appointment yesterday.  CMP did show normal liver and renal function along with electrolytes  TSH level was in range  Cholesterol level was great continue current regimen  CBC did not show anemia or other concerns

## 2024-02-02 DIAGNOSIS — E11.8 TYPE 2 DIABETES MELLITUS WITH COMPLICATION, WITH LONG-TERM CURRENT USE OF INSULIN (HCC): ICD-10-CM

## 2024-02-02 DIAGNOSIS — Z79.4 TYPE 2 DIABETES MELLITUS WITH COMPLICATION, WITH LONG-TERM CURRENT USE OF INSULIN (HCC): ICD-10-CM

## 2024-02-02 NOTE — TELEPHONE ENCOUNTER
Received fax from pharmacy requesting refill on pts medication(s). Pt was last seen in office on 1/31/2024  and has a follow up scheduled for 3/13/2024. Will send request to  Salma Gonzalez  for patient.     Requested Prescriptions     Pending Prescriptions Disp Refills    lovastatin (MEVACOR) 40 MG tablet [Pharmacy Med Name: LOVASTATIN 40MG TABLET] 30 tablet 4     Sig: TAKE 1 TABLET BY MOUTH EVERY NIGHT AT BEDTIME

## 2024-02-06 RX ORDER — LOVASTATIN 40 MG/1
TABLET ORAL
Qty: 30 TABLET | Refills: 5 | Status: SHIPPED | OUTPATIENT
Start: 2024-02-06

## 2024-03-13 ENCOUNTER — OFFICE VISIT (OUTPATIENT)
Dept: FAMILY MEDICINE CLINIC | Age: 59
End: 2024-03-13
Payer: MEDICARE

## 2024-03-13 VITALS
HEART RATE: 72 BPM | BODY MASS INDEX: 35.88 KG/M2 | HEIGHT: 62 IN | SYSTOLIC BLOOD PRESSURE: 122 MMHG | OXYGEN SATURATION: 97 % | DIASTOLIC BLOOD PRESSURE: 70 MMHG | TEMPERATURE: 97.9 F | WEIGHT: 195 LBS

## 2024-03-13 DIAGNOSIS — G62.9 NEUROPATHY: Primary | ICD-10-CM

## 2024-03-13 DIAGNOSIS — E55.9 VITAMIN D DEFICIENCY: ICD-10-CM

## 2024-03-13 DIAGNOSIS — R20.8 ALLODYNIA: ICD-10-CM

## 2024-03-13 DIAGNOSIS — R42 DIZZINESS: ICD-10-CM

## 2024-03-13 DIAGNOSIS — G62.9 NEUROPATHY: ICD-10-CM

## 2024-03-13 DIAGNOSIS — R51.9 ACHING HEADACHE: ICD-10-CM

## 2024-03-13 DIAGNOSIS — E11.8 TYPE 2 DIABETES MELLITUS WITH COMPLICATION, WITH LONG-TERM CURRENT USE OF INSULIN (HCC): ICD-10-CM

## 2024-03-13 DIAGNOSIS — Z79.4 TYPE 2 DIABETES MELLITUS WITH COMPLICATION, WITH LONG-TERM CURRENT USE OF INSULIN (HCC): ICD-10-CM

## 2024-03-13 DIAGNOSIS — R26.9 ABNORMAL GAIT: ICD-10-CM

## 2024-03-13 DIAGNOSIS — Z82.0 FAMILY HISTORY OF MS (MULTIPLE SCLEROSIS): ICD-10-CM

## 2024-03-13 LAB
25(OH)D3 SERPL-MCNC: 21.3 NG/ML
ANION GAP SERPL CALCULATED.3IONS-SCNC: 13 MMOL/L (ref 7–19)
BUN SERPL-MCNC: 13 MG/DL (ref 8–23)
CALCIUM SERPL-MCNC: 9.2 MG/DL (ref 8.8–10.2)
CHLORIDE SERPL-SCNC: 102 MMOL/L (ref 98–111)
CO2 SERPL-SCNC: 24 MMOL/L (ref 22–29)
CREAT SERPL-MCNC: 0.8 MG/DL (ref 0.5–0.9)
CRP SERPL HS-MCNC: 1.17 MG/DL (ref 0–0.5)
ERYTHROCYTE [SEDIMENTATION RATE] IN BLOOD BY WESTERGREN METHOD: 72 MM/HR (ref 0–25)
GLUCOSE SERPL-MCNC: 159 MG/DL (ref 74–109)
MAGNESIUM SERPL-MCNC: 2.3 MG/DL (ref 1.6–2.4)
POTASSIUM SERPL-SCNC: 4 MMOL/L (ref 3.5–5)
RHEUMATOID FACT SER NEPH-ACNC: <10 IU/ML
SODIUM SERPL-SCNC: 139 MMOL/L (ref 136–145)
VIT B12 SERPL-MCNC: 330 PG/ML (ref 211–946)

## 2024-03-13 PROCEDURE — 3074F SYST BP LT 130 MM HG: CPT | Performed by: NURSE PRACTITIONER

## 2024-03-13 PROCEDURE — 3017F COLORECTAL CA SCREEN DOC REV: CPT | Performed by: NURSE PRACTITIONER

## 2024-03-13 PROCEDURE — 3046F HEMOGLOBIN A1C LEVEL >9.0%: CPT | Performed by: NURSE PRACTITIONER

## 2024-03-13 PROCEDURE — G8417 CALC BMI ABV UP PARAM F/U: HCPCS | Performed by: NURSE PRACTITIONER

## 2024-03-13 PROCEDURE — G8484 FLU IMMUNIZE NO ADMIN: HCPCS | Performed by: NURSE PRACTITIONER

## 2024-03-13 PROCEDURE — G8427 DOCREV CUR MEDS BY ELIG CLIN: HCPCS | Performed by: NURSE PRACTITIONER

## 2024-03-13 PROCEDURE — 3078F DIAST BP <80 MM HG: CPT | Performed by: NURSE PRACTITIONER

## 2024-03-13 PROCEDURE — 1036F TOBACCO NON-USER: CPT | Performed by: NURSE PRACTITIONER

## 2024-03-13 PROCEDURE — 99214 OFFICE O/P EST MOD 30 MIN: CPT | Performed by: NURSE PRACTITIONER

## 2024-03-13 PROCEDURE — 2022F DILAT RTA XM EVC RTNOPTHY: CPT | Performed by: NURSE PRACTITIONER

## 2024-03-13 SDOH — ECONOMIC STABILITY: FOOD INSECURITY: WITHIN THE PAST 12 MONTHS, YOU WORRIED THAT YOUR FOOD WOULD RUN OUT BEFORE YOU GOT MONEY TO BUY MORE.: NEVER TRUE

## 2024-03-13 SDOH — ECONOMIC STABILITY: INCOME INSECURITY: HOW HARD IS IT FOR YOU TO PAY FOR THE VERY BASICS LIKE FOOD, HOUSING, MEDICAL CARE, AND HEATING?: NOT HARD AT ALL

## 2024-03-13 SDOH — ECONOMIC STABILITY: FOOD INSECURITY: WITHIN THE PAST 12 MONTHS, THE FOOD YOU BOUGHT JUST DIDN'T LAST AND YOU DIDN'T HAVE MONEY TO GET MORE.: NEVER TRUE

## 2024-03-13 ASSESSMENT — PATIENT HEALTH QUESTIONNAIRE - PHQ9
SUM OF ALL RESPONSES TO PHQ QUESTIONS 1-9: 0
1. LITTLE INTEREST OR PLEASURE IN DOING THINGS: 0
SUM OF ALL RESPONSES TO PHQ QUESTIONS 1-9: 0
2. FEELING DOWN, DEPRESSED OR HOPELESS: 0
SUM OF ALL RESPONSES TO PHQ9 QUESTIONS 1 & 2: 0

## 2024-03-13 ASSESSMENT — ENCOUNTER SYMPTOMS
EYES NEGATIVE: 1
GASTROINTESTINAL NEGATIVE: 1
RESPIRATORY NEGATIVE: 1

## 2024-03-13 NOTE — PROGRESS NOTES
(JARDIANCE) 25 MG tablet Take 1 tablet by mouth daily 30 tablet 5    pregabalin (LYRICA) 75 MG capsule Take 1 capsule by mouth 2 times daily for 30 days. Max Daily Amount: 150 mg 60 capsule 3    Tirzepatide (MOUNJARO) 2.5 MG/0.5ML SOPN SC injection Inject 0.5 mLs into the skin once a week 2 mL 1    insulin glargine (LANTUS SOLOSTAR) 100 UNIT/ML injection pen Inject 25 Units into the skin nightly 5 Adjustable Dose Pre-filled Pen Syringe 3    amitriptyline (ELAVIL) 10 MG tablet Take 1 tablet by mouth nightly 30 tablet 5    glipiZIDE (GLUCOTROL XL) 10 MG extended release tablet TAKE 1 TABLET BY MOUTH DAILY 30 tablet 3    glimepiride (AMARYL) 2 MG tablet Take 1 tablet by mouth every morning (before breakfast) 30 tablet 5    oxybutynin (DITROPAN-XL) 10 MG extended release tablet Take 1 tablet by mouth daily 90 tablet 3    docusate sodium (COLACE) 100 MG capsule Take 1 capsule by mouth daily as needed for Constipation 30 capsule 1    furosemide (LASIX) 40 MG tablet Take 1 tablet by mouth daily 30 tablet 5    metFORMIN (GLUCOPHAGE-XR) 500 MG extended release tablet Take one tablet by mouth 2 times daily 60 tablet 5    metoprolol tartrate (LOPRESSOR) 25 MG tablet Take one tablet by mouth twice daily 60 tablet 5    nitroGLYCERIN (NITROSTAT) 0.4 MG SL tablet Place 1 tablet under the tongue every 5 minutes as needed for Chest pain 25 tablet 3    ondansetron (ZOFRAN ODT) 4 MG disintegrating tablet Take 1 tablet by mouth daily as needed for Nausea or Vomiting 30 tablet 5    valsartan (DIOVAN) 80 MG tablet Take 1 tablet by mouth daily 30 tablet 5    vitamin D (ERGOCALCIFEROL) 1.25 MG (18915 UT) CAPS capsule Take 1 capsule by mouth once a week 12 capsule 1    ranolazine (RANEXA) 1000 MG extended release tablet Take 1 tablet by mouth 2 times daily 180 tablet 3    aspirin 81 MG tablet Take 1 tablet by mouth daily      HYDROcodone-acetaminophen (NORCO) 5-325 MG per tablet Take 1 tablet by mouth every 6 hours as needed for Pain.

## 2024-03-15 LAB — NUCLEAR IGG SER QL IA: NORMAL

## 2024-03-16 LAB
ARSENIC BLD-MCNC: <10 UG/L
LEAD BLD-MCNC: <2 UG/DL
MERCURY BLD-MCNC: <2.5 UG/L

## 2024-03-18 ENCOUNTER — OFFICE VISIT (OUTPATIENT)
Dept: CARDIOLOGY CLINIC | Age: 59
End: 2024-03-18
Payer: MEDICARE

## 2024-03-18 VITALS
SYSTOLIC BLOOD PRESSURE: 118 MMHG | BODY MASS INDEX: 36.07 KG/M2 | HEIGHT: 62 IN | DIASTOLIC BLOOD PRESSURE: 78 MMHG | WEIGHT: 196 LBS

## 2024-03-18 DIAGNOSIS — I10 ESSENTIAL HYPERTENSION: ICD-10-CM

## 2024-03-18 DIAGNOSIS — I25.10 CORONARY ARTERY DISEASE INVOLVING NATIVE CORONARY ARTERY OF NATIVE HEART WITHOUT ANGINA PECTORIS: Primary | ICD-10-CM

## 2024-03-18 DIAGNOSIS — I50.32 CHRONIC DIASTOLIC CONGESTIVE HEART FAILURE (HCC): ICD-10-CM

## 2024-03-18 DIAGNOSIS — E78.2 MIXED HYPERLIPIDEMIA: ICD-10-CM

## 2024-03-18 PROCEDURE — 3017F COLORECTAL CA SCREEN DOC REV: CPT | Performed by: CLINICAL NURSE SPECIALIST

## 2024-03-18 PROCEDURE — 3078F DIAST BP <80 MM HG: CPT | Performed by: CLINICAL NURSE SPECIALIST

## 2024-03-18 PROCEDURE — 3074F SYST BP LT 130 MM HG: CPT | Performed by: CLINICAL NURSE SPECIALIST

## 2024-03-18 PROCEDURE — 1036F TOBACCO NON-USER: CPT | Performed by: CLINICAL NURSE SPECIALIST

## 2024-03-18 PROCEDURE — G8417 CALC BMI ABV UP PARAM F/U: HCPCS | Performed by: CLINICAL NURSE SPECIALIST

## 2024-03-18 PROCEDURE — G8427 DOCREV CUR MEDS BY ELIG CLIN: HCPCS | Performed by: CLINICAL NURSE SPECIALIST

## 2024-03-18 PROCEDURE — 99214 OFFICE O/P EST MOD 30 MIN: CPT | Performed by: CLINICAL NURSE SPECIALIST

## 2024-03-18 PROCEDURE — G8484 FLU IMMUNIZE NO ADMIN: HCPCS | Performed by: CLINICAL NURSE SPECIALIST

## 2024-03-18 RX ORDER — NITROGLYCERIN 0.4 MG/1
0.4 TABLET SUBLINGUAL EVERY 5 MIN PRN
Qty: 25 TABLET | Refills: 3 | Status: SHIPPED | OUTPATIENT
Start: 2024-03-18

## 2024-03-18 NOTE — PATIENT INSTRUCTIONS
Follow up with DR Cr in 6 mos   Maintain good blood pressure control-goal<130/80 at rest  Maintain good cholesterol control LDL goal<70 with arterial disease  If you are diabetic work to keep/obtain hemoglobin A1c< 7  Call with any questions or concerns  Follow up with Salma Gonzalez APRN for non cardiac problems  Report any new problems  Cardiovascular Fitness-Exercise as tolerated.  Strive for 30 minutes of exercise most days of the week.    Cardiac / Healthy Diet- Avoid processed high fat foods, maintain low sodium/salt   Continue current medications as directed  Continue plan of treatment  It is always recommended that you bring your medications bottles with you to each visit - this is for your safety!

## 2024-03-18 NOTE — PROGRESS NOTES
No carotid bruits.  No mass.   Respiratory - Lungs are clear bilaterally.  No wheezes or rales.  Normal effort without use of accessory muscles.  Cardiovascular - Heart has regular rhythm and rate.  No murmurs, rubs or gallops.    + pedal pulses and no varicosities.      Abdominal -  Soft, nontender, nondistended.  Bowel sounds are intact.   Extremities - No clubbing, cyanosis, or  edema.   Musculoskeletal -  No clubbing .  No Osler's nodes.   Gait normal .  No kyphosis or scoliosis.   Skin -  no statis ulcers or dermatitis.  Neurological - No focal signs are identified.  Oriented to person, place and time.    Psychiatric -  Appropriate affect and mood.       Assessment:     Diagnosis Orders   1. Coronary artery disease involving native coronary artery of native heart without angina pectoris        2. Essential hypertension        3. Chronic diastolic congestive heart failure (HCC)        4. Mixed hyperlipidemia            Data:  BP Readings from Last 3 Encounters:   03/18/24 118/78   03/13/24 122/70   01/31/24 126/78    Pulse Readings from Last 3 Encounters:   03/13/24 72   01/31/24 74   12/18/23 76        Wt Readings from Last 3 Encounters:   03/18/24 88.9 kg (196 lb)   03/13/24 88.5 kg (195 lb)   01/31/24 88.5 kg (195 lb)     EKG today shows normal sinus rhythm with a rate of 66.  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  Unable to compare previous EKG      CAD-  Diffuse disease with last cath in 2019-ongoing medical management  Negative nuclear stress test 4/1/2023    Current medical management includes beta-blocker aspirin and on Ranexa  Chest pain thought to be noncardiac.  Sharp quick and not with activity.  Unlike her previous anginal-like symptoms.  Nitroglycerin refilled      Hypertension-  Blood pressure  controlled.  Medical management includes beta-blocker, ARB and Ranexa.      Also on aspirin and statin    She has been diagnosed with JAYSON.  She has her CPAP and is trying to adjust to using it.       Reviewed

## 2024-03-20 ENCOUNTER — TELEPHONE (OUTPATIENT)
Dept: FAMILY MEDICINE CLINIC | Age: 59
End: 2024-03-20

## 2024-03-20 DIAGNOSIS — Z82.0 FAMILY HISTORY OF MS (MULTIPLE SCLEROSIS): ICD-10-CM

## 2024-03-20 DIAGNOSIS — R20.8 ALLODYNIA: Primary | ICD-10-CM

## 2024-03-20 DIAGNOSIS — R42 DIZZINESS: ICD-10-CM

## 2024-03-20 DIAGNOSIS — R26.9 ABNORMAL GAIT: ICD-10-CM

## 2024-03-20 DIAGNOSIS — R51.9 ACHING HEADACHE: ICD-10-CM

## 2024-03-20 RX ORDER — ERGOCALCIFEROL 1.25 MG/1
50000 CAPSULE ORAL WEEKLY
Qty: 12 CAPSULE | Refills: 1 | Status: SHIPPED | OUTPATIENT
Start: 2024-03-20

## 2024-03-20 NOTE — TELEPHONE ENCOUNTER
Called patient, spoke with: Patient regarding the results of the patients most recent labs.  I advised Patient of Salma Gonzalez recommendations.   Patient did voice understanding      Pt is wanting to know if you are going to order an MRI. She said this was discussed. Pt said she woke up at 4am crying with the pain in her hands and states they are still tingling.     Will send to provider for recommendations.

## 2024-03-20 NOTE — TELEPHONE ENCOUNTER
Tried calling pt back to let her know the MRI had been ordered. No answer and No vm. Will try again later.

## 2024-03-20 NOTE — TELEPHONE ENCOUNTER
----- Message from MIKE Stafford sent at 3/20/2024  8:13 AM CDT -----  Please let patient know that labs have finally returned.  Inflammation markers were elevated.  Vitamin D level was also low.  Heavy metal was negative.  LOUISE was negative.  Vitamin B12 was in normal limits.  RF factor was negative.  Once weekly vitamin D supplement has been sent to the pharmacy.

## 2024-03-26 DIAGNOSIS — E11.8 TYPE 2 DIABETES MELLITUS WITH COMPLICATION, WITH LONG-TERM CURRENT USE OF INSULIN (HCC): ICD-10-CM

## 2024-03-26 DIAGNOSIS — F43.9 STRESS AT HOME: ICD-10-CM

## 2024-03-26 DIAGNOSIS — Z79.4 TYPE 2 DIABETES MELLITUS WITH COMPLICATION, WITH LONG-TERM CURRENT USE OF INSULIN (HCC): ICD-10-CM

## 2024-03-26 DIAGNOSIS — I10 ESSENTIAL HYPERTENSION: ICD-10-CM

## 2024-03-26 RX ORDER — VALSARTAN 80 MG/1
80 TABLET ORAL DAILY
Qty: 30 TABLET | Refills: 3 | Status: SHIPPED | OUTPATIENT
Start: 2024-03-26

## 2024-03-26 RX ORDER — FUROSEMIDE 40 MG/1
40 TABLET ORAL DAILY
Qty: 30 TABLET | Refills: 3 | Status: SHIPPED | OUTPATIENT
Start: 2024-03-26

## 2024-03-26 RX ORDER — METFORMIN HYDROCHLORIDE 500 MG/1
TABLET, EXTENDED RELEASE ORAL
Qty: 60 TABLET | Refills: 3 | Status: SHIPPED | OUTPATIENT
Start: 2024-03-26

## 2024-03-26 RX ORDER — AMITRIPTYLINE HYDROCHLORIDE 10 MG/1
10 TABLET, FILM COATED ORAL NIGHTLY
Qty: 30 TABLET | Refills: 0 | Status: SHIPPED | OUTPATIENT
Start: 2024-03-26

## 2024-03-26 RX ORDER — GLIPIZIDE 10 MG/1
10 TABLET, FILM COATED, EXTENDED RELEASE ORAL DAILY
Qty: 30 TABLET | Refills: 3 | Status: SHIPPED | OUTPATIENT
Start: 2024-03-26

## 2024-03-26 RX ORDER — NYSTATIN 100000 [USP'U]/G
POWDER TOPICAL
Qty: 60 G | Refills: 5 | OUTPATIENT
Start: 2024-03-26

## 2024-03-26 RX ORDER — RANOLAZINE 1000 MG/1
1000 TABLET, EXTENDED RELEASE ORAL 2 TIMES DAILY
Qty: 180 TABLET | Refills: 1 | Status: SHIPPED | OUTPATIENT
Start: 2024-03-26

## 2024-03-26 RX ORDER — TAMSULOSIN HYDROCHLORIDE 0.4 MG/1
0.4 CAPSULE ORAL DAILY
Qty: 30 CAPSULE | Refills: 0 | OUTPATIENT
Start: 2024-03-26

## 2024-03-26 NOTE — TELEPHONE ENCOUNTER
Received fax from pharmacy requesting refill on pts medication(s). Pt was last seen in office on 3/13/2024  and has a follow up scheduled for 5/14/2024. Will send request to  Salma Gonzalez  for authorization.     Requested Prescriptions     Pending Prescriptions Disp Refills    amitriptyline (ELAVIL) 10 MG tablet [Pharmacy Med Name: AMITRIPTYLINE HYDROCHLORIDE 10MG TABLET] 30 tablet 0     Sig: TAKE 1 TABLET BY MOUTH NIGHTLY    glipiZIDE (GLUCOTROL XL) 10 MG extended release tablet [Pharmacy Med Name: GLIPIZIDE ER 10MG TABLET ER 24HR] 30 tablet 3     Sig: TAKE 1 TABLET BY MOUTH DAILY     Refused Prescriptions Disp Refills    tamsulosin (FLOMAX) 0.4 MG capsule [Pharmacy Med Name: TAMSULOSIN HYDROCHLORIDE 0.4MG CAPSULE] 30 capsule 0     Sig: Take 1 capsule by mouth daily

## 2024-03-26 NOTE — TELEPHONE ENCOUNTER
Received fax from pharmacy requesting refill on pts medication(s). Pt was last seen in office on 3/13/2024  and has a follow up scheduled for 5/14/2024. Will send request to  Salma Gonzalez  for authorization.     Requested Prescriptions     Pending Prescriptions Disp Refills    valsartan (DIOVAN) 80 MG tablet [Pharmacy Med Name: VALSARTAN 80MG TABLET] 30 tablet 0     Sig: TAKE 1 TABLET BY MOUTH DAILY    metFORMIN (GLUCOPHAGE-XR) 500 MG extended release tablet [Pharmacy Med Name: METFORMIN HYDROCHLORIDE ER 500MG ER TABLET ER 24HR] 60 tablet 0     Sig: TAKE 1 TABLET BY MOUTH TWICE DAILY    metoprolol tartrate (LOPRESSOR) 25 MG tablet [Pharmacy Med Name: METOPROLOL TARTRATE 25MG TABLET] 60 tablet 5     Sig: TAKE 1 TABLET BY MOUTH TWICE DAILY    furosemide (LASIX) 40 MG tablet [Pharmacy Med Name: FUROSEMIDE 40MG TABLET] 30 tablet 0     Sig: TAKE 1 TABLET BY MOUTH DAILY

## 2024-04-09 ENCOUNTER — TELEPHONE (OUTPATIENT)
Dept: FAMILY MEDICINE CLINIC | Age: 59
End: 2024-04-09

## 2024-04-09 ENCOUNTER — HOSPITAL ENCOUNTER (OUTPATIENT)
Dept: MRI IMAGING | Age: 59
Discharge: HOME | End: 2024-04-09
Payer: MEDICARE

## 2024-04-09 DIAGNOSIS — R51.9 ACHING HEADACHE: ICD-10-CM

## 2024-04-09 DIAGNOSIS — R26.9 ABNORMAL GAIT: ICD-10-CM

## 2024-04-09 DIAGNOSIS — R20.8 ALLODYNIA: ICD-10-CM

## 2024-04-09 DIAGNOSIS — R42 DIZZINESS: ICD-10-CM

## 2024-04-09 DIAGNOSIS — Z82.0 FAMILY HISTORY OF MS (MULTIPLE SCLEROSIS): ICD-10-CM

## 2024-04-09 PROCEDURE — 6360000004 HC RX CONTRAST MEDICATION: Performed by: NURSE PRACTITIONER

## 2024-04-09 PROCEDURE — 70553 MRI BRAIN STEM W/O & W/DYE: CPT

## 2024-04-09 PROCEDURE — A9577 INJ MULTIHANCE: HCPCS | Performed by: NURSE PRACTITIONER

## 2024-04-09 RX ADMIN — GADOBENATE DIMEGLUMINE 20 ML: 529 INJECTION, SOLUTION INTRAVENOUS at 15:37

## 2024-04-09 NOTE — TELEPHONE ENCOUNTER
[facility]iology called about an urgent MRI that needed to be addressed. They wanted to make sure that we got it. Salma was already gone for the day, so I asked Mich to review it. He reviewed it, but since this was ordered a month ago and he was not familiar with pt, he advised me to call Salma and inform her.     I called Salma and informed her of report. She states that she wants me to call and check on pt and if any new changes, advise her to go to the ER. She would review it further in the morning and also wanted pt to be seen by neuro ASAP.    I called pt and asked her how she was. She said that she has had some new issues going on. States that 1-5 times a day her head will start shaking up and down. She will have to stop what she is doing and hold it. It will finally stop doing it. Also, she fell earlier today and hurt her shoulder and it feels like someone is stabbing her in her shoulder. And she has been having memory issues. She will not know where she is, but then it comes back to her. Just short term memory loss that is getting worse.  I advised her that will the fall today and worsing memory issues, it would be best that so go on to the ER. She states that they just got done with her MRI, she is starving and they just got to [employer], she doesn't want to go to the ER and get put through the ringer. She states that her  has an appt on Friday and will discuss it with you then. I advised her that I would let you know everything and that I would document this all in her chart. Also that we would call her tomorrow after you review her results.    I called Salma back and advised her of what pt stated as well.

## 2024-04-10 ENCOUNTER — TELEPHONE (OUTPATIENT)
Dept: NEUROLOGY | Age: 59
End: 2024-04-10

## 2024-04-10 ENCOUNTER — PATIENT MESSAGE (OUTPATIENT)
Dept: FAMILY MEDICINE CLINIC | Age: 59
End: 2024-04-10

## 2024-04-10 ENCOUNTER — TELEPHONE (OUTPATIENT)
Dept: FAMILY MEDICINE CLINIC | Age: 59
End: 2024-04-10

## 2024-04-10 DIAGNOSIS — I69.30 LATE EFFECT OF LACUNAR INFARCTION: Primary | ICD-10-CM

## 2024-04-10 DIAGNOSIS — I67.89 CEREBRAL MICROVASCULAR DISEASE: ICD-10-CM

## 2024-04-10 NOTE — TELEPHONE ENCOUNTER
Called pt to attempt to scheduled appointment due to Mri read,  no answer VM was full unable to leave message.

## 2024-04-10 NOTE — TELEPHONE ENCOUNTER
----- Message from MIKE Stafford sent at 4/10/2024  7:45 AM CDT -----  Urgent referral to neuro has been placed.  Please make appointment and let patient know of appointment date/time.  Patient is aware of results.  Make sure she continues ASA and lovastatin.

## 2024-04-10 NOTE — TELEPHONE ENCOUNTER
From: Shari Aldrich  To: Salma Gonzalez  Sent: 4/10/2024 12:50 PM CDT  Subject: Question    Can you please call me about these test results?   I don't know what all this means and I am a nervous wreck at this point.

## 2024-04-15 ENCOUNTER — OFFICE VISIT (OUTPATIENT)
Dept: NEUROLOGY | Age: 59
End: 2024-04-15
Payer: MEDICARE

## 2024-04-15 ENCOUNTER — PATIENT MESSAGE (OUTPATIENT)
Dept: FAMILY MEDICINE CLINIC | Age: 59
End: 2024-04-15

## 2024-04-15 VITALS
OXYGEN SATURATION: 98 % | DIASTOLIC BLOOD PRESSURE: 76 MMHG | WEIGHT: 196 LBS | HEART RATE: 71 BPM | BODY MASS INDEX: 36.07 KG/M2 | HEIGHT: 62 IN | SYSTOLIC BLOOD PRESSURE: 121 MMHG

## 2024-04-15 DIAGNOSIS — I67.89 OTHER CEREBROVASCULAR DISEASE: ICD-10-CM

## 2024-04-15 DIAGNOSIS — I63.89 OTHER CEREBRAL INFARCTION (HCC): ICD-10-CM

## 2024-04-15 DIAGNOSIS — I63.50 RIGHT PONTINE CEREBROVASCULAR ACCIDENT (HCC): Primary | ICD-10-CM

## 2024-04-15 DIAGNOSIS — G47.33 OSA (OBSTRUCTIVE SLEEP APNEA): ICD-10-CM

## 2024-04-15 PROCEDURE — G8417 CALC BMI ABV UP PARAM F/U: HCPCS | Performed by: NURSE PRACTITIONER

## 2024-04-15 PROCEDURE — 3017F COLORECTAL CA SCREEN DOC REV: CPT | Performed by: NURSE PRACTITIONER

## 2024-04-15 PROCEDURE — 3078F DIAST BP <80 MM HG: CPT | Performed by: NURSE PRACTITIONER

## 2024-04-15 PROCEDURE — 99204 OFFICE O/P NEW MOD 45 MIN: CPT | Performed by: NURSE PRACTITIONER

## 2024-04-15 PROCEDURE — 1036F TOBACCO NON-USER: CPT | Performed by: NURSE PRACTITIONER

## 2024-04-15 PROCEDURE — G8427 DOCREV CUR MEDS BY ELIG CLIN: HCPCS | Performed by: NURSE PRACTITIONER

## 2024-04-15 PROCEDURE — 3074F SYST BP LT 130 MM HG: CPT | Performed by: NURSE PRACTITIONER

## 2024-04-15 NOTE — PROGRESS NOTES
REVIEW OF SYSTEMS    Constitutional: []Fever []Sweats []Chills [] Recent Injury [x] Denies all unless marked  HEENT:[]Headache  [] Head Injury [] Hearing Loss  [] Sore Throat  [] Ear Ache [x] Denies all unless marked  Spine:  [] Neck pain  [] Back pain  [] Sciaticia  [x] Denies all unless marked  Cardiovascular:[]Heart Disease []Palpitations [] Chest Pain   [x] Denies all unless marked  Pulmonary: []Shortness of Breath []Cough   [x] Denies all unless marked  Psychiatric/Behavioral:[] Depression [] Anxiety [x] Denies all unless marked  Gastrointestinal: []Nausea  []Vomiting  []Abdominal Pain  []Constipation  []Diarrhea  [x] Denies all unless marked  Genitourinary:   [] Frequency  [] Urgency  [] Dysuria [] Incontinence  [x] Denies all unless marked  Extremities: []Pain  []Swelling  [x] Denies all unless marked  Musculoskeletal: [] Myalgias  [] Joint Pain  [] Arthritis [] Muscle Cramps [] Muscle Twitches  [x] Denies all unless marked  Sleep: []Insomnia[]Snoring []Restless Legs  []Sleep Apnea  []Daytime Sleepiness  [x] Denies all unless marked  Skin:[] Rash [] Color Change [x] Denies all unless marked   Neurological:[]Visual Disturbance [] Memory Loss []Loss of Balance []Slurred Speech []Weakness []Seizures  [] Dizziness [x] Denies all unless marked      
[]Palpitations [] Chest Pain   [x] Denies all unless marked  Pulmonary: []Shortness of Breath []Cough   [x] Denies all unless marked  Psychiatric/Behavioral:[] Depression [] Anxiety [x] Denies all unless marked  Gastrointestinal: []Nausea  []Vomiting  []Abdominal Pain  []Constipation  []Diarrhea  [x] Denies all unless marked  Genitourinary:   [] Frequency  [] Urgency  [] Dysuria [] Incontinence  [x] Denies all unless marked  Extremities: []Pain  []Swelling  [x] Denies all unless marked  Musculoskeletal: [] Myalgias  [] Joint Pain  [] Arthritis [] Muscle Cramps [] Muscle Twitches  [x] Denies all unless marked  Sleep: []Insomnia[]Snoring []Restless Legs  []Sleep Apnea  []Daytime Sleepiness  [x] Denies all unless marked  Skin:[] Rash [] Color Change [x] Denies all unless marked   Neurological:[]Visual Disturbance [] Memory Loss []Loss of Balance []Slurred Speech []Weakness []Seizures  [] Dizziness [x] Denies all unless marked    The MA has completed the ROS with the patient. I have reviewed it in its' entirety with the patient and agree with the documentation.     PHYSICAL EXAM  /76   Pulse 71   Ht 1.575 m (5' 2\")   Wt 88.9 kg (196 lb)   LMP 01/01/1998 (Approximate)   SpO2 98%   BMI 35.85 kg/m²       Constitutional - No acute distress    HEENT- Conjunctiva normal.  No scars, masses, or lesions over external nose or ears, no neck masses noted, no jugular vein distension, no bruit  Cardiac- Regular rate and rhythm  Pulmonary- Good expansion, normal effort without use of accessory muscles  Musculoskeletal - No significant wasting of muscles noted, no bony deformities  Extremities - No clubbing, cyanosis or edema  Skin - Warm, dry, and intact.  No rash, erythema, or pallor  Psychiatric - Mood, affect, and behavior appear normal      NEUROLOGICAL EXAM     Mental status   [x] Awake, alert, oriented   [x]Affect attention and concentration appear appropriate  [x]Recent and remote memory appears

## 2024-04-16 RX ORDER — CEPHALEXIN 500 MG/1
500 CAPSULE ORAL 2 TIMES DAILY
Qty: 20 CAPSULE | Refills: 0 | Status: SHIPPED | OUTPATIENT
Start: 2024-04-16 | End: 2024-04-26

## 2024-04-16 NOTE — TELEPHONE ENCOUNTER
From: Shari Aldrich  To: Salma Gonzalez  Sent: 4/15/2024 5:42 PM CDT  Subject: Need something    Is there any way I can get something for a UTI without making a trip to [place]? This has all been doing fine for so long, but I have a lot of pressure and feel like I need to pee a lot, but I am not. Now it is starting to burn. I am drinking lots of water. Haven't had this in a long time.   Thanks, Shari

## 2024-05-02 ENCOUNTER — TELEPHONE (OUTPATIENT)
Dept: NEUROLOGY | Age: 59
End: 2024-05-02

## 2024-05-02 NOTE — TELEPHONE ENCOUNTER
Received a referral from MIKE Yates office for this patient. Called patient to let her know when I have her scheduled an appointment with LORI Nguyen. NO answer VM is full. I will mail patient an appointment letter.

## 2024-05-07 ENCOUNTER — HOSPITAL ENCOUNTER (OUTPATIENT)
Age: 59
Discharge: HOME | End: 2024-05-09
Payer: MEDICARE

## 2024-05-07 ENCOUNTER — HOSPITAL ENCOUNTER (OUTPATIENT)
Dept: VASCULAR LAB | Age: 59
Discharge: HOME | End: 2024-05-09
Payer: MEDICARE

## 2024-05-07 VITALS
SYSTOLIC BLOOD PRESSURE: 121 MMHG | BODY MASS INDEX: 36.07 KG/M2 | DIASTOLIC BLOOD PRESSURE: 76 MMHG | HEIGHT: 62 IN | WEIGHT: 196 LBS

## 2024-05-07 DIAGNOSIS — I63.89 OTHER CEREBRAL INFARCTION (HCC): ICD-10-CM

## 2024-05-07 DIAGNOSIS — I63.50 RIGHT PONTINE CEREBROVASCULAR ACCIDENT (HCC): ICD-10-CM

## 2024-05-07 DIAGNOSIS — I67.89 OTHER CEREBROVASCULAR DISEASE: ICD-10-CM

## 2024-05-07 LAB
ECHO AO ASC DIAM: 2.4 CM
ECHO AO ASCENDING AORTA INDEX: 1.26 CM/M2
ECHO AO ROOT DIAM: 1.8 CM
ECHO AO ROOT INDEX: 0.95 CM/M2
ECHO AO SINUS VALSALVA DIAM: 2.4 CM
ECHO AO SINUS VALSALVA INDEX: 1.26 CM/M2
ECHO AO ST JNCT DIAM: 2.1 CM
ECHO AV AREA PEAK VELOCITY: 2.3 CM2
ECHO AV AREA VTI: 2.5 CM2
ECHO AV AREA/BSA PEAK VELOCITY: 1.2 CM2/M2
ECHO AV AREA/BSA VTI: 1.3 CM2/M2
ECHO AV MEAN GRADIENT: 3 MMHG
ECHO AV MEAN VELOCITY: 0.9 M/S
ECHO AV PEAK GRADIENT: 7 MMHG
ECHO AV PEAK VELOCITY: 1.3 M/S
ECHO AV VELOCITY RATIO: 0.69
ECHO AV VTI: 27.2 CM
ECHO BSA: 1.97 M2
ECHO BSA: 1.97 M2
ECHO EST RA PRESSURE: 3 MMHG
ECHO IVC PROX: 1.5 CM
ECHO LA AREA 2C: 13.3 CM2
ECHO LA AREA 4C: 10.7 CM2
ECHO LA DIAMETER INDEX: 1.53 CM/M2
ECHO LA DIAMETER: 2.9 CM
ECHO LA MAJOR AXIS: 3.9 CM
ECHO LA MINOR AXIS: 4.9 CM
ECHO LA TO AORTIC ROOT RATIO: 1.61
ECHO LA VOL MOD A2C: 29 ML (ref 22–52)
ECHO LA VOL MOD A4C: 24 ML (ref 22–52)
ECHO LA VOLUME INDEX MOD A2C: 15 ML/M2 (ref 16–34)
ECHO LA VOLUME INDEX MOD A4C: 13 ML/M2 (ref 16–34)
ECHO LV E' LATERAL VELOCITY: 6 CM/S
ECHO LV E' SEPTAL VELOCITY: 5 CM/S
ECHO LV EDV A2C: 87 ML
ECHO LV EDV A4C: 81 ML
ECHO LV EDV INDEX A4C: 43 ML/M2
ECHO LV EDV NDEX A2C: 46 ML/M2
ECHO LV EJECTION FRACTION A2C: 61 %
ECHO LV EJECTION FRACTION A4C: 59 %
ECHO LV EJECTION FRACTION BIPLANE: 59 % (ref 55–100)
ECHO LV ESV A2C: 34 ML
ECHO LV ESV A4C: 33 ML
ECHO LV ESV INDEX A2C: 18 ML/M2
ECHO LV ESV INDEX A4C: 17 ML/M2
ECHO LV FRACTIONAL SHORTENING: 30 % (ref 28–44)
ECHO LV INTERNAL DIMENSION DIASTOLE INDEX: 1.95 CM/M2
ECHO LV INTERNAL DIMENSION DIASTOLIC: 3.7 CM (ref 3.9–5.3)
ECHO LV INTERNAL DIMENSION SYSTOLIC: 2.6 CM
ECHO LV IVSD: 1.1 CM (ref 0.6–0.9)
ECHO LV MASS 2D: 129.3 G (ref 67–162)
ECHO LV MASS INDEX 2D: 68.1 G/M2 (ref 43–95)
ECHO LV POSTERIOR WALL DIASTOLIC: 1.1 CM (ref 0.6–0.9)
ECHO LV RELATIVE WALL THICKNESS RATIO: 0.59
ECHO LVOT AREA: 3.1 CM2
ECHO LVOT AV VTI INDEX: 0.79
ECHO LVOT DIAM: 2 CM
ECHO LVOT MEAN GRADIENT: 2 MMHG
ECHO LVOT PEAK GRADIENT: 3 MMHG
ECHO LVOT PEAK VELOCITY: 0.9 M/S
ECHO LVOT STROKE VOLUME INDEX: 35.5 ML/M2
ECHO LVOT SV: 67.5 ML
ECHO LVOT VTI: 21.5 CM
ECHO MV A VELOCITY: 0.68 M/S
ECHO MV AREA VTI: 2.9 CM2
ECHO MV E DECELERATION TIME (DT): 227 MS
ECHO MV E VELOCITY: 0.48 M/S
ECHO MV E/A RATIO: 0.71
ECHO MV E/E' LATERAL: 8
ECHO MV E/E' RATIO (AVERAGED): 8.8
ECHO MV LVOT VTI INDEX: 1.08
ECHO MV MAX VELOCITY: 0.8 M/S
ECHO MV MEAN GRADIENT: 1 MMHG
ECHO MV MEAN VELOCITY: 0.5 M/S
ECHO MV PEAK GRADIENT: 3 MMHG
ECHO MV VTI: 23.2 CM
ECHO RA AREA 4C: 14.1 CM2
ECHO RA END SYSTOLIC VOLUME APICAL 4 CHAMBER INDEX BSA: 15 ML/M2
ECHO RA VOLUME: 28 ML
ECHO RV BASAL DIMENSION: 2.8 CM
ECHO RV INTERNAL DIMENSION: 2.5 CM
ECHO RV LONGITUDINAL DIMENSION: 6.5 CM
ECHO RV MID DIMENSION: 2.4 CM
ECHO RV TAPSE: 2.1 CM (ref 1.7–?)

## 2024-05-07 PROCEDURE — 93242 EXT ECG>48HR<7D RECORDING: CPT

## 2024-05-07 PROCEDURE — 6360000004 HC RX CONTRAST MEDICATION: Performed by: NURSE PRACTITIONER

## 2024-05-07 PROCEDURE — 2580000003 HC RX 258: Performed by: NURSE PRACTITIONER

## 2024-05-07 PROCEDURE — 93306 TTE W/DOPPLER COMPLETE: CPT | Performed by: INTERNAL MEDICINE

## 2024-05-07 PROCEDURE — C8929 TTE W OR WO FOL WCON,DOPPLER: HCPCS

## 2024-05-07 PROCEDURE — 93880 EXTRACRANIAL BILAT STUDY: CPT

## 2024-05-07 RX ORDER — SODIUM CHLORIDE 0.9 % (FLUSH) 0.9 %
5-40 SYRINGE (ML) INJECTION 2 TIMES DAILY
Status: DISCONTINUED | OUTPATIENT
Start: 2024-05-07 | End: 2024-05-11 | Stop reason: HOSPADM

## 2024-05-07 RX ADMIN — SODIUM CHLORIDE, PRESERVATIVE FREE 10 ML: 5 INJECTION INTRAVENOUS at 11:39

## 2024-05-07 RX ADMIN — PERFLUTREN 1.5 ML: 6.52 INJECTION, SUSPENSION INTRAVENOUS at 11:40

## 2024-05-08 LAB
VAS LEFT CCA MID EDV: 13 CM/S
VAS LEFT CCA MID PSV: 93.2 CM/S
VAS LEFT CCA PROX EDV: 16.8 CM/S
VAS LEFT CCA PROX PSV: 108 CM/S
VAS LEFT ECA PSV: 327 CM/S
VAS LEFT ICA DIST EDV: 23.4 CM/S
VAS LEFT ICA DIST PSV: 131 CM/S
VAS LEFT ICA MID EDV: 28.1 CM/S
VAS LEFT ICA MID PSV: 165 CM/S
VAS LEFT ICA PROX EDV: 32.9 CM/S
VAS LEFT ICA PROX PSV: 177 CM/S
VAS LEFT VERTEBRAL EDV: 12.6 CM/S
VAS LEFT VERTEBRAL PSV: 71.9 CM/S
VAS RIGHT CCA MID EDV: 14.3 CM/S
VAS RIGHT CCA MID PSV: 71.8 CM/S
VAS RIGHT CCA PROX EDV: 14.9 CM/S
VAS RIGHT CCA PROX PSV: 90.1 CM/S
VAS RIGHT ECA PSV: 0 CM/S
VAS RIGHT ICA DIST EDV: 16.2 CM/S
VAS RIGHT ICA DIST PSV: 99.4 CM/S
VAS RIGHT ICA MID EDV: 24.2 CM/S
VAS RIGHT ICA MID PSV: 111 CM/S
VAS RIGHT ICA PROX EDV: 25.6 CM/S
VAS RIGHT ICA PROX PSV: 86 CM/S
VAS RIGHT VERTEBRAL EDV: 9.83 CM/S
VAS RIGHT VERTEBRAL PSV: 65.4 CM/S

## 2024-05-08 PROCEDURE — 93880 EXTRACRANIAL BILAT STUDY: CPT | Performed by: SURGERY

## 2024-05-09 DIAGNOSIS — I65.22 LEFT CAROTID STENOSIS: Primary | ICD-10-CM

## 2024-05-14 ENCOUNTER — OFFICE VISIT (OUTPATIENT)
Dept: FAMILY MEDICINE CLINIC | Age: 59
End: 2024-05-14
Payer: MEDICARE

## 2024-05-14 VITALS
OXYGEN SATURATION: 97 % | HEART RATE: 66 BPM | DIASTOLIC BLOOD PRESSURE: 68 MMHG | SYSTOLIC BLOOD PRESSURE: 120 MMHG | WEIGHT: 193 LBS | BODY MASS INDEX: 35.51 KG/M2 | TEMPERATURE: 97.9 F | HEIGHT: 62 IN

## 2024-05-14 DIAGNOSIS — F43.9 STRESS AT HOME: ICD-10-CM

## 2024-05-14 DIAGNOSIS — T74.31XA VERBAL ABUSE OF ADULT, INITIAL ENCOUNTER: ICD-10-CM

## 2024-05-14 DIAGNOSIS — Z79.4 TYPE 2 DIABETES MELLITUS WITH COMPLICATION, WITH LONG-TERM CURRENT USE OF INSULIN (HCC): Primary | ICD-10-CM

## 2024-05-14 DIAGNOSIS — I10 ESSENTIAL HYPERTENSION: ICD-10-CM

## 2024-05-14 DIAGNOSIS — E11.8 TYPE 2 DIABETES MELLITUS WITH COMPLICATION, WITH LONG-TERM CURRENT USE OF INSULIN (HCC): Primary | ICD-10-CM

## 2024-05-14 DIAGNOSIS — G47.33 OSA (OBSTRUCTIVE SLEEP APNEA): ICD-10-CM

## 2024-05-14 LAB — HBA1C MFR BLD: 7.7 %

## 2024-05-14 PROCEDURE — 3051F HG A1C>EQUAL 7.0%<8.0%: CPT | Performed by: NURSE PRACTITIONER

## 2024-05-14 PROCEDURE — 99214 OFFICE O/P EST MOD 30 MIN: CPT | Performed by: NURSE PRACTITIONER

## 2024-05-14 PROCEDURE — G8417 CALC BMI ABV UP PARAM F/U: HCPCS | Performed by: NURSE PRACTITIONER

## 2024-05-14 PROCEDURE — 2022F DILAT RTA XM EVC RTNOPTHY: CPT | Performed by: NURSE PRACTITIONER

## 2024-05-14 PROCEDURE — G8427 DOCREV CUR MEDS BY ELIG CLIN: HCPCS | Performed by: NURSE PRACTITIONER

## 2024-05-14 PROCEDURE — 83036 HEMOGLOBIN GLYCOSYLATED A1C: CPT | Performed by: NURSE PRACTITIONER

## 2024-05-14 PROCEDURE — 3017F COLORECTAL CA SCREEN DOC REV: CPT | Performed by: NURSE PRACTITIONER

## 2024-05-14 PROCEDURE — 3078F DIAST BP <80 MM HG: CPT | Performed by: NURSE PRACTITIONER

## 2024-05-14 PROCEDURE — 3074F SYST BP LT 130 MM HG: CPT | Performed by: NURSE PRACTITIONER

## 2024-05-14 PROCEDURE — 1036F TOBACCO NON-USER: CPT | Performed by: NURSE PRACTITIONER

## 2024-05-14 ASSESSMENT — ENCOUNTER SYMPTOMS
GASTROINTESTINAL NEGATIVE: 1
EYES NEGATIVE: 1
RESPIRATORY NEGATIVE: 1
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
## 2024-05-22 DIAGNOSIS — Z79.4 TYPE 2 DIABETES MELLITUS WITH COMPLICATION, WITH LONG-TERM CURRENT USE OF INSULIN (HCC): ICD-10-CM

## 2024-05-22 DIAGNOSIS — E11.8 TYPE 2 DIABETES MELLITUS WITH COMPLICATION, WITH LONG-TERM CURRENT USE OF INSULIN (HCC): ICD-10-CM

## 2024-05-22 RX ORDER — TIRZEPATIDE 2.5 MG/.5ML
INJECTION, SOLUTION SUBCUTANEOUS
Qty: 2 ML | Refills: 1 | Status: SHIPPED | OUTPATIENT
Start: 2024-05-22

## 2024-05-22 NOTE — TELEPHONE ENCOUNTER
Received fax from pharmacy requesting refill on pts medication(s). Pt was last seen in office on 5/14/2024  and has a follow up scheduled for 8/14/2024. Will send request to  Salma Gonzalez  for authorization.     Requested Prescriptions     Pending Prescriptions Disp Refills    MOUNJARO 2.5 MG/0.5ML SOPN SC injection [Pharmacy Med Name: MOUNJARO 2.5/0.5 SOLN PEN-INJ] 2 mL 1     Sig: INJECT 1/2 (ONE HALF) MILLILITERS INTO THE SKIN ONCE A WEEK

## 2024-05-24 LAB — ECHO BSA: 1.97 M2

## 2024-05-29 ENCOUNTER — PATIENT MESSAGE (OUTPATIENT)
Dept: FAMILY MEDICINE CLINIC | Age: 59
End: 2024-05-29

## 2024-05-29 RX ORDER — NYSTATIN 100000 [USP'U]/G
POWDER TOPICAL
Qty: 60 G | Refills: 1 | Status: SHIPPED | OUTPATIENT
Start: 2024-05-29

## 2024-05-29 NOTE — TELEPHONE ENCOUNTER
From: Shari Aldrich  To: Salma Gonzalez  Sent: 5/29/2024 12:59 AM CDT  Subject: Question    Can y'all send a script in for Nystatin powder. Under my breast is so raw and this heat don't help. Thank you.

## 2024-05-31 DIAGNOSIS — Z12.31 ENCOUNTER FOR SCREENING MAMMOGRAM FOR MALIGNANT NEOPLASM OF BREAST: Primary | ICD-10-CM

## 2024-06-06 ENCOUNTER — OFFICE VISIT (OUTPATIENT)
Dept: VASCULAR SURGERY | Age: 59
End: 2024-06-06

## 2024-06-06 VITALS
OXYGEN SATURATION: 96 % | TEMPERATURE: 97.6 F | HEART RATE: 72 BPM | DIASTOLIC BLOOD PRESSURE: 64 MMHG | SYSTOLIC BLOOD PRESSURE: 114 MMHG

## 2024-06-06 DIAGNOSIS — I65.23 BILATERAL CAROTID ARTERY STENOSIS: Primary | ICD-10-CM

## 2024-06-06 NOTE — PROGRESS NOTES
Liver Cancer Mother     Heart Failure Maternal Grandfather     Stroke Maternal Grandfather     Pancreatic Cancer Maternal Grandfather     Stomach Cancer Maternal Grandfather     Diabetes Brother     Colon Cancer Paternal Uncle     Rectal Cancer Other     Breast Cancer Maternal Grandmother 79    Colon Polyps Neg Hx     Crohn's Disease Neg Hx     Irritable Bowel Syndrome Neg Hx     Liver Disease Neg Hx      Social History     Tobacco Use    Smoking status: Former     Current packs/day: 0.00     Average packs/day: 2.0 packs/day for 15.0 years (30.0 ttl pk-yrs)     Types: Cigarettes     Start date: 1999     Quit date: 2014     Years since quittin.9    Smokeless tobacco: Never   Substance Use Topics    Alcohol use: No         Eyes - no sudden vision change or amaurosis.  Respiratory - no significant shortness of breath,  Cardiovascular - no chest pain or syncope.  No  significant leg swelling. no claudication.  Musculoskeletal - no gait disturbance  Skin - no new wound.  Neurologic -  No speech difficulty or lateralizing weakness.  All other review of systems are negative.    Physical Exam    /64 (Site: Left Upper Arm)   Pulse 72   Temp 97.6 °F (36.4 °C)   LMP 1998 (Approximate)   SpO2 96%       Neck- carotid pulses 2+ to palpation with no bruit  Cardiovascular - Regular rate and rhythm.    Pulmonary - effort appears normal.  No respiratory distress.    Lungs - Breath sounds normal. No wheezes or rales.    Extremities - without edema   Neurologic - alert and oriented X 3.  Physiologic.   Face symmetric.  Skin - warm, dry, and intact.  no wound  Psychiatric - mood, affect, and behavior anoppear normal.  Judgment and thought processes appear normal.    Risk factors for atherosclerosis of all vascular beds have been reviewed with the patient including:  Family history, tobacco abuse in all forms, elevated cholesterol, hyperlipidemia, and diabetes.      Doppler results:    Right CCA/ICA <50%

## 2024-06-17 ENCOUNTER — TELEPHONE (OUTPATIENT)
Dept: NEUROLOGY | Age: 59
End: 2024-06-17

## 2024-06-18 ENCOUNTER — OFFICE VISIT (OUTPATIENT)
Dept: NEUROLOGY | Age: 59
End: 2024-06-18
Payer: MEDICARE

## 2024-06-18 VITALS
DIASTOLIC BLOOD PRESSURE: 73 MMHG | BODY MASS INDEX: 34.96 KG/M2 | SYSTOLIC BLOOD PRESSURE: 111 MMHG | HEIGHT: 62 IN | HEART RATE: 67 BPM | OXYGEN SATURATION: 98 % | WEIGHT: 190 LBS

## 2024-06-18 DIAGNOSIS — I63.50 RIGHT PONTINE CEREBROVASCULAR ACCIDENT (HCC): Primary | ICD-10-CM

## 2024-06-18 DIAGNOSIS — R20.2 NUMBNESS AND TINGLING OF BOTH LEGS: ICD-10-CM

## 2024-06-18 DIAGNOSIS — R20.0 NUMBNESS AND TINGLING OF BOTH LEGS: ICD-10-CM

## 2024-06-18 PROCEDURE — G8417 CALC BMI ABV UP PARAM F/U: HCPCS | Performed by: NURSE PRACTITIONER

## 2024-06-18 PROCEDURE — 1036F TOBACCO NON-USER: CPT | Performed by: NURSE PRACTITIONER

## 2024-06-18 PROCEDURE — 3074F SYST BP LT 130 MM HG: CPT | Performed by: NURSE PRACTITIONER

## 2024-06-18 PROCEDURE — 99213 OFFICE O/P EST LOW 20 MIN: CPT | Performed by: NURSE PRACTITIONER

## 2024-06-18 PROCEDURE — G8427 DOCREV CUR MEDS BY ELIG CLIN: HCPCS | Performed by: NURSE PRACTITIONER

## 2024-06-18 PROCEDURE — 3078F DIAST BP <80 MM HG: CPT | Performed by: NURSE PRACTITIONER

## 2024-06-18 PROCEDURE — 3017F COLORECTAL CA SCREEN DOC REV: CPT | Performed by: NURSE PRACTITIONER

## 2024-06-18 RX ORDER — DULOXETIN HYDROCHLORIDE 30 MG/1
30 CAPSULE, DELAYED RELEASE ORAL DAILY
Qty: 30 CAPSULE | Refills: 3 | Status: SHIPPED | OUTPATIENT
Start: 2024-06-18

## 2024-06-18 NOTE — PROGRESS NOTES
REVIEW OF SYSTEMS    Constitutional: []Fever []Sweats []Chills [] Recent Injury [x] Denies all unless marked  HEENT:[]Headache  [] Head Injury [] Hearing Loss  [] Sore Throat  [] Ear Ache [x] Denies all unless marked  Spine:  [] Neck pain  [] Back pain  [] Sciaticia  [x] Denies all unless marked  Cardiovascular:[]Heart Disease []Palpitations [] Chest Pain   [x] Denies all unless marked  Pulmonary: []Shortness of Breath []Cough   [x] Denies all unless marked  Psychiatric/Behavioral:[] Depression [] Anxiety [x] Denies all unless marked  Gastrointestinal: []Nausea  []Vomiting  []Abdominal Pain  []Constipation  []Diarrhea  [x] Denies all unless marked  Genitourinary:   [] Frequency  [] Urgency  [] Dysuria [] Incontinence  [x] Denies all unless marked  Extremities: []Pain  []Swelling  [x] Denies all unless marked  Musculoskeletal: [] Myalgias  [] Joint Pain  [] Arthritis [x] Muscle Cramps [] Muscle Twitches  [x] Denies all unless marked  Sleep: []Insomnia[]Snoring []Restless Legs  []Sleep Apnea  [x]Daytime Sleepiness  [x] Denies all unless marked  Skin:[] Rash [] Color Change [x] Denies all unless marked   Neurological:[]Visual Disturbance [x] Memory Loss [x]Loss of Balance []Slurred Speech [x]Weakness []Seizures  [] Dizziness [x] Denies all unless marked    .

## 2024-06-18 NOTE — PROGRESS NOTES
[facility] Office Note      Patient:   Shari Aldrich  MR#:    [id number]  Account Number:                         YOB: 1965  Date of Evaluation:  6/18/2024  Time of Note:                          3:13 PM  Primary/Referring Physician:  Salma Gonzalez APRN   Consulting Physician:  MIKE Yates DNP    FOLLOW UP    Chief Complaint   Patient presents with    Follow-up     Right pontine cerebrovascular accident.  Patient states she is hurting all the time, pain in bilateral leg-left is worse, is unable to sleep at night.      HISTORY OF PRESENT ILLNESS    Shari Aldrich is a 58 y.o. year old female here for follow up of stroke. Denies further stroke like symptoms. She previously noted weakness, falls, gait changes, memory changes which prompted MRI brain. MRI brain show a late acute to subacute infarct in the right dillon. She does note that she falls asleep when sedentary, snores, stops breathing/wakes up gasping for air. She will see sleep medicine in August to discuss Inspire device. She continues to note episodes of neck pain, muscle spasms and head shaking. She is concerned about seizure. Brief in nature. No clear LOC with events. No tongue biting or bladder incontinence. Episodes are sporadic. She has underlying diabetic neuropathy, notes painful paresthesias in BUE, BLE. Pain has been worse recently, keeping her awake at night. Noting some muscle spasms as well. Notes some weakness in her hands and feet related to this. On Lyrica for symptomatic management. She has had more lethargy with higher doses of Lyrica. She has tried and failed Gabapentin. She is a diabetic, A1C has not been well controlled over the past year, she had issues with insurance and wasn't getting medications. History of CAD with stent and CABG. No atrial fibrillation history.  No history of carotid artery stenosis. Has underlying JAYSON, does not wear CPAP. She has a family history of multiple sclerosis.     Past Medical

## 2024-06-21 DIAGNOSIS — Z79.4 TYPE 2 DIABETES MELLITUS WITH COMPLICATION, WITH LONG-TERM CURRENT USE OF INSULIN (HCC): ICD-10-CM

## 2024-06-21 DIAGNOSIS — E11.8 TYPE 2 DIABETES MELLITUS WITH COMPLICATION, WITH LONG-TERM CURRENT USE OF INSULIN (HCC): ICD-10-CM

## 2024-06-21 NOTE — TELEPHONE ENCOUNTER
Received fax from pharmacy requesting refill on pts medication(s). Pt was last seen in office on 5/14/2024  and has a follow up scheduled for 8/14/2024. Will send request to  Salma Gonzalez  for authorization.     Requested Prescriptions     Pending Prescriptions Disp Refills    glipiZIDE (GLUCOTROL XL) 10 MG extended release tablet [Pharmacy Med Name: GLIPIZIDE ER 10MG TABLET ER 24HR] 90 tablet 3     Sig: TAKE 1 TABLET BY MOUTH DAILY    lovastatin (MEVACOR) 40 MG tablet [Pharmacy Med Name: LOVASTATIN 40MG TABLET] 90 tablet 3     Sig: TAKE 1 TABLET BY MOUTH EVERY NIGHT AT BEDTIME    furosemide (LASIX) 40 MG tablet [Pharmacy Med Name: FUROSEMIDE 40MG TABLET] 90 tablet 3     Sig: TAKE 1 TABLET BY MOUTH DAILY    metFORMIN (GLUCOPHAGE-XR) 500 MG extended release tablet [Pharmacy Med Name: METFORMIN HYDROCHLORIDE ER 500MG ER TABLET ER 24HR] 180 tablet 3     Sig: TAKE 1 TABLET BY MOUTH TWICE DAILY    valsartan (DIOVAN) 80 MG tablet [Pharmacy Med Name: VALSARTAN 80MG TABLET] 90 tablet 3     Sig: TAKE 1 TABLET BY MOUTH DAILY

## 2024-06-24 RX ORDER — METFORMIN HYDROCHLORIDE 500 MG/1
TABLET, EXTENDED RELEASE ORAL
Qty: 180 TABLET | Refills: 1 | Status: SHIPPED | OUTPATIENT
Start: 2024-06-24

## 2024-06-24 RX ORDER — LOVASTATIN 40 MG/1
TABLET ORAL
Qty: 90 TABLET | Refills: 1 | Status: SHIPPED | OUTPATIENT
Start: 2024-06-24

## 2024-06-24 RX ORDER — VALSARTAN 80 MG/1
80 TABLET ORAL DAILY
Qty: 90 TABLET | Refills: 1 | Status: SHIPPED | OUTPATIENT
Start: 2024-06-24

## 2024-06-24 RX ORDER — FUROSEMIDE 40 MG/1
40 TABLET ORAL DAILY
Qty: 90 TABLET | Refills: 1 | Status: SHIPPED | OUTPATIENT
Start: 2024-06-24

## 2024-06-24 RX ORDER — GLIPIZIDE 10 MG/1
10 TABLET, FILM COATED, EXTENDED RELEASE ORAL DAILY
Qty: 90 TABLET | Refills: 1 | Status: SHIPPED | OUTPATIENT
Start: 2024-06-24

## 2024-08-01 DIAGNOSIS — K31.84 GASTROPARESIS DUE TO DM (HCC): ICD-10-CM

## 2024-08-01 DIAGNOSIS — E11.43 GASTROPARESIS DUE TO DM (HCC): ICD-10-CM

## 2024-08-01 RX ORDER — ONDANSETRON 4 MG/1
4 TABLET, ORALLY DISINTEGRATING ORAL DAILY PRN
Qty: 30 TABLET | Refills: 5 | Status: SHIPPED | OUTPATIENT
Start: 2024-08-01

## 2024-08-01 NOTE — TELEPHONE ENCOUNTER
Received fax from pharmacy requesting refill on pts medication(s). Pt was last seen in office on 5/14/2024  and has a follow up scheduled for 8/14/2024. Will send request to  Salma Gonzalez  for authorization.     Requested Prescriptions     Pending Prescriptions Disp Refills    ondansetron (ZOFRAN-ODT) 4 MG disintegrating tablet [Pharmacy Med Name: ONDANSETRON ODT 4MG ODT TABLET DISPERSE] 30 tablet 5     Sig: TAKE 1 TABLET BY MOUTH DAILY AS NEEDED FOR NAUSEA OR VOMITING

## 2024-08-14 ENCOUNTER — OFFICE VISIT (OUTPATIENT)
Dept: FAMILY MEDICINE CLINIC | Age: 59
End: 2024-08-14
Payer: MEDICARE

## 2024-08-14 VITALS
OXYGEN SATURATION: 97 % | WEIGHT: 192 LBS | SYSTOLIC BLOOD PRESSURE: 124 MMHG | TEMPERATURE: 97.8 F | HEIGHT: 62 IN | BODY MASS INDEX: 35.33 KG/M2 | DIASTOLIC BLOOD PRESSURE: 82 MMHG | HEART RATE: 72 BPM

## 2024-08-14 DIAGNOSIS — E11.8 TYPE 2 DIABETES MELLITUS WITH COMPLICATION, WITH LONG-TERM CURRENT USE OF INSULIN (HCC): Primary | ICD-10-CM

## 2024-08-14 DIAGNOSIS — G25.81 RESTLESS LEGS: ICD-10-CM

## 2024-08-14 DIAGNOSIS — K02.9 DENTAL CARIES: ICD-10-CM

## 2024-08-14 DIAGNOSIS — Z79.4 TYPE 2 DIABETES MELLITUS WITH COMPLICATION, WITH LONG-TERM CURRENT USE OF INSULIN (HCC): Primary | ICD-10-CM

## 2024-08-14 DIAGNOSIS — G44.209 TENSION HEADACHE: ICD-10-CM

## 2024-08-14 PROCEDURE — 3079F DIAST BP 80-89 MM HG: CPT | Performed by: NURSE PRACTITIONER

## 2024-08-14 PROCEDURE — 1036F TOBACCO NON-USER: CPT | Performed by: NURSE PRACTITIONER

## 2024-08-14 PROCEDURE — 3074F SYST BP LT 130 MM HG: CPT | Performed by: NURSE PRACTITIONER

## 2024-08-14 PROCEDURE — 99214 OFFICE O/P EST MOD 30 MIN: CPT | Performed by: NURSE PRACTITIONER

## 2024-08-14 PROCEDURE — G8417 CALC BMI ABV UP PARAM F/U: HCPCS | Performed by: NURSE PRACTITIONER

## 2024-08-14 PROCEDURE — 3051F HG A1C>EQUAL 7.0%<8.0%: CPT | Performed by: NURSE PRACTITIONER

## 2024-08-14 PROCEDURE — 3017F COLORECTAL CA SCREEN DOC REV: CPT | Performed by: NURSE PRACTITIONER

## 2024-08-14 PROCEDURE — 2022F DILAT RTA XM EVC RTNOPTHY: CPT | Performed by: NURSE PRACTITIONER

## 2024-08-14 PROCEDURE — G8427 DOCREV CUR MEDS BY ELIG CLIN: HCPCS | Performed by: NURSE PRACTITIONER

## 2024-08-14 RX ORDER — ROPINIROLE 1 MG/1
1 TABLET, FILM COATED ORAL NIGHTLY
Qty: 90 TABLET | Refills: 3 | Status: SHIPPED | OUTPATIENT
Start: 2024-08-14

## 2024-08-14 RX ORDER — GLIMEPIRIDE 4 MG/1
4 TABLET ORAL
Qty: 90 TABLET | Refills: 2 | Status: SHIPPED | OUTPATIENT
Start: 2024-08-14

## 2024-08-14 ASSESSMENT — ENCOUNTER SYMPTOMS
GASTROINTESTINAL NEGATIVE: 1
EYES NEGATIVE: 1
RESPIRATORY NEGATIVE: 1

## 2024-08-14 NOTE — PROGRESS NOTES
[facility]  [address] [place] [address]  Dept: [phone]  Dept Fax: [phone]  Loc: [phone]    Shari Aldrich is a 58 y.o. female who presents today for her medical conditions/complaints as noted below.  Shari Aldrich is c/o of Follow-up Chronic Condition      Chief Complaint   Patient presents with    Follow-up Chronic Condition       HPI:     HPI  Patient presents today for routine follow up of chronic conditions including anxiety. Diabetes, HTN, HLD.  She has no new complaints to discuss today.  She states that she has been having headaches.  Patient She is having stabbing pain in both of her hands as well.    Patient was seen by neurology last month and was started on Cymbalta to see if this would help with neuropathic pain.  Patient is currently on Lyrica for this as well.  Patient reports that the leg pain is worse at night and she has to get up and move around.      Past Medical History:   Diagnosis Date    Abnormal Pap smear of cervix     Arthritis     CAD (coronary artery disease)     stent 2011 ; sees [facility] cardiology    Cervical cancer ([facility])     Class 3 severe obesity due to excess calories without serious comorbidity with body mass index (BMI) of 40.0 to 44.9 in adult ([facility]) 07/28/2020    Diabetes mellitus ([facility])     ERRONEOUS ENCOUNTER--DISREGARD 09/15/2015    GERD (gastroesophageal reflux disease)     Headache(784.0)     Heart attack ([facility]) 12/2011    Hyperlipidemia     Hypertension     Lichen simplex chronicus 10/2015    Osteopenia     Pneumonia     Type II or unspecified type diabetes mellitus without mention of complication, not stated as uncontrolled         Past Surgical History:   Procedure Laterality Date    BREAST BIOPSY Right 06/08/2011    US guided core needle, right, benign    CARDIAC CATHETERIZATION  12/22/14  JDT    EF 50%    CARDIAC CATHETERIZATION  06/05/2017    Dr.Talley FARRELL FLUOROSCOPIC GUIDANCE NEEDLE PLACEMENT ADD ON

## 2024-08-19 ENCOUNTER — TELEPHONE (OUTPATIENT)
Dept: NEUROLOGY | Age: 59
End: 2024-08-19

## 2024-08-19 NOTE — TELEPHONE ENCOUNTER
Spoke with patient about her appointment with Teri Byrnes for JAYSON, letting her know she will not be able to see Teri since she sees Dr. Yanez for JAYSON. Patient stated she would rather see Teri, she feels more comfortable with a woman provider.    Patient understood she will not see both providers, keeping appointment with Teri.

## 2024-08-26 ENCOUNTER — OFFICE VISIT (OUTPATIENT)
Dept: NEUROLOGY | Age: 59
End: 2024-08-26
Payer: MEDICARE

## 2024-08-26 VITALS
DIASTOLIC BLOOD PRESSURE: 65 MMHG | HEIGHT: 62 IN | HEART RATE: 79 BPM | SYSTOLIC BLOOD PRESSURE: 139 MMHG | WEIGHT: 192 LBS | BODY MASS INDEX: 35.33 KG/M2

## 2024-08-26 DIAGNOSIS — F40.240 CLAUSTROPHOBIA: ICD-10-CM

## 2024-08-26 DIAGNOSIS — Z78.9 INTOLERANCE OF CONTINUOUS POSITIVE AIRWAY PRESSURE (CPAP) VENTILATION: ICD-10-CM

## 2024-08-26 DIAGNOSIS — G47.33 OSA (OBSTRUCTIVE SLEEP APNEA): Primary | ICD-10-CM

## 2024-08-26 PROCEDURE — 3078F DIAST BP <80 MM HG: CPT | Performed by: PHYSICIAN ASSISTANT

## 2024-08-26 PROCEDURE — 3017F COLORECTAL CA SCREEN DOC REV: CPT | Performed by: PHYSICIAN ASSISTANT

## 2024-08-26 PROCEDURE — G8417 CALC BMI ABV UP PARAM F/U: HCPCS | Performed by: PHYSICIAN ASSISTANT

## 2024-08-26 PROCEDURE — 3075F SYST BP GE 130 - 139MM HG: CPT | Performed by: PHYSICIAN ASSISTANT

## 2024-08-26 PROCEDURE — 1036F TOBACCO NON-USER: CPT | Performed by: PHYSICIAN ASSISTANT

## 2024-08-26 PROCEDURE — 99214 OFFICE O/P EST MOD 30 MIN: CPT | Performed by: PHYSICIAN ASSISTANT

## 2024-08-26 PROCEDURE — G8427 DOCREV CUR MEDS BY ELIG CLIN: HCPCS | Performed by: PHYSICIAN ASSISTANT

## 2024-08-26 NOTE — PATIENT INSTRUCTIONS
apnea.    Testing is usually performed in a sleep laboratory. A full sleep study is called a polysomnogram. The polysomnogram measures the breathing effort and airflow, blood oxygen level, heart rate and rhythm, duration of the various stages of sleep, body position, and movement of the arms/legs.  Home monitoring devices are available that can perform a sleep study. This is a reasonable alternative to conventional testing in a sleep laboratory if the clinician strongly suspects moderate or severe sleep apnea and the patient does not have other illnesses or sleep disorders that may interfere with the results.    SLEEP APNEA TREATMENT -- Sleep apnea is best treated by a knowledgeable sleep medicine specialist. The goal of treatment is to maintain an open airway during sleep. Effective treatment will eliminate the symptoms of sleep disturbance; long-term health consequences are also reduced. Most treatments require nightly use. The challenge for the clinician and the patient is to select an effective therapy that is appropriate for the patient's problem and that is acceptable for long term use.    Auto-titrating CPAP delivers an amount of PAP that varies during the night. The variation is dependent on event detection software algorithms, which will increase the pressure gradually in response to flow changes until adequate patency is detected.  After a period of sustained upper airway patency, the delivered level of pressure gradually decreases until the algorithm identifies recurrent upper airway obstruction, at which point the delivered pressure again increases. The result is that the delivered pressure varies throughout the night, in an effort to provide the lowest pressure that is necessary to maintain upper airway patency.    Continuous positive airway pressure (CPAP) -- The most effective treatment for sleep apnea uses air pressure from a mechanical device to keep the upper airway open during sleep. A CPAP  reports, additional testing, and face-to-face  clinical evaluation subsequent to any treatment, changes in treatment, and continued treatment.     Caution:  Please abstain from driving or engaging in other activities which may be hazardous in the presence of diminished alertness or daytime drowsiness. And avoid the use of sedatives or alcohol, which can worsen sleep apnea and daytime drowsiness.       Mask suggestions:  -     Resmed Airfit N20 (Nasal) or F20 (Full face mask).  They conform to your face, thus decreasing the potential for mask leakage. You might like the AirTouch F20(full face mask).  It has a \"memory foam\" like cushion. The AirFit F30 is a smaller style full face mask designed to sit low on and cover less of your face for fewer facial marks. AirFit N30i has a top of the head tube with a nasal mask. AirFit P10 reported to be the most comfortable nasal pillow mask. Resmed Mirage FX reported to be the most comfortable nasal mask. Resmed Mirage [place] reported to be the most comfortable hybrid mask. AirTouch N20-memory foam nasal mask.    Respironics: You might also like to try a nasal mask called a Dreamwear nasal mask or the Dreamwear nasal pillow. Another suggestion is the Nancy View, it is a minimal contact full face mask.  The Nancy View's incredible under the nose design makes it the only full face mask that won't cause red marks on the bridge of your nose when compared to other full face masks. The Dreamwear full face mask has a  soft feel, unique in-frame air-flow, and innovative air tube connection at the top of the head for the ultimate in sleep comfort. Comfort Gel Blue. Dreamwear gel pillows.    Jude & Kevin: Brevida nasal pillow mask and Simplus FFM    The use of a memory foam CPAP pillow supports the head and neck throughout the night.

## 2024-08-26 NOTE — PROGRESS NOTES
Neg Hx     Liver Disease Neg Hx        REVIEW OF SYSTEMS    Constitutional: []Fever []Sweats []Chills [] Recent Injury   [x] Denies all unless marked  HENT:[]Headache  [] Head Injury  [] Sore Throat  [] Ear Pain  [] Dizziness [] Hearing Loss   [x] Denies all unless marked  Musculoskeletal: [] Arthralgia  [] Myalgias [] Muscle cramps  [] Muscle twitches   [x] Denies all unless marked   Spine:  [] Neck pain  [] Back pain  [] Sciatica  [x] Denies all unless marked  Neurological:[] Visual Disturbance [] Double Vision [] Slurred Speech [] Trouble swallowing  [] Vertigo [] Tingling [] Numbness [] Weakness [] Loss of Balance   [] Loss of Consciousness [] Memory Loss [] Seizures  [x] Denies all unless marked  Psychiatric/Behavioral:[] Depression [] Anxiety  [x] Denies all unless marked  Sleep: []  Insomnia [] Sleep Disturbance [] Snoring [] Restless Legs [] Daytime Sleepiness [x] Sleep Apnea  [x] Denies all unless marked       The MA has completed the ROS with the patient. I have reviewed it in its' entirety with the patient and agree with the documentation.       PHYSICAL EXAM  /65   Pulse 79   Ht 1.575 m (5' 2\")   Wt 87.1 kg (192 lb)   LMP 01/01/1998 (Approximate)   BMI 35.12 kg/m²      Constitutional -  Alert in NAD, well developed, pleasant and cooperative with exam  HEENT- Conjunctiva normal.  No scars, masses, or lesions over external nose or ears, hearing intact, no neck masses noted, no jugular vein distension, no bruit  Cardiac- Regular rate and rhythm  Pulmonary- Clear to auscultation, good expansion, normal effort without use of accessory muscles  Musculoskeletal - No significant wasting of muscles noted, no bony deformities  Extremities - No clubbing, cyanosis or edema  Skin - Warm, dry, and intact.  No rash, erythema, or pallor  Psychiatric - Mood, affect, and behavior appear normal      Neurological exam  Awake, alert, fluent oriented x 3 appropriate affect  Attention and concentration appear  protocol      The patient indicates understanding of the above issues and agrees with the care plan.       I spent 30 minutes caring for Shari Aldrich  on this date of service. This time includes time spent by me in the following activities:preparing for the visit, reviewing tests, performing a medically appropriate examination and/or evaluation , counseling and educating the patient/family/caregiver, ordering medications, tests, or procedures, documenting information in the medical record and care coordination

## 2024-08-27 PROBLEM — F40.240 CLAUSTROPHOBIA: Status: ACTIVE | Noted: 2024-08-27

## 2024-08-27 PROBLEM — Z78.9 INTOLERANCE OF CONTINUOUS POSITIVE AIRWAY PRESSURE (CPAP) VENTILATION: Status: ACTIVE | Noted: 2024-08-27

## 2024-09-02 NOTE — PROGRESS NOTES
 THORACOTOMY      VULVAR/PERINEAL BIOPSY  10/07/2015    Dr. Navjot Ceron       Family History   Problem Relation Age of Onset    Diabetes Father     Cancer Father     High Blood Pressure Father     Heart Attack Father     Diabetes Mother     Breast Cancer Mother     Heart Failure Mother         CHF, MVP    Heart Failure Maternal Grandfather     Stroke Maternal Grandfather     Diabetes Brother        Social History     Tobacco Use    Smoking status: Former Smoker     Packs/day: 2.50     Years: 35.00     Pack years: 87.50     Types: Cigarettes     Last attempt to quit: 12/15/2014     Years since quittin.3    Smokeless tobacco: Never Used    Tobacco comment: pack and half per week plus electronic cig   Substance Use Topics    Alcohol use: No      Current Outpatient Medications   Medication Sig Dispense Refill    ciprofloxacin (CIPRO) 500 MG tablet Take 1 tablet by mouth 2 times daily for 10 days 20 tablet 0    tamsulosin (FLOMAX) 0.4 MG capsule Take 1 capsule by mouth daily 30 capsule 0    ondansetron (ZOFRAN ODT) 4 MG disintegrating tablet Take 1 tablet by mouth every 8 hours as needed for Nausea or Vomiting 15 tablet 0    tiZANidine (ZANAFLEX) 4 MG tablet TAKE 1/2 TABLET BY MOUTH 2 TIMES DAILY AS NEEDED (PAIN) 30 tablet 0    LANTUS SOLOSTAR 100 UNIT/ML injection pen INJECT 35 UNITS SQ INTO THE SKIN NIGHTLY 15 mL 1    BYDUREON 2 MG PEN INJECT 2 MG INTO THE SKINEVERY 7 DAYS 4 pen 2    empagliflozin (JARDIANCE) 25 MG tablet Take 25 mg by mouth daily 30 tablet 5    JANUVIA 100 MG tablet TAKE 1 TABLET EVERY DAY 30 tablet 5    famotidine (PEPCID) 20 MG tablet TAKE 1 TABLET TWICE DAILY 60 tablet 3    metoprolol tartrate (LOPRESSOR) 25 MG tablet TAKE 1 TABLET BY MOUTH TWICE DAILY 60 tablet 5    isosorbide mononitrate (IMDUR) 30 MG extended release tablet TAKE 1 TABLET EVERY DAY 30 tablet 5    valsartan (DIOVAN) 80 MG tablet TAKE 1 TABLET BY MOUTH DAILY 30 tablet 5    vitamin D (ERGOCALCIFEROL) 37132 units CAPS capsule TAKE 1 CAPSULE BY MOUTH ONCE WEEKLY 12 capsule 1    Diabetic Shoe MISC by Does not apply route 1 each 0    LORazepam (ATIVAN) 0.5 MG tablet Take 1 tablet by mouth every 8 hours as needed for Anxiety. . 90 tablet 0    furosemide (LASIX) 20 MG tablet Take 1 tablet by mouth daily as needed (edema) 90 tablet 3    nitroGLYCERIN (NITROSTAT) 0.4 MG SL tablet Place 1 tablet under the tongue every 5 minutes as needed for Chest pain 25 tablet 3    Nebulizers (COMPRESSOR/NEBULIZER) MISC Use as directed 1 each 3    aspirin 81 MG tablet Take 81 mg by mouth daily      glucose blood VI test strips (ASCENSIA AUTODISC VI;ONE TOUCH ULTRA TEST VI) strip 1 each by In Vitro route daily As needed. 100 each 3    Insulin Pen Needle ([employer] PEN NEEDLES) 31G X 6 MM MISC 1 each by Does not apply route daily 100 each 3    Lactobacillus (PROBIATA PO) Take 1 tablet by mouth daily       mometasone (ELOCON) 0.1 % ointment Apply topically to vagina 3 times per week 1 Tube 3    Eflornithine HCl (VANIQA) 13.9 % CREA Apply 1 Dose topically 2 times daily 45 g 2    Docusate Calcium (STOOL SOFTENER PO) Take 100 mg by mouth 2 times daily as needed.  nystatin (MYCOSTATIN) 479760 UNIT/GM cream APPLY TOPICALLY 2 TIMES DAILY.  30 g 1    metFORMIN (GLUCOPHAGE-XR) 500 MG extended release tablet Take 1 tablet by mouth 2 times daily Indications: Diabetes 60 tablet 2    tamsulosin (FLOMAX) 0.4 MG capsule Take 1 capsule by mouth daily 30 capsule 1    lovastatin (MEVACOR) 40 MG tablet TAKE 1 TABLET AT BEDTIME 30 tablet 2    albuterol (PROVENTIL) (5 MG/ML) 0.5% nebulizer solution Take 0.5 mLs by nebulization every 6 hours as needed for Wheezing 120 each 0    ondansetron (ZOFRAN) 8 MG tablet Take 1 tablet by mouth every 8 hours as needed for Nausea or Vomiting 9 tablet 1     Current Facility-Administered Medications   Medication Dose Route Frequency Provider Last Rate Last Dose    ketorolac (TORADOL) injection 60 mg  60 mg exhibits no distension and no mass. There is no rebound and no guarding. Musculoskeletal: She exhibits no edema. Neurological: No cranial nerve deficit. She exhibits normal muscle tone. Coordination normal.   Skin: She is not diaphoretic. No pallor. /74   Pulse 70   Temp 99.5 °F (37.5 °C) (Oral)   Resp 18   Ht 5' 2\" (1.575 m)   Wt 217 lb 8 oz (98.7 kg)   LMP 01/01/1998 (Approximate)   SpO2 98%   BMI 39.78 kg/m²           Assessment:          Diagnosis Orders   1. Hematuria, gross  Urine Culture    POCT Urinalysis no Micro   2. Urinary tract infection without hematuria, site unspecified     3. Acute bilateral low back pain without sciatica     4. History of kidney stones         Plan:      Orders Placed This Encounter   Procedures    Urine Culture     Order Specific Question:   Specify (ex-cath, midstream, cysto, etc)? Answer:   midstream    POCT Urinalysis no Micro        No follow-ups on file. Orders Placed This Encounter   Procedures    Urine Culture     Order Specific Question:   Specify (ex-cath, midstream, cysto, etc)? Answer:   midstream    POCT Urinalysis no Micro     Orders Placed This Encounter   Medications    ciprofloxacin (CIPRO) 500 MG tablet     Sig: Take 1 tablet by mouth 2 times daily for 10 days     Dispense:  20 tablet     Refill:  0    tamsulosin (FLOMAX) 0.4 MG capsule     Sig: Take 1 capsule by mouth daily     Dispense:  30 capsule     Refill:  0    ondansetron (ZOFRAN ODT) 4 MG disintegrating tablet     Sig: Take 1 tablet by mouth every 8 hours as needed for Nausea or Vomiting     Dispense:  15 tablet     Refill:  0    ketorolac (TORADOL) injection 60 mg       Patient given educationalmaterials - see patient instructions. Discussed use, benefit, and side effectsof prescribed medications. All patient questions answered. Pt voiced understanding. Reviewed health maintenance. Instructed to continue current medications, diet andexercise.   Patient agreed with treatment plan. Follow up as directed. Patient Instructions     Patient Education        Urinary Tract Infection in Women: Care Instructions  Your Care Instructions    A urinary tract infection, or UTI, is a general term for an infection anywhere between the kidneys and the urethra (where urine comes out). Most UTIs are bladder infections. They often cause pain or burning when you urinate. UTIs are caused by bacteria and can be cured with antibiotics. Be sure to complete your treatment so that the infection goes away. Follow-up care is a key part of your treatment and safety. Be sure to make and go to all appointments, and call your doctor if you are having problems. It's also a good idea to know your test results and keep a list of the medicines you take. How can you care for yourself at home? · Take your antibiotics as directed. Do not stop taking them just because you feel better. You need to take the full course of antibiotics. · Drink extra water and other fluids for the next day or two. This may help wash out the bacteria that are causing the infection. (If you have kidney, heart, or liver disease and have to limit fluids, talk with your doctor before you increase your fluid intake.)  · Avoid drinks that are carbonated or have caffeine. They can irritate the bladder. · Urinate often. Try to empty your bladder each time. · To relieve pain, take a hot bath or lay a heating pad set on low over your lower belly or genital area. Never go to sleep with a heating pad in place. To prevent UTIs  · Drink plenty of water each day. This helps you urinate often, which clears bacteria from your system. (If you have kidney, heart, or liver disease and have to limit fluids, talk with your doctor before you increase your fluid intake.)  · Urinate when you need to. · Urinate right after you have sex. · Change sanitary pads often.   · Avoid douches, bubble baths, feminine hygiene sprays, and other feminine hygiene products that have deodorants. · After going to the bathroom, wipe from front to back. When should you call for help? Call your doctor now or seek immediate medical care if:    · Symptoms such as fever, chills, nausea, or vomiting get worse or appear for the first time.     · You have new pain in your back just below your rib cage. This is called flank pain.     · There is new blood or pus in your urine.     · You have any problems with your antibiotic medicine.    Watch closely for changes in your health, and be sure to contact your doctor if:    · You are not getting better after taking an antibiotic for 2 days.     · Your symptoms go away but then come back. Where can you learn more? Go to [email or web address] and sign in to your [employer] account. Enter E845 in the [employer] box to learn more about \"Urinary Tract Infection in Women: Care Instructions. \"     If you do not have an account, please click on the \"Sign Up Now\" link. Current as of: March 20, 2018  Content Version: 11.9  © 8949-5331 [employer]. Care instructions adapted under license by [facility] [place] ([facility]). If you have questions about a medical condition or this instruction, always ask your healthcare professional. [address] any warranty or liability for your use of this information. treadmill

## 2024-09-03 ENCOUNTER — TELEPHONE (OUTPATIENT)
Dept: FAMILY MEDICINE CLINIC | Age: 59
End: 2024-09-03

## 2024-09-03 NOTE — TELEPHONE ENCOUNTER
Patient called and said her AC at home went out from last Monday until Friday. She said it got up to 96 degrees in her house. She said her cats were vomiting and her dogs vomiting.. When they came out to her house last Friday they said they couldn't fix it, so she had a window unit placed for the time being. She called today to let us know it has cooled off but she is still not feeling well. She said she feels like she is in a haze-like state almost, and overall just still not feeling well. I advised to try and drink plenty of fluids and went ahead and made her an appt with you in the morning, just wanted to give you a heads up. Thank you

## 2024-09-04 ENCOUNTER — OFFICE VISIT (OUTPATIENT)
Dept: FAMILY MEDICINE CLINIC | Age: 59
End: 2024-09-04
Payer: MEDICARE

## 2024-09-04 ENCOUNTER — TELEPHONE (OUTPATIENT)
Dept: FAMILY MEDICINE CLINIC | Age: 59
End: 2024-09-04

## 2024-09-04 VITALS
BODY MASS INDEX: 35.88 KG/M2 | HEIGHT: 62 IN | HEART RATE: 66 BPM | SYSTOLIC BLOOD PRESSURE: 120 MMHG | WEIGHT: 195 LBS | TEMPERATURE: 98.2 F | OXYGEN SATURATION: 97 % | DIASTOLIC BLOOD PRESSURE: 66 MMHG

## 2024-09-04 DIAGNOSIS — E11.8 TYPE 2 DIABETES MELLITUS WITH COMPLICATION, WITH LONG-TERM CURRENT USE OF INSULIN (HCC): ICD-10-CM

## 2024-09-04 DIAGNOSIS — R53.83 OTHER FATIGUE: ICD-10-CM

## 2024-09-04 DIAGNOSIS — R79.89 ELEVATED TSH: Primary | ICD-10-CM

## 2024-09-04 DIAGNOSIS — Z79.4 TYPE 2 DIABETES MELLITUS WITH COMPLICATION, WITH LONG-TERM CURRENT USE OF INSULIN (HCC): ICD-10-CM

## 2024-09-04 DIAGNOSIS — T67.5XXA HEAT EXHAUSTION, INITIAL ENCOUNTER: ICD-10-CM

## 2024-09-04 DIAGNOSIS — R42 DIZZINESS: ICD-10-CM

## 2024-09-04 DIAGNOSIS — R42 DIZZINESS: Primary | ICD-10-CM

## 2024-09-04 LAB
ALBUMIN SERPL-MCNC: 3.8 G/DL (ref 3.5–5.2)
ALP SERPL-CCNC: 78 U/L (ref 35–104)
ALT SERPL-CCNC: 17 U/L (ref 5–33)
ANION GAP SERPL CALCULATED.3IONS-SCNC: 10 MMOL/L (ref 7–19)
APPEARANCE FLUID: CLEAR
AST SERPL-CCNC: 14 U/L (ref 5–32)
BASOPHILS # BLD: 0.1 K/UL (ref 0–0.2)
BASOPHILS NFR BLD: 1.2 % (ref 0–1)
BILIRUB SERPL-MCNC: 0.3 MG/DL (ref 0.2–1.2)
BILIRUBIN, POC: NORMAL
BLOOD URINE, POC: NORMAL
BUN SERPL-MCNC: 8 MG/DL (ref 8–23)
CALCIUM SERPL-MCNC: 9 MG/DL (ref 8.8–10.2)
CHLORIDE SERPL-SCNC: 102 MMOL/L (ref 98–111)
CLARITY, POC: CLEAR
CO2 SERPL-SCNC: 27 MMOL/L (ref 22–29)
COLOR, POC: YELLOW
CREAT SERPL-MCNC: 0.7 MG/DL (ref 0.5–0.9)
EOSINOPHIL # BLD: 0.2 K/UL (ref 0–0.6)
EOSINOPHIL NFR BLD: 3.2 % (ref 0–5)
ERYTHROCYTE [DISTWIDTH] IN BLOOD BY AUTOMATED COUNT: 13.1 % (ref 11.5–14.5)
GLUCOSE SERPL-MCNC: 293 MG/DL (ref 70–99)
GLUCOSE URINE, POC: 1000 MG/DL
HBA1C MFR BLD: 9.5 % (ref 4–5.6)
HCT VFR BLD AUTO: 42.3 % (ref 37–47)
HGB BLD-MCNC: 14 G/DL (ref 12–16)
IMM GRANULOCYTES # BLD: 0 K/UL
KETONES, POC: NORMAL MG/DL
LEUKOCYTE EST, POC: NORMAL
LYMPHOCYTES # BLD: 2 K/UL (ref 1.1–4.5)
LYMPHOCYTES NFR BLD: 28.5 % (ref 20–40)
MAGNESIUM SERPL-MCNC: 1.9 MG/DL (ref 1.6–2.4)
MCH RBC QN AUTO: 29.2 PG (ref 27–31)
MCHC RBC AUTO-ENTMCNC: 33.1 G/DL (ref 33–37)
MCV RBC AUTO: 88.1 FL (ref 81–99)
MONOCYTES # BLD: 0.5 K/UL (ref 0–0.9)
MONOCYTES NFR BLD: 7.3 % (ref 0–10)
NEUTROPHILS # BLD: 4.1 K/UL (ref 1.5–7.5)
NEUTS SEG NFR BLD: 59.5 % (ref 50–65)
NITRITE, POC: NORMAL
PH, POC: 6.5
PLATELET # BLD AUTO: 240 K/UL (ref 130–400)
PMV BLD AUTO: 10.3 FL (ref 9.4–12.3)
POTASSIUM SERPL-SCNC: 3.5 MMOL/L (ref 3.5–5)
PROT SERPL-MCNC: 6.5 G/DL (ref 6.4–8.3)
PROTEIN, POC: NORMAL MG/DL
RBC # BLD AUTO: 4.8 M/UL (ref 4.2–5.4)
SODIUM SERPL-SCNC: 139 MMOL/L (ref 136–145)
SPECIFIC GRAVITY, POC: 1.01
T4 FREE SERPL-MCNC: 1.01 NG/DL (ref 0.93–1.7)
TSH SERPL DL<=0.005 MIU/L-ACNC: 5.13 UIU/ML (ref 0.27–4.2)
UROBILINOGEN, POC: 0.2 MG/DL
WBC # BLD AUTO: 6.9 K/UL (ref 4.8–10.8)

## 2024-09-04 PROCEDURE — 3017F COLORECTAL CA SCREEN DOC REV: CPT | Performed by: NURSE PRACTITIONER

## 2024-09-04 PROCEDURE — G8427 DOCREV CUR MEDS BY ELIG CLIN: HCPCS | Performed by: NURSE PRACTITIONER

## 2024-09-04 PROCEDURE — 81002 URINALYSIS NONAUTO W/O SCOPE: CPT | Performed by: NURSE PRACTITIONER

## 2024-09-04 PROCEDURE — G8417 CALC BMI ABV UP PARAM F/U: HCPCS | Performed by: NURSE PRACTITIONER

## 2024-09-04 PROCEDURE — 3078F DIAST BP <80 MM HG: CPT | Performed by: NURSE PRACTITIONER

## 2024-09-04 PROCEDURE — 1036F TOBACCO NON-USER: CPT | Performed by: NURSE PRACTITIONER

## 2024-09-04 PROCEDURE — 2022F DILAT RTA XM EVC RTNOPTHY: CPT | Performed by: NURSE PRACTITIONER

## 2024-09-04 PROCEDURE — 99214 OFFICE O/P EST MOD 30 MIN: CPT | Performed by: NURSE PRACTITIONER

## 2024-09-04 PROCEDURE — 3051F HG A1C>EQUAL 7.0%<8.0%: CPT | Performed by: NURSE PRACTITIONER

## 2024-09-04 PROCEDURE — 3074F SYST BP LT 130 MM HG: CPT | Performed by: NURSE PRACTITIONER

## 2024-09-04 ASSESSMENT — ENCOUNTER SYMPTOMS
RESPIRATORY NEGATIVE: 1
GASTROINTESTINAL NEGATIVE: 1
EYES NEGATIVE: 1

## 2024-09-04 NOTE — PROGRESS NOTES
[facility]  [address] [place] [address]  Dept: [phone]  Dept Fax: [phone]  Loc: [phone]    Shari Aldrich is a 58 y.o. female who presents today for her medical conditions/complaints as noted below.  hSari Aldrich is c/o of Possible Dehydration      Chief Complaint   Patient presents with    Possible Dehydration       HPI:     HPI  Patient presents today with complaints of possible dehydration.  Patient states that her AC went out last Monday and was out for a week.  She states that it got up to 96 degrees.  She does now have a window unit placed for the time being.  She is not feeling well.  She states that she is in a haze-like state.  She states that she has been having nose bleeds and dizziness.  Patient has increased her fluid intake and she is urinating normal.    She has been monitoring blood sugars occasionally.  She reports that it is ranging from 60-250s.    Past Medical History:   Diagnosis Date    Abnormal Pap smear of cervix     Arthritis     CAD (coronary artery disease)     stent 2011 ; sees [facility] cardiology    Cervical cancer ([facility])     Class 3 severe obesity due to excess calories without serious comorbidity with body mass index (BMI) of 40.0 to 44.9 in adult ([facility]) 07/28/2020    Diabetes mellitus ([facility])     ERRONEOUS ENCOUNTER--DISREGARD 09/15/2015    GERD (gastroesophageal reflux disease)     Headache(784.0)     Heart attack ([facility]) 12/2011    Hyperlipidemia     Hypertension     Lichen simplex chronicus 10/2015    JAYSON (obstructive sleep apnea)     Osteopenia     Pneumonia     Type II or unspecified type diabetes mellitus without mention of complication, not stated as uncontrolled         Past Surgical History:   Procedure Laterality Date    BREAST BIOPSY Right 06/08/2011    US guided core needle, right, benign    CARDIAC CATHETERIZATION  12/22/14  JDT    EF 50%    CARDIAC CATHETERIZATION  06/05/2017        [facility] FLUOROSCOPIC

## 2024-09-04 NOTE — TELEPHONE ENCOUNTER
----- Message from MIKE Stafford sent at 9/4/2024  4:03 PM CDT -----  Thyroid level was a little high.  Recommend rechecking again in 4 weeks and if still off will start thyroid medication.

## 2024-09-04 NOTE — TELEPHONE ENCOUNTER
----- Message from MIKE Stafford sent at 9/4/2024 12:37 PM CDT -----  A1c has increased a lot.  Up to 9.5%  Cmp showed normal electrolytes and renal function  Cbc was good  Magnesium level was great  Increased glucose readings can be the cause of the worsening symptoms with the heat exhaustion

## 2024-09-17 RX ORDER — RANOLAZINE 1000 MG/1
1000 TABLET, EXTENDED RELEASE ORAL 2 TIMES DAILY
Qty: 180 TABLET | Refills: 1 | Status: SHIPPED | OUTPATIENT
Start: 2024-09-17

## 2024-09-27 ENCOUNTER — TELEPHONE (OUTPATIENT)
Dept: OTOLARYNGOLOGY | Facility: CLINIC | Age: 59
End: 2024-09-27
Payer: MEDICARE

## 2024-09-27 NOTE — TELEPHONE ENCOUNTER
Provider: RENARD ANGELA    Caller:  Gwendolyn Delgadillo     Relationship to Patient: SELF    Phone Number: [phone]     Reason for Call: PT CANCELLED HER UPCOMING APPT. SHE HAS CHANGED HER MIND AND DECIDED SHE DOESN'T THINK THE INSPIRE DEVICE IS FOR HER. IF SHE CHANGES HER MIND, SHE WILL CALL TO RESCHEDULE.

## 2024-10-03 ENCOUNTER — OFFICE VISIT (OUTPATIENT)
Dept: FAMILY MEDICINE CLINIC | Age: 59
End: 2024-10-03
Payer: MEDICARE

## 2024-10-03 VITALS
HEART RATE: 60 BPM | DIASTOLIC BLOOD PRESSURE: 70 MMHG | BODY MASS INDEX: 35.33 KG/M2 | TEMPERATURE: 97.9 F | HEIGHT: 62 IN | OXYGEN SATURATION: 97 % | WEIGHT: 192 LBS | SYSTOLIC BLOOD PRESSURE: 120 MMHG

## 2024-10-03 DIAGNOSIS — E11.8 TYPE 2 DIABETES MELLITUS WITH COMPLICATION, WITH LONG-TERM CURRENT USE OF INSULIN (HCC): Primary | ICD-10-CM

## 2024-10-03 DIAGNOSIS — G25.81 RESTLESS LEGS: ICD-10-CM

## 2024-10-03 DIAGNOSIS — R20.9 COLD SENSATION OF SKIN: ICD-10-CM

## 2024-10-03 DIAGNOSIS — Z79.4 TYPE 2 DIABETES MELLITUS WITH COMPLICATION, WITH LONG-TERM CURRENT USE OF INSULIN (HCC): Primary | ICD-10-CM

## 2024-10-03 PROCEDURE — 99214 OFFICE O/P EST MOD 30 MIN: CPT | Performed by: NURSE PRACTITIONER

## 2024-10-03 PROCEDURE — 3078F DIAST BP <80 MM HG: CPT | Performed by: NURSE PRACTITIONER

## 2024-10-03 PROCEDURE — 3074F SYST BP LT 130 MM HG: CPT | Performed by: NURSE PRACTITIONER

## 2024-10-03 PROCEDURE — G8484 FLU IMMUNIZE NO ADMIN: HCPCS | Performed by: NURSE PRACTITIONER

## 2024-10-03 PROCEDURE — G8417 CALC BMI ABV UP PARAM F/U: HCPCS | Performed by: NURSE PRACTITIONER

## 2024-10-03 PROCEDURE — G8427 DOCREV CUR MEDS BY ELIG CLIN: HCPCS | Performed by: NURSE PRACTITIONER

## 2024-10-03 PROCEDURE — 1036F TOBACCO NON-USER: CPT | Performed by: NURSE PRACTITIONER

## 2024-10-03 PROCEDURE — 3046F HEMOGLOBIN A1C LEVEL >9.0%: CPT | Performed by: NURSE PRACTITIONER

## 2024-10-03 PROCEDURE — 3017F COLORECTAL CA SCREEN DOC REV: CPT | Performed by: NURSE PRACTITIONER

## 2024-10-03 PROCEDURE — 2022F DILAT RTA XM EVC RTNOPTHY: CPT | Performed by: NURSE PRACTITIONER

## 2024-10-03 RX ORDER — DULOXETIN HYDROCHLORIDE 20 MG/1
20 CAPSULE, DELAYED RELEASE ORAL DAILY
Qty: 30 CAPSULE | Refills: 3 | Status: SHIPPED | OUTPATIENT
Start: 2024-10-03

## 2024-10-03 ASSESSMENT — ENCOUNTER SYMPTOMS
GASTROINTESTINAL NEGATIVE: 1
RESPIRATORY NEGATIVE: 1
EYES NEGATIVE: 1

## 2024-10-03 NOTE — PROGRESS NOTES
[facility]  [address] [place] [address]  Dept: [phone]  Dept Fax: [phone]  Loc: [phone]    Shari Aldrich is a 58 y.o. female who presents today for her medical conditions/complaints as noted below.  Shari Aldrich is c/o of Follow-up (Medication)      Chief Complaint   Patient presents with    Follow-up     Medication       HPI:     HPI  Patient presents today for medication follow up.  At last visit amaryl was increased to 4mg.  Requip was added to see if this would help with RLS type symptoms at night.  She states that requip is helping.  She states that her blood sugars have been \"up and down\" she has complaints of her legs and feet \"feeling wet\"  she states that this has been ongoing for a while, but now it is happening all of the time.  She states that right now her socks feel wet, but they are not. Unsure if this is related to medication or what the cause of it is. This has started about June.      Past Medical History:   Diagnosis Date    Abnormal Pap smear of cervix     Arthritis     CAD (coronary artery disease)     stent 2011 ; sees [facility] cardiology    Cervical cancer ([facility])     Class 3 severe obesity due to excess calories without serious comorbidity with body mass index (BMI) of 40.0 to 44.9 in adult 07/28/2020    Diabetes mellitus ([facility])     ERRONEOUS ENCOUNTER--DISREGARD 09/15/2015    GERD (gastroesophageal reflux disease)     Headache(784.0)     Heart attack ([facility]) 12/2011    Hyperlipidemia     Hypertension     Lichen simplex chronicus 10/2015    JAYSON (obstructive sleep apnea)     Osteopenia     Pneumonia     Type II or unspecified type diabetes mellitus without mention of complication, not stated as uncontrolled         Past Surgical History:   Procedure Laterality Date    BREAST BIOPSY Right 06/08/2011    US guided core needle, right, benign    CARDIAC CATHETERIZATION  12/22/14  JDT    EF 50%    CARDIAC CATHETERIZATION  06/05/2017

## 2024-10-15 DIAGNOSIS — N32.89 BLADDER SPASM: ICD-10-CM

## 2024-10-15 RX ORDER — OXYBUTYNIN CHLORIDE 10 MG/1
10 TABLET, EXTENDED RELEASE ORAL DAILY
Qty: 90 TABLET | Refills: 3 | Status: SHIPPED | OUTPATIENT
Start: 2024-10-15

## 2024-10-15 NOTE — TELEPHONE ENCOUNTER
Received fax from pharmacy requesting refill on pts medication(s). Pt was last seen in office on 10/3/2024  and has a follow up scheduled for Visit date not found. Will send request to  Salma Gonzalez  for authorization.     Requested Prescriptions     Pending Prescriptions Disp Refills    oxyBUTYnin (DITROPAN-XL) 10 MG extended release tablet [Pharmacy Med Name: OXYBUTYNIN CHLORIDE ER 10MG ER TABLET ER 24HR] 90 tablet 3     Sig: TAKE 1 TABLET BY MOUTH DAILY

## 2024-10-28 ENCOUNTER — PATIENT MESSAGE (OUTPATIENT)
Dept: FAMILY MEDICINE CLINIC | Age: 59
End: 2024-10-28

## 2024-11-05 ENCOUNTER — OFFICE VISIT (OUTPATIENT)
Dept: FAMILY MEDICINE CLINIC | Age: 59
End: 2024-11-05
Payer: MEDICARE

## 2024-11-05 VITALS
DIASTOLIC BLOOD PRESSURE: 73 MMHG | HEART RATE: 66 BPM | WEIGHT: 197 LBS | BODY MASS INDEX: 36.03 KG/M2 | OXYGEN SATURATION: 97 % | TEMPERATURE: 98.2 F | SYSTOLIC BLOOD PRESSURE: 124 MMHG

## 2024-11-05 DIAGNOSIS — N30.01 ACUTE CYSTITIS WITH HEMATURIA: Primary | ICD-10-CM

## 2024-11-05 DIAGNOSIS — R30.0 DYSURIA: ICD-10-CM

## 2024-11-05 LAB
APPEARANCE FLUID: NORMAL
BILIRUBIN, POC: NORMAL
BLOOD URINE, POC: NORMAL
CLARITY, POC: NORMAL
COLOR, POC: YELLOW
GLUCOSE URINE, POC: 100 MG/DL
KETONES, POC: NORMAL MG/DL
LEUKOCYTE EST, POC: NORMAL
NITRITE, POC: NORMAL
PH, POC: 5.5
PROTEIN, POC: NORMAL MG/DL
SPECIFIC GRAVITY, POC: 1.02
UROBILINOGEN, POC: NORMAL MG/DL

## 2024-11-05 PROCEDURE — G8484 FLU IMMUNIZE NO ADMIN: HCPCS | Performed by: NURSE PRACTITIONER

## 2024-11-05 PROCEDURE — 99213 OFFICE O/P EST LOW 20 MIN: CPT | Performed by: NURSE PRACTITIONER

## 2024-11-05 PROCEDURE — 81002 URINALYSIS NONAUTO W/O SCOPE: CPT | Performed by: NURSE PRACTITIONER

## 2024-11-05 PROCEDURE — 3074F SYST BP LT 130 MM HG: CPT | Performed by: NURSE PRACTITIONER

## 2024-11-05 PROCEDURE — G8427 DOCREV CUR MEDS BY ELIG CLIN: HCPCS | Performed by: NURSE PRACTITIONER

## 2024-11-05 PROCEDURE — G8417 CALC BMI ABV UP PARAM F/U: HCPCS | Performed by: NURSE PRACTITIONER

## 2024-11-05 PROCEDURE — 3017F COLORECTAL CA SCREEN DOC REV: CPT | Performed by: NURSE PRACTITIONER

## 2024-11-05 PROCEDURE — 3078F DIAST BP <80 MM HG: CPT | Performed by: NURSE PRACTITIONER

## 2024-11-05 PROCEDURE — 1036F TOBACCO NON-USER: CPT | Performed by: NURSE PRACTITIONER

## 2024-11-05 RX ORDER — PHENAZOPYRIDINE HYDROCHLORIDE 200 MG/1
200 TABLET, FILM COATED ORAL 3 TIMES DAILY PRN
Qty: 9 TABLET | Refills: 0 | Status: SHIPPED | OUTPATIENT
Start: 2024-11-05 | End: 2024-11-08

## 2024-11-05 RX ORDER — CIPROFLOXACIN 500 MG/1
500 TABLET, FILM COATED ORAL 2 TIMES DAILY
Qty: 10 TABLET | Refills: 0 | Status: SHIPPED | OUTPATIENT
Start: 2024-11-05 | End: 2024-11-15

## 2024-11-05 NOTE — ASSESSMENT & PLAN NOTE
Orders:    POCT Urinalysis no Micro    phenazopyridine (PYRIDIUM) 200 MG tablet; Take 1 tablet by mouth 3 times daily as needed for Pain

## 2024-11-05 NOTE — PROGRESS NOTES
Shari Aldrich (: 1965) is a 59 y.o. female is here for evaluation of the following chief complaint(s): Urinary Pain (Pts states she has had urinary symptoms x4 days, takes meds for bladder spasms. Started with burning with urination )    Assessment/Plan:     Assessment & Plan  Acute cystitis with hematuria       Orders:    ciprofloxacin (CIPRO) 500 MG tablet; Take 1 tablet by mouth 2 times daily for 10 days    phenazopyridine (PYRIDIUM) 200 MG tablet; Take 1 tablet by mouth 3 times daily as needed for Pain    Dysuria       Orders:    POCT Urinalysis no Micro    phenazopyridine (PYRIDIUM) 200 MG tablet; Take 1 tablet by mouth 3 times daily as needed for Pain    The above diagnosis is a new problem.  We discussed expected course, resolution, and complications of diagnosis in detail.  I advised her to call back or return to office if symptoms worsen/change/persist.        Return if symptoms worsen or fail to improve.    Subjective/Objective:   She complains of dysuria, frequency, urgency for 2 days.  She denies dysuria, frequency, urgency.  Since starting she reports her symptoms are worsened. Treatments tried: increased water intake.    Pertinent items are noted in HPI.      Physical Exam:  General: alert, cooperative, and no distress  Abdomen: soft, nontender, nondistended, no masses or organomegaly  Back: CVA tenderness absent  : exam deferred  /73 (Site: Right Upper Arm, Position: Sitting, Cuff Size: Small Adult)   Pulse 66   Temp 98.2 °F (36.8 °C) (Temporal)   Wt 89.4 kg (197 lb)   LMP 1998 (Approximate)   SpO2 97%   BMI 36.03 kg/m²        An electronic signature was used to authenticate this note.  -- Tracy Fontaine, APRN

## 2024-12-11 ENCOUNTER — HOSPITAL ENCOUNTER (OUTPATIENT)
Dept: VASCULAR LAB | Age: 59
Discharge: HOME | End: 2024-12-13
Payer: MEDICARE

## 2024-12-11 ENCOUNTER — OFFICE VISIT (OUTPATIENT)
Dept: VASCULAR SURGERY | Age: 59
End: 2024-12-11
Payer: MEDICARE

## 2024-12-11 VITALS — DIASTOLIC BLOOD PRESSURE: 80 MMHG | HEART RATE: 70 BPM | SYSTOLIC BLOOD PRESSURE: 166 MMHG | OXYGEN SATURATION: 94 %

## 2024-12-11 DIAGNOSIS — I65.23 BILATERAL CAROTID ARTERY STENOSIS: Primary | ICD-10-CM

## 2024-12-11 DIAGNOSIS — I65.23 BILATERAL CAROTID ARTERY STENOSIS: ICD-10-CM

## 2024-12-11 PROCEDURE — 3077F SYST BP >= 140 MM HG: CPT | Performed by: NURSE PRACTITIONER

## 2024-12-11 PROCEDURE — G8484 FLU IMMUNIZE NO ADMIN: HCPCS | Performed by: NURSE PRACTITIONER

## 2024-12-11 PROCEDURE — G8427 DOCREV CUR MEDS BY ELIG CLIN: HCPCS | Performed by: NURSE PRACTITIONER

## 2024-12-11 PROCEDURE — 3079F DIAST BP 80-89 MM HG: CPT | Performed by: NURSE PRACTITIONER

## 2024-12-11 PROCEDURE — 93880 EXTRACRANIAL BILAT STUDY: CPT

## 2024-12-11 PROCEDURE — G8417 CALC BMI ABV UP PARAM F/U: HCPCS | Performed by: NURSE PRACTITIONER

## 2024-12-11 PROCEDURE — 3017F COLORECTAL CA SCREEN DOC REV: CPT | Performed by: NURSE PRACTITIONER

## 2024-12-11 PROCEDURE — 99214 OFFICE O/P EST MOD 30 MIN: CPT | Performed by: NURSE PRACTITIONER

## 2024-12-11 PROCEDURE — 1036F TOBACCO NON-USER: CPT | Performed by: NURSE PRACTITIONER

## 2024-12-11 RX ORDER — AMITRIPTYLINE HYDROCHLORIDE 10 MG/1
TABLET ORAL
COMMUNITY
Start: 2024-09-24

## 2024-12-12 LAB
VAS LEFT ARM BP DIA: 82 MMHG
VAS LEFT ARM BP: 156 MMHG
VAS LEFT CCA MID EDV: 15.8 CM/S
VAS LEFT CCA MID PSV: 82.7 CM/S
VAS LEFT CCA PROX EDV: 18.1 CM/S
VAS LEFT CCA PROX PSV: 108 CM/S
VAS LEFT ECA EDV: 32.8 CM/S
VAS LEFT ECA PSV: 350 CM/S
VAS LEFT ICA DIST EDV: 29.5 CM/S
VAS LEFT ICA DIST PSV: 137 CM/S
VAS LEFT ICA MID EDV: 34.4 CM/S
VAS LEFT ICA MID PSV: 150 CM/S
VAS LEFT ICA PROX EDV: 26.5 CM/S
VAS LEFT ICA PROX PSV: 156 CM/S
VAS LEFT VERTEBRAL EDV: 12.9 CM/S
VAS LEFT VERTEBRAL PSV: 61 CM/S
VAS RIGHT ARM BP DIA: 90 MMHG
VAS RIGHT ARM BP: 158 MMHG
VAS RIGHT CCA MID EDV: 12.6 CM/S
VAS RIGHT CCA MID PSV: 71.1 CM/S
VAS RIGHT CCA PROX EDV: 11.7 CM/S
VAS RIGHT CCA PROX PSV: 87.9 CM/S
VAS RIGHT ECA PSV: 28.7 CM/S
VAS RIGHT ICA DIST EDV: 31.4 CM/S
VAS RIGHT ICA DIST PSV: 121 CM/S
VAS RIGHT ICA MID EDV: 20.4 CM/S
VAS RIGHT ICA MID PSV: 106 CM/S
VAS RIGHT ICA PROX EDV: 22 CM/S
VAS RIGHT ICA PROX PSV: 95.9 CM/S
VAS RIGHT VERTEBRAL EDV: 16.5 CM/S
VAS RIGHT VERTEBRAL PSV: 94.3 CM/S

## 2024-12-12 PROCEDURE — 93880 EXTRACRANIAL BILAT STUDY: CPT | Performed by: SURGERY

## 2024-12-26 ENCOUNTER — OFFICE VISIT (OUTPATIENT)
Dept: CARDIOLOGY CLINIC | Age: 59
End: 2024-12-26
Payer: MEDICARE

## 2024-12-26 VITALS
BODY MASS INDEX: 35.15 KG/M2 | DIASTOLIC BLOOD PRESSURE: 76 MMHG | SYSTOLIC BLOOD PRESSURE: 136 MMHG | HEART RATE: 78 BPM | WEIGHT: 191 LBS | HEIGHT: 62 IN

## 2024-12-26 DIAGNOSIS — E78.2 MIXED HYPERLIPIDEMIA: ICD-10-CM

## 2024-12-26 DIAGNOSIS — I25.10 CORONARY ARTERY DISEASE INVOLVING NATIVE CORONARY ARTERY OF NATIVE HEART WITHOUT ANGINA PECTORIS: Primary | ICD-10-CM

## 2024-12-26 DIAGNOSIS — I10 ESSENTIAL HYPERTENSION: ICD-10-CM

## 2024-12-26 DIAGNOSIS — I50.32 CHRONIC DIASTOLIC CONGESTIVE HEART FAILURE (HCC): ICD-10-CM

## 2024-12-26 PROCEDURE — 93000 ELECTROCARDIOGRAM COMPLETE: CPT | Performed by: INTERNAL MEDICINE

## 2024-12-26 PROCEDURE — 3017F COLORECTAL CA SCREEN DOC REV: CPT | Performed by: INTERNAL MEDICINE

## 2024-12-26 PROCEDURE — G8484 FLU IMMUNIZE NO ADMIN: HCPCS | Performed by: INTERNAL MEDICINE

## 2024-12-26 PROCEDURE — 3075F SYST BP GE 130 - 139MM HG: CPT | Performed by: INTERNAL MEDICINE

## 2024-12-26 PROCEDURE — 99214 OFFICE O/P EST MOD 30 MIN: CPT | Performed by: INTERNAL MEDICINE

## 2024-12-26 PROCEDURE — G8427 DOCREV CUR MEDS BY ELIG CLIN: HCPCS | Performed by: INTERNAL MEDICINE

## 2024-12-26 PROCEDURE — G8417 CALC BMI ABV UP PARAM F/U: HCPCS | Performed by: INTERNAL MEDICINE

## 2024-12-26 PROCEDURE — 3078F DIAST BP <80 MM HG: CPT | Performed by: INTERNAL MEDICINE

## 2024-12-26 PROCEDURE — 1036F TOBACCO NON-USER: CPT | Performed by: INTERNAL MEDICINE

## 2024-12-26 RX ORDER — NITROGLYCERIN 0.4 MG/1
0.4 TABLET SUBLINGUAL EVERY 5 MIN PRN
Qty: 25 TABLET | Refills: 3 | Status: SHIPPED | OUTPATIENT
Start: 2024-12-26

## 2024-12-26 ASSESSMENT — ENCOUNTER SYMPTOMS
EYES NEGATIVE: 1
DIARRHEA: 0
RESPIRATORY NEGATIVE: 1
NAUSEA: 0
GASTROINTESTINAL NEGATIVE: 1
SHORTNESS OF BREATH: 0
VOMITING: 0

## 2024-12-26 NOTE — PROGRESS NOTES
THORACOTOMY      UPPER GASTROINTESTINAL ENDOSCOPY  approx 2005    per pt recall \" Normal\"    UPPER GASTROINTESTINAL ENDOSCOPY N/A 06/19/2020    Dr Aguilar-NORMAL exam, neg EoE    VULVAR/PERINEAL BIOPSY  10/07/2015    Dr. Henning    VULVAR/PERINEAL BIOPSY N/A 07/02/2021    VULVAR BIOPSY performed by Irma Vazquez MD at [facility] OR     Family History   Problem Relation Age of Onset    Diabetes Father     Cancer Father     High Blood Pressure Father     Heart Attack Father     Colon Cancer Father     Diabetes Mother     Breast Cancer Mother 54    Heart Failure Mother         CHF, MVP    Liver Cancer Mother     Heart Failure Maternal Grandfather     Stroke Maternal Grandfather     Pancreatic Cancer Maternal Grandfather     Stomach Cancer Maternal Grandfather     Diabetes Brother     Colon Cancer Paternal Uncle     Rectal Cancer Other     Breast Cancer Maternal Grandmother 79    Colon Polyps Neg Hx     Crohn's Disease Neg Hx     Irritable Bowel Syndrome Neg Hx     Liver Disease Neg Hx      Allergies   Allergen Reactions    Codeine Other (See Comments)     Seizures as a child    Motrin [Ibuprofen] Swelling     Tongue to swell    Humalog [Insulin Lispro] Other (See Comments)     Shakiness, break out in a sweat at higher doses.    Ozempic (0.25 Or 0.5 Mg-Dose) [Semaglutide(0.25 Or 0.5mg-Dos)]      nausea     Current Outpatient Medications   Medication Sig Dispense Refill    nitroGLYCERIN (NITROSTAT) 0.4 MG SL tablet Place 1 tablet under the tongue every 5 minutes as needed for Chest pain 25 tablet 3    amitriptyline (ELAVIL) 10 MG tablet Take 1 tablet by mouth nightly      oxyBUTYnin (DITROPAN-XL) 10 MG extended release tablet TAKE 1 TABLET BY MOUTH DAILY 90 tablet 3    DULoxetine (CYMBALTA) 20 MG extended release capsule Take 1 capsule by mouth daily 30 capsule 3    ranolazine (RANEXA) 1000 MG extended release tablet TAKE 1 TABLET BY MOUTH 2 TIMES DAILY 180 tablet 1    rOPINIRole (REQUIP) 1 MG tablet Take 1

## 2025-01-08 ENCOUNTER — PATIENT MESSAGE (OUTPATIENT)
Age: 60
End: 2025-01-08

## 2025-01-15 ENCOUNTER — OFFICE VISIT (OUTPATIENT)
Age: 60
End: 2025-01-15
Payer: MEDICARE

## 2025-01-15 ENCOUNTER — HOSPITAL ENCOUNTER (OUTPATIENT)
Dept: GENERAL RADIOLOGY | Age: 60
Discharge: HOME | End: 2025-01-15
Payer: MEDICARE

## 2025-01-15 VITALS
HEART RATE: 78 BPM | SYSTOLIC BLOOD PRESSURE: 120 MMHG | OXYGEN SATURATION: 96 % | WEIGHT: 183 LBS | DIASTOLIC BLOOD PRESSURE: 76 MMHG | HEIGHT: 62 IN | BODY MASS INDEX: 33.68 KG/M2 | TEMPERATURE: 97.2 F

## 2025-01-15 DIAGNOSIS — M54.50 ACUTE RIGHT-SIDED LOW BACK PAIN WITHOUT SCIATICA: ICD-10-CM

## 2025-01-15 DIAGNOSIS — Z87.891 PERSONAL HISTORY OF TOBACCO USE: ICD-10-CM

## 2025-01-15 DIAGNOSIS — N12 PYELONEPHRITIS: ICD-10-CM

## 2025-01-15 DIAGNOSIS — R11.0 NAUSEA: ICD-10-CM

## 2025-01-15 DIAGNOSIS — M54.50 ACUTE RIGHT-SIDED LOW BACK PAIN WITHOUT SCIATICA: Primary | ICD-10-CM

## 2025-01-15 LAB
APPEARANCE FLUID: ABNORMAL
BILIRUBIN, POC: ABNORMAL
BLOOD URINE, POC: ABNORMAL
CLARITY, POC: ABNORMAL
COLOR, POC: YELLOW
CREAT UR-MCNC: 216.7 MG/DL (ref 28–217)
GLUCOSE URINE, POC: 1000 MG/DL
KETONES, POC: ABNORMAL MG/DL
LEUKOCYTE EST, POC: ABNORMAL
MICROALBUMIN UR-MCNC: 50.1 MG/DL (ref 0–1.99)
MICROALBUMIN/CREAT UR-RTO: 231.2 MG/G
NITRITE, POC: ABNORMAL
PH, POC: 6
PROTEIN, POC: 100 MG/DL
SPECIFIC GRAVITY, POC: 1.02
UROBILINOGEN, POC: 0.2 MG/DL

## 2025-01-15 PROCEDURE — 81002 URINALYSIS NONAUTO W/O SCOPE: CPT | Performed by: NURSE PRACTITIONER

## 2025-01-15 PROCEDURE — 74018 RADEX ABDOMEN 1 VIEW: CPT

## 2025-01-15 RX ORDER — CEFTRIAXONE 500 MG/1
500 INJECTION, POWDER, FOR SOLUTION INTRAMUSCULAR; INTRAVENOUS ONCE
Status: COMPLETED | OUTPATIENT
Start: 2025-01-15 | End: 2025-01-15

## 2025-01-15 RX ORDER — CEPHALEXIN 500 MG/1
500 CAPSULE ORAL 3 TIMES DAILY
Qty: 30 CAPSULE | Refills: 0 | Status: SHIPPED | OUTPATIENT
Start: 2025-01-15 | End: 2025-01-25

## 2025-01-15 RX ADMIN — CEFTRIAXONE 500 MG: 500 INJECTION, POWDER, FOR SOLUTION INTRAMUSCULAR; INTRAVENOUS at 14:32

## 2025-01-15 SDOH — ECONOMIC STABILITY: FOOD INSECURITY: WITHIN THE PAST 12 MONTHS, THE FOOD YOU BOUGHT JUST DIDN'T LAST AND YOU DIDN'T HAVE MONEY TO GET MORE.: SOMETIMES TRUE

## 2025-01-15 SDOH — ECONOMIC STABILITY: FOOD INSECURITY: WITHIN THE PAST 12 MONTHS, YOU WORRIED THAT YOUR FOOD WOULD RUN OUT BEFORE YOU GOT MONEY TO BUY MORE.: SOMETIMES TRUE

## 2025-01-15 ASSESSMENT — PATIENT HEALTH QUESTIONNAIRE - PHQ9
1. LITTLE INTEREST OR PLEASURE IN DOING THINGS: NOT AT ALL
SUM OF ALL RESPONSES TO PHQ QUESTIONS 1-9: 0
SUM OF ALL RESPONSES TO PHQ9 QUESTIONS 1 & 2: 0
2. FEELING DOWN, DEPRESSED OR HOPELESS: NOT AT ALL

## 2025-01-15 ASSESSMENT — ENCOUNTER SYMPTOMS
RESPIRATORY NEGATIVE: 1
BACK PAIN: 1
GASTROINTESTINAL NEGATIVE: 1
EYES NEGATIVE: 1

## 2025-01-15 NOTE — PROGRESS NOTES
CPAP  Past: Tylenol, Vicks VapoRub    Prior to Visit Medications    Medication Sig Taking? Authorizing Provider   cephALEXin (KEFLEX) 500 MG capsule Take 1 capsule by mouth 3 times daily for 10 days Yes Salma Gonzalez APRN   nitroGLYCERIN (NITROSTAT) 0.4 MG SL tablet Place 1 tablet under the tongue every 5 minutes as needed for Chest pain Yes Jose Cr MD   amitriptyline (ELAVIL) 10 MG tablet Take 1 tablet by mouth nightly Yes Thony Rhodes MD   oxyBUTYnin (DITROPAN-XL) 10 MG extended release tablet TAKE 1 TABLET BY MOUTH DAILY Yes Salma Gonzalez APRN   DULoxetine (CYMBALTA) 20 MG extended release capsule Take 1 capsule by mouth daily Yes Salma Gonzalez APRN   ranolazine (RANEXA) 1000 MG extended release tablet TAKE 1 TABLET BY MOUTH 2 TIMES DAILY Yes Aby Mackey APRN   rOPINIRole (REQUIP) 1 MG tablet Take 1 tablet by mouth nightly Yes Salma Gonzalez APRN   glimepiride (AMARYL) 4 MG tablet Take 1 tablet by mouth every morning (before breakfast) Yes Salma Gonzalez APRN   ondansetron (ZOFRAN-ODT) 4 MG disintegrating tablet TAKE 1 TABLET BY MOUTH DAILY AS NEEDED FOR NAUSEA OR VOMITING Yes Salma Gonzaelz APRN   glipiZIDE (GLUCOTROL XL) 10 MG extended release tablet TAKE 1 TABLET BY MOUTH DAILY Yes Cy Fulton MD   lovastatin (MEVACOR) 40 MG tablet TAKE 1 TABLET BY MOUTH EVERY NIGHT AT BEDTIME Yes Cy Fulton MD   furosemide (LASIX) 40 MG tablet TAKE 1 TABLET BY MOUTH DAILY Yes Cy Fulton MD   metFORMIN (GLUCOPHAGE-XR) 500 MG extended release tablet TAKE 1 TABLET BY MOUTH TWICE DAILY Yes Cy Fulton MD   valsartan (DIOVAN) 80 MG tablet TAKE 1 TABLET BY MOUTH DAILY Yes Cy Fulton MD   nystatin (MYCOSTATIN) 197192 UNIT/GM powder Apply 3 times daily. Yes Salma Gonzalez APRN   metoprolol tartrate (LOPRESSOR) 25 MG tablet TAKE 1 TABLET BY MOUTH TWICE DAILY Yes Salma Gonzalez APRN   vitamin D (ERGOCALCIFEROL) 1.25 MG (86704 UT) CAPS capsule Take 1 capsule by mouth once a week Yes Carlos

## 2025-01-16 ENCOUNTER — TELEPHONE (OUTPATIENT)
Age: 60
End: 2025-01-16

## 2025-01-16 NOTE — TELEPHONE ENCOUNTER
----- Message from MIKE Stafford sent at 1/16/2025  7:34 AM CST -----  KUB negative for any stones noted

## 2025-01-17 ENCOUNTER — PATIENT MESSAGE (OUTPATIENT)
Age: 60
End: 2025-01-17

## 2025-01-17 DIAGNOSIS — Z79.4 TYPE 2 DIABETES MELLITUS WITH COMPLICATION, WITH LONG-TERM CURRENT USE OF INSULIN (HCC): Primary | ICD-10-CM

## 2025-01-17 DIAGNOSIS — E11.8 TYPE 2 DIABETES MELLITUS WITH COMPLICATION, WITH LONG-TERM CURRENT USE OF INSULIN (HCC): Primary | ICD-10-CM

## 2025-01-17 LAB
BACTERIA UR CULT: ABNORMAL
BACTERIA UR CULT: ABNORMAL
ORGANISM: ABNORMAL

## 2025-01-17 NOTE — TELEPHONE ENCOUNTER
Can you please send rx for needles?  I have also given patient number to heart [facility] as well

## 2025-01-28 ENCOUNTER — PATIENT MESSAGE (OUTPATIENT)
Age: 60
End: 2025-01-28

## 2025-01-28 DIAGNOSIS — R30.0 DYSURIA: Primary | ICD-10-CM

## 2025-01-31 DIAGNOSIS — R30.0 DYSURIA: Primary | ICD-10-CM

## 2025-01-31 DIAGNOSIS — R30.0 DYSURIA: ICD-10-CM

## 2025-02-03 LAB
BACTERIA UR CULT: ABNORMAL
ORGANISM: ABNORMAL

## 2025-02-04 ENCOUNTER — TELEPHONE (OUTPATIENT)
Age: 60
End: 2025-02-04

## 2025-02-04 RX ORDER — NITROFURANTOIN 25; 75 MG/1; MG/1
100 CAPSULE ORAL 2 TIMES DAILY
Qty: 20 CAPSULE | Refills: 0 | Status: SHIPPED | OUTPATIENT
Start: 2025-02-04 | End: 2025-02-14

## 2025-02-04 RX ORDER — FLUCONAZOLE 150 MG/1
150 TABLET ORAL DAILY
Qty: 3 TABLET | Refills: 0 | Status: SHIPPED | OUTPATIENT
Start: 2025-02-04 | End: 2025-02-07

## 2025-02-04 NOTE — TELEPHONE ENCOUNTER
----- Message from MKIE Stafford sent at 2/4/2025  3:40 PM CST -----  Abx and diflucan sent to pharmacy based on results of urine

## 2025-02-05 NOTE — TELEPHONE ENCOUNTER
August 22, 2024     Patient: Carroll Turcios  YOB: 2001  Date of Visit: 8/22/2024      To Whom it May Concern:    Carroll Turcios is under my professional care. Carroll was seen in my office on 8/22/2024. Carroll may return to work with limitations of lifting, pushing or pulling with the left arm until evaluation at next appointment in 3 weeks .    If you have any questions or concerns, please don't hesitate to call.         Sincerely,          Kris Jamil MD        CC: No Recipients   No answer VM full - sent [employer] message for pt to contact office

## 2025-02-25 ENCOUNTER — HOSPITAL ENCOUNTER (OUTPATIENT)
Dept: CT IMAGING | Age: 60
Discharge: HOME | End: 2025-02-25
Payer: MEDICARE

## 2025-02-25 DIAGNOSIS — Z87.891 PERSONAL HISTORY OF TOBACCO USE: ICD-10-CM

## 2025-02-25 PROCEDURE — 71271 CT THORAX LUNG CANCER SCR C-: CPT

## 2025-02-26 ENCOUNTER — OFFICE VISIT (OUTPATIENT)
Dept: NEUROLOGY | Age: 60
End: 2025-02-26
Payer: MEDICARE

## 2025-02-26 VITALS
SYSTOLIC BLOOD PRESSURE: 152 MMHG | BODY MASS INDEX: 33.68 KG/M2 | HEIGHT: 62 IN | DIASTOLIC BLOOD PRESSURE: 78 MMHG | WEIGHT: 183 LBS | HEART RATE: 76 BPM | OXYGEN SATURATION: 99 %

## 2025-02-26 DIAGNOSIS — G62.9 NEUROPATHY: ICD-10-CM

## 2025-02-26 DIAGNOSIS — I63.50 RIGHT PONTINE CEREBROVASCULAR ACCIDENT (HCC): Primary | ICD-10-CM

## 2025-02-26 DIAGNOSIS — G43.009 MIGRAINE WITHOUT AURA AND WITHOUT STATUS MIGRAINOSUS, NOT INTRACTABLE: ICD-10-CM

## 2025-02-26 PROCEDURE — G8417 CALC BMI ABV UP PARAM F/U: HCPCS | Performed by: NURSE PRACTITIONER

## 2025-02-26 PROCEDURE — 3078F DIAST BP <80 MM HG: CPT | Performed by: NURSE PRACTITIONER

## 2025-02-26 PROCEDURE — 1036F TOBACCO NON-USER: CPT | Performed by: NURSE PRACTITIONER

## 2025-02-26 PROCEDURE — 99214 OFFICE O/P EST MOD 30 MIN: CPT | Performed by: NURSE PRACTITIONER

## 2025-02-26 PROCEDURE — G8427 DOCREV CUR MEDS BY ELIG CLIN: HCPCS | Performed by: NURSE PRACTITIONER

## 2025-02-26 PROCEDURE — 3077F SYST BP >= 140 MM HG: CPT | Performed by: NURSE PRACTITIONER

## 2025-02-26 PROCEDURE — 3017F COLORECTAL CA SCREEN DOC REV: CPT | Performed by: NURSE PRACTITIONER

## 2025-02-26 RX ORDER — DULOXETINE 40 MG/1
40 CAPSULE, DELAYED RELEASE ORAL DAILY
Qty: 90 CAPSULE | Refills: 1 | Status: SHIPPED | OUTPATIENT
Start: 2025-02-26

## 2025-02-26 RX ORDER — UBROGEPANT 100 MG/1
TABLET ORAL
Qty: 16 TABLET | Refills: 3 | Status: SHIPPED | OUTPATIENT
Start: 2025-02-26

## 2025-02-26 NOTE — PROGRESS NOTES
REVIEW OF SYSTEMS    Constitutional: []Fever []Sweats []Chills [] Recent Injury [x] Denies all unless marked  HEENT:[]Headache  [] Head Injury [] Hearing Loss  [] Sore Throat  [] Ear Ache [x] Denies all unless marked  Spine:  [] Neck pain  [] Back pain  [] Sciaticia  [x] Denies all unless marked  Cardiovascular:[]Heart Disease []Palpitations [] Chest Pain   [x] Denies all unless marked  Pulmonary: []Shortness of Breath []Cough   [x] Denies all unless marked  Psychiatric/Behavioral:[] Depression [] Anxiety [x] Denies all unless marked  Gastrointestinal: []Nausea  []Vomiting  []Abdominal Pain  []Constipation  []Diarrhea  [x] Denies all unless marked  Genitourinary:   [] Frequency  [] Urgency  [] Dysuria [] Incontinence  [x] Denies all unless marked  Extremities: []Pain  []Swelling  [x] Denies all unless marked  Musculoskeletal: [] Myalgias  [] Joint Pain  [] Arthritis [] Muscle Cramps [] Muscle Twitches  [x] Denies all unless marked  Sleep: []Insomnia[]Snoring []Restless Legs  []Sleep Apnea  []Daytime Sleepiness  [x] Denies all unless marked  Skin:[] Rash [] Color Change [x] Denies all unless marked   Neurological:[]Visual Disturbance [] Memory Loss []Loss of Balance []Slurred Speech []Weakness []Seizures  [] Dizziness [x] Denies all unless marked    
dillon consistent with late acute to subacute lacunar infarct.  There is no intracranial mass or abnormal enhancement.  There is no acute ischemic infarct or acute intracranial hemorrhage.  The ventricles and sulci demonstrate a normal pattern.  Multiple foci of T2/FLAIR hyperintense signal are present in the subcortical and periventricular white matter, which could represent chronic white matter microvascular ischemic changes and/or migraine related changes.    Additional patchy T2/FLAIR hyperintensities are noted in the dillon.  The basilar cisterns are patent.  The posterior fossa structures are within normal limits.  The paranasal sinuses and mastoid air cells are well aerated.  The orbits are within normal limits.  No calvarial abnormality is identified.      1. Late acute to subacute lacunar infarct in the right dillon. 2. Mild chronic white matter microvascular ischemic changes versus migraine related changes. 3. Mild chronic pontine microvascular ischemic changes.  Unexpected finding #1 will be communicated with the ordering provider by [facility].   ______________________________________ Electronically signed by: KINJAL VALVERDE M.D. Date:     04/09/2024 Time:    16:06     Echocardiogram (5/2024)-EF 55-60%.  Normal valvular function.    Zio patch (5/2024)-sinus rhythm.  Low burden of PACs, atrial couplets, PVCs and ventricular couplets.  2 runs of nonsustained ventricular tachycardia.  No atrial fibrillation    Carotid artery ultrasound (5/2024)-calcific plaque in bilateral carotid arteries.  Less than 50% stenosis in the right ICA.  50 to 69% stenosis of the left ICA.  Normal antegrade flow in bilateral vertebral arteries.    Reviewed referral records, reviewed vascular records      ASSESSMENT:    Shari Aldrich is a 59 y.o. year old female here for follow-up of stroke, numbness and tingling of BLE, headaches.  She denies further clinical strokelike symptoms. She previously had a period of worsening

## 2025-02-28 DIAGNOSIS — E11.8 TYPE 2 DIABETES MELLITUS WITH COMPLICATION, WITH LONG-TERM CURRENT USE OF INSULIN (HCC): ICD-10-CM

## 2025-02-28 DIAGNOSIS — Z79.4 TYPE 2 DIABETES MELLITUS WITH COMPLICATION, WITH LONG-TERM CURRENT USE OF INSULIN (HCC): ICD-10-CM

## 2025-03-03 NOTE — TELEPHONE ENCOUNTER
Shari called requesting a refill of the below medication which has been pended for you:     Requested Prescriptions     Pending Prescriptions Disp Refills    BASAGLAR KWIKPEN 100 UNIT/ML injection pen [Pharmacy Med Name: BASAGLJOE HORNIKPEN 100UNIT SOLN PEN-INJ] 15 mL 3     Sig: INJECT 25 UNITS INTO THE SKIN NIGHTLY       Last Appointment Date: 1/15/2025  Next Appointment Date: Visit date not found    Allergies   Allergen Reactions    Codeine Other (See Comments)     Seizures as a child    Motrin [Ibuprofen] Swelling     Tongue to swell    Humalog [Insulin Lispro] Other (See Comments)     Shakiness, break out in a sweat at higher doses.    Ozempic (0.25 Or 0.5 Mg-Dose) [Semaglutide(0.25 Or 0.5mg-Dos)]      nausea

## 2025-03-04 ENCOUNTER — HOSPITAL ENCOUNTER (OUTPATIENT)
Dept: MRI IMAGING | Age: 60
Discharge: HOME | End: 2025-03-04
Payer: MEDICARE

## 2025-03-04 DIAGNOSIS — G43.009 MIGRAINE WITHOUT AURA AND WITHOUT STATUS MIGRAINOSUS, NOT INTRACTABLE: ICD-10-CM

## 2025-03-04 PROCEDURE — 70553 MRI BRAIN STEM W/O & W/DYE: CPT

## 2025-03-04 PROCEDURE — 6360000004 HC RX CONTRAST MEDICATION: Performed by: NURSE PRACTITIONER

## 2025-03-04 PROCEDURE — A9577 INJ MULTIHANCE: HCPCS | Performed by: NURSE PRACTITIONER

## 2025-03-04 RX ORDER — INSULIN GLARGINE 100 [IU]/ML
25 INJECTION, SOLUTION SUBCUTANEOUS NIGHTLY
Qty: 15 ML | Refills: 3 | Status: SHIPPED | OUTPATIENT
Start: 2025-03-04 | End: 2025-03-07

## 2025-03-04 RX ADMIN — GADOBENATE DIMEGLUMINE 17 ML: 529 INJECTION, SOLUTION INTRAVENOUS at 11:48

## 2025-03-06 ENCOUNTER — TELEPHONE (OUTPATIENT)
Dept: NEUROLOGY | Age: 60
End: 2025-03-06

## 2025-03-06 ENCOUNTER — PATIENT MESSAGE (OUTPATIENT)
Dept: NEUROLOGY | Age: 60
End: 2025-03-06

## 2025-03-06 NOTE — TELEPHONE ENCOUNTER
Tried to reach patient  per Leisa Holden her vm was full, will try to reach out to her again to discuss her recent results

## 2025-03-06 NOTE — TELEPHONE ENCOUNTER
Mild  versus migraine related changes. Old lacunar infarcts in the left cerebellum and right dillon.  Nothing acute

## 2025-03-07 ENCOUNTER — TELEPHONE (OUTPATIENT)
Dept: FAMILY MEDICINE CLINIC | Age: 60
End: 2025-03-07

## 2025-03-07 DIAGNOSIS — Z79.4 TYPE 2 DIABETES MELLITUS WITH COMPLICATION, WITH LONG-TERM CURRENT USE OF INSULIN (HCC): ICD-10-CM

## 2025-03-07 DIAGNOSIS — E11.8 TYPE 2 DIABETES MELLITUS WITH COMPLICATION, WITH LONG-TERM CURRENT USE OF INSULIN (HCC): ICD-10-CM

## 2025-03-07 RX ORDER — INSULIN GLARGINE 300 U/ML
25 INJECTION, SOLUTION SUBCUTANEOUS NIGHTLY
Qty: 15 ML | Refills: 3 | Status: SHIPPED | OUTPATIENT
Start: 2025-03-07

## 2025-03-07 RX ORDER — INSULIN GLARGINE 100 [IU]/ML
25 INJECTION, SOLUTION SUBCUTANEOUS NIGHTLY
Qty: 15 ML | Refills: 3 | OUTPATIENT
Start: 2025-03-07

## 2025-03-07 NOTE — TELEPHONE ENCOUNTER
Received fax from pharmacy requesting refill on pts medication(s). Pt was last seen in office on 1/15/25  and has a follow up scheduled for Visit date not found. Will send request to  Salma Gonzalez  for patient.     Requested Prescriptions     Pending Prescriptions Disp Refills    BASAGLAR KWIKPEN 100 UNIT/ML injection pen [Pharmacy Med Name: BASAGLAR KWIKPEN 100UNIT SOLN PEN-INJ] 15 mL 3     Sig: INJECT 25 UNITS INTO THE SKIN NIGHTLY       To pharmacy: INSURANCE WANTS LANTUS, TOUJEU, OR TRESIBA ONLY. CAN WE GET THAT CHANGED FOR HER PLEASE? THANKS!

## 2025-03-13 ENCOUNTER — OFFICE VISIT (OUTPATIENT)
Dept: CARDIOLOGY CLINIC | Age: 60
End: 2025-03-13
Payer: MEDICARE

## 2025-03-13 VITALS
OXYGEN SATURATION: 96 % | SYSTOLIC BLOOD PRESSURE: 118 MMHG | DIASTOLIC BLOOD PRESSURE: 66 MMHG | WEIGHT: 185 LBS | BODY MASS INDEX: 34.04 KG/M2 | HEART RATE: 72 BPM | HEIGHT: 62 IN

## 2025-03-13 DIAGNOSIS — I25.10 CORONARY ARTERY DISEASE INVOLVING NATIVE CORONARY ARTERY OF NATIVE HEART WITHOUT ANGINA PECTORIS: Primary | ICD-10-CM

## 2025-03-13 DIAGNOSIS — I10 ESSENTIAL HYPERTENSION: ICD-10-CM

## 2025-03-13 DIAGNOSIS — I50.32 CHRONIC DIASTOLIC CONGESTIVE HEART FAILURE (HCC): ICD-10-CM

## 2025-03-13 DIAGNOSIS — E78.2 MIXED HYPERLIPIDEMIA: ICD-10-CM

## 2025-03-13 PROCEDURE — 99214 OFFICE O/P EST MOD 30 MIN: CPT | Performed by: CLINICAL NURSE SPECIALIST

## 2025-03-13 PROCEDURE — 3078F DIAST BP <80 MM HG: CPT | Performed by: CLINICAL NURSE SPECIALIST

## 2025-03-13 PROCEDURE — G8427 DOCREV CUR MEDS BY ELIG CLIN: HCPCS | Performed by: CLINICAL NURSE SPECIALIST

## 2025-03-13 PROCEDURE — G8417 CALC BMI ABV UP PARAM F/U: HCPCS | Performed by: CLINICAL NURSE SPECIALIST

## 2025-03-13 PROCEDURE — 3017F COLORECTAL CA SCREEN DOC REV: CPT | Performed by: CLINICAL NURSE SPECIALIST

## 2025-03-13 PROCEDURE — 1036F TOBACCO NON-USER: CPT | Performed by: CLINICAL NURSE SPECIALIST

## 2025-03-13 PROCEDURE — 3074F SYST BP LT 130 MM HG: CPT | Performed by: CLINICAL NURSE SPECIALIST

## 2025-03-13 RX ORDER — ISOSORBIDE MONONITRATE 30 MG/1
30 TABLET, EXTENDED RELEASE ORAL DAILY
Qty: 30 TABLET | Refills: 3 | Status: SHIPPED | OUTPATIENT
Start: 2025-03-13

## 2025-03-13 NOTE — PROGRESS NOTES
[facility]  [address] [place] [address]  Phone: [phone]  Fax: [phone]    OFFICE VISIT:  3/13/2025    Shari Aldrich - : 1965    Reason For Visit:  Shari is a 59 y.o. female who is here for Follow-up, Coronary Artery Disease, and Hypertension    CAD with CABG ×3 in , hypertension, hyperlipidemia and diabetes . Heart catheterization in  showed occluded LIMA to the LAD with patient and grafts.  19  Cath  Occluded LIMA-LAD at the anastomosis to the LAD itself, patent VG-diag, patent VG-PDA, apical akinesis, EF 45%  Recommend continuing ongoing medical management     2D echo 3/4/2021 shows normal LV size and function with EF 55 to 60%.  No significant valvular disease noted  At previous visit in  patient complained of fatigue and daytime somnolence.  Sent for sleep study/evaluation.  Was found to have JAYSON and wearing her CPAP  and tring to adjust    Ranexa uptitrated last year due to anginal-like symptoms  Blanquita scan 2023 showed preserved ejection fraction with inferior apical scar.  No ischemia    She has occasional chest pressure but more TAVERAS  She states sometimes doing things around the house she is short of breath.  She has taken about 3 nitroglycerin in the past several months.  She does get relief with it.        Subjective  Shari denies exertional chest pain, resting shortness of breath, orthopnea, paroxysmal nocturnal dyspnea, syncope, presyncope, arrhythmia, edema and fatigue.  The patient denies numbness or weakness to suggest cerebrovascular accident or transient ischemic attack.    Salma Gonzalez APRN is PCP and follows labs .  Shari Aldrich has the following history as recorded in [facility]:    Patient Active Problem List    Diagnosis Date Noted    JAYSON (obstructive sleep apnea) 2022    Hypersomnia 2022    Non-restorative sleep 2022    Obesity (BMI 30-39.9) 2022    Diaphragmatic disorder 2022    Restrictive lung

## 2025-03-13 NOTE — PATIENT INSTRUCTIONS
Add Imdur 30mg daily   Follow up in June as planned  Maintain good blood pressure control-goal<130/80 at rest  Maintain good cholesterol control LDL goal<70 with arterial disease  If you are diabetic work to keep/obtain hemoglobin A1c< 7  Call with any questions or concerns  Follow up with Salma Gonzalez APRN for non cardiac problems  Report any new problems  Cardiovascular Fitness-Exercise as tolerated.  Strive for 30 minutes of exercise most days of the week.    Cardiac / Healthy Diet- Avoid processed high fat foods, maintain low sodium/salt   Continue current medications as directed  Continue plan of treatment  It is always recommended that you bring your medications bottles with you to each visit - this is for your safety!

## 2025-03-21 ENCOUNTER — RESULTS FOLLOW-UP (OUTPATIENT)
Dept: CT IMAGING | Age: 60
End: 2025-03-21

## 2025-04-01 RX ORDER — NYSTATIN 100000 [USP'U]/G
POWDER TOPICAL
Qty: 60 G | Refills: 1 | Status: SHIPPED | OUTPATIENT
Start: 2025-04-01

## 2025-04-01 NOTE — TELEPHONE ENCOUNTER
Received fax from pharmacy requesting refill on pts medication(s). Pt was last seen in office on 11/5/2024  and has a follow up scheduled for Visit date not found. Will send request to  Salma Gonzalez  for authorization.     Requested Prescriptions     Pending Prescriptions Disp Refills    nystatin (MYCOSTATIN) 057397 UNIT/GM powder [Pharmacy Med Name: NYSTATIN [id number] POWDER]  1     Sig: APPLY TO AFFECTED AREA 3 TIMES DAILY

## 2025-04-16 DIAGNOSIS — E11.8 TYPE 2 DIABETES MELLITUS WITH COMPLICATION, WITH LONG-TERM CURRENT USE OF INSULIN (HCC): Primary | ICD-10-CM

## 2025-04-16 DIAGNOSIS — Z79.4 TYPE 2 DIABETES MELLITUS WITH COMPLICATION, WITH LONG-TERM CURRENT USE OF INSULIN (HCC): Primary | ICD-10-CM

## 2025-05-01 ENCOUNTER — OFFICE VISIT (OUTPATIENT)
Age: 60
End: 2025-05-01
Payer: MEDICARE

## 2025-05-01 VITALS
HEIGHT: 62 IN | BODY MASS INDEX: 33.13 KG/M2 | DIASTOLIC BLOOD PRESSURE: 84 MMHG | SYSTOLIC BLOOD PRESSURE: 126 MMHG | OXYGEN SATURATION: 98 % | HEART RATE: 82 BPM | TEMPERATURE: 97.2 F | WEIGHT: 180 LBS

## 2025-05-01 DIAGNOSIS — F41.9 ANXIETY: ICD-10-CM

## 2025-05-01 DIAGNOSIS — E11.8 TYPE 2 DIABETES MELLITUS WITH COMPLICATION, WITH LONG-TERM CURRENT USE OF INSULIN (HCC): ICD-10-CM

## 2025-05-01 DIAGNOSIS — Z00.00 MEDICARE ANNUAL WELLNESS VISIT, SUBSEQUENT: Primary | ICD-10-CM

## 2025-05-01 DIAGNOSIS — K91.1 DUMPING SYNDROME: ICD-10-CM

## 2025-05-01 DIAGNOSIS — Z79.4 TYPE 2 DIABETES MELLITUS WITH COMPLICATION, WITH LONG-TERM CURRENT USE OF INSULIN (HCC): ICD-10-CM

## 2025-05-01 DIAGNOSIS — F32.0 MILD MAJOR DEPRESSION: ICD-10-CM

## 2025-05-01 DIAGNOSIS — R30.0 DYSURIA: ICD-10-CM

## 2025-05-01 LAB
APPEARANCE FLUID: ABNORMAL
BILIRUBIN, POC: ABNORMAL
BLOOD URINE, POC: ABNORMAL
CLARITY, POC: ABNORMAL
COLOR, POC: YELLOW
GLUCOSE URINE, POC: 1000 MG/DL
KETONES, POC: ABNORMAL MG/DL
LEUKOCYTE EST, POC: ABNORMAL
NITRITE, POC: ABNORMAL
PH, POC: 5.5
PROTEIN, POC: ABNORMAL MG/DL
SPECIFIC GRAVITY, POC: 1
UROBILINOGEN, POC: 0.2 MG/DL

## 2025-05-01 PROCEDURE — 81002 URINALYSIS NONAUTO W/O SCOPE: CPT | Performed by: NURSE PRACTITIONER

## 2025-05-01 ASSESSMENT — LIFESTYLE VARIABLES
HOW OFTEN DO YOU HAVE A DRINK CONTAINING ALCOHOL: NEVER
HOW MANY STANDARD DRINKS CONTAINING ALCOHOL DO YOU HAVE ON A TYPICAL DAY: PATIENT DOES NOT DRINK

## 2025-05-01 ASSESSMENT — PATIENT HEALTH QUESTIONNAIRE - PHQ9
SUM OF ALL RESPONSES TO PHQ QUESTIONS 1-9: 21
3. TROUBLE FALLING OR STAYING ASLEEP: NEARLY EVERY DAY
8. MOVING OR SPEAKING SO SLOWLY THAT OTHER PEOPLE COULD HAVE NOTICED. OR THE OPPOSITE, BEING SO FIGETY OR RESTLESS THAT YOU HAVE BEEN MOVING AROUND A LOT MORE THAN USUAL: NOT AT ALL
6. FEELING BAD ABOUT YOURSELF - OR THAT YOU ARE A FAILURE OR HAVE LET YOURSELF OR YOUR FAMILY DOWN: NEARLY EVERY DAY
2. FEELING DOWN, DEPRESSED OR HOPELESS: NEARLY EVERY DAY
5. POOR APPETITE OR OVEREATING: NEARLY EVERY DAY
SUM OF ALL RESPONSES TO PHQ QUESTIONS 1-9: 21
1. LITTLE INTEREST OR PLEASURE IN DOING THINGS: NEARLY EVERY DAY
4. FEELING TIRED OR HAVING LITTLE ENERGY: NEARLY EVERY DAY
SUM OF ALL RESPONSES TO PHQ QUESTIONS 1-9: 21
9. THOUGHTS THAT YOU WOULD BE BETTER OFF DEAD, OR OF HURTING YOURSELF: NOT AT ALL
10. IF YOU CHECKED OFF ANY PROBLEMS, HOW DIFFICULT HAVE THESE PROBLEMS MADE IT FOR YOU TO DO YOUR WORK, TAKE CARE OF THINGS AT HOME, OR GET ALONG WITH OTHER PEOPLE: EXTREMELY DIFFICULT
7. TROUBLE CONCENTRATING ON THINGS, SUCH AS READING THE NEWSPAPER OR WATCHING TELEVISION: NEARLY EVERY DAY
SUM OF ALL RESPONSES TO PHQ QUESTIONS 1-9: 21

## 2025-05-01 ASSESSMENT — COLUMBIA-SUICIDE SEVERITY RATING SCALE - C-SSRS
2. HAVE YOU ACTUALLY HAD ANY THOUGHTS OF KILLING YOURSELF?: NO
1. WITHIN THE PAST MONTH, HAVE YOU WISHED YOU WERE DEAD OR WISHED YOU COULD GO TO SLEEP AND NOT WAKE UP?: YES
6. HAVE YOU EVER DONE ANYTHING, STARTED TO DO ANYTHING, OR PREPARED TO DO ANYTHING TO END YOUR LIFE?: NO

## 2025-05-01 NOTE — PROGRESS NOTES
Medicare Annual Wellness Visit    Shari Aldrich is here for Medicare AWV and Health Maintenance (Defers C-Scope and mammogram at this time )    Assessment & Plan  1. Dumping syndrome post-cholecystectomy: Chronic.  - Advised to avoid food triggers and ensure adequate hydration.  - Urine test will be conducted to rule out any underlying conditions.  - Monitoring for any additional symptoms related to gastrointestinal distress.    2. Major depressive disorder: Chronic.  - Depression screening results indicate a high level of stress.  - Currently on medication for depression and anxiety.  - Discussion about possibly adding another medication if symptoms worsen and patient is not wanting to change meds at this time.  - Counseling provided regarding stress management and support resources.    3. Pruritus: Chronic.  - Reports chronic inflammation and itching, particularly in warmer weather.  - Advised to continue using nystatin cream as directed.  - Symptoms exacerbated by stress; discussed the importance of stress reduction.  - Further evaluation may be necessary if symptoms persist.    4. Diabetes mellitus: Chronic.  - Advised to start with a low dose of Toujeo, 5 to 10 units, and gradually increase the dosage each day.  - Prescription for Mounjaro will be sent to [facility] to see if insurance will cover it.  - If not covered, will continue with Toujeo.  - Monitoring blood glucose levels and adjusting treatment as necessary.    5. Headache: Chronic.  - Reports nausea after taking Imdur and insulin together.  - Advised to try taking Imdur in the morning with food to see if it reduces nausea.  - If nausea persists, further adjustments may be needed.  - Monitoring headache frequency and severity for any changes.    Follow-up  - Urine test  - Prescription for Mounjaro    Medicare annual wellness visit, subsequent  Mild major depression  Dysuria  -     POCT Urinalysis no Micro  Anxiety  Type 2 diabetes mellitus with

## 2025-05-05 DIAGNOSIS — Z79.4 TYPE 2 DIABETES MELLITUS WITH COMPLICATION, WITH LONG-TERM CURRENT USE OF INSULIN (HCC): ICD-10-CM

## 2025-05-05 DIAGNOSIS — E11.8 TYPE 2 DIABETES MELLITUS WITH COMPLICATION, WITH LONG-TERM CURRENT USE OF INSULIN (HCC): ICD-10-CM

## 2025-05-05 NOTE — TELEPHONE ENCOUNTER
Received fax from pharmacy requesting refill on pts medication(s). Pt was last seen in office on 5/1/2025  and has a follow up scheduled for 6/26/2025. Will send request to  Salma Gonzalez  for authorization.     Requested Prescriptions     Pending Prescriptions Disp Refills    glipiZIDE (GLUCOTROL XL) 10 MG extended release tablet [Pharmacy Med Name: GLIPIZIDE ER 10MG TABLET ER 24HR] 90 tablet 3     Sig: TAKE 1 TABLET BY MOUTH DAILY    lovastatin (MEVACOR) 40 MG tablet [Pharmacy Med Name: LOVASTATIN 40MG TABLET] 90 tablet 3     Sig: TAKE 1 TABLET BY MOUTH EVERY NIGHT AT BEDTIME    furosemide (LASIX) 40 MG tablet [Pharmacy Med Name: FUROSEMIDE 40MG TABLET] 90 tablet 3     Sig: TAKE 1 TABLET BY MOUTH DAILY    metFORMIN (GLUCOPHAGE-XR) 500 MG extended release tablet [Pharmacy Med Name: METFORMIN HYDROCHLORIDE ER 500MG ER TABLET ER 24HR] 180 tablet 3     Sig: TAKE 1 TABLET BY MOUTH TWICE DAILY    valsartan (DIOVAN) 80 MG tablet [Pharmacy Med Name: VALSARTAN 80MG TABLET] 90 tablet 3     Sig: TAKE 1 TABLET BY MOUTH DAILY

## 2025-05-06 RX ORDER — FUROSEMIDE 40 MG/1
40 TABLET ORAL DAILY
Qty: 90 TABLET | Refills: 3 | Status: SHIPPED | OUTPATIENT
Start: 2025-05-06

## 2025-05-06 RX ORDER — VALSARTAN 80 MG/1
80 TABLET ORAL DAILY
Qty: 90 TABLET | Refills: 3 | Status: SHIPPED | OUTPATIENT
Start: 2025-05-06

## 2025-05-06 RX ORDER — METFORMIN HYDROCHLORIDE 500 MG/1
500 TABLET, EXTENDED RELEASE ORAL 2 TIMES DAILY
Qty: 180 TABLET | Refills: 3 | Status: SHIPPED | OUTPATIENT
Start: 2025-05-06

## 2025-05-06 RX ORDER — GLIPIZIDE 10 MG/1
10 TABLET, FILM COATED, EXTENDED RELEASE ORAL DAILY
Qty: 90 TABLET | Refills: 3 | Status: SHIPPED | OUTPATIENT
Start: 2025-05-06

## 2025-05-06 RX ORDER — LOVASTATIN 40 MG/1
40 TABLET ORAL NIGHTLY
Qty: 90 TABLET | Refills: 3 | Status: SHIPPED | OUTPATIENT
Start: 2025-05-06

## 2025-06-05 RX ORDER — ISOSORBIDE MONONITRATE 30 MG/1
30 TABLET, EXTENDED RELEASE ORAL DAILY
Qty: 30 TABLET | Refills: 3 | Status: SHIPPED | OUTPATIENT
Start: 2025-06-05

## 2025-06-05 RX ORDER — TIRZEPATIDE 2.5 MG/.5ML
INJECTION, SOLUTION SUBCUTANEOUS
Qty: 2 ML | Refills: 1 | Status: SHIPPED | OUTPATIENT
Start: 2025-06-05

## 2025-06-05 NOTE — TELEPHONE ENCOUNTER
Shari Valdemar called to request a refill on her medication.      Last office visit : 5/1/2025   Next office visit : 6/26/2025     Requested Prescriptions     Pending Prescriptions Disp Refills    MOUNJARO 2.5 MG/0.5ML SOAJ pen [Pharmacy Med Name: MOUNJARO 2.5/0.5 SOLN AUTO-INJ] 2 mL 1     Sig: INJECT 2 & 1/2 (TWO AND A HALF) MILLIGRAM INTO THE SKIN EVERY 7 DAYS            Fany Resendiz MA

## 2025-06-05 NOTE — TELEPHONE ENCOUNTER
Refill requested on Imdur. Imdur was taken off medication list on 5/1/25 as not taking at PCP visit. Tried to call patient to verify if she was taking. No answer and unable to leave VM.

## 2025-06-18 ENCOUNTER — HOSPITAL ENCOUNTER (OUTPATIENT)
Dept: VASCULAR LAB | Age: 60
Discharge: HOME | End: 2025-06-20
Payer: MEDICARE

## 2025-06-18 ENCOUNTER — OFFICE VISIT (OUTPATIENT)
Dept: VASCULAR SURGERY | Age: 60
End: 2025-06-18
Payer: MEDICARE

## 2025-06-18 VITALS
SYSTOLIC BLOOD PRESSURE: 116 MMHG | HEART RATE: 72 BPM | OXYGEN SATURATION: 96 % | TEMPERATURE: 97.6 F | DIASTOLIC BLOOD PRESSURE: 62 MMHG

## 2025-06-18 DIAGNOSIS — I65.23 BILATERAL CAROTID ARTERY STENOSIS: ICD-10-CM

## 2025-06-18 DIAGNOSIS — I65.23 BILATERAL CAROTID ARTERY STENOSIS: Primary | ICD-10-CM

## 2025-06-18 PROCEDURE — 3074F SYST BP LT 130 MM HG: CPT | Performed by: NURSE PRACTITIONER

## 2025-06-18 PROCEDURE — 93880 EXTRACRANIAL BILAT STUDY: CPT

## 2025-06-18 PROCEDURE — 99214 OFFICE O/P EST MOD 30 MIN: CPT | Performed by: NURSE PRACTITIONER

## 2025-06-18 PROCEDURE — 3078F DIAST BP <80 MM HG: CPT | Performed by: NURSE PRACTITIONER

## 2025-06-18 PROCEDURE — G8427 DOCREV CUR MEDS BY ELIG CLIN: HCPCS | Performed by: NURSE PRACTITIONER

## 2025-06-18 PROCEDURE — 1036F TOBACCO NON-USER: CPT | Performed by: NURSE PRACTITIONER

## 2025-06-18 PROCEDURE — 3017F COLORECTAL CA SCREEN DOC REV: CPT | Performed by: NURSE PRACTITIONER

## 2025-06-18 PROCEDURE — G8417 CALC BMI ABV UP PARAM F/U: HCPCS | Performed by: NURSE PRACTITIONER

## 2025-06-19 NOTE — PROGRESS NOTES
Shari Aldrich (:  1965) is a 59 y.o. female,Established patient, here for evaluation of the following chief complaint(s):  6 Month Follow-Up (carotid)            SUBJECTIVE/OBJECTIVE:  She presents for follow up of carotid artery stenosis.  She has a known history of carotid artery stenosis for 1 - 5 years. Her current treatment includes asa.She denies a history of CVA.  She reports has not had TIA's, episodes of lateralizing weakness and episodes of amaurosis fugax.    I have personally reviewed the following: problem list, current meds, allergies, PMH, PSH, family hx, and social hx  Shari Aldrich is a 59 y.o. female with the following history as recorded in [facility]:  Patient Active Problem List    Diagnosis Date Noted    JAYSON (obstructive sleep apnea) 2022    Hypersomnia 2022    Non-restorative sleep 2022    Obesity (BMI 30-39.9) 2022    Diaphragmatic disorder 2022    Restrictive lung disease 2022    Diarrhea 2022    Constipation 2022    Right sided abdominal pain 2022    Left sided abdominal pain 2022    Gastroparesis due to DM (HCC) 2022    Claustrophobia 2024    Intolerance of continuous positive airway pressure (CPAP) ventilation 2024    Chronic diastolic congestive heart failure (HCC) 2021    Morbid (severe) obesity due to excess calories (HCC) 2020    Chronic nausea 2020    Chronic vomiting 2020    Change in bowel habits 2020    Family history of colon cancer 2020    Alternating constipation and diarrhea 2020    S/P cholecystectomy 2020    Chronic heartburn 2020    Gastroesophageal reflux disease without esophagitis 2018    Dysuria 2018    Vitamin D deficiency 2018    Urinary tract infection without hematuria 2018    Adhesive capsulitis of left shoulder 2018    Angina effort 2018    Coronary artery disease involving coronary bypass graft

## 2025-06-20 LAB
VAS LEFT CCA MID EDV: 19.6 CM/S
VAS LEFT CCA MID PSV: 77 CM/S
VAS LEFT CCA PROX EDV: 20.4 CM/S
VAS LEFT CCA PROX PSV: 92.7 CM/S
VAS LEFT ECA EDV: 47.1 CM/S
VAS LEFT ECA PSV: 432 CM/S
VAS LEFT ICA DIST EDV: 30.4 CM/S
VAS LEFT ICA DIST PSV: 121 CM/S
VAS LEFT ICA MID EDV: 30.4 CM/S
VAS LEFT ICA MID PSV: 141 CM/S
VAS LEFT ICA PROX EDV: 33.4 CM/S
VAS LEFT ICA PROX PSV: 132 CM/S
VAS LEFT VERTEBRAL PSV: 66.8 CM/S
VAS RIGHT CCA MID EDV: 11.8 CM/S
VAS RIGHT CCA MID PSV: 54.6 CM/S
VAS RIGHT CCA PROX EDV: 11.7 CM/S
VAS RIGHT CCA PROX PSV: 87.4 CM/S
VAS RIGHT ECA EDV: 8.25 CM/S
VAS RIGHT ECA PSV: 42.4 CM/S
VAS RIGHT ICA DIST EDV: 36.1 CM/S
VAS RIGHT ICA DIST PSV: 134 CM/S
VAS RIGHT ICA MID EDV: 26.7 CM/S
VAS RIGHT ICA MID PSV: 121 CM/S
VAS RIGHT ICA PROX EDV: 26.4 CM/S
VAS RIGHT ICA PROX PSV: 98.5 CM/S
VAS RIGHT VERTEBRAL EDV: 11 CM/S
VAS RIGHT VERTEBRAL PSV: 71.9 CM/S

## 2025-06-20 PROCEDURE — 93880 EXTRACRANIAL BILAT STUDY: CPT | Performed by: SURGERY

## 2025-06-30 ENCOUNTER — OFFICE VISIT (OUTPATIENT)
Dept: CARDIOLOGY CLINIC | Age: 60
End: 2025-06-30
Payer: MEDICARE

## 2025-06-30 VITALS
HEIGHT: 62 IN | HEART RATE: 72 BPM | WEIGHT: 172 LBS | BODY MASS INDEX: 31.65 KG/M2 | SYSTOLIC BLOOD PRESSURE: 116 MMHG | DIASTOLIC BLOOD PRESSURE: 72 MMHG

## 2025-06-30 DIAGNOSIS — Z95.1 STATUS POST AORTO-CORONARY ARTERY BYPASS GRAFT: ICD-10-CM

## 2025-06-30 DIAGNOSIS — I10 ESSENTIAL HYPERTENSION: ICD-10-CM

## 2025-06-30 DIAGNOSIS — I50.32 CHRONIC DIASTOLIC CONGESTIVE HEART FAILURE (HCC): ICD-10-CM

## 2025-06-30 DIAGNOSIS — I25.10 CORONARY ARTERY DISEASE INVOLVING NATIVE CORONARY ARTERY OF NATIVE HEART WITHOUT ANGINA PECTORIS: Primary | ICD-10-CM

## 2025-06-30 DIAGNOSIS — I63.50 RIGHT PONTINE CEREBROVASCULAR ACCIDENT (HCC): ICD-10-CM

## 2025-06-30 DIAGNOSIS — E78.2 MIXED HYPERLIPIDEMIA: ICD-10-CM

## 2025-06-30 PROCEDURE — 3074F SYST BP LT 130 MM HG: CPT | Performed by: INTERNAL MEDICINE

## 2025-06-30 PROCEDURE — G8427 DOCREV CUR MEDS BY ELIG CLIN: HCPCS | Performed by: INTERNAL MEDICINE

## 2025-06-30 PROCEDURE — 99214 OFFICE O/P EST MOD 30 MIN: CPT | Performed by: INTERNAL MEDICINE

## 2025-06-30 PROCEDURE — 1036F TOBACCO NON-USER: CPT | Performed by: INTERNAL MEDICINE

## 2025-06-30 PROCEDURE — G8417 CALC BMI ABV UP PARAM F/U: HCPCS | Performed by: INTERNAL MEDICINE

## 2025-06-30 PROCEDURE — 3017F COLORECTAL CA SCREEN DOC REV: CPT | Performed by: INTERNAL MEDICINE

## 2025-06-30 PROCEDURE — 3078F DIAST BP <80 MM HG: CPT | Performed by: INTERNAL MEDICINE

## 2025-06-30 RX ORDER — RANOLAZINE 500 MG/1
500 TABLET, EXTENDED RELEASE ORAL 2 TIMES DAILY
Qty: 60 TABLET | Refills: 3 | Status: SHIPPED | OUTPATIENT
Start: 2025-06-30

## 2025-06-30 ASSESSMENT — ENCOUNTER SYMPTOMS
SHORTNESS OF BREATH: 0
DIARRHEA: 0
NAUSEA: 0
EYES NEGATIVE: 1
GASTROINTESTINAL NEGATIVE: 1
RESPIRATORY NEGATIVE: 1
VOMITING: 0

## 2025-06-30 NOTE — PROGRESS NOTES
[facility]                            Date:  6/30/2025  Patient: Sahri Aldrich  Age:  59 y.o., 1965      Reason for evaluation:         SUBJECTIVE:    Returns today follow-up assessment coronary artery disease status post CABG hypertension hyperlipidemia chronic diastolic congestive heart failure previous stroke overall doing reasonably well.  She had to take 1 nitroglycerin tablet in the past 4 weeks.  Occasional chest discomfort overall pattern is unchanged since her last visit dyspnea stable denies palpitations no other complaints or issues reported.  She is able to perform most activities as long she does not have to get in a big hurry.  Blood pressure 116/72 heart 72 per    Review of Systems   Constitutional: Negative.  Negative for chills, fever and unexpected weight change.   HENT: Negative.     Eyes: Negative.    Respiratory: Negative.  Negative for shortness of breath.    Cardiovascular: Negative.  Negative for chest pain.   Gastrointestinal: Negative.  Negative for diarrhea, nausea and vomiting.   Endocrine: Negative.    Genitourinary: Negative.    Musculoskeletal: Negative.    Skin: Negative.    Neurological: Negative.    All other systems reviewed and are negative.        OBJECTIVE:    /72   Pulse 72   Ht 1.575 m (5' 2\")   Wt 78 kg (172 lb)   LMP 01/01/1998 (Approximate)   BMI 31.46 kg/m²     Labs:   CBC: No results for input(s): \"WBC\", \"HGB\", \"HCT\", \"PLT\" in the last 72 hours.  BMP:No results for input(s): \"NA\", \"K\", \"CO2\", \"BUN\", \"CREATININE\", \"LABGLOM\", \"GLUCOSE\" in the last 72 hours.  BNP: No results for input(s): \"BNP\" in the last 72 hours.  PT/INR: No results for input(s): \"PROTIME\", \"INR\" in the last 72 hours.  APTT:No results for input(s): \"APTT\" in the last 72 hours.  CARDIAC ENZYMES:No results for input(s): \"CKTOTAL\", \"CKMB\", \"CKMBINDEX\", \"TROPONINI\" in the last 72 hours.  FASTING LIPID PANEL:  Lab Results   Component Value Date/Time    HDL 66

## 2025-07-24 ENCOUNTER — TELEPHONE (OUTPATIENT)
Dept: NEUROLOGY | Age: 60
End: 2025-07-24

## 2025-07-24 NOTE — TELEPHONE ENCOUNTER
Shari called to reschedule an appointment for Follow Up. [facility] was unable to accommodate in the time frame needed. Please be advised that the best time to call Anytime.    Thank you.

## 2025-07-30 ENCOUNTER — OFFICE VISIT (OUTPATIENT)
Age: 60
End: 2025-07-30
Payer: MEDICARE

## 2025-07-30 VITALS
DIASTOLIC BLOOD PRESSURE: 80 MMHG | SYSTOLIC BLOOD PRESSURE: 130 MMHG | TEMPERATURE: 97.5 F | BODY MASS INDEX: 31.28 KG/M2 | HEIGHT: 62 IN | OXYGEN SATURATION: 98 % | WEIGHT: 170 LBS | HEART RATE: 78 BPM

## 2025-07-30 DIAGNOSIS — B37.31 VAGINAL YEAST INFECTION: ICD-10-CM

## 2025-07-30 DIAGNOSIS — E11.8 TYPE 2 DIABETES MELLITUS WITH COMPLICATION, WITH LONG-TERM CURRENT USE OF INSULIN (HCC): Primary | ICD-10-CM

## 2025-07-30 DIAGNOSIS — Z79.4 TYPE 2 DIABETES MELLITUS WITH COMPLICATION, WITH LONG-TERM CURRENT USE OF INSULIN (HCC): Primary | ICD-10-CM

## 2025-07-30 DIAGNOSIS — K04.7 DENTAL ABSCESS: ICD-10-CM

## 2025-07-30 DIAGNOSIS — F32.0 MILD MAJOR DEPRESSION: ICD-10-CM

## 2025-07-30 PROCEDURE — 3075F SYST BP GE 130 - 139MM HG: CPT | Performed by: NURSE PRACTITIONER

## 2025-07-30 PROCEDURE — G8427 DOCREV CUR MEDS BY ELIG CLIN: HCPCS | Performed by: NURSE PRACTITIONER

## 2025-07-30 PROCEDURE — 3079F DIAST BP 80-89 MM HG: CPT | Performed by: NURSE PRACTITIONER

## 2025-07-30 PROCEDURE — G8417 CALC BMI ABV UP PARAM F/U: HCPCS | Performed by: NURSE PRACTITIONER

## 2025-07-30 PROCEDURE — 99214 OFFICE O/P EST MOD 30 MIN: CPT | Performed by: NURSE PRACTITIONER

## 2025-07-30 PROCEDURE — 3046F HEMOGLOBIN A1C LEVEL >9.0%: CPT | Performed by: NURSE PRACTITIONER

## 2025-07-30 RX ORDER — FLUCONAZOLE 150 MG/1
150 TABLET ORAL DAILY
Qty: 3 TABLET | Refills: 0 | Status: SHIPPED | OUTPATIENT
Start: 2025-07-30 | End: 2025-08-02

## 2025-07-30 RX ORDER — CLINDAMYCIN HYDROCHLORIDE 300 MG/1
300 CAPSULE ORAL 3 TIMES DAILY
Qty: 30 CAPSULE | Refills: 0 | Status: SHIPPED | OUTPATIENT
Start: 2025-07-30 | End: 2025-08-09

## 2025-07-31 ASSESSMENT — ENCOUNTER SYMPTOMS
GASTROINTESTINAL NEGATIVE: 1
RESPIRATORY NEGATIVE: 1
EYES NEGATIVE: 1
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
## 2025-08-21 ENCOUNTER — HOSPITAL ENCOUNTER (OUTPATIENT)
Dept: GENERAL RADIOLOGY | Age: 60
Discharge: HOME | End: 2025-08-21
Payer: MEDICARE

## 2025-08-21 ENCOUNTER — PATIENT MESSAGE (OUTPATIENT)
Age: 60
End: 2025-08-21

## 2025-08-21 ENCOUNTER — OFFICE VISIT (OUTPATIENT)
Age: 60
End: 2025-08-21
Payer: MEDICARE

## 2025-08-21 VITALS
SYSTOLIC BLOOD PRESSURE: 124 MMHG | TEMPERATURE: 97.9 F | OXYGEN SATURATION: 97 % | HEART RATE: 78 BPM | DIASTOLIC BLOOD PRESSURE: 70 MMHG | WEIGHT: 168 LBS | BODY MASS INDEX: 30.91 KG/M2 | HEIGHT: 62 IN

## 2025-08-21 DIAGNOSIS — Z95.1 HISTORY OF CORONARY ARTERY BYPASS GRAFT: ICD-10-CM

## 2025-08-21 DIAGNOSIS — Z91.81 STATUS POST FALL: Primary | ICD-10-CM

## 2025-08-21 DIAGNOSIS — M79.642 LEFT HAND PAIN: ICD-10-CM

## 2025-08-21 DIAGNOSIS — Z91.81 STATUS POST FALL: ICD-10-CM

## 2025-08-21 PROCEDURE — G8427 DOCREV CUR MEDS BY ELIG CLIN: HCPCS | Performed by: NURSE PRACTITIONER

## 2025-08-21 PROCEDURE — 3074F SYST BP LT 130 MM HG: CPT | Performed by: NURSE PRACTITIONER

## 2025-08-21 PROCEDURE — 73130 X-RAY EXAM OF HAND: CPT

## 2025-08-21 PROCEDURE — 3078F DIAST BP <80 MM HG: CPT | Performed by: NURSE PRACTITIONER

## 2025-08-21 PROCEDURE — G8417 CALC BMI ABV UP PARAM F/U: HCPCS | Performed by: NURSE PRACTITIONER

## 2025-08-21 PROCEDURE — 99213 OFFICE O/P EST LOW 20 MIN: CPT | Performed by: NURSE PRACTITIONER

## 2025-08-21 PROCEDURE — 71046 X-RAY EXAM CHEST 2 VIEWS: CPT

## 2025-08-21 ASSESSMENT — ENCOUNTER SYMPTOMS
GASTROINTESTINAL NEGATIVE: 1
EYES NEGATIVE: 1
CHEST TIGHTNESS: 1

## 2025-08-22 ENCOUNTER — OFFICE VISIT (OUTPATIENT)
Age: 60
End: 2025-08-22
Payer: MEDICARE

## 2025-08-22 ENCOUNTER — TELEPHONE (OUTPATIENT)
Age: 60
End: 2025-08-22

## 2025-08-22 VITALS — HEIGHT: 62 IN | WEIGHT: 172.8 LBS | BODY MASS INDEX: 31.8 KG/M2

## 2025-08-22 DIAGNOSIS — S62.647A NONDISPLACED FRACTURE OF PROXIMAL PHALANX OF LEFT LITTLE FINGER, INITIAL ENCOUNTER FOR CLOSED FRACTURE: Primary | ICD-10-CM

## 2025-08-22 PROCEDURE — G8427 DOCREV CUR MEDS BY ELIG CLIN: HCPCS | Performed by: PHYSICIAN ASSISTANT

## 2025-08-22 PROCEDURE — G8417 CALC BMI ABV UP PARAM F/U: HCPCS | Performed by: PHYSICIAN ASSISTANT

## 2025-08-22 PROCEDURE — 3017F COLORECTAL CA SCREEN DOC REV: CPT | Performed by: PHYSICIAN ASSISTANT

## 2025-08-22 PROCEDURE — 99203 OFFICE O/P NEW LOW 30 MIN: CPT | Performed by: PHYSICIAN ASSISTANT

## 2025-08-22 PROCEDURE — 1036F TOBACCO NON-USER: CPT | Performed by: PHYSICIAN ASSISTANT

## 2025-08-22 RX ORDER — LIDOCAINE HYDROCHLORIDE 20 MG/ML
SOLUTION OROPHARYNGEAL
COMMUNITY
Start: 2025-07-08

## 2025-09-02 ENCOUNTER — OFFICE VISIT (OUTPATIENT)
Dept: NEUROLOGY | Age: 60
End: 2025-09-02
Payer: MEDICARE

## 2025-09-02 VITALS
BODY MASS INDEX: 30.91 KG/M2 | OXYGEN SATURATION: 98 % | DIASTOLIC BLOOD PRESSURE: 73 MMHG | WEIGHT: 168 LBS | HEIGHT: 62 IN | SYSTOLIC BLOOD PRESSURE: 145 MMHG | HEART RATE: 65 BPM

## 2025-09-02 DIAGNOSIS — M79.89 LEFT LEG SWELLING: ICD-10-CM

## 2025-09-02 DIAGNOSIS — G43.009 MIGRAINE WITHOUT AURA AND WITHOUT STATUS MIGRAINOSUS, NOT INTRACTABLE: ICD-10-CM

## 2025-09-02 DIAGNOSIS — Z86.73 HISTORY OF STROKE: Primary | ICD-10-CM

## 2025-09-02 DIAGNOSIS — G47.33 OSA (OBSTRUCTIVE SLEEP APNEA): ICD-10-CM

## 2025-09-02 DIAGNOSIS — G62.9 NEUROPATHY: ICD-10-CM

## 2025-09-02 PROCEDURE — 1036F TOBACCO NON-USER: CPT | Performed by: NURSE PRACTITIONER

## 2025-09-02 PROCEDURE — 3078F DIAST BP <80 MM HG: CPT | Performed by: NURSE PRACTITIONER

## 2025-09-02 PROCEDURE — G8417 CALC BMI ABV UP PARAM F/U: HCPCS | Performed by: NURSE PRACTITIONER

## 2025-09-02 PROCEDURE — 3017F COLORECTAL CA SCREEN DOC REV: CPT | Performed by: NURSE PRACTITIONER

## 2025-09-02 PROCEDURE — 99214 OFFICE O/P EST MOD 30 MIN: CPT | Performed by: NURSE PRACTITIONER

## 2025-09-02 PROCEDURE — 3077F SYST BP >= 140 MM HG: CPT | Performed by: NURSE PRACTITIONER

## 2025-09-02 PROCEDURE — G8427 DOCREV CUR MEDS BY ELIG CLIN: HCPCS | Performed by: NURSE PRACTITIONER

## 2025-09-05 ENCOUNTER — OFFICE VISIT (OUTPATIENT)
Age: 60
End: 2025-09-05
Payer: MEDICARE

## 2025-09-05 VITALS — BODY MASS INDEX: 31.76 KG/M2 | WEIGHT: 172.6 LBS | HEIGHT: 62 IN

## 2025-09-05 DIAGNOSIS — S62.647A NONDISPLACED FRACTURE OF PROXIMAL PHALANX OF LEFT LITTLE FINGER, INITIAL ENCOUNTER FOR CLOSED FRACTURE: Primary | ICD-10-CM

## 2025-09-05 PROCEDURE — G8417 CALC BMI ABV UP PARAM F/U: HCPCS | Performed by: PHYSICIAN ASSISTANT

## 2025-09-05 PROCEDURE — 99213 OFFICE O/P EST LOW 20 MIN: CPT | Performed by: PHYSICIAN ASSISTANT

## 2025-09-05 PROCEDURE — G8427 DOCREV CUR MEDS BY ELIG CLIN: HCPCS | Performed by: PHYSICIAN ASSISTANT

## 2025-09-05 PROCEDURE — 3017F COLORECTAL CA SCREEN DOC REV: CPT | Performed by: PHYSICIAN ASSISTANT

## 2025-09-05 PROCEDURE — 1036F TOBACCO NON-USER: CPT | Performed by: PHYSICIAN ASSISTANT

## (undated) DEVICE — BNDG ADHS CURAD FLX/FABRC 1X3IN STRL LF

## (undated) DEVICE — UNDERCAST PADDING: Brand: DEROYAL

## (undated) DEVICE — SUTURE VCRL SZ 3-0 L27IN ABSRB UD L26MM SH 1/2 CIR J416H

## (undated) DEVICE — 9165 UNIVERSAL PATIENT PLATE: Brand: 3M™

## (undated) DEVICE — SURGICAL PROCEDURE PACK GYNECOLOGIC MIN LOURDES HOSP

## (undated) DEVICE — CHLORAPREP 26ML ORANGE

## (undated) DEVICE — SHOULDER IMMOBILIZER: Brand: DEROYAL

## (undated) DEVICE — GLOVE SURG SZ 7 CRM LTX FREE POLYISOPRENE POLYMER BEAD ANTI

## (undated) DEVICE — CVR BRD ARM 13X30

## (undated) DEVICE — GLOVE SURG SZ 75 L12IN FNGR THK94MIL TRNSLUC YEL LTX

## (undated) DEVICE — FORCEPS BX 240CM 2.4MM L NDL RAD JAW 4 M00513334

## (undated) DEVICE — GLOVE SURG SZ 6 CRM LTX FREE POLYISOPRENE POLYMER BEAD ANTI

## (undated) DEVICE — PAD,NON-ADHERENT,3X8,STERILE,LF,1/PK: Brand: MEDLINE

## (undated) DEVICE — NDL HYPO PRECISIONGLIDE REG 25G 1 1/2

## (undated) DEVICE — SUTURE PERMAHAND SZ 3-0 L18IN NONABSORBABLE BLK L26MM SH C013D

## (undated) DEVICE — VELCLOSE LATEX FREE VELCRO ELASTIC BANDAGE 4INX5YD: Brand: VELCLOSE

## (undated) DEVICE — AIRWAY CIRCUIT: Brand: DEROYAL

## (undated) DEVICE — GLOVE SURG SZ 75 CRM LTX FREE POLYISOPRENE POLYMER BEAD ANTI

## (undated) DEVICE — SUTURE ETHLN SZ 4-0 L18IN NONABSORBABLE BLK L19MM PS-2 3/8 1667H

## (undated) DEVICE — SYR LL TP 10ML STRL

## (undated) DEVICE — DRAPE,U/ SHT,SPLIT,PLAS,STERIL: Brand: MEDLINE

## (undated) DEVICE — MAJOR BSIN SETUP PK

## (undated) DEVICE — CURITY NON-ADHERENT STRIPS: Brand: CURITY

## (undated) DEVICE — BANDAGE ESMARK 4IN ST

## (undated) DEVICE — DRAPE,EXTREMITY,89X128,STERILE: Brand: MEDLINE

## (undated) DEVICE — AMBU AURAONCE U SIZE 3: Brand: AURAONCE

## (undated) DEVICE — NDL HYPO PRECISIONGLIDE/REG 18G 11/2 PNK

## (undated) DEVICE — SKIN MARKER,REGULAR TIP WITH RULER: Brand: DEVON

## (undated) DEVICE — PUNCH SURG DIA5MM DISP FOR SKIN BX ACUPNCH 25 PER BX

## (undated) DEVICE — SOLUTION IV IRRIG POUR BRL 0.9% SODIUM CHL 2F7124